# Patient Record
Sex: MALE | Race: BLACK OR AFRICAN AMERICAN | HISPANIC OR LATINO | URBAN - METROPOLITAN AREA
[De-identification: names, ages, dates, MRNs, and addresses within clinical notes are randomized per-mention and may not be internally consistent; named-entity substitution may affect disease eponyms.]

---

## 2021-05-03 ENCOUNTER — INPATIENT (INPATIENT)
Facility: HOSPITAL | Age: 66
LOS: 6 days | Discharge: HOME | DRG: 287 | End: 2021-05-10
Attending: INTERNAL MEDICINE | Admitting: INTERNAL MEDICINE
Payer: COMMERCIAL

## 2021-05-03 VITALS
HEIGHT: 70 IN | DIASTOLIC BLOOD PRESSURE: 81 MMHG | OXYGEN SATURATION: 97 % | HEART RATE: 80 BPM | TEMPERATURE: 99 F | WEIGHT: 220.02 LBS | SYSTOLIC BLOOD PRESSURE: 126 MMHG | RESPIRATION RATE: 18 BRPM

## 2021-05-03 DIAGNOSIS — E89.0 POSTPROCEDURAL HYPOTHYROIDISM: Chronic | ICD-10-CM

## 2021-05-03 DIAGNOSIS — Z98.89 OTHER SPECIFIED POSTPROCEDURAL STATES: Chronic | ICD-10-CM

## 2021-05-03 LAB
ALBUMIN SERPL ELPH-MCNC: 4.4 G/DL — SIGNIFICANT CHANGE UP (ref 3.3–5)
ALP SERPL-CCNC: 52 U/L — SIGNIFICANT CHANGE UP (ref 40–120)
ALT FLD-CCNC: 19 U/L — SIGNIFICANT CHANGE UP (ref 10–45)
ANION GAP SERPL CALC-SCNC: 16 MMOL/L — SIGNIFICANT CHANGE UP (ref 5–17)
APPEARANCE UR: CLEAR — SIGNIFICANT CHANGE UP
APTT BLD: 30.1 SEC — SIGNIFICANT CHANGE UP (ref 27.5–35.5)
AST SERPL-CCNC: 22 U/L — SIGNIFICANT CHANGE UP (ref 10–40)
BACTERIA # UR AUTO: NEGATIVE — SIGNIFICANT CHANGE UP
BASE EXCESS BLDV CALC-SCNC: 3.9 MMOL/L — HIGH (ref -2–2)
BASOPHILS # BLD AUTO: 0.02 K/UL — SIGNIFICANT CHANGE UP (ref 0–0.2)
BASOPHILS NFR BLD AUTO: 0.2 % — SIGNIFICANT CHANGE UP (ref 0–2)
BILIRUB SERPL-MCNC: 0.6 MG/DL — SIGNIFICANT CHANGE UP (ref 0.2–1.2)
BILIRUB UR-MCNC: NEGATIVE — SIGNIFICANT CHANGE UP
BUN SERPL-MCNC: 15 MG/DL — SIGNIFICANT CHANGE UP (ref 7–23)
CA-I SERPL-SCNC: 1.12 MMOL/L — SIGNIFICANT CHANGE UP (ref 1.12–1.3)
CALCIUM SERPL-MCNC: 9.7 MG/DL — SIGNIFICANT CHANGE UP (ref 8.4–10.5)
CHLORIDE BLDV-SCNC: 110 MMOL/L — HIGH (ref 96–108)
CHLORIDE SERPL-SCNC: 105 MMOL/L — SIGNIFICANT CHANGE UP (ref 96–108)
CO2 BLDV-SCNC: 32 MMOL/L — HIGH (ref 22–30)
CO2 SERPL-SCNC: 22 MMOL/L — SIGNIFICANT CHANGE UP (ref 22–31)
COLOR SPEC: SIGNIFICANT CHANGE UP
CREAT SERPL-MCNC: 1.11 MG/DL — SIGNIFICANT CHANGE UP (ref 0.5–1.3)
D DIMER BLD IA.RAPID-MCNC: 336 NG/ML DDU — HIGH
DIFF PNL FLD: NEGATIVE — SIGNIFICANT CHANGE UP
EOSINOPHIL # BLD AUTO: 0.08 K/UL — SIGNIFICANT CHANGE UP (ref 0–0.5)
EOSINOPHIL NFR BLD AUTO: 0.9 % — SIGNIFICANT CHANGE UP (ref 0–6)
EPI CELLS # UR: 0 /HPF — SIGNIFICANT CHANGE UP
GAS PNL BLDV: 138 MMOL/L — SIGNIFICANT CHANGE UP (ref 135–145)
GAS PNL BLDV: SIGNIFICANT CHANGE UP
GAS PNL BLDV: SIGNIFICANT CHANGE UP
GLUCOSE BLDV-MCNC: 96 MG/DL — SIGNIFICANT CHANGE UP (ref 70–99)
GLUCOSE SERPL-MCNC: 90 MG/DL — SIGNIFICANT CHANGE UP (ref 70–99)
GLUCOSE UR QL: NEGATIVE — SIGNIFICANT CHANGE UP
HCO3 BLDV-SCNC: 30 MMOL/L — HIGH (ref 21–29)
HCT VFR BLD CALC: 43.8 % — SIGNIFICANT CHANGE UP (ref 39–50)
HCT VFR BLDA CALC: 46 % — SIGNIFICANT CHANGE UP (ref 39–50)
HGB BLD CALC-MCNC: 15.1 G/DL — SIGNIFICANT CHANGE UP (ref 13–17)
HGB BLD-MCNC: 14.4 G/DL — SIGNIFICANT CHANGE UP (ref 13–17)
IMM GRANULOCYTES NFR BLD AUTO: 0.6 % — SIGNIFICANT CHANGE UP (ref 0–1.5)
INR BLD: 1.12 RATIO — SIGNIFICANT CHANGE UP (ref 0.88–1.16)
KETONES UR-MCNC: NEGATIVE — SIGNIFICANT CHANGE UP
LACTATE BLDV-MCNC: 1.6 MMOL/L — SIGNIFICANT CHANGE UP (ref 0.7–2)
LEUKOCYTE ESTERASE UR-ACNC: ABNORMAL
LYMPHOCYTES # BLD AUTO: 1.59 K/UL — SIGNIFICANT CHANGE UP (ref 1–3.3)
LYMPHOCYTES # BLD AUTO: 17.9 % — SIGNIFICANT CHANGE UP (ref 13–44)
MCHC RBC-ENTMCNC: 29 PG — SIGNIFICANT CHANGE UP (ref 27–34)
MCHC RBC-ENTMCNC: 32.9 GM/DL — SIGNIFICANT CHANGE UP (ref 32–36)
MCV RBC AUTO: 88.3 FL — SIGNIFICANT CHANGE UP (ref 80–100)
MONOCYTES # BLD AUTO: 0.93 K/UL — HIGH (ref 0–0.9)
MONOCYTES NFR BLD AUTO: 10.5 % — SIGNIFICANT CHANGE UP (ref 2–14)
NEUTROPHILS # BLD AUTO: 6.21 K/UL — SIGNIFICANT CHANGE UP (ref 1.8–7.4)
NEUTROPHILS NFR BLD AUTO: 69.9 % — SIGNIFICANT CHANGE UP (ref 43–77)
NITRITE UR-MCNC: NEGATIVE — SIGNIFICANT CHANGE UP
NRBC # BLD: 0 /100 WBCS — SIGNIFICANT CHANGE UP (ref 0–0)
PCO2 BLDV: 54 MMHG — HIGH (ref 35–50)
PH BLDV: 7.37 — SIGNIFICANT CHANGE UP (ref 7.35–7.45)
PH UR: 6.5 — SIGNIFICANT CHANGE UP (ref 5–8)
PLATELET # BLD AUTO: 194 K/UL — SIGNIFICANT CHANGE UP (ref 150–400)
PO2 BLDV: <20 MMHG — LOW (ref 25–45)
POTASSIUM BLDV-SCNC: 3.5 MMOL/L — SIGNIFICANT CHANGE UP (ref 3.5–5.3)
POTASSIUM SERPL-MCNC: 3.8 MMOL/L — SIGNIFICANT CHANGE UP (ref 3.5–5.3)
POTASSIUM SERPL-SCNC: 3.8 MMOL/L — SIGNIFICANT CHANGE UP (ref 3.5–5.3)
PROT SERPL-MCNC: 8.2 G/DL — SIGNIFICANT CHANGE UP (ref 6–8.3)
PROT UR-MCNC: NEGATIVE — SIGNIFICANT CHANGE UP
PROTHROM AB SERPL-ACNC: 13.4 SEC — SIGNIFICANT CHANGE UP (ref 10.6–13.6)
RBC # BLD: 4.96 M/UL — SIGNIFICANT CHANGE UP (ref 4.2–5.8)
RBC # FLD: 15 % — HIGH (ref 10.3–14.5)
RBC CASTS # UR COMP ASSIST: 0 /HPF — SIGNIFICANT CHANGE UP (ref 0–4)
SAO2 % BLDV: 20 % — LOW (ref 67–88)
SARS-COV-2 RNA SPEC QL NAA+PROBE: SIGNIFICANT CHANGE UP
SODIUM SERPL-SCNC: 143 MMOL/L — SIGNIFICANT CHANGE UP (ref 135–145)
SP GR SPEC: 1.01 — SIGNIFICANT CHANGE UP (ref 1.01–1.02)
TROPONIN T, HIGH SENSITIVITY RESULT: 31 NG/L — SIGNIFICANT CHANGE UP (ref 0–51)
TROPONIN T, HIGH SENSITIVITY RESULT: 32 NG/L — SIGNIFICANT CHANGE UP (ref 0–51)
TSH SERPL-MCNC: 1.58 UIU/ML — SIGNIFICANT CHANGE UP (ref 0.27–4.2)
UROBILINOGEN FLD QL: NEGATIVE — SIGNIFICANT CHANGE UP
WBC # BLD: 8.88 K/UL — SIGNIFICANT CHANGE UP (ref 3.8–10.5)
WBC # FLD AUTO: 8.88 K/UL — SIGNIFICANT CHANGE UP (ref 3.8–10.5)
WBC UR QL: 5 /HPF — SIGNIFICANT CHANGE UP (ref 0–5)

## 2021-05-03 RX ORDER — IBUPROFEN 200 MG
600 TABLET ORAL ONCE
Refills: 0 | Status: COMPLETED | OUTPATIENT
Start: 2021-05-03 | End: 2021-05-03

## 2021-05-03 RX ORDER — ACETAMINOPHEN 500 MG
975 TABLET ORAL ONCE
Refills: 0 | Status: COMPLETED | OUTPATIENT
Start: 2021-05-03 | End: 2021-05-03

## 2021-05-03 RX ORDER — ASPIRIN/CALCIUM CARB/MAGNESIUM 324 MG
81 TABLET ORAL DAILY
Refills: 0 | Status: DISCONTINUED | OUTPATIENT
Start: 2021-05-03 | End: 2021-05-10

## 2021-05-03 RX ADMIN — Medication 975 MILLIGRAM(S): at 16:38

## 2021-05-03 RX ADMIN — Medication 600 MILLIGRAM(S): at 20:15

## 2021-05-03 NOTE — ED ADULT NURSE NOTE - OBJECTIVE STATEMENT
65 year old male presented to ED from home with c/o of left sided neck pain radiating down left arm, and mid sternal chest pain radiating down right side of abdomen since last night. hx throat ca in remission, HTN, denies cardiac hx, reports left shoulder sx and back sx. pt denies SOB, nausea/vomiting, numbness/tingling, fever, cough, chills, dizziness, headache, blurred vision, neuro intact. pt a&ox3, lung sounds clear, heart rate regular, on cardiac monitor, EKG completed handed to MD, abdomen soft nontender nondistended to palp. skin intact. IV in right AC 20G and patent. Will continue to monitor and assess while offering support and reassurance.

## 2021-05-03 NOTE — ED ADULT NURSE NOTE - CAS EDN DISCHARGE ASSESSMENT
Alert and oriented to person, place and time/Patient baseline mental status/Awake/Symptoms improved Alert and oriented to person, place and time

## 2021-05-03 NOTE — ED PROVIDER NOTE - PROGRESS NOTE DETAILS
spoke with radiology who saw mixing defect vs thrombus of left atrium would recommend echo. -Vilma Cameron PA-C

## 2021-05-03 NOTE — ED CDU PROVIDER INITIAL DAY NOTE - PROGRESS NOTE DETAILS
CDU PROGRESS NOTE AXEL PALOMINO: Pt resting comfortably, NAD, VSS. No events on telemetry. Will continue to monitor, plan for TTE and Stress in am.

## 2021-05-03 NOTE — ED CDU PROVIDER INITIAL DAY NOTE - DETAILS
66 yo male with pmh htn, thyroidectomy s/p thyroid ca c/o chest pain  Plan: frequent reeval, vitals q 4hrs, tele, TTE, Nuclear stress

## 2021-05-03 NOTE — ED CDU PROVIDER INITIAL DAY NOTE - NS ED ROS FT
Constitutional: No fever or chills  Eyes: No visual changes, eye pain or redness  HEENT: No throat pain, ear pain, nasal pain. No nose bleeding.  CV: +Right sided chest pain  Resp: No SOB no cough  GI: No abd pain. No nausea or vomiting. No diarrhea. No constipation.   : No dysuria, hematuria.   MSK: +Left sided neck pain  Skin: No rash  Neuro: No headache. No numbness or tingling. No weakness.

## 2021-05-03 NOTE — ED CDU PROVIDER DISPOSITION NOTE - CLINICAL COURSE
66 yo male with pmh htn, thyroidectomy s/p thyroid ca presenting with concern of left sided neck and right sided chest pain since yesterday. Pt states he has been doing a lot of work on his house, yesterday awoke with left posterior neck pain, worse with movement and tender to palpation, took a muscle relaxer which did not help the pain, did not take anything else for the pain. Pt then started to have sharp right sided chest discomfort which radiates to the right side of his abdomen, has been waxing and waning but now constant, worse with deep inspiration, has not taken anything for pain, no fevers, chills nausea, vomiting, hx of abd surgery, shortness of breath, difficulty breathing, diarrhea, constipation.  In ED, patient had ekg no signs of acute ischemia, troponin 32--31, Dimer 336, chest x ray no signs of acute pathology. CT angio chest showed No pulmonary embolism. Scattered ground glass opacities of uncertain etiology. Left atrial appendage mixing artifact versus thrombus. Pt sent to CDU for frequent reeval, vitals q 4hrs, telemetry monitoring, TTE, Nuclear stress. 64 yo male with pmh htn, thyroidectomy s/p thyroid ca presenting with concern of left sided neck and right sided chest pain since yesterday. Pt states he has been doing a lot of work on his house, yesterday awoke with left posterior neck pain, worse with movement and tender to palpation, took a muscle relaxer which did not help the pain, did not take anything else for the pain. Pt then started to have sharp right sided chest discomfort which radiates to the right side of his abdomen, has been waxing and waning but now constant, worse with deep inspiration, has not taken anything for pain, no fevers, chills nausea, vomiting, hx of abd surgery, shortness of breath, difficulty breathing, diarrhea, constipation.  In ED, patient had ekg no signs of acute ischemia, troponin 32--31, Dimer 336, chest x ray no signs of acute pathology. CT angio chest showed No pulmonary embolism. Scattered ground glass opacities of uncertain etiology. Left atrial appendage mixing artifact versus thrombus. Pt sent to CDU for frequent reeval, vitals q 4hrs, telemetry monitoring, TTE, Nuclear stress.  No events on telemetry overnight. Pt with no return of CP/SOB in AM.   Called by cardiologist in stress lab, unable to stress pt with questionable LA thrombus noted on CT. Pt to be admitted for JERAD as TTE is suboptimal imaging to evaluate. Admission d/w Dr. Brizuela who will obtain cards consult.

## 2021-05-03 NOTE — ED PROVIDER NOTE - ATTENDING CONTRIBUTION TO CARE
RGUJRAL 66yo male hx listed presents with chest pain since yesterday. Pain is right side, worse with inspiration and self limiting. Pt also reports L side neck pain that is worse with movement and reproducible, states he has been doing a lot of housework. No numbness, tingling, weakness, HA, change in vision, n/v.   On exam, Patient is awake, alert and oriented x 3.  Patient is well appearing and in no acute distress.  NCAT, PERRL, EOMI.  Neck is supple, No LAD. + L TTP paraspinal.   Lungs are CTA B/L,+S1S2 no murmurs,  Abdomen:Soft nd/nt+bs no rebound or guarding.  Extremity no edema or calf tender.  Skin with no rash.  Neuro CN3-12 intact. Strength 5/5 in upper and lower extremities. Nml Sensation.Gait normal.   Check labs, EKG. Neck pain likely MSK, low suspicion for dissection, no manipulation or trauma.   Eval for ACS and CDU for stress test.

## 2021-05-03 NOTE — ED PROVIDER NOTE - PHYSICAL EXAMINATION
A&Ox3, NAD. NCAT. PERRL, EOMI. Neck supple, + left trapezius ttp, no LAD. Lungs CTAB. +S1S2, RRR, No m/r/g. Abd soft, NT/ND, neg murphys sign,  +BS, no rebound or guarding. Extremities: cap refill <2, pulses in distal extremities 4+, no edema. Skin without rash. CN II-XII intact. Strength 5/5 UE/LE. Sensations intact throughout. Gait steady.

## 2021-05-03 NOTE — ED CDU PROVIDER DISPOSITION NOTE - NSFOLLOWUPINSTRUCTIONS_ED_ALL_ED_FT
1. Follow up with your PMD within 48-72 hours.   You may schedule appointment with Cardiology clinic this week by calling [phone]  2. Show copies of your reports given to you. Take all of your other medications as previously prescribed.   3. Worsening or continued chest pain, shortness of breath, weakness, return to ED.

## 2021-05-03 NOTE — ED CDU PROVIDER INITIAL DAY NOTE - OBJECTIVE STATEMENT
64 yo male with pmh htn, thyroidectomy s/p thyroid ca presenting with concern of left sided neck and right sided chest pain since yesterday. Pt states he has been doing a lot of work on his house, yesterday awoke with left posterior neck pain, worse with movement and tender to palpation, took a muscle relaxer which did not help the pain, did not take anything else for the pain. Pt then started to have sharp right sided chest discomfort which radiates to the right side of his abdomen, has been waxing and waning but now constant, worse with deep inspiration, has not taken anything for pain, no fevers, chills nausea, vomiting, hx of abd surgery, shortness of breath, difficulty breathing, diarrhea, constipation.  In ED, patient had ekg no signs of acute ischemia, troponin 32--31, Dimer 336, chest x ray no signs of acute pathology. CT angio chest showed No pulmonary embolism. Scattered ground glass opacities of uncertain etiology. Left atrial appendage mixing artifact versus thrombus. Pt sent to CDU for frequent reeval, vitals q 4hrs, telemetry monitoring, TTE, Nuclear stress.

## 2021-05-03 NOTE — ED PROVIDER NOTE - NS ED ROS FT
Constitutional: No fever or chills  Eyes: No visual changes, eye pain or redness  HEENT: No throat pain, ear pain, nasal pain. No nose bleeding.  CV: see hpi  Resp: No SOB no cough  GI: No abd pain. No nausea or vomiting. No diarrhea. No constipation.   : No dysuria, hematuria.   MSK: see hpi  Skin: No rash  Neuro: No headache. No numbness or tingling. No weakness.

## 2021-05-03 NOTE — ED PROVIDER NOTE - OBJECTIVE STATEMENT
64 yo male with pmh htn, thyroidectomy presenting with concern of left sided neck and right sided chest pain since yesterday. Pt states he has been doing a lot of 64 yo male with pmh htn, thyroidectomy s/p thyroid ca presenting with concern of left sided neck and right sided chest pain since yesterday. Pt states he has been doing a lot of work on his house, yesterday awoke with left posterior neck pain, worse with movement and tender to palpation, took a muscle relaxer which did not help the pain, did not take anything else for the pain. Pt then started to have sharp right sided chest discomfort which radiates to the right side of his abdomen, has been waxing and waning but now constant, worse with deep inspiration, has not taken anything for pain, no fevers, chills nausea, vomiting, hx of abd surgery, shortness of breath, difficulty breathing, diarrhea, constipation.

## 2021-05-04 DIAGNOSIS — R91.8 OTHER NONSPECIFIC ABNORMAL FINDING OF LUNG FIELD: ICD-10-CM

## 2021-05-04 DIAGNOSIS — R07.89 OTHER CHEST PAIN: ICD-10-CM

## 2021-05-04 DIAGNOSIS — M54.2 CERVICALGIA: ICD-10-CM

## 2021-05-04 DIAGNOSIS — J45.909 UNSPECIFIED ASTHMA, UNCOMPLICATED: ICD-10-CM

## 2021-05-04 DIAGNOSIS — R07.9 CHEST PAIN, UNSPECIFIED: ICD-10-CM

## 2021-05-04 LAB
A1C WITH ESTIMATED AVERAGE GLUCOSE RESULT: 6 % — HIGH (ref 4–5.6)
CHOLEST SERPL-MCNC: 177 MG/DL — SIGNIFICANT CHANGE UP
ESTIMATED AVERAGE GLUCOSE: 126 MG/DL — HIGH (ref 68–114)
HDLC SERPL-MCNC: 47 MG/DL — SIGNIFICANT CHANGE UP
LIPID PNL WITH DIRECT LDL SERPL: 112 MG/DL — HIGH
NON HDL CHOLESTEROL: 130 MG/DL — HIGH
TRIGL SERPL-MCNC: 86 MG/DL — SIGNIFICANT CHANGE UP

## 2021-05-04 PROCEDURE — 31575 DIAGNOSTIC LARYNGOSCOPY: CPT

## 2021-05-04 PROCEDURE — 93306 TTE W/DOPPLER COMPLETE: CPT | Mod: 26

## 2021-05-04 PROCEDURE — 99222 1ST HOSP IP/OBS MODERATE 55: CPT | Mod: 25

## 2021-05-04 RX ORDER — ACETAMINOPHEN 500 MG
975 TABLET ORAL ONCE
Refills: 0 | Status: COMPLETED | OUTPATIENT
Start: 2021-05-04 | End: 2021-05-04

## 2021-05-04 RX ORDER — BUDESONIDE AND FORMOTEROL FUMARATE DIHYDRATE 160; 4.5 UG/1; UG/1
2 AEROSOL RESPIRATORY (INHALATION)
Refills: 0 | Status: DISCONTINUED | OUTPATIENT
Start: 2021-05-04 | End: 2021-05-10

## 2021-05-04 RX ORDER — ALBUTEROL 90 UG/1
1 AEROSOL, METERED ORAL
Refills: 0 | Status: DISCONTINUED | OUTPATIENT
Start: 2021-05-04 | End: 2021-05-10

## 2021-05-04 RX ORDER — TAMSULOSIN HYDROCHLORIDE 0.4 MG/1
0.4 CAPSULE ORAL AT BEDTIME
Refills: 0 | Status: DISCONTINUED | OUTPATIENT
Start: 2021-05-04 | End: 2021-05-10

## 2021-05-04 RX ORDER — ACETAMINOPHEN 500 MG
650 TABLET ORAL EVERY 6 HOURS
Refills: 0 | Status: DISCONTINUED | OUTPATIENT
Start: 2021-05-04 | End: 2021-05-10

## 2021-05-04 RX ADMIN — BUDESONIDE AND FORMOTEROL FUMARATE DIHYDRATE 2 PUFF(S): 160; 4.5 AEROSOL RESPIRATORY (INHALATION) at 17:06

## 2021-05-04 RX ADMIN — Medication 81 MILLIGRAM(S): at 07:49

## 2021-05-04 RX ADMIN — TAMSULOSIN HYDROCHLORIDE 0.4 MILLIGRAM(S): 0.4 CAPSULE ORAL at 21:46

## 2021-05-04 RX ADMIN — Medication 650 MILLIGRAM(S): at 23:20

## 2021-05-04 RX ADMIN — Medication 975 MILLIGRAM(S): at 04:50

## 2021-05-04 RX ADMIN — ALBUTEROL 1 PUFF(S): 90 AEROSOL, METERED ORAL at 23:28

## 2021-05-04 NOTE — CONSULT NOTE ADULT - PROBLEM SELECTOR RECOMMENDATION 9
-No acute ENT intervention necessary at this time  -Pt to follow with his out pt ENT as needed
CT chest with scattered GGO - unclear etiology  -Afebrile, normoxic   -COVID PCR neg  -Suggest f/u CT chest in ~1 month

## 2021-05-04 NOTE — H&P ADULT - NSICDXPASTMEDICALHX_GEN_ALL_CORE_FT
PAST MEDICAL HISTORY:  Gout     History of pituitary disease     Hypertension     Throat cancer     Thyroid cancer

## 2021-05-04 NOTE — CONSULT NOTE ADULT - PROBLEM SELECTOR RECOMMENDATION 2
by hx - recently diagnosed by PCP  -Start Symbicort 160mcg/4.5 mcg 2 puffs BID (Home med: Advair)  -Albuterol HFA 2 puffs q6h PRN

## 2021-05-04 NOTE — H&P ADULT - NSICDXPASTSURGICALHX_GEN_ALL_CORE_FT
HC routed chart stating patient having issues with metformin. Prior PharmD message notes improved tolerability.     Attempted outreach. No answer - no voicemail available. Sent my chart message.     Will continue to monitor and reach out.    PAST SURGICAL HISTORY:  H/O thyroidectomy

## 2021-05-04 NOTE — CONSULT NOTE ADULT - SUBJECTIVE AND OBJECTIVE BOX
PULMONARY CONSULT    HPI: 64 y/o M with PMH HTN, thyroidectomy s/p thyroid CA. Presents with left sided neck and right sided chest pain since yesterday. Pt states he has been doing a lot of work on his house, yesterday awoke with left posterior neck pain, worse with movement and tender to palpation, took a muscle relaxer which did not help the pain, did not take anything else for the pain. Pt then started to have sharp right sided chest discomfort which radiates to the right side of his abdomen, has been waxing and waning but now constant, worse with deep inspiration. CTA chest neg PE, found to have L atrial appendage mixing artifact vs. thrombus, and scattered GGO of uncertain etiology.       PAST MEDICAL & SURGICAL HISTORY:  Hypertension  Thyroid cancer  Throat cancer  History of pituitary disease  Gout  H/O thyroidectomy    Allergies  Allergy Status Unknown    FAMILY HISTORY:  No pertinent family history in first degree relatives    Social history:     Review of Systems:  CONSTITUTIONAL: No fever, chills, or fatigue  EYES: No eye pain, visual disturbances, or discharge  ENMT:  No difficulty hearing, tinnitus, vertigo; No sinus or throat pain  NECK: No pain or stiffness  RESPIRATORY: Per above  CARDIOVASCULAR: Per above  GASTROINTESTINAL: No abdominal or epigastric pain. No nausea, vomiting, or hematemesis; No diarrhea or constipation. No melena or hematochezia.  GENITOURINARY: No dysuria, frequency, hematuria, or incontinence  NEUROLOGICAL: No headaches, memory loss, loss of strength, numbness, or tremors  SKIN: No itching, burning, rashes, or lesions   MUSCULOSKELETAL: No joint pain or swelling; No muscle, back, or extremity pain  PSYCHIATRIC: No depression, anxiety, mood swings, or difficulty sleeping      Medications:  MEDICATIONS  (STANDING):  aspirin enteric coated 81 milliGRAM(s) Oral daily  tamsulosin 0.4 milliGRAM(s) Oral at bedtime    MEDICATIONS  (PRN):  acetaminophen   Tablet .. 650 milliGRAM(s) Oral every 6 hours PRN Temp greater or equal to 38.5C (101.3F), Mild Pain (1 - 3)  ALBUTerol    90 MICROgram(s) HFA Inhaler 1 Puff(s) Inhalation four times a day PRN Shortness of Breath and/or Wheezing            Vital Signs Last 24 Hrs  T(C): 36.7 (04 May 2021 11:36), Max: 36.7 (03 May 2021 23:06)  T(F): 98.1 (04 May 2021 11:36), Max: 98.1 (03 May 2021 23:06)  HR: 74 (04 May 2021 11:36) (65 - 79)  BP: 113/75 (04 May 2021 11:36) (106/71 - 144/71)  BP(mean): --  RR: 18 (04 May 2021 07:52) (16 - 20)  SpO2: 96% (04 May 2021 11:36) (96% - 100%)      VBG pH 7.37 05-03 @ 15:00  VBG pCO2 54 05-03 @ 15:00  VBG O2 sat 20 05-03 @ 15:00  VBG lactate 1.6 05-03 @ 15:00            LABS:                        14.4   8.88  )-----------( 194      ( 03 May 2021 15:00 )             43.8     05-03    143  |  105  |  15  ----------------------------<  90  3.8   |  22  |  1.11    Ca    9.7      03 May 2021 15:00    TPro  8.2  /  Alb  4.4  /  TBili  0.6  /  DBili  x   /  AST  22  /  ALT  19  /  AlkPhos  52  05-03          PT/INR - ( 03 May 2021 16:23 )   PT: 13.4 sec;   INR: 1.12 ratio         PTT - ( 03 May 2021 16:23 )  PTT:30.1 sec  Urinalysis Basic - ( 03 May 2021 16:45 )    Color: Light Yellow / Appearance: Clear / S.014 / pH: x  Gluc: x / Ketone: Negative  / Bili: Negative / Urobili: Negative   Blood: x / Protein: Negative / Nitrite: Negative   Leuk Esterase: Small / RBC: 0 /hpf / WBC 5 /HPF   Sq Epi: x / Non Sq Epi: 0 /hpf / Bacteria: Negative          Physical Examination:    General: No acute distress.      HEENT: Pupils equal, reactive to light.  Symmetric.    PULM: Clear to auscultation bilaterally, no significant sputum production    CVS: S1, S2    ABD: Soft, nondistended, nontender, normoactive bowel sounds, no masses    EXT: No edema, nontender    SKIN: Warm and well perfused, no rashes noted.    NEURO: Alert, oriented, interactive, nonfocal      RADIOLOGY REVIEWED    CT chest: < from: CT Angio Chest w/ IV Cont (21 @ 18:05) >  FINDINGS:    LUNGS AND AIRWAYS: Patent central airways.  Scattered groundglass opacities withlower lobe predominance. Mild bilateral lower lobe interlobular septal thickening.  PLEURA: No pleural effusion.  MEDIASTINUM AND COSME: No lymphadenopathy.  VESSELS: No pulmonary embolism  HEART: Heart size is normal. No pericardial effusion. Fillingdefect in the left atrial appendage.  CHEST WALL AND LOWER NECK: Within normal limits.  VISUALIZED UPPER ABDOMEN: Within normal limits.  BONES: Degenerative changes.    IMPRESSION:  No pulmonary embolism    Scattered ground glass opacities of uncertain etiology.    Left atrial appendage mixing artifact versus thrombus.      TTE: < from: Transthoracic Echocardiogram (21 @ 05:47) >  Dimensions:    Normal Values:  LA:     4.8    2.0 - 4.0 cm  Ao:     3.6    2.0 - 3.8 cm  SEPTUM: 1.0    0.6 - 1.2 cm  PWT:    1.0    0.6 - 1.1 cm  LVIDd:  5.2    3.0 - 5.6 cm  LVIDs:  3.2    1.8 - 4.0 cm  Derived variables:  LVMI: 89 g/m2  RWT: 0.38  EF (Visual Estimate): 65 %  Doppler Peak Velocity (m/sec): TV=2.8  ------------------------------------------------------------------------  Observations:  Mitral Valve: Normal mitral valve. Minimal mitral  regurgitation.  Aortic Valve/Aorta: Mildly calcified aortic valve with  normal opening. Mild aortic regurgitation.  Normal aortic root.  Left Atrium: Moderately dilated left atrium.  Left Ventricle: Normal left ventricular internal dimensions  and wall thicknesses.  Normal left ventricular systolic function. No segmental  wall motion abnormalities.  Left ventricular filling pressure is elevated.  Right Heart: Normal right atrium. Normal right ventricular  size and function.  Normal tricuspid valve. Mild tricuspid regurgitation.  Normal pulmonic valve. Mild pulmonic regurgitation.  Pericardium/Pleura: Normal pericardium with no pericardial  effusion.  Hemodynamic: Pulmonary hypertension. Estimated PASP = 32  mmHg + estimated mean right atrial pressure.  ------------------------------------------------------------------------  Conclusions:  Normal left ventricular systolic function. No segmental  wall motion abnormalities.  Left ventricular filling pressure is elevated.  Pulmonary hypertension. Estimated PASP = 32 mmHg +  estimated mean right atrial pressure.    < end of copied text >     PULMONARY CONSULT    HPI: 66 y/o M with PMH HTN, thyroidectomy s/p thyroid CA. Presents with left sided neck and right sided chest pain since yesterday. Pt states he has been doing a lot of work on his house, yesterday awoke with left posterior neck pain, worse with movement and tender to palpation. Pt then started to have sharp right sided chest discomfort which radiates to the right side of his abdomen, has been waxing and waning but now constant, worse with deep inspiration. CTA chest neg PE, found to have L atrial appendage mixing artifact vs. thrombus, and scattered GGO of uncertain etiology. 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 Denies fevers, chills, wheezing.       PAST MEDICAL & SURGICAL HISTORY:  Hypertension  Thyroid cancer  Throat cancer  History of pituitary disease  Gout  H/O thyroidectomy    Allergies  Allergy Status Unknown    FAMILY HISTORY:  No pertinent family history in first degree relatives    Social history: never a smoker     Review of Systems:  CONSTITUTIONAL: No fever, chills, or fatigue  EYES: No eye pain, visual disturbances, or discharge  ENMT:  No difficulty hearing, tinnitus, vertigo; No sinus or throat pain  NECK: No pain or stiffness  RESPIRATORY: Per above  CARDIOVASCULAR: Per above  GASTROINTESTINAL: No abdominal or epigastric pain. No nausea, vomiting, or hematemesis; No diarrhea or constipation. No melena or hematochezia.  GENITOURINARY: No dysuria, frequency, hematuria, or incontinence  NEUROLOGICAL: No headaches, memory loss, loss of strength, numbness, or tremors  SKIN: No itching, burning, rashes, or lesions   MUSCULOSKELETAL: Per above   PSYCHIATRIC: No depression, anxiety, mood swings, or difficulty sleeping      Medications:  MEDICATIONS  (STANDING):  aspirin enteric coated 81 milliGRAM(s) Oral daily  tamsulosin 0.4 milliGRAM(s) Oral at bedtime    MEDICATIONS  (PRN):  acetaminophen   Tablet .. 650 milliGRAM(s) Oral every 6 hours PRN Temp greater or equal to 38.5C (101.3F), Mild Pain (1 - 3)  ALBUTerol    90 MICROgram(s) HFA Inhaler 1 Puff(s) Inhalation four times a day PRN Shortness of Breath and/or Wheezing            Vital Signs Last 24 Hrs  T(C): 36.7 (04 May 2021 11:36), Max: 36.7 (03 May 2021 23:06)  T(F): 98.1 (04 May 2021 11:36), Max: 98.1 (03 May 2021 23:06)  HR: 74 (04 May 2021 11:36) (65 - 79)  BP: 113/75 (04 May 2021 11:36) (106/71 - 144/71)  BP(mean): --  RR: 18 (04 May 2021 07:52) (16 - 20)  SpO2: 96% (04 May 2021 11:36) (96% - 100%)      VBG pH 7.37 05-03 @ 15:00  VBG pCO2 54 05-03 @ 15:00  VBG O2 sat 20 05-03 @ 15:00  VBG lactate 1.6 05-03 @ 15:00            LABS:                        14.4   8.88  )-----------( 194      ( 03 May 2021 15:00 )             43.8     05-03    143  |  105  |  15  ----------------------------<  90  3.8   |  22  |  1.11    Ca    9.7      03 May 2021 15:00    TPro  8.2  /  Alb  4.4  /  TBili  0.6  /  DBili  x   /  AST  22  /  ALT  19  /  AlkPhos  52  05-03          PT/INR - ( 03 May 2021 16:23 )   PT: 13.4 sec;   INR: 1.12 ratio         PTT - ( 03 May 2021 16:23 )  PTT:30.1 sec  Urinalysis Basic - ( 03 May 2021 16:45 )    Color: Light Yellow / Appearance: Clear / S.014 / pH: x  Gluc: x / Ketone: Negative  / Bili: Negative / Urobili: Negative   Blood: x / Protein: Negative / Nitrite: Negative   Leuk Esterase: Small / RBC: 0 /hpf / WBC 5 /HPF   Sq Epi: x / Non Sq Epi: 0 /hpf / Bacteria: Negative          Physical Examination:    General: No acute distress.      HEENT: Pupils equal, reactive to light.  Symmetric.    PULM: Clear to auscultation bilaterally, no significant sputum production    CVS: RRR    ABD: Soft, nondistended, nontender, normoactive bowel sounds, no masses    EXT: No edema, nontender    SKIN: Warm and well perfused, no rashes noted.    NEURO: Alert, oriented, interactive, nonfocal      RADIOLOGY REVIEWED    CT chest: < from: CT Angio Chest w/ IV Cont (21 @ 18:05) >  FINDINGS:    LUNGS AND AIRWAYS: Patent central airways.  Scattered groundglass opacities withlower lobe predominance. Mild bilateral lower lobe interlobular septal thickening.  PLEURA: No pleural effusion.  MEDIASTINUM AND COSME: No lymphadenopathy.  VESSELS: No pulmonary embolism  HEART: Heart size is normal. No pericardial effusion. Fillingdefect in the left atrial appendage.  CHEST WALL AND LOWER NECK: Within normal limits.  VISUALIZED UPPER ABDOMEN: Within normal limits.  BONES: Degenerative changes.    IMPRESSION:  No pulmonary embolism    Scattered ground glass opacities of uncertain etiology.    Left atrial appendage mixing artifact versus thrombus.      TTE: < from: Transthoracic Echocardiogram (21 @ 05:47) >  Dimensions:    Normal Values:  LA:     4.8    2.0 - 4.0 cm  Ao:     3.6    2.0 - 3.8 cm  SEPTUM: 1.0    0.6 - 1.2 cm  PWT:    1.0    0.6 - 1.1 cm  LVIDd:  5.2    3.0 - 5.6 cm  LVIDs:  3.2    1.8 - 4.0 cm  Derived variables:  LVMI: 89 g/m2  RWT: 0.38  EF (Visual Estimate): 65 %  Doppler Peak Velocity (m/sec): TV=2.8  ------------------------------------------------------------------------  Observations:  Mitral Valve: Normal mitral valve. Minimal mitral  regurgitation.  Aortic Valve/Aorta: Mildly calcified aortic valve with  normal opening. Mild aortic regurgitation.  Normal aortic root.  Left Atrium: Moderately dilated left atrium.  Left Ventricle: Normal left ventricular internal dimensions  and wall thicknesses.  Normal left ventricular systolic function. No segmental  wall motion abnormalities.  Left ventricular filling pressure is elevated.  Right Heart: Normal right atrium. Normal right ventricular  size and function.  Normal tricuspid valve. Mild tricuspid regurgitation.  Normal pulmonic valve. Mild pulmonic regurgitation.  Pericardium/Pleura: Normal pericardium with no pericardial  effusion.  Hemodynamic: Pulmonary hypertension. Estimated PASP = 32  mmHg + estimated mean right atrial pressure.  ------------------------------------------------------------------------  Conclusions:  Normal left ventricular systolic function. No segmental  wall motion abnormalities.  Left ventricular filling pressure is elevated.  Pulmonary hypertension. Estimated PASP = 32 mmHg +  estimated mean right atrial pressure.    < end of copied text >

## 2021-05-04 NOTE — CONSULT NOTE ADULT - ATTENDING COMMENTS
right neck pain with movement. appears musculoskeletal however pt is being worked up to r/o cardiac etiologies. no palpable masses and no evidence of mass on CT chest. recommend workup per primary team and pt can f/u with his ENT as outpatient upon discharge to recheck.
agree w above  dw md

## 2021-05-04 NOTE — ED CDU PROVIDER SUBSEQUENT DAY NOTE - MEDICAL DECISION MAKING DETAILS
ALLIE Cummings MD: ck and right sided chest pain since yesterday. Pt states he has been doing a lot of work on his house, yesterday awoke with left posterior neck pain, worse with movement and tender to palpation, took a muscle relaxer which did not help the pain, did not take anything else for the pain. Pt then started to have sharp right sided chest discomfort which radiates to the right side of his abdomen, has been waxing and waning but now constant, worse with deep inspiration, has not taken anything for pain, no fevers, chills nausea, vomiting, hx of abd surgery, shortness of breath, difficulty breathing, diarrhea, constipation.  In ED, patient had ekg no signs of acute ischemia, troponin 32--31, Dimer 336, chest x ray no signs of acute pathology. CT angio chest showed No pulmonary embolism. Scattered ground glass opacities of uncertain etiology. Left atrial appendage mixing artifact versus thrombus. Pt sent to CDU for frequent reeval, vitals q 4hrs, telemetry monitoring, TTE, Nuclear stress.

## 2021-05-04 NOTE — ED CDU PROVIDER SUBSEQUENT DAY NOTE - PROGRESS NOTE DETAILS
Patient seen and evaluated at bedside, resting comfortably in NAD. No complaints. No CP/SOB. Most recent VSS. No events on telemetry monitor. TTE completed, pending results. Pt to also go for stress test today. ALLIE Cummings MD: Pt resting comfortably now, no complaints. VSS, no events O/N. Troponins stable. Results reviewed. CTA chest showed questionable BRENNEN thrombus. Pt is awaiting nuclear stress and echo at this time. If still question of thrombus after add'l testing, will consult cardiology. Called by cardiologist in stress lab, unable to stress pt with questionable LA thrombus noted on CT. Pt to be admitted for JERAD as TTE is suboptimal imaging to evaluate. Left VM for unattached cardiologist, Dr. Velazco. Will admit. D/w Dr. Cummings. Spoke with Dr. Brizuela regarding admission, will obtain his own cards consult.

## 2021-05-04 NOTE — H&P ADULT - NSHPREVIEWOFSYSTEMS_GEN_ALL_CORE
REVIEW OF SYSTEMS:    CONSTITUTIONAL: No weakness, fevers or chills  EYES/ENT: No visual changes;  No vertigo or throat pain   NECK: L side pain   RESPIRATORY: No cough, wheezing, hemoptysis; No shortness of breath  CARDIOVASCULAR: + chest pain no palpitations  GASTROINTESTINAL: No abdominal or epigastric pain. No nausea, vomiting, or hematemesis; No diarrhea or constipation. No melena or hematochezia.  GENITOURINARY: No dysuria, frequency or hematuria  NEUROLOGICAL: No numbness or weakness  SKIN: No itching, burning, rashes, or lesions   All other review of systems is negative unless indicated above.

## 2021-05-04 NOTE — CONSULT NOTE ADULT - SUBJECTIVE AND OBJECTIVE BOX
CC: neck pain    HPI: 64 yo male with pmh htn, VC ca s/p resection and radiation presenting with concern of left sided neck and right sided chest pain since yesterday. Pt states he has been doing a lot of work on his house, yesterday awoke with left posterior neck pain, worse with movement and tender to palpation, took a muscle relaxer which did not help the pain, did not take anything else for the pain. Pt then started to have sharp right sided chest discomfort which radiates to the right side of his abdomen, has been waxing and waning but now constant, worse with deep inspiration, has not taken anything for pain, no fevers, chills nausea, vomiting, hx of abd surgery, shortness of breath, difficulty breathing, diarrhea, constipation.    Pt notes hx of VC ca (unsure of which side) in 2007 which he had surgery and radiation. pt says he knows one of the cords 'stays' open and that is why his voice has changed. Pt notes he is able to eat solids and liquids in small quantities at a time as he is being careful. Pt otherwise denies throat pain, painful swallowing, difficulty tolerating secretions.         PAST MEDICAL & SURGICAL HISTORY:  Low back pain  Herniated disc on lumbar region    Redundant prepuce and phimosis    Arthritis  on knees    Hypothyroid  not taking any meds    Hypertension    Thyroid cancer    Throat cancer    History of pituitary disease    Gout    S/P excision of vocal cord nodule    H/O shoulder surgery  right    H/O thyroidectomy      Allergies    Allergy Status Unknown  No Known Allergies    Intolerances      MEDICATIONS  (STANDING):  aspirin enteric coated 81 milliGRAM(s) Oral daily  budesonide 160 MICROgram(s)/formoterol 4.5 MICROgram(s) Inhaler 2 Puff(s) Inhalation two times a day  tamsulosin 0.4 milliGRAM(s) Oral at bedtime    MEDICATIONS  (PRN):  acetaminophen   Tablet .. 650 milliGRAM(s) Oral every 6 hours PRN Temp greater or equal to 38.5C (101.3F), Mild Pain (1 - 3)  ALBUTerol    90 MICROgram(s) HFA Inhaler 1 Puff(s) Inhalation four times a day PRN Shortness of Breath and/or Wheezing      Social History: Never smoker    Family history:   Family history of stroke (Mother)    Family history of Alzheimer disease (Father)    ROS:   ENT: all negative except as noted in HPI   Skin: No itching, dryness, rash, changes to hair, or skin masses  CV: denies palpitations  Pulm: denies SOB, cough, hemoptysis  GI: denies change in appetite, indigestion, n/v  : denies pertinent urinary symptoms, urgency  Neuro: denies numbness/tingling, loss of sensation  Psych: denies anxiety  MS: denies muscle weakness, instability  Heme: denies easy bruising or bleeding  Endo: denies heat/cold intolerance, excessive sweating  Vascular: denies LE edema    Vital Signs Last 24 Hrs  T(C): 36.8 (04 May 2021 16:10), Max: 36.8 (04 May 2021 16:10)  T(F): 98.2 (04 May 2021 16:10), Max: 98.2 (04 May 2021 16:10)  HR: 85 (04 May 2021 16:10) (65 - 85)  BP: 138/89 (04 May 2021 16:10) (106/71 - 144/71)  BP(mean): 105 (04 May 2021 16:10) (105 - 105)  RR: 20 (04 May 2021 16:10) (16 - 20)  SpO2: 99% (04 May 2021 16:10) (96% - 100%)                          14.4   8.88  )-----------( 194      ( 03 May 2021 15:00 )             43.8    05-03    143  |  105  |  15  ----------------------------<  90  3.8   |  22  |  1.11    Ca    9.7      03 May 2021 15:00    TPro  8.2  /  Alb  4.4  /  TBili  0.6  /  DBili  x   /  AST  22  /  ALT  19  /  AlkPhos  52  05-03   PT/INR - ( 03 May 2021 16:23 )   PT: 13.4 sec;   INR: 1.12 ratio         PTT - ( 03 May 2021 16:23 )  PTT:30.1 sec    PHYSICAL EXAM:  Gen: NAD  Skin: No rashes, bruises, or lesions  Head: Normocephalic, Atraumatic  Face: no edema, erythema, or fluctuance. Parotid glands soft without mass  Eyes: no scleral injection  Nose: Nares bilaterally patent, no discharge  Mouth: No Stridor / Drooling / Trismus.  Mucosa moist, tongue/uvula midline, oropharynx clear  Neck: Flat, supple, no lymphadenopathy, trachea midline, no masses, no scars  Lymphatic: No lymphadenopathy  Resp: breathing easily, no stridor  CV: no peripheral edema/cyanosis  GI: nondistended   Peripheral vascular: no JVD or edema  Neuro: facial nerve intact, no facial droop      Fiberoptic Indirect laryngoscopy:  (Scope #2 used)  Reason for Laryngoscopy:    Patient was unable to cooperate with mirror.  Nasopharynx, oropharynx, and hypopharynx clear, no bleeding. Tongue base, posterior pharyngeal wall, vallecula, and subglottis appear normal. +Omega shaped epiglottis. No erythema, edema, pooling of secretions, masses or lesions. Airway patent, no foreign body visualized. No glottic/supraglottic edema. True vocal cords, arytenoids, vestibular folds, ventricles, pyriform sinuses, and aryepiglottic folds appear normal bilaterally. Glottis post surgery and radiation, both cords appear mobile, however, limited by patient excessive coughing. Appears to be maintaining his airway well             IMAGING/ADDITIONAL STUDIES:  CC: neck pain    HPI: 66 yo male with pmh htn, VC ca s/p resection and radiation presenting with concern of right sided neck and right sided chest pain since yesterday. Pt then started to have sharp right sided chest discomfort which radiates to the right side of his abdomen, has been waxing and waning but now constant, worse with deep inspiration, has not taken anything for pain, no fevers, chills nausea, vomiting, hx of abd surgery, shortness of breath, difficulty breathing, diarrhea, constipation. He ntoes now that when he turns his head he feels pain int he right neck that radiates down the chest.    Pt notes hx of VC ca (unsure of which side) in 2007 which he had surgery and radiation. pt says he knows one of the cords 'stays' open and that is why his voice has changed. Pt notes he is able to eat solids and liquids in small quantities at a time as he is being careful. Pt otherwise denies throat pain, painful swallowing, difficulty tolerating secretions.         PAST MEDICAL & SURGICAL HISTORY:  Low back pain  Herniated disc on lumbar region    Redundant prepuce and phimosis    Arthritis  on knees    Hypothyroid  not taking any meds    Hypertension    Thyroid cancer    Throat cancer    History of pituitary disease    Gout    S/P excision of vocal cord nodule    H/O shoulder surgery  right    H/O thyroidectomy      Allergies    Allergy Status Unknown  No Known Allergies    Intolerances      MEDICATIONS  (STANDING):  aspirin enteric coated 81 milliGRAM(s) Oral daily  budesonide 160 MICROgram(s)/formoterol 4.5 MICROgram(s) Inhaler 2 Puff(s) Inhalation two times a day  tamsulosin 0.4 milliGRAM(s) Oral at bedtime    MEDICATIONS  (PRN):  acetaminophen   Tablet .. 650 milliGRAM(s) Oral every 6 hours PRN Temp greater or equal to 38.5C (101.3F), Mild Pain (1 - 3)  ALBUTerol    90 MICROgram(s) HFA Inhaler 1 Puff(s) Inhalation four times a day PRN Shortness of Breath and/or Wheezing      Social History: Never smoker    Family history:   Family history of stroke (Mother)    Family history of Alzheimer disease (Father)    ROS:   ENT: all negative except as noted in HPI   Skin: No itching, dryness, rash, changes to hair, or skin masses  CV: denies palpitations  Pulm: denies SOB, cough, hemoptysis  GI: denies change in appetite, indigestion, n/v  : denies pertinent urinary symptoms, urgency  Neuro: denies numbness/tingling, loss of sensation  Psych: denies anxiety  MS: denies muscle weakness, instability  Heme: denies easy bruising or bleeding  Endo: denies heat/cold intolerance, excessive sweating  Vascular: denies LE edema    Vital Signs Last 24 Hrs  T(C): 36.8 (04 May 2021 16:10), Max: 36.8 (04 May 2021 16:10)  T(F): 98.2 (04 May 2021 16:10), Max: 98.2 (04 May 2021 16:10)  HR: 85 (04 May 2021 16:10) (65 - 85)  BP: 138/89 (04 May 2021 16:10) (106/71 - 144/71)  BP(mean): 105 (04 May 2021 16:10) (105 - 105)  RR: 20 (04 May 2021 16:10) (16 - 20)  SpO2: 99% (04 May 2021 16:10) (96% - 100%)                          14.4   8.88  )-----------( 194      ( 03 May 2021 15:00 )             43.8    05-03    143  |  105  |  15  ----------------------------<  90  3.8   |  22  |  1.11    Ca    9.7      03 May 2021 15:00    TPro  8.2  /  Alb  4.4  /  TBili  0.6  /  DBili  x   /  AST  22  /  ALT  19  /  AlkPhos  52  05-03   PT/INR - ( 03 May 2021 16:23 )   PT: 13.4 sec;   INR: 1.12 ratio         PTT - ( 03 May 2021 16:23 )  PTT:30.1 sec    PHYSICAL EXAM:  Gen: NAD  Skin: No rashes, bruises, or lesions  Head: Normocephalic, Atraumatic  Face: no edema, erythema, or fluctuance. Parotid glands soft without mass  Eyes: no scleral injection  Nose: Nares bilaterally patent, no discharge  Mouth: No Stridor / Drooling / Trismus.  Mucosa moist, tongue/uvula midline, oropharynx clear  Neck: Flat, supple, no lymphadenopathy, trachea midline, no masses, no scars, + radiation changes  Lymphatic: No lymphadenopathy  Resp: breathing easily, no stridor  CV: no peripheral edema/cyanosis  GI: nondistended   Peripheral vascular: no JVD or edema  Neuro: facial nerve intact, no facial droop      Fiberoptic Indirect laryngoscopy:  (Scope #2 used)  Reason for Laryngoscopy:    Patient was unable to cooperate with mirror.  Nasopharynx, oropharynx, and hypopharynx clear, no bleeding. Tongue base, posterior pharyngeal wall, vallecula, and subglottis appear normal. +Omega shaped epiglottis. No erythema, edema, pooling of secretions, masses or lesions. Airway patent, no foreign body visualized. No glottic/supraglottic edema. True vocal cords, arytenoids, vestibular folds, ventricles, pyriform sinuses, and aryepiglottic folds appear normal bilaterally. Glottis post surgery and radiation, both cords appear mobile, however, limited view by patient excessive coughing. Appears to be maintaining his airway well.      IMAGING/ADDITIONAL STUDIES: CT chest reviewed which covers the majority of the neck and no noted masses/lesion in right neck noted.

## 2021-05-04 NOTE — H&P ADULT - NSHPPHYSICALEXAM_GEN_ALL_CORE
PHYSICAL EXAMINATION:  Vital Signs Last 24 Hrs  T(C): 36.7 (04 May 2021 11:36), Max: 36.8 (03 May 2021 14:07)  T(F): 98.1 (04 May 2021 11:36), Max: 98.3 (03 May 2021 14:07)  HR: 74 (04 May 2021 11:36) (65 - 79)  BP: 113/75 (04 May 2021 11:36) (106/71 - 144/71)  BP(mean): --  RR: 18 (04 May 2021 07:52) (16 - 20)  SpO2: 96% (04 May 2021 11:36) (96% - 100%)  CAPILLARY BLOOD GLUCOSE          GENERAL: NAD, well-groomed, well-developed  HEAD:  atraumatic, normocephalic  EYES: sclera anicteric  ENMT: mucous membranes moist  NECK: supple, No JVD+ scar  CHEST/LUNG: clear to auscultation bilaterally; no rales, rhonchi, or wheezing b/l  HEART: normal S1, S2  ABDOMEN: BS+, soft, ND, NT   EXTREMITIES:  pulses palpable; no clubbing, cyanosis, or edema b/l LEs  NEURO: awake, alert, interactive; moves all extremities  SKIN: no rashes or lesions

## 2021-05-04 NOTE — H&P ADULT - ASSESSMENT
65 m with    Neck pain and hx of throat Ca  - ENT evaluation called    Possible atrial thrombus  - AC with Heparin? ( low Platelets)  - JERAD  - Cardiology evaluation called Dr. Bunch    Hypothyroidism  - continue Rx  - TSH    Hx of Pituitary disease  - check Prolactin  - Endocrine evaluation  - CT head     DVT prophylaxis  - PAS    Further action as per clinical course     Jose Carlos Brizuela MD pager [phone]      65 m with    Neck pain and hx of throat Ca  - ENT evaluation called    Possible atrial thrombus  - AC with Heparin? ( low Platelets)  - JERAD  - Cardiology evaluation called Dr. Bunch    Chest pain  - stress test   - cardiology follow    Chest opacities  - Pulmonary evaluation dr. Jean Baptiste    Hypothyroidism  - continue Rx  - TSH    Hx of Pituitary disease  - check Prolactin  - Endocrine evaluation  - CT head     DVT prophylaxis  - PAS    Further action as per clinical course     Jose Carlos Brizuela MD pager [phone]      65 m with    Neck pain and hx of throat Ca  - ENT evaluation called    Possible atrial thrombus  - AC with Heparin? ( low Platelets)  - JERAD  - Cardiology evaluation called Dr. Bunch    Chest pain  - stress test   - cardiology follow    Chest opacities  - Pulmonary evaluation    Hypothyroidism  - continue Rx  - TSH    Hx of Pituitary disease  - check Prolactin  - Endocrine evaluation  - CT head     DVT prophylaxis  - PAS    Further action as per clinical course     Jose Carlos Brizuela MD pager [phone]

## 2021-05-04 NOTE — H&P ADULT - NSHPLABSRESULTS_GEN_ALL_CORE
14.4   8.88  )-----------( 194      ( 03 May 2021 15:00 )             43.8           143  |  105  |  15  ----------------------------<  90  3.8   |  22  |  1.11    Ca    9.7      03 May 2021 15:00    TPro  8.2  /  Alb  4.4  /  TBili  0.6  /  DBili  x   /  AST  22  /  ALT  19  /  AlkPhos  52  05-03              Urinalysis Basic - ( 03 May 2021 16:45 )    Color: Light Yellow / Appearance: Clear / S.014 / pH: x  Gluc: x / Ketone: Negative  / Bili: Negative / Urobili: Negative   Blood: x / Protein: Negative / Nitrite: Negative   Leuk Esterase: Small / RBC: 0 /hpf / WBC 5 /HPF   Sq Epi: x / Non Sq Epi: 0 /hpf / Bacteria: Negative        PT/INR - ( 03 May 2021 16:23 )   PT: 13.4 sec;   INR: 1.12 ratio         PTT - ( 03 May 2021 16:23 )  PTT:30.1 sec    < from: CT Angio Chest w/ IV Cont (21 @ 18:05) >    IMPRESSION:  No pulmonary embolism    Scattered ground glass opacities of uncertain etiology.    Left atrial appendage mixing artifact versus thrombus.    < end of copied text >    < from: Xray Chest 2 Views PA/Lat (21 @ 15:03) >    IMPRESSION:    The heart is normal in size. The lungs are clear. No pleural effusion. No pneumothorax. No acute bony pathology could be identified.    < end of copied text >    EKG SR NSST/T

## 2021-05-04 NOTE — ED CDU PROVIDER SUBSEQUENT DAY NOTE - HISTORY
CDU PROGRESS NOTE AXEL PALOMINO: No interval change from prior exam. Pt resting comfortably, NAD, VSS. No events on telemetry. Plan for Nuclear stress and TTE in am.

## 2021-05-04 NOTE — CONSULT NOTE ADULT - PROBLEM SELECTOR RECOMMENDATION 4
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

## 2021-05-04 NOTE — H&P ADULT - HISTORY OF PRESENT ILLNESS
66 yo male with pmh htn, thyroidectomy s/p thyroid ca presenting with concern of left sided neck and right sided chest pain since yesterday. Pt states he has been doing a lot of work on his house, yesterday awoke with left posterior neck pain, worse with movement and tender to palpation, took a muscle relaxer which did not help the pain, did not take anything else for the pain. Pt then started to have sharp right sided chest discomfort which radiates to the right side of his abdomen, has been waxing and waning but now constant, worse with deep inspiration, has not taken anything for pain, no fevers, chills nausea, vomiting, hx of abd surgery, shortness of breath, difficulty breathing, diarrhea, constipation.  In ED, patient had ekg no signs of acute ischemia, troponin 32--31, Dimer 336, chest x ray no signs of acute pathology. CT angio chest showed No pulmonary embolism. Scattered ground glass opacities of uncertain etiology. Left atrial appendage mixing artifact versus thrombus.

## 2021-05-04 NOTE — H&P ADULT - NSHPSOCIALHISTORY_GEN_ALL_CORE
Social History:    Marital Status:  ( x  )    (   ) Single    (   )    (  )   Occupation: retired dann  Lives with: (  ) alone  (  ) children   (x  ) spouse   (  ) parents  (  ) other    Substance Use (street drugs): ( x ) never used  (  ) other:  Tobacco Usage:  ( x  ) never smoked   (   ) former smoker   (   ) current smoker  (     ) pack years  (        ) last cigarette date  Alcohol Usage: denies    (     ) Advanced Directives: (     ) None    (      ) DNR    (     ) DNI    (     ) Health Care Proxy:

## 2021-05-05 LAB
COVID-19 SPIKE DOMAIN AB INTERP: POSITIVE
COVID-19 SPIKE DOMAIN ANTIBODY RESULT: >250 U/ML — HIGH
HCV AB S/CO SERPL IA: 0.48 S/CO — SIGNIFICANT CHANGE UP (ref 0–0.99)
HCV AB SERPL-IMP: SIGNIFICANT CHANGE UP
PROLACTIN SERPL-MCNC: 6.1 NG/ML — SIGNIFICANT CHANGE UP (ref 4.1–18.4)
SARS-COV-2 IGG+IGM SERPL QL IA: >250 U/ML — HIGH
SARS-COV-2 IGG+IGM SERPL QL IA: POSITIVE
TSH SERPL-MCNC: 3.12 UIU/ML — SIGNIFICANT CHANGE UP (ref 0.27–4.2)

## 2021-05-05 RX ORDER — LEVOTHYROXINE SODIUM 125 MCG
175 TABLET ORAL DAILY
Refills: 0 | Status: DISCONTINUED | OUTPATIENT
Start: 2021-05-05 | End: 2021-05-10

## 2021-05-05 RX ADMIN — BUDESONIDE AND FORMOTEROL FUMARATE DIHYDRATE 2 PUFF(S): 160; 4.5 AEROSOL RESPIRATORY (INHALATION) at 17:08

## 2021-05-05 RX ADMIN — BUDESONIDE AND FORMOTEROL FUMARATE DIHYDRATE 2 PUFF(S): 160; 4.5 AEROSOL RESPIRATORY (INHALATION) at 05:56

## 2021-05-05 RX ADMIN — TAMSULOSIN HYDROCHLORIDE 0.4 MILLIGRAM(S): 0.4 CAPSULE ORAL at 21:27

## 2021-05-05 RX ADMIN — Medication 81 MILLIGRAM(S): at 11:03

## 2021-05-05 RX ADMIN — Medication 650 MILLIGRAM(S): at 22:00

## 2021-05-05 RX ADMIN — Medication 650 MILLIGRAM(S): at 00:08

## 2021-05-05 RX ADMIN — Medication 650 MILLIGRAM(S): at 21:27

## 2021-05-05 NOTE — PROGRESS NOTE ADULT - SUBJECTIVE AND OBJECTIVE BOX
Follow-up Pulm Progress Note    No new respiratory events overnight.  Denies SOB  CP improving     Medications:  MEDICATIONS  (STANDING):  aspirin enteric coated 81 milliGRAM(s) Oral daily  budesonide 160 MICROgram(s)/formoterol 4.5 MICROgram(s) Inhaler 2 Puff(s) Inhalation two times a day  levothyroxine 175 MICROGram(s) Oral daily  tamsulosin 0.4 milliGRAM(s) Oral at bedtime    MEDICATIONS  (PRN):  acetaminophen   Tablet .. 650 milliGRAM(s) Oral every 6 hours PRN Temp greater or equal to 38.5C (101.3F), Mild Pain (1 - 3)  ALBUTerol    90 MICROgram(s) HFA Inhaler 1 Puff(s) Inhalation four times a day PRN Shortness of Breath and/or Wheezing          Vital Signs Last 24 Hrs  T(C): 36.7 (05 May 2021 12:12), Max: 37.1 (04 May 2021 23:16)  T(F): 98 (05 May 2021 12:12), Max: 98.7 (04 May 2021 23:16)  HR: 76 (05 May 2021 12:12) (76 - 90)  BP: 127/85 (05 May 2021 12:12) (120/76 - 147/93)  BP(mean): 105 (04 May 2021 16:10) (105 - 105)  RR: 16 (05 May 2021 12:12) (16 - 20)  SpO2: 97% (05 May 2021 12:12) (93% - 99%)          -04 @ 07:01  -  - @ 07:00  --------------------------------------------------------  IN: 0 mL / OUT: 300 mL / NET: -300 mL            PT/INR - ( 03 May 2021 16:23 )   PT: 13.4 sec;   INR: 1.12 ratio         PTT - ( 03 May 2021 16:23 )  PTT:30.1 sec  Urinalysis Basic - ( 03 May 2021 16:45 )    Color: Light Yellow / Appearance: Clear / S.014 / pH: x  Gluc: x / Ketone: Negative  / Bili: Negative / Urobili: Negative   Blood: x / Protein: Negative / Nitrite: Negative   Leuk Esterase: Small / RBC: 0 /hpf / WBC 5 /HPF   Sq Epi: x / Non Sq Epi: 0 /hpf / Bacteria: Negative            Physical Examination:  PULM: Clear to auscultation bilaterally, no significant sputum production  CVS: S1, S2 heard    RADIOLOGY REVIEWED  CT chest: < from: CT Angio Chest w/ IV Cont (21 @ 18:05) >  FINDINGS:    LUNGS AND AIRWAYS: Patent central airways.  Scattered groundglass opacities withlower lobe predominance. Mild bilateral lower lobe interlobular septal thickening.  PLEURA: No pleural effusion.  MEDIASTINUM AND COSME: No lymphadenopathy.  VESSELS: No pulmonary embolism  HEART: Heart size is normal. No pericardial effusion. Fillingdefect in the left atrial appendage.  CHEST WALL AND LOWER NECK: Within normal limits.  VISUALIZED UPPER ABDOMEN: Within normal limits.  BONES: Degenerative changes.    IMPRESSION:  No pulmonary embolism    Scattered ground glass opacities of uncertain etiology.    Left atrial appendage mixing artifact versus thrombus.    Findings were discussed with AXEL ECKERT  5/3/2021 6:53 PM with read back confirmation.          < end of copied text >

## 2021-05-05 NOTE — PROGRESS NOTE ADULT - SUBJECTIVE AND OBJECTIVE BOX
Patient is a 65y old  Male who presents with a chief complaint of     SUBJECTIVE / OVERNIGHT EVENTS: no events over night     MEDICATIONS  (STANDING):  aspirin enteric coated 81 milliGRAM(s) Oral daily  budesonide 160 MICROgram(s)/formoterol 4.5 MICROgram(s) Inhaler 2 Puff(s) Inhalation two times a day  levothyroxine 175 MICROGram(s) Oral daily  tamsulosin 0.4 milliGRAM(s) Oral at bedtime    MEDICATIONS  (PRN):  acetaminophen   Tablet .. 650 milliGRAM(s) Oral every 6 hours PRN Temp greater or equal to 38.5C (101.3F), Mild Pain (1 - 3)  ALBUTerol    90 MICROgram(s) HFA Inhaler 1 Puff(s) Inhalation four times a day PRN Shortness of Breath and/or Wheezing      CAPILLARY BLOOD GLUCOSE        I&O's Summary    04 May 2021 07:01  -  05 May 2021 07:00  --------------------------------------------------------  IN: 0 mL / OUT: 300 mL / NET: -300 mL    05 May 2021 07:01  -  05 May 2021 20:58  --------------------------------------------------------  IN: 480 mL / OUT: 150 mL / NET: 330 mL      T(C): 36.7 (05-05-21 @ 20:54), Max: 36.7 (05-05-21 @ 12:12)  HR: 84 (05-05-21 @ 20:54) (76 - 84)  BP: 128/76 (05-05-21 @ 20:54) (127/85 - 128/76)  RR: 17 (05-05-21 @ 20:54) (16 - 17)  SpO2: 96% (05-05-21 @ 20:54) (96% - 97%)    PHYSICAL EXAM:  GENERAL: NAD, well-developed  HEAD:  Atraumatic, Normocephalic  EYES: EOMI, PERRLA, conjunctiva and sclera clear  NECK: Supple, No JVD  CHEST/LUNG: Clear to auscultation bilaterally; No wheeze  HEART: Regular rate and rhythm; No murmurs, rubs, or gallops  ABDOMEN: Soft, Nontender, Nondistended; Bowel sounds present  EXTREMITIES:  2+ Peripheral Pulses, No clubbing, cyanosis, or edema  PSYCH: AAOx3  NEUROLOGY: non-focal  SKIN: No rashes or lesions                    RADIOLOGY & ADDITIONAL TESTS:    Imaging Personally Reviewed:    Consultant(s) Notes Reviewed:      Care Discussed with Consultants/Other Providers:

## 2021-05-05 NOTE — PHARMACOTHERAPY INTERVENTION NOTE - COMMENTS
Confirmed home dose of levothyroxine (175 mcg daily) with patient and wife over the phone (with patient's permission). Patient's wife confirmed that even though the pharmacy has not filled the medication in a while, the patient is still currently taking this dose and has an adequate supply at home. TSH level is normal this admission. Recommended resuming home dose if no contraindications.    Raquel Longo, PharmD, BCPS  [phone]

## 2021-05-06 LAB
ANION GAP SERPL CALC-SCNC: 12 MMOL/L — SIGNIFICANT CHANGE UP (ref 5–17)
BUN SERPL-MCNC: 20 MG/DL — SIGNIFICANT CHANGE UP (ref 7–23)
CALCIUM SERPL-MCNC: 9 MG/DL — SIGNIFICANT CHANGE UP (ref 8.4–10.5)
CHLORIDE SERPL-SCNC: 106 MMOL/L — SIGNIFICANT CHANGE UP (ref 96–108)
CO2 SERPL-SCNC: 22 MMOL/L — SIGNIFICANT CHANGE UP (ref 22–31)
CREAT SERPL-MCNC: 1.2 MG/DL — SIGNIFICANT CHANGE UP (ref 0.5–1.3)
GLUCOSE SERPL-MCNC: 104 MG/DL — HIGH (ref 70–99)
POTASSIUM SERPL-MCNC: 3.8 MMOL/L — SIGNIFICANT CHANGE UP (ref 3.5–5.3)
POTASSIUM SERPL-SCNC: 3.8 MMOL/L — SIGNIFICANT CHANGE UP (ref 3.5–5.3)
SODIUM SERPL-SCNC: 140 MMOL/L — SIGNIFICANT CHANGE UP (ref 135–145)

## 2021-05-06 PROCEDURE — 70553 MRI BRAIN STEM W/O & W/DYE: CPT | Mod: 26

## 2021-05-06 RX ORDER — HEPARIN SODIUM 5000 [USP'U]/ML
INJECTION INTRAVENOUS; SUBCUTANEOUS
Qty: 25000 | Refills: 0 | Status: DISCONTINUED | OUTPATIENT
Start: 2021-05-06 | End: 2021-05-07

## 2021-05-06 RX ORDER — HEPARIN SODIUM 5000 [USP'U]/ML
4000 INJECTION INTRAVENOUS; SUBCUTANEOUS EVERY 6 HOURS
Refills: 0 | Status: DISCONTINUED | OUTPATIENT
Start: 2021-05-06 | End: 2021-05-07

## 2021-05-06 RX ORDER — HEPARIN SODIUM 5000 [USP'U]/ML
8000 INJECTION INTRAVENOUS; SUBCUTANEOUS ONCE
Refills: 0 | Status: COMPLETED | OUTPATIENT
Start: 2021-05-06 | End: 2021-05-06

## 2021-05-06 RX ORDER — HEPARIN SODIUM 5000 [USP'U]/ML
8000 INJECTION INTRAVENOUS; SUBCUTANEOUS EVERY 6 HOURS
Refills: 0 | Status: DISCONTINUED | OUTPATIENT
Start: 2021-05-06 | End: 2021-05-07

## 2021-05-06 RX ADMIN — TAMSULOSIN HYDROCHLORIDE 0.4 MILLIGRAM(S): 0.4 CAPSULE ORAL at 21:34

## 2021-05-06 RX ADMIN — HEPARIN SODIUM 1800 UNIT(S)/HR: 5000 INJECTION INTRAVENOUS; SUBCUTANEOUS at 19:26

## 2021-05-06 RX ADMIN — BUDESONIDE AND FORMOTEROL FUMARATE DIHYDRATE 2 PUFF(S): 160; 4.5 AEROSOL RESPIRATORY (INHALATION) at 18:20

## 2021-05-06 RX ADMIN — Medication 81 MILLIGRAM(S): at 12:37

## 2021-05-06 RX ADMIN — HEPARIN SODIUM 8000 UNIT(S): 5000 INJECTION INTRAVENOUS; SUBCUTANEOUS at 19:27

## 2021-05-06 RX ADMIN — Medication 175 MICROGRAM(S): at 05:22

## 2021-05-06 RX ADMIN — BUDESONIDE AND FORMOTEROL FUMARATE DIHYDRATE 2 PUFF(S): 160; 4.5 AEROSOL RESPIRATORY (INHALATION) at 05:22

## 2021-05-06 NOTE — PROGRESS NOTE ADULT - SUBJECTIVE AND OBJECTIVE BOX
Follow-up Pulm Progress Note    No new respiratory events overnight.  Denies SOB/CP.     Medications:  MEDICATIONS  (STANDING):  aspirin enteric coated 81 milliGRAM(s) Oral daily  budesonide 160 MICROgram(s)/formoterol 4.5 MICROgram(s) Inhaler 2 Puff(s) Inhalation two times a day  levothyroxine 175 MICROGram(s) Oral daily  tamsulosin 0.4 milliGRAM(s) Oral at bedtime    MEDICATIONS  (PRN):  acetaminophen   Tablet .. 650 milliGRAM(s) Oral every 6 hours PRN Temp greater or equal to 38.5C (101.3F), Mild Pain (1 - 3)  ALBUTerol    90 MICROgram(s) HFA Inhaler 1 Puff(s) Inhalation four times a day PRN Shortness of Breath and/or Wheezing          Vital Signs Last 24 Hrs  T(C): 36.6 (06 May 2021 12:12), Max: 36.7 (05 May 2021 20:54)  T(F): 97.9 (06 May 2021 12:12), Max: 98.1 (05 May 2021 20:54)  HR: 81 (06 May 2021 12:12) (74 - 84)  BP: 122/66 (06 May 2021 12:12) (118/50 - 134/76)  BP(mean): --  RR: 18 (06 May 2021 12:12) (17 - 18)  SpO2: 98% (06 May 2021 12:12) (94% - 98%)          05-05 @ 07:01  -  05-06 @ 07:00  --------------------------------------------------------  IN: 480 mL / OUT: 600 mL / NET: -120 mL          LABS:    05-06    140  |  106  |  20  ----------------------------<  104<H>  3.8   |  22  |  1.20    Ca    9.0      06 May 2021 05:59              Physical Examination:  PULM: Clear to auscultation bilaterally, no significant sputum production  CVS: S1, S2 heard    RADIOLOGY REVIEWED  CT chest: < from: CT Angio Chest w/ IV Cont (05.03.21 @ 18:05) >  FINDINGS:    LUNGS AND AIRWAYS: Patent central airways.  Scattered groundglass opacities withlower lobe predominance. Mild bilateral lower lobe interlobular septal thickening.  PLEURA: No pleural effusion.  MEDIASTINUM AND COSME: No lymphadenopathy.  VESSELS: No pulmonary embolism  HEART: Heart size is normal. No pericardial effusion. Fillingdefect in the left atrial appendage.  CHEST WALL AND LOWER NECK: Within normal limits.  VISUALIZED UPPER ABDOMEN: Within normal limits.  BONES: Degenerative changes.    IMPRESSION:  No pulmonary embolism    Scattered ground glass opacities of uncertain etiology.    Left atrial appendage mixing artifact versus thrombus.    Findings were discussed with AXEL ECKERT  5/3/2021 6:53 PM with read back confirmation.          < end of copied text >

## 2021-05-06 NOTE — CONSULT NOTE ADULT - ASSESSMENT
66 yo male w new onset L neck pain and R chest pain. 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 Laryngoscopy WNL, cords mobile w minimal post-radiation and surgical changes. Pt denying hx of thyroidectomy although documented. However, acute presentation unlikely related to patients head/neck hx    Chest CT reviewed, neck up to the mandible included which appears WNL per Dr. Almanzar
64 y/o M with PMH HTN, thyroidectomy s/p thyroid CA. Presents with left sided neck and right sided chest pain since yesterday. Pt states he has been doing a lot of work on his house, yesterday awoke with left posterior neck pain, worse with movement and tender to palpation. Pt then started to have sharp right sided chest discomfort which radiates to the right side of his abdomen, has been waxing and waning but now constant, worse with deep inspiration. CTA chest neg PE, found to have L atrial appendage mixing artifact vs. thrombus, and scattered GGO of uncertain etiology. 
Patient seen and examined, agree with above assessment and plan as transcribed above.    - CTA of chest with possible BRENNEN thrombus  - Start heparin gtt for CVA prophylaxis  - JERAD tomorrow r/o LA mass /clot  - If no sign of thrombus can D.C AC  - No evidence of PAF on tele thus far and he has had no history of CVA  - Stress test pending JERAD result  - D/W team and wife Dea  - Oupt cardiac f/u [phone]    Justice Kerr MD, [facility]  BEEPER [phone]

## 2021-05-06 NOTE — CHART NOTE - NSCHARTNOTEFT_GEN_A_CORE
Consult called as patient on cabergoline and he is a poor historian. Per his PCP's office staff, Dr. Harris just refilled the medication. As a result would reach out to him. Also without a level, I am unable to adjust anything. Check a PRL level and if its elevated (30 or greater), then call a consult.  Discussed with the attg Dr. Jose Carlos Brizuela.  Sandrine Soriano MD  Endocrinology Attending  Mondays and Tuesdays 9am-6pm: [phone] (pager)  Other days, night and weekend: [phone]
PA called for JERAD clearance.   Pt indirect laryngoscopy from 5/4 without any contraindication for procedure   - may procedure with JERAD  - call ent prn
Asked by Cardiology, AXEL Alvarado to order Heparin gtt Full AC for concern of Left Atrial thrombus, Heparin gtt ordered. Patient and RN made aware.    Edouard Dejesus, HELGA  [phone]
Endocrine consulted for hx of "prolactin disorder". Pt's prolactin is normal. Pt. can follow-up as outpatient. Please reconsult if needed.      Tom Ramirez D.O  [phone]

## 2021-05-06 NOTE — PROGRESS NOTE ADULT - SUBJECTIVE AND OBJECTIVE BOX
Patient is a 65y old  Male who presents with a chief complaint of     SUBJECTIVE / OVERNIGHT EVENTS: no events over night     T(C): 36.7 (05-06-21 @ 21:30), Max: 36.7 (05-06-21 @ 21:30)  HR: 76 (05-06-21 @ 21:30) (76 - 81)  BP: 119/77 (05-06-21 @ 21:30) (119/77 - 122/66)  RR: 17 (05-06-21 @ 21:30) (17 - 18)  SpO2: 96% (05-06-21 @ 21:30) (96% - 98%)    MEDICATIONS  (STANDING):  aspirin enteric coated 81 milliGRAM(s) Oral daily  budesonide 160 MICROgram(s)/formoterol 4.5 MICROgram(s) Inhaler 2 Puff(s) Inhalation two times a day  heparin  Infusion.  Unit(s)/Hr (18 mL/Hr) IV Continuous <Continuous>  levothyroxine 175 MICROGram(s) Oral daily  tamsulosin 0.4 milliGRAM(s) Oral at bedtime    MEDICATIONS  (PRN):  acetaminophen   Tablet .. 650 milliGRAM(s) Oral every 6 hours PRN Temp greater or equal to 38.5C (101.3F), Mild Pain (1 - 3)  ALBUTerol    90 MICROgram(s) HFA Inhaler 1 Puff(s) Inhalation four times a day PRN Shortness of Breath and/or Wheezing  heparin   Injectable 8000 Unit(s) IV Push every 6 hours PRN For aPTT less than 40  heparin   Injectable 4000 Unit(s) IV Push every 6 hours PRN For aPTT between 40 - 57      PHYSICAL EXAM:  GENERAL: NAD, well-developed  HEAD:  Atraumatic, Normocephalic  EYES: EOMI, PERRLA, conjunctiva and sclera clear  NECK: Supple, No JVD  CHEST/LUNG: Clear to auscultation bilaterally; No wheeze  HEART: Regular rate and rhythm; No murmurs, rubs, or gallops  ABDOMEN: Soft, Nontender, Nondistended; Bowel sounds present  EXTREMITIES:  2+ Peripheral Pulses, No clubbing, cyanosis, or edema  PSYCH: AAOx3  NEUROLOGY: non-focal  SKIN: No rashes or lesions                  140|106|20<104  3.8|22|1.20  9.0,--,--  05-06 @ 05:59                RADIOLOGY & ADDITIONAL TESTS:    Imaging Personally Reviewed:    Consultant(s) Notes Reviewed:      Care Discussed with Consultants/Other Providers:

## 2021-05-06 NOTE — CONSULT NOTE ADULT - SUBJECTIVE AND OBJECTIVE BOX
HISTORY OF PRESENT ILLNESS: HPI:  Patient is a 64 y/o male with PMH of HTN, thyroidectomy s/p thyroid ca who presented with left sided neck and right sided chest pain. CT angio chest showed "no pulmonary embolism. Scattered ground glass opacities of uncertain etiology. Left atrial appendage mixing artifact versus thrombus." Cardiology consulted for further evaluation. Pt states he has been doing a lot of work on his house, yesterday awoke with left posterior neck pain, worse with movement and tender to palpation, took a muscle relaxer which did not help the pain, did not take anything else for the pain. Pt then started to have sharp right sided chest discomfort which radiates to the right side of his abdomen, has been waxing and waning but now constant, worse with deep inspiration, has not taken anything for pain. Reports chest pain resolved. Denies prior cardiac history. Denies SOB, palpitations, dizziness, or syncope.    PAST MEDICAL & SURGICAL HISTORY:  Low back pain  Herniated disc on lumbar region    Redundant prepuce and phimosis    Arthritis  on knees    Hypothyroid  not taking any meds    Hypertension    Thyroid cancer    Throat cancer    History of pituitary disease    Gout    S/P excision of vocal cord nodule    H/O shoulder surgery  right    H/O thyroidectomy      MEDICATIONS:  MEDICATIONS  (STANDING):  aspirin enteric coated 81 milliGRAM(s) Oral daily  budesonide 160 MICROgram(s)/formoterol 4.5 MICROgram(s) Inhaler 2 Puff(s) Inhalation two times a day  levothyroxine 175 MICROGram(s) Oral daily  tamsulosin 0.4 milliGRAM(s) Oral at bedtime      Allergies    Allergy Status Unknown  No Known Allergies    Intolerances        FAMILY HISTORY:  Family history of stroke (Mother)    Family history of Alzheimer&#x27;s disease (Father)    No pertinent family history in first degree relatives      Non-contributary for premature coronary disease or sudden cardiac death    SOCIAL HISTORY:    [x ] Non-smoker  [ ] Smoker  [ ] Alcohol    FLU VACCINE THIS YEAR STARTS IN AUGUST:  [ ] Yes    [ ] No    IF OVER 65 HAVE YOU EVER HAD A PNA VACCINE:  [ ] Yes    [ ] No       [ ] N/A      REVIEW OF SYSTEMS:  [x ]chest pain  [  ]shortness of breath  [  ]palpitations  [  ]syncope  [ ]near syncope [ ]upper extremity weakness   [ ] lower extremity weakness  [  ]diplopia  [  ]altered mental status   [  ]fevers  [ ]chills [ ]nausea  [ ]vomitting  [  ]dysphagia    [ ]abdominal pain  [ ]melena  [ ]BRBPR    [  ]epistaxis  [  ]rash    [ ]lower extremity edema        [ x] All others negative	  [ ] Unable to obtain      LABS:	 	    CARDIAC MARKERS:          Hb Trend:     05-06    140  |  106  |  20  ----------------------------<  104<H>  3.8   |  22  |  1.20    Ca    9.0      06 May 2021 05:59      Creatinine Trend: 1.20<--, 1.11<--    Coags:      proBNP:   Lipid Profile:   HgA1c:   TSH:         PHYSICAL EXAM:  T(C): 36.6 (05-06-21 @ 12:12), Max: 36.7 (05-05-21 @ 20:54)  HR: 81 (05-06-21 @ 12:12) (74 - 84)  BP: 122/66 (05-06-21 @ 12:12) (118/50 - 134/76)  RR: 18 (05-06-21 @ 12:12) (17 - 18)  SpO2: 98% (05-06-21 @ 12:12) (94% - 98%)  Wt(kg): --   BMI (kg/m2): 31.6 (05-03-21 @ 12:28)  I&O's Summary    05 May 2021 07:01  -  06 May 2021 07:00  --------------------------------------------------------  IN: 480 mL / OUT: 600 mL / NET: -120 mL    06 May 2021 07:01  -  06 May 2021 16:28  --------------------------------------------------------  IN: 240 mL / OUT: 0 mL / NET: 240 mL        Gen: Appears well in NAD  HEENT:  (-)icterus (-)pallor  CV: N S1 S2 1/6 WOJCIECH (+)2 Pulses B/l  Resp:  Clear to auscultation B/L, normal effort  GI: (+) BS Soft, NT, ND  Lymph:  (-)Edema, (-)obvious lymphadenopathy  Skin: Warm to touch, Normal turgor  Psych: Appropriate mood and affect    TELEMETRY: SR 60-80s	      ECG: NSR, nonspecific T wave abnormality 	    < from: Transthoracic Echocardiogram (05.04.21 @ 05:47) >  Observations:  Mitral Valve: Normal mitral valve. Minimal mitral  regurgitation.  Aortic Valve/Aorta: Mildly calcified aortic valve with  normal opening. Mild aortic regurgitation.  Normal aortic root.  Left Atrium: Moderately dilated left atrium.  Left Ventricle: Normal left ventricular internal dimensions  and wall thicknesses.  Normal left ventricular systolic function. No segmental  wall motion abnormalities.  Left ventricular filling pressure is elevated.  Right Heart: Normal right atrium. Normal right ventricular  size and function.  Normal tricuspid valve. Mild tricuspid regurgitation.  Normal pulmonic valve. Mild pulmonic regurgitation.  Pericardium/Pleura: Normal pericardium with no pericardial  effusion.  Hemodynamic: Pulmonary hypertension. Estimated PASP = 32  mmHg + estimated mean right atrial pressure.    < end of copied text >      RADIOLOGY:  < from: CT Angio Chest w/ IV Cont (05.03.21 @ 18:05) >  IMPRESSION:  No pulmonary embolism    Scattered ground glass opacities of uncertain etiology.    Left atrial appendage mixing artifact versus thrombus.    < end of copied text >      ASSESSMENT/PLAN: Patient is a 64 y/o male with PMH of HTN, thyroidectomy s/p thyroid ca who presented with left sided neck and right sided chest pain. CT angio chest showed "no pulmonary embolism. Scattered ground glass opacities of uncertain etiology. Left atrial appendage mixing artifact versus thrombus." Cardiology consulted for further evaluation.    - Stable indeterminate troponin noted, no acute ischemic EKG changes noted  - Pulrosibel diaz appreciated  - Make NPO after midnight for JERAD tomorrow to r/o BRENNEN thrombus  - In meantime, would start hep gtt until JERAD results  - Can check eventual exercise NST once JERAD results known  - Further recs pending above    Pedro Alvarado PA-C  Pager: [phone]

## 2021-05-07 LAB
ANION GAP SERPL CALC-SCNC: 14 MMOL/L — SIGNIFICANT CHANGE UP (ref 5–17)
APTT BLD: 120.1 SEC — CRITICAL HIGH (ref 27.5–35.5)
APTT BLD: 130.4 SEC — CRITICAL HIGH (ref 27.5–35.5)
BUN SERPL-MCNC: 18 MG/DL — SIGNIFICANT CHANGE UP (ref 7–23)
CALCIUM SERPL-MCNC: 8.8 MG/DL — SIGNIFICANT CHANGE UP (ref 8.4–10.5)
CHLORIDE SERPL-SCNC: 106 MMOL/L — SIGNIFICANT CHANGE UP (ref 96–108)
CO2 SERPL-SCNC: 21 MMOL/L — LOW (ref 22–31)
CREAT SERPL-MCNC: 1.16 MG/DL — SIGNIFICANT CHANGE UP (ref 0.5–1.3)
GLUCOSE SERPL-MCNC: 101 MG/DL — HIGH (ref 70–99)
HCT VFR BLD CALC: 35.1 % — LOW (ref 39–50)
HCT VFR BLD CALC: 37.5 % — LOW (ref 39–50)
HGB BLD-MCNC: 11.7 G/DL — LOW (ref 13–17)
HGB BLD-MCNC: 12.4 G/DL — LOW (ref 13–17)
MCHC RBC-ENTMCNC: 28.5 PG — SIGNIFICANT CHANGE UP (ref 27–34)
MCHC RBC-ENTMCNC: 28.6 PG — SIGNIFICANT CHANGE UP (ref 27–34)
MCHC RBC-ENTMCNC: 33.1 GM/DL — SIGNIFICANT CHANGE UP (ref 32–36)
MCHC RBC-ENTMCNC: 33.3 GM/DL — SIGNIFICANT CHANGE UP (ref 32–36)
MCV RBC AUTO: 85.4 FL — SIGNIFICANT CHANGE UP (ref 80–100)
MCV RBC AUTO: 86.6 FL — SIGNIFICANT CHANGE UP (ref 80–100)
NRBC # BLD: 0 /100 WBCS — SIGNIFICANT CHANGE UP (ref 0–0)
NRBC # BLD: 0 /100 WBCS — SIGNIFICANT CHANGE UP (ref 0–0)
PLATELET # BLD AUTO: 176 K/UL — SIGNIFICANT CHANGE UP (ref 150–400)
PLATELET # BLD AUTO: 180 K/UL — SIGNIFICANT CHANGE UP (ref 150–400)
POTASSIUM SERPL-MCNC: 3.9 MMOL/L — SIGNIFICANT CHANGE UP (ref 3.5–5.3)
POTASSIUM SERPL-SCNC: 3.9 MMOL/L — SIGNIFICANT CHANGE UP (ref 3.5–5.3)
RBC # BLD: 4.11 M/UL — LOW (ref 4.2–5.8)
RBC # BLD: 4.33 M/UL — SIGNIFICANT CHANGE UP (ref 4.2–5.8)
RBC # FLD: 14.7 % — HIGH (ref 10.3–14.5)
RBC # FLD: 14.9 % — HIGH (ref 10.3–14.5)
SODIUM SERPL-SCNC: 141 MMOL/L — SIGNIFICANT CHANGE UP (ref 135–145)
WBC # BLD: 5.51 K/UL — SIGNIFICANT CHANGE UP (ref 3.8–10.5)
WBC # BLD: 5.91 K/UL — SIGNIFICANT CHANGE UP (ref 3.8–10.5)
WBC # FLD AUTO: 5.51 K/UL — SIGNIFICANT CHANGE UP (ref 3.8–10.5)
WBC # FLD AUTO: 5.91 K/UL — SIGNIFICANT CHANGE UP (ref 3.8–10.5)

## 2021-05-07 PROCEDURE — 93312 ECHO TRANSESOPHAGEAL: CPT | Mod: 26

## 2021-05-07 PROCEDURE — 93320 DOPPLER ECHO COMPLETE: CPT | Mod: 26

## 2021-05-07 PROCEDURE — 93325 DOPPLER ECHO COLOR FLOW MAPG: CPT | Mod: 26

## 2021-05-07 RX ORDER — ATORVASTATIN CALCIUM 80 MG/1
40 TABLET, FILM COATED ORAL AT BEDTIME
Refills: 0 | Status: DISCONTINUED | OUTPATIENT
Start: 2021-05-07 | End: 2021-05-10

## 2021-05-07 RX ADMIN — TAMSULOSIN HYDROCHLORIDE 0.4 MILLIGRAM(S): 0.4 CAPSULE ORAL at 21:59

## 2021-05-07 RX ADMIN — HEPARIN SODIUM 1300 UNIT(S)/HR: 5000 INJECTION INTRAVENOUS; SUBCUTANEOUS at 12:06

## 2021-05-07 RX ADMIN — BUDESONIDE AND FORMOTEROL FUMARATE DIHYDRATE 2 PUFF(S): 160; 4.5 AEROSOL RESPIRATORY (INHALATION) at 05:08

## 2021-05-07 RX ADMIN — Medication 175 MICROGRAM(S): at 05:09

## 2021-05-07 RX ADMIN — BUDESONIDE AND FORMOTEROL FUMARATE DIHYDRATE 2 PUFF(S): 160; 4.5 AEROSOL RESPIRATORY (INHALATION) at 18:50

## 2021-05-07 RX ADMIN — HEPARIN SODIUM 1500 UNIT(S)/HR: 5000 INJECTION INTRAVENOUS; SUBCUTANEOUS at 02:47

## 2021-05-07 RX ADMIN — ATORVASTATIN CALCIUM 40 MILLIGRAM(S): 80 TABLET, FILM COATED ORAL at 21:59

## 2021-05-07 RX ADMIN — HEPARIN SODIUM 0 UNIT(S)/HR: 5000 INJECTION INTRAVENOUS; SUBCUTANEOUS at 01:46

## 2021-05-07 RX ADMIN — Medication 81 MILLIGRAM(S): at 13:42

## 2021-05-07 NOTE — PROGRESS NOTE ADULT - SUBJECTIVE AND OBJECTIVE BOX
Patient is a 65y old  Male who presents with a chief complaint of     SUBJECTIVE / OVERNIGHT EVENTS: no events over night     T(C): 36.6 (05-07-21 @ 14:44), Max: 36.6 (05-07-21 @ 12:15)  HR: 70 (05-07-21 @ 14:44) (70 - 70)  BP: 118/85 (05-07-21 @ 14:44) (118/85 - 118/85)  RR: 18 (05-07-21 @ 14:44) (18 - 18)  SpO2: 98% (05-07-21 @ 14:44) (98% - 98%)      MEDICATIONS  (STANDING):  aspirin enteric coated 81 milliGRAM(s) Oral daily  budesonide 160 MICROgram(s)/formoterol 4.5 MICROgram(s) Inhaler 2 Puff(s) Inhalation two times a day  heparin  Infusion.  Unit(s)/Hr (18 mL/Hr) IV Continuous <Continuous>  levothyroxine 175 MICROGram(s) Oral daily  tamsulosin 0.4 milliGRAM(s) Oral at bedtime    MEDICATIONS  (PRN):  acetaminophen   Tablet .. 650 milliGRAM(s) Oral every 6 hours PRN Temp greater or equal to 38.5C (101.3F), Mild Pain (1 - 3)  ALBUTerol    90 MICROgram(s) HFA Inhaler 1 Puff(s) Inhalation four times a day PRN Shortness of Breath and/or Wheezing  heparin   Injectable 8000 Unit(s) IV Push every 6 hours PRN For aPTT less than 40  heparin   Injectable 4000 Unit(s) IV Push every 6 hours PRN For aPTT between 40 - 57    PHYSICAL EXAM:  GENERAL: NAD, well-developed  HEAD:  Atraumatic, Normocephalic  EYES: EOMI, PERRLA, conjunctiva and sclera clear  NECK: Supple, No JVD  CHEST/LUNG: Clear to auscultation bilaterally; No wheeze  HEART: Regular rate and rhythm; No murmurs, rubs, or gallops  ABDOMEN: Soft, Nontender, Nondistended; Bowel sounds present  EXTREMITIES:  2+ Peripheral Pulses, No clubbing, cyanosis, or edema  PSYCH: AAOx3  NEUROLOGY: non-focal  SKIN: No rashes or lesions                                12.4   5.51  )-----------( 180      ( 07 May 2021 06:10 )             37.5             PTT - ( 07 May 2021 11:18 )  PTT:120.1 sec  141|106|18<101  3.9|21|1.16  8.8,--,--  05-07 @ 06:09      CAPILLARY BLOOD GLUCOSE              RADIOLOGY & ADDITIONAL TESTS:    Imaging Personally Reviewed:    Consultant(s) Notes Reviewed:      Care Discussed with Consultants/Other Providers:

## 2021-05-07 NOTE — PROGRESS NOTE ADULT - SUBJECTIVE AND OBJECTIVE BOX
Follow-up Pulm Progress Note    No new respiratory events overnight.  Denies SOB/CP.     Medications:  MEDICATIONS  (STANDING):  aspirin enteric coated 81 milliGRAM(s) Oral daily  budesonide 160 MICROgram(s)/formoterol 4.5 MICROgram(s) Inhaler 2 Puff(s) Inhalation two times a day  heparin  Infusion.  Unit(s)/Hr (18 mL/Hr) IV Continuous <Continuous>  levothyroxine 175 MICROGram(s) Oral daily  tamsulosin 0.4 milliGRAM(s) Oral at bedtime    MEDICATIONS  (PRN):  acetaminophen   Tablet .. 650 milliGRAM(s) Oral every 6 hours PRN Temp greater or equal to 38.5C (101.3F), Mild Pain (1 - 3)  ALBUTerol    90 MICROgram(s) HFA Inhaler 1 Puff(s) Inhalation four times a day PRN Shortness of Breath and/or Wheezing  heparin   Injectable 8000 Unit(s) IV Push every 6 hours PRN For aPTT less than 40  heparin   Injectable 4000 Unit(s) IV Push every 6 hours PRN For aPTT between 40 - 57          Vital Signs Last 24 Hrs  T(C): 36.6 (07 May 2021 04:24), Max: 36.7 (06 May 2021 21:30)  T(F): 97.9 (07 May 2021 04:24), Max: 98.1 (06 May 2021 21:30)  HR: 74 (07 May 2021 04:24) (74 - 81)  BP: 115/73 (07 May 2021 04:24) (115/73 - 122/66)  BP(mean): --  RR: 18 (07 May 2021 04:24) (17 - 18)  SpO2: 97% (07 May 2021 04:24) (96% - 98%)          05-06 @ 07:01  -  05-07 @ 07:00  --------------------------------------------------------  IN: 660 mL / OUT: 0 mL / NET: 660 mL          LABS:                        12.4   5.51  )-----------( 180      ( 07 May 2021 06:10 )             37.5     05-07    141  |  106  |  18  ----------------------------<  101<H>  3.9   |  21<L>  |  1.16    Ca    8.8      07 May 2021 06:09          PTT - ( 07 May 2021 01:12 )  PTT:130.4 sec          Physical Examination:  PULM: Clear to auscultation bilaterally, no significant sputum production  CVS: S1, S2 heard    RADIOLOGY REVIEWED  CT chest: < from: CT Angio Chest w/ IV Cont (05.03.21 @ 18:05) >  FINDINGS:    LUNGS AND AIRWAYS: Patent central airways.  Scattered groundglass opacities withlower lobe predominance. Mild bilateral lower lobe interlobular septal thickening.  PLEURA: No pleural effusion.  MEDIASTINUM AND COSME: No lymphadenopathy.  VESSELS: No pulmonary embolism  HEART: Heart size is normal. No pericardial effusion. Fillingdefect in the left atrial appendage.  CHEST WALL AND LOWER NECK: Within normal limits.  VISUALIZED UPPER ABDOMEN: Within normal limits.  BONES: Degenerative changes.    IMPRESSION:  No pulmonary embolism    Scattered ground glass opacities of uncertain etiology.    Left atrial appendage mixing artifact versus thrombus.    Findings were discussed with AXEL ECKERT  5/3/2021 6:53 PM with read back confirmation.          < end of copied text >

## 2021-05-07 NOTE — PROGRESS NOTE ADULT - SUBJECTIVE AND OBJECTIVE BOX
S: Denies chest pain or SOB. Review of systems otherwise (-)    Review of Systems:   Constitutional: [ ] fevers, [ ] chills.   Skin: [ ] dry skin. [ ] rashes.  Psychiatric: [ ] depression, [ ] anxiety.   Gastrointestinal: [ ] BRBPR, [ ] melena.   Neurological: [ ] confusion. [ ] seizures. [ ] shuffling gait.   Ears,Nose,Mouth and Throat: [ ] ear pain [ ] sore throat.   Eyes: [ ] diplopia.   Respiratory: [ ] hemoptysis. [ ] shortness of breath  Cardiovascular: See HPI above  Hematologic/Lymphatic: [ ] anemia. [ ] painful nodes. [ ] prolonged bleeding.   Genitourinary: [ ] hematuria. [ ] flank pain.   Endocrine: [ ] significant change in weight. [ ] intolerance to heat and cold.     Review of systems [x ] otherwise negative, [ ] otherwise unable to obtain    FH: no family history of sudden cardiac death in first degree relatives    SH: [ ] tobacco, [ ] alcohol, [ ] drugs    acetaminophen   Tablet .. 650 milliGRAM(s) Oral every 6 hours PRN  ALBUTerol    90 MICROgram(s) HFA Inhaler 1 Puff(s) Inhalation four times a day PRN  aspirin enteric coated 81 milliGRAM(s) Oral daily  budesonide 160 MICROgram(s)/formoterol 4.5 MICROgram(s) Inhaler 2 Puff(s) Inhalation two times a day  heparin   Injectable 8000 Unit(s) IV Push every 6 hours PRN  heparin   Injectable 4000 Unit(s) IV Push every 6 hours PRN  heparin  Infusion.  Unit(s)/Hr IV Continuous <Continuous>  levothyroxine 175 MICROGram(s) Oral daily  tamsulosin 0.4 milliGRAM(s) Oral at bedtime                            12.4   5.51  )-----------( 180      ( 07 May 2021 06:10 )             37.5       05-07    141  |  106  |  18  ----------------------------<  101<H>  3.9   |  21<L>  |  1.16    Ca    8.8      07 May 2021 06:09        T(C): 36.6 (05-07-21 @ 04:24), Max: 36.7 (05-06-21 @ 21:30)  HR: 74 (05-07-21 @ 04:24) (74 - 81)  BP: 115/73 (05-07-21 @ 04:24) (115/73 - 122/66)  RR: 18 (05-07-21 @ 04:24) (17 - 18)  SpO2: 97% (05-07-21 @ 04:24) (96% - 98%)  Wt(kg): --    I&O's Summary    06 May 2021 07:01  -  07 May 2021 07:00  --------------------------------------------------------  IN: 660 mL / OUT: 0 mL / NET: 660 mL      General: Well nourished in no acute distress. Alert and Oriented * 3.   Head: Normocephalic and atraumatic.   Neck: No JVD. No bruits. Supple. Does not appear to be enlarged.   Cardiovascular: + S1,S2 ; RRR Soft systolic murmur at the left lower sternal border. No rubs noted.    Lungs: CTA b/l. No rhonchi, rales or wheezes.   Abdomen: + BS, soft. Non tender. Non distended. No rebound. No guarding.   Extremities: No clubbing/cyanosis/edema.   Neurologic: Moves all four extremities. Full range of motion.   Skin: Warm and moist. The patient's skin has normal elasticity and good skin turgor.   Psychiatric: Appropriate mood and affect.  Musculoskeletal: Normal range of motion, normal strength    TELEMETRY: SR 60-80s      ECG: NSR, nonspecific T wave abnormality 	    < from: Transthoracic Echocardiogram (05.04.21 @ 05:47) >  Observations:  Mitral Valve: Normal mitral valve. Minimal mitral  regurgitation.  Aortic Valve/Aorta: Mildly calcified aortic valve with  normal opening. Mild aortic regurgitation.  Normal aortic root.  Left Atrium: Moderately dilated left atrium.  Left Ventricle: Normal left ventricular internal dimensions  and wall thicknesses.  Normal left ventricular systolic function. No segmental  wall motion abnormalities.  Left ventricular filling pressure is elevated.  Right Heart: Normal right atrium. Normal right ventricular  size and function.  Normal tricuspid valve. Mild tricuspid regurgitation.  Normal pulmonic valve. Mild pulmonic regurgitation.  Pericardium/Pleura: Normal pericardium with no pericardial  effusion.  Hemodynamic: Pulmonary hypertension. Estimated PASP = 32  mmHg + estimated mean right atrial pressure.    < end of copied text >      RADIOLOGY:  < from: CT Angio Chest w/ IV Cont (05.03.21 @ 18:05) >  IMPRESSION:  No pulmonary embolism    Scattered ground glass opacities of uncertain etiology.    Left atrial appendage mixing artifact versus thrombus.    < end of copied text >      ASSESSMENT/PLAN: Patient is a 66 y/o male with PMH of HTN, thyroidectomy s/p thyroid ca who presented with left sided neck and right sided chest pain. CT angio chest showed "no pulmonary embolism. Scattered ground glass opacities of uncertain etiology. Left atrial appendage mixing artifact versus thrombus." Cardiology consulted for further evaluation.    - Stable indeterminate troponin noted, no acute ischemic EKG changes noted  - Pulm eval appreciated  - No evidence of PAF on tele thus far and he has had no history of CVA  - NPO for JERAD today to r/o BRENNEN thrombus  - In meantime, would start hep gtt until JERAD results (if no sign of thrombus can d/c anticoagulation)  - Can check eventual exercise NST pending JERAD results  - Further recs pending above  - Plan for outpatient cardiac f/u in our office after d/c ([phone])    Pedro Alvarado PA-C  Pager: [phone]

## 2021-05-07 NOTE — PROVIDER CONTACT NOTE (CRITICAL VALUE NOTIFICATION) - ACTION/TREATMENT ORDERED:
Provider notified. Heparin nomogram followed. Rate adjusted from 15ml/hr to 13ml/hr.
Provider notified and full nomogram followed

## 2021-05-07 NOTE — PRE-ANESTHESIA EVALUATION ADULT - NSANTHOSAYNRD_GEN_A_CORE
No. CHILANGO screening performed.  STOP BANG Legend: 0-2 = LOW Risk; 3-4 = INTERMEDIATE Risk; 5-8 = HIGH Risk

## 2021-05-07 NOTE — PRE-ANESTHESIA EVALUATION ADULT - NSANTHLABRESULTSFT_GEN_ALL_CORE
PTT was 130; Heparin gtt held x 1 hour as per cardiology request PTT was 120; Heparin gtt held x 1 hour as per cardiology request

## 2021-05-08 LAB
HCT VFR BLD CALC: 38 % — LOW (ref 39–50)
HGB BLD-MCNC: 12.4 G/DL — LOW (ref 13–17)
MCHC RBC-ENTMCNC: 28.2 PG — SIGNIFICANT CHANGE UP (ref 27–34)
MCHC RBC-ENTMCNC: 32.6 GM/DL — SIGNIFICANT CHANGE UP (ref 32–36)
MCV RBC AUTO: 86.4 FL — SIGNIFICANT CHANGE UP (ref 80–100)
NRBC # BLD: 0 /100 WBCS — SIGNIFICANT CHANGE UP (ref 0–0)
PLATELET # BLD AUTO: 181 K/UL — SIGNIFICANT CHANGE UP (ref 150–400)
RBC # BLD: 4.4 M/UL — SIGNIFICANT CHANGE UP (ref 4.2–5.8)
RBC # FLD: 14.7 % — HIGH (ref 10.3–14.5)
WBC # BLD: 5.62 K/UL — SIGNIFICANT CHANGE UP (ref 3.8–10.5)
WBC # FLD AUTO: 5.62 K/UL — SIGNIFICANT CHANGE UP (ref 3.8–10.5)

## 2021-05-08 RX ADMIN — BUDESONIDE AND FORMOTEROL FUMARATE DIHYDRATE 2 PUFF(S): 160; 4.5 AEROSOL RESPIRATORY (INHALATION) at 17:35

## 2021-05-08 RX ADMIN — ATORVASTATIN CALCIUM 40 MILLIGRAM(S): 80 TABLET, FILM COATED ORAL at 21:47

## 2021-05-08 RX ADMIN — BUDESONIDE AND FORMOTEROL FUMARATE DIHYDRATE 2 PUFF(S): 160; 4.5 AEROSOL RESPIRATORY (INHALATION) at 05:12

## 2021-05-08 RX ADMIN — ALBUTEROL 1 PUFF(S): 90 AEROSOL, METERED ORAL at 22:50

## 2021-05-08 RX ADMIN — Medication 175 MICROGRAM(S): at 05:12

## 2021-05-08 RX ADMIN — Medication 81 MILLIGRAM(S): at 12:37

## 2021-05-08 RX ADMIN — TAMSULOSIN HYDROCHLORIDE 0.4 MILLIGRAM(S): 0.4 CAPSULE ORAL at 21:47

## 2021-05-08 NOTE — PROGRESS NOTE ADULT - SUBJECTIVE AND OBJECTIVE BOX
S: Denies chest pain or SOB. Review of systems otherwise (-)    Review of Systems:   Constitutional: [ ] fevers, [ ] chills.   Skin: [ ] dry skin. [ ] rashes.  Psychiatric: [ ] depression, [ ] anxiety.   Gastrointestinal: [ ] BRBPR, [ ] melena.   Neurological: [ ] confusion. [ ] seizures. [ ] shuffling gait.   Ears,Nose,Mouth and Throat: [ ] ear pain [ ] sore throat.   Eyes: [ ] diplopia.   Respiratory: [ ] hemoptysis. [ ] shortness of breath  Cardiovascular: See HPI above  Hematologic/Lymphatic: [ ] anemia. [ ] painful nodes. [ ] prolonged bleeding.   Genitourinary: [ ] hematuria. [ ] flank pain.   Endocrine: [ ] significant change in weight. [ ] intolerance to heat and cold.     Review of systems [x ] otherwise negative, [ ] otherwise unable to obtain    FH: no family history of sudden cardiac death in first degree relatives    SH: [ ] tobacco, [ ] alcohol, [ ] drugs         acetaminophen   Tablet .. 650 milliGRAM(s) Oral every 6 hours PRN  ALBUTerol    90 MICROgram(s) HFA Inhaler 1 Puff(s) Inhalation four times a day PRN  aspirin enteric coated 81 milliGRAM(s) Oral daily  atorvastatin 40 milliGRAM(s) Oral at bedtime  budesonide 160 MICROgram(s)/formoterol 4.5 MICROgram(s) Inhaler 2 Puff(s) Inhalation two times a day  levothyroxine 175 MICROGram(s) Oral daily  tamsulosin 0.4 milliGRAM(s) Oral at bedtime                            12.4   5.62  )-----------( 181      ( 08 May 2021 07:25 )             38.0       Hemoglobin: 12.4 g/dL (05-08 @ 07:25)  Hemoglobin: 12.4 g/dL (05-07 @ 06:10)  Hemoglobin: 11.7 g/dL (05-07 @ 01:12)  Hemoglobin: 14.4 g/dL (05-03 @ 15:00)      05-07    141  |  106  |  18  ----------------------------<  101<H>  3.9   |  21<L>  |  1.16    Ca    8.8      07 May 2021 06:09      Creatinine Trend: 1.16<--, 1.20<--, 1.11<--    COAGS:           T(C): 36.6 (05-08-21 @ 04:40), Max: 36.6 (05-07-21 @ 12:15)  HR: 67 (05-08-21 @ 04:40) (67 - 83)  BP: 116/73 (05-08-21 @ 04:40) (109/63 - 118/85)  RR: 18 (05-08-21 @ 08:08) (18 - 18)  SpO2: 97% (05-08-21 @ 08:08) (92% - 98%)  Wt(kg): --    I&O's Summary    General: Well nourished in no acute distress. Alert and Oriented * 3.   Head: Normocephalic and atraumatic.   Neck: No JVD. No bruits. Supple. Does not appear to be enlarged.   Cardiovascular: + S1,S2 ; RRR Soft systolic murmur at the left lower sternal border. No rubs noted.    Lungs: CTA b/l. No rhonchi, rales or wheezes.   Abdomen: + BS, soft. Non tender. Non distended. No rebound. No guarding.   Extremities: No clubbing/cyanosis/edema.   Neurologic: Moves all four extremities. Full range of motion.   Skin: Warm and moist. The patient's skin has normal elasticity and good skin turgor.   Psychiatric: Appropriate mood and affect.  Musculoskeletal: Normal range of motion, normal strength    TELEMETRY: SR 60-80s      ECG: NSR, nonspecific T wave abnormality 	    < from: Transthoracic Echocardiogram (05.04.21 @ 05:47) >  Observations:  Mitral Valve: Normal mitral valve. Minimal mitral  regurgitation.  Aortic Valve/Aorta: Mildly calcified aortic valve with  normal opening. Mild aortic regurgitation.  Normal aortic root.  Left Atrium: Moderately dilated left atrium.  Left Ventricle: Normal left ventricular internal dimensions  and wall thicknesses.  Normal left ventricular systolic function. No segmental  wall motion abnormalities.  Left ventricular filling pressure is elevated.  Right Heart: Normal right atrium. Normal right ventricular  size and function.  Normal tricuspid valve. Mild tricuspid regurgitation.  Normal pulmonic valve. Mild pulmonic regurgitation.  Pericardium/Pleura: Normal pericardium with no pericardial  effusion.  Hemodynamic: Pulmonary hypertension. Estimated PASP = 32  mmHg + estimated mean right atrial pressure.    < end of copied text >      RADIOLOGY:  < from: CT Angio Chest w/ IV Cont (05.03.21 @ 18:05) >  IMPRESSION:  No pulmonary embolism    Scattered ground glass opacities of uncertain etiology.    Left atrial appendage mixing artifact versus thrombus.    < end of copied text >    JERAD: t< from: Transesophageal Echocardiogram w/o TTE (05.07.21 @ 15:10) >  ------------------------------------------------------------------------  Conclusions:  1. Complex atheroma noted in aortic arch/descending aorta.  2. No left atrial or left atrial appendage thrombus.  Normal left atrial appendage function (velocity= 0.45  m/s).  3. Concentric left ventricular hypertrophy.  4. Normal left ventricular systolic function. No segmental  wall motion abnormalities.  ------------------------------------------------------------------------  Confirmed on  5/7/2021 - 16:15:21 by CESAR Bertrand  -------------------------    < end of copied text >      ASSESSMENT/PLAN: Patient is a 66 y/o male with PMH of HTN, thyroidectomy s/p thyroid ca who presented with left sided neck and right sided chest pain. CT angio chest showed "no pulmonary embolism. Scattered ground glass opacities of uncertain etiology. Left atrial appendage mixing artifact versus thrombus." Cardiology consulted for further evaluation.  s/p JERAD    - Stable indeterminate troponin noted, no acute ischemic EKG changes noted  - Pulm eval appreciated  - No evidence of PAF on tele thus far and he has had no history of CVA   - In meantime, would start hep gtt until JERAD results (if no sign of thrombus can d/c anticoagulation)  - Can check eventual exercise NST pending JERAD results  - Further recs pending above  - Plan for outpatient cardiac f/u in our office after d/c ([phone])

## 2021-05-08 NOTE — PROGRESS NOTE ADULT - SUBJECTIVE AND OBJECTIVE BOX
Patient is a 65y old  Male who presents with a chief complaint of     SUBJECTIVE / OVERNIGHT EVENTS: no events over night     T(C): 36.6 (05-08-21 @ 21:02), Max: 36.6 (05-08-21 @ 12:31)  HR: 76 (05-08-21 @ 21:02) (71 - 76)  BP: 108/69 (05-08-21 @ 21:02) (108/69 - 117/72)  RR: 17 (05-08-21 @ 21:02) (17 - 18)  SpO2: 92% (05-08-21 @ 21:02) (92% - 98%)    MEDICATIONS  (STANDING):  aspirin enteric coated 81 milliGRAM(s) Oral daily  atorvastatin 40 milliGRAM(s) Oral at bedtime  budesonide 160 MICROgram(s)/formoterol 4.5 MICROgram(s) Inhaler 2 Puff(s) Inhalation two times a day  levothyroxine 175 MICROGram(s) Oral daily  tamsulosin 0.4 milliGRAM(s) Oral at bedtime    MEDICATIONS  (PRN):  acetaminophen   Tablet .. 650 milliGRAM(s) Oral every 6 hours PRN Temp greater or equal to 38.5C (101.3F), Mild Pain (1 - 3)  ALBUTerol    90 MICROgram(s) HFA Inhaler 1 Puff(s) Inhalation four times a day PRN Shortness of Breath and/or Wheezing      PHYSICAL EXAM:  GENERAL: NAD, well-developed  HEAD:  Atraumatic, Normocephalic  EYES: EOMI, PERRLA, conjunctiva and sclera clear  NECK: Supple, No JVD  CHEST/LUNG: Clear to auscultation bilaterally; No wheeze  HEART: Regular rate and rhythm; No murmurs, rubs, or gallops  ABDOMEN: Soft, Nontender, Nondistended; Bowel sounds present  EXTREMITIES:  2+ Peripheral Pulses, No clubbing, cyanosis, or edema  PSYCH: AAOx3  NEUROLOGY: non-focal  SKIN: No rashes or lesions                              12.4   5.62  )-----------( 181      ( 08 May 2021 07:25 )             38.0             PTT - ( 07 May 2021 11:18 )  PTT:120.1 sec      CAPILLARY BLOOD GLUCOSE                  RADIOLOGY & ADDITIONAL TESTS:    Imaging Personally Reviewed:    Consultant(s) Notes Reviewed:      Care Discussed with Consultants/Other Providers:

## 2021-05-09 LAB
HCT VFR BLD CALC: 37.9 % — LOW (ref 39–50)
HGB BLD-MCNC: 12.8 G/DL — LOW (ref 13–17)
MCHC RBC-ENTMCNC: 29.6 PG — SIGNIFICANT CHANGE UP (ref 27–34)
MCHC RBC-ENTMCNC: 33.8 GM/DL — SIGNIFICANT CHANGE UP (ref 32–36)
MCV RBC AUTO: 87.5 FL — SIGNIFICANT CHANGE UP (ref 80–100)
NRBC # BLD: 0 /100 WBCS — SIGNIFICANT CHANGE UP (ref 0–0)
PLATELET # BLD AUTO: 179 K/UL — SIGNIFICANT CHANGE UP (ref 150–400)
RBC # BLD: 4.33 M/UL — SIGNIFICANT CHANGE UP (ref 4.2–5.8)
RBC # FLD: 14.6 % — HIGH (ref 10.3–14.5)
WBC # BLD: 5.16 K/UL — SIGNIFICANT CHANGE UP (ref 3.8–10.5)
WBC # FLD AUTO: 5.16 K/UL — SIGNIFICANT CHANGE UP (ref 3.8–10.5)

## 2021-05-09 PROCEDURE — 93018 CV STRESS TEST I&R ONLY: CPT

## 2021-05-09 PROCEDURE — 93016 CV STRESS TEST SUPVJ ONLY: CPT

## 2021-05-09 PROCEDURE — 78452 HT MUSCLE IMAGE SPECT MULT: CPT | Mod: 26

## 2021-05-09 RX ADMIN — Medication 175 MICROGRAM(S): at 05:25

## 2021-05-09 RX ADMIN — BUDESONIDE AND FORMOTEROL FUMARATE DIHYDRATE 2 PUFF(S): 160; 4.5 AEROSOL RESPIRATORY (INHALATION) at 17:06

## 2021-05-09 RX ADMIN — Medication 200 MILLIGRAM(S): at 15:29

## 2021-05-09 RX ADMIN — Medication 200 MILLIGRAM(S): at 21:49

## 2021-05-09 RX ADMIN — BUDESONIDE AND FORMOTEROL FUMARATE DIHYDRATE 2 PUFF(S): 160; 4.5 AEROSOL RESPIRATORY (INHALATION) at 05:25

## 2021-05-09 RX ADMIN — ATORVASTATIN CALCIUM 40 MILLIGRAM(S): 80 TABLET, FILM COATED ORAL at 21:16

## 2021-05-09 RX ADMIN — TAMSULOSIN HYDROCHLORIDE 0.4 MILLIGRAM(S): 0.4 CAPSULE ORAL at 21:16

## 2021-05-09 RX ADMIN — Medication 81 MILLIGRAM(S): at 14:32

## 2021-05-09 NOTE — PROGRESS NOTE ADULT - SUBJECTIVE AND OBJECTIVE BOX
S: Denies chest pain or SOB. Review of systems otherwise (-)    Review of Systems:   Constitutional: [ ] fevers, [ ] chills.   Skin: [ ] dry skin. [ ] rashes.  Psychiatric: [ ] depression, [ ] anxiety.   Gastrointestinal: [ ] BRBPR, [ ] melena.   Neurological: [ ] confusion. [ ] seizures. [ ] shuffling gait.   Ears,Nose,Mouth and Throat: [ ] ear pain [ ] sore throat.   Eyes: [ ] diplopia.   Respiratory: [ ] hemoptysis. [ ] shortness of breath  Cardiovascular: See HPI above  Hematologic/Lymphatic: [ ] anemia. [ ] painful nodes. [ ] prolonged bleeding.   Genitourinary: [ ] hematuria. [ ] flank pain.   Endocrine: [ ] significant change in weight. [ ] intolerance to heat and cold.     Review of systems [x ] otherwise negative, [ ] otherwise unable to obtain    FH: no family history of sudden cardiac death in first degree relatives    SH: [ ] tobacco, [ ] alcohol, [ ] drugs         acetaminophen   Tablet .. 650 milliGRAM(s) Oral every 6 hours PRN  ALBUTerol    90 MICROgram(s) HFA Inhaler 1 Puff(s) Inhalation four times a day PRN  aspirin enteric coated 81 milliGRAM(s) Oral daily  atorvastatin 40 milliGRAM(s) Oral at bedtime  budesonide 160 MICROgram(s)/formoterol 4.5 MICROgram(s) Inhaler 2 Puff(s) Inhalation two times a day  levothyroxine 175 MICROGram(s) Oral daily  tamsulosin 0.4 milliGRAM(s) Oral at bedtime                            12.4   5.62  )-----------( 181      ( 08 May 2021 07:25 )             38.0       Hemoglobin: 12.4 g/dL (05-08 @ 07:25)  Hemoglobin: 12.4 g/dL (05-07 @ 06:10)  Hemoglobin: 11.7 g/dL (05-07 @ 01:12)            Creatinine Trend: 1.16<--, 1.20<--, 1.11<--    COAGS:           T(C): 36.6 (05-09-21 @ 05:26), Max: 36.6 (05-08-21 @ 12:31)  HR: 83 (05-09-21 @ 05:26) (71 - 83)  BP: 127/77 (05-09-21 @ 05:26) (108/69 - 127/77)  RR: 18 (05-09-21 @ 05:26) (17 - 18)  SpO2: 99% (05-09-21 @ 05:26) (92% - 99%)  Wt(kg): --    I&O's Summary    08 May 2021 07:01  -  09 May 2021 06:55  --------------------------------------------------------  IN: 540 mL / OUT: 0 mL / NET: 540 mL        General: Well nourished in no acute distress. Alert and Oriented * 3.   Head: Normocephalic and atraumatic.   Neck: No JVD. No bruits. Supple. Does not appear to be enlarged.   Cardiovascular: + S1,S2 ; RRR Soft systolic murmur at the left lower sternal border. No rubs noted.    Lungs: CTA b/l. No rhonchi, rales or wheezes.   Abdomen: + BS, soft. Non tender. Non distended. No rebound. No guarding.   Extremities: No clubbing/cyanosis/edema.   Neurologic: Moves all four extremities. Full range of motion.   Skin: Warm and moist. The patient's skin has normal elasticity and good skin turgor.   Psychiatric: Appropriate mood and affect.  Musculoskeletal: Normal range of motion, normal strength    TELEMETRY: SR 60-80s      ECG: NSR, nonspecific T wave abnormality 	    < from: Transthoracic Echocardiogram (05.04.21 @ 05:47) >  Observations:  Mitral Valve: Normal mitral valve. Minimal mitral  regurgitation.  Aortic Valve/Aorta: Mildly calcified aortic valve with  normal opening. Mild aortic regurgitation.  Normal aortic root.  Left Atrium: Moderately dilated left atrium.  Left Ventricle: Normal left ventricular internal dimensions  and wall thicknesses.  Normal left ventricular systolic function. No segmental  wall motion abnormalities.  Left ventricular filling pressure is elevated.  Right Heart: Normal right atrium. Normal right ventricular  size and function.  Normal tricuspid valve. Mild tricuspid regurgitation.  Normal pulmonic valve. Mild pulmonic regurgitation.  Pericardium/Pleura: Normal pericardium with no pericardial  effusion.  Hemodynamic: Pulmonary hypertension. Estimated PASP = 32  mmHg + estimated mean right atrial pressure.    < end of copied text >      RADIOLOGY:  < from: CT Angio Chest w/ IV Cont (05.03.21 @ 18:05) >  IMPRESSION:  No pulmonary embolism    Scattered ground glass opacities of uncertain etiology.    Left atrial appendage mixing artifact versus thrombus.    < end of copied text >    JERAD: t< from: Transesophageal Echocardiogram w/o TTE (05.07.21 @ 15:10) >  ------------------------------------------------------------------------  Conclusions:  1. Complex atheroma noted in aortic arch/descending aorta.  2. No left atrial or left atrial appendage thrombus.  Normal left atrial appendage function (velocity= 0.45  m/s).  3. Concentric left ventricular hypertrophy.  4. Normal left ventricular systolic function. No segmental  wall motion abnormalities.  ------------------------------------------------------------------------  Confirmed on  5/7/2021 - 16:15:21 by CESAR Bertrand  -------------------------    < end of copied text >      ASSESSMENT/PLAN: Patient is a 66 y/o male with PMH of HTN, thyroidectomy s/p thyroid ca who presented with left sided neck and right sided chest pain. CT angio chest showed "no pulmonary embolism. Scattered ground glass opacities of uncertain etiology. Left atrial appendage mixing artifact versus thrombus." Cardiology consulted for further evaluation.  s/p JERAD    - Stable indeterminate troponin noted, no acute ischemic EKG changes noted  - Pulm eval appreciated  - tele stable , no arrythmia   - Can check eventual exercise NST   - Further recs pending above  - Plan for outpatient cardiac f/u in our office after d/c ([phone])

## 2021-05-09 NOTE — PROGRESS NOTE ADULT - SUBJECTIVE AND OBJECTIVE BOX
Patient is a 65y old  Male who presents with a chief complaint of     SUBJECTIVE / OVERNIGHT EVENTS: no events over night     T(C): --  HR: --  BP: --  RR: --  SpO2: --      MEDICATIONS  (STANDING):  aspirin enteric coated 81 milliGRAM(s) Oral daily  atorvastatin 40 milliGRAM(s) Oral at bedtime  budesonide 160 MICROgram(s)/formoterol 4.5 MICROgram(s) Inhaler 2 Puff(s) Inhalation two times a day  levothyroxine 175 MICROGram(s) Oral daily  tamsulosin 0.4 milliGRAM(s) Oral at bedtime    MEDICATIONS  (PRN):  acetaminophen   Tablet .. 650 milliGRAM(s) Oral every 6 hours PRN Temp greater or equal to 38.5C (101.3F), Mild Pain (1 - 3)  ALBUTerol    90 MICROgram(s) HFA Inhaler 1 Puff(s) Inhalation four times a day PRN Shortness of Breath and/or Wheezing  guaiFENesin Oral Liquid (Sugar-Free) 200 milliGRAM(s) Oral every 6 hours PRN Cough      PHYSICAL EXAM:  GENERAL: NAD, well-developed  HEAD:  Atraumatic, Normocephalic  EYES: EOMI, PERRLA, conjunctiva and sclera clear  NECK: Supple, No JVD  CHEST/LUNG: Clear to auscultation bilaterally; No wheeze  HEART: Regular rate and rhythm; No murmurs, rubs, or gallops  ABDOMEN: Soft, Nontender, Nondistended; Bowel sounds present  EXTREMITIES:  2+ Peripheral Pulses, No clubbing, cyanosis, or edema  PSYCH: AAOx3  NEUROLOGY: non-focal  SKIN: No rashes or lesions                                            12.8   5.16  )-----------( 179      ( 09 May 2021 07:03 )             37.9                   CAPILLARY BLOOD GLUCOSE                  RADIOLOGY & ADDITIONAL TESTS:    Imaging Personally Reviewed:    Consultant(s) Notes Reviewed:      Care Discussed with Consultants/Other Providers:

## 2021-05-10 ENCOUNTER — TRANSCRIPTION ENCOUNTER (OUTPATIENT)
Age: 66
End: 2021-05-10

## 2021-05-10 VITALS
OXYGEN SATURATION: 97 % | SYSTOLIC BLOOD PRESSURE: 141 MMHG | RESPIRATION RATE: 18 BRPM | TEMPERATURE: 98 F | DIASTOLIC BLOOD PRESSURE: 83 MMHG | HEART RATE: 80 BPM

## 2021-05-10 LAB
ANION GAP SERPL CALC-SCNC: 12 MMOL/L — SIGNIFICANT CHANGE UP (ref 5–17)
APTT BLD: 32.5 SEC — SIGNIFICANT CHANGE UP (ref 27.5–35.5)
BUN SERPL-MCNC: 26 MG/DL — HIGH (ref 7–23)
CALCIUM SERPL-MCNC: 9 MG/DL — SIGNIFICANT CHANGE UP (ref 8.4–10.5)
CHLORIDE SERPL-SCNC: 106 MMOL/L — SIGNIFICANT CHANGE UP (ref 96–108)
CO2 SERPL-SCNC: 20 MMOL/L — LOW (ref 22–31)
CREAT SERPL-MCNC: 1.25 MG/DL — SIGNIFICANT CHANGE UP (ref 0.5–1.3)
GLUCOSE SERPL-MCNC: 104 MG/DL — HIGH (ref 70–99)
HCT VFR BLD CALC: 37.3 % — LOW (ref 39–50)
HGB BLD-MCNC: 12.4 G/DL — LOW (ref 13–17)
INR BLD: 1.01 RATIO — SIGNIFICANT CHANGE UP (ref 0.88–1.16)
MCHC RBC-ENTMCNC: 28.6 PG — SIGNIFICANT CHANGE UP (ref 27–34)
MCHC RBC-ENTMCNC: 33.2 GM/DL — SIGNIFICANT CHANGE UP (ref 32–36)
MCV RBC AUTO: 85.9 FL — SIGNIFICANT CHANGE UP (ref 80–100)
NRBC # BLD: 0 /100 WBCS — SIGNIFICANT CHANGE UP (ref 0–0)
PLATELET # BLD AUTO: 177 K/UL — SIGNIFICANT CHANGE UP (ref 150–400)
POTASSIUM SERPL-MCNC: 4.1 MMOL/L — SIGNIFICANT CHANGE UP (ref 3.5–5.3)
POTASSIUM SERPL-SCNC: 4.1 MMOL/L — SIGNIFICANT CHANGE UP (ref 3.5–5.3)
PROTHROM AB SERPL-ACNC: 12.1 SEC — SIGNIFICANT CHANGE UP (ref 10.6–13.6)
RBC # BLD: 4.34 M/UL — SIGNIFICANT CHANGE UP (ref 4.2–5.8)
RBC # FLD: 14.6 % — HIGH (ref 10.3–14.5)
SARS-COV-2 RNA SPEC QL NAA+PROBE: SIGNIFICANT CHANGE UP
SODIUM SERPL-SCNC: 138 MMOL/L — SIGNIFICANT CHANGE UP (ref 135–145)
WBC # BLD: 5.96 K/UL — SIGNIFICANT CHANGE UP (ref 3.8–10.5)
WBC # FLD AUTO: 5.96 K/UL — SIGNIFICANT CHANGE UP (ref 3.8–10.5)

## 2021-05-10 PROCEDURE — 70553 MRI BRAIN STEM W/O & W/DYE: CPT

## 2021-05-10 PROCEDURE — 83690 ASSAY OF LIPASE: CPT

## 2021-05-10 PROCEDURE — 80048 BASIC METABOLIC PNL TOTAL CA: CPT

## 2021-05-10 PROCEDURE — 93306 TTE W/DOPPLER COMPLETE: CPT

## 2021-05-10 PROCEDURE — 84132 ASSAY OF SERUM POTASSIUM: CPT

## 2021-05-10 PROCEDURE — 85610 PROTHROMBIN TIME: CPT

## 2021-05-10 PROCEDURE — 84295 ASSAY OF SERUM SODIUM: CPT

## 2021-05-10 PROCEDURE — 86803 HEPATITIS C AB TEST: CPT

## 2021-05-10 PROCEDURE — 84484 ASSAY OF TROPONIN QUANT: CPT

## 2021-05-10 PROCEDURE — A9500: CPT

## 2021-05-10 PROCEDURE — 83036 HEMOGLOBIN GLYCOSYLATED A1C: CPT

## 2021-05-10 PROCEDURE — 85014 HEMATOCRIT: CPT

## 2021-05-10 PROCEDURE — C1894: CPT

## 2021-05-10 PROCEDURE — 99285 EMERGENCY DEPT VISIT HI MDM: CPT | Mod: 25

## 2021-05-10 PROCEDURE — 85379 FIBRIN DEGRADATION QUANT: CPT

## 2021-05-10 PROCEDURE — 83605 ASSAY OF LACTIC ACID: CPT

## 2021-05-10 PROCEDURE — 85018 HEMOGLOBIN: CPT

## 2021-05-10 PROCEDURE — 93005 ELECTROCARDIOGRAM TRACING: CPT

## 2021-05-10 PROCEDURE — 93017 CV STRESS TEST TRACING ONLY: CPT

## 2021-05-10 PROCEDURE — 80061 LIPID PANEL: CPT

## 2021-05-10 PROCEDURE — 85025 COMPLETE CBC W/AUTO DIFF WBC: CPT

## 2021-05-10 PROCEDURE — 82803 BLOOD GASES ANY COMBINATION: CPT

## 2021-05-10 PROCEDURE — U0005: CPT

## 2021-05-10 PROCEDURE — 93454 CORONARY ARTERY ANGIO S&I: CPT | Mod: 26

## 2021-05-10 PROCEDURE — G0378: CPT

## 2021-05-10 PROCEDURE — 86769 SARS-COV-2 COVID-19 ANTIBODY: CPT

## 2021-05-10 PROCEDURE — 93010 ELECTROCARDIOGRAM REPORT: CPT

## 2021-05-10 PROCEDURE — C1887: CPT

## 2021-05-10 PROCEDURE — 84443 ASSAY THYROID STIM HORMONE: CPT

## 2021-05-10 PROCEDURE — 85027 COMPLETE CBC AUTOMATED: CPT

## 2021-05-10 PROCEDURE — 82947 ASSAY GLUCOSE BLOOD QUANT: CPT

## 2021-05-10 PROCEDURE — 84146 ASSAY OF PROLACTIN: CPT

## 2021-05-10 PROCEDURE — 71046 X-RAY EXAM CHEST 2 VIEWS: CPT

## 2021-05-10 PROCEDURE — 94640 AIRWAY INHALATION TREATMENT: CPT

## 2021-05-10 PROCEDURE — 93312 ECHO TRANSESOPHAGEAL: CPT

## 2021-05-10 PROCEDURE — 93356 MYOCRD STRAIN IMG SPCKL TRCK: CPT

## 2021-05-10 PROCEDURE — 80053 COMPREHEN METABOLIC PANEL: CPT

## 2021-05-10 PROCEDURE — 99152 MOD SED SAME PHYS/QHP 5/>YRS: CPT

## 2021-05-10 PROCEDURE — 93454 CORONARY ARTERY ANGIO S&I: CPT

## 2021-05-10 PROCEDURE — 78452 HT MUSCLE IMAGE SPECT MULT: CPT

## 2021-05-10 PROCEDURE — 93320 DOPPLER ECHO COMPLETE: CPT

## 2021-05-10 PROCEDURE — 85730 THROMBOPLASTIN TIME PARTIAL: CPT

## 2021-05-10 PROCEDURE — 81001 URINALYSIS AUTO W/SCOPE: CPT

## 2021-05-10 PROCEDURE — 82435 ASSAY OF BLOOD CHLORIDE: CPT

## 2021-05-10 PROCEDURE — 36000 PLACE NEEDLE IN VEIN: CPT

## 2021-05-10 PROCEDURE — C1769: CPT

## 2021-05-10 PROCEDURE — U0003: CPT

## 2021-05-10 PROCEDURE — 82330 ASSAY OF CALCIUM: CPT

## 2021-05-10 PROCEDURE — 93325 DOPPLER ECHO COLOR FLOW MAPG: CPT

## 2021-05-10 PROCEDURE — 71275 CT ANGIOGRAPHY CHEST: CPT

## 2021-05-10 RX ORDER — ATORVASTATIN CALCIUM 80 MG/1
1 TABLET, FILM COATED ORAL
Qty: 30 | Refills: 0
Start: 2021-05-10 | End: 2021-06-08

## 2021-05-10 RX ORDER — CABERGOLINE 0.5 MG/1
2 TABLET ORAL
Qty: 0 | Refills: 0 | DISCHARGE

## 2021-05-10 RX ORDER — INDAPAMIDE 1.25 MG
1 TABLET ORAL
Qty: 0 | Refills: 0 | DISCHARGE

## 2021-05-10 RX ORDER — ASPIRIN/CALCIUM CARB/MAGNESIUM 324 MG
1 TABLET ORAL
Qty: 30 | Refills: 0
Start: 2021-05-10 | End: 2021-06-08

## 2021-05-10 RX ORDER — SODIUM CHLORIDE 9 MG/ML
250 INJECTION INTRAMUSCULAR; INTRAVENOUS; SUBCUTANEOUS
Refills: 0 | Status: DISCONTINUED | OUTPATIENT
Start: 2021-05-10 | End: 2021-05-10

## 2021-05-10 RX ADMIN — Medication 175 MICROGRAM(S): at 05:25

## 2021-05-10 RX ADMIN — Medication 81 MILLIGRAM(S): at 12:24

## 2021-05-10 RX ADMIN — BUDESONIDE AND FORMOTEROL FUMARATE DIHYDRATE 2 PUFF(S): 160; 4.5 AEROSOL RESPIRATORY (INHALATION) at 05:25

## 2021-05-10 RX ADMIN — BUDESONIDE AND FORMOTEROL FUMARATE DIHYDRATE 2 PUFF(S): 160; 4.5 AEROSOL RESPIRATORY (INHALATION) at 17:30

## 2021-05-10 NOTE — DIETITIAN INITIAL EVALUATION ADULT. - OTHER INFO
Dosing wt: 220 lbs. Daily wt in lbs - standin.4 (). Reports UBW of 260 lbs, endorses recent intentional wt loss ~25 lbs x4 months 2/2 portion control and healthier eating.     Pt is eating well with no changes in appetite. Denies recent N/V, diarrhea, or constipation. Last BM .    Provided verbal education on heart healthy nutrition. Emphasis on limiting sodium intake; foods that contain sodium; limiting saturated fat intake. Pt made aware RD to remain available for any questions.

## 2021-05-10 NOTE — DISCHARGE NOTE PROVIDER - NSDCCPCAREPLAN_GEN_ALL_CORE_FT
PRINCIPAL DISCHARGE DIAGNOSIS  Diagnosis: Other chest pain  Assessment and Plan of Treatment:   HOME CARE INSTRUCTIONS  For the next few days, avoid physical activities that bring on chest pain. Continue physical activities as directed.  Do not smoke.  Avoid drinking alcohol.   Only take over-the-counter or prescription medicine for pain, discomfort, or fever as directed by your caregiver.  Follow your caregiver's suggestions for further testing if your chest pain does not go away.  Keep any follow-up appointments you made. If you do not go to an appointment, you could develop lasting (chronic) problems with pain. If there is any problem keeping an appointment, you must call to reschedule.   SEEK MEDICAL CARE IF:  You think you are having problems from the medicine you are taking. Read your medicine instructions carefully.  Your chest pain does not go away, even after treatment.  You develop a rash with blisters on your chest.  SEEK IMMEDIATE MEDICAL CARE IF:  You have increased chest pain or pain that spreads to your arm, neck, jaw, back, or abdomen.   You develop shortness of breath, an increasing cough, or you are coughing up blood.  You have severe back or abdominal pain, feel nauseous, or vomit.  You develop severe weakness, fainting, or chills.  You have a fever.  THIS IS AN EMERGENCY. Do not wait to see if the pain will go away. Get medical help at once. Call your local emergency services (_____________________). Do not drive yourself to the hospital.        SECONDARY DISCHARGE DIAGNOSES  Diagnosis: Asthma  Assessment and Plan of Treatment: HOME CARE INSTRUCTIONS  Take medicines as directed by your caregiver.  Control your home environment in the following ways to help prevent asthma attacks:  Change your heating and air conditioning filter at least once a month.  Do not smoke. Do not stay in places where others are smoking.  Get rid of pests (such as roaches and mice) and their droppings.  If you see mold on a plant, throw it away.  Clean your floors and dust every week. Use unscented cleaning products. Use a vacuum  with a HEPA filter if possible. If vacuuming or cleaning triggers your asthma, try to find someone else to do these chores..  Use allergy-proof pillows, mattress covers, and box spring covers.  Wash bedsheets and blankets every week in hot water and dry in a dryer.  Use a blanket that is made of polyester or cotton with a tight nap.  Clean bathrooms and karsten with bleach and repaint with mold-resistant paint.   Wash hands frequently.  Talk to your caregiver about an action plan for managing asthma attacks. This includes the use of a peak flow meter which measures the severity of the attack and medicines that can help stop the attack. An action plan can help minimize or stop the attack without having to seek medical care.  Always have a plan prepared for seeking medical attention. This should include contacting your caregiver and in the case of a severe attack, calling your local emergency services (_____________________).  SEEK MEDICAL CARE IF:  You have wheezing, shortness of breath, or a cough even if taking medicine to prevent attacks.   You have thickening of sputum.   Your sputum changes from clear or white to yellow, green, gray, or bloody.   You have any problems that may be related to the medicines you are taking (such as a rash, itching, swelling, or trouble breathing).  You are using a reliever medicine more than 2–3 times per week.  Your peak flow is still at 50–79% of personal best after following your action plan for 1 hour.  SEEK IMMEDIATE MEDICAL CARE IF:  You are short of breath even a

## 2021-05-10 NOTE — PROGRESS NOTE ADULT - PROBLEM SELECTOR PLAN 1
CT chest with scattered GGO - unclear etiology  -Afebrile, normoxic   -COVID PCR neg  -Suggest f/u CT chest in ~1 month.

## 2021-05-10 NOTE — PROGRESS NOTE ADULT - SUBJECTIVE AND OBJECTIVE BOX
Follow-up Pulm Progress Note    No new respiratory events overnight.  Denies SOB/CP.   Sats 98% RA    Medications:  MEDICATIONS  (STANDING):  aspirin enteric coated 81 milliGRAM(s) Oral daily  atorvastatin 40 milliGRAM(s) Oral at bedtime  budesonide 160 MICROgram(s)/formoterol 4.5 MICROgram(s) Inhaler 2 Puff(s) Inhalation two times a day  levothyroxine 175 MICROGram(s) Oral daily  tamsulosin 0.4 milliGRAM(s) Oral at bedtime    MEDICATIONS  (PRN):  acetaminophen   Tablet .. 650 milliGRAM(s) Oral every 6 hours PRN Temp greater or equal to 38.5C (101.3F), Mild Pain (1 - 3)  ALBUTerol    90 MICROgram(s) HFA Inhaler 1 Puff(s) Inhalation four times a day PRN Shortness of Breath and/or Wheezing  guaiFENesin Oral Liquid (Sugar-Free) 200 milliGRAM(s) Oral every 6 hours PRN Cough          Vital Signs Last 24 Hrs  T(C): 36.6 (10 May 2021 04:38), Max: 36.7 (09 May 2021 21:37)  T(F): 97.8 (10 May 2021 04:38), Max: 98 (09 May 2021 21:37)  HR: 78 (10 May 2021 04:38) (78 - 79)  BP: 115/77 (10 May 2021 04:38) (115/77 - 119/75)  BP(mean): --  RR: 18 (10 May 2021 04:38) (16 - 18)  SpO2: 98% (10 May 2021 04:38) (97% - 98%)          05-09 @ 07:01  -  05-10 @ 07:00  --------------------------------------------------------  IN: 720 mL / OUT: 750 mL / NET: -30 mL          LABS:                        12.4   5.96  )-----------( 177      ( 10 May 2021 06:17 )             37.3     05-10    138  |  106  |  26<H>  ----------------------------<  104<H>  4.1   |  20<L>  |  1.25    Ca    9.0      10 May 2021 06:17          PT/INR - ( 10 May 2021 06:17 )   PT: 12.1 sec;   INR: 1.01 ratio         PTT - ( 10 May 2021 06:17 )  PTT:32.5 sec          Physical Examination:  PULM: Clear to auscultation bilaterally, no significant sputum production  CVS: S1, S2 heard    RADIOLOGY REVIEWED  CT chest: < from: CT Angio Chest w/ IV Cont (05.03.21 @ 18:05) >  FINDINGS:    LUNGS AND AIRWAYS: Patent central airways.  Scattered groundglass opacities withlower lobe predominance. Mild bilateral lower lobe interlobular septal thickening.  PLEURA: No pleural effusion.  MEDIASTINUM AND COSME: No lymphadenopathy.  VESSELS: No pulmonary embolism  HEART: Heart size is normal. No pericardial effusion. Fillingdefect in the left atrial appendage.  CHEST WALL AND LOWER NECK: Within normal limits.  VISUALIZED UPPER ABDOMEN: Within normal limits.  BONES: Degenerative changes.    IMPRESSION:  No pulmonary embolism    Scattered ground glass opacities of uncertain etiology.    Left atrial appendage mixing artifact versus thrombus.    Findings were discussed with AXEL ECKERT  5/3/2021 6:53 PM with read back confirmation.          < end of copied text >

## 2021-05-10 NOTE — PROGRESS NOTE ADULT - SUBJECTIVE AND OBJECTIVE BOX
Patient is a 65y old  Male who presents with a chief complaint of     SUBJECTIVE / OVERNIGHT EVENTS: no events over night   s/p cath   MEDICATIONS  (STANDING):  aspirin enteric coated 81 milliGRAM(s) Oral daily  atorvastatin 40 milliGRAM(s) Oral at bedtime  budesonide 160 MICROgram(s)/formoterol 4.5 MICROgram(s) Inhaler 2 Puff(s) Inhalation two times a day  levothyroxine 175 MICROGram(s) Oral daily  tamsulosin 0.4 milliGRAM(s) Oral at bedtime    MEDICATIONS  (PRN):  acetaminophen   Tablet .. 650 milliGRAM(s) Oral every 6 hours PRN Temp greater or equal to 38.5C (101.3F), Mild Pain (1 - 3)  ALBUTerol    90 MICROgram(s) HFA Inhaler 1 Puff(s) Inhalation four times a day PRN Shortness of Breath and/or Wheezing  guaiFENesin Oral Liquid (Sugar-Free) 200 milliGRAM(s) Oral every 6 hours PRN Cough      PHYSICAL EXAM:  GENERAL: NAD, well-developed  HEAD:  Atraumatic, Normocephalic  EYES: EOMI, PERRLA, conjunctiva and sclera clear  NECK: Supple, No JVD  CHEST/LUNG: Clear to auscultation bilaterally; No wheeze  HEART: Regular rate and rhythm; No murmurs, rubs, or gallops  ABDOMEN: Soft, Nontender, Nondistended; Bowel sounds present  EXTREMITIES:  2+ Peripheral Pulses, No clubbing, cyanosis, or edema  PSYCH: AAOx3  NEUROLOGY: non-focal  SKIN: No rashes or lesions                                            12.8   5.16  )-----------( 179      ( 09 May 2021 07:03 )             37.9                   CAPILLARY BLOOD GLUCOSE                  RADIOLOGY & ADDITIONAL TESTS:    Imaging Personally Reviewed:    Consultant(s) Notes Reviewed:      Care Discussed with Consultants/Other Providers:

## 2021-05-10 NOTE — DISCHARGE NOTE PROVIDER - HOSPITAL COURSE
Patient is a 64 y/o male with PMH of HTN, thyroidectomy s/p thyroid ca who presented with left sided neck and right sided chest pain. CT angio chest showed "no pulmonary embolism. Scattered ground glass opacities of uncertain etiology. Left atrial appendage mixing artifact versus thrombus." Cardiology consulted for further evaluation. s/p JERAD with no LA thrombus.s/p cath with minor luminal CAD  - recommend medical management with ASA 81mg/Lipitor 40mg  - no further inpatient cardiac w/u - stable for discharge from CV perspective if radial site remains stable after cath recovery precautions

## 2021-05-10 NOTE — PROGRESS NOTE ADULT - SUBJECTIVE AND OBJECTIVE BOX
S: Denies chest pain or SOB. Review of systems otherwise (-)    Review of Systems:   Constitutional: [ ] fevers, [ ] chills.   Skin: [ ] dry skin. [ ] rashes.  Psychiatric: [ ] depression, [ ] anxiety.   Gastrointestinal: [ ] BRBPR, [ ] melena.   Neurological: [ ] confusion. [ ] seizures. [ ] shuffling gait.   Ears,Nose,Mouth and Throat: [ ] ear pain [ ] sore throat.   Eyes: [ ] diplopia.   Respiratory: [ ] hemoptysis. [ ] shortness of breath  Cardiovascular: See HPI above  Hematologic/Lymphatic: [ ] anemia. [ ] painful nodes. [ ] prolonged bleeding.   Genitourinary: [ ] hematuria. [ ] flank pain.   Endocrine: [ ] significant change in weight. [ ] intolerance to heat and cold.     Review of systems [x ] otherwise negative, [ ] otherwise unable to obtain    FH: no family history of sudden cardiac death in first degree relatives    SH: [ ] tobacco, [ ] alcohol, [ ] drugs      MEDICATIONS  (STANDING):  aspirin enteric coated 81 milliGRAM(s) Oral daily  atorvastatin 40 milliGRAM(s) Oral at bedtime  budesonide 160 MICROgram(s)/formoterol 4.5 MICROgram(s) Inhaler 2 Puff(s) Inhalation two times a day  levothyroxine 175 MICROGram(s) Oral daily  tamsulosin 0.4 milliGRAM(s) Oral at bedtime    MEDICATIONS  (PRN):  acetaminophen   Tablet .. 650 milliGRAM(s) Oral every 6 hours PRN Temp greater or equal to 38.5C (101.3F), Mild Pain (1 - 3)  ALBUTerol    90 MICROgram(s) HFA Inhaler 1 Puff(s) Inhalation four times a day PRN Shortness of Breath and/or Wheezing  guaiFENesin Oral Liquid (Sugar-Free) 200 milliGRAM(s) Oral every 6 hours PRN Cough      LABS:                          12.4   5.96  )-----------( 177      ( 10 May 2021 06:17 )             37.3     Hemoglobin: 12.4 g/dL (05-10 @ 06:17)  Hemoglobin: 12.8 g/dL (05-09 @ 07:03)  Hemoglobin: 12.4 g/dL (05-08 @ 07:25)  Hemoglobin: 12.4 g/dL (05-07 @ 06:10)  Hemoglobin: 11.7 g/dL (05-07 @ 01:12)    05-10    138  |  106  |  26<H>  ----------------------------<  104<H>  4.1   |  20<L>  |  1.25    Ca    9.0      10 May 2021 06:17      Creatinine Trend: 1.25<--, 1.16<--, 1.20<--, 1.11<--   PT/INR - ( 10 May 2021 06:17 )   PT: 12.1 sec;   INR: 1.01 ratio         PTT - ( 10 May 2021 06:17 )  PTT:32.5 sec          05-09-21 @ 07:01  -  05-10-21 @ 07:00  --------------------------------------------------------  IN: 720 mL / OUT: 750 mL / NET: -30 mL        PHYSICAL EXAM  Vital Signs Last 24 Hrs  T(C): 36.6 (10 May 2021 04:38), Max: 36.7 (09 May 2021 21:37)  T(F): 97.8 (10 May 2021 04:38), Max: 98 (09 May 2021 21:37)  HR: 78 (10 May 2021 04:38) (78 - 79)  BP: 115/77 (10 May 2021 04:38) (115/77 - 119/75)  BP(mean): --  RR: 18 (10 May 2021 04:38) (16 - 18)  SpO2: 98% (10 May 2021 04:38) (97% - 98%)      General: Well nourished in no acute distress. Alert and Oriented * 3.   Head: Normocephalic and atraumatic.   Neck: No JVD. No bruits. Supple. Does not appear to be enlarged.   Cardiovascular: + S1,S2 ; RRR Soft systolic murmur at the left lower sternal border. No rubs noted.    Lungs: CTA b/l. No rhonchi, rales or wheezes.   Abdomen: + BS, soft. Non tender. Non distended. No rebound. No guarding.   Extremities: No clubbing/cyanosis/edema.   Neurologic: Moves all four extremities. Full range of motion.   Skin: Warm and moist. The patient's skin has normal elasticity and good skin turgor.   Psychiatric: Appropriate mood and affect.  Musculoskeletal: Normal range of motion, normal strength    TELEMETRY: SR 60-70s    ECG: NSR, nonspecific T wave abnormality 	    < from: Transthoracic Echocardiogram (05.04.21 @ 05:47) >  Observations:  Mitral Valve: Normal mitral valve. Minimal mitral  regurgitation.  Aortic Valve/Aorta: Mildly calcified aortic valve with  normal opening. Mild aortic regurgitation.  Normal aortic root.  Left Atrium: Moderately dilated left atrium.  Left Ventricle: Normal left ventricular internal dimensions  and wall thicknesses.  Normal left ventricular systolic function. No segmental  wall motion abnormalities.  Left ventricular filling pressure is elevated.  Right Heart: Normal right atrium. Normal right ventricular  size and function.  Normal tricuspid valve. Mild tricuspid regurgitation.  Normal pulmonic valve. Mild pulmonic regurgitation.  Pericardium/Pleura: Normal pericardium with no pericardial  effusion.  Hemodynamic: Pulmonary hypertension. Estimated PASP = 32  mmHg + estimated mean right atrial pressure.    < end of copied text >    < from: Nuclear Stress Test-Exercise (05.09.21 @ 12:25) >  IMPRESSIONS:Abnormal Study  * Exercise capacity: 6 METS, Poor for age and gender.  * Chest Pain: No chest pain with exercise.  * Symptom: Fatigue.  * HR Response: Excessive increase in HR with stress due to  poor physical condition and/or reduced cardiovascular  reserve.  * BP Response: Appropriate.  * Heart Rhythm: Sinus Rhythm - 82 BPM.  * Baseline ECG: There were approximately 0.5 mm  downsloping ST segment depressions in leads II, III, and  aVF at baseline.  * ECG Changes: No significant ischemic ST segment changes  beyond baseline abnormalities.  * Arrhythmia: Rare APDs occurred during recovery.  * Review of raw data shows: Minor motion artifact.  * The left ventricle was enlarged.  * There is a small, moderate defect in the basal  anteroseptal wall that is partially reversible suggestive  of infarct with moderate susan-infarct ischemia in this  segment.  * There are large, mild to moderate defects in the  inferior, inferoapical, basal inferoseptal, and basal  inferolateral walls that are mostly fixed suggestive of  infarct with minimal susan-infarct ischemia.  * Post-stress gated wall motion analysis was performed  (LVEF= 48 %;LVEDV = 135 ml.) revealing hypokinesis of the  inferior, inferoapical, basal inferolateral, and basal  septal walls.  *** No previous Nuclear/Stress exam.    < end of copied text >        RADIOLOGY:  < from: CT Angio Chest w/ IV Cont (05.03.21 @ 18:05) >  IMPRESSION:  No pulmonary embolism    Scattered ground glass opacities of uncertain etiology.    Left atrial appendage mixing artifact versus thrombus.    < end of copied text >    JERAD: t< from: Transesophageal Echocardiogram w/o TTE (05.07.21 @ 15:10) >  ------------------------------------------------------------------------  Conclusions:  1. Complex atheroma noted in aortic arch/descending aorta.  2. No left atrial or left atrial appendage thrombus.  Normal left atrial appendage function (velocity= 0.45  m/s).  3. Concentric left ventricular hypertrophy.  4. Normal left ventricular systolic function. No segmental  wall motion abnormalities.  ------------------------------------------------------------------------  Confirmed on  5/7/2021 - 16:15:21 by CESAR Bertrand  -------------------------    < end of copied text >      ASSESSMENT/PLAN: Patient is a 66 y/o male with PMH of HTN, thyroidectomy s/p thyroid ca who presented with left sided neck and right sided chest pain. CT angio chest showed "no pulmonary embolism. Scattered ground glass opacities of uncertain etiology. Left atrial appendage mixing artifact versus thrombus." Cardiology consulted for further evaluation. s/p JERAD with no LA thrombus.    - Stable indeterminate troponin noted, no acute ischemic EKG changes noted  - Pulm eval appreciated  - tele stable , no arrythmia   - JERAD with no LA thrombus  - Abnormal exercise NST above  - Plan for cardiac cath today - patient aware and agreeable  - Plan for outpatient cardiac f/u in our office after d/c ([phone]) - will setup appt    Pedro Alvarado PA-C  Pager: [phone]   S: Denies chest pain or SOB. Review of systems otherwise (-)    Review of Systems:   Constitutional: [ ] fevers, [ ] chills.   Skin: [ ] dry skin. [ ] rashes.  Psychiatric: [ ] depression, [ ] anxiety.   Gastrointestinal: [ ] BRBPR, [ ] melena.   Neurological: [ ] confusion. [ ] seizures. [ ] shuffling gait.   Ears,Nose,Mouth and Throat: [ ] ear pain [ ] sore throat.   Eyes: [ ] diplopia.   Respiratory: [ ] hemoptysis. [ ] shortness of breath  Cardiovascular: See HPI above  Hematologic/Lymphatic: [ ] anemia. [ ] painful nodes. [ ] prolonged bleeding.   Genitourinary: [ ] hematuria. [ ] flank pain.   Endocrine: [ ] significant change in weight. [ ] intolerance to heat and cold.     Review of systems [x ] otherwise negative, [ ] otherwise unable to obtain    FH: no family history of sudden cardiac death in first degree relatives    SH: [ ] tobacco, [ ] alcohol, [ ] drugs      MEDICATIONS  (STANDING):  aspirin enteric coated 81 milliGRAM(s) Oral daily  atorvastatin 40 milliGRAM(s) Oral at bedtime  budesonide 160 MICROgram(s)/formoterol 4.5 MICROgram(s) Inhaler 2 Puff(s) Inhalation two times a day  levothyroxine 175 MICROGram(s) Oral daily  tamsulosin 0.4 milliGRAM(s) Oral at bedtime    MEDICATIONS  (PRN):  acetaminophen   Tablet .. 650 milliGRAM(s) Oral every 6 hours PRN Temp greater or equal to 38.5C (101.3F), Mild Pain (1 - 3)  ALBUTerol    90 MICROgram(s) HFA Inhaler 1 Puff(s) Inhalation four times a day PRN Shortness of Breath and/or Wheezing  guaiFENesin Oral Liquid (Sugar-Free) 200 milliGRAM(s) Oral every 6 hours PRN Cough      LABS:                          12.4   5.96  )-----------( 177      ( 10 May 2021 06:17 )             37.3     Hemoglobin: 12.4 g/dL (05-10 @ 06:17)  Hemoglobin: 12.8 g/dL (05-09 @ 07:03)  Hemoglobin: 12.4 g/dL (05-08 @ 07:25)  Hemoglobin: 12.4 g/dL (05-07 @ 06:10)  Hemoglobin: 11.7 g/dL (05-07 @ 01:12)    05-10    138  |  106  |  26<H>  ----------------------------<  104<H>  4.1   |  20<L>  |  1.25    Ca    9.0      10 May 2021 06:17      Creatinine Trend: 1.25<--, 1.16<--, 1.20<--, 1.11<--   PT/INR - ( 10 May 2021 06:17 )   PT: 12.1 sec;   INR: 1.01 ratio         PTT - ( 10 May 2021 06:17 )  PTT:32.5 sec          05-09-21 @ 07:01  -  05-10-21 @ 07:00  --------------------------------------------------------  IN: 720 mL / OUT: 750 mL / NET: -30 mL        PHYSICAL EXAM  Vital Signs Last 24 Hrs  T(C): 36.6 (10 May 2021 04:38), Max: 36.7 (09 May 2021 21:37)  T(F): 97.8 (10 May 2021 04:38), Max: 98 (09 May 2021 21:37)  HR: 78 (10 May 2021 04:38) (78 - 79)  BP: 115/77 (10 May 2021 04:38) (115/77 - 119/75)  BP(mean): --  RR: 18 (10 May 2021 04:38) (16 - 18)  SpO2: 98% (10 May 2021 04:38) (97% - 98%)      General: Well nourished in no acute distress. Alert and Oriented * 3.   Head: Normocephalic and atraumatic.   Neck: No JVD. No bruits. Supple. Does not appear to be enlarged.   Cardiovascular: + S1,S2 ; RRR Soft systolic murmur at the left lower sternal border. No rubs noted.    Lungs: CTA b/l. No rhonchi, rales or wheezes.   Abdomen: + BS, soft. Non tender. Non distended. No rebound. No guarding.   Extremities: No clubbing/cyanosis/edema.   Neurologic: Moves all four extremities. Full range of motion.   Skin: Warm and moist. The patient's skin has normal elasticity and good skin turgor.   Psychiatric: Appropriate mood and affect.  Musculoskeletal: Normal range of motion, normal strength    TELEMETRY: SR 60-70s    ECG: NSR, nonspecific T wave abnormality 	    < from: Transthoracic Echocardiogram (05.04.21 @ 05:47) >  Observations:  Mitral Valve: Normal mitral valve. Minimal mitral  regurgitation.  Aortic Valve/Aorta: Mildly calcified aortic valve with  normal opening. Mild aortic regurgitation.  Normal aortic root.  Left Atrium: Moderately dilated left atrium.  Left Ventricle: Normal left ventricular internal dimensions  and wall thicknesses.  Normal left ventricular systolic function. No segmental  wall motion abnormalities.  Left ventricular filling pressure is elevated.  Right Heart: Normal right atrium. Normal right ventricular  size and function.  Normal tricuspid valve. Mild tricuspid regurgitation.  Normal pulmonic valve. Mild pulmonic regurgitation.  Pericardium/Pleura: Normal pericardium with no pericardial  effusion.  Hemodynamic: Pulmonary hypertension. Estimated PASP = 32  mmHg + estimated mean right atrial pressure.    < end of copied text >    < from: Nuclear Stress Test-Exercise (05.09.21 @ 12:25) >  IMPRESSIONS:Abnormal Study  * Exercise capacity: 6 METS, Poor for age and gender.  * Chest Pain: No chest pain with exercise.  * Symptom: Fatigue.  * HR Response: Excessive increase in HR with stress due to  poor physical condition and/or reduced cardiovascular  reserve.  * BP Response: Appropriate.  * Heart Rhythm: Sinus Rhythm - 82 BPM.  * Baseline ECG: There were approximately 0.5 mm  downsloping ST segment depressions in leads II, III, and  aVF at baseline.  * ECG Changes: No significant ischemic ST segment changes  beyond baseline abnormalities.  * Arrhythmia: Rare APDs occurred during recovery.  * Review of raw data shows: Minor motion artifact.  * The left ventricle was enlarged.  * There is a small, moderate defect in the basal  anteroseptal wall that is partially reversible suggestive  of infarct with moderate susan-infarct ischemia in this  segment.  * There are large, mild to moderate defects in the  inferior, inferoapical, basal inferoseptal, and basal  inferolateral walls that are mostly fixed suggestive of  infarct with minimal susan-infarct ischemia.  * Post-stress gated wall motion analysis was performed  (LVEF= 48 %;LVEDV = 135 ml.) revealing hypokinesis of the  inferior, inferoapical, basal inferolateral, and basal  septal walls.  *** No previous Nuclear/Stress exam.    < end of copied text >        RADIOLOGY:  < from: CT Angio Chest w/ IV Cont (05.03.21 @ 18:05) >  IMPRESSION:  No pulmonary embolism    Scattered ground glass opacities of uncertain etiology.    Left atrial appendage mixing artifact versus thrombus.    < end of copied text >    JERAD: t< from: Transesophageal Echocardiogram w/o TTE (05.07.21 @ 15:10) >  ------------------------------------------------------------------------  Conclusions:  1. Complex atheroma noted in aortic arch/descending aorta.  2. No left atrial or left atrial appendage thrombus.  Normal left atrial appendage function (velocity= 0.45  m/s).  3. Concentric left ventricular hypertrophy.  4. Normal left ventricular systolic function. No segmental  wall motion abnormalities.  ------------------------------------------------------------------------  Confirmed on  5/7/2021 - 16:15:21 by CESAR Bertrand  -------------------------    < end of copied text >      ASSESSMENT/PLAN: Patient is a 66 y/o male with PMH of HTN, thyroidectomy s/p thyroid ca who presented with left sided neck and right sided chest pain. CT angio chest showed "no pulmonary embolism. Scattered ground glass opacities of uncertain etiology. Left atrial appendage mixing artifact versus thrombus." Cardiology consulted for further evaluation. s/p JERAD with no LA thrombus.    - Stable indeterminate troponin noted, no acute ischemic EKG changes noted  - Pulm eval appreciated  - tele stable , no arrythmia   - JERAD with no LA thrombus  - Abnormal exercise NST above  - Plan for cardiac cath today - patient aware and agreeable  - Plan for outpatient cardiac f/u in our office after d/c ([phone]) - will setup appt    ***ADDENDUM 4:20 PM:  - s/p cath with minor luminal CAD  - recommend medical management with ASA 81mg/Lipitor 40mg  - no further inpatient cardiac w/u - stable for discharge from CV perspective if radial site remains stable after cath recovery precautions  - Patient to follow up in our [place] office with Dr. Ray on 5/17 at 10 AM ([address], [address], [place] [address] - Office #[phone])    Pedro Alvarado PA-C  Pager: [phone]

## 2021-05-10 NOTE — DIETITIAN INITIAL EVALUATION ADULT. - PERTINENT LABORATORY DATA
05-10 Na 138 mmol/L Glu 104 mg/dL<H> K+ 4.1 mmol/L Cr  1.25 mg/dL BUN 26 mg/dL<H>  Hgb 12.4 g/dL<L> Hct 37.3 %<L>  A1C with Estimated Average Glucose Result: 6.0 % (05-04-21 @ 08:55)

## 2021-05-10 NOTE — DIETITIAN INITIAL EVALUATION ADULT. - PHYSCIAL ASSESSMENT
IBW%: 142%   Skin per nursing documentation: No pressure injuries noted.  BMI based on daily wt 5/9 106.8 kG obese

## 2021-05-10 NOTE — PROGRESS NOTE ADULT - PROBLEM SELECTOR PLAN 3
L atrial appendage mixing artifact versus thrombus  -F/u JERAD  -F/u Cards recs.
L atrial appendage mixing artifact versus thrombus  -JERAD with no thrombus
L atrial appendage mixing artifact versus thrombus  -F/u JERAD  -F/u Cards recs.
L atrial appendage mixing artifact versus thrombus  -F/u JERAD  -Heparin gtt per cards

## 2021-05-10 NOTE — DIETITIAN INITIAL EVALUATION ADULT. - ORAL INTAKE PTA/DIET HISTORY
Pt was eating well with no changes in appetite. Pt was following a low Na diet; eats well-balanced meals prepared by self and wife. Confirms no known food allergies. Denies Hx of chewing or swallowing issues. Denies micronutrient use. Pt was taking Oil supplement, unable to name.

## 2021-05-10 NOTE — PROGRESS NOTE ADULT - PROBLEM SELECTOR PLAN 4
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  -No further c/o chest pain at this time
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  -No further c/o chest pain at this time

## 2021-05-10 NOTE — DISCHARGE NOTE PROVIDER - NSDCMRMEDTOKEN_GEN_ALL_CORE_FT
albuterol 90 mcg/inh inhalation aerosol: 1 puff(s) inhaled , As Needed  cabergoline 0.5 mg oral tablet: 2 tab(s) orally once a day  Flexeril 5 mg oral tablet: 1 tab(s) orally , As Needed  Flomax 0.4 mg oral capsule: 1 cap(s) orally once a day  indapamide 1.25 mg oral tablet: 1 tab(s) orally once a day (in the morning)    Note:last filled in sept for 3 month supply pt showed a picture of current medication.  Synthroid 175 mcg (0.175 mg) oral tablet: 1 tab(s) orally once a day    Note:Last filled in sept for 3 month supply   albuterol 90 mcg/inh inhalation aerosol: 1 puff(s) inhaled , As Needed  aspirin 81 mg oral delayed release tablet: 1 tab(s) orally once a day  atorvastatin 40 mg oral tablet: 1 tab(s) orally once a day (at bedtime)  Flexeril 5 mg oral tablet: 1 tab(s) orally , As Needed  Flomax 0.4 mg oral capsule: 1 cap(s) orally once a day  Synthroid 175 mcg (0.175 mg) oral tablet: 1 tab(s) orally once a day    Note:Last filled in sept for 3 month supply   albuterol 90 mcg/inh inhalation aerosol: 1 puff(s) inhaled , As Needed  aspirin 81 mg oral delayed release tablet: 1 tab(s) orally once a day  atorvastatin 40 mg oral tablet: 1 tab(s) orally once a day (at bedtime)  Flexeril 5 mg oral tablet: 1 tab(s) orally , As Needed  Flomax 0.4 mg oral capsule: 1 cap(s) orally once a day  guaiFENesin 100 mg/5 mL oral liquid: 10 milliliter(s) orally every 6 hours, As needed, Cough  Synthroid 175 mcg (0.175 mg) oral tablet: 1 tab(s) orally once a day    Note:Last filled in sept for 3 month supply

## 2021-05-10 NOTE — PROGRESS NOTE ADULT - PROBLEM SELECTOR PLAN 2
by hx - recently diagnosed by PCP  -Symbicort 160mcg/4.5 mcg 2 puffs BID (Home med: Advair)  -Albuterol HFA 2 puffs q6h PRN.

## 2021-05-10 NOTE — DIETITIAN INITIAL EVALUATION ADULT. - CHIEF COMPLAINT
Patient is a 66 y/o male with PMH of HTN, thyroidectomy s/p thyroid ca who presented with left sided neck and right sided chest pain. CT angio chest showed "no pulmonary embolism. Scattered ground glass opacities of uncertain etiology. Left atrial appendage mixing artifact versus thrombus." Cardiology consulted for further evaluation.  s/p JERAD

## 2021-05-10 NOTE — PROGRESS NOTE ADULT - PROVIDER SPECIALTY LIST ADULT
Cardiology
Cardiology
Hospitalist
Cardiology
Cardiology
Hospitalist
Hospitalist
Pulmonology
Hospitalist
Pulmonology

## 2021-05-10 NOTE — DISCHARGE NOTE NURSING/CASE MANAGEMENT/SOCIAL WORK - PATIENT PORTAL LINK FT
You can access the FollowMyHealth Patient Portal offered by [facility] by registering at the following website: [email or web address]. By joining [facility] [employer]’s FollowMyHealth portal, you will also be able to view your health information using other applications (apps) compatible with our system.

## 2021-05-10 NOTE — DISCHARGE NOTE PROVIDER - CARE PROVIDER_API CALL
VITALY SYLVESTER  Internal Medicine  [address] [place] [address], [place] [address]  Phone: [phone]  Fax: [phone]  Follow Up Time:

## 2021-05-10 NOTE — PROGRESS NOTE ADULT - NSICDXPILOT_GEN_ALL_CORE
[place]

## 2021-05-10 NOTE — PROGRESS NOTE ADULT - ASSESSMENT
65 m with    Neck pain and hx of throat Ca  - ENT evaluation appreciated.   Cards consult called, follow up recs     Possible atrial thrombus  - AC with Heparin? ( low Platelets)  - JERAD today follow up results   - Cardiology following, continue with Heparin drip      Chest pain  - stress test   - cardiology follow    Chest opacities  - Pulmonary evaluation    Hypothyroidism  - continue Rx  - TSH    Hx of Pituitary disease  - check Prolactin  - Endocrine evaluation  - CT head     DVT prophylaxis  - PAS    Further action as per clinical course     Jose Carlos Brizuela MD pager [phone]     
65 m with    Neck pain and hx of throat Ca  - ENT evaluation appreciated.   Cards consult called, follow up recs     Possible atrial thrombus  - AC with Heparin? ( low Platelets)  - JERAD today follow up results   - Cardiology following, continue with Heparin drip      Chest pain  - stress test   - cardiology follow    Chest opacities  - Pulmonary evaluation    Hypothyroidism  - continue Rx  - TSH    Hx of Pituitary disease  - check Prolactin  - Endocrine evaluation  - CT head     DVT prophylaxis  - PAS    Further action as per clinical course     Jose Carlos Brizuela MD pager [phone]     
66 y/o M with PMH HTN, VC CA. Presents with left sided neck and right sided chest pain since yesterday. Pt states he has been doing a lot of work on his house, yesterday awoke with left posterior neck pain, worse with movement and tender to palpation. Pt then started to have sharp right sided chest discomfort which radiates to the right side of his abdomen, has been waxing and waning but now constant, worse with deep inspiration. CTA chest neg PE, found to have L atrial appendage mixing artifact vs. thrombus, and scattered GGO of uncertain etiology. Currently breathing comfortably on RA. 
65 m with    Chest pain  - stress test   - cardiology following   Neck pain and hx of throat Ca  - ENT evaluation appreciated.   -JERAD no thrombus       Chest opacities  - Pulmonary evaluation    Hypothyroidism  - continue Rx  - TSH    Hx of Pituitary disease  - check Prolactin 6.1  -  CT head / MRI head no acute findings     DVT prophylaxis  - PAS    Further action as per clinical course   
65 m with    Chest pain  - stress test   - cardiology following   Neck pain and hx of throat Ca  - ENT evaluation appreciated.   -JERAD no thrombus       Chest opacities  - Pulmonary evaluation    Hypothyroidism  - continue Rx  - TSH    Hx of Pituitary disease  - check Prolactin 6.1  -  CT head / MRI head no acute findings     DVT prophylaxis  - PAS    Further action as per clinical course   
65 m with    Neck pain and hx of throat Ca  - ENT evaluation appreciated.   Cards consult called, follow up recs     Possible atrial thrombus  - AC with Heparin? ( low Platelets)  - JERAD  - Cardiology evaluation called Dr. Bunch    Chest pain  - stress test   - cardiology follow    Chest opacities  - Pulmonary evaluation    Hypothyroidism  - continue Rx  - TSH    Hx of Pituitary disease  - check Prolactin  - Endocrine evaluation  - CT head     DVT prophylaxis  - PAS    Further action as per clinical course     Jose Carlos Brizuela MD pager [phone]     
65 m with    Neck pain and hx of throat Ca  - ENT evaluation appreciated.   Cards consult called, follow up recs     Possible atrial thrombus  - AC with Heparin? ( low Platelets)  - JERAD tomorrow   - Cardiology evaluation     Chest pain  - stress test   - cardiology follow    Chest opacities  - Pulmonary evaluation    Hypothyroidism  - continue Rx  - TSH    Hx of Pituitary disease  - check Prolactin  - Endocrine evaluation  - CT head     DVT prophylaxis  - PAS    Further action as per clinical course     Jose Carlos Brziuela MD pager [phone]     
Patient seen and examined, agree with above assessment and plan as transcribed above.    - stress noted, will discuss cath with patient     Justice Kerr MD, FACC  BEEPER [phone]  
66 y/o M with PMH HTN, VC CA. Presents with left sided neck and right sided chest pain since yesterday. Pt states he has been doing a lot of work on his house, yesterday awoke with left posterior neck pain, worse with movement and tender to palpation. Pt then started to have sharp right sided chest discomfort which radiates to the right side of his abdomen, has been waxing and waning but now constant, worse with deep inspiration. CTA chest neg PE, found to have L atrial appendage mixing artifact vs. thrombus, and scattered GGO of uncertain etiology. Currently breathing comfortably on RA. 
66 y/o M with PMH HTN, thyroidectomy s/p thyroid CA. Presents with left sided neck and right sided chest pain since yesterday. Pt states he has been doing a lot of work on his house, yesterday awoke with left posterior neck pain, worse with movement and tender to palpation. Pt then started to have sharp right sided chest discomfort which radiates to the right side of his abdomen, has been waxing and waning but now constant, worse with deep inspiration. CTA chest neg PE, found to have L atrial appendage mixing artifact vs. thrombus, and scattered GGO of uncertain etiology. Currently breathing comfortably on RA. 
66 y/o M with PMH HTN, thyroidectomy s/p thyroid CA. Presents with left sided neck and right sided chest pain since yesterday. Pt states he has been doing a lot of work on his house, yesterday awoke with left posterior neck pain, worse with movement and tender to palpation. Pt then started to have sharp right sided chest discomfort which radiates to the right side of his abdomen, has been waxing and waning but now constant, worse with deep inspiration. CTA chest neg PE, found to have L atrial appendage mixing artifact vs. thrombus, and scattered GGO of uncertain etiology.

## 2021-05-10 NOTE — DIETITIAN INITIAL EVALUATION ADULT. - CONTINUE CURRENT NUTRITION CARE PLAN
1) Continue current low Na diet, no caffeine. RD remains available for diet changes as needed/able./yes

## 2022-04-18 ENCOUNTER — APPOINTMENT (EMERGENCY)
Dept: CT IMAGING | Facility: HOSPITAL | Age: 67
End: 2022-04-18
Payer: COMMERCIAL

## 2022-04-18 ENCOUNTER — HOSPITAL ENCOUNTER (OUTPATIENT)
Facility: HOSPITAL | Age: 67
Setting detail: OBSERVATION
Discharge: HOME | End: 2022-04-19
Attending: EMERGENCY MEDICINE | Admitting: INTERNAL MEDICINE
Payer: COMMERCIAL

## 2022-04-18 ENCOUNTER — APPOINTMENT (EMERGENCY)
Dept: RADIOLOGY | Facility: HOSPITAL | Age: 67
End: 2022-04-18
Payer: COMMERCIAL

## 2022-04-18 DIAGNOSIS — I67.2 ATHEROSCLEROTIC CEREBROVASCULAR DISEASE: ICD-10-CM

## 2022-04-18 DIAGNOSIS — R42 VERTIGO: Primary | ICD-10-CM

## 2022-04-18 PROBLEM — I63.9 CEREBROVASCULAR ACCIDENT (CVA) DUE TO VASCULAR STENOSIS (HCC): Status: RESOLVED | Noted: 2022-04-18 | Resolved: 2022-04-18

## 2022-04-18 PROBLEM — N17.9 AKI (ACUTE KIDNEY INJURY) (HCC): Status: ACTIVE | Noted: 2022-04-18

## 2022-04-18 PROBLEM — J45.909 ASTHMA: Status: ACTIVE | Noted: 2022-04-18

## 2022-04-18 PROBLEM — M10.9 GOUT: Status: ACTIVE | Noted: 2022-04-18

## 2022-04-18 PROBLEM — I63.9 CEREBROVASCULAR ACCIDENT (CVA) DUE TO VASCULAR STENOSIS (HCC): Status: ACTIVE | Noted: 2022-04-18

## 2022-04-18 PROBLEM — E03.9 HYPOTHYROIDISM: Status: ACTIVE | Noted: 2022-04-18

## 2022-04-18 PROBLEM — N40.0 BPH (BENIGN PROSTATIC HYPERPLASIA): Status: ACTIVE | Noted: 2022-04-18

## 2022-04-18 PROBLEM — I10 HTN (HYPERTENSION): Status: ACTIVE | Noted: 2022-04-18

## 2022-04-18 PROBLEM — I67.9 INTRACRANIAL VASCULAR STENOSIS: Status: ACTIVE | Noted: 2022-04-18

## 2022-04-18 LAB
2HR DELTA HS TROPONIN: 1 NG/L
ALBUMIN SERPL BCP-MCNC: 3.9 G/DL (ref 3.5–5)
ALP SERPL-CCNC: 47 U/L (ref 46–116)
ALT SERPL W P-5'-P-CCNC: 24 U/L (ref 12–78)
ANION GAP SERPL CALCULATED.3IONS-SCNC: 10 MMOL/L (ref 4–13)
ANISOCYTOSIS BLD QL SMEAR: PRESENT
AST SERPL W P-5'-P-CCNC: 19 U/L (ref 5–45)
BASOPHILS # BLD MANUAL: 0 THOUSAND/UL (ref 0–0.1)
BASOPHILS NFR MAR MANUAL: 0 % (ref 0–1)
BILIRUB DIRECT SERPL-MCNC: 0.18 MG/DL (ref 0–0.2)
BILIRUB SERPL-MCNC: 0.79 MG/DL (ref 0.2–1)
BUN SERPL-MCNC: 17 MG/DL (ref 5–25)
CALCIUM SERPL-MCNC: 9.1 MG/DL (ref 8.3–10.1)
CARDIAC TROPONIN I PNL SERPL HS: 33 NG/L
CARDIAC TROPONIN I PNL SERPL HS: 34 NG/L
CHLORIDE SERPL-SCNC: 109 MMOL/L (ref 100–108)
CO2 SERPL-SCNC: 26 MMOL/L (ref 21–32)
CREAT SERPL-MCNC: 1.33 MG/DL (ref 0.6–1.3)
EOSINOPHIL # BLD MANUAL: 0 THOUSAND/UL (ref 0–0.4)
EOSINOPHIL NFR BLD MANUAL: 0 % (ref 0–6)
ERYTHROCYTE [DISTWIDTH] IN BLOOD BY AUTOMATED COUNT: 15.8 % (ref 11.6–15.1)
GFR SERPL CREATININE-BSD FRML MDRD: 54 ML/MIN/1.73SQ M
GLUCOSE SERPL-MCNC: 106 MG/DL (ref 65–140)
HCT VFR BLD AUTO: 40.3 % (ref 36.5–49.3)
HGB BLD-MCNC: 13.2 G/DL (ref 12–17)
LG PLATELETS BLD QL SMEAR: PRESENT
LYMPHOCYTES # BLD AUTO: 0.98 THOUSAND/UL (ref 0.6–4.47)
LYMPHOCYTES # BLD AUTO: 17 % (ref 14–44)
MAGNESIUM SERPL-MCNC: 1.6 MG/DL (ref 1.6–2.6)
MCH RBC QN AUTO: 29.7 PG (ref 26.8–34.3)
MCHC RBC AUTO-ENTMCNC: 32.8 G/DL (ref 31.4–37.4)
MCV RBC AUTO: 91 FL (ref 82–98)
MONOCYTES # BLD AUTO: 0.23 THOUSAND/UL (ref 0–1.22)
MONOCYTES NFR BLD: 4 % (ref 4–12)
NEUTROPHILS # BLD MANUAL: 4.55 THOUSAND/UL (ref 1.85–7.62)
NEUTS SEG NFR BLD AUTO: 79 % (ref 43–75)
PLATELET # BLD AUTO: 153 THOUSANDS/UL (ref 149–390)
PLATELET BLD QL SMEAR: ABNORMAL
PMV BLD AUTO: 13.4 FL (ref 8.9–12.7)
POTASSIUM SERPL-SCNC: 3.5 MMOL/L (ref 3.5–5.3)
PROT SERPL-MCNC: 7.4 G/DL (ref 6.4–8.2)
RBC # BLD AUTO: 4.44 MILLION/UL (ref 3.88–5.62)
SODIUM SERPL-SCNC: 145 MMOL/L (ref 136–145)
WBC # BLD AUTO: 5.76 THOUSAND/UL (ref 4.31–10.16)

## 2022-04-18 PROCEDURE — 85007 BL SMEAR W/DIFF WBC COUNT: CPT | Performed by: EMERGENCY MEDICINE

## 2022-04-18 PROCEDURE — 71045 X-RAY EXAM CHEST 1 VIEW: CPT

## 2022-04-18 PROCEDURE — 1124F ACP DISCUSS-NO DSCNMKR DOCD: CPT | Performed by: EMERGENCY MEDICINE

## 2022-04-18 PROCEDURE — 70498 CT ANGIOGRAPHY NECK: CPT

## 2022-04-18 PROCEDURE — 96361 HYDRATE IV INFUSION ADD-ON: CPT

## 2022-04-18 PROCEDURE — 83735 ASSAY OF MAGNESIUM: CPT | Performed by: EMERGENCY MEDICINE

## 2022-04-18 PROCEDURE — 99285 EMERGENCY DEPT VISIT HI MDM: CPT

## 2022-04-18 PROCEDURE — 80076 HEPATIC FUNCTION PANEL: CPT | Performed by: EMERGENCY MEDICINE

## 2022-04-18 PROCEDURE — 99220 PR INITIAL OBSERVATION CARE/DAY 70 MINUTES: CPT | Performed by: INTERNAL MEDICINE

## 2022-04-18 PROCEDURE — 93005 ELECTROCARDIOGRAM TRACING: CPT

## 2022-04-18 PROCEDURE — G1004 CDSM NDSC: HCPCS

## 2022-04-18 PROCEDURE — 85027 COMPLETE CBC AUTOMATED: CPT | Performed by: EMERGENCY MEDICINE

## 2022-04-18 PROCEDURE — 96374 THER/PROPH/DIAG INJ IV PUSH: CPT

## 2022-04-18 PROCEDURE — 70496 CT ANGIOGRAPHY HEAD: CPT

## 2022-04-18 PROCEDURE — 36415 COLL VENOUS BLD VENIPUNCTURE: CPT | Performed by: EMERGENCY MEDICINE

## 2022-04-18 PROCEDURE — 99285 EMERGENCY DEPT VISIT HI MDM: CPT | Performed by: EMERGENCY MEDICINE

## 2022-04-18 PROCEDURE — 84484 ASSAY OF TROPONIN QUANT: CPT | Performed by: EMERGENCY MEDICINE

## 2022-04-18 PROCEDURE — 80048 BASIC METABOLIC PNL TOTAL CA: CPT | Performed by: EMERGENCY MEDICINE

## 2022-04-18 RX ORDER — ONDANSETRON 2 MG/ML
4 INJECTION INTRAMUSCULAR; INTRAVENOUS EVERY 6 HOURS PRN
Status: DISCONTINUED | OUTPATIENT
Start: 2022-04-18 | End: 2022-04-18

## 2022-04-18 RX ORDER — ALLOPURINOL 100 MG/1
300 TABLET ORAL DAILY
COMMUNITY

## 2022-04-18 RX ORDER — TAMSULOSIN HYDROCHLORIDE 0.4 MG/1
0.4 CAPSULE ORAL ONCE
Status: COMPLETED | OUTPATIENT
Start: 2022-04-18 | End: 2022-04-18

## 2022-04-18 RX ORDER — ATORVASTATIN CALCIUM 40 MG/1
40 TABLET, FILM COATED ORAL EVERY EVENING
Status: DISCONTINUED | OUTPATIENT
Start: 2022-04-18 | End: 2022-04-19 | Stop reason: HOSPADM

## 2022-04-18 RX ORDER — ACETAMINOPHEN 325 MG/1
650 TABLET ORAL ONCE
Status: COMPLETED | OUTPATIENT
Start: 2022-04-18 | End: 2022-04-18

## 2022-04-18 RX ORDER — COLCHICINE 0.6 MG/1
0.6 TABLET ORAL DAILY
COMMUNITY

## 2022-04-18 RX ORDER — ALBUTEROL SULFATE 90 UG/1
2 AEROSOL, METERED RESPIRATORY (INHALATION) EVERY 6 HOURS PRN
COMMUNITY

## 2022-04-18 RX ORDER — HEPARIN SODIUM 5000 [USP'U]/ML
5000 INJECTION, SOLUTION INTRAVENOUS; SUBCUTANEOUS EVERY 8 HOURS SCHEDULED
Status: DISCONTINUED | OUTPATIENT
Start: 2022-04-18 | End: 2022-04-19 | Stop reason: HOSPADM

## 2022-04-18 RX ORDER — LEVOTHYROXINE SODIUM 175 UG/1
175 TABLET ORAL DAILY
COMMUNITY

## 2022-04-18 RX ORDER — MECLIZINE HYDROCHLORIDE 25 MG/1
25 TABLET ORAL ONCE
Status: COMPLETED | OUTPATIENT
Start: 2022-04-18 | End: 2022-04-18

## 2022-04-18 RX ORDER — INDAPAMIDE 1.25 MG/1
1.25 TABLET, FILM COATED ORAL EVERY MORNING
COMMUNITY

## 2022-04-18 RX ORDER — CABERGOLINE 0.5 MG/1
0.5 TABLET ORAL 2 TIMES WEEKLY
COMMUNITY

## 2022-04-18 RX ORDER — KETOTIFEN FUMARATE 0.35 MG/ML
1 SOLUTION/ DROPS OPHTHALMIC 2 TIMES DAILY
COMMUNITY

## 2022-04-18 RX ORDER — ONDANSETRON 2 MG/ML
4 INJECTION INTRAMUSCULAR; INTRAVENOUS ONCE
Status: COMPLETED | OUTPATIENT
Start: 2022-04-18 | End: 2022-04-18

## 2022-04-18 RX ORDER — FAMOTIDINE 10 MG
10 TABLET ORAL 2 TIMES DAILY
COMMUNITY

## 2022-04-18 RX ORDER — ASPIRIN 81 MG/1
81 TABLET, CHEWABLE ORAL DAILY
Status: DISCONTINUED | OUTPATIENT
Start: 2022-04-19 | End: 2022-04-19 | Stop reason: HOSPADM

## 2022-04-18 RX ORDER — SODIUM CHLORIDE 9 MG/ML
80 INJECTION, SOLUTION INTRAVENOUS CONTINUOUS
Status: DISCONTINUED | OUTPATIENT
Start: 2022-04-18 | End: 2022-04-19 | Stop reason: HOSPADM

## 2022-04-18 RX ORDER — TAMSULOSIN HYDROCHLORIDE 0.4 MG/1
0.4 CAPSULE ORAL
COMMUNITY

## 2022-04-18 RX ADMIN — SODIUM CHLORIDE 80 ML/HR: 0.9 INJECTION, SOLUTION INTRAVENOUS at 18:46

## 2022-04-18 RX ADMIN — MECLIZINE HYDROCHLORIDE 25 MG: 25 TABLET ORAL at 14:42

## 2022-04-18 RX ADMIN — IOHEXOL 85 ML: 350 INJECTION, SOLUTION INTRAVENOUS at 15:04

## 2022-04-18 RX ADMIN — SODIUM CHLORIDE 1000 ML: 0.9 INJECTION, SOLUTION INTRAVENOUS at 14:21

## 2022-04-18 RX ADMIN — ACETAMINOPHEN 650 MG: 325 TABLET ORAL at 14:42

## 2022-04-18 RX ADMIN — TRIMETHOBENZAMIDE HYDROCHLORIDE 200 MG: 100 INJECTION INTRAMUSCULAR at 23:57

## 2022-04-18 RX ADMIN — HEPARIN SODIUM 5000 UNITS: 5000 INJECTION INTRAVENOUS; SUBCUTANEOUS at 18:46

## 2022-04-18 RX ADMIN — ATORVASTATIN CALCIUM 40 MG: 40 TABLET, FILM COATED ORAL at 18:46

## 2022-04-18 RX ADMIN — TAMSULOSIN HYDROCHLORIDE 0.4 MG: 0.4 CAPSULE ORAL at 16:29

## 2022-04-18 RX ADMIN — ONDANSETRON 4 MG: 2 INJECTION INTRAMUSCULAR; INTRAVENOUS at 14:46

## 2022-04-18 NOTE — ASSESSMENT & PLAN NOTE
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 with distal reconstitution at V3 segment through distal collateral vessels in left upper neck, 0 7 cm penetrating atheromatous ulcer in proximal brachiocephalic artery  Severe short-segment stenosis > 75% narrowing in right common carotid artery with moderate Left ICA (60% stenosis), right ICA proximal cervical (50% stenosis)   -Initiate stroke protocol  -patient not a tPA candidate, out of treatment time frame  -cardiac monitoring  -check lipid profile, A1c, TSH  -start aspirin and atorvastatin  -allow permissive hypertension, labetalol p r n  for BP >220/110  -check MRIB, echo  -OT/PT/ST  -neurology and vascular consult

## 2022-04-18 NOTE — ED PROVIDER NOTES
History  Chief Complaint   Patient presents with    Dizziness     pt arrived via ems having nausea vomiting and dizziness since thursday, pt states it feels like a loss of balance and describes "it only happens when i visit here from [place]"     Patient is a 68-year-old male with past medical history of asthma, hypothyroidism, gout, hypertension, stage 0 vocal cord cancer status post surgical excision and radiation in 2007 in remission, presents to the emergency department for dizziness, nausea and vomiting  Patient reports that since Saturday he has had constant dizziness described as the room spinning  He has also had postural lightheadedness and at times he has felt as though he was going to pass out  He reports the dizziness/vertigo is not positional and is constant, getting progressively worse  He states today he had nausea and 1 episode of nonbilious and nonbloody vomiting  He reports feeling off balance when he walks and at one point he did feel as though he was going to fall but he denies actually falling or any head injury  He does report having a generalized headache  He states he does have a history of migraines but denies that this feels similar to a migraine  Two days ago he felt a whooshing sound in his left ear but that has resolved  Denies any ringing in the ears or loss of hearing, change in vision, photophobia, syncope, neck pain or stiffness, fevers, chills, cough, URI symptoms, chest pain, palpitations, dyspnea, abdominal pain, diarrhea, constipation, blood per rectum or melena, dysuria, change in frequency, hematuria, flank pain, skin rash or color change, leg pain or swelling, lateralizing extremity weakness or paresthesia, slurring of speech or difficulty getting words out, facial drooping or other focal neurologic deficits  He reports having had similar dizziness once before many many years ago but it was very brief and did not last days like this   Denies history of prior stroke  History provided by:  Patient and spouse   used: No        Prior to Admission Medications   Prescriptions Last Dose Informant Patient Reported? Taking? albuterol (PROVENTIL HFA,VENTOLIN HFA) 90 mcg/act inhaler   Yes Yes   Sig: Inhale 2 puffs every 6 (six) hours as needed for wheezing   allopurinol (ZYLOPRIM) 100 mg tablet   Yes Yes   Sig: Take 300 mg by mouth daily   cabergoline (DOSTINEX) 0 5 MG tablet   Yes Yes   Sig: Take 0 5 mg by mouth 2 (two) times a week   colchicine (COLCRYS) 0 6 mg tablet   Yes Yes   Sig: Take 0 6 mg by mouth daily   famotidine (PEPCID) 10 mg tablet   Yes Yes   Sig: Take 10 mg by mouth 2 (two) times a day   fluticasone-salmeterol (Advair) 100-50 mcg/dose inhaler   Yes Yes   Sig: Inhale 1 puff 2 (two) times a day Rinse mouth after use  indapamide (LOZOL) 1 25 mg tablet   Yes Yes   Sig: Take 1 25 mg by mouth every morning   ketotifen (ZADITOR) 0 025 % ophthalmic solution   Yes Yes   Si drop 2 (two) times a day   levothyroxine 175 mcg tablet   Yes Yes   Sig: Take 175 mcg by mouth daily   tamsulosin (FLOMAX) 0 4 mg   Yes Yes   Sig: Take 0 4 mg by mouth daily with dinner      Facility-Administered Medications: None       Past Medical History:   Diagnosis Date    Asthma     Disease of thyroid gland     Gout     Hypertension        Past Surgical History:   Procedure Laterality Date    BACK SURGERY         History reviewed  No pertinent family history  I have reviewed and agree with the history as documented  E-Cigarette/Vaping     E-Cigarette/Vaping Substances     Social History     Tobacco Use    Smoking status: Never Smoker    Smokeless tobacco: Never Used   Substance Use Topics    Alcohol use: Not Currently    Drug use: Never       Review of Systems   Constitutional: Negative for chills and fever  HENT: Negative for congestion, ear pain, hearing loss, rhinorrhea, sore throat, tinnitus and trouble swallowing      Eyes: Negative for photophobia, pain and visual disturbance  Respiratory: Negative for cough, chest tightness, shortness of breath and wheezing  Cardiovascular: Negative for chest pain and palpitations  Gastrointestinal: Positive for nausea and vomiting  Negative for abdominal pain, constipation and diarrhea  Genitourinary: Negative for dysuria, flank pain, frequency and hematuria  Musculoskeletal: Negative for back pain, neck pain and neck stiffness  Skin: Negative for color change, pallor, rash and wound  Allergic/Immunologic: Negative for immunocompromised state  Neurological: Positive for dizziness, light-headedness and headaches  Negative for syncope, facial asymmetry, speech difficulty, weakness and numbness  Hematological: Negative for adenopathy  Does not bruise/bleed easily  Psychiatric/Behavioral: Negative for confusion and decreased concentration  All other systems reviewed and are negative  Physical Exam  Physical Exam  Vitals and nursing note reviewed  Constitutional:       General: He is not in acute distress  Appearance: Normal appearance  He is well-developed  He is not ill-appearing, toxic-appearing or diaphoretic  HENT:      Head: Normocephalic and atraumatic  Right Ear: External ear normal       Left Ear: External ear normal       Nose: Nose normal       Mouth/Throat:      Mouth: Mucous membranes are moist       Pharynx: Oropharynx is clear  No oropharyngeal exudate  Eyes:      Extraocular Movements: Extraocular movements intact  Conjunctiva/sclera: Conjunctivae normal       Pupils: Pupils are equal, round, and reactive to light  Comments: Bidirectional fatigable horizontal nystagmus  No vertical or rotary nystagmus  Neck:      Vascular: No JVD  Cardiovascular:      Rate and Rhythm: Normal rate and regular rhythm  Pulses: Normal pulses  Heart sounds: Normal heart sounds  No murmur heard  No friction rub  No gallop      Pulmonary:      Effort: Pulmonary effort is normal  No respiratory distress  Breath sounds: Normal breath sounds  No wheezing, rhonchi or rales  Abdominal:      General: There is no distension  Palpations: Abdomen is soft  Tenderness: There is no abdominal tenderness  There is no guarding or rebound  Musculoskeletal:         General: No swelling or tenderness  Normal range of motion  Cervical back: Normal range of motion and neck supple  No rigidity  Skin:     General: Skin is warm and dry  Coloration: Skin is not pale  Findings: No erythema or rash  Neurological:      General: No focal deficit present  Mental Status: He is alert and oriented to person, place, and time  Cranial Nerves: No cranial nerve deficit  Sensory: No sensory deficit  Motor: No weakness  Comments: 5/5 strength throughout  Normal finger-to-nose and heel-to-shin exam  Normal speech     Psychiatric:         Mood and Affect: Mood normal          Behavior: Behavior normal          Vital Signs  ED Triage Vitals [04/18/22 1400]   Temperature Pulse Respirations Blood Pressure SpO2   98 3 °F (36 8 °C) 64 20 119/77 98 %      Temp Source Heart Rate Source Patient Position - Orthostatic VS BP Location FiO2 (%)   Oral Monitor Lying Right arm --      Pain Score       No Pain           Vitals:    04/18/22 1400 04/18/22 1402 04/18/22 1430   BP: 119/77 119/77 123/71   Pulse: 64 67 66   Patient Position - Orthostatic VS: Lying Lying Lying     Vitals:    04/18/22 1400 04/18/22 1402 04/18/22 1430   BP: 119/77 119/77 123/71   BP Location: Right arm Right arm Right arm   Pulse: 64 67 66   Resp: 20 18 20   Temp: 98 3 °F (36 8 °C)     TempSrc: Oral     SpO2: 98% 98% 97%   Weight: 105 kg (231 lb 7 7 oz)     Height:   5' 10 5" (1 791 m)       Visual Acuity  Visual Acuity      Most Recent Value   L Pupil Size (mm) 3   R Pupil Size (mm) 3          ED Medications  Medications   sodium chloride 0 9 % bolus 1,000 mL (0 mL Intravenous Stopped 4/18/22 1629)   ondansetron (ZOFRAN) injection 4 mg (4 mg Intravenous Given 4/18/22 1446)   meclizine (ANTIVERT) tablet 25 mg (25 mg Oral Given 4/18/22 1442)   acetaminophen (TYLENOL) tablet 650 mg (650 mg Oral Given 4/18/22 1442)   iohexol (OMNIPAQUE) 350 MG/ML injection (SINGLE-DOSE) 85 mL (85 mL Intravenous Given 4/18/22 1504)   tamsulosin (FLOMAX) capsule 0 4 mg (0 4 mg Oral Given 4/18/22 1629)       Diagnostic Studies  Results Reviewed     Procedure Component Value Units Date/Time    HS Troponin I 2hr [[id number]]  (Normal) Collected: 04/18/22 1634    Lab Status: Final result Specimen: Blood from Arm, Right Updated: 04/18/22 1711     hs TnI 2hr 34 ng/L      Delta 2hr hsTnI 1 ng/L     Manual Differential(PHLEBS Do Not Order) [[id number]]  (Abnormal) Collected: 04/18/22 1417    Lab Status: Final result Specimen: Blood from Arm, Right Updated: 04/18/22 1519     Segmented % 79 %      Lymphocytes % 17 %      Monocytes % 4 %      Eosinophils, % 0 %      Basophils % 0 %      Absolute Neutrophils 4 55 Thousand/uL      Lymphocytes Absolute 0 98 Thousand/uL      Monocytes Absolute 0 23 Thousand/uL      Eosinophils Absolute 0 00 Thousand/uL      Basophils Absolute 0 00 Thousand/uL      Total Counted --     Anisocytosis Present     Platelet Estimate Borderline     Large Platelet Present    CBC and differential [[id number]]  (Abnormal) Collected: 04/18/22 1417    Lab Status: Final result Specimen: Blood from Arm, Right Updated: 04/18/22 1519     WBC 5 76 Thousand/uL      RBC 4 44 Million/uL      Hemoglobin 13 2 g/dL      Hematocrit 40 3 %      MCV 91 fL      MCH 29 7 pg      MCHC 32 8 g/dL      RDW 15 8 %      MPV 13 4 fL      Platelets 608 Thousands/uL     Narrative: This is an appended report  These results have been appended to a previously verified report      HS Troponin 0hr (reflex protocol) [[id number]]  (Normal) Collected: 04/18/22 1417    Lab Status: Final result Specimen: Blood from Arm, Right Updated: 04/18/22 1458     hs TnI 0hr 33 ng/L     Basic metabolic panel [[id number]]  (Abnormal) Collected: 04/18/22 1417    Lab Status: Final result Specimen: Blood from Arm, Right Updated: 04/18/22 1450     Sodium 145 mmol/L      Potassium 3 5 mmol/L      Chloride 109 mmol/L      CO2 26 mmol/L      ANION GAP 10 mmol/L      BUN 17 mg/dL      Creatinine 1 33 mg/dL      Glucose 106 mg/dL      Calcium 9 1 mg/dL      eGFR 54 ml/min/1 73sq m     Narrative:      [place] guidelines for Chronic Kidney Disease (CKD):     Stage 1 with normal or high GFR (GFR > 90 mL/min/1 73 square meters)    Stage 2 Mild CKD (GFR = 60-89 mL/min/1 73 square meters)    Stage 3A Moderate CKD (GFR = 45-59 mL/min/1 73 square meters)    Stage 3B Moderate CKD (GFR = 30-44 mL/min/1 73 square meters)    Stage 4 Severe CKD (GFR = 15-29 mL/min/1 73 square meters)    Stage 5 End Stage CKD (GFR <15 mL/min/1 73 square meters)  Note: GFR calculation is accurate only with a steady state creatinine    Hepatic function panel [[id number]]  (Normal) Collected: 04/18/22 1417    Lab Status: Final result Specimen: Blood from Arm, Right Updated: 04/18/22 1450     Total Bilirubin 0 79 mg/dL      Bilirubin, Direct 0 18 mg/dL      Alkaline Phosphatase 47 U/L      AST 19 U/L      ALT 24 U/L      Total Protein 7 4 g/dL      Albumin 3 9 g/dL     Magnesium [[id number]]  (Normal) Collected: 04/18/22 1417    Lab Status: Final result Specimen: Blood from Arm, Right Updated: 04/18/22 1450     Magnesium 1 6 mg/dL                  CTA head and neck with and without contrast   Final Result by Jak Perez MD (04/18 1559)      No acute intracranial abnormality  Mild chronic microangiopathy  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 with distal reconstitution at V3 segment through distal collateral vessels in left upper neck and probable retrograde flow  Question subclavian steal syndrome        0 7 cm penetrating atheromatous ulcer in proximal brachiocephalic artery  Severe short-segment stenosis (greater than 75% narrowing) in right common carotid artery due to irregular ulcerative atherosclerotic plaque  Moderate stenosis (approximately 60% narrowing) in left ICA proximal cervical segment due to atherosclerotic disease  Moderate stenosis (approximately 50% narrowing) in right ICA proximal cervical segment due to irregular ulcerative atherosclerotic plaque  Additional chronic/incidental findings as detailed above  The study was marked in [facility] for immediate notification  Workstation performed: [id number]         XR chest 1 view portable   ED Interpretation by Jordy Coy DO (04/18 1505)   Borderline cardiomegaly  No acute abnormality in the chest   No prior chest x-ray for comparison  Final Result by Jeffrey Delgado MD (04/18 1501)      No acute cardiopulmonary disease  Workstation performed: [id number]                    Procedures  ECG 12 Lead Documentation Only    Date/Time: 4/18/2022 2:36 PM  Performed by: Jordy Coy DO  Authorized by: Jordy Coy DO     ECG reviewed by me, the ED Provider: yes    Patient location:  ED  Previous ECG:     Previous ECG:  Unavailable  Rate:     ECG rate:  76    ECG rate assessment: normal    Rhythm:     Rhythm: sinus rhythm    Ectopy:     Ectopy: none    QRS:     QRS axis:  Right    QRS intervals:  Normal  Conduction:     Conduction: normal    ST segments:     ST segments:  Normal  T waves:     T waves: non-specific    Other findings:     Other findings: prolonged qTc interval      Other findings comment:  QT/QTc: 432/486 ms  Comments:      Low voltage QRS complex             ED Course  ED Course as of 04/18/22 1715   Mon Apr 18, 2022   1545 Patient reassessed and states he is feeling a lot better  Headache and dizziness essentially resolved    Waiting on head CT results as well as repeat troponin prior to disposition  Will also ambulate patient for ambulatory trial   Patient is requesting his home dose of Flomax  1625 Updated patient about significant CT findings of cerebrovascular atherosclerosis  Recommended admission for neurology and vascular consult and MRI leeann  Patient is agreeable  MDM  Number of Diagnoses or Management Options  Diagnosis management comments: 77-year-old male presents to the ED with 3 days of constant and progressively worsening dizziness/vertigo, nausea and vomiting today, headache as well as postural lightheadedness  DDX includes BPPV, labyrinthitis, ICH, acute stroke  Patient's symptoms present for 3 days, making patient outside of window for stroke alert or TPA if this were to be stroke-related  NIHSS is zero  Will work up with cardiac labs, CXR, EKG, CTA head and neck  Will give IVF, zofran, meclizine and tylenol for symptom relief  Disposition pending CT results and patient's response to treatment         Amount and/or Complexity of Data Reviewed  Clinical lab tests: ordered and reviewed  Tests in the radiology section of CPT®: ordered and reviewed  Tests in the medicine section of CPT®: reviewed and ordered  Obtain history from someone other than the patient: yes (Wife)  Independent visualization of images, tracings, or specimens: yes        Disposition  Final diagnoses:   Vertigo   Atherosclerotic cerebrovascular disease     Time reflects when diagnosis was documented in both MDM as applicable and the Disposition within this note     Time User Action Codes Description Comment    4/18/2022  4:39 PM Daphine [place] E Add [R42] Vertigo     4/18/2022  4:39 PM Daphine [place] E Add [I67 2] Atherosclerotic cerebrovascular disease       ED Disposition     ED Disposition Condition Date/Time Comment    Admit Stable Mon Apr 18, 2022  4:39 PM Case was discussed with BRANDEE and the patient's admission status was agreed to be Admission Status: observation status to the service of Dr Clarisa Ervin   Follow-up Information    None         Patient's Medications   Discharge Prescriptions    No medications on file       No discharge procedures on file      PDMP Review     None          ED Provider  Electronically Signed by           Tamar Bueno DO  04/18/22 4422

## 2022-04-18 NOTE — ASSESSMENT & PLAN NOTE
Cough, occasional shortness of breath no wheezing  Continue Albuterol p r n    Will give Greta Beaver not available on formulary

## 2022-04-18 NOTE — H&P
[facility]  H&P- Ever Yareli 1955, 74 y o  male MRN: [id number]  Unit/Bed#: ED 23 Encounter: [id number]  Primary Care Provider: No primary care provider on file  Date and time admitted to hospital: 4/18/2022  1:53 PM    * Intracranial vascular stenosis  Assessment & Plan  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 with distal reconstitution at V3 segment through distal collateral vessels in left upper neck, 0 7 cm penetrating atheromatous ulcer in proximal brachiocephalic artery  Severe short-segment stenosis > 75% narrowing in right common carotid artery with moderate Left ICA (60% stenosis), right ICA proximal cervical (50% stenosis)   -Initiate stroke protocol  -patient not a tPA candidate, out of treatment time frame  -cardiac monitoring  -check lipid profile, A1c, TSH  -start aspirin and atorvastatin  -check MRIB, echo  -OT/PT  -counseled on risk factor modification with diet, exercise, weight loss  -neurology and vascular consult      REBECCA (acute kidney injury) ([facility] [address] )  Assessment & Plan  Presents with REBECCA, creatinine 1 33, no baseline creatinine after vomiting on Saturday  IVF hydration  Avoid nephrotoxins  Monitor creatinine closely  BMP in the morning    Asthma  Assessment & Plan  Cough, occasional shortness of breath no wheezing  Continue Albuterol p r n  Will give Aaron People not available on formulary    Gout  Assessment & Plan  Continue home regimen colchicine and allopurinol    BPH (benign prostatic hyperplasia)  Assessment & Plan  On tamsulosin    Hypothyroidism  Assessment & Plan  Continue levothyroxine    HTN (hypertension)  Assessment & Plan  Patient normotensive continue home regimen    VTE Pharmacologic Prophylaxis: VTE Score: 10 High Risk (Score >/= 5) - Pharmacological DVT Prophylaxis Ordered: heparin  Sequential Compression Devices Ordered    Code Status: Level 1 - Full Code   Discussion with family: Updated  (wife) at bedside  Anticipated Length of Stay: Patient will be admitted on an observation basis with an anticipated length of stay of less than 2 midnights secondary to Intracranial artery stenosis  Total Time for Visit, including Counseling / Coordination of Care: 70 minutes Greater than 50% of this total time spent on direct patient counseling and coordination of care  Chief Complaint: Dizziness    History of Present Illness:  Aretha Green is a 74 y o  male with a PMH of HTN, asthma, hypothyroidism, 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 who presents with Dizziness since Friday  He complains of persistent dizziness associated with diffuse headache, nausea and vomiting X1 on Saturday  Dizziness is constant, feels like room is spinning and feeling off balance with ambulation  Today, he had worsening dizziness almost passed out prompting wife to bring him to ED  Complains of URI symptoms with cough and shortness of breath using albuterol and Advair inhaler given to him by PCP  Denies facial droop, slurred speech, focal weakness, visual disturbance, fever, chills, tinnitus, palpitation, syncope, chest pain, history of CVA or any other complaint  Patient is from [place], [place] visiting family in the area  Review of Systems:  Review of Systems  Comprehensive review of systems negative except in above HPI  Past Medical and Surgical History:   Past Medical History:   Diagnosis Date    Asthma     BPH (benign prostatic hyperplasia)     Disease of thyroid gland     GERD (gastroesophageal reflux disease)     Gout     Hypertension     Thyroid cancer ([facility] [address] )        Past Surgical History:   Procedure Laterality Date    BACK SURGERY      VOCAL FOLD LESION EXCISION N/A        Meds/Allergies:  Prior to Admission medications    Medication Sig Start Date End Date Taking?  Authorizing Provider   albuterol (PROVENTIL HFA,VENTOLIN HFA) 90 mcg/act inhaler Inhale 2 puffs every 6 (six) hours as needed for wheezing   Yes Historical Provider, MD   allopurinol (ZYLOPRIM) 100 mg tablet Take 300 mg by mouth daily   Yes Historical Provider, MD   cabergoline (DOSTINEX) 0 5 MG tablet Take 0 5 mg by mouth 2 (two) times a week   Yes Historical Provider, MD   colchicine (COLCRYS) 0 6 mg tablet Take 0 6 mg by mouth daily   Yes Historical Provider, MD   famotidine (PEPCID) 10 mg tablet Take 10 mg by mouth 2 (two) times a day   Yes Historical Provider, MD   fluticasone-salmeterol (Advair) 100-50 mcg/dose inhaler Inhale 1 puff 2 (two) times a day Rinse mouth after use  Yes Historical Provider, MD   indapamide (LOZOL) 1 25 mg tablet Take 1 25 mg by mouth every morning   Yes Historical Provider, MD   ketotifen (ZADITOR) 0 025 % ophthalmic solution 1 drop 2 (two) times a day   Yes Historical Provider, MD   levothyroxine 175 mcg tablet Take 175 mcg by mouth daily   Yes Historical Provider, MD   tamsulosin (FLOMAX) 0 4 mg Take 0 4 mg by mouth daily with dinner   Yes Historical Provider, MD     I have reviewed home medications with patient personally      Allergies: No Known Allergies    Social History:  Marital Status: /Civil Union   Occupation: retired  Patient Pre-hospital Living Situation: With spouse  Patient Pre-hospital Level of Mobility: walks  Patient Pre-hospital Diet Restrictions: none reported  Substance Use History:   Social History     Substance and Sexual Activity   Alcohol Use Not Currently     Social History     Tobacco Use   Smoking Status Never Smoker   Smokeless Tobacco Never Used     Social History     Substance and Sexual Activity   Drug Use Never       Family History:  Family History   Problem Relation Age of Onset    Stroke Mother     Stroke Father        Physical Exam:     Vitals:   Blood Pressure: 120/70 (04/18/22 1900)  Pulse: 80 (04/18/22 1900)  Temperature: 98 3 °F (36 8 °C) (04/18/22 1400)  Temp Source: Oral (04/18/22 1400)  Respirations: 16 (04/18/22 1900)  Height: 5' 10 5" (179 1 cm) (04/18/22 1430)  Weight - Scale: 105 kg (231 lb 7 7 oz) (04/18/22 1400)  SpO2: 98 % (04/18/22 1900)    Physical Exam  Vitals and nursing note reviewed  Constitutional:       General: He is not in acute distress  Appearance: Normal appearance  He is well-developed  He is not toxic-appearing  Comments: Wife at the bedside   HENT:      Head: Normocephalic and atraumatic  Eyes:      Extraocular Movements: Extraocular movements intact  Conjunctiva/sclera: Conjunctivae normal       Pupils: Pupils are equal, round, and reactive to light  Cardiovascular:      Rate and Rhythm: Normal rate and regular rhythm  Pulses: Normal pulses  Heart sounds: Normal heart sounds  No murmur heard  Pulmonary:      Effort: Pulmonary effort is normal  No respiratory distress  Breath sounds: Normal breath sounds  Abdominal:      General: Abdomen is flat  Bowel sounds are normal  There is no distension  Palpations: Abdomen is soft  Tenderness: There is no abdominal tenderness  Musculoskeletal:      Cervical back: Neck supple  Right lower leg: No edema  Left lower leg: No edema  Skin:     General: Skin is warm and dry  Neurological:      Mental Status: He is alert and oriented to person, place, and time  Cranial Nerves: No cranial nerve deficit  Sensory: No sensory deficit  Motor: No weakness        Coordination: Coordination normal       Deep Tendon Reflexes: Reflexes normal    Psychiatric:         Mood and Affect: Mood normal          Behavior: Behavior normal           Additional Data:     Lab Results:  Results from last 7 days   Lab Units 04/18/22  1417   WBC Thousand/uL 5 76   HEMOGLOBIN g/dL 13 2   HEMATOCRIT % 40 3   PLATELETS Thousands/uL 153   LYMPHO PCT % 17   MONO PCT % 4   EOS PCT % 0     Results from last 7 days   Lab Units 04/18/22  1417   SODIUM mmol/L 145   POTASSIUM mmol/L 3 5   CHLORIDE mmol/L 109*   CO2 mmol/L 26   BUN mg/dL 17   CREATININE mg/dL 1 33*   ANION GAP mmol/L 10   CALCIUM mg/dL 9 1   ALBUMIN g/dL 3 9   TOTAL BILIRUBIN mg/dL 0 79   ALK PHOS U/L 47   ALT U/L 24   AST U/L 19   GLUCOSE RANDOM mg/dL 106                       Imaging: Reviewed radiology reports from this admission including: CTA head/neck  CTA head and neck with and without contrast   Final Result by Fay Rainey MD (04/18 3279)      No acute intracranial abnormality  Mild chronic microangiopathy  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 with distal reconstitution at V3 segment through distal collateral vessels in left upper neck and probable retrograde flow  Question subclavian steal syndrome  0 7 cm penetrating atheromatous ulcer in proximal brachiocephalic artery  Severe short-segment stenosis (greater than 75% narrowing) in right common carotid artery due to irregular ulcerative atherosclerotic plaque  Moderate stenosis (approximately 60% narrowing) in left ICA proximal cervical segment due to atherosclerotic disease  Moderate stenosis (approximately 50% narrowing) in right ICA proximal cervical segment due to irregular ulcerative atherosclerotic plaque  Additional chronic/incidental findings as detailed above  The study was marked in [facility] for immediate notification  Workstation performed: [id number]         XR chest 1 view portable   ED Interpretation by Merline Tobar DO (04/18 5840)   Borderline cardiomegaly  No acute abnormality in the chest   No prior chest x-ray for comparison  Final Result by Rhett Johnson MD (04/18 1611)      No acute cardiopulmonary disease  Workstation performed: [id number]         MRI Inpatient Order    (Results Pending)       EKG and Other Studies Reviewed on Admission:   · EKG: NSR   HR 76bpm   RAD, nonspecific T wave abnormality, prolonged QT  ** Please Note: This note has been constructed using a voice recognition system   **

## 2022-04-18 NOTE — ASSESSMENT & PLAN NOTE
Presents with REBECCA, creatinine 1 33, no baseline creatinine after vomiting on Saturday  IVF hydration  Avoid nephrotoxins  Monitor creatinine closely  BMP in the morning

## 2022-04-19 ENCOUNTER — APPOINTMENT (OUTPATIENT)
Dept: VASCULAR ULTRASOUND | Facility: HOSPITAL | Age: 67
End: 2022-04-19
Payer: COMMERCIAL

## 2022-04-19 ENCOUNTER — APPOINTMENT (OUTPATIENT)
Dept: NON INVASIVE DIAGNOSTICS | Facility: HOSPITAL | Age: 67
End: 2022-04-19
Payer: COMMERCIAL

## 2022-04-19 ENCOUNTER — APPOINTMENT (OUTPATIENT)
Dept: MRI IMAGING | Facility: HOSPITAL | Age: 67
End: 2022-04-19
Payer: COMMERCIAL

## 2022-04-19 VITALS
SYSTOLIC BLOOD PRESSURE: 111 MMHG | OXYGEN SATURATION: 89 % | HEART RATE: 72 BPM | WEIGHT: 231 LBS | HEIGHT: 70 IN | RESPIRATION RATE: 18 BRPM | TEMPERATURE: 98.1 F | DIASTOLIC BLOOD PRESSURE: 77 MMHG | BODY MASS INDEX: 33.07 KG/M2

## 2022-04-19 PROBLEM — R79.89 ELEVATED SERUM CREATININE: Status: ACTIVE | Noted: 2022-04-18

## 2022-04-19 PROBLEM — R42 DIZZINESS: Status: ACTIVE | Noted: 2022-04-19

## 2022-04-19 LAB
ANION GAP SERPL CALCULATED.3IONS-SCNC: 7 MMOL/L (ref 4–13)
AORTIC ROOT: 3.3 CM
APICAL FOUR CHAMBER EJECTION FRACTION: 47 %
ASCENDING AORTA: 3.8 CM (ref 2.18–3.26)
AV LVOT MEAN GRADIENT: 1 MMHG
AV LVOT PEAK GRADIENT: 2 MMHG
BUN SERPL-MCNC: 17 MG/DL (ref 5–25)
CALCIUM SERPL-MCNC: 8.3 MG/DL (ref 8.3–10.1)
CHLORIDE SERPL-SCNC: 112 MMOL/L (ref 100–108)
CHOLEST SERPL-MCNC: 176 MG/DL
CO2 SERPL-SCNC: 25 MMOL/L (ref 21–32)
CREAT SERPL-MCNC: 1.14 MG/DL (ref 0.6–1.3)
DOP CALC LVOT PEAK VEL VTI: 12.94 CM
DOP CALC LVOT PEAK VEL: 0.67 M/S
E WAVE DECELERATION TIME: 118 MS
EST. AVERAGE GLUCOSE BLD GHB EST-MCNC: 123 MG/DL
FRACTIONAL SHORTENING: 24 % (ref 28–44)
GFR SERPL CREATININE-BSD FRML MDRD: 65 ML/MIN/1.73SQ M
GLUCOSE SERPL-MCNC: 94 MG/DL (ref 65–140)
HBA1C MFR BLD: 5.9 %
HDLC SERPL-MCNC: 49 MG/DL
INTERVENTRICULAR SEPTUM IN DIASTOLE (PARASTERNAL SHORT AXIS VIEW): 1.6 CM
INTERVENTRICULAR SEPTUM: 1.6 CM (ref 0.56–1.06)
LAAS-AP2: 23.3 CM2
LAAS-AP4: 30.2 CM2
LDLC SERPL CALC-MCNC: 117 MG/DL (ref 0–100)
LEFT ATRIUM AREA SYSTOLE SINGLE PLANE A4C: 30.1 CM2
LEFT ATRIUM SIZE: 4.9 CM
LEFT INTERNAL DIMENSION IN SYSTOLE: 3.7 CM (ref 4.45–6.74)
LEFT VENTRICULAR INTERNAL DIMENSION IN DIASTOLE: 4.9 CM (ref 7.44–11.09)
LEFT VENTRICULAR POSTERIOR WALL IN END DIASTOLE: 1.6 CM (ref 0.55–1.04)
LEFT VENTRICULAR STROKE VOLUME: 55 ML
LVSV (TEICH): 55 ML
MITRAL REGURGITATION PEAK VELOCITY: 4.36 M/S
MITRAL VALVE MEAN INFLOW VELOCITY: 3.44 M/S
MITRAL VALVE REGURGITANT PEAK GRADIENT: 76 MMHG
MV E'TISSUE VEL-SEP: 6 CM/S
MV EROA: 0.2 CM2
MV PEAK A VEL: 0.22 M/S
MV PEAK E VEL: 70 CM/S
MV STENOSIS PRESSURE HALF TIME: 34 MS
MV VALVE AREA P 1/2 METHOD: 6.47 CM2
PISA MRMAX VEL: 0.3 M/S
PISA RADIUS: 0.6 CM
POTASSIUM SERPL-SCNC: 3.5 MMOL/L (ref 3.5–5.3)
RIGHT ATRIUM AREA SYSTOLE A4C: 22 CM2
RIGHT VENTRICLE ID DIMENSION: 4.7 CM
SL CV DOP CALC MV VTI RETROGRADE: 131.5 CM
SL CV LEFT ATRIUM LENGTH A2C: 5.5 CM
SL CV MV MEAN GRADIENT RETROGRADE: 53 MMHG
SL CV PED ECHO LEFT VENTRICLE DIASTOLIC VOLUME (MOD BIPLANE) 2D: 113 ML
SL CV PED ECHO LEFT VENTRICLE SYSTOLIC VOLUME (MOD BIPLANE) 2D: 59 ML
SODIUM SERPL-SCNC: 144 MMOL/L (ref 136–145)
T4 FREE SERPL-MCNC: 1.07 NG/DL (ref 0.76–1.46)
TR MAX PG: 37 MMHG
TR PEAK VELOCITY: 3 M/S
TRICUSPID VALVE PEAK REGURGITATION VELOCITY: 3.04 M/S
TRIGL SERPL-MCNC: 50 MG/DL
TSH SERPL DL<=0.05 MIU/L-ACNC: 6.1 UIU/ML (ref 0.45–4.5)
Z-SCORE OF ASCENDING AORTA: 3.95
Z-SCORE OF INTERVENTRICULAR SEPTUM IN END DIASTOLE: 6.27
Z-SCORE OF LEFT VENTRICULAR DIMENSION IN END DIASTOLE: -6.17
Z-SCORE OF LEFT VENTRICULAR DIMENSION IN END SYSTOLE: -3.1
Z-SCORE OF LEFT VENTRICULAR POSTERIOR WALL IN END DIASTOLE: 6.39

## 2022-04-19 PROCEDURE — 99217 PR OBSERVATION CARE DISCHARGE MANAGEMENT: CPT | Performed by: PHYSICIAN ASSISTANT

## 2022-04-19 PROCEDURE — 36415 COLL VENOUS BLD VENIPUNCTURE: CPT | Performed by: INTERNAL MEDICINE

## 2022-04-19 PROCEDURE — 83036 HEMOGLOBIN GLYCOSYLATED A1C: CPT | Performed by: INTERNAL MEDICINE

## 2022-04-19 PROCEDURE — 84443 ASSAY THYROID STIM HORMONE: CPT | Performed by: INTERNAL MEDICINE

## 2022-04-19 PROCEDURE — G1004 CDSM NDSC: HCPCS

## 2022-04-19 PROCEDURE — 99204 OFFICE O/P NEW MOD 45 MIN: CPT | Performed by: PSYCHIATRY & NEUROLOGY

## 2022-04-19 PROCEDURE — 70551 MRI BRAIN STEM W/O DYE: CPT

## 2022-04-19 PROCEDURE — 80048 BASIC METABOLIC PNL TOTAL CA: CPT | Performed by: INTERNAL MEDICINE

## 2022-04-19 PROCEDURE — 93306 TTE W/DOPPLER COMPLETE: CPT | Performed by: INTERNAL MEDICINE

## 2022-04-19 PROCEDURE — 80061 LIPID PANEL: CPT | Performed by: INTERNAL MEDICINE

## 2022-04-19 PROCEDURE — 97162 PT EVAL MOD COMPLEX 30 MIN: CPT

## 2022-04-19 PROCEDURE — 99204 OFFICE O/P NEW MOD 45 MIN: CPT | Performed by: NURSE PRACTITIONER

## 2022-04-19 PROCEDURE — 97166 OT EVAL MOD COMPLEX 45 MIN: CPT

## 2022-04-19 PROCEDURE — 84439 ASSAY OF FREE THYROXINE: CPT | Performed by: INTERNAL MEDICINE

## 2022-04-19 PROCEDURE — 93306 TTE W/DOPPLER COMPLETE: CPT

## 2022-04-19 RX ORDER — ALLOPURINOL 300 MG/1
300 TABLET ORAL DAILY
Status: DISCONTINUED | OUTPATIENT
Start: 2022-04-19 | End: 2022-04-19 | Stop reason: HOSPADM

## 2022-04-19 RX ORDER — ALBUTEROL SULFATE 90 UG/1
2 AEROSOL, METERED RESPIRATORY (INHALATION) EVERY 6 HOURS PRN
Status: DISCONTINUED | OUTPATIENT
Start: 2022-04-19 | End: 2022-04-19 | Stop reason: HOSPADM

## 2022-04-19 RX ORDER — FAMOTIDINE 20 MG/1
10 TABLET, FILM COATED ORAL 2 TIMES DAILY
Status: DISCONTINUED | OUTPATIENT
Start: 2022-04-19 | End: 2022-04-19 | Stop reason: HOSPADM

## 2022-04-19 RX ORDER — ASPIRIN 81 MG/1
81 TABLET, CHEWABLE ORAL DAILY
Refills: 0
Start: 2022-04-20 | End: 2022-04-19

## 2022-04-19 RX ORDER — MECLIZINE HYDROCHLORIDE 25 MG/1
25 TABLET ORAL EVERY 8 HOURS PRN
Qty: 30 TABLET | Refills: 0 | Status: SHIPPED | OUTPATIENT
Start: 2022-04-19 | End: 2022-04-19 | Stop reason: SDUPTHER

## 2022-04-19 RX ORDER — ATORVASTATIN CALCIUM 40 MG/1
40 TABLET, FILM COATED ORAL EVERY EVENING
Qty: 30 TABLET | Refills: 0 | Status: SHIPPED | OUTPATIENT
Start: 2022-04-19

## 2022-04-19 RX ORDER — LORAZEPAM 2 MG/ML
1 INJECTION INTRAMUSCULAR ONCE AS NEEDED
Status: COMPLETED | OUTPATIENT
Start: 2022-04-19 | End: 2022-04-19

## 2022-04-19 RX ORDER — ASPIRIN 81 MG/1
81 TABLET, CHEWABLE ORAL DAILY
Qty: 30 TABLET | Refills: 0 | Status: SHIPPED | OUTPATIENT
Start: 2022-04-20

## 2022-04-19 RX ORDER — COLCHICINE 0.6 MG/1
0.6 TABLET ORAL DAILY
Status: DISCONTINUED | OUTPATIENT
Start: 2022-04-19 | End: 2022-04-19 | Stop reason: HOSPADM

## 2022-04-19 RX ORDER — FLUTICASONE FUROATE AND VILANTEROL 100; 25 UG/1; UG/1
1 POWDER RESPIRATORY (INHALATION) DAILY
Status: DISCONTINUED | OUTPATIENT
Start: 2022-04-19 | End: 2022-04-19 | Stop reason: HOSPADM

## 2022-04-19 RX ORDER — LEVOTHYROXINE SODIUM 175 UG/1
175 TABLET ORAL DAILY
Status: DISCONTINUED | OUTPATIENT
Start: 2022-04-19 | End: 2022-04-19 | Stop reason: HOSPADM

## 2022-04-19 RX ORDER — MECLIZINE HYDROCHLORIDE 25 MG/1
25 TABLET ORAL EVERY 8 HOURS PRN
Qty: 15 TABLET | Refills: 0 | Status: SHIPPED | OUTPATIENT
Start: 2022-04-19

## 2022-04-19 RX ORDER — ATORVASTATIN CALCIUM 40 MG/1
40 TABLET, FILM COATED ORAL EVERY EVENING
Qty: 30 TABLET | Refills: 0 | Status: SHIPPED | OUTPATIENT
Start: 2022-04-19 | End: 2022-04-19

## 2022-04-19 RX ADMIN — LORAZEPAM 1 MG: 2 INJECTION INTRAMUSCULAR; INTRAVENOUS at 14:21

## 2022-04-19 RX ADMIN — HEPARIN SODIUM 5000 UNITS: 5000 INJECTION INTRAVENOUS; SUBCUTANEOUS at 15:33

## 2022-04-19 RX ADMIN — FAMOTIDINE 10 MG: 20 TABLET ORAL at 18:29

## 2022-04-19 RX ADMIN — ASPIRIN 81 MG: 81 TABLET, CHEWABLE ORAL at 09:35

## 2022-04-19 RX ADMIN — ALBUTEROL SULFATE 2 PUFF: 90 AEROSOL, METERED RESPIRATORY (INHALATION) at 15:36

## 2022-04-19 RX ADMIN — FAMOTIDINE 10 MG: 20 TABLET ORAL at 09:35

## 2022-04-19 RX ADMIN — ALLOPURINOL 300 MG: 300 TABLET ORAL at 09:35

## 2022-04-19 RX ADMIN — LEVOTHYROXINE SODIUM 175 MCG: 175 TABLET ORAL at 09:35

## 2022-04-19 RX ADMIN — HEPARIN SODIUM 5000 UNITS: 5000 INJECTION INTRAVENOUS; SUBCUTANEOUS at 06:30

## 2022-04-19 RX ADMIN — COLCHICINE 0.6 MG: 0.6 TABLET ORAL at 09:35

## 2022-04-19 RX ADMIN — ATORVASTATIN CALCIUM 40 MG: 40 TABLET, FILM COATED ORAL at 18:30

## 2022-04-19 NOTE — CONSULTS
[facility] 1955, 74 y o  male MRN: [id number]  Unit/Bed#: -01 Encounter: [id number]  Primary Care Provider: No primary care provider on file  Date and time admitted to hospital: 4/18/2022  1:53 PM    Inpatient consult to Vascular Surgery  Consult performed by: Rik Moritz, CRNP  Consult ordered by: Jacob Mark MD        Dizziness  Assessment & Plan  58-year-old male with HTN, hypothyroidism, and asthma presented with complaints of dizziness, nausea and vomiting  CTA head and neck demonstrated bilateral ICA stenosis  Vascular was consulted for evaluation recommendations  Diagnostics:  -CTA head and neck 4/18/22: No acute intracranial abnormality  Mild chronic microangiopathy  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 with distal reconstitution at V3 segment through distal collateral vessels in left upper neck and probable retrograde flow  Question subclavian steal syndrome  0 7 cm penetrating atheromatous ulcer in proximal brachiocephalic artery    Severe short-segment stenosis (greater than 75% narrowing) in right common carotid artery due to irregular ulcerative atherosclerotic plaque    Moderate stenosis (approximately 60% narrowing) in left ICA proximal cervical segment    Moderate stenosis (approximately 50% narrowing) in right ICA proximal cervical segment    Recommendations:  - asymptomatic bilateral carotid artery stenosis  No vascular intervention indicated at this time  - Carotid duplex- as outpatient  Repeat in 6 months  - continue ASA  - continue statin  - patient can follow-up with PCP/vascular at home, [facility]  - rest of care per Medicine team  - d/w SLIM   - will discuss with Dr Liu Smith Service: SLIM    Chief Complaint: dizziness    HPI: Zena Schmidt is a 74 y o  male with HTN, hypothyroidism, asthma who presented with complaints of dizziness, n/v worsening since Thursday  Patient [place] state and is here visiting his niece helping with renovations on her home  Patient reports dizziness worsened yesterday 04/18/2022 stating he was unable to even stand up  Patient denies fall  Patient denies TIA or stroke like symptoms  Neurovascular exam intact  CTA head and neck demonstrated bilateral carotid artery stenosis and occluded left vertebral artery with distal reconstitution  Patient is asymptomatic  Recommend carotid duplex as outpatient  No vascular intervention indicated at this time  Patient is on optimal medical management with ASA and statin    Will discuss case with Dr Alan Matson of Systems:  General: negative  Cardiovascular: no chest pain or dyspnea on exertion  Respiratory: no cough, shortness of breath, or wheezing  Gastrointestinal: no abdominal pain, change in bowel habits, or black or bloody stools  Genitourinary ROS: no dysuria, trouble voiding, or hematuria  Musculoskeletal ROS: negative  Neurological ROS: no TIA or stroke symptoms  Hematological and Lymphatic ROS: negative  Dermatological ROS: negative  Psychological ROS: negative  Ophthalmic ROS: negative  ENT ROS: negative    Past Medical History:  Past Medical History:   Diagnosis Date    Asthma     BPH (benign prostatic hyperplasia)     Disease of thyroid gland     GERD (gastroesophageal reflux disease)     Gout     Hypertension     [facility] ([facility] [address] )        Past Surgical History:  Past Surgical History:   Procedure Laterality Date    BACK SURGERY      VOCAL FOLD LESION EXCISION N/A        Social History:  Social History     Substance and Sexual Activity   Alcohol Use Not Currently     Social History     Substance and Sexual Activity   Drug Use Never     Social History     Tobacco Use   Smoking Status Never Smoker   Smokeless Tobacco Never Used       Family History:  Family History   Problem Relation Age of Onset    Stroke Mother     Stroke Father        Allergies:  No Known Allergies    Medications:  Current Facility-Administered Medications   Medication Dose Route Frequency    albuterol (PROVENTIL HFA,VENTOLIN HFA) inhaler 2 puff  2 puff Inhalation Q6H PRN    allopurinol (ZYLOPRIM) tablet 300 mg  300 mg Oral Daily    aspirin chewable tablet 81 mg  81 mg Oral Daily    atorvastatin (LIPITOR) tablet 40 mg  40 mg Oral QPM    colchicine (COLCRYS) tablet 0 6 mg  0 6 mg Oral Daily    famotidine (PEPCID) tablet 10 mg  10 mg Oral BID    fluticasone-vilanterol (BREO ELLIPTA) 100-25 mcg/inh inhaler 1 puff  1 puff Inhalation Daily    heparin (porcine) subcutaneous injection 5,000 Units  5,000 Units Subcutaneous Q8H [address]    levothyroxine tablet 175 mcg  175 mcg Oral Daily    sodium chloride 0 9 % infusion  80 mL/hr Intravenous Continuous    trimethobenzamide (TIGAN) IM injection 200 mg  200 mg Intramuscular Q6H PRN       Vitals:  /70   Pulse 67   Temp 98 3 °F (36 8 °C) (Oral)   Resp 18   Ht 5' 10" (1 778 m)   Wt 105 kg (231 lb)   SpO2 93%   BMI 33 15 kg/m²     I/Os:  I/O last 24 hours:   In: 1000 [IV Piggyback:1000]  Out: -     Lab Results and Cultures:   Lab Results   Component Value Date    WBC 5 76 04/18/2022    HGB 13 2 04/18/2022    HCT 40 3 04/18/2022    MCV 91 04/18/2022     04/18/2022     Lab Results   Component Value Date    CALCIUM 8 3 04/19/2022    K 3 5 04/19/2022    CO2 25 04/19/2022     (H) 04/19/2022    BUN 17 04/19/2022    CREATININE 1 14 04/19/2022     No results found for: INR, PROTIME    Lipid Panel: No results found for: CHOL,     Blood Culture: No results found for: BLOODCX,   Urinalysis: No results found for: COLORU, CLARITYU, SPECGRAV, PHUR, LEUKOCYTESUR, NITRITE, PROTEINUA, GLUCOSEU, KETONESU, BILIRUBINUR, BLOODU,   Urine Culture: No results found for: URINECX,   Wound Culure: No results found for: WOUNDCULT    Imaging:  As above    Physical Exam:    General appearance: alert and oriented, in no acute distress  Skin: Skin color, texture, turgor normal  No rashes or lesions  Neurologic: Grossly normal  Head: Normocephalic, without obvious abnormality, atraumatic  Eyes: conjunctivae/corneas clear  PERRL, EOM's intact  Fundi benign    Neck: no adenopathy, no carotid bruit, no JVD, supple, symmetrical, trachea midline and thyroid not enlarged, symmetric, no tenderness/mass/nodules  Lungs: clear to auscultation bilaterally  Chest wall: no tenderness  Heart: regular rate and rhythm, S1, S2 normal, no murmur, click, rub or gallop  Abdomen: soft, non-tender; bowel sounds normal; no masses,  no organomegaly  Extremities: extremities normal, warm and well-perfused; no cyanosis, clubbing, or edema    Wound/Incision:  n/a    Pulse exam:  Radial: Right: 2+ Left[de-identified] 2+    KISHA Green  4/19/2022  [facility], [phone]

## 2022-04-19 NOTE — ASSESSMENT & PLAN NOTE
66M with migraines, htn, hypothyroid and pituitary macroadenoma who presented to [facility] ED 4/18/2022 via EMS with dizziness, nausea and vomiting for several days  He reported progressive worsening of his dizziness and it felt as though the room was spinning causing him to feel "off balance" to where he almost fell  He also reported associated postural lightheadedness and a generalized headache  He was treated with ivf, antiemetics, and meclizine with some improvement of his symptoms    Imaging:  CTA head and neck showed occluded left vertebral artery with possible subclavian steal syndrome  Severe stenosis (>75%) right common carotid artery, moderate stenosis (approximately 60%) left ICA, and moderate stenosis (approximately 50%) right ICA secondary to atherosclerotic disease  Pertinent Labs:  Lipid panel - chol 176, trig 50, HDL 49   Na 145    A1c 5 9  TSH 6 0 with T4 wnl    Recommendations:  -continue asa 81mg daily  -continue atorvastatin 40mg daily  -check MRI brain->IMPRESSION:  White matter changes suggestive of chronic microangiopathy    No acute intracranial pathology   -check echo  -vascular surgery consult  -goal normotension; avoid hypotension  -prn meclizine/antiemetics  -Check orthostatic Bps  -consider outpatient vestibular therapy or ENT eval if symptoms persist

## 2022-04-19 NOTE — PLAN OF CARE
Problem: MOBILITY - ADULT  Goal: Maintain or return to baseline ADL function  Description: INTERVENTIONS:  -  Assess patient's ability to carry out ADLs; assess patient's baseline for ADL function and identify physical deficits which impact ability to perform ADLs (bathing, care of mouth/teeth, toileting, grooming, dressing, etc )  - Assess/evaluate cause of self-care deficits   - Assess range of motion  - Assess patient's mobility; develop plan if impaired  - Assess patient's need for assistive devices and provide as appropriate  - Encourage maximum independence but intervene and supervise when necessary  - Involve family in performance of ADLs  - Assess for home care needs following discharge   - Consider OT consult to assist with ADL evaluation and planning for discharge  - Provide patient education as appropriate  4/19/2022 1429 by Ozzie Bernstein RN  Outcome: Progressing  4/19/2022 0957 by Ozzie Bernstein RN  Outcome: Progressing  Goal: Maintains/Returns to pre admission functional level  Description: INTERVENTIONS:  - Perform BMAT or MOVE assessment daily    - Set and communicate daily mobility goal to care team and patient/family/caregiver  - Collaborate with rehabilitation services on mobility goals if consulted  - Perform Range of Motion  times a day  - Reposition patient every  hours    - Dangle patient  times a day  - Stand patient  times a day  - Ambulate patient  times a day  - Out of bed to chair  times a day   - Out of bed for meals  times a day  - Out of bed for toileting  - Record patient progress and toleration of activity level   4/19/2022 1429 by Ozzie Bernstein RN  Outcome: Progressing  4/19/2022 0957 by Ozzie Bernstein RN  Outcome: Progressing     Problem: Potential for Falls  Goal: Patient will remain free of falls  Description: INTERVENTIONS:  - Educate patient/family on patient safety including physical limitations  - Instruct patient to call for assistance with activity   - Consult OT/PT to assist with strengthening/mobility   - Keep Call bell within reach  - Keep bed low and locked with side rails adjusted as appropriate  - Keep care items and personal belongings within reach  - Initiate and maintain comfort rounds  - Make Fall Risk Sign visible to staff  - Offer Toileting every  Hours, in advance of need  - Initiate/Maintainalarm  - Obtain necessary fall risk management equipment  - Apply yellow socks and bracelet for high fall risk patients  - Consider moving patient to room near nurses station  Outcome: Progressing

## 2022-04-19 NOTE — ASSESSMENT & PLAN NOTE
-CTA with bilateral ICA stenosis  -continue asa and statin  -no vascular surgical intervention at this time  -outpatient carotid duplex  -outpatient follow-up

## 2022-04-19 NOTE — DISCHARGE INSTR - AVS FIRST PAGE
Dear Leatha Khalil,     It was our pleasure to care for you here at [facility]  It is our hope that we were always able to exceed the expected standards for your care during your stay  You were hospitalized due to vertigo  You were cared for on the 2nd floor by the [address] [place] [address] Internal Medicine Hospitalist Group who covers for your primary care physician (PCP), while you were hospitalized  If you have any questions or concerns related to this hospitalization, you may contact us at [phone]  For follow up as well as any medication refills, we recommend that you follow up with your primary care physician  A registered nurse will reach out to you by phone within a few days after your discharge to answer any additional questions that you may have after going home  However, at this time we provide for you here, the most important instructions / recommendations at discharge:     Notable Medication Adjustments -   Start aspirin and atorvastatin for management of vascular disease found on imaging  Testing Required after Discharge -   Holter (cardiac) monitor to look for atrial fibrillation  Important follow up information -   No driving until cleared by your physician  Schedule an appointment for within 7 days of discharge  If any significant worsening or NEW concerning symptoms, seek emergent evaluation in the closest ER   Other Information for your doctor -   CTA head/neck:  No acute intracranial abnormality  Mild chronic microangiopathy  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 with distal reconstitution at V3 segment through distal collateral vessels in left upper neck and probable retrograde flow  Question subclavian steal syndrome  0 7 cm penetrating atheromatous ulcer in proximal brachiocephalic artery  Severe short-segment stenosis (greater than 75% narrowing) in right common carotid artery due to irregular ulcerative atherosclerotic plaque    Moderate stenosis (approximately 60% narrowing) in left ICA proximal cervical segment due to atherosclerotic disease  Moderate stenosis (approximately 50% narrowing) in right ICA proximal cervical segment due to irregular ulcerative atherosclerotic plaque  MRI brain:  White matter changes suggestive of chronic microangiopathy  No acute intracranial abnormality  Echo:  LV dilated, LVEF 50%, mild global hypokinesis  RV dilated  Biatrial dilation  RVSP 40 mmHg       Please review this entire after visit summary as additional general instructions including medication list, appointments, activity, diet, any pertinent wound care, and other additional recommendations from your care team that may be provided for you        Sincerely,     Re Garcia PA-C

## 2022-04-19 NOTE — PLAN OF CARE
Patient is calling with the following symptoms: Sore throat, swollen and red tonsils, 102.1 fever this afternoon  The symptoms have been present for 3 days  The patient has tried any over the counter medications. Sore throat medications, elderberry syrup immune system support      Same day appointment or appointment offered? No.      Problem: PHYSICAL THERAPY ADULT  Goal: Performs mobility at highest level of function for planned discharge setting  See evaluation for individualized goals  Description: Treatment/Interventions: Functional transfer training,Elevations,Therapeutic exercise,Endurance training,Patient/family training,Bed mobility,Gait training,OT          See flowsheet documentation for full assessment, interventions and recommendations  Note: Prognosis: Good  Problem List: Decreased endurance,Impaired balance,Decreased mobility  Assessment: Pt is 74year old male seen for PT evaluation s/p admit to [facility] on 4/18/2022 with Intracranial vascular stenosis  PT consulted to assess pt's functional mobility and d/c needs  Order placed for PT eval and tx, with up with assist order  Comorbidities affecting pt's physical performance at time of assessment include hypertension, hypothyroidism, benign prostatic hyperplasia, and gout  PTA, pt was independent with all functional mobility without an AD  Pt ambulates unrestricted distances on all terrain and elevations  Pt resides with his spouse in a two level house with two steps to enter and a flight of stairs to the second floor  Personal factors affecting pt at time of IE include lives in a two story house, ambulating with an assistive device, stairs to enter home, inability to ambulate household distances, inability to navigate community distances, inability to navigate level surfaces without external assistance, and unable to perform dynamic tasks in community  Please find objective findings from PT assessment regarding body systems outlined above with impairments and limitations including impaired balance, decreased endurance, gait deviations, decreased activity tolerance, decreased functional mobility tolerance, and fall risk   The following objective measures performed on IE also reveal limitations: Barthel Index: 55/100, Modified Adam: 4 (moderate/severe disability) and -PAC 6-Clicks: 24/80  Pt's clinical presentation is currently evolving seen in pt's presentation of need for ongoing medical management/monitoring, pt is a fall risk, pt requires use of RW for safe ambulation, pt has persistent dizziness limiting functional mobility, and pt requires cues and assist for safety with functional mobility  Pt to benefit from continued PT tx to address deficits as defined above and maximize level of functional independent mobility and consistency  From PT/mobility standpoint, recommendation at time of d/c would be home with family support and outpatient PT pending progress in order to facilitate return to PLOF  Barriers to Discharge: Inaccessible home environment     PT Discharge Recommendation: Home with outpatient rehabilitation    See flowsheet documentation for full assessment

## 2022-04-19 NOTE — PLAN OF CARE
Problem: MOBILITY - ADULT  Goal: Maintain or return to baseline ADL function  Description: INTERVENTIONS:  -  Assess patient's ability to carry out ADLs; assess patient's baseline for ADL function and identify physical deficits which impact ability to perform ADLs (bathing, care of mouth/teeth, toileting, grooming, dressing, etc )  - Assess/evaluate cause of self-care deficits   - Assess range of motion  - Assess patient's mobility; develop plan if impaired  - Assess patient's need for assistive devices and provide as appropriate  - Encourage maximum independence but intervene and supervise when necessary  - Involve family in performance of ADLs  - Assess for home care needs following discharge   - Consider OT consult to assist with ADL evaluation and planning for discharge  - Provide patient education as appropriate  Outcome: Progressing  Goal: Maintains/Returns to pre admission functional level  Description: INTERVENTIONS:  - Perform BMAT or MOVE assessment daily    - Set and communicate daily mobility goal to care team and patient/family/caregiver  - Collaborate with rehabilitation services on mobility goals if consulted  - Perform Range of Motion  times a day  - Reposition patient every  hours    - Dangle patient  times a day  - Stand patient  times a day  - Ambulate patient  times a day  - Out of bed to chair  times a day   - Out of bed for meal times a day  - Out of bed for toileting  - Record patient progress and toleration of activity level   Outcome: Progressing

## 2022-04-19 NOTE — ASSESSMENT & PLAN NOTE
· Patient is from out of the area, baseline is unknown    Presenting creatinine 1 33 with BUN 17  · IV fluids provided overnight with improvement to 1 14  · Will continue to monitor while inpatient

## 2022-04-19 NOTE — ASSESSMENT & PLAN NOTE
· Patient presented the hospital secondary to new onset dizziness, nausea and vomiting for 3 days  · See plan below

## 2022-04-19 NOTE — ASSESSMENT & PLAN NOTE
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 with distal reconstitution at V3 segment through distal collateral vessels in left upper neck and probable retrograde flow  Question subclavian steal syndrome  0 7 cm penetrating atheromatous ulcer in proximal brachiocephalic artery  Severe short-segment stenosis (greater than 75% narrowing) in right common carotid artery due to irregular ulcerative atherosclerotic plaque  Moderate stenosis (approximately 60% narrowing) in left ICA proximal cervical segment due to atherosclerotic disease    Moderate stenosis (approximately 50% narrowing) in right ICA proximal cervical segment due to irregular ulcerative atherosclerotic plaque      · Patient under stroke pathway, not a tPA candidate secondary to onset of symptoms > 4 hours an NIHSS of 0  · PT, OT consult pending  · Neuro and vascular consult to be appreciated  · Continue aspirin, statin, blood pressure management  · Telemetry monitor shows normal sinus rhythm  · Echo, MRI brain pending  · Fall precautions in the setting of dizziness  Lab Results   Component Value Date    HGBA1C 5 9 (H) 04/19/2022    CHOLESTEROL 176 04/19/2022    LDLCALC 117 (H) 04/19/2022    HDL 49 04/19/2022    TRIG 50 04/19/2022

## 2022-04-19 NOTE — ASSESSMENT & PLAN NOTE
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 with distal reconstitution at V3 segment through distal collateral vessels in left upper neck and probable retrograde flow  Question subclavian steal syndrome  0 7 cm penetrating atheromatous ulcer in proximal brachiocephalic artery    Severe short-segment stenosis (greater than 75% narrowing) in right common carotid artery due to irregular ulcerative atherosclerotic plaque    Moderate stenosis (approximately 60% narrowing) in left ICA proximal cervical segment    Moderate stenosis (approximately 50% narrowing) in right ICA proximal cervical segment    Recommendations:  - asymptomatic bilateral carotid artery stenosis  No vascular intervention indicated at this time  - Carotid duplex- as outpatient  Repeat in 6 months    - continue ASA  - continue statin  - patient can follow-up with PCP/vascular at home, [facility]  - rest of care per Medicine team  - d/w SLIM   - will discuss with Dr Shannon Marcos

## 2022-04-19 NOTE — CONSULTS
Consultation - Neurology   Wheatley Mick Dakin 74 y o  male MRN: [id number]  Unit/Bed#: -01 Encounter: [id number]      Assessment/Plan     Dizziness  Assessment & Plan  2Y with migraines, htn, hypothyroid and pituitary macroadenoma who presented to [facility] ED 4/18/2022 via EMS with dizziness, nausea and vomiting for several days  He reported progressive worsening of his dizziness and it felt as though the room was spinning causing him to feel "off balance" to where he almost fell  He also reported associated postural lightheadedness and a generalized headache  He was treated with ivf, antiemetics, and meclizine with some improvement of his symptoms    Imaging:  CTA head and neck showed occluded left vertebral artery with possible subclavian steal syndrome  Severe stenosis (>75%) right common carotid artery, moderate stenosis (approximately 60%) left ICA, and moderate stenosis (approximately 50%) right ICA secondary to atherosclerotic disease  Pertinent Labs:  Lipid panel - chol 176, trig 50, HDL 49   Na 145    A1c 5 9  TSH 6 0 with T4 wnl    Recommendations:  -continue asa 81mg daily  -continue atorvastatin 40mg daily  -check MRI brain->IMPRESSION:  White matter changes suggestive of chronic microangiopathy  No acute intracranial pathology   -check echo  -vascular surgery consult  -goal normotension; avoid hypotension  -prn meclizine/antiemetics  -Check orthostatic Bps  -consider outpatient vestibular therapy or ENT eval if symptoms persist          * Intracranial vascular stenosis  Assessment & Plan  -CTA with bilateral ICA stenosis  -continue asa and statin  -no vascular surgical intervention at this time  -outpatient carotid duplex  -outpatient follow-up      Recommendations for outpatient neurological follow up have yet to be determined      Reason for Consult / Principal Problem: "vertigo/atherosclerotic cerebrovascular disease"  HPI: Elias Zuluaga is a 74 y o  right handed male with asthma, migraines, htn, gerd, bph, hypothyroid, pituitary macroadenoma, and 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 who presented to [facility] ED 4/18/2022 via EMS with dizziness, nausea and vomiting for several days  He reported progressive worsening of his dizziness and it felt as though the room was spinning causing him to feel "off balance" to where he almost fell  He also reported associated postural lightheadedness and a generalized headache  CTA head and neck showed occluded left vertebral artery with possible subclavian steal syndrome  Severe stenosis (>75%) right common carotid artery, moderate stenosis (approximately 60%) left ICA, and moderate stenosis (approximately 50%) right ICA secondary to atherosclerotic disease  Initial /77  He was given meclizine 25mg x1, zofran 4mg x1, tigan 200mg x1 and 1L NSS with documented improvement of his symptoms with essentially resolved headache and dizziness  He still however feels a little dizzy when stands up  Inpatient consult to Neurology  Consult performed by: KISHA Montoya  Consult ordered by: Eliz Be MD        Review of Systems   Constitutional: Negative for chills and fever  Eyes: Negative for visual disturbance  Respiratory: Negative for shortness of breath  Cardiovascular: Negative for chest pain  Gastrointestinal: Negative for abdominal pain  Neurological: Positive for dizziness, light-headedness and headaches  Negative for speech difficulty, weakness and numbness  Psychiatric/Behavioral: Negative for confusion         Historical Information   Past Medical History:   Diagnosis Date    Asthma     BPH (benign prostatic hyperplasia)     Disease of thyroid gland     GERD (gastroesophageal reflux disease)     Gout     Hypertension     Thyroid cancer ([facility] [address] )      Past Surgical History:   Procedure Laterality Date    BACK SURGERY      VOCAL FOLD LESION EXCISION N/A      Social History   Social History     Substance and Sexual Activity   Alcohol Use Not Currently     Social History     Substance and Sexual Activity   Drug Use Never     E-Cigarette/Vaping     E-Cigarette/Vaping Substances     Social History     Tobacco Use   Smoking Status Never Smoker   Smokeless Tobacco Never Used     Family History:   Family History   Problem Relation Age of Onset    Stroke Mother     Stroke Father        Review of previous medical records was completed  Meds/Allergies   current meds:   Current Facility-Administered Medications   Medication Dose Route Frequency    albuterol (PROVENTIL HFA,VENTOLIN HFA) inhaler 2 puff  2 puff Inhalation Q6H PRN    allopurinol (ZYLOPRIM) tablet 300 mg  300 mg Oral Daily    aspirin chewable tablet 81 mg  81 mg Oral Daily    atorvastatin (LIPITOR) tablet 40 mg  40 mg Oral QPM    colchicine (COLCRYS) tablet 0 6 mg  0 6 mg Oral Daily    famotidine (PEPCID) tablet 10 mg  10 mg Oral BID    fluticasone-vilanterol (BREO ELLIPTA) 100-25 mcg/inh inhaler 1 puff  1 puff Inhalation Daily    heparin (porcine) subcutaneous injection 5,000 Units  5,000 Units Subcutaneous Q8H [address]    levothyroxine tablet 175 mcg  175 mcg Oral Daily    sodium chloride 0 9 % infusion  80 mL/hr Intravenous Continuous    trimethobenzamide (TIGAN) IM injection 200 mg  200 mg Intramuscular Q6H PRN    and PTA meds:   Prior to Admission Medications   Prescriptions Last Dose Informant Patient Reported? Taking?    albuterol (PROVENTIL HFA,VENTOLIN HFA) 90 mcg/act inhaler   Yes Yes   Sig: Inhale 2 puffs every 6 (six) hours as needed for wheezing   allopurinol (ZYLOPRIM) 100 mg tablet   Yes Yes   Sig: Take 300 mg by mouth daily   cabergoline (DOSTINEX) 0 5 MG tablet   Yes Yes   Sig: Take 0 5 mg by mouth 2 (two) times a week   colchicine (COLCRYS) 0 6 mg tablet   Yes Yes   Sig: Take 0 6 mg by mouth daily   famotidine (PEPCID) 10 mg tablet   Yes Yes   Sig: Take 10 mg by mouth 2 (two) times a day   fluticasone-salmeterol (Advair) 100-50 mcg/dose inhaler   Yes Yes   Sig: Inhale 1 puff 2 (two) times a day Rinse mouth after use  indapamide (LOZOL) 1 25 mg tablet   Yes Yes   Sig: Take 1 25 mg by mouth every morning   ketotifen (ZADITOR) 0 025 % ophthalmic solution   Yes Yes   Si drop 2 (two) times a day   levothyroxine 175 mcg tablet   Yes Yes   Sig: Take 175 mcg by mouth daily   tamsulosin (FLOMAX) 0 4 mg   Yes Yes   Sig: Take 0 4 mg by mouth daily with dinner      Facility-Administered Medications: None       No Known Allergies    Objective   Vitals:Blood pressure 111/77, pulse 72, temperature 98 1 °F (36 7 °C), temperature source Oral, resp  rate 18, height 5' 10" (1 778 m), weight 105 kg (231 lb), SpO2 (!) 89 %  ,Body mass index is 33 15 kg/m²  Intake/Output Summary (Last 24 hours) at 2022 1701  Last data filed at 2022 1418  Gross per 24 hour   Intake 480 ml   Output --   Net 480 ml       Invasive Devices: Invasive Devices  Report    Peripheral Intravenous Line            Peripheral IV 22 Right Antecubital 1 day                Physical Exam  Vitals reviewed  Constitutional:       General: He is not in acute distress  HENT:      Head: Normocephalic and atraumatic  Eyes:      Pupils: Pupils are equal, round, and reactive to light  Pulmonary:      Effort: Pulmonary effort is normal    Abdominal:      Palpations: Abdomen is soft  Musculoskeletal:      Cervical back: Neck supple  Skin:     General: Skin is warm and dry  Neurological:      General: No focal deficit present  Mental Status: He is alert and oriented to person, place, and time  Coordination: Finger-Nose-Finger Test normal       Deep Tendon Reflexes: Strength normal       Reflex Scores:       Brachioradialis reflexes are 2+ on the right side and 2+ on the left side  Patellar reflexes are 2+ on the right side and 2+ on the left side    Psychiatric:         Mood and Affect: Mood normal          Speech: Speech normal        Neurologic Exam     Mental Status   Oriented to person, place, and time  Follows 2 step commands  Attention: normal  Concentration: normal    Speech: speech is normal   Level of consciousness: alert  Knowledge: good  Able to perform simple calculations  Able to name object  Able to repeat  Normal comprehension  Cranial Nerves     CN II   Visual acuity: normal  Right visual field deficit: none  Left visual field deficit: none     CN III, IV, VI   Pupils are equal, round, and reactive to light  Nystagmus: bilateral   Nystagmus type: horizontal  Diplopia: none  Conjugate gaze: present    CN V   Facial sensation intact  CN VII   Facial expression full, symmetric  CN VIII   Hearing: intact    CN XI   Right sternocleidomastoid strength: normal  Right trapezius strength: normal    CN XII   CN XII normal    Horizontal nystagmus noted when looking right and up     Motor Exam   Muscle bulk: normal  Overall muscle tone: normal    Strength   Strength 5/5 throughout       Sensory Exam   Light touch normal    Vibration normal      Gait, Coordination, and Reflexes     Coordination   Finger to nose coordination: normal    Reflexes   Right brachioradialis: 2+  Left brachioradialis: 2+  Right patellar: 2+  Left patellar: 2+      Lab Results:   CBC:   Results from last 7 days   Lab Units 04/18/22  1417   WBC Thousand/uL 5 76   RBC Million/uL 4 44   HEMOGLOBIN g/dL 13 2   HEMATOCRIT % 40 3   MCV fL 91   PLATELETS Thousands/uL 153   BMP/CMP:   Results from last 7 days   Lab Units 04/19/22  0431 04/18/22  1417   SODIUM mmol/L 144 145   POTASSIUM mmol/L 3 5 3 5   CHLORIDE mmol/L 112* 109*   CO2 mmol/L 25 26   BUN mg/dL 17 17   CREATININE mg/dL 1 14 1 33*   CALCIUM mg/dL 8 3 9 1   AST U/L  --  19   ALT U/L  --  24   ALK PHOS U/L  --  47   EGFR ml/min/1 73sq m 65 54   HgBA1C:   Results from last 7 days   Lab Units 04/19/22  0431   HEMOGLOBIN A1C % 5 9*   TSH:   Results from last 7 days   Lab Units 04/19/22  0431   TSH 3RD GENERATON uIU/mL 6 097* Lipid Profile:   Results from last 7 days   Lab Units 04/19/22  0431   HDL mg/dL 49   LDL CALC mg/dL 117*   TRIGLYCERIDES mg/dL 50     Imaging Studies: I have personally reviewed pertinent reports  and I have personally reviewed pertinent films in PACS     4/18/2022 CTA head and neck:  IMPRESSION:  No acute intracranial abnormality  Mild chronic microangiopathy  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 with distal reconstitution at V3 segment through distal collateral vessels in left upper neck and probable retrograde flow  Question subclavian steal syndrome  0 7 cm penetrating atheromatous ulcer in proximal brachiocephalic artery  Severe short-segment stenosis (greater than 75% narrowing) in right common carotid artery due to irregular ulcerative atherosclerotic plaque  Moderate stenosis (approximately 60% narrowing) in left ICA proximal cervical segment due to atherosclerotic disease  Moderate stenosis (approximately 50% narrowing) in right ICA proximal cervical segment due to irregular ulcerative atherosclerotic plaque      EKG, Pathology, and Other Studies: I have personally reviewed pertinent reports       ECHO: pending    VTE Prophylaxis: SQ heparin    Code Status: Level 1 - Full Code    Counseling / Coordination of Care  Assessment, plan and imaging reviewed with Dr Kala Byrd

## 2022-04-19 NOTE — PROGRESS NOTES
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 with distal reconstitution at V3 segment through distal collateral vessels in left upper neck and probable retrograde flow  Question subclavian steal syndrome  0 7 cm penetrating atheromatous ulcer in proximal brachiocephalic artery  Severe short-segment stenosis (greater than 75% narrowing) in right common carotid artery due to irregular ulcerative atherosclerotic plaque  Moderate stenosis (approximately 60% narrowing) in left ICA proximal cervical segment due to atherosclerotic disease  Moderate stenosis (approximately 50% narrowing) in right ICA proximal cervical segment due to irregular ulcerative atherosclerotic plaque      · Patient under stroke pathway, not a tPA candidate secondary to onset of symptoms > 4 hours an NIHSS of 0  · PT, OT consult pending  · Neuro and vascular consult to be appreciated  · Continue aspirin, statin, blood pressure management  · Telemetry monitor shows normal sinus rhythm  · Echo, MRI brain pending  · Fall precautions in the setting of dizziness  Lab Results   Component Value Date    HGBA1C 5 9 (H) 04/19/2022    CHOLESTEROL 176 04/19/2022    LDLCALC 117 (H) 04/19/2022    HDL 49 04/19/2022    TRIG 50 04/19/2022       Elevated serum creatinine  Assessment & Plan  · Patient is from out of the area, baseline is unknown    Presenting creatinine 1 33 with BUN 17  · IV fluids provided overnight with improvement to 1 14  · Will continue to monitor while inpatient    HTN (hypertension)  Assessment & Plan  · Patient normotensive continue home regimen    Hypothyroidism  Assessment & Plan  · Continue levothyroxine 175 mcg daily    Gout  Assessment & Plan  · Continue home regimen colchicine and allopurinol    BPH (benign prostatic hyperplasia)  Assessment & Plan  · On tamsulosin, monitor urine output    Asthma  Assessment & Plan  · Cough, occasional shortness of breath, no wheezing  · Continue Albuterol p r n  · Will give [place] not available on formulary        VTE Pharmacologic Prophylaxis: VTE Score: 10 High Risk (Score >/= 5) - Pharmacological DVT Prophylaxis Ordered: heparin  Sequential Compression Devices Ordered  Patient Centered Rounds: I performed bedside rounds with nursing staff today  Discussions with Specialists or Other Care Team Provider: pending consults     Education and Discussions with Family / Patient: Patient declined call to   Time Spent for Care: 30 minutes  More than 50% of total time spent on counseling and coordination of care as described above  Current Length of Stay: 0 day(s)  Current Patient Status: Observation   Certification Statement: The patient, admitted on an observation basis, will now require > 2 midnight hospital stay due to Ongoing workup for vascular disease, rule out CVA  Discharge Plan: Anticipate discharge later today or tomorrow to home  outpatient PT recommended    Code Status: Level 1 - Full Code    Subjective:   Patient seen this morning, he is doing well  No further issues overnight  No headache, no dizziness, no visual changes  No overt weakness  No chest pain or palpitations      Objective:     Vitals:   Temp (24hrs), Av 3 °F (36 8 °C), Min:98 3 °F (36 8 °C), Max:98 3 °F (36 8 °C)    Temp:  [98 3 °F (36 8 °C)] 98 3 °F (36 8 °C)  HR:  [64-87] 85  Resp:  [15-22] 18  BP: (102-137)/(58-83) 102/70  SpO2:  [92 %-98 %] 93 %  Body mass index is 32 74 kg/m²  Input and Output Summary (last 24 hours): Intake/Output Summary (Last 24 hours) at 4/19/2022 0949  Last data filed at 4/18/2022 1629  Gross per 24 hour   Intake 1000 ml   Output --   Net 1000 ml       Physical Exam:   Physical Exam  Vitals and nursing note reviewed  Constitutional:       General: He is not in acute distress  Appearance: He is not ill-appearing or toxic-appearing  HENT:      Head: Normocephalic and atraumatic  Eyes:      Extraocular Movements: Extraocular movements intact  Conjunctiva/sclera: Conjunctivae normal       Pupils: Pupils are equal, round, and reactive to light  Cardiovascular:      Rate and Rhythm: Normal rate and regular rhythm  Heart sounds: Normal heart sounds  Pulmonary:      Effort: Pulmonary effort is normal  No respiratory distress  Breath sounds: Normal breath sounds  Abdominal:      General: Bowel sounds are normal  There is no distension  Palpations: Abdomen is soft  Musculoskeletal:      Right lower leg: No edema  Left lower leg: No edema  Neurological:      Mental Status: He is alert and oriented to person, place, and time  Cranial Nerves: No cranial nerve deficit        Coordination: Coordination normal    Psychiatric:         Behavior: Behavior normal           Additional Data:     Labs:  Results from last 7 days   Lab Units 04/18/22  1417   WBC Thousand/uL 5 76   HEMOGLOBIN g/dL 13 2   HEMATOCRIT % 40 3   PLATELETS Thousands/uL 153   LYMPHO PCT % 17   MONO PCT % 4   EOS PCT % 0     Results from last 7 days   Lab Units 04/19/22  0431 04/18/22  1417 04/18/22  1417   SODIUM mmol/L 144   < > 145   POTASSIUM mmol/L 3 5   < > 3 5   CHLORIDE mmol/L 112*   < > 109*   CO2 mmol/L 25   < > 26   BUN mg/dL 17   < > 17   CREATININE mg/dL 1 14   < > 1 33*   ANION GAP mmol/L 7   < > 10   CALCIUM mg/dL 8 3   < > 9 1   ALBUMIN g/dL  --   --  3 9   TOTAL BILIRUBIN mg/dL --   --  0 79   ALK PHOS U/L  --   --  47   ALT U/L  --   --  24   AST U/L  --   --  19   GLUCOSE RANDOM mg/dL 94   < > 106    < > = values in this interval not displayed  Results from last 7 days   Lab Units 04/19/22  0431   HEMOGLOBIN A1C % 5 9*           Lines/Drains:  Invasive Devices  Report    Peripheral Intravenous Line            Peripheral IV 04/18/22 Right Antecubital <1 day                  Telemetry:  Telemetry Orders (From admission, onward)             48 Hour Telemetry Monitoring  Continuous x 48 hours        References:    Telemetry Guidelines   Question:  Reason for 48 Hour Telemetry  Answer:  Acute CVA (<24 hrs old, hemispheric strokes, selected brainstem strokes, cardiac arrhythmias)                 Telemetry Reviewed: Normal Sinus Rhythm  Indication for Continued Telemetry Use: Acute CVA             Imaging: Reviewed radiology reports from this admission including: CTA head neck    Recent Cultures (last 7 days):         Last 24 Hours Medication List:   Current Facility-Administered Medications   Medication Dose Route Frequency Provider Last Rate    albuterol  2 puff Inhalation Q6H PRN Jeffry Snyder MD      allopurinol  300 mg Oral Daily Jeffry Snyder MD      aspirin  81 mg Oral Daily Jeffry Snyder MD      atorvastatin  40 mg Oral QPM Jeffry Snyder MD      colchicine  0 6 mg Oral Daily Jeffry Snyder MD      famotidine  10 mg Oral BID Jeffry Snyder MD      fluticasone-vilanterol  1 puff Inhalation Daily Jeffry Snyder MD      heparin (porcine)  5,000 Units Subcutaneous [facility] Jeffry Snyder MD      levothyroxine  175 mcg Oral Daily Jeffry Snyder MD      sodium chloride  80 mL/hr Intravenous Continuous Jeffry Snyder MD 80 mL/hr (04/18/22 8446)    trimethobenzamide  200 mg Intramuscular Q6H PRN Jeffry Snyder MD          Today, Patient Was Seen By: Sally Woodall PA-C    **Please Note: This note may have been constructed using a voice recognition system  **

## 2022-04-19 NOTE — QUICK NOTE
Floor call: Pt  Is discharged and has unsigned paper prescriptions for Statin and Meclizine  Pt  Prefers medications to be sent to [facility] aid pharmacy in [place]  Will sent rx for ASA 81mg daily, Atorvastatin 40mg daily and meclizine 25mg q8hrs PRN  As per chart review

## 2022-04-19 NOTE — DISCHARGE INSTRUCTIONS
Vertigo   WHAT YOU NEED TO KNOW:   Vertigo is a condition that causes you to feel dizzy  You may feel that you or everything around you is moving or spinning  You may also feel like you are being pulled down or toward your side  DISCHARGE INSTRUCTIONS:   Seek care immediately if:   · You have a headache and a stiff neck  · You have shaking chills and a fever  · You vomit over and over with no relief  · You have blood, pus, or fluid coming out of your ears  · You are confused  Contact your healthcare provider if:   · Your symptoms do not get better with treatment  · You have questions about your condition or care  Medicines:   · Medicine  may be given to help relieve your symptoms  · Take your medicine as directed  Contact your healthcare provider if you think your medicine is not helping or if you have side effects  Tell him or her if you are allergic to any medicine  Keep a list of the medicines, vitamins, and herbs you take  Include the amounts, and when and why you take them  Bring the list or the pill bottles to follow-up visits  Carry your medicine list with you in case of an emergency  Manage your symptoms:   · Do not drive , walk without help, or operate heavy machinery when you are dizzy  · Move slowly  when you move from one position to another position  Get up slowly from sitting or lying down  Sit or lie down right away if you feel dizzy  · Drink plenty of liquids  Liquids help prevent dehydration  Ask how much liquid to drink each day and which liquids are best for you  · Vestibular and balance rehabilitation therapy (VBRT)  is used to teach you exercises to improve your balance and strength  These exercises may help decrease your vertigo and improve your balance  Ask for more information about this therapy  Follow up with your doctor as directed:  Write down your questions so you remember to ask them during your visits     © Copyright [employer] 2022 Information is for End User's use only and may not be sold, redistributed or otherwise used for commercial purposes  All illustrations and images included in CareNotes® are the copyrighted property of A D A M , Inc  or Oliverio Ellison  The above information is an  only  It is not intended as medical advice for individual conditions or treatments  Talk to your doctor, nurse or pharmacist before following any medical regimen to see if it is safe and effective for you  Meclizine (Antivert, Antivert/25, Antivert/50, Motion Sickness Relief) - (By mouth)     Why this medicine is used:   Treats symptoms of motion sickness  Also treats vertigo  Common side effects:  · Drowsiness  · Dry mouth  · Headache  · Blurred vision    © Copyright [employer] 2022 Information is for End User's use only and may not be sold, redistributed or otherwise used for commercial purposes  Aspirin (Maria E Extra Strength, Maria E Aspirin Children's, Bufferin, Bufferin Low Dose) - (By mouth)     Why this medicine is used:   Treats pain, fever, and inflammation  May also reduce the risk of heart attack  Contact a nurse or doctor right away if you have:  · Bloody vomit or vomit that looks like coffee grounds  · Blood in urine or bloody or black, tarry stools  · Wheezing or trouble breathing     Common side effects:  · Upset stomach    © Copyright [employer] 2022 Information is for End User's use only and may not be sold, redistributed or otherwise used for commercial purposes  Atorvastatin (Lipitor) - (By mouth)     Why this medicine is used:   Lowers high cholesterol and triglyceride levels  Reduces the risk of angina, stroke, heart attack, or certain heart and blood vessel problems     Contact a nurse or doctor right away if you have:  · Blistering, peeling, red skin rash  · Change in how much or how often you urinate  · Dark urine or pale stools, nausea, vomiting  · Loss of appetite, stomach pain, yellow skin or eyes  · Fever, unusual tiredness  · Unexplained muscle pain, tenderness, or weakness     Common side effects:  · Diarrhea  · Arm, leg, or joint pain    © Copyright [employer] 2022 Information is for End User's use only and may not be sold, redistributed or otherwise used for commercial purposes

## 2022-04-19 NOTE — ASSESSMENT & PLAN NOTE
· Cough, occasional shortness of breath, no wheezing  · Continue Albuterol p r n    · Will give [place] not available on formulary

## 2022-04-19 NOTE — ASSESSMENT & PLAN NOTE
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 with distal reconstitution at V3 segment through distal collateral vessels in left upper neck and probable retrograde flow  Question subclavian steal syndrome   0 7 cm penetrating atheromatous ulcer in proximal brachiocephalic artery    Severe short-segment stenosis (greater than 75% narrowing) in right common carotid artery due to irregular ulcerative atherosclerotic plaque    Moderate stenosis (approximately 60% narrowing) in left ICA proximal cervical segment    Moderate stenosis (approximately 50% narrowing) in right ICA proximal cervical segment    Recommendations:  - No vascular intervention indicated at this time  - Carotid duplex  - continue ASA  - continue statin  - rest of care per Medicine team  - will discuss with Dr Philippe Moreno

## 2022-04-19 NOTE — PHYSICAL THERAPY NOTE
Physical Therapy Evaluation     Patient's Name: Jose Toledo    Admitting Diagnosis  Atherosclerotic cerebrovascular disease [I67 2]  Vertigo [R42]  Vomiting [R11 10]    Problem List  Patient Active Problem List   Diagnosis    HTN (hypertension)    Hypothyroidism    BPH (benign prostatic hyperplasia)    Gout    Asthma    [facility]    Intracranial vascular stenosis     Past Medical History  Past Medical History:   Diagnosis Date    Asthma     BPH (benign prostatic hyperplasia)     Disease of thyroid gland     GERD (gastroesophageal reflux disease)     Gout     Hypertension     [facility] cancer ([facility] )      Past Surgical History  Past Surgical History:   Procedure Laterality Date    BACK SURGERY      VOCAL FOLD LESION EXCISION N/A       04/19/22 0756   PT Last Visit   PT Visit Date 04/19/22   Note Type   Note type Evaluation   Pain Assessment   Pain Assessment Tool 0-10   Pain Score No Pain   Restrictions/Precautions   Weight Bearing Precautions Per Order No   Braces or Orthoses Other (Comment)  (none per patient)   Other Precautions Multiple lines;Telemetry; Fall Risk   Home Living   Type of [address] [place] [address] Two level;Stairs to enter with rails;Bed/bath upstairs  (2 NANCY; flight of stairs to 2nd floor)   Bathroom Shower/Tub Walk-in shower   Bathroom Toilet Standard   Bathroom Equipment Grab bars in shower; Shower chair   Bathroom Accessibility Accessible   Home Equipment Walker;Cane;Crutches   Additional Comments Pt ambulates without an AD     Prior Function   Level of [place] Independent with ADLs and functional mobility   Lives With Spouse   Receives Help From Family   ADL Assistance Independent   IADLs Independent   Falls in the last 6 months 0   Vocational Retired   General   Family/Caregiver Present No   Cognition   Overall Cognitive Status WFL   Arousal/Participation Alert   Orientation Level Oriented X4   Memory Within functional limits   Following Commands Follows all commands and directions without difficulty   Comments Pt agreeable to PT  Subjective   Subjective "I get dizzy "   RUE Assessment   RUE Assessment   (defer to OT assessment)   LUE Assessment   LUE Assessment   (defer to OT assessment)   RLE Assessment   RLE Assessment WFL   LLE Assessment   LLE Assessment WFL   Light Touch   RLE Light Touch Grossly intact   LLE Light Touch Grossly intact   Bed Mobility   Rolling R 5  Supervision   Additional items Assist x 1;HOB elevated; Increased time required;Verbal cues   Supine to Sit 5  Supervision   Additional items Assist x 1;HOB elevated; Increased time required;Verbal cues   Sit to Supine 5  Supervision   Additional items Assist x 1;HOB elevated; Increased time required;Verbal cues   Transfers   Sit to Stand 4  Minimal assistance  (CG assist)   Additional items Assist x 1; Increased time required;Verbal cues   Stand to Sit 4  Minimal assistance  (CG assist)   Additional items Assist x 1; Increased time required;Verbal cues   Ambulation/Elevation   Gait pattern Decreased toe off;Decreased heel strike;Decreased hip extension; Excessively slow; Step to;Short stride; Shuffling   Gait Assistance 4  Minimal assist  (CG assist)   Additional items Assist x 1;Verbal cues   Assistive Device Rolling walker   Distance 3 side steps at EOB   Stair Management Assistance Not tested   Ambulation/Elevation Additional Comments Ambulation distance limited and stair negotation deferred secondary to persistent dizziness   Balance   Static Sitting Good   Dynamic Sitting Fair +   Static Standing Fair   Dynamic Standing Fair -   Ambulatory Fair -   Endurance Deficit   Endurance Deficit Yes   Endurance Deficit Description decreased activity tolerance   Activity Tolerance   Activity Tolerance Treatment limited secondary to medical complications (Comment)  (persistent dizziness)   Medical Staff Made Aware OT [employer]   Assessment   Prognosis Good   Problem List Decreased endurance; Impaired balance;Decreased mobility   Assessment Pt is 74year old male seen for PT evaluation s/p admit to [facility] on 4/18/2022 with Intracranial vascular stenosis  PT consulted to assess pt's functional mobility and d/c needs  Order placed for PT eval and tx, with up with assist order  Comorbidities affecting pt's physical performance at time of assessment include hypertension, hypothyroidism, benign prostatic hyperplasia, and gout  PTA, pt was independent with all functional mobility without an AD  Pt ambulates unrestricted distances on all terrain and elevations  Pt resides with his spouse in a two level house with two steps to enter and a flight of stairs to the second floor  Personal factors affecting pt at time of IE include lives in a two story house, ambulating with an assistive device, stairs to enter home, inability to ambulate household distances, inability to navigate community distances, inability to navigate level surfaces without external assistance, and unable to perform dynamic tasks in community  Please find objective findings from PT assessment regarding body systems outlined above with impairments and limitations including impaired balance, decreased endurance, gait deviations, decreased activity tolerance, decreased functional mobility tolerance, and fall risk  The following objective measures performed on IE also reveal limitations: Barthel Index: 55/100, Modified Adam: 4 (moderate/severe disability) and AM-PAC 6-Clicks: 45/38  Pt's clinical presentation is currently evolving seen in pt's presentation of need for ongoing medical management/monitoring, pt is a fall risk, pt requires use of RW for safe ambulation, pt has persistent dizziness limiting functional mobility, and pt requires cues and assist for safety with functional mobility  Pt to benefit from continued PT tx to address deficits as defined above and maximize level of functional independent mobility and consistency   From PT/mobility standpoint, recommendation at time of d/c would be home with family support and outpatient PT pending progress in order to facilitate return to PLOF  Barriers to Discharge Inaccessible home environment   Goals   STG Expiration Date 04/29/22   Short Term Goal #1 In 7-10 days: Perform all bed mobility tasks modified independent to decrease caregiver burden, Perform all transfers modified independent to improve independence, Ambulate > 150 ft  with least restrictive assistive device modified independent w/o LOB and w/ normalized gait pattern 100% of the time, Navigate 12 stairs modified independent with unilateral handrail to facilitate return to previous living environment and Increase all balance 1/2 grade to decrease risk for falls   Plan   Treatment/Interventions Functional transfer training;Elevations; Therapeutic exercise; Endurance training;Patient/family training;Bed mobility;Gait training;OT   PT Frequency 2-3x/wk   Recommendation   PT Discharge Recommendation Home with outpatient rehabilitation   AM-PAC Basic Mobility Inpatient   Turning in Bed Without Bedrails 3   Lying on Back to Sitting on Edge of Flat Bed 3   Moving Bed to Chair 3   Standing Up From Chair 3   Walk in Room 3   Climb 3-5 Stairs 3   Basic Mobility Inpatient Raw Score 18   Basic Mobility Standardized Score 41 05   Highest Level Of Mobility   -HL Goal 6: Walk 10 steps or more   -[facility] Highest Level of Mobility 5: Stand (1 or more minutes)   -HL Goal Achieved No   Modified Howell Scale   Modified Howell Scale 4   Barthel Index   Feeding 10   Bathing 0   Grooming Score 5   Dressing Score 5   Bladder Score 10   Bowels Score 10   Toilet Use Score 5   Transfers (Bed/Chair) Score 10   Mobility (Level Surface) Score 0   Stairs Score 0   Barthel Index Score 55     PT Evaluation Time: 8139-1980    Heena Virk, PT, DPT

## 2022-04-20 NOTE — ASSESSMENT & PLAN NOTE
· Patient is from out of the area, baseline is unknown    Presenting creatinine 1 33 with BUN 17  · IV fluids provided overnight with improvement to 1 14

## 2022-04-20 NOTE — ASSESSMENT & PLAN NOTE
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 with distal reconstitution at V3 segment through distal collateral vessels in left upper neck and probable retrograde flow  Question subclavian steal syndrome  0 7 cm penetrating atheromatous ulcer in proximal brachiocephalic artery  Severe short-segment stenosis (greater than 75% narrowing) in right common carotid artery due to irregular ulcerative atherosclerotic plaque  Moderate stenosis (approximately 60% narrowing) in left ICA proximal cervical segment due to atherosclerotic disease    Moderate stenosis (approximately 50% narrowing) in right ICA proximal cervical segment due to irregular ulcerative atherosclerotic plaque      · Patient under stroke pathway, not a tPA candidate secondary to onset of symptoms > 4 hours an NIHSS of 0  · PT, OT consult pending  · Neuro and vascular consults appreciated  · Continue aspirin, statin, blood pressure management  · Telemetry monitor shows normal sinus rhythm  · Echo shows no acute findings requiring intervention, but he will need cardiac follow up on discharge  · MRI brain shows chronic microangiopathic changes without acute CVA  · Meclizine prn, ENT if needed, PT for vestibular therapy outpatient  · Will need vascular surgery follow up and serial monitoring for ICA stenosis  · Hemodynamically stable for discharge   Lab Results   Component Value Date    HGBA1C 5 9 (H) 04/19/2022    CHOLESTEROL 176 04/19/2022    LDLCALC 117 (H) 04/19/2022    HDL 49 04/19/2022    TRIG 50 04/19/2022

## 2022-04-20 NOTE — DISCHARGE SUMMARY
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 with distal reconstitution at V3 segment through distal collateral vessels in left upper neck and probable retrograde flow  Question subclavian steal syndrome  0 7 cm penetrating atheromatous ulcer in proximal brachiocephalic artery  Severe short-segment stenosis (greater than 75% narrowing) in right common carotid artery due to irregular ulcerative atherosclerotic plaque  Moderate stenosis (approximately 60% narrowing) in left ICA proximal cervical segment due to atherosclerotic disease    Moderate stenosis (approximately 50% narrowing) in right ICA proximal cervical segment due to irregular ulcerative atherosclerotic plaque      · Patient under stroke pathway, not a tPA candidate secondary to onset of symptoms > 4 hours an NIHSS of 0  · PT, OT consult pending  · Neuro and vascular consults appreciated  · Continue aspirin, statin, blood pressure management  · Telemetry monitor shows normal sinus rhythm  · Echo shows no acute findings requiring intervention, but he will need cardiac follow up on discharge  · MRI brain shows chronic microangiopathic changes without acute CVA  · Meclizine prn, ENT if needed, PT for vestibular therapy outpatient  · Will need vascular surgery follow up and serial monitoring for ICA stenosis  · Hemodynamically stable for discharge   Lab Results   Component Value Date HGBA1C 5 9 (H) 04/19/2022    CHOLESTEROL 176 04/19/2022    LDLCALC 117 (H) 04/19/2022    HDL 49 04/19/2022    TRIG 50 04/19/2022       Elevated serum creatinine  Assessment & Plan  · Patient is from out of the area, baseline is unknown  Presenting creatinine 1 33 with BUN 17  · IV fluids provided overnight with improvement to 1 14    HTN (hypertension)  Assessment & Plan  · Patient normotensive continue home regimen    Hypothyroidism  Assessment & Plan  · Continue levothyroxine 175 mcg daily    Gout  Assessment & Plan  · Continue home regimen colchicine and allopurinol    BPH (benign prostatic hyperplasia)  Assessment & Plan  · On tamsulosin, monitor urine output    Asthma  Assessment & Plan  · Cough, occasional shortness of breath, no wheezing  · Continue Albuterol p r n  Medical Problems             Resolved Problems  Date Reviewed: 4/20/2022    None              Discharging Physician / Practitioner: Ileana Purcell PA-C  PCP: No primary care provider on file  Admission Date:   Admission Orders (From admission, onward)     Ordered        04/18/22 1640  Place in Observation  Once                      Discharge Date: 04/19/22    Consultations During Hospital Stay:  [facility]  · IP CONSULT TO VASCULAR SURGERY     Procedures Performed:   · None     Significant Findings / Test Results:   MRI brain wo contrast   Final Result by Rahul Pisano DO (04/19 9795)      White matter changes suggestive of chronic microangiopathy  No acute intracranial pathology  Workstation performed: [id number]         CTA head and neck with and without contrast   Final Result by Iman Lenz MD (04/18 4495)      No acute intracranial abnormality  Mild chronic microangiopathy  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 with distal reconstitution at V3 segment through distal collateral vessels in left upper neck and probable retrograde flow    Question subclavian steal syndrome  0 7 cm penetrating atheromatous ulcer in proximal brachiocephalic artery  Severe short-segment stenosis (greater than 75% narrowing) in right common carotid artery due to irregular ulcerative atherosclerotic plaque  Moderate stenosis (approximately 60% narrowing) in left ICA proximal cervical segment due to atherosclerotic disease  Moderate stenosis (approximately 50% narrowing) in right ICA proximal cervical segment due to irregular ulcerative atherosclerotic plaque  Additional chronic/incidental findings as detailed above  The study was marked in [facility] for immediate notification  Workstation performed: [id number]         XR chest 1 view portable   ED Interpretation by Marisol Erazo DO (04/18 9846)   Borderline cardiomegaly  No acute abnormality in the chest   No prior chest x-ray for comparison  Final Result by Donna Danielson MD (04/18 1611)      No acute cardiopulmonary disease  Workstation performed: [id number]            Incidental Findings:   · As above      Test Results Pending at Discharge (will require follow up): · None      Outpatient Tests Requested:  · PCP  · Cardiology  · Neurology prn  · Vascular surgery   · ENT prn  · PT for vestibular therapy  · Holter monitor  · Stress test  · Carotid surveillance     Complications:  none    Reason for Admission: vertigo     Hospital Course:   Claudell Plane is a 74 y o  male patient who originally presented to the hospital on 4/18/2022 due to several days of dizziness and nausea  Patient's past medical history of hypertension, asthma, hypothyroidism, 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  Patient presented with dizziness x3 days with associated vomiting  Patient felt like the room was spinning and he was off balance  Patient complaining of some URI symptoms as well    Patient is currently visiting family in the area and plans return to [place] once discharge  Patient was observed for stroke rule out  Incidental findings as above  No acute stroke  He will need close outpatient follow-up with his PCP to coordinate continued workup and surveillance  He will need PT evaluation outpatient for vestibular therapy  Continue with meclizine as needed  Continue aspirin and statin, alcohol avoidance and no tobacco   Patient was hemodynamically stable at time of discharge and all of his questions were answered to the best my ability at the bedside along with his wife  Please see above list of diagnoses and related plan for additional information  Condition at Discharge: good    Discharge Day Visit / Exam:   * Please refer to separate progress note for these details *    Discussion with Family: Updated  (wife) at bedside  Discharge instructions/Information to patient and family:   See after visit summary for information provided to patient and family  Provisions for Follow-Up Care:  See after visit summary for information related to follow-up care and any pertinent home health orders  Disposition:   Home    Planned Readmission: none     Discharge Statement:  I spent 45 minutes discharging the patient  This time was spent on the day of discharge  I had direct contact with the patient on the day of discharge  Greater than 50% of the total time was spent examining patient, answering all patient questions, arranging and discussing plan of care with patient as well as directly providing post-discharge instructions  Additional time then spent on discharge activities  Discharge Medications:  See after visit summary for reconciled discharge medications provided to patient and/or family        **Please Note: This note may have been constructed using a voice recognition system**

## 2022-04-20 NOTE — OCCUPATIONAL THERAPY NOTE
Occupational Therapy Evaluation        Patient Name: Leatha Khalil  RTRXG'A Date: 4/19/2022 04/19/22 6102   OT Last Visit   OT Visit Date 04/19/22   Note Type   Note type Evaluation   Restrictions/Precautions   Weight Bearing Precautions Per Order No   Braces or Orthoses Other (Comment)  (none per patient)   Other Precautions Multiple lines;Telemetry; Fall Risk   Pain Assessment   Pain Assessment Tool 0-10   Pain Score No Pain   Home Living   Type of Home House   Home Layout Two level;Bed/bath upstairs;Stairs to enter with rails  (2 NANCY, flight of stairs to second floor)   Bathroom Shower/Tub Walk-in shower   Bathroom Toilet Standard   Bathroom Equipment Grab bars in shower; Shower chair   Bathroom Accessibility Accessible   Home Equipment Walker;Cane;Crutches   Additional Comments Pt  ambulates without use of AD   Prior Function   Level of [place] Independent with ADLs and functional mobility   Lives With Spouse   Receives Help From Family   ADL Assistance Independent   IADLs Independent   Falls in the last 6 months 0   Vocational Retired   Comments  (+)   Lifestyle   Autonomy Patient reported independent with all ADL and IADL tasks  Patient lives in a 2 stroy home with 2 NANCY and bed/bath located on the second floor  Patient ambulates without the use of AD and reports no falls within the last 6 months  Patient is retired and enjoys making jewlery with his special printer     Reciprocal Relationships supportive family   Intrinsic Gratification enjoys making jewlery with his special printer   Psychosocial   Psychosocial (WDL) WDL   Subjective   Subjective "It feels like the room is spinning"   ADL   Eating Assistance 6  Modified independent   Grooming Assistance 5  Supervision/Setup   [address] [place] [address] Minimal Assistance   Functional Assistance 4  Minimal Assistance   Bed Mobility   Rolling R 5  Supervision   Additional items Assist x 1;HOB elevated; Increased time required;Verbal cues   Supine to Sit 5  Supervision   Additional items Assist x 1;HOB elevated;Leg ;Verbal cues   Sit to Supine 5  Supervision   Additional items Assist x 1;HOB elevated; Increased time required;Verbal cues   Transfers   Sit to Stand 4  Minimal assistance  (contact guard)   Additional items Assist x 1; Increased time required;Verbal cues  (RW)   Stand to Sit 4  Minimal assistance  (contact guard)   Additional items Assist x 1; Increased time required;Verbal cues   Functional Mobility   Functional Mobility 4  Minimal assistance  (contact guard)   Additional Comments small side steps taken limited by pt  dizziness   Additional items Rolling walker   Balance   Static Sitting Fair -   Dynamic Sitting Fair -   Static Standing Poor +   Dynamic Standing Poor  (RW)   Ambulatory Poor  (RW, small side steps taken only)   Activity Tolerance   Activity Tolerance Patient limited by fatigue;Treatment limited secondary to medical complications (Comment)  (limited by dizziness)   Medical Staff Made Aware PT Donald Salcedo   RUE Assessment   RUE Assessment WFL   LUE Assessment   LUE Assessment WFL   Hand Function   Gross Motor Coordination Functional   Fine Motor Coordination Functional   Sensation   Light Touch No apparent deficits  (BUEs)   Vision-Basic Assessment   Current Vision Wears glasses all the time   Vision - Complex Assessment   Ocular Range of Motion WFL   Head Position WDL   Tracking Able to track stimulus in all quads without difficulty   Additional Comments dizziness not worsened by eye mov  only head changes   Perception   Inattention/Neglect Appears intact   Motor Planning Appears intact   Cognition   Overall Cognitive Status [facility]   Arousal/Participation Alert; Cooperative;Responsive   Attention Within functional limits   Orientation Level Oriented X4   Memory Within functional limits   Following Commands Follows all commands and directions without difficulty   Assessment   Limitation Decreased ADL status; Decreased endurance;Decreased high-level ADLs   Prognosis Good   Assessment Patient is a 74 y o  male seen for OT evaluation s/p admit to [facility] on 4/18/2022 w/Intracranial vascular stenosis  Commorbidities affecting patient's functional performance at time of assessment include: hypertension, hypothyroidism, benign prostatic hyperplasia, and gout  Orders placed for OT evaluation and treatment  Performed at least two patient identifiers during session including name and wristband  Prior to admission, Patient reported independent with all ADL and IADL tasks  Patient lives in a 2 stroy home with 2 NANCY and bed/bath located on the second floor  Patient ambulates without the use of AD and reports no falls within the last 6 months  Patient is retired and 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  Personal factors affecting patient at time of initial evaluation include: steps to enter, decreased initiation and engagement, difficulty performing ADLs and difficulty performing IADLs  Upon evaluation, patient requires modified independent assist for UB ADLs, minimal  assist for LB ADLs  Occupational performance is affected by the following deficits: dynamic sit/ stand balance deficit with poor standing tolerance time for self care and functional mobility, decreased activity tolerance and postural control and postural alignment deficit, requiring external assistance to complete transitional movements  Patient to benefit from continued Occupational Therapy treatment while in the hospital to address deficits as defined above and maximize level of functional independence with ADLs and functional mobility   Occupational Performance areas to address include: transfer to all surfaces, functional mobility, emergency response, health maintenance and IADLs: safety procedures  From OT standpoint, recommendation at time of d/c would be Home with family supportOP Vestibular therapy as indicated  Plan   Treatment Interventions Endurance training;Patient/family training;Equipment evaluation/education; Compensatory technique education;Cardiac education;Continued evaluation; Energy conservation; Activityengagement   Goal Expiration Date 04/26/22   OT Frequency 1-2x/wk   Recommendation   OT Discharge Recommendation Home with outpatient rehabilitation   AM-PAC Daily Activity Inpatient   Lower Body Dressing 2   Bathing 2   Toileting 3   Upper Body Dressing 3   Grooming 4   Eating 4   Daily Activity Raw Score 18   Daily Activity Standardized Score (Calc for Raw Score >=11) 38 66   AM-PAC Applied Cognition Inpatient   Following a Speech/Presentation 4   Understanding Ordinary Conversation 4   Taking Medications 4   Remembering Where Things Are Placed or Put Away 4   Remembering List of 4-5 Errands 4   Taking Care of Complicated Tasks 4   Applied Cognition Raw Score 24   Applied Cognition Standardized Score 62 21   Barthel Index   Feeding 10   Bathing 0   Grooming Score 5   Dressing Score 5   Bladder Score 10   Bowels Score 10   Toilet Use Score 5   Transfers (Bed/Chair) Score 10   Mobility (Level Surface) Score 0   Stairs Score 0   Barthel Index Score 55   Modified [place] Scale   Modified [place] Scale 4       Occupational therapy Goals: In 2-4 days    1- Patient will verbalize and demonstrate use of energy conservation/ deep breathing technique and work simplification skills during functional activity with no verbal cues  2- Patient will verbalize and demonstrate good body mechanics and joint protection techniques during ADLs/ IADLs with no verbal cues   3- Patient will increase OOB/ sitting tolerance to 4-6 hours per day for increased participation in self care and leisure tasks with no s/s of exertion    4- Patient will identify s/s of exertion during ADL and functional mobility with no verbal cues    5-Patient will verbalize/ demonstrate compensatory strategies to recover from exertion with no verbal cues  6-Patient will increase standing tolerance time to 10 minutes with No UE support to complete sink level ADLs @ Mod I level   7- Patient will increase sitting tolerance at edge of bed to 30 minutes to complete UB ADLs @ Indep  level     8-- Patient/ Family will demonstrate competency with UE Home Exercise Program

## 2022-04-20 NOTE — PLAN OF CARE
Problem: OCCUPATIONAL THERAPY ADULT  Goal: Performs self-care activities at highest level of function for planned discharge setting  See evaluation for individualized goals  Description: Treatment Interventions: Endurance training,Patient/family training,Equipment evaluation/education,Compensatory technique education,Cardiac education,Continued evaluation,Energy conservation,Activityengagement          See flowsheet documentation for full assessment, interventions and recommendations  Note: Limitation: Decreased ADL status,Decreased endurance,Decreased high-level ADLs  Prognosis: Good  Assessment: Patient is a 74 y o  male seen for OT evaluation s/p admit to [id number] [place] [address] on 4/18/2022 w/Intracranial vascular stenosis  Commorbidities affecting patient's functional performance at time of assessment include: hypertension, hypothyroidism, benign prostatic hyperplasia, and gout  Orders placed for OT evaluation and treatment  Performed at least two patient identifiers during session including name and wristband  Prior to admission, Patient reported independent with all ADL and IADL tasks  Patient lives in a 2 stroy home with 2 NANCY and bed/bath located on the second floor  Patient ambulates without the use of AD and reports no falls within the last 6 months  Patient is retired and enjoys making [place] [employer] with his special Tarenaer  Personal factors affecting patient at time of initial evaluation include: steps to enter, decreased initiation and engagement, difficulty performing ADLs and difficulty performing IADLs  Upon evaluation, patient requires modified independent assist for UB ADLs, minimal  assist for LB ADLs   Occupational performance is affected by the following deficits: dynamic sit/ stand balance deficit with poor standing tolerance time for self care and functional mobility, decreased activity tolerance and postural control and postural alignment deficit, requiring external assistance to complete transitional movements  Patient to benefit from continued Occupational Therapy treatment while in the hospital to address deficits as defined above and maximize level of functional independence with ADLs and functional mobility  Occupational Performance areas to address include: transfer to all surfaces, functional mobility, emergency response, health maintenance and IADLs: safety procedures  From OT standpoint, recommendation at time of d/c would be Home with family supportOP Vestibular therapy as indicated         OT Discharge Recommendation: Home with outpatient rehabilitation

## 2022-04-21 LAB
ATRIAL RATE: 76 BPM
P AXIS: 67 DEGREES
PR INTERVAL: 152 MS
QRS AXIS: 93 DEGREES
QRSD INTERVAL: 96 MS
QT INTERVAL: 432 MS
QTC INTERVAL: 486 MS
T WAVE AXIS: -3 DEGREES
VENTRICULAR RATE: 76 BPM

## 2022-04-21 PROCEDURE — 93010 ELECTROCARDIOGRAM REPORT: CPT | Performed by: INTERNAL MEDICINE

## 2022-04-25 PROBLEM — M10.9 GOUT, UNSPECIFIED: Chronic | Status: ACTIVE | Noted: 2021-05-04

## 2022-04-25 PROBLEM — I10 ESSENTIAL (PRIMARY) HYPERTENSION: Chronic | Status: ACTIVE | Noted: 2021-05-03

## 2022-04-29 ENCOUNTER — OUTPATIENT (OUTPATIENT)
Dept: OUTPATIENT SERVICES | Facility: HOSPITAL | Age: 67
LOS: 1 days | End: 2022-04-29
Payer: COMMERCIAL

## 2022-04-29 VITALS
TEMPERATURE: 98 F | SYSTOLIC BLOOD PRESSURE: 113 MMHG | OXYGEN SATURATION: 99 % | WEIGHT: 225.97 LBS | HEART RATE: 75 BPM | DIASTOLIC BLOOD PRESSURE: 76 MMHG | RESPIRATION RATE: 18 BRPM | HEIGHT: 69 IN

## 2022-04-29 DIAGNOSIS — Z98.89 OTHER SPECIFIED POSTPROCEDURAL STATES: Chronic | ICD-10-CM

## 2022-04-29 DIAGNOSIS — Z01.818 ENCOUNTER FOR OTHER PREPROCEDURAL EXAMINATION: ICD-10-CM

## 2022-04-29 DIAGNOSIS — I65.23 OCCLUSION AND STENOSIS OF BILATERAL CAROTID ARTERIES: ICD-10-CM

## 2022-04-29 DIAGNOSIS — G47.33 OBSTRUCTIVE SLEEP APNEA (ADULT) (PEDIATRIC): ICD-10-CM

## 2022-04-29 DIAGNOSIS — Z98.890 OTHER SPECIFIED POSTPROCEDURAL STATES: Chronic | ICD-10-CM

## 2022-04-29 DIAGNOSIS — E89.0 POSTPROCEDURAL HYPOTHYROIDISM: Chronic | ICD-10-CM

## 2022-04-29 DIAGNOSIS — Z29.9 ENCOUNTER FOR PROPHYLACTIC MEASURES, UNSPECIFIED: ICD-10-CM

## 2022-04-29 LAB
ANION GAP SERPL CALC-SCNC: 13 MMOL/L — SIGNIFICANT CHANGE UP (ref 5–17)
BLD GP AB SCN SERPL QL: NEGATIVE — SIGNIFICANT CHANGE UP
BUN SERPL-MCNC: 19 MG/DL — SIGNIFICANT CHANGE UP (ref 7–23)
CALCIUM SERPL-MCNC: 9.6 MG/DL — SIGNIFICANT CHANGE UP (ref 8.4–10.5)
CHLORIDE SERPL-SCNC: 105 MMOL/L — SIGNIFICANT CHANGE UP (ref 96–108)
CO2 SERPL-SCNC: 23 MMOL/L — SIGNIFICANT CHANGE UP (ref 22–31)
CREAT SERPL-MCNC: 1.18 MG/DL — SIGNIFICANT CHANGE UP (ref 0.5–1.3)
EGFR: 67 ML/MIN/1.73M2 — SIGNIFICANT CHANGE UP
GLUCOSE SERPL-MCNC: 75 MG/DL — SIGNIFICANT CHANGE UP (ref 70–99)
HCT VFR BLD CALC: 39.6 % — SIGNIFICANT CHANGE UP (ref 39–50)
HGB BLD-MCNC: 12.7 G/DL — LOW (ref 13–17)
MCHC RBC-ENTMCNC: 29.3 PG — SIGNIFICANT CHANGE UP (ref 27–34)
MCHC RBC-ENTMCNC: 32.1 GM/DL — SIGNIFICANT CHANGE UP (ref 32–36)
MCV RBC AUTO: 91.2 FL — SIGNIFICANT CHANGE UP (ref 80–100)
NRBC # BLD: 0 /100 WBCS — SIGNIFICANT CHANGE UP (ref 0–0)
PLATELET # BLD AUTO: 181 K/UL — SIGNIFICANT CHANGE UP (ref 150–400)
POTASSIUM SERPL-MCNC: 3.9 MMOL/L — SIGNIFICANT CHANGE UP (ref 3.5–5.3)
POTASSIUM SERPL-SCNC: 3.9 MMOL/L — SIGNIFICANT CHANGE UP (ref 3.5–5.3)
RBC # BLD: 4.34 M/UL — SIGNIFICANT CHANGE UP (ref 4.2–5.8)
RBC # FLD: 15.1 % — HIGH (ref 10.3–14.5)
RH IG SCN BLD-IMP: POSITIVE — SIGNIFICANT CHANGE UP
SODIUM SERPL-SCNC: 141 MMOL/L — SIGNIFICANT CHANGE UP (ref 135–145)
WBC # BLD: 5.68 K/UL — SIGNIFICANT CHANGE UP (ref 3.8–10.5)
WBC # FLD AUTO: 5.68 K/UL — SIGNIFICANT CHANGE UP (ref 3.8–10.5)

## 2022-04-29 PROCEDURE — 86850 RBC ANTIBODY SCREEN: CPT

## 2022-04-29 PROCEDURE — G0463: CPT

## 2022-04-29 PROCEDURE — 86900 BLOOD TYPING SEROLOGIC ABO: CPT

## 2022-04-29 PROCEDURE — 36415 COLL VENOUS BLD VENIPUNCTURE: CPT

## 2022-04-29 PROCEDURE — 85027 COMPLETE CBC AUTOMATED: CPT

## 2022-04-29 PROCEDURE — 86901 BLOOD TYPING SEROLOGIC RH(D): CPT

## 2022-04-29 PROCEDURE — 80048 BASIC METABOLIC PNL TOTAL CA: CPT

## 2022-04-29 RX ORDER — LIDOCAINE HCL 20 MG/ML
0.2 VIAL (ML) INJECTION ONCE
Refills: 0 | Status: DISCONTINUED | OUTPATIENT
Start: 2022-05-13 | End: 2022-05-13

## 2022-04-29 RX ORDER — CEFAZOLIN SODIUM 1 G
2000 VIAL (EA) INJECTION ONCE
Refills: 0 | Status: DISCONTINUED | OUTPATIENT
Start: 2022-05-13 | End: 2022-05-13

## 2022-04-29 RX ORDER — SODIUM CHLORIDE 9 MG/ML
3 INJECTION INTRAMUSCULAR; INTRAVENOUS; SUBCUTANEOUS EVERY 8 HOURS
Refills: 0 | Status: DISCONTINUED | OUTPATIENT
Start: 2022-05-13 | End: 2022-05-13

## 2022-04-29 RX ORDER — CHLORHEXIDINE GLUCONATE 213 G/1000ML
1 SOLUTION TOPICAL DAILY
Refills: 0 | Status: DISCONTINUED | OUTPATIENT
Start: 2022-05-13 | End: 2022-05-13

## 2022-04-29 RX ORDER — COLCHICINE 0.6 MG
1 TABLET ORAL
Qty: 0 | Refills: 0 | DISCHARGE

## 2022-04-29 RX ORDER — CYCLOBENZAPRINE HYDROCHLORIDE 10 MG/1
1 TABLET, FILM COATED ORAL
Qty: 0 | Refills: 0 | DISCHARGE

## 2022-04-29 NOTE — H&P PST ADULT - OTHER CARE PROVIDERS
neurology Erica Gould - [phone]  last seen year ago - PMD give carbergoline Endocrinology Erica Gould - [phone]  last seen year ago - PMD give carbergoline

## 2022-04-29 NOTE — H&P PST ADULT - NSICDXFAMILYHX_GEN_ALL_CORE_FT
FAMILY HISTORY:  No pertinent family history in first degree relatives    Father  Still living? No  Family history of Alzheimer's disease, Age at diagnosis: Age Unknown    Mother  Still living? No  Family history of stroke, Age at diagnosis: Age Unknown

## 2022-04-29 NOTE — H&P PST ADULT - HISTORY OF PRESENT ILLNESS
66-year-old male with past medical history of asthma on meds ( denies any exacerbation), hypothyroidism, gout, hypertension, stage 0 vocal cord cancer status post surgical excision and radiation in 2007 in remission, Pituitary disease on cabergoline ( PMD provide med). Patient reports that  he has been experiencing  constant dizziness /vertigo admit to [facility] on 4/18/2022 work up revealed Intracranial vascular stenosis. Pt followed by Dr Sims planned for Stage 1 Right Carotid Endarterectomy with EEG Monitoring on 5/13/22,    covid 5/10/22

## 2022-04-29 NOTE — H&P PST ADULT - NSICDXPASTSURGICALHX_GEN_ALL_CORE_FT
PAST SURGICAL HISTORY:  H/O shoulder surgery right- long time ago    History of back surgery long time ago    S/P excision of vocal cord nodule radiation - 2007

## 2022-04-29 NOTE — H&P PST ADULT - PROBLEM SELECTOR PLAN 1
Stage 1 Right carotid Endarterectomy with EEg monitoring on 5/13/.22  Pre- op Instructions discussed   labs sent   Covid test 5/10/22  medical eval   pt will continue ASA

## 2022-04-29 NOTE — H&P PST ADULT - NSICDXPASTMEDICALHX_GEN_ALL_CORE_FT
PAST MEDICAL HISTORY:  Arthritis on knees    Gout     History of pituitary disease on caber    Hypertension     Hypothyroid not taking any meds    Low back pain Herniated disc on lumbar region    Redundant prepuce and phimosis     S/P excision of vocal cord nodule 2007 and radiation -    Thyroid cancer pt denies any thyroid cancer

## 2022-04-29 NOTE — H&P PST ADULT - ASSESSMENT
CAPRINI SCORE [CLOT updated 18]    AGE RELATED RISK FACTORS                                                       MOBILITY RELATED FACTORS  [ ] Age 41-60 years                                            (1 Point)                    [ ] Bed rest                                                        (1 Point)  x[ ] Age: 61-74 years                                           (2 Points)                  [ ] Plaster cast                                                   (2 Points)  [ ] Age= 75 years                                              (3 Points)                    [ ] Bed bound for more than 72 hours                 (2 Points)    DISEASE RELATED RISK FACTORS                                               GENDER SPECIFIC FACTORS  [ ] Edema in the lower extremities                       (1 Point)              [ ] Pregnancy                                                     (1 Point)  [ ] Varicose veins                                               (1 Point)                     [ ] Post-partum < 6 weeks                                   (1 Point)             [x ] BMI > 25 Kg/m2                                            (1 Point)                     [ ] Hormonal therapy  or oral contraception          (1 Point)                 [ ] Sepsis (in the previous month)                        (1 Point)               [ ] History of pregnancy complications                 (1 point)  [ ] Pneumonia or serious lung disease                                               [ ] Unexplained or recurrent                     (1 Point)           (in the previous month)                               (1 Point)  [ ] Abnormal pulmonary function test                     (1 Point)                 SURGERY RELATED RISK FACTORS  [ ] Acute myocardial infarction                              (1 Point)               [ ]  Section                                             (1 Point)  [ ] Congestive heart failure (in the previous month)  (1 Point)      [ ] Minor surgery                                                  (1 Point)   [ ] Inflammatory bowel disease                             (1 Point)               [ ] Arthroscopic surgery                                        (2 Points)  [ ] Central venous access                                      (2 Points)                [ x] General surgery lasting more than 45 minutes (2 points)  [ x] Present or previous malignancy                     (2 Points)                [ ] Elective arthroplasty                                         (5 points)    [ ] Stroke (in the previous month)                          (5 Points)                                                                                                                                                           HEMATOLOGY RELATED FACTORS                                                 TRAUMA RELATED RISK FACTORS  [ ] Prior episodes of VTE                                     (3 Points)                [ ] Fracture of the hip, pelvis, or leg                       (5 Points)  [ ] Positive family history for VTE                         (3 Points)             [ ] Acute spinal cord injury (in the previous month)  (5 Points)  [ ] Prothrombin 49026 A                                     (3 Points)               [ ] Paralysis  (less than 1 month)                             (5 Points)  [ ] Factor V Leiden                                             (3 Points)                  [ ] Multiple Trauma within 1 month                        (5 Points)  [ ] Lupus anticoagulants                                     (3 Points)                                                           [ ] Anticardiolipin antibodies                               (3 Points)                                                       [ ] High homocysteine in the blood                      (3 Points)                                             [ ] Other congenital or acquired thrombophilia      (3 Points)                                                [ ] Heparin induced thrombocytopenia                  (3 Points)                                     Total Score [      7    ]

## 2022-05-10 ENCOUNTER — OUTPATIENT (OUTPATIENT)
Dept: OUTPATIENT SERVICES | Facility: HOSPITAL | Age: 67
LOS: 1 days | End: 2022-05-10
Payer: COMMERCIAL

## 2022-05-10 DIAGNOSIS — Z98.890 OTHER SPECIFIED POSTPROCEDURAL STATES: Chronic | ICD-10-CM

## 2022-05-10 DIAGNOSIS — Z98.89 OTHER SPECIFIED POSTPROCEDURAL STATES: Chronic | ICD-10-CM

## 2022-05-10 DIAGNOSIS — Z11.52 ENCOUNTER FOR SCREENING FOR COVID-19: ICD-10-CM

## 2022-05-10 LAB — SARS-COV-2 RNA SPEC QL NAA+PROBE: SIGNIFICANT CHANGE UP

## 2022-05-10 PROCEDURE — U0003: CPT

## 2022-05-10 PROCEDURE — C9803: CPT

## 2022-05-10 PROCEDURE — U0005: CPT

## 2022-05-12 ENCOUNTER — TRANSCRIPTION ENCOUNTER (OUTPATIENT)
Age: 67
End: 2022-05-12

## 2022-05-13 ENCOUNTER — OUTPATIENT (OUTPATIENT)
Dept: INPATIENT UNIT | Facility: HOSPITAL | Age: 67
LOS: 1 days | End: 2022-05-13
Payer: COMMERCIAL

## 2022-05-13 ENCOUNTER — TRANSCRIPTION ENCOUNTER (OUTPATIENT)
Age: 67
End: 2022-05-13

## 2022-05-13 VITALS
RESPIRATION RATE: 16 BRPM | SYSTOLIC BLOOD PRESSURE: 123 MMHG | HEART RATE: 59 BPM | DIASTOLIC BLOOD PRESSURE: 82 MMHG | TEMPERATURE: 98 F | OXYGEN SATURATION: 96 %

## 2022-05-13 VITALS
HEIGHT: 69 IN | WEIGHT: 225.97 LBS | TEMPERATURE: 98 F | HEART RATE: 73 BPM | OXYGEN SATURATION: 98 % | RESPIRATION RATE: 16 BRPM | DIASTOLIC BLOOD PRESSURE: 73 MMHG | SYSTOLIC BLOOD PRESSURE: 106 MMHG

## 2022-05-13 DIAGNOSIS — Z98.890 OTHER SPECIFIED POSTPROCEDURAL STATES: Chronic | ICD-10-CM

## 2022-05-13 DIAGNOSIS — I65.23 OCCLUSION AND STENOSIS OF BILATERAL CAROTID ARTERIES: ICD-10-CM

## 2022-05-13 DIAGNOSIS — Z98.89 OTHER SPECIFIED POSTPROCEDURAL STATES: Chronic | ICD-10-CM

## 2022-05-13 LAB — RH IG SCN BLD-IMP: POSITIVE — SIGNIFICANT CHANGE UP

## 2022-05-13 PROCEDURE — 35301 RECHANNELING OF ARTERY: CPT | Mod: RT,74

## 2022-05-13 PROCEDURE — C1769: CPT

## 2022-05-13 PROCEDURE — C1889: CPT

## 2022-05-13 PROCEDURE — C9399: CPT

## 2022-05-13 DEVICE — CLIP APPLIER ETHICON LIGACLIP 9 3/8" MEDIUM: Type: IMPLANTABLE DEVICE | Site: RIGHT | Status: FUNCTIONAL

## 2022-05-13 DEVICE — KIT A-LINE 1LUM 20G X 12CM SAFE KIT: Type: IMPLANTABLE DEVICE | Site: RIGHT | Status: FUNCTIONAL

## 2022-05-13 DEVICE — CLIP APPLIER COVIDIEN SURGICLIP III 9" SM: Type: IMPLANTABLE DEVICE | Site: RIGHT | Status: FUNCTIONAL

## 2022-05-13 DEVICE — SURGIFOAM PAD 8CM X 12.5CM X 10MM (100): Type: IMPLANTABLE DEVICE | Site: RIGHT | Status: FUNCTIONAL

## 2022-05-13 RX ORDER — ONDANSETRON 8 MG/1
4 TABLET, FILM COATED ORAL ONCE
Refills: 0 | Status: COMPLETED | OUTPATIENT
Start: 2022-05-13 | End: 2022-05-13

## 2022-05-13 RX ORDER — HYDROMORPHONE HYDROCHLORIDE 2 MG/ML
0.5 INJECTION INTRAMUSCULAR; INTRAVENOUS; SUBCUTANEOUS ONCE
Refills: 0 | Status: DISCONTINUED | OUTPATIENT
Start: 2022-05-13 | End: 2022-05-13

## 2022-05-13 RX ORDER — FENTANYL CITRATE 50 UG/ML
50 INJECTION INTRAVENOUS
Refills: 0 | Status: DISCONTINUED | OUTPATIENT
Start: 2022-05-13 | End: 2022-05-13

## 2022-05-13 RX ORDER — LEVOTHYROXINE SODIUM 125 MCG
175 TABLET ORAL DAILY
Refills: 0 | Status: DISCONTINUED | OUTPATIENT
Start: 2022-05-13 | End: 2022-05-13

## 2022-05-13 RX ADMIN — ONDANSETRON 4 MILLIGRAM(S): 8 TABLET, FILM COATED ORAL at 17:15

## 2022-05-13 RX ADMIN — HYDROMORPHONE HYDROCHLORIDE 0.5 MILLIGRAM(S): 2 INJECTION INTRAMUSCULAR; INTRAVENOUS; SUBCUTANEOUS at 15:51

## 2022-05-13 RX ADMIN — HYDROMORPHONE HYDROCHLORIDE 0.5 MILLIGRAM(S): 2 INJECTION INTRAMUSCULAR; INTRAVENOUS; SUBCUTANEOUS at 15:41

## 2022-05-13 NOTE — PRE-ANESTHESIA EVALUATION ADULT - NSANTHPMHFT_GEN_ALL_CORE
66-year-old male with past medical history of asthma on meds ( denies any exacerbation), hypothyroidism, gout, hypertension, stage 0 vocal cord cancer status post surgical excision and radiation in 2007 in remission, Pituitary disease on cabergoline ( PMD provide med). Patient reports that  he has been experiencing  constant dizziness /vertigo admit to [facility] on 4/18/2022 work up revealed Intracranial vascular stenosis.

## 2022-05-13 NOTE — ASU DISCHARGE PLAN (ADULT/PEDIATRIC) - NS MD DC FALL RISK RISK
For information on Fall & Injury Prevention, visit: [email or web address] OR  [email or web address] OR  https://www.cdc.gov/steadi/patient.html

## 2022-05-13 NOTE — ASU DISCHARGE PLAN (ADULT/PEDIATRIC) - ASU DC SPECIAL INSTRUCTIONSFT
Please follow up with Dr. Sims in 2 weeks. Keep dressing c/d/i for at least 1-2 days. Can shower as long as dressing is covered.

## 2022-05-13 NOTE — ASU DISCHARGE PLAN (ADULT/PEDIATRIC) - CARE PROVIDER_API CALL
Melvin Sims)  Vascular Surgery  [address], [address]  [place] [address]  Phone: [phone]  Fax: [phone]  Follow Up Time:

## 2022-05-13 NOTE — CONSULT NOTE ADULT - SUBJECTIVE AND OBJECTIVE BOX
HPI:  66-year-old male with past medical history of asthma on meds ( denies any exacerbation), hypothyroidism, gout, hypertension, stage 0 vocal cord cancer status post surgical excision and radiation in 2007 in remission, Pituitary disease on cabergoline ( PMD provide med). Patient reports that  he has been experiencing  constant dizziness /vertigo admit to [facility] on 4/18/2022 work up revealed Intracranial vascular stenosis. Pt followed by Dr Sims planned for Stage 1 Right Carotid Endarterectomy with EEG Monitoring on 5/13/22,    covid 5/10/22  (29 Apr 2022 16:13)    No events     PAST MEDICAL & SURGICAL HISTORY:  Low back pain  Herniated disc on lumbar region      Redundant prepuce and phimosis      Arthritis  on knees      Hypothyroid  not taking any meds      Hypertension      Thyroid cancer  pt denies any thyroid cancer      History of pituitary disease  on caber      Gout      S/P excision of vocal cord nodule  2007 and radiation -      S/P excision of vocal cord nodule  radiation - 2007      H/O shoulder surgery  right- long time ago      History of back surgery  long time ago          Review of Systems:   CONSTITUTIONAL: No fever, weight loss, or fatigue  EYES: No eye pain, visual disturbances, or discharge  ENMT:  No difficulty hearing, tinnitus, vertigo; No sinus or throat pain  NECK: No pain or stiffness  BREASTS: No pain, masses, or nipple discharge  RESPIRATORY: No cough, wheezing, chills or hemoptysis; No shortness of breath  CARDIOVASCULAR: No chest pain, palpitations, dizziness, or leg swelling  GASTROINTESTINAL: No abdominal or epigastric pain. No nausea, vomiting, or hematemesis; No diarrhea or constipation. No melena or hematochezia.  GENITOURINARY: No dysuria, frequency, hematuria, or incontinence  NEUROLOGICAL: No headaches, memory loss, loss of strength, numbness, or tremors  SKIN: No itching, burning, rashes, or lesions   LYMPH NODES: No enlarged glands  ENDOCRINE: No heat or cold intolerance; No hair loss  MUSCULOSKELETAL: No joint pain or swelling; No muscle, back, or extremity pain  PSYCHIATRIC: No depression, anxiety, mood swings, or difficulty sleeping  HEME/LYMPH: No easy bruising, or bleeding gums  ALLERY AND IMMUNOLOGIC: No hives or eczema    Allergies    No Known Allergies    Intolerances        Social History:     FAMILY HISTORY:  Family history of stroke (Mother)    Family history of Alzheimer&#x27;s disease (Father)    No pertinent family history in first degree relatives        MEDICATIONS  (STANDING):  ceFAZolin   IVPB 2000 milliGRAM(s) IV Intermittent once    MEDICATIONS  (PRN):  fentaNYL    Injectable 50 MICROGram(s) IV Push every 15 minutes PRN Moderate Pain (4 - 6)        CAPILLARY BLOOD GLUCOSE        I&O's Summary    13 May 2022 07:01  -  13 May 2022 20:02  --------------------------------------------------------  IN: 0 mL / OUT: 300 mL / NET: -300 mL        PHYSICAL EXAM:  GENERAL: NAD, well-developed  HEAD:  Atraumatic, Normocephalic  EYES: EOMI, PERRLA, conjunctiva and sclera clear  NECK: Supple, No JVD  CHEST/LUNG: Clear to auscultation bilaterally; No wheeze  HEART: Regular rate and rhythm; No murmurs, rubs, or gallops  ABDOMEN: Soft, Nontender, Nondistended; Bowel sounds present  EXTREMITIES:  2+ Peripheral Pulses, No clubbing, cyanosis, or edema  PSYCH: AAOx3  NEUROLOGY: non-focal  SKIN: No rashes or lesions        no new labs                     RADIOLOGY & ADDITIONAL TESTS:    Imaging Personally Reviewed:    Consultant(s) Notes Reviewed:      Care Discussed with Consultants/Other Providers:

## 2022-05-13 NOTE — PATIENT PROFILE ADULT - FALL HARM RISK - HARM RISK INTERVENTIONS

## 2022-05-13 NOTE — CONSULT NOTE ADULT - ASSESSMENT
66-year-old male with past medical history of asthma on meds ( denies any exacerbation), hypothyroidism, gout, hypertension, stage 0 vocal cord cancer status post surgical excision and radiation in 2007 in remission, Pituitary disease on cabergoline ( PMD provide med). Patient reports that  he has been experiencing  constant dizziness /vertigo admit to [facility] on 4/18/2022 work up revealed Intracranial vascular stenosis. Pt followed by Dr Sims planned for Stage 1 Right Carotid Endarterectomy with EEG Monitoring on 5/13/22,    # ICA stenosis stage 1 Right Carotid Endarterectomy with EEG Monitoring today     # Pituitary Disease on Cabergoline Continue with Cabergoline   Endo consult     # HTN Hold Indapamide   # Hypothyroidism Continue with Synthroid   # Asthma stable not on meds     Patient with no cardiac risk factors   Left message with pmd

## 2022-05-13 NOTE — BRIEF OPERATIVE NOTE - OPERATION/FINDINGS
Significant adhesive radiation changed with internal carotid artery fused to adjacent scar tissue and muscle.  Patient requiring multiple pressors so elevate SBP.  Procedure aborted.

## 2022-05-18 PROBLEM — C73 MALIGNANT NEOPLASM OF THYROID GLAND: Chronic | Status: ACTIVE | Noted: 2021-05-03

## 2022-05-18 PROBLEM — Z86.39 PERSONAL HISTORY OF OTHER ENDOCRINE, NUTRITIONAL AND METABOLIC DISEASE: Chronic | Status: ACTIVE | Noted: 2021-05-04

## 2022-05-18 PROBLEM — Z98.890 OTHER SPECIFIED POSTPROCEDURAL STATES: Chronic | Status: ACTIVE | Noted: 2022-04-29

## 2022-06-01 PROBLEM — Z00.00 ENCOUNTER FOR PREVENTIVE HEALTH EXAMINATION: Status: ACTIVE | Noted: 2022-06-01

## 2022-06-03 ENCOUNTER — APPOINTMENT (OUTPATIENT)
Dept: VASCULAR SURGERY | Facility: CLINIC | Age: 67
End: 2022-06-03
Payer: COMMERCIAL

## 2022-06-03 VITALS — DIASTOLIC BLOOD PRESSURE: 71 MMHG | HEART RATE: 69 BPM | SYSTOLIC BLOOD PRESSURE: 109 MMHG

## 2022-06-03 VITALS — TEMPERATURE: 97.8 F | BODY MASS INDEX: 31.35 KG/M2 | WEIGHT: 219 LBS | HEIGHT: 70 IN

## 2022-06-03 PROCEDURE — 99204 OFFICE O/P NEW MOD 45 MIN: CPT

## 2022-06-03 NOTE — PHYSICAL EXAM
[JVD] : no jugular venous distention  [Normal Breath Sounds] : Normal breath sounds [Normal Heart Sounds] : normal heart sounds [Ankle Swelling (On Exam)] : not present [Abdomen Tenderness] : ~T ~M No abdominal tenderness [Alert] : alert [de-identified] : awake, alert [de-identified] : cn 2-12 intact.  slight R mabel mandibular palsy [de-identified] : healing R carotid incision [FreeTextEntry1] : ext warm well perfused [de-identified] : no gross motor/sensory deficits

## 2022-06-03 NOTE — HISTORY OF PRESENT ILLNESS
[FreeTextEntry1] : 66M, bilat carotid stenosis.\par pt has hx of vocal cord CA, sp radiation \par No hx stroke ot TIA sx.  Last month, was planned for R CEA, however intraop encountered sig scarring, therefore procedure was aborted.  \par He presents here for eval for poss carotid stent.\par He does have hx of dizziness, pt denies irregular heart rhythm, low blood pressure.  Dizziness is episodic, but occurs frequently throughout the day.  no known exacerbating factors.\par \par no hx smoking\par Good overall exercise tolerance, able to walk up stairs and many blocks without chest pain or sob\par \par

## 2022-06-03 NOTE — ASSESSMENT
[FreeTextEntry1] : given bilat carotid stenosis, occluded left vert, dizziness may be a symptom, however no evidence cva.\par likely radiation induced\par \par after reviewing images, I think L carotid stent warranted.\par The R has a concommitant CC stenosis, and therefore not typical tcar candidate.\par \par Would check laryngoscopy to assess VC, pt reports he has ENT and recent eval.\par Pt unsure of meds, will obtain records from his PCP to confirm.\par Will need to be on DAPT, and will have to check plavix response assay prior to scheduling procedure

## 2022-06-03 NOTE — DATA REVIEWED
[FreeTextEntry1] : CTA 5/26: R common and ICA steonosis.  L ICA stenosis.  patent R vert.  occluded L vert

## 2022-06-09 ENCOUNTER — OUTPATIENT (OUTPATIENT)
Dept: OUTPATIENT SERVICES | Facility: HOSPITAL | Age: 67
LOS: 1 days | End: 2022-06-09
Payer: COMMERCIAL

## 2022-06-09 VITALS
SYSTOLIC BLOOD PRESSURE: 112 MMHG | DIASTOLIC BLOOD PRESSURE: 81 MMHG | WEIGHT: 214.07 LBS | OXYGEN SATURATION: 98 % | TEMPERATURE: 98 F | RESPIRATION RATE: 16 BRPM | HEIGHT: 70.5 IN | HEART RATE: 86 BPM

## 2022-06-09 DIAGNOSIS — Z98.890 OTHER SPECIFIED POSTPROCEDURAL STATES: Chronic | ICD-10-CM

## 2022-06-09 DIAGNOSIS — Z98.89 OTHER SPECIFIED POSTPROCEDURAL STATES: Chronic | ICD-10-CM

## 2022-06-09 DIAGNOSIS — Z98.1 ARTHRODESIS STATUS: Chronic | ICD-10-CM

## 2022-06-09 DIAGNOSIS — I65.22 OCCLUSION AND STENOSIS OF LEFT CAROTID ARTERY: ICD-10-CM

## 2022-06-09 DIAGNOSIS — I65.23 OCCLUSION AND STENOSIS OF BILATERAL CAROTID ARTERIES: ICD-10-CM

## 2022-06-09 LAB
ALBUMIN SERPL ELPH-MCNC: 4.6 G/DL — SIGNIFICANT CHANGE UP (ref 3.3–5)
ALP SERPL-CCNC: 54 U/L — SIGNIFICANT CHANGE UP (ref 40–120)
ALT FLD-CCNC: 18 U/L — SIGNIFICANT CHANGE UP (ref 4–41)
ANION GAP SERPL CALC-SCNC: 14 MMOL/L — SIGNIFICANT CHANGE UP (ref 7–14)
AST SERPL-CCNC: 16 U/L — SIGNIFICANT CHANGE UP (ref 4–40)
BILIRUB SERPL-MCNC: 0.5 MG/DL — SIGNIFICANT CHANGE UP (ref 0.2–1.2)
BLD GP AB SCN SERPL QL: NEGATIVE — SIGNIFICANT CHANGE UP
BUN SERPL-MCNC: 34 MG/DL — HIGH (ref 7–23)
CALCIUM SERPL-MCNC: 9.6 MG/DL — SIGNIFICANT CHANGE UP (ref 8.4–10.5)
CHLORIDE SERPL-SCNC: 104 MMOL/L — SIGNIFICANT CHANGE UP (ref 98–107)
CO2 SERPL-SCNC: 23 MMOL/L — SIGNIFICANT CHANGE UP (ref 22–31)
CREAT SERPL-MCNC: 1.45 MG/DL — HIGH (ref 0.5–1.3)
EGFR: 52 ML/MIN/1.73M2 — LOW
GLUCOSE SERPL-MCNC: 88 MG/DL — SIGNIFICANT CHANGE UP (ref 70–99)
HCT VFR BLD CALC: 39.1 % — SIGNIFICANT CHANGE UP (ref 39–50)
HGB BLD-MCNC: 13.2 G/DL — SIGNIFICANT CHANGE UP (ref 13–17)
MCHC RBC-ENTMCNC: 29.1 PG — SIGNIFICANT CHANGE UP (ref 27–34)
MCHC RBC-ENTMCNC: 33.8 GM/DL — SIGNIFICANT CHANGE UP (ref 32–36)
MCV RBC AUTO: 86.1 FL — SIGNIFICANT CHANGE UP (ref 80–100)
NRBC # BLD: 0 /100 WBCS — SIGNIFICANT CHANGE UP
NRBC # FLD: 0 K/UL — SIGNIFICANT CHANGE UP
PLATELET # BLD AUTO: 201 K/UL — SIGNIFICANT CHANGE UP (ref 150–400)
POTASSIUM SERPL-MCNC: 3.6 MMOL/L — SIGNIFICANT CHANGE UP (ref 3.5–5.3)
POTASSIUM SERPL-SCNC: 3.6 MMOL/L — SIGNIFICANT CHANGE UP (ref 3.5–5.3)
PROT SERPL-MCNC: 8 G/DL — SIGNIFICANT CHANGE UP (ref 6–8.3)
RBC # BLD: 4.54 M/UL — SIGNIFICANT CHANGE UP (ref 4.2–5.8)
RBC # FLD: 14.4 % — SIGNIFICANT CHANGE UP (ref 10.3–14.5)
RH IG SCN BLD-IMP: POSITIVE — SIGNIFICANT CHANGE UP
SODIUM SERPL-SCNC: 141 MMOL/L — SIGNIFICANT CHANGE UP (ref 135–145)
WBC # BLD: 5.96 K/UL — SIGNIFICANT CHANGE UP (ref 3.8–10.5)
WBC # FLD AUTO: 5.96 K/UL — SIGNIFICANT CHANGE UP (ref 3.8–10.5)

## 2022-06-09 PROCEDURE — 93010 ELECTROCARDIOGRAM REPORT: CPT

## 2022-06-09 RX ORDER — MECLIZINE HCL 12.5 MG
1 TABLET ORAL
Qty: 0 | Refills: 0 | DISCHARGE

## 2022-06-09 RX ORDER — INDAPAMIDE 1.25 MG
1 TABLET ORAL
Qty: 0 | Refills: 0 | DISCHARGE

## 2022-06-09 RX ORDER — FLUTICASONE PROPIONATE 220 MCG
1 AEROSOL WITH ADAPTER (GRAM) INHALATION
Qty: 0 | Refills: 0 | DISCHARGE

## 2022-06-09 RX ORDER — TAMSULOSIN HYDROCHLORIDE 0.4 MG/1
1 CAPSULE ORAL
Qty: 0 | Refills: 0 | DISCHARGE

## 2022-06-09 RX ORDER — CABERGOLINE 0.5 MG/1
1 TABLET ORAL
Qty: 0 | Refills: 0 | DISCHARGE

## 2022-06-09 NOTE — H&P PST ADULT - NSICDXFAMILYHX_GEN_ALL_CORE_FT
FAMILY HISTORY:  Father  Still living? No  Family history of Alzheimer's disease, Age at diagnosis: Age Unknown  FH: stroke, Age at diagnosis: Age Unknown    Mother  Still living? No  Family history of stroke, Age at diagnosis: Age Unknown  FH: stroke, Age at diagnosis: Age Unknown

## 2022-06-09 NOTE — H&P PST ADULT - NSICDXPASTMEDICALHX_GEN_ALL_CORE_FT
PAST MEDICAL HISTORY:  Arthritis on knees    Carotid occlusion, bilateral     Gout     History of pituitary disease on caber    Hypertension     Hypothyroid not taking any meds    Laryngeal cancer "stage 0"    Low back pain Herniated disc on lumbar region    Pituitary mass     Redundant prepuce and phimosis     S/P excision of vocal cord nodule 2007 and radiation -    Thyroid cancer pt denies any thyroid cancer     PAST MEDICAL HISTORY:  Arthritis on knees    Carotid occlusion, bilateral     Gout     History of pituitary disease on caber    Hypertension     Hypothyroid not taking any meds    Laryngeal cancer "stage 0"    Low back pain Herniated disc on lumbar region    Obese     Pituitary mass     Redundant prepuce and phimosis     S/P excision of vocal cord nodule 2007 and radiation -    Thyroid cancer pt denies any thyroid cancer

## 2022-06-09 NOTE — H&P PST ADULT - PROBLEM SELECTOR PLAN 1
Pt. is scheduled for a left transcarotid artery revascularization...6/28/22.  Pt. verbalized understanding of instructions and that Chlorhexidine is for external use.  Pt. instructed to continue ASA.  Attempted to discuss with the Surgeon.  The office is closed.  Therefore, will notify the Surgeon via email. Informed the pt. that an email will be sent to the Surgeon and if there are any changes that I will request that the Surgeon's office contact him.

## 2022-06-09 NOTE — H&P PST ADULT - NSICDXPASTSURGICALHX_GEN_ALL_CORE_FT
PAST SURGICAL HISTORY:  H/O arthroscopy of shoulder right-2017    History of lumbar spinal fusion 2018    S/P excision of vocal cord nodule radiation - 2007    Status post carotid surgery right-5/13/2022, pt. states unsuccesful

## 2022-06-20 LAB — PA ADP PRP-ACNC: 158 PRU

## 2022-06-21 RX ORDER — SODIUM CHLORIDE 9 MG/ML
1000 INJECTION, SOLUTION INTRAVENOUS
Refills: 0 | Status: DISCONTINUED | OUTPATIENT
Start: 2022-06-28 | End: 2022-06-29

## 2022-06-24 LAB — SARS-COV-2 N GENE NPH QL NAA+PROBE: NOT DETECTED

## 2022-06-27 ENCOUNTER — TRANSCRIPTION ENCOUNTER (OUTPATIENT)
Age: 67
End: 2022-06-27

## 2022-06-27 NOTE — ASU PATIENT PROFILE, ADULT - NSICDXPASTMEDICALHX_GEN_ALL_CORE_FT
PAST MEDICAL HISTORY:  Arthritis on knees    Carotid occlusion, bilateral     Gout     History of pituitary disease on caber    Hypertension     Hypothyroid not taking any meds    Laryngeal cancer "stage 0"    Low back pain Herniated disc on lumbar region    Obese     Pituitary mass     Redundant prepuce and phimosis     S/P excision of vocal cord nodule 2007 and radiation -    Thyroid cancer pt denies any thyroid cancer

## 2022-06-27 NOTE — ASU PATIENT PROFILE, ADULT - FALL HARM RISK - UNIVERSAL INTERVENTIONS
Bed in lowest position, wheels locked, appropriate side rails in place/Call bell, personal items and telephone in reach/Instruct patient to call for assistance before getting out of bed or chair/Non-slip footwear when patient is out of bed/[place] to call system/Physically safe environment - no spills, clutter or unnecessary equipment/Purposeful Proactive Rounding/Room/bathroom lighting operational, light cord in reach

## 2022-06-28 ENCOUNTER — RESULT REVIEW (OUTPATIENT)
Age: 67
End: 2022-06-28

## 2022-06-28 ENCOUNTER — APPOINTMENT (OUTPATIENT)
Dept: VASCULAR SURGERY | Facility: HOSPITAL | Age: 67
End: 2022-06-28

## 2022-06-28 ENCOUNTER — INPATIENT (INPATIENT)
Facility: HOSPITAL | Age: 67
LOS: 0 days | Discharge: HOME | End: 2022-06-29
Attending: STUDENT IN AN ORGANIZED HEALTH CARE EDUCATION/TRAINING PROGRAM | Admitting: STUDENT IN AN ORGANIZED HEALTH CARE EDUCATION/TRAINING PROGRAM
Payer: COMMERCIAL

## 2022-06-28 VITALS
WEIGHT: 214.07 LBS | SYSTOLIC BLOOD PRESSURE: 119 MMHG | DIASTOLIC BLOOD PRESSURE: 73 MMHG | HEART RATE: 84 BPM | RESPIRATION RATE: 19 BRPM | OXYGEN SATURATION: 97 % | HEIGHT: 70.5 IN | TEMPERATURE: 98 F

## 2022-06-28 DIAGNOSIS — Z98.890 OTHER SPECIFIED POSTPROCEDURAL STATES: Chronic | ICD-10-CM

## 2022-06-28 DIAGNOSIS — Z98.89 OTHER SPECIFIED POSTPROCEDURAL STATES: Chronic | ICD-10-CM

## 2022-06-28 DIAGNOSIS — Z98.1 ARTHRODESIS STATUS: Chronic | ICD-10-CM

## 2022-06-28 DIAGNOSIS — I65.23 OCCLUSION AND STENOSIS OF BILATERAL CAROTID ARTERIES: ICD-10-CM

## 2022-06-28 LAB
GAS PNL BLDA: SIGNIFICANT CHANGE UP
GAS PNL BLDA: SIGNIFICANT CHANGE UP

## 2022-06-28 PROCEDURE — 88304 TISSUE EXAM BY PATHOLOGIST: CPT | Mod: 26

## 2022-06-28 PROCEDURE — 37215 TRANSCATH STENT CCA W/EPS: CPT | Mod: LT

## 2022-06-28 PROCEDURE — 76937 US GUIDE VASCULAR ACCESS: CPT | Mod: 26

## 2022-06-28 PROCEDURE — 37215 TRANSCATH STENT CCA W/EPS: CPT | Mod: 80,LT

## 2022-06-28 PROCEDURE — 88311 DECALCIFY TISSUE: CPT | Mod: 26

## 2022-06-28 DEVICE — STENT TRANSCAROTID ENROUTE 10X40MM: Type: IMPLANTABLE DEVICE | Site: LEFT | Status: FUNCTIONAL

## 2022-06-28 DEVICE — CLIP APPLIER ETHICON LIGACLIP 11.5" MEDIUM: Type: IMPLANTABLE DEVICE | Site: LEFT | Status: FUNCTIONAL

## 2022-06-28 DEVICE — GWIRE ENROUTE 0.014X95CM: Type: IMPLANTABLE DEVICE | Site: LEFT | Status: FUNCTIONAL

## 2022-06-28 DEVICE — IMPLANTABLE DEVICE: Type: IMPLANTABLE DEVICE | Site: LEFT | Status: FUNCTIONAL

## 2022-06-28 DEVICE — BLLN STERLING MONORAIL 5X30MMX135CM: Type: IMPLANTABLE DEVICE | Site: LEFT | Status: FUNCTIONAL

## 2022-06-28 DEVICE — CLIP APPLIER COVIDIEN SURGICLIP III 9" SM: Type: IMPLANTABLE DEVICE | Site: LEFT | Status: FUNCTIONAL

## 2022-06-28 DEVICE — SYS TRANSCAROTID NEUROPROTECTION: Type: IMPLANTABLE DEVICE | Site: LEFT | Status: FUNCTIONAL

## 2022-06-28 DEVICE — KIT INTRODUCER MICRO 21GAX7CM 7FR: Type: IMPLANTABLE DEVICE | Site: LEFT | Status: FUNCTIONAL

## 2022-06-28 DEVICE — SURGIFOAM PAD 8CM X 12.5CM X 2MM (100C): Type: IMPLANTABLE DEVICE | Site: LEFT | Status: FUNCTIONAL

## 2022-06-28 DEVICE — SHEATH INTRODUCER TERUMO PINNACLE CORONARY 6FR X 10CM X 0.038" MINI WIRE: Type: IMPLANTABLE DEVICE | Site: LEFT | Status: FUNCTIONAL

## 2022-06-28 DEVICE — GWIRE VASC ENTRY MINISTICK MAX 4FRX10CM: Type: IMPLANTABLE DEVICE | Site: LEFT | Status: FUNCTIONAL

## 2022-06-28 RX ORDER — TAMSULOSIN HYDROCHLORIDE 0.4 MG/1
0.8 CAPSULE ORAL AT BEDTIME
Refills: 0 | Status: DISCONTINUED | OUTPATIENT
Start: 2022-06-28 | End: 2022-06-29

## 2022-06-28 RX ORDER — LEVOTHYROXINE SODIUM 125 MCG
175 TABLET ORAL DAILY
Refills: 0 | Status: DISCONTINUED | OUTPATIENT
Start: 2022-06-28 | End: 2022-06-29

## 2022-06-28 RX ORDER — SODIUM CHLORIDE 9 MG/ML
1000 INJECTION, SOLUTION INTRAVENOUS
Refills: 0 | Status: DISCONTINUED | OUTPATIENT
Start: 2022-06-28 | End: 2022-06-29

## 2022-06-28 RX ORDER — ONDANSETRON 8 MG/1
4 TABLET, FILM COATED ORAL ONCE
Refills: 0 | Status: DISCONTINUED | OUTPATIENT
Start: 2022-06-28 | End: 2022-06-28

## 2022-06-28 RX ORDER — CLOPIDOGREL BISULFATE 75 MG/1
75 TABLET, FILM COATED ORAL DAILY
Refills: 0 | Status: DISCONTINUED | OUTPATIENT
Start: 2022-06-28 | End: 2022-06-29

## 2022-06-28 RX ORDER — ATORVASTATIN CALCIUM 80 MG/1
40 TABLET, FILM COATED ORAL AT BEDTIME
Refills: 0 | Status: DISCONTINUED | OUTPATIENT
Start: 2022-06-28 | End: 2022-06-29

## 2022-06-28 RX ORDER — HEPARIN SODIUM 5000 [USP'U]/ML
5000 INJECTION INTRAVENOUS; SUBCUTANEOUS EVERY 8 HOURS
Refills: 0 | Status: DISCONTINUED | OUTPATIENT
Start: 2022-06-28 | End: 2022-06-29

## 2022-06-28 RX ORDER — HYDROMORPHONE HYDROCHLORIDE 2 MG/ML
1 INJECTION INTRAMUSCULAR; INTRAVENOUS; SUBCUTANEOUS
Refills: 0 | Status: DISCONTINUED | OUTPATIENT
Start: 2022-06-28 | End: 2022-06-28

## 2022-06-28 RX ORDER — ALBUTEROL 90 UG/1
1 AEROSOL, METERED ORAL DAILY
Refills: 0 | Status: DISCONTINUED | OUTPATIENT
Start: 2022-06-28 | End: 2022-06-29

## 2022-06-28 RX ORDER — HYDROMORPHONE HYDROCHLORIDE 2 MG/ML
0.5 INJECTION INTRAMUSCULAR; INTRAVENOUS; SUBCUTANEOUS
Refills: 0 | Status: DISCONTINUED | OUTPATIENT
Start: 2022-06-28 | End: 2022-06-28

## 2022-06-28 RX ORDER — ASPIRIN/CALCIUM CARB/MAGNESIUM 324 MG
81 TABLET ORAL DAILY
Refills: 0 | Status: DISCONTINUED | OUTPATIENT
Start: 2022-06-28 | End: 2022-06-29

## 2022-06-28 RX ADMIN — CLOPIDOGREL BISULFATE 75 MILLIGRAM(S): 75 TABLET, FILM COATED ORAL at 21:07

## 2022-06-28 RX ADMIN — ATORVASTATIN CALCIUM 40 MILLIGRAM(S): 80 TABLET, FILM COATED ORAL at 21:06

## 2022-06-28 RX ADMIN — HEPARIN SODIUM 5000 UNIT(S): 5000 INJECTION INTRAVENOUS; SUBCUTANEOUS at 17:14

## 2022-06-28 RX ADMIN — TAMSULOSIN HYDROCHLORIDE 0.8 MILLIGRAM(S): 0.4 CAPSULE ORAL at 21:06

## 2022-06-28 RX ADMIN — Medication 81 MILLIGRAM(S): at 21:13

## 2022-06-28 RX ADMIN — SODIUM CHLORIDE 100 MILLILITER(S): 9 INJECTION, SOLUTION INTRAVENOUS at 11:10

## 2022-06-28 NOTE — ASU PREOP CHECKLIST - HAIR REMOVAL
Pt asked for copy of lipid/liver lab orders due end of month to be mailed to her. Printed & mailed. She also asked for refill on amlodipine 10mg daily to be sent to [facility]. Sent refill.  
Pt is still awaiting mailing of lab order. Let her know it will take a few more days to get to her d/t [facility] central mail service.  
hair removal not indicated

## 2022-06-28 NOTE — PATIENT PROFILE ADULT - DATE OF LAST VACCINATION
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 Stressed the importance of regular followup and good control of BS BP and Lipids to avoid future complications.  f/u Dr Maddie Vázquez in a month to reevaluate the retina 09-Apr-2021

## 2022-06-28 NOTE — CHART NOTE - NSCHARTNOTEFT_GEN_A_CORE
Post Operative Note  Patient: MUSTAPHA CANTU 66y (1955) Male   MRN: [id number]  Location: [facility] 02  Visit: 06-28-22 Inpatient  Date: 06-28-22 @ 15:48    Procedure: S/P Left TCAR with left groin access, stent placed.      Subjective: Patient seen and examined post operatively. Reports pain as controlled. Denies nausea, vomiting, fever, chills, chest pain, SOB, cough.      Objective:  Vitals: T(F): 97.5 (06-28-22 @ 14:05), Max: 97.9 (06-28-22 @ 07:51)  HR: 71 (06-28-22 @ 14:30)  BP: 125/80 (06-28-22 @ 14:30) (104/65 - 137/88)  RR: 14 (06-28-22 @ 14:30)  SpO2: 99% (06-28-22 @ 14:30)  Vent Settings:     In:   IV Fluids: lactated ringers. 1000 milliLiter(s) (100 mL/Hr) IV Continuous <Continuous>  lactated ringers. 1000 milliLiter(s) (30 mL/Hr) IV Continuous <Continuous>        Physical Examination:  General: NAD, resting comfortably in bed  Respiratory: Nonlabored respirations, normal CW expansion.  Cardio: S1S2, regular rate and rhythm.  Abdomen: non distended, non tender  Vascular: extremities are warm and well perfused. Left groin site c/d/i.   Neuro: intact.     Imaging:  No post-op imaging studies    Assessment:  66yMale patient S/P Left TCAR with left groin access, stent placed.     Plan:  - IV Abx:   - Pain control PRN  - Diet:  - IVF  - Peterson, DTV  - Activity:  - DVT ppx: SCD's &     Date/Time: 06-28-22 @ 15:48 Post Operative Note  Patient: MUSTAPHA CANTU 66y (1955) Male   MRN: [id number]  Location: [facility] 02  Visit: 06-28-22 Inpatient  Date: 06-28-22 @ 15:48    Procedure: S/P Left TCAR with left groin access, stent placed.      Subjective: Patient seen and examined post operatively. Reports pain as controlled. Denies nausea, vomiting, fever, chills, chest pain, SOB, cough.      Objective:  Vitals: T(F): 97.5 (06-28-22 @ 14:05), Max: 97.9 (06-28-22 @ 07:51)  HR: 71 (06-28-22 @ 14:30)  BP: 125/80 (06-28-22 @ 14:30) (104/65 - 137/88)  RR: 14 (06-28-22 @ 14:30)  SpO2: 99% (06-28-22 @ 14:30)      In:   IV Fluids: lactated ringers. 1000 milliLiter(s) (100 mL/Hr) IV Continuous <Continuous>  lactated ringers. 1000 milliLiter(s) (30 mL/Hr) IV Continuous <Continuous>        Physical Examination:  General: NAD, resting comfortably in bed  Respiratory: Nonlabored respirations, normal CW expansion.  Cardio: S1S2, regular rate and rhythm.  Abdomen: non distended, non tender  Vascular: extremities are warm and well perfused. Left groin site c/d/i.   Neuro: intact.     Imaging:  No post-op imaging studies    Assessment:  66yMale patient S/P Left TCAR with left groin access, stent placed.     Plan:  - Pain control PRN  - Diet: Regular   - IVF  - Activity: as tolerated   - Cont. ASA/Plavix/Heparin    Date/Time: 06-28-22 @ 15:48

## 2022-06-28 NOTE — PATIENT PROFILE ADULT - FALL HARM RISK - HARM RISK INTERVENTIONS

## 2022-06-29 ENCOUNTER — TRANSCRIPTION ENCOUNTER (OUTPATIENT)
Age: 67
End: 2022-06-29

## 2022-06-29 VITALS
RESPIRATION RATE: 18 BRPM | TEMPERATURE: 98 F | SYSTOLIC BLOOD PRESSURE: 112 MMHG | OXYGEN SATURATION: 99 % | DIASTOLIC BLOOD PRESSURE: 78 MMHG | HEART RATE: 68 BPM

## 2022-06-29 LAB
ANION GAP SERPL CALC-SCNC: 14 MMOL/L — SIGNIFICANT CHANGE UP (ref 7–14)
BUN SERPL-MCNC: 22 MG/DL — SIGNIFICANT CHANGE UP (ref 7–23)
CALCIUM SERPL-MCNC: 9.1 MG/DL — SIGNIFICANT CHANGE UP (ref 8.4–10.5)
CHLORIDE SERPL-SCNC: 102 MMOL/L — SIGNIFICANT CHANGE UP (ref 98–107)
CO2 SERPL-SCNC: 22 MMOL/L — SIGNIFICANT CHANGE UP (ref 22–31)
CREAT SERPL-MCNC: 1.26 MG/DL — SIGNIFICANT CHANGE UP (ref 0.5–1.3)
EGFR: 62 ML/MIN/1.73M2 — SIGNIFICANT CHANGE UP
GLUCOSE SERPL-MCNC: 118 MG/DL — HIGH (ref 70–99)
HCT VFR BLD CALC: 36 % — LOW (ref 39–50)
HGB BLD-MCNC: 11.9 G/DL — LOW (ref 13–17)
MCHC RBC-ENTMCNC: 28 PG — SIGNIFICANT CHANGE UP (ref 27–34)
MCHC RBC-ENTMCNC: 33.1 GM/DL — SIGNIFICANT CHANGE UP (ref 32–36)
MCV RBC AUTO: 84.7 FL — SIGNIFICANT CHANGE UP (ref 80–100)
NRBC # BLD: 0 /100 WBCS — SIGNIFICANT CHANGE UP
NRBC # FLD: 0 K/UL — SIGNIFICANT CHANGE UP
PLATELET # BLD AUTO: 154 K/UL — SIGNIFICANT CHANGE UP (ref 150–400)
POTASSIUM SERPL-MCNC: 4 MMOL/L — SIGNIFICANT CHANGE UP (ref 3.5–5.3)
POTASSIUM SERPL-SCNC: 4 MMOL/L — SIGNIFICANT CHANGE UP (ref 3.5–5.3)
RBC # BLD: 4.25 M/UL — SIGNIFICANT CHANGE UP (ref 4.2–5.8)
RBC # FLD: 14.5 % — SIGNIFICANT CHANGE UP (ref 10.3–14.5)
SODIUM SERPL-SCNC: 138 MMOL/L — SIGNIFICANT CHANGE UP (ref 135–145)
WBC # BLD: 7.36 K/UL — SIGNIFICANT CHANGE UP (ref 3.8–10.5)
WBC # FLD AUTO: 7.36 K/UL — SIGNIFICANT CHANGE UP (ref 3.8–10.5)

## 2022-06-29 RX ORDER — ALBUTEROL 90 UG/1
1 AEROSOL, METERED ORAL
Qty: 0 | Refills: 0 | DISCHARGE

## 2022-06-29 RX ORDER — CLOPIDOGREL BISULFATE 75 MG/1
1 TABLET, FILM COATED ORAL
Qty: 30 | Refills: 0
Start: 2022-06-29 | End: 2022-07-28

## 2022-06-29 RX ADMIN — HEPARIN SODIUM 5000 UNIT(S): 5000 INJECTION INTRAVENOUS; SUBCUTANEOUS at 10:05

## 2022-06-29 RX ADMIN — Medication 175 MICROGRAM(S): at 05:26

## 2022-06-29 RX ADMIN — HEPARIN SODIUM 5000 UNIT(S): 5000 INJECTION INTRAVENOUS; SUBCUTANEOUS at 00:16

## 2022-06-29 NOTE — DISCHARGE NOTE PROVIDER - NSDCCPTREATMENT_GEN_ALL_CORE_FT
PRINCIPAL PROCEDURE  Procedure: Transcarotid artery revascularization (TCAR)  Findings and Treatment:

## 2022-06-29 NOTE — PROGRESS NOTE ADULT - ASSESSMENT
66M S/P Left TCAR with left groin access, stent placed.     PLAN  - Pain control PRN  - Diet: Regular   - IVF  - Activity: as tolerated   - Cont. ASA/Plavix/Heparin  - Discharge: home today    C team - Vascular Surgery  [id number]

## 2022-06-29 NOTE — DISCHARGE NOTE PROVIDER - NSDCMRMEDTOKEN_GEN_ALL_CORE_FT
albuterol 90 mcg/inh inhalation aerosol: 1 puff(s) inhaled every 4 to 6 hours, As Needed  aspirin 81 mg oral delayed release tablet: 1 tab(s) orally once a day  atorvastatin 40 mg oral tablet: 1 tab(s) orally once a day (at bedtime)  cabergoline 0.5 mg oral tablet: 2 tab(s) orally once a week on friday  clopidogrel 75 mg oral tablet: 1 tab(s) orally once a day MDD:75mg  Flomax 0.4 mg oral capsule: 2 cap(s) orally once a day (at bedtime)  indapamide 1.25 mg oral tablet: 1 tab(s) orally once a day (at bedtime)  Synthroid 175 mcg (0.175 mg) oral tablet: 1 tab(s) orally once a day    Note:Last filled in sept for 3 month supply

## 2022-06-29 NOTE — PROGRESS NOTE ADULT - SUBJECTIVE AND OBJECTIVE BOX
VASCULAR SURGERY DAILY PROGRESS NOTE:     PROCEDURE: Left TCAR with left groin access with stent 6/28    SUBJECTIVE/ROS: No acute events overnight. Patient seen and examined at bedside by surgical team.     OBJECTIVE:  Vital Signs Last 24 Hrs  T(C): 36.4 (28 Jun 2022 20:29), Max: 36.6 (28 Jun 2022 07:51)  T(F): 97.5 (28 Jun 2022 20:29), Max: 97.9 (28 Jun 2022 07:51)  HR: 83 (28 Jun 2022 20:29) (57 - 100)  BP: 116/80 (28 Jun 2022 22:40) (104/65 - 137/88)  BP(mean): 91 (28 Jun 2022 18:30) (72 - 101)  RR: 18 (28 Jun 2022 20:29) (10 - 20)  SpO2: 100% (28 Jun 2022 20:29) (96% - 100%)    PHYSICAL EXAM:  General: NAD, resting comfortably in bed  Respiratory: Nonlabored respirations, normal CW expansion.  Abdomen: non distended, non tender  Vascular: extremities are warm and well perfused. Left groin site c/d/i.   Neuro: intact.            I&O's Detail    28 Jun 2022 07:01  -  29 Jun 2022 00:14  --------------------------------------------------------  IN:    Lactated Ringers: 730 mL    Lactated Ringers: 120 mL    Oral Fluid: 60 mL  Total IN: 910 mL    OUT:    Blood Loss (mL): 0 mL    IV PiggyBack: 0 mL    Voided (mL): 1025 mL  Total OUT: 1025 mL    Total NET: -115 mL

## 2022-06-29 NOTE — PROGRESS NOTE ADULT - SUBJECTIVE AND OBJECTIVE BOX
ANESTHESIA POSTOP CHECK    66y Male POSTOP DAY 1 S/P     [ X] NO APPARENT ANESTHESIA COMPLICATIONS      Comments:

## 2022-06-29 NOTE — DISCHARGE NOTE PROVIDER - NSDCCPCAREPLAN_GEN_ALL_CORE_FT
PRINCIPAL DISCHARGE DIAGNOSIS  Diagnosis: Left carotid artery occlusion  Assessment and Plan of Treatment: WOUND CARE:  Please keep incisions clean and dry. Please do not Scrub or rub incisions. Do not use lotion or powder on incisions.   BATHING: You may shower and/or sponge bathe. You may use warm soapy water in the shower and rinse, pat dry.  ACTIVITY: You may return to your usual level of physical activity. If you are taking narcotic pain medication DO NOT drive a car, operate machinery or make important decisions.  DIET: Return to your usual diet.  NOTIFY YOUR SURGEON IF YOU HAVE: sudden facial droop, difficulty speaking ,weakness or paralysis of any extremity, severe headache, loss of consciousness, any bleeding that does not stop, any pus draining from your wound(s), any fever (over 100.4 F), if your pain is not controlled on your discharge pain medications, unable to urinate.  FOLLOW UP:  1. Please follow up with your primary care physician in one week regarding your hospitalization, bring copies of your discharge paperwork.  2. Please follow up with your surgeon, Dr. Castellanos. Call [phone] to make an appointment.

## 2022-06-29 NOTE — DISCHARGE NOTE NURSING/CASE MANAGEMENT/SOCIAL WORK - PATIENT PORTAL LINK FT
You can access the FollowMyHealth Patient Portal offered by [facility] by registering at the following website: [email or web address]. By joining [employer]’s FollowMyHealth portal, you will also be able to view your health information using other applications (apps) compatible with our system.

## 2022-06-29 NOTE — DISCHARGE NOTE NURSING/CASE MANAGEMENT/SOCIAL WORK - NSDCPEFALRISK_GEN_ALL_CORE
For information on Fall & Injury Prevention, visit: [email or web address] OR  [email or web address] OR  https://www.cdc.gov/steadi/patient.html

## 2022-06-29 NOTE — DISCHARGE NOTE PROVIDER - HOSPITAL COURSE
This patient is a 65 yo male with left carotid occlusion.  Patient taken to OR by Dr. Castellanos and Dr. Teixeira and underwent left transcarotid artery revascularization. Patient tolerated operation well and there were no post-operative complications identified. Patient remained hemodynamically stable in the PACU and transferred to the surgical floor. Diet was restarted and advanced as tolerated. Pain control was transitioned from IV to PO pain meds. At this time, patient is currently ambulating, voiding, tolerating a regular diet. Pain well controlled on PO pain meds. Patient has been deemed stable for discharge home with follow up as an outpatient.

## 2022-06-29 NOTE — DISCHARGE NOTE PROVIDER - CARE PROVIDER_API CALL
Hemanth Castellanos)  Vascular Surgery  [address] [facility], [address] [place] [address]  Phone: [phone]  Fax: [phone]  Follow Up Time: 2 weeks

## 2022-06-30 PROBLEM — E23.6 OTHER DISORDERS OF PITUITARY GLAND: Chronic | Status: ACTIVE | Noted: 2022-06-09

## 2022-06-30 PROBLEM — C32.9 MALIGNANT NEOPLASM OF LARYNX, UNSPECIFIED: Chronic | Status: ACTIVE | Noted: 2022-06-09

## 2022-06-30 PROBLEM — E66.9 OBESITY, UNSPECIFIED: Chronic | Status: ACTIVE | Noted: 2022-06-09

## 2022-06-30 PROBLEM — I65.23 OCCLUSION AND STENOSIS OF BILATERAL CAROTID ARTERIES: Chronic | Status: ACTIVE | Noted: 2022-06-09

## 2022-06-30 NOTE — ED CDU PROVIDER DISPOSITION NOTE - NS ED MD EM SUBS DAY SELECT 1
Patient aware that the provider is out of the office and is okay waiting until they return for the message to be addressed. Thank you    Patient requesting refills for clopidogrel (PLAVIX) 75 MG tablet and atorvastatin (LIPITOR) 40 MG tablet  Patient would like them sent to [facility]. [facility], but she cannot get in contact with the provider from [facility] who prescribed them.      Please call patient with questions [id number] - Disposition Day Code

## 2022-07-01 LAB — SURGICAL PATHOLOGY STUDY: SIGNIFICANT CHANGE UP

## 2022-07-01 RX ORDER — CLOPIDOGREL BISULFATE 75 MG/1
1 TABLET, FILM COATED ORAL
Qty: 60 | Refills: 3
Start: 2022-07-01 | End: 2023-02-25

## 2022-07-10 ENCOUNTER — NON-APPOINTMENT (OUTPATIENT)
Age: 67
End: 2022-07-10

## 2022-07-15 ENCOUNTER — APPOINTMENT (OUTPATIENT)
Dept: VASCULAR SURGERY | Facility: CLINIC | Age: 67
End: 2022-07-15

## 2022-07-15 VITALS
WEIGHT: 219 LBS | DIASTOLIC BLOOD PRESSURE: 76 MMHG | TEMPERATURE: 98 F | HEART RATE: 67 BPM | SYSTOLIC BLOOD PRESSURE: 111 MMHG | BODY MASS INDEX: 31.35 KG/M2 | HEIGHT: 70 IN

## 2022-07-15 VITALS — SYSTOLIC BLOOD PRESSURE: 109 MMHG | DIASTOLIC BLOOD PRESSURE: 71 MMHG | HEART RATE: 69 BPM

## 2022-07-15 PROCEDURE — 99024 POSTOP FOLLOW-UP VISIT: CPT

## 2022-07-15 NOTE — PHYSICAL EXAM
[JVD] : no jugular venous distention  [Normal Breath Sounds] : Normal breath sounds [Normal Heart Sounds] : normal heart sounds [Ankle Swelling (On Exam)] : not present [Abdomen Tenderness] : ~T ~M No abdominal tenderness [Alert] : alert [de-identified] : awake, alert [de-identified] : cn 2-12 intact.  slight R mabel mandibular palsy [de-identified] : healed R carotid incision.  clean L cervical incision, no surrounding erythema.  small underlying hematoma [FreeTextEntry1] : ext warm well perfused [de-identified] : no gross motor/sensory deficits

## 2022-07-15 NOTE — HISTORY OF PRESENT ILLNESS
[FreeTextEntry1] : 66M, bilat carotid stenosis.\par pt has hx of vocal cord CA, sp radiation \par No hx stroke ot TIA sx.  Last month, was planned for R CEA, however intraop encountered sig scarring, therefore procedure was aborted.  \par He presents here for eval for poss carotid stent.\par He does have hx of dizziness, pt denies irregular heart rhythm, low blood pressure.  Dizziness is episodic, but occurs frequently throughout the day.  no known exacerbating factors.\par \par no hx smoking\par Good overall exercise tolerance, able to walk up stairs and many blocks without chest pain or sob\par \par  [de-identified] : s/p left TCAR 6/28/22\par \par 7/15/22: post op.  doing well, no neurologic deficits.  no incisional pain.

## 2022-10-17 ENCOUNTER — APPOINTMENT (OUTPATIENT)
Dept: VASCULAR SURGERY | Facility: CLINIC | Age: 67
End: 2022-10-17

## 2022-10-18 ENCOUNTER — APPOINTMENT (OUTPATIENT)
Dept: VASCULAR SURGERY | Facility: CLINIC | Age: 67
End: 2022-10-18

## 2022-10-18 PROCEDURE — 93880 EXTRACRANIAL BILAT STUDY: CPT

## 2022-10-26 ENCOUNTER — APPOINTMENT (OUTPATIENT)
Dept: VASCULAR SURGERY | Facility: CLINIC | Age: 67
End: 2022-10-26

## 2022-10-26 DIAGNOSIS — I65.23 OCCLUSION AND STENOSIS OF BILATERAL CAROTID ARTERIES: ICD-10-CM

## 2022-10-26 PROCEDURE — 99442: CPT

## 2022-10-26 RX ORDER — CABERGOLINE 0.5 MG/1
0.5 TABLET ORAL
Qty: 25 | Refills: 0 | Status: ACTIVE | COMMUNITY
Start: 2022-10-11

## 2022-10-26 RX ORDER — TAMSULOSIN HYDROCHLORIDE 0.4 MG/1
0.4 CAPSULE ORAL
Qty: 180 | Refills: 0 | Status: ACTIVE | COMMUNITY
Start: 2022-01-20

## 2022-10-26 RX ORDER — LEVOTHYROXINE SODIUM 0.17 MG/1
175 TABLET ORAL
Qty: 30 | Refills: 0 | Status: ACTIVE | COMMUNITY
Start: 2021-10-07

## 2022-10-26 RX ORDER — FLUTICASONE PROPIONATE AND SALMETEROL 50; 100 UG/1; UG/1
100-50 POWDER RESPIRATORY (INHALATION)
Qty: 180 | Refills: 0 | Status: ACTIVE | COMMUNITY
Start: 2022-10-13

## 2022-10-26 RX ORDER — FINASTERIDE 5 MG/1
5 TABLET, FILM COATED ORAL
Qty: 30 | Refills: 0 | Status: ACTIVE | COMMUNITY
Start: 2022-08-04

## 2022-10-26 NOTE — HISTORY OF PRESENT ILLNESS
[FreeTextEntry1] : 66M, bilat carotid stenosis.\par pt has hx of vocal cord CA, sp radiation \par No hx stroke ot TIA sx.  Last month, was planned for R CEA, however intraop encountered sig scarring, therefore procedure was aborted.  \par He presents here for eval for poss carotid stent.\par He does have hx of dizziness, pt denies irregular heart rhythm, low blood pressure.  Dizziness is episodic, but occurs frequently throughout the day.  no known exacerbating factors.\par \par no hx smoking\par Good overall exercise tolerance, able to walk up stairs and many blocks without chest pain or sob\par \par  [de-identified] : s/p left TCAR 6/28/22\par \par 7/15/22: post op.  doing well, no neurologic deficits.  no incisional pain.  \par \par 10/26/22: telephone visit.  surveillance duplex.  No complaints, no stroke/tia sx.

## 2022-10-26 NOTE — ASSESSMENT
[FreeTextEntry1] : s/p TCAR, plan 6 mo surveillance\par can dc plavix, continue on ASA monotherapy\par \par The R had significant radiation changes, and lesion is in the CCA.  plan medical management (asa, statin) for primary stroke prevention

## 2022-10-26 NOTE — PHYSICAL EXAM
[JVD] : no jugular venous distention  [Normal Breath Sounds] : Normal breath sounds [Normal Heart Sounds] : normal heart sounds [Ankle Swelling (On Exam)] : not present [Abdomen Tenderness] : ~T ~M No abdominal tenderness [Alert] : alert [de-identified] : awake, alert [de-identified] : cn 2-12 intact.  slight R mabel mandibular palsy [FreeTextEntry1] : ext warm well perfused [de-identified] : healed R carotid incision.  clean L cervical incision, no surrounding erythema.  small underlying hematoma [de-identified] : no gross motor/sensory deficits

## 2022-11-04 ENCOUNTER — EMERGENCY (EMERGENCY)
Facility: HOSPITAL | Age: 67
LOS: 1 days | Discharge: HOME | End: 2022-11-04
Attending: EMERGENCY MEDICINE
Payer: COMMERCIAL

## 2022-11-04 VITALS
RESPIRATION RATE: 20 BRPM | SYSTOLIC BLOOD PRESSURE: 119 MMHG | HEART RATE: 75 BPM | HEIGHT: 71 IN | DIASTOLIC BLOOD PRESSURE: 83 MMHG | OXYGEN SATURATION: 99 % | TEMPERATURE: 98 F | WEIGHT: 220.02 LBS

## 2022-11-04 VITALS
HEART RATE: 68 BPM | RESPIRATION RATE: 18 BRPM | SYSTOLIC BLOOD PRESSURE: 131 MMHG | OXYGEN SATURATION: 99 % | TEMPERATURE: 98 F | DIASTOLIC BLOOD PRESSURE: 90 MMHG

## 2022-11-04 DIAGNOSIS — Z98.890 OTHER SPECIFIED POSTPROCEDURAL STATES: Chronic | ICD-10-CM

## 2022-11-04 DIAGNOSIS — Z98.89 OTHER SPECIFIED POSTPROCEDURAL STATES: Chronic | ICD-10-CM

## 2022-11-04 DIAGNOSIS — Z98.1 ARTHRODESIS STATUS: Chronic | ICD-10-CM

## 2022-11-04 LAB
ALBUMIN SERPL ELPH-MCNC: 4.3 G/DL — SIGNIFICANT CHANGE UP (ref 3.3–5)
ALP SERPL-CCNC: 57 U/L — SIGNIFICANT CHANGE UP (ref 40–120)
ALT FLD-CCNC: 40 U/L — SIGNIFICANT CHANGE UP (ref 10–45)
ANION GAP SERPL CALC-SCNC: 12 MMOL/L — SIGNIFICANT CHANGE UP (ref 5–17)
AST SERPL-CCNC: 35 U/L — SIGNIFICANT CHANGE UP (ref 10–40)
BASOPHILS # BLD AUTO: 0.01 K/UL — SIGNIFICANT CHANGE UP (ref 0–0.2)
BASOPHILS NFR BLD AUTO: 0.2 % — SIGNIFICANT CHANGE UP (ref 0–2)
BILIRUB SERPL-MCNC: 1 MG/DL — SIGNIFICANT CHANGE UP (ref 0.2–1.2)
BUN SERPL-MCNC: 32 MG/DL — HIGH (ref 7–23)
CALCIUM SERPL-MCNC: 9.5 MG/DL — SIGNIFICANT CHANGE UP (ref 8.4–10.5)
CHLORIDE SERPL-SCNC: 106 MMOL/L — SIGNIFICANT CHANGE UP (ref 96–108)
CO2 SERPL-SCNC: 23 MMOL/L — SIGNIFICANT CHANGE UP (ref 22–31)
CREAT SERPL-MCNC: 1.29 MG/DL — SIGNIFICANT CHANGE UP (ref 0.5–1.3)
EGFR: 60 ML/MIN/1.73M2 — SIGNIFICANT CHANGE UP
EOSINOPHIL # BLD AUTO: 0.15 K/UL — SIGNIFICANT CHANGE UP (ref 0–0.5)
EOSINOPHIL NFR BLD AUTO: 2.4 % — SIGNIFICANT CHANGE UP (ref 0–6)
GLUCOSE SERPL-MCNC: 96 MG/DL — SIGNIFICANT CHANGE UP (ref 70–99)
HCT VFR BLD CALC: 40.2 % — SIGNIFICANT CHANGE UP (ref 39–50)
HGB BLD-MCNC: 12.6 G/DL — LOW (ref 13–17)
IMM GRANULOCYTES NFR BLD AUTO: 0.5 % — SIGNIFICANT CHANGE UP (ref 0–0.9)
LYMPHOCYTES # BLD AUTO: 1.7 K/UL — SIGNIFICANT CHANGE UP (ref 1–3.3)
LYMPHOCYTES # BLD AUTO: 27.7 % — SIGNIFICANT CHANGE UP (ref 13–44)
MCHC RBC-ENTMCNC: 28.8 PG — SIGNIFICANT CHANGE UP (ref 27–34)
MCHC RBC-ENTMCNC: 31.3 GM/DL — LOW (ref 32–36)
MCV RBC AUTO: 92 FL — SIGNIFICANT CHANGE UP (ref 80–100)
MONOCYTES # BLD AUTO: 0.45 K/UL — SIGNIFICANT CHANGE UP (ref 0–0.9)
MONOCYTES NFR BLD AUTO: 7.3 % — SIGNIFICANT CHANGE UP (ref 2–14)
NEUTROPHILS # BLD AUTO: 3.79 K/UL — SIGNIFICANT CHANGE UP (ref 1.8–7.4)
NEUTROPHILS NFR BLD AUTO: 61.9 % — SIGNIFICANT CHANGE UP (ref 43–77)
NRBC # BLD: 0 /100 WBCS — SIGNIFICANT CHANGE UP (ref 0–0)
PLATELET # BLD AUTO: 153 K/UL — SIGNIFICANT CHANGE UP (ref 150–400)
POTASSIUM SERPL-MCNC: 3.9 MMOL/L — SIGNIFICANT CHANGE UP (ref 3.5–5.3)
POTASSIUM SERPL-SCNC: 3.9 MMOL/L — SIGNIFICANT CHANGE UP (ref 3.5–5.3)
PROT SERPL-MCNC: 7.4 G/DL — SIGNIFICANT CHANGE UP (ref 6–8.3)
RAPID RVP RESULT: DETECTED
RBC # BLD: 4.37 M/UL — SIGNIFICANT CHANGE UP (ref 4.2–5.8)
RBC # FLD: 15.2 % — HIGH (ref 10.3–14.5)
RV+EV RNA SPEC QL NAA+PROBE: DETECTED
SARS-COV-2 RNA SPEC QL NAA+PROBE: SIGNIFICANT CHANGE UP
SODIUM SERPL-SCNC: 141 MMOL/L — SIGNIFICANT CHANGE UP (ref 135–145)
TROPONIN T, HIGH SENSITIVITY RESULT: 38 NG/L — SIGNIFICANT CHANGE UP (ref 0–51)
TROPONIN T, HIGH SENSITIVITY RESULT: 41 NG/L — SIGNIFICANT CHANGE UP (ref 0–51)
WBC # BLD: 6.13 K/UL — SIGNIFICANT CHANGE UP (ref 3.8–10.5)
WBC # FLD AUTO: 6.13 K/UL — SIGNIFICANT CHANGE UP (ref 3.8–10.5)

## 2022-11-04 PROCEDURE — 71046 X-RAY EXAM CHEST 2 VIEWS: CPT | Mod: 26

## 2022-11-04 PROCEDURE — 36415 COLL VENOUS BLD VENIPUNCTURE: CPT

## 2022-11-04 PROCEDURE — 0225U NFCT DS DNA&RNA 21 SARSCOV2: CPT

## 2022-11-04 PROCEDURE — 93005 ELECTROCARDIOGRAM TRACING: CPT

## 2022-11-04 PROCEDURE — 85025 COMPLETE CBC W/AUTO DIFF WBC: CPT

## 2022-11-04 PROCEDURE — 84484 ASSAY OF TROPONIN QUANT: CPT

## 2022-11-04 PROCEDURE — 71046 X-RAY EXAM CHEST 2 VIEWS: CPT

## 2022-11-04 PROCEDURE — 80053 COMPREHEN METABOLIC PANEL: CPT

## 2022-11-04 PROCEDURE — 83880 ASSAY OF NATRIURETIC PEPTIDE: CPT

## 2022-11-04 PROCEDURE — 99285 EMERGENCY DEPT VISIT HI MDM: CPT | Mod: 25

## 2022-11-04 PROCEDURE — 96374 THER/PROPH/DIAG INJ IV PUSH: CPT

## 2022-11-04 PROCEDURE — 99285 EMERGENCY DEPT VISIT HI MDM: CPT

## 2022-11-04 RX ORDER — FUROSEMIDE 40 MG
20 TABLET ORAL ONCE
Refills: 0 | Status: COMPLETED | OUTPATIENT
Start: 2022-11-04 | End: 2022-11-04

## 2022-11-04 RX ADMIN — Medication 20 MILLIGRAM(S): at 10:17

## 2022-11-04 NOTE — ED PROVIDER NOTE - NS ED ROS FT
GENERAL: No fever, chills  HEENT: +cough, +congestion, + sore throat, + Rhinnorhea  CARDIAC: +chest tightness No palpitations, lightheadedness, syncope  PULM: + dyspnea, +cough, No wheezing   GI: No abdominal pain, nausea, vomiting, diarrhea, constipation, hematochezia  : No urinary dysuria, +frequency, hematuria  NEURO: No headache, motor weakness, sensory changes  MSK: No joint pain, back pain, pain in extremities  SKIN: no rashes, hives, urticaria  HEME: no active bleeding, bruising

## 2022-11-04 NOTE — ED PROVIDER NOTE - CLINICAL SUMMARY MEDICAL DECISION MAKING FREE TEXT BOX
Your symptoms are likely due to a Viral Illness such as the Flu or RSV. You will receive a text with the results of the Viral Panel that was done in the ED.       Shortness of breath    Shortness of breath (dyspnea) means you have trouble breathing and could indicate a medical problem. Causes include lung disease, heart disease, low amount of red blood cells (anemia), poor physical fitness, being overweight, smoking, etc. Your health care provider today may not be able to find a cause for your shortness of breath after your exam. In this case, it is important to have a follow-up exam with your primary care physician as instructed. If medicines were prescribed, take them as directed for the full length of time directed. Refrain from tobacco products.    SEEK IMMEDIATE MEDICAL CARE IF YOU HAVE ANY OF THE FOLLOWING SYMPTOMS: worsening shortness of breath, chest pain, back pain, abdominal pain, fever, coughing up blood, lightheadedness/dizziness.    Rest, drink plenty of fluids.  Advance activity as tolerated.  Continue all previously prescribed medications as directed.  Follow up with your primary care physician as needed, bring copies of your results.  Return to the ER for worsening or persistent symptoms, and/or ANY NEW OR CONCERNING SYMPTOMS. If you have issues obtaining follow up, please call: [phone] to obtain a doctor or specialist who takes your insurance in your area. Dr. Villarreal's Note: Patient with 1 week of upper respiratory symptoms including congestion, cough, shortness of breath, here with worsening shortness of breath particularly at night keeping him awake.  On exam, patient is well-appearing, no acute distress, breathing comfortably, although with crackles bilaterally.  We will obtain labs and x-ray to rule out pneumonia versus fluid overload versus new onset CHF, and if negative, will be treated symptomatically for suspected URI.

## 2022-11-04 NOTE — ED ADULT NURSE NOTE - OBJECTIVE STATEMENT
66 yo male presents to the ED AAOx4 with complaints of SOB that woke him from sleep this morning. PMH HTN, HLD, Thyroid Ca, Laryngeal ca, Carotid occlusion BL, with stents placed. Pt states he woke up from this morning and felt like he couldn't breath. Pt has seen his pcp regarding intermitted episodes of SOB and was told he was developing asthma and was given an inhaler. Pt states he had no relief after use of inhaler this morning. Pt breathing unlabored, spontaneous, and symmetrical. Lung sounds clear to ausculation BL. Pt states he has been feeling anxious regarding upcoming trip that he thinks could be triggering this exacerbation. Pt abd is soft, nontender, nondistended. Denies headache, dizziness, vision changes, chest pain, shortness of breath, abdominal pain, nausea, vomiting, diarrhea, fevers, chills, dysuria, hematuria, recent illness travel or fall. No new swelling noted in lower extremities. States has chronic cough with sputum, nasal drip s/p vocal cord nodule removal 66 yo male presents to the ED AAOx4 with complaints of SOB that woke him from sleep this morning. PMH HTN, HLD, Thyroid Ca, Laryngeal ca, Carotid occlusion BL, with stents placed. Pt states he woke up from this morning and felt like he couldn't breath.  Pt has seen his pcp regarding intermitted episodes of SOB and was told he was developing asthma and was given an inhaler. Pt states he had no relief after use of inhaler this morning. Pt breathing unlabored, spontaneous, and symmetrical. Lung sounds clear to ausculation BL. Pt states he has been feeling anxious regarding upcoming trip that he thinks could be triggering this exacerbation. Pt abd is soft, nontender, nondistended. Denies headache, dizziness, vision changes, chest pain, shortness of breath, abdominal pain, nausea, vomiting, diarrhea, fevers, chills, dysuria, hematuria, recent illness travel or fall. No new swelling noted in lower extremities. States has chronic cough with sputum, nasal drip s/p vocal cord nodule removal

## 2022-11-04 NOTE — ED PROVIDER NOTE - PHYSICAL EXAMINATION
GENERAL: Not in acute distress, non-toxic appearing  HEAD: normocephalic, atraumatic  HEENT: EOMI, normal conjunctiva, oral mucosa moist, neck supple  CARDIAC: regular rate and rhythm, normal S1 and S2,  no appreciable murmurs  PULM: + coarse breath sound in the bases bilat, O2 sat 100% while walking  GI: abdomen nondistended, soft, nontender, no guarding or rebound tenderness  : No CVA tenderness, no suprapubic tenderness  NEURO: alert and oriented x 3, normal speech, no focal motor or sensory deficits, gait normal, no gross neurologic deficit  MSK: No visible deformities, no peripheral edema, calf tenderness/redness/swelling  SKIN: No visible rashes, dry, well-perfused  PSYCH: appropriate mood and affect

## 2022-11-04 NOTE — ED ADULT NURSE REASSESSMENT NOTE - NS ED NURSE REASSESS COMMENT FT1
[id number] Pt returned from CXR. A&Ox4. IVL intact without sx of infilt. No c/o pain. c/o difficulty breathing. No change from earlier. Voice hoarse. Skin W&D. Lips and nailbeds pink.

## 2022-11-04 NOTE — ED PROVIDER NOTE - PATIENT PORTAL LINK FT
You can access the FollowMyHealth Patient Portal offered by [facility] by registering at the following website: [email or web address]. By joining [employer]’s FollowMyHealth portal, you will also be able to view your health information using other applications (apps) compatible with our system.

## 2022-11-04 NOTE — ED PROVIDER NOTE - PROGRESS NOTE DETAILS
Erica Marc PGY2: I received sign out on this patient. I have reassessed patient and agree with the current plan. Will follow-up labs/imaging. Erica Marc MD PGY2: Patient states no history of CHF, BNP here is 2200. Patient breathing comfortably sitting up in room. Will give Lasix in ED and d/c with cardiology f/u. Trop x2 stable.

## 2022-11-04 NOTE — ED PROVIDER NOTE - OBJECTIVE STATEMENT
67 year old male comes into the ED for eval of SOB and flu like symptoms for the past 1 week. He comes into the ED today because he has had trouble sleeping or laying flat and has had some intermittent chest tightness. He denies any fevers, chills, abd pain, n/v/d. He has had a good appetite and has been able to take fluids. No exertional CP/SOB or pleuritic CP.

## 2022-12-04 ENCOUNTER — EMERGENCY (EMERGENCY)
Facility: HOSPITAL | Age: 67
LOS: 1 days | Discharge: HOME | End: 2022-12-04
Attending: STUDENT IN AN ORGANIZED HEALTH CARE EDUCATION/TRAINING PROGRAM
Payer: COMMERCIAL

## 2022-12-04 VITALS
DIASTOLIC BLOOD PRESSURE: 75 MMHG | HEART RATE: 71 BPM | RESPIRATION RATE: 16 BRPM | WEIGHT: 220.02 LBS | SYSTOLIC BLOOD PRESSURE: 115 MMHG | OXYGEN SATURATION: 99 % | HEIGHT: 71 IN | TEMPERATURE: 98 F

## 2022-12-04 DIAGNOSIS — Z98.89 OTHER SPECIFIED POSTPROCEDURAL STATES: Chronic | ICD-10-CM

## 2022-12-04 DIAGNOSIS — Z98.1 ARTHRODESIS STATUS: Chronic | ICD-10-CM

## 2022-12-04 DIAGNOSIS — Z98.890 OTHER SPECIFIED POSTPROCEDURAL STATES: Chronic | ICD-10-CM

## 2022-12-04 LAB
ALBUMIN SERPL ELPH-MCNC: 3.9 G/DL — SIGNIFICANT CHANGE UP (ref 3.3–5)
ALP SERPL-CCNC: 57 U/L — SIGNIFICANT CHANGE UP (ref 40–120)
ALT FLD-CCNC: 17 U/L — SIGNIFICANT CHANGE UP (ref 10–45)
ANION GAP SERPL CALC-SCNC: 12 MMOL/L — SIGNIFICANT CHANGE UP (ref 5–17)
AST SERPL-CCNC: 15 U/L — SIGNIFICANT CHANGE UP (ref 10–40)
BASE EXCESS BLDV CALC-SCNC: 2.4 MMOL/L — SIGNIFICANT CHANGE UP (ref -2–3)
BILIRUB SERPL-MCNC: 0.7 MG/DL — SIGNIFICANT CHANGE UP (ref 0.2–1.2)
BUN SERPL-MCNC: 25 MG/DL — HIGH (ref 7–23)
CA-I SERPL-SCNC: 1.16 MMOL/L — SIGNIFICANT CHANGE UP (ref 1.15–1.33)
CALCIUM SERPL-MCNC: 9.2 MG/DL — SIGNIFICANT CHANGE UP (ref 8.4–10.5)
CHLORIDE BLDV-SCNC: 101 MMOL/L — SIGNIFICANT CHANGE UP (ref 96–108)
CHLORIDE SERPL-SCNC: 100 MMOL/L — SIGNIFICANT CHANGE UP (ref 96–108)
CO2 BLDV-SCNC: 30 MMOL/L — HIGH (ref 22–26)
CO2 SERPL-SCNC: 26 MMOL/L — SIGNIFICANT CHANGE UP (ref 22–31)
CREAT SERPL-MCNC: 1.51 MG/DL — HIGH (ref 0.5–1.3)
EGFR: 50 ML/MIN/1.73M2 — LOW
GAS PNL BLDV: 135 MMOL/L — LOW (ref 136–145)
GAS PNL BLDV: SIGNIFICANT CHANGE UP
GAS PNL BLDV: SIGNIFICANT CHANGE UP
GLUCOSE BLDV-MCNC: 143 MG/DL — HIGH (ref 70–99)
GLUCOSE SERPL-MCNC: 151 MG/DL — HIGH (ref 70–99)
HCO3 BLDV-SCNC: 29 MMOL/L — SIGNIFICANT CHANGE UP (ref 22–29)
HCT VFR BLD CALC: 36.7 % — LOW (ref 39–50)
HCT VFR BLDA CALC: 39 % — SIGNIFICANT CHANGE UP (ref 39–51)
HGB BLD CALC-MCNC: 13.1 G/DL — SIGNIFICANT CHANGE UP (ref 12.6–17.4)
HGB BLD-MCNC: 12.3 G/DL — LOW (ref 13–17)
HOROWITZ INDEX BLDV+IHG-RTO: SIGNIFICANT CHANGE UP
LACTATE BLDV-MCNC: 0.9 MMOL/L — SIGNIFICANT CHANGE UP (ref 0.5–2)
LIDOCAIN IGE QN: 31 U/L — SIGNIFICANT CHANGE UP (ref 7–60)
MCHC RBC-ENTMCNC: 29.8 PG — SIGNIFICANT CHANGE UP (ref 27–34)
MCHC RBC-ENTMCNC: 33.5 GM/DL — SIGNIFICANT CHANGE UP (ref 32–36)
MCV RBC AUTO: 88.9 FL — SIGNIFICANT CHANGE UP (ref 80–100)
PCO2 BLDV: 51 MMHG — SIGNIFICANT CHANGE UP (ref 42–55)
PH BLDV: 7.36 — SIGNIFICANT CHANGE UP (ref 7.32–7.43)
PLATELET # BLD AUTO: 193 K/UL — SIGNIFICANT CHANGE UP (ref 150–400)
PO2 BLDV: 20 MMHG — LOW (ref 25–45)
POTASSIUM BLDV-SCNC: 3.5 MMOL/L — SIGNIFICANT CHANGE UP (ref 3.5–5.1)
POTASSIUM SERPL-MCNC: 3.7 MMOL/L — SIGNIFICANT CHANGE UP (ref 3.5–5.3)
POTASSIUM SERPL-SCNC: 3.7 MMOL/L — SIGNIFICANT CHANGE UP (ref 3.5–5.3)
PROT SERPL-MCNC: 7.3 G/DL — SIGNIFICANT CHANGE UP (ref 6–8.3)
RBC # BLD: 4.13 M/UL — LOW (ref 4.2–5.8)
RBC # FLD: 14.7 % — HIGH (ref 10.3–14.5)
SAO2 % BLDV: 26 % — LOW (ref 67–88)
SODIUM SERPL-SCNC: 138 MMOL/L — SIGNIFICANT CHANGE UP (ref 135–145)

## 2022-12-04 PROCEDURE — 99285 EMERGENCY DEPT VISIT HI MDM: CPT

## 2022-12-04 PROCEDURE — 71045 X-RAY EXAM CHEST 1 VIEW: CPT | Mod: 26

## 2022-12-04 PROCEDURE — 74174 CTA ABD&PLVS W/CONTRAST: CPT | Mod: 26,MA

## 2022-12-04 RX ORDER — ACETAMINOPHEN 500 MG
1000 TABLET ORAL ONCE
Refills: 0 | Status: COMPLETED | OUTPATIENT
Start: 2022-12-04 | End: 2022-12-04

## 2022-12-04 RX ORDER — SODIUM CHLORIDE 9 MG/ML
1000 INJECTION INTRAMUSCULAR; INTRAVENOUS; SUBCUTANEOUS ONCE
Refills: 0 | Status: COMPLETED | OUTPATIENT
Start: 2022-12-04 | End: 2022-12-04

## 2022-12-04 RX ADMIN — SODIUM CHLORIDE 1000 MILLILITER(S): 9 INJECTION INTRAMUSCULAR; INTRAVENOUS; SUBCUTANEOUS at 23:22

## 2022-12-04 RX ADMIN — Medication 1000 MILLIGRAM(S): at 23:52

## 2022-12-04 RX ADMIN — Medication 400 MILLIGRAM(S): at 23:22

## 2022-12-04 NOTE — CHART NOTE - NSCHARTNOTEFT_GEN_A_CORE
[facility]  Leo Rodríguez  Office: [phone]  Cell: [phone]  Can also be reached on [employer] Teams      Patient is a 68yo male PMHx of prolactinoma on cabergoline, CEA now on asa/statin, BPH on tamsulosin, hypothyroidism, gout on allopurinol recently diagnosed mildy reduced EF 45% presented to my office 11/29 complaining of nausea after fatty meal of fried chicken mac and cheese night before. Nausea subsided after a day followed by right sided abdominal pain for 1 day. At that office he was also instructed to start lisinopril 2.5mg (after findings of echo) when he started feeling better. On Thursday he started having some back pain, took oxycodone at home. He also notes not having a bowel movement for some time, tried enema at home and did not work.     Patient seen and examined in the Emergency Department. Still with severe back pain, difficulty getting onto stretcher. No sensation deficits lower extremities, no focal deficits. Has severe right flank pain. [facility]  Leo Rodríguez  Office: [phone]  Cell: [phone]  Can also be reached on [employer] Teams      Patient is a 66yo male PMHx of prolactinoma on cabergoline, CEA now on asa/statin, BPH on tamsulosin, hypothyroidism, gout on allopurinol recently diagnosed mildy reduced EF 45% presented to my office 11/29 complaining of nausea after fatty meal of fried chicken mac and cheese night before. Nausea subsided after a day followed by right sided abdominal pain for 1 day. At that office he was also instructed to start lisinopril 2.5mg (after findings of echo) when he started feeling better. On Thursday he started having some back pain, took oxycodone at home. He also notes not having a bowel movement for some time, tried enema at home and did not work.   Labs drawn 11/29: CBC- mild anemia 12.8 stable, CMP unremarkable, creatinine 1.3 at baseline. lipase unremarkable.    Patient seen and examined in the Emergency Department. Still with severe back pain, difficulty getting onto stretcher. No sensation deficits lower extremities, no focal deficits. Has severe right flank pain. [facility]  Leo Rodríguez  Office: [phone]  Cell: [phone]  Can also be reached on [employer] Teams      Patient is a 66yo male PMHx of prolactinoma on cabergoline, CEA now on asa/statin, BPH on tamsulosin, hypothyroidism, gout on allopurinol recently diagnosed mildy reduced EF 45% presented to my office 11/29 complaining of nausea after fatty meal of fried chicken mac and cheese night before. Nausea subsided after a day followed by right sided abdominal pain for 1 day. At that office he was also instructed to start lisinopril 2.5mg (after findings of echo) when he started feeling better. On Thursday he started having some back pain, took oxycodone at home. He also notes not having a bowel movement for some time, tried enema at home and did not work.   Labs drawn 11/29: CBC- mild anemia 12.8 stable, CMP unremarkable, creatinine 1.3 at baseline. lipase unremarkable.    Patient seen and examined in the Emergency Department. Still with severe back pain, difficulty getting onto stretcher. No sensation deficits lower extremities, no focal deficits. Has severe right flank tenderness [facility]  Leo Rodríguez  Office: [phone]  Cell: [phone]  Can also be reached on [employer] Teams      Patient is a 66yo male PMHx of prolactinoma on cabergoline, CEA now on asa/statin, BPH on tamsulosin, hypothyroidism, gout on allopurinol recently diagnosed mildy reduced EF 45% presented to my office 11/29 complaining of nausea after fatty meal of fried chicken mac and cheese night before. Nausea subsided after a day followed by right sided abdominal pain for 1 day. At that office he was also instructed to start lisinopril 2.5mg (after findings of echo) when he started feeling better. On Thursday he started having some back pain, took oxycodone at home. He also notes not having a bowel movement for some time, tried enema at home and did not work.   Labs drawn 11/29: CBC- mild anemia 12.8 stable, CMP unremarkable, creatinine 1.3 at baseline. lipase unremarkable.    Patient seen and examined in the Emergency Department. Still with severe back pain, difficulty getting onto stretcher. No sensation deficits lower extremities, no focal deficits. Has severe right flank tenderness. Abd soft nontender to palpation. [facility]  Leo Rodríguez  Office: [phone]  Cell: [phone]  Can also be reached on [employer] Teams      Patient is a 68yo male PMHx of prolactinoma on cabergoline, CEA now on asa/statin, BPH on tamsulosin, hypothyroidism, gout on allopurinol recently diagnosed mildy reduced EF 45% presented to my office 11/29 complaining of nausea after fatty meal of fried chicken mac and cheese night before. Nausea subsided after a day followed by right sided abdominal pain for 1 day. At that office he was also instructed to start lisinopril 2.5mg (after findings of echo) when he started feeling better. On Thursday he started having some back pain, took oxycodone at home. He also notes not having a bowel movement for some time, tried enema at home and did not work.   Labs drawn 11/29: CBC- hgb 12.8 baseline, CMP unremarkable, creatinine 1.3 at baseline. lipase unremarkable.    Patient seen and examined in the Emergency Department. Still with severe back pain, difficulty getting onto stretcher. No sensation deficits lower extremities, no focal deficits. Has severe right flank tenderness. Abd soft nontender to palpation. [facility]  Leo Rodríguez  Office: [phone]  Cell: [phone]  Can also be reached on [employer] Teams      Patient is a 66yo male PMHx of prolactinoma on cabergoline, CEA now on asa/statin, BPH on tamsulosin, hypothyroidism, gout on allopurinol recently diagnosed mildy reduced EF 45% presented to my office 11/29 complaining of nausea after fatty meal of fried chicken mac and cheese night before. Nausea subsided after a day followed by right sided abdominal pain for 1 day. At that office visit, he was also instructed to start lisinopril 2.5mg (after findings of echo) when he started feeling better. On Thursday he started having some back pain, took oxycodone at home. Has a hx of lower back pain. He also notes not having a bowel movement for some time, tried enema at home and did not work.   Labs drawn 11/29: CBC- hgb 12.8 baseline, CMP unremarkable, creatinine 1.3 at baseline. lipase unremarkable.    Patient seen and examined in the Emergency Department. Still with severe back pain, difficulty getting onto stretcher. No sensation deficits lower extremities, no focal deficits. Has severe right flank tenderness. Abd soft nontender to palpation.

## 2022-12-04 NOTE — ED ADULT TRIAGE NOTE - CHIEF COMPLAINT QUOTE
constipation x 2 weeks, back pain. denies abd pain. vomiting 2 days ago. took enema without any relief.

## 2022-12-05 VITALS
HEART RATE: 72 BPM | DIASTOLIC BLOOD PRESSURE: 82 MMHG | TEMPERATURE: 98 F | OXYGEN SATURATION: 98 % | RESPIRATION RATE: 18 BRPM | SYSTOLIC BLOOD PRESSURE: 112 MMHG

## 2022-12-05 LAB
ANISOCYTOSIS BLD QL: SLIGHT — SIGNIFICANT CHANGE UP
APPEARANCE UR: CLEAR — SIGNIFICANT CHANGE UP
APTT BLD: 29.4 SEC — SIGNIFICANT CHANGE UP (ref 27.5–35.5)
BACTERIA # UR AUTO: NEGATIVE — SIGNIFICANT CHANGE UP
BASOPHILS # BLD AUTO: 0 K/UL — SIGNIFICANT CHANGE UP (ref 0–0.2)
BASOPHILS NFR BLD AUTO: 0 % — SIGNIFICANT CHANGE UP (ref 0–2)
BILIRUB UR-MCNC: NEGATIVE — SIGNIFICANT CHANGE UP
BLD GP AB SCN SERPL QL: NEGATIVE — SIGNIFICANT CHANGE UP
COLOR SPEC: SIGNIFICANT CHANGE UP
DIFF PNL FLD: NEGATIVE — SIGNIFICANT CHANGE UP
EOSINOPHIL # BLD AUTO: 0.08 K/UL — SIGNIFICANT CHANGE UP (ref 0–0.5)
EOSINOPHIL NFR BLD AUTO: 0.9 % — SIGNIFICANT CHANGE UP (ref 0–6)
EPI CELLS # UR: 0 /HPF — SIGNIFICANT CHANGE UP
FLUAV AG NPH QL: SIGNIFICANT CHANGE UP
FLUBV AG NPH QL: SIGNIFICANT CHANGE UP
GIANT PLATELETS BLD QL SMEAR: PRESENT — SIGNIFICANT CHANGE UP
GLUCOSE UR QL: NEGATIVE — SIGNIFICANT CHANGE UP
HYALINE CASTS # UR AUTO: 1 /LPF — SIGNIFICANT CHANGE UP (ref 0–2)
INR BLD: 1.4 RATIO — HIGH (ref 0.88–1.16)
KETONES UR-MCNC: NEGATIVE — SIGNIFICANT CHANGE UP
LEUKOCYTE ESTERASE UR-ACNC: NEGATIVE — SIGNIFICANT CHANGE UP
LYMPHOCYTES # BLD AUTO: 1.23 K/UL — SIGNIFICANT CHANGE UP (ref 1–3.3)
LYMPHOCYTES # BLD AUTO: 13.3 % — SIGNIFICANT CHANGE UP (ref 13–44)
MACROCYTES BLD QL: SLIGHT — SIGNIFICANT CHANGE UP
MANUAL SMEAR VERIFICATION: SIGNIFICANT CHANGE UP
MONOCYTES # BLD AUTO: 1.15 K/UL — HIGH (ref 0–0.9)
MONOCYTES NFR BLD AUTO: 12.4 % — SIGNIFICANT CHANGE UP (ref 2–14)
NEUTROPHILS # BLD AUTO: 6.73 K/UL — SIGNIFICANT CHANGE UP (ref 1.8–7.4)
NEUTROPHILS NFR BLD AUTO: 72.5 % — SIGNIFICANT CHANGE UP (ref 43–77)
NITRITE UR-MCNC: NEGATIVE — SIGNIFICANT CHANGE UP
OVALOCYTES BLD QL SMEAR: SLIGHT — SIGNIFICANT CHANGE UP
PH UR: 5.5 — SIGNIFICANT CHANGE UP (ref 5–8)
PLAT MORPH BLD: NORMAL — SIGNIFICANT CHANGE UP
PLATELET COUNT - ESTIMATE: ABNORMAL
PROT UR-MCNC: SIGNIFICANT CHANGE UP
PROTHROM AB SERPL-ACNC: 16.1 SEC — HIGH (ref 10.5–13.4)
RBC BLD AUTO: SIGNIFICANT CHANGE UP
RBC CASTS # UR COMP ASSIST: 1 /HPF — SIGNIFICANT CHANGE UP (ref 0–4)
RH IG SCN BLD-IMP: POSITIVE — SIGNIFICANT CHANGE UP
RSV RNA NPH QL NAA+NON-PROBE: SIGNIFICANT CHANGE UP
SARS-COV-2 RNA SPEC QL NAA+PROBE: SIGNIFICANT CHANGE UP
SP GR SPEC: 1.04 — HIGH (ref 1.01–1.02)
SPHEROCYTES BLD QL SMEAR: SLIGHT — SIGNIFICANT CHANGE UP
TROPONIN T, HIGH SENSITIVITY RESULT: 57 NG/L — HIGH (ref 0–51)
UROBILINOGEN FLD QL: NEGATIVE — SIGNIFICANT CHANGE UP
VARIANT LYMPHS # BLD: 0.9 % — SIGNIFICANT CHANGE UP (ref 0–6)
WBC # BLD: 9.28 K/UL — SIGNIFICANT CHANGE UP (ref 3.8–10.5)
WBC # FLD AUTO: 9.28 K/UL — SIGNIFICANT CHANGE UP (ref 3.8–10.5)
WBC UR QL: 1 /HPF — SIGNIFICANT CHANGE UP (ref 0–5)

## 2022-12-05 PROCEDURE — 83690 ASSAY OF LIPASE: CPT

## 2022-12-05 PROCEDURE — 74174 CTA ABD&PLVS W/CONTRAST: CPT | Mod: MA

## 2022-12-05 PROCEDURE — 83605 ASSAY OF LACTIC ACID: CPT

## 2022-12-05 PROCEDURE — 85610 PROTHROMBIN TIME: CPT

## 2022-12-05 PROCEDURE — 85730 THROMBOPLASTIN TIME PARTIAL: CPT

## 2022-12-05 PROCEDURE — 82435 ASSAY OF BLOOD CHLORIDE: CPT

## 2022-12-05 PROCEDURE — 93005 ELECTROCARDIOGRAM TRACING: CPT

## 2022-12-05 PROCEDURE — 36415 COLL VENOUS BLD VENIPUNCTURE: CPT

## 2022-12-05 PROCEDURE — 85018 HEMOGLOBIN: CPT

## 2022-12-05 PROCEDURE — 82947 ASSAY GLUCOSE BLOOD QUANT: CPT

## 2022-12-05 PROCEDURE — 82330 ASSAY OF CALCIUM: CPT

## 2022-12-05 PROCEDURE — 96374 THER/PROPH/DIAG INJ IV PUSH: CPT | Mod: XU

## 2022-12-05 PROCEDURE — 87637 SARSCOV2&INF A&B&RSV AMP PRB: CPT

## 2022-12-05 PROCEDURE — 86850 RBC ANTIBODY SCREEN: CPT

## 2022-12-05 PROCEDURE — 85014 HEMATOCRIT: CPT

## 2022-12-05 PROCEDURE — 86900 BLOOD TYPING SEROLOGIC ABO: CPT

## 2022-12-05 PROCEDURE — 87086 URINE CULTURE/COLONY COUNT: CPT

## 2022-12-05 PROCEDURE — 80053 COMPREHEN METABOLIC PANEL: CPT

## 2022-12-05 PROCEDURE — 81001 URINALYSIS AUTO W/SCOPE: CPT

## 2022-12-05 PROCEDURE — 82803 BLOOD GASES ANY COMBINATION: CPT

## 2022-12-05 PROCEDURE — 86901 BLOOD TYPING SEROLOGIC RH(D): CPT

## 2022-12-05 PROCEDURE — 84295 ASSAY OF SERUM SODIUM: CPT

## 2022-12-05 PROCEDURE — 84132 ASSAY OF SERUM POTASSIUM: CPT

## 2022-12-05 PROCEDURE — 71045 X-RAY EXAM CHEST 1 VIEW: CPT

## 2022-12-05 PROCEDURE — 84484 ASSAY OF TROPONIN QUANT: CPT

## 2022-12-05 PROCEDURE — 99285 EMERGENCY DEPT VISIT HI MDM: CPT | Mod: 25

## 2022-12-05 PROCEDURE — 85025 COMPLETE CBC W/AUTO DIFF WBC: CPT

## 2022-12-05 RX ORDER — POLYETHYLENE GLYCOL 3350 17 G/17G
17 POWDER, FOR SOLUTION ORAL ONCE
Refills: 0 | Status: COMPLETED | OUTPATIENT
Start: 2022-12-05 | End: 2022-12-05

## 2022-12-05 RX ADMIN — POLYETHYLENE GLYCOL 3350 17 GRAM(S): 17 POWDER, FOR SOLUTION ORAL at 01:40

## 2022-12-05 RX ADMIN — Medication 1 ENEMA: at 01:12

## 2022-12-05 NOTE — ED ADULT NURSE NOTE - OBJECTIVE STATEMENT
66 y/o male presents to the ED endorsing abdominal pain and constipation x2 weeks. A/Ox4. Ambulatory. PMH: Pituitary Tumor, HTN, HLD, vertigo, hypothyroid and BPH. Patient endorses BL intermittent lower quadrant pain. Patient's last bowel movement was 12/2/22 and describes it as "wagner." Patient is passing flatus. Patient attempted an enema today at home. Patient recently took oxycodone at home today. Patient endorses 1 episode of vomiting on 11/29/22. Upon assessment, abdomen is soft, distended and non-tender. Safety measures maintained, call-bell within reach and side rails intact.

## 2022-12-05 NOTE — ED PROVIDER NOTE - PROGRESS NOTE DETAILS
Simi Dawson- pt feels better. had small bm after enema. instructed to f/u with pcp for Cr and repeat labs.  instructed to use miralax and enemas prn. pt feels comfortable going home and following up with pcp.

## 2022-12-05 NOTE — ED PROVIDER NOTE - OBJECTIVE STATEMENT
67-year-old male with history of hypertension, hyperlipidemia, pituitary tumor on cabergoline, hypothyroidism presenting with constipation.  Patient has not had normal bowel movement for 2 weeks, has had small bowel movements with last one being on Friday after self administered enema.  Patient started with lower abdominal pain which has now resolved but moved to lower back.  Denies urinary symptoms.  Nausea vomiting which has since resolved.  No fever, chest pain, shortness of breath.

## 2022-12-05 NOTE — ED PROVIDER NOTE - CLINICAL SUMMARY MEDICAL DECISION MAKING FREE TEXT BOX
67-year-old male with history of hypertension, hyperlipidemia, pituitary tumor on cabergoline, hypothyroidism presenting with constipation.  Patient has not had normal bowel movement for 2 weeks, has had small bowel movements with last one being on Friday after self administered enema.  Patient started with lower abdominal pain which has now resolved but moved to lower back.  Denies urinary symptoms.  Nausea vomiting which has since resolved.  No fever, chest pain, shortness of breath. Exam shows uncomfortable pt, no abd ttp, back not ttp. Likely has constipation. Possibly mesenteric ischemia given abd pain, pain out of proportion to exam. will get labs, ct, urine. if ct unremarkable, will enema.

## 2022-12-05 NOTE — ED PROVIDER NOTE - ATTENDING CONTRIBUTION TO CARE
I, Michael Abarca, performed a history and physical exam of the patient and discussed their management with the resident and/or advanced care provider. I reviewed the resident and/or advanced care provider's note and agree with the documented findings and plan of care. I was present and available for all procedures.    67-year-old male with history of hypertension, hyperlipidemia, pituitary tumor on cabergoline, hypothyroidism presenting with constipation.  Patient has not had normal bowel movement for 2 weeks, has had small bowel movements with last one being on Friday after self administered enema.  Patient started with lower abdominal pain which has now resolved but moved to lower back.  Denies urinary symptoms.  Nausea vomiting which has since resolved.  No fever, chest pain, shortness of breath.    Well appearing and in NAD, head normal appearing atraumatic, trachea midline, no respiratory distress, lungs cta bilaterally, rrr no murmurs, soft NT ND abdomen, no visible extremity deformities, Alert and oriented, non focal neuro exam, skin warm and dry, normal affect and mood, no c/t/l spine ttp midline no leg swelling jvd    Concerning for constipation otherwise not tender on examination will CTA rule out mesenteric ischemia otherwise screening blood work unlikely ACS PE pneumothorax dissection AAA but will obtain troponin EKG chest x-ray otherwise discussed with patient avoidance and education of constipation will give IV hydration pain medication as needed and enema as well as MiraLAX p.o. DC hopefully with follow-up with PMD return precautions discussed with patient and wife at bedside agreeable with plan

## 2022-12-05 NOTE — ED PROVIDER NOTE - PATIENT PORTAL LINK FT
You can access the FollowMyHealth Patient Portal offered by [facility] by registering at the following website: [email or web address]. By joining [employer]’s FollowMyHealth portal, you will also be able to view your health information using other applications (apps) compatible with our system.

## 2022-12-05 NOTE — ED ADULT NURSE REASSESSMENT NOTE - NS ED NURSE REASSESS COMMENT FT1
Patient endorses having a bowel movement following the enema. Patient denies diarrhea. Patient describes the stool as "wagner".

## 2022-12-05 NOTE — ED PROVIDER NOTE - PHYSICAL EXAMINATION
General appearance: uncomfortable, conversant, afebrile    Eyes: anicteric sclerae, MARLEN, EOMI   HENT: Atraumatic; oropharynx clear, MMM and no ulcerations, no pharyngeal erythema or exudate   Neck: Trachea midline; Full range of motion, supple   Pulm: CTA bl, normal respiratory effort and no intercostal retractions, normal work of breathing   CV: RRR, No murmurs, rubs, or gallops. 2+ peripheral pulses.   Abdomen: Soft, non-tender, non-distended; no guarding or rebound   Extremities: No peripheral edema or extremity lymphadenopathy. 5/5 strength in all four extremities.   Skin: Dry, normal temperature, turgor and texture; no rash, ulcers or subcutaneous nodules   Psych: Appropriate affect, cooperative; alert and oriented to person, place and time

## 2022-12-05 NOTE — ED ADULT NURSE NOTE - NSFALLRSKUNASSIST_ED_ALL_ED
[place]  Progress Note - Joaquin Dove 1948, 74 y o  male MRN: [id number]  Unit/Bed#: -01 Encounter: [id number]  Primary Care Provider: Latha Taylor DO   Date and time admitted to hospital: 5/2/2021  2:28 PM    * Pneumonia due to COVID-19 virus  Assessment & Plan  · Patient was diagnosed with COVID on 04/06  · Patient was admitted twice after diagnosis from 4/ 6-4/10 and also from 4/15-4/20   · Patient finished steroids/supportive care  · Came to the hospital with worsening of shortness of breath and chest x-ray was suggestive of worsening of infiltrate  · Given fever possibility of secondary bacterial pneumonia however initial procalcitonin was normal, patient will be started on levofloxacin and vancomycin for healthcare associated pneumonia  · ID consulted on admission  · Currently on 15L midflow, pulmonology following - appreciate ongoing recommendations  · Possible cardiac etiology for patient's shortness of breath/hypoxia, doubt active COVID infection    Chronic diastolic congestive heart failure ([facility] )  Assessment & Plan  Wt Readings from Last 3 Encounters:   05/06/21 70 5 kg (155 lb 6 8 oz)   04/20/21 73 2 kg (161 lb 6 oz)   04/10/21 76 8 kg (169 lb 5 oz)     · Diuretics initially held on admission - home regimen includes Lasix 20 mg twice daily patient reports compliance - overall on exam appears euvolemic  · Elevated BNP on admission around 7 K, repeat showed 11 K  · Concern for possible cardiac etiology for shortness of breath/worsening hypoxia  · Obtain echocardiogram - EF 40% appears similar to prior  · Good urine output with lasix 40 mg BID  · Diurese as tolerated, monitor creatinine, if creatinine peaks, will resume home dose diuretics    · CTA does suggest pulmonary effusion and congestion    Lymphoma ([facility] )  Assessment & Plan  · Follows with Dr Glo Hawkins with [facility] [phone]  · Considering lung biopsy, patient currently not stable enough to undergo biopsy  · PET scan was done through [facility]- concerning for illuminating areas, question possible reactive inflammatory response versus tumors  Patient will need outpatient follow-up with his oncologist   · Recommending IVIG- ordered 5/6    Severe sepsis [facility])  Assessment & Plan  · Secondary to HCAP?  · Patient met the criteria for sepsis with fever and leukocytosis  · Patient also noted to have lactic acidosis and tachypnea  · Repeat lactic acid normal  · Continue antibiotics pending procalcitonin/blood cultures    Paroxysmal atrial fibrillation ([facility] [address] )  Assessment & Plan  · Patient is on Eliquis as outpatient  · Rate controlled on metoprolol  · Continue with Eliquis    Acute respiratory failure with hypoxia ([facility] [address] )  Assessment & Plan  · Patient with recent diagnosis of COVID and was on 2 L nasal cannula as outpatient, patient reports that he otherwise was doing well  · Concern for possible cardiac etiology - echocardiogram completed, EF 40% appears similar to prior  · Patient got acutely short of breath prior to admission  · Currently on 15L mid flow  · Will trial IV diuresis given elevated BNP, good urine output- ongoing 40 milligram IV Lasix b i d  · Also recently had PET scan completed as output, working with [facility] onc- nodules and lymph nodes illuminated, question reactive inflammatory response versus tumor    Patient will need outpatient follow-up with his oncologist   · Continue with the supplemental oxygen and wean off as tolerated    Dyslipidemia  Assessment & Plan  · Continue statin    Type 2 diabetes mellitus without complication, without long-term current use of insulin [facility])  Assessment & Plan  Lab Results   Component Value Date    HGBA1C 5 8 (H) 12/30/2020       Recent Labs     05/05/21  1618 05/05/21  2150 05/06/21  0806 05/06/21  1102   POCGLU 174* 228* 163* 246*       Blood Sugar Average: Last 72 hrs:  (P) 222 [id number]   · Hold oral agents while inpatient  · SSI + accuchek  · Diabetic diet    Coronary disease  Assessment & Plan  · Status post CABG  · Continue with the aspirin statin and beta-blocker    VTE Pharmacologic Prophylaxis:   Pharmacologic: Apixaban (Eliquis)  Mechanical VTE Prophylaxis in Place: Yes    Patient Centered Rounds: I have performed bedside rounds with nursing staff today  Discussions with Specialists or Other Care Team Provider:  Discussed with attending who has communicated with patient's oncologist who is from [facility]  Oncologist recommending IVIG and desiring possible biopsy of areas of elimination on PET scan  Pulmonology does not feel this is safe at this time  Appreciate a tightening coordinating and communicating with all providers  Education and Discussions with Family / Patient:  Discussed with patient at bedside  Attending has communicated with patient's family today and updated them in regards to results of PET scan  Time Spent for Care: 30 minutes  More than 50% of total time spent on counseling and coordination of care as described above  Current Length of Stay: 4 day(s)    Current Patient Status: Inpatient   Certification Statement: The patient will continue to require additional inpatient hospital stay due to Ongoing increased oxygen requirements  Ongoing pulmonary management and Infectious Disease monitoring  Discharge Plan:  Patient comes from home  Await clinical improvement prior to discharge planning  Code Status: Level 1 - Full Code      Subjective:   Patient reports shortness of breath has improved now he is on 15 liters  Denies any difficulty eating, drinking or eliminating  Objective:     Vitals:   Temp (24hrs), Av 6 °F (37 °C), Min:98 2 °F (36 8 °C), Max:98 9 °F (37 2 °C)    Temp:  [98 2 °F (36 8 °C)-98 9 °F (37 2 °C)] 98 2 °F (36 8 °C)  HR:  [88-95] 95  BP: (113-131)/(54-57) 113/54  SpO2:  [85 %-94 %] 92 %  Body mass index is 21 68 kg/m²       Input and Output Summary (last 24 hours): Intake/Output Summary (Last 24 hours) at 5/6/2021 1644  Last data filed at 5/6/2021 1501  Gross per 24 hour   Intake 1920 ml   Output 3740 ml   Net -1820 ml       Physical Exam:     Physical Exam  Vitals signs and nursing note reviewed  Constitutional:       General: He is not in acute distress  Appearance: Normal appearance  He is well-developed  Interventions: Nasal cannula and face mask in place  HENT:      Head: Normocephalic and atraumatic  Eyes:      General: No scleral icterus  Conjunctiva/sclera: Conjunctivae normal    Cardiovascular:      Rate and Rhythm: Normal rate and regular rhythm  Heart sounds: No murmur  Pulmonary:      Effort: Pulmonary effort is normal       Breath sounds: Decreased air movement present  Decreased breath sounds present  No wheezing, rhonchi or rales  Abdominal:      General: There is no distension  Palpations: Abdomen is soft  Skin:     General: Skin is warm and dry  Neurological:      General: No focal deficit present  Mental Status: He is alert     Psychiatric:         Mood and Affect: Mood normal          Additional Data:     Labs:    Results from last 7 days   Lab Units 05/06/21  0543 05/06/21  0404   WBC Thousand/uL 9 66  --    HEMOGLOBIN g/dL 7 4*  7 6*  --    HEMATOCRIT % 23 9*  24 0*  --    PLATELETS Thousands/uL 213  --    NEUTROS PCT %  --  76*   LYMPHS PCT %  --  8*   MONOS PCT %  --  8   EOS PCT %  --  7*     Results from last 7 days   Lab Units 05/06/21  1347  05/05/21  0515   SODIUM mmol/L 139   < > 138   POTASSIUM mmol/L 3 1*   < > 3 2*   CHLORIDE mmol/L 97*   < > 99*   CO2 mmol/L 35*   < > 28   BUN mg/dL 15   < > 16   CREATININE mg/dL 1 08   < > 0 95   ANION GAP mmol/L 7   < > 11   CALCIUM mg/dL 8 2*   < > 8 0*   ALBUMIN g/dL  --   --  1 8*   TOTAL BILIRUBIN mg/dL  --   --  0 80   ALK PHOS U/L  --   --  94   ALT U/L  --   --  23   AST U/L  --   --  11   GLUCOSE RANDOM mg/dL 123   < > 126    < > = values in this interval not displayed  Results from last 7 days   Lab Units 05/02/21  1518   INR  1 66*     Results from last 7 days   Lab Units 05/06/21  1102 05/06/21  0806 05/05/21  2150 05/05/21  1618 05/05/21  1118 05/05/21  0806 05/04/21  2040 05/04/21  1548 05/04/21  1214 05/04/21  0925 05/03/21  2211 05/03/21  1703   POC GLUCOSE mg/dl 246* 163* 228* 174* 198* 137 185* 211* 204* 208* 259* 326*         Results from last 7 days   Lab Units 05/03/21  0536 05/02/21  2048 05/02/21  1825 05/02/21  1518   LACTIC ACID mmol/L  --  2 0 2 7* 3 6*   PROCALCITONIN ng/ml 0 17  --  0 21 0 18           * I Have Reviewed All Lab Data Listed Above  * Additional Pertinent Lab Tests Reviewed: All Labs Within Last 24 Hours Reviewed    Imaging:    Imaging Reports Reviewed Today Include:   · CTA PE study:   No definite pulmonary emboli although interpretation is moderately compromised by respiratory motion and small emboli cannot be excluded      Marked worsening of consolidation and groundglass opacity since 4/15/2021, severe and diffuse throughout both lungs which could be due to superimposed edema and/or pneumonia on changes of Covid-19      Small bilateral pleural effusions, slightly larger on the left; the left effusion has increased since 4/15/2021      Persistent mild mediastinal lymphadenopathy, unchanged since March 2021, new since September 2020  Imaging Personally Reviewed by Myself Includes:  None    Recent Cultures (last 7 days):     Results from last 7 days   Lab Units 05/03/21  0539 05/02/21  1518   BLOOD CULTURE   --  No Growth at 72 hrs  No Growth at 72 hrs     LEGIONELLA URINARY ANTIGEN  Negative  --        Last 24 Hours Medication List:   Current Facility-Administered Medications   Medication Dose Route Frequency Provider Last Rate    acetaminophen  650 mg Oral Q6H PRN Janett Teran MD      albuterol  2 puff Inhalation TID Meghan Manuel DO      apixaban  2 5 mg Oral BID Janett Teran MD      aspirin  81 mg Oral Daily Rico Otero MD      atorvastatin  40 mg Oral Daily With Abisai Chavis MD      cholecalciferol  2,000 Units Oral Daily Rico Otero MD      ferrous sulfate  325 mg Oral Daily With Breakfast Rico Otero MD      furosemide  40 mg Intravenous BID (diuretic) Margot Maldonado PA-C      immune globulin, human  400 mg/kg (Adjusted) Intravenous Once Denis Eastlake, DO      insulin lispro  2-12 Units Subcutaneous HS Jose Lopez PA-C      insulin lispro  2-12 Units Subcutaneous TID AC Digna Lopez PA-C      levofloxacin  750 mg Intravenous Q24H Rico Otero  mg (05/05/21 1624)    lisinopril  20 mg Oral Daily Rico Otero MD      metoprolol succinate  100 mg Oral Daily Rico Otero MD      multivitamin-minerals  1 tablet Oral Daily Rico Otero MD      nitroglycerin  0 4 mg Sublingual Q5 Min PRN Rico Otero MD      ondansetron  4 mg Intravenous Q6H PRN Rico Otero MD      pantoprazole  40 mg Oral Early Morning Rico Otero MD      saccharomyces boulardii  250 mg Oral BID Rico Otero MD          Today, Patient Was Seen By: John Mcclain PA-C    ** Please Note: Dictation voice to text software may have been used in the creation of this document   ** no

## 2022-12-05 NOTE — ED ADULT NURSE REASSESSMENT NOTE - NS ED NURSE REASSESS COMMENT FT1
Patient educated regarding discharge instructions. IV removed. Patient to follow-up with PCP. Patient instructed to return to ED for any worsening s/s including CP, dyspnea and lightheadedness.

## 2022-12-06 LAB
CULTURE RESULTS: SIGNIFICANT CHANGE UP
SPECIMEN SOURCE: SIGNIFICANT CHANGE UP

## 2023-02-24 ENCOUNTER — NON-APPOINTMENT (OUTPATIENT)
Age: 68
End: 2023-02-24

## 2023-02-24 ENCOUNTER — APPOINTMENT (OUTPATIENT)
Dept: CARDIOLOGY | Facility: CLINIC | Age: 68
End: 2023-02-24
Payer: MEDICARE

## 2023-02-24 VITALS
WEIGHT: 230 LBS | BODY MASS INDEX: 33 KG/M2 | DIASTOLIC BLOOD PRESSURE: 70 MMHG | SYSTOLIC BLOOD PRESSURE: 110 MMHG | HEART RATE: 84 BPM | OXYGEN SATURATION: 98 %

## 2023-02-24 DIAGNOSIS — Z95.828 OTHER SPECIFIED POSTPROCEDURAL STATES: ICD-10-CM

## 2023-02-24 DIAGNOSIS — Z98.890 OTHER SPECIFIED POSTPROCEDURAL STATES: ICD-10-CM

## 2023-02-24 PROCEDURE — 93000 ELECTROCARDIOGRAM COMPLETE: CPT

## 2023-02-24 PROCEDURE — 99204 OFFICE O/P NEW MOD 45 MIN: CPT

## 2023-02-24 NOTE — PHYSICAL EXAM
[Well Developed] : well developed [Well Nourished] : well nourished [Normal Conjunctiva] : normal conjunctiva [Normal S1, S2] : normal S1, S2 [No Murmur] : no murmur [Clear Lung Fields] : clear lung fields [No Respiratory Distress] : no respiratory distress  [No Edema] : no edema [de-identified] : Hoarse voice some abdominal obesity [de-identified] : No rales rhonchi or wheezes [de-identified] : Protuberant abdomen

## 2023-02-24 NOTE — ASSESSMENT
[FreeTextEntry1] : Patient has a history of normal coronaries and initially a normal echo with normal LV function.  He is status post TICAR stenting of the left carotid.  He has some shortness of breath particularly in the lying position.  His most recent echo shows some decrease in LV function.  I am not certain what the cause of his shortness of breath is but he does have some degree of LV dysfunction.  I doubt ischemia is playing a role.  There is probably an underlying pulmonary etiology either obstructive sleep apnea or related to his vocal cord radiation or underlying pulmonary etiology that is causing some of the shortness of breath.\par \par 1.  Status post TICAR stent of the left carotid\par 2.  Mixed hyperlipidemia\par 3.  Mild LV dysfunction\par 4.  Normal coronaries 2021\par 5.  Shortness of breath mainly orthopnea etiology unclear though I am not certain it is cardiac related

## 2023-02-24 NOTE — DISCUSSION/SUMMARY
[FreeTextEntry1] : 1 start low-dose ARB losartan 25 mg sent to his pharmacy\par 2.  On next blood draw to check BNP\par 3.  Pulmonary consultation/ENT consultation read shortness of breath possible sleep apnea etc.\par \par Patient to follow-up with me again in 2 to 3 months

## 2023-02-24 NOTE — HISTORY OF PRESENT ILLNESS
[FreeTextEntry1] : Jamie is 61 years old no history of diabetes or hypertension and a non-smoker.\par \par His cardiac status was apparently evaluated at [facility] in 2021.  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  He is chronically hoarse.\par \par 2D echo dated November 23, 2022 mildly depressed LV systolic function EF 45% mild decrease in global LV function left atrial cavity is moderately dilated structurally normal mitral valve with moderate mitral regurgitation moderate to severe tricuspid insufficiency.  There is no mention of pulmonary hypertension\par \par EKG February 24, 2023: Normal sinus rhythm low voltage otherwise unremarkable

## 2023-02-24 NOTE — REASON FOR VISIT
[FreeTextEntry1] : Jamie Joaquin 67 years old here for evaluation of recent onset of shortness of breath.\par \par Referring physician Dr. Mitchell Harris

## 2023-03-19 ENCOUNTER — INPATIENT (INPATIENT)
Facility: HOSPITAL | Age: 68
LOS: 2 days | Discharge: HOME | DRG: 545 | End: 2023-03-22
Attending: STUDENT IN AN ORGANIZED HEALTH CARE EDUCATION/TRAINING PROGRAM | Admitting: INTERNAL MEDICINE
Payer: MEDICARE

## 2023-03-19 VITALS
HEART RATE: 94 BPM | OXYGEN SATURATION: 96 % | RESPIRATION RATE: 30 BRPM | DIASTOLIC BLOOD PRESSURE: 89 MMHG | HEIGHT: 70 IN | SYSTOLIC BLOOD PRESSURE: 121 MMHG | WEIGHT: 229.94 LBS

## 2023-03-19 DIAGNOSIS — I50.9 HEART FAILURE, UNSPECIFIED: ICD-10-CM

## 2023-03-19 DIAGNOSIS — Z98.1 ARTHRODESIS STATUS: Chronic | ICD-10-CM

## 2023-03-19 DIAGNOSIS — Z98.89 OTHER SPECIFIED POSTPROCEDURAL STATES: Chronic | ICD-10-CM

## 2023-03-19 DIAGNOSIS — J38.4 EDEMA OF LARYNX: ICD-10-CM

## 2023-03-19 DIAGNOSIS — Z98.890 OTHER SPECIFIED POSTPROCEDURAL STATES: Chronic | ICD-10-CM

## 2023-03-19 LAB
A1C WITH ESTIMATED AVERAGE GLUCOSE RESULT: 6.1 % — HIGH (ref 4–5.6)
ALBUMIN SERPL ELPH-MCNC: 3.8 G/DL — SIGNIFICANT CHANGE UP (ref 3.3–5)
ALBUMIN SERPL ELPH-MCNC: 4.3 G/DL — SIGNIFICANT CHANGE UP (ref 3.3–5)
ALP SERPL-CCNC: 56 U/L — SIGNIFICANT CHANGE UP (ref 40–120)
ALP SERPL-CCNC: 67 U/L — SIGNIFICANT CHANGE UP (ref 40–120)
ALT FLD-CCNC: 29 U/L — SIGNIFICANT CHANGE UP (ref 10–45)
ALT FLD-CCNC: 35 U/L — SIGNIFICANT CHANGE UP (ref 10–45)
ANION GAP SERPL CALC-SCNC: 12 MMOL/L — SIGNIFICANT CHANGE UP (ref 5–17)
ANION GAP SERPL CALC-SCNC: 12 MMOL/L — SIGNIFICANT CHANGE UP (ref 5–17)
APTT BLD: 30.1 SEC — SIGNIFICANT CHANGE UP (ref 27.5–35.5)
AST SERPL-CCNC: 21 U/L — SIGNIFICANT CHANGE UP (ref 10–40)
AST SERPL-CCNC: 28 U/L — SIGNIFICANT CHANGE UP (ref 10–40)
BASE EXCESS BLDV CALC-SCNC: 3.1 MMOL/L — HIGH (ref -2–3)
BASOPHILS # BLD AUTO: 0.02 K/UL — SIGNIFICANT CHANGE UP (ref 0–0.2)
BASOPHILS NFR BLD AUTO: 0.4 % — SIGNIFICANT CHANGE UP (ref 0–2)
BILIRUB SERPL-MCNC: 1 MG/DL — SIGNIFICANT CHANGE UP (ref 0.2–1.2)
BILIRUB SERPL-MCNC: 1 MG/DL — SIGNIFICANT CHANGE UP (ref 0.2–1.2)
BLD GP AB SCN SERPL QL: NEGATIVE — SIGNIFICANT CHANGE UP
BUN SERPL-MCNC: 25 MG/DL — HIGH (ref 7–23)
BUN SERPL-MCNC: 26 MG/DL — HIGH (ref 7–23)
CA-I SERPL-SCNC: 1.19 MMOL/L — SIGNIFICANT CHANGE UP (ref 1.15–1.33)
CALCIUM SERPL-MCNC: 9 MG/DL — SIGNIFICANT CHANGE UP (ref 8.4–10.5)
CALCIUM SERPL-MCNC: 9.4 MG/DL — SIGNIFICANT CHANGE UP (ref 8.4–10.5)
CHLORIDE BLDV-SCNC: 107 MMOL/L — SIGNIFICANT CHANGE UP (ref 96–108)
CHLORIDE SERPL-SCNC: 107 MMOL/L — SIGNIFICANT CHANGE UP (ref 96–108)
CHLORIDE SERPL-SCNC: 108 MMOL/L — SIGNIFICANT CHANGE UP (ref 96–108)
CHOLEST SERPL-MCNC: 111 MG/DL — SIGNIFICANT CHANGE UP
CK MB BLD-MCNC: 1.6 % — SIGNIFICANT CHANGE UP (ref 0–3.5)
CK MB CFR SERPL CALC: 2.3 NG/ML — SIGNIFICANT CHANGE UP (ref 0–6.7)
CK SERPL-CCNC: 141 U/L — SIGNIFICANT CHANGE UP (ref 30–200)
CO2 BLDV-SCNC: 32 MMOL/L — HIGH (ref 22–26)
CO2 SERPL-SCNC: 23 MMOL/L — SIGNIFICANT CHANGE UP (ref 22–31)
CO2 SERPL-SCNC: 24 MMOL/L — SIGNIFICANT CHANGE UP (ref 22–31)
CREAT SERPL-MCNC: 1.37 MG/DL — HIGH (ref 0.5–1.3)
CREAT SERPL-MCNC: 1.58 MG/DL — HIGH (ref 0.5–1.3)
EGFR: 47 ML/MIN/1.73M2 — LOW
EGFR: 56 ML/MIN/1.73M2 — LOW
EOSINOPHIL # BLD AUTO: 0.19 K/UL — SIGNIFICANT CHANGE UP (ref 0–0.5)
EOSINOPHIL NFR BLD AUTO: 4 % — SIGNIFICANT CHANGE UP (ref 0–6)
ESTIMATED AVERAGE GLUCOSE: 128 MG/DL — HIGH (ref 68–114)
GAS PNL BLDV: 139 MMOL/L — SIGNIFICANT CHANGE UP (ref 136–145)
GAS PNL BLDV: SIGNIFICANT CHANGE UP
GAS PNL BLDV: SIGNIFICANT CHANGE UP
GLUCOSE BLDV-MCNC: 104 MG/DL — HIGH (ref 70–99)
GLUCOSE SERPL-MCNC: 108 MG/DL — HIGH (ref 70–99)
GLUCOSE SERPL-MCNC: 115 MG/DL — HIGH (ref 70–99)
HCO3 BLDV-SCNC: 30 MMOL/L — HIGH (ref 22–29)
HCT VFR BLD CALC: 45.7 % — SIGNIFICANT CHANGE UP (ref 39–50)
HCT VFR BLDA CALC: 44 % — SIGNIFICANT CHANGE UP (ref 39–51)
HDLC SERPL-MCNC: 45 MG/DL — SIGNIFICANT CHANGE UP
HGB BLD CALC-MCNC: 14.7 G/DL — SIGNIFICANT CHANGE UP (ref 12.6–17.4)
HGB BLD-MCNC: 14 G/DL — SIGNIFICANT CHANGE UP (ref 13–17)
IMM GRANULOCYTES NFR BLD AUTO: 0.4 % — SIGNIFICANT CHANGE UP (ref 0–0.9)
INR BLD: 1.18 RATIO — HIGH (ref 0.88–1.16)
LACTATE BLDV-MCNC: 1.7 MMOL/L — SIGNIFICANT CHANGE UP (ref 0.5–2)
LACTATE SERPL-SCNC: 1.5 MMOL/L — SIGNIFICANT CHANGE UP (ref 0.5–2)
LIPID PNL WITH DIRECT LDL SERPL: 56 MG/DL — SIGNIFICANT CHANGE UP
LYMPHOCYTES # BLD AUTO: 1.24 K/UL — SIGNIFICANT CHANGE UP (ref 1–3.3)
LYMPHOCYTES # BLD AUTO: 25.9 % — SIGNIFICANT CHANGE UP (ref 13–44)
MAGNESIUM SERPL-MCNC: 1.6 MG/DL — SIGNIFICANT CHANGE UP (ref 1.6–2.6)
MCHC RBC-ENTMCNC: 28.7 PG — SIGNIFICANT CHANGE UP (ref 27–34)
MCHC RBC-ENTMCNC: 30.6 GM/DL — LOW (ref 32–36)
MCV RBC AUTO: 93.8 FL — SIGNIFICANT CHANGE UP (ref 80–100)
MONOCYTES # BLD AUTO: 0.43 K/UL — SIGNIFICANT CHANGE UP (ref 0–0.9)
MONOCYTES NFR BLD AUTO: 9 % — SIGNIFICANT CHANGE UP (ref 2–14)
NEUTROPHILS # BLD AUTO: 2.88 K/UL — SIGNIFICANT CHANGE UP (ref 1.8–7.4)
NEUTROPHILS NFR BLD AUTO: 60.3 % — SIGNIFICANT CHANGE UP (ref 43–77)
NON HDL CHOLESTEROL: 66 MG/DL — SIGNIFICANT CHANGE UP
NRBC # BLD: 0 /100 WBCS — SIGNIFICANT CHANGE UP (ref 0–0)
NT-PROBNP SERPL-SCNC: 1962 PG/ML — HIGH (ref 0–300)
NT-PROBNP SERPL-SCNC: 2338 PG/ML — HIGH (ref 0–300)
PCO2 BLDV: 53 MMHG — SIGNIFICANT CHANGE UP (ref 42–55)
PH BLDV: 7.36 — SIGNIFICANT CHANGE UP (ref 7.32–7.43)
PHOSPHATE SERPL-MCNC: 3.6 MG/DL — SIGNIFICANT CHANGE UP (ref 2.5–4.5)
PLATELET # BLD AUTO: 167 K/UL — SIGNIFICANT CHANGE UP (ref 150–400)
PO2 BLDV: 17 MMHG — LOW (ref 25–45)
POTASSIUM BLDV-SCNC: 3.8 MMOL/L — SIGNIFICANT CHANGE UP (ref 3.5–5.1)
POTASSIUM SERPL-MCNC: 4.1 MMOL/L — SIGNIFICANT CHANGE UP (ref 3.5–5.3)
POTASSIUM SERPL-MCNC: 4.1 MMOL/L — SIGNIFICANT CHANGE UP (ref 3.5–5.3)
POTASSIUM SERPL-SCNC: 4.1 MMOL/L — SIGNIFICANT CHANGE UP (ref 3.5–5.3)
POTASSIUM SERPL-SCNC: 4.1 MMOL/L — SIGNIFICANT CHANGE UP (ref 3.5–5.3)
PROT SERPL-MCNC: 6.1 G/DL — SIGNIFICANT CHANGE UP (ref 6–8.3)
PROT SERPL-MCNC: 6.8 G/DL — SIGNIFICANT CHANGE UP (ref 6–8.3)
PROTHROM AB SERPL-ACNC: 13.6 SEC — HIGH (ref 10.5–13.4)
RAPID RVP RESULT: SIGNIFICANT CHANGE UP
RBC # BLD: 4.87 M/UL — SIGNIFICANT CHANGE UP (ref 4.2–5.8)
RBC # FLD: 16.2 % — HIGH (ref 10.3–14.5)
RH IG SCN BLD-IMP: POSITIVE — SIGNIFICANT CHANGE UP
SAO2 % BLDV: 18.1 % — LOW (ref 67–88)
SARS-COV-2 RNA SPEC QL NAA+PROBE: SIGNIFICANT CHANGE UP
SODIUM SERPL-SCNC: 143 MMOL/L — SIGNIFICANT CHANGE UP (ref 135–145)
SODIUM SERPL-SCNC: 143 MMOL/L — SIGNIFICANT CHANGE UP (ref 135–145)
TRIGL SERPL-MCNC: 49 MG/DL — SIGNIFICANT CHANGE UP
TROPONIN T, HIGH SENSITIVITY RESULT: 44 NG/L — SIGNIFICANT CHANGE UP (ref 0–51)
TROPONIN T, HIGH SENSITIVITY RESULT: 54 NG/L — HIGH (ref 0–51)
TSH SERPL-MCNC: 4.94 UIU/ML — HIGH (ref 0.27–4.2)
WBC # BLD: 4.78 K/UL — SIGNIFICANT CHANGE UP (ref 3.8–10.5)
WBC # FLD AUTO: 4.78 K/UL — SIGNIFICANT CHANGE UP (ref 3.8–10.5)

## 2023-03-19 PROCEDURE — 93308 TTE F-UP OR LMTD: CPT | Mod: 26

## 2023-03-19 PROCEDURE — 93971 EXTREMITY STUDY: CPT | Mod: 26,RT

## 2023-03-19 PROCEDURE — 99291 CRITICAL CARE FIRST HOUR: CPT

## 2023-03-19 PROCEDURE — 71045 X-RAY EXAM CHEST 1 VIEW: CPT | Mod: 26

## 2023-03-19 PROCEDURE — 99292 CRITICAL CARE ADDL 30 MIN: CPT | Mod: FS

## 2023-03-19 PROCEDURE — 93010 ELECTROCARDIOGRAM REPORT: CPT

## 2023-03-19 RX ORDER — CLOPIDOGREL BISULFATE 75 MG/1
75 TABLET, FILM COATED ORAL DAILY
Refills: 0 | Status: DISCONTINUED | OUTPATIENT
Start: 2023-03-19 | End: 2023-03-22

## 2023-03-19 RX ORDER — FUROSEMIDE 40 MG
40 TABLET ORAL
Refills: 0 | Status: DISCONTINUED | OUTPATIENT
Start: 2023-03-19 | End: 2023-03-20

## 2023-03-19 RX ORDER — TAMSULOSIN HYDROCHLORIDE 0.4 MG/1
0.4 CAPSULE ORAL AT BEDTIME
Refills: 0 | Status: DISCONTINUED | OUTPATIENT
Start: 2023-03-19 | End: 2023-03-22

## 2023-03-19 RX ORDER — FUROSEMIDE 40 MG
40 TABLET ORAL ONCE
Refills: 0 | Status: COMPLETED | OUTPATIENT
Start: 2023-03-19 | End: 2023-03-19

## 2023-03-19 RX ORDER — CABERGOLINE 0.5 MG/1
1 TABLET ORAL
Refills: 0 | Status: DISCONTINUED | OUTPATIENT
Start: 2023-03-19 | End: 2023-03-22

## 2023-03-19 RX ORDER — FINASTERIDE 5 MG/1
5 TABLET, FILM COATED ORAL DAILY
Refills: 0 | Status: DISCONTINUED | OUTPATIENT
Start: 2023-03-19 | End: 2023-03-22

## 2023-03-19 RX ORDER — MAGNESIUM SULFATE 500 MG/ML
2 VIAL (ML) INJECTION ONCE
Refills: 0 | Status: COMPLETED | OUTPATIENT
Start: 2023-03-19 | End: 2023-03-19

## 2023-03-19 RX ORDER — ALBUTEROL 90 UG/1
2.5 AEROSOL, METERED ORAL EVERY 6 HOURS
Refills: 0 | Status: DISCONTINUED | OUTPATIENT
Start: 2023-03-19 | End: 2023-03-22

## 2023-03-19 RX ORDER — ASPIRIN/CALCIUM CARB/MAGNESIUM 324 MG
81 TABLET ORAL DAILY
Refills: 0 | Status: DISCONTINUED | OUTPATIENT
Start: 2023-03-19 | End: 2023-03-22

## 2023-03-19 RX ORDER — CHLORHEXIDINE GLUCONATE 213 G/1000ML
1 SOLUTION TOPICAL
Refills: 0 | Status: DISCONTINUED | OUTPATIENT
Start: 2023-03-19 | End: 2023-03-22

## 2023-03-19 RX ORDER — LEVOTHYROXINE SODIUM 125 MCG
175 TABLET ORAL DAILY
Refills: 0 | Status: DISCONTINUED | OUTPATIENT
Start: 2023-03-19 | End: 2023-03-22

## 2023-03-19 RX ORDER — ATORVASTATIN CALCIUM 80 MG/1
40 TABLET, FILM COATED ORAL AT BEDTIME
Refills: 0 | Status: DISCONTINUED | OUTPATIENT
Start: 2023-03-19 | End: 2023-03-22

## 2023-03-19 RX ORDER — BUDESONIDE AND FORMOTEROL FUMARATE DIHYDRATE 160; 4.5 UG/1; UG/1
2 AEROSOL RESPIRATORY (INHALATION)
Refills: 0 | Status: DISCONTINUED | OUTPATIENT
Start: 2023-03-19 | End: 2023-03-22

## 2023-03-19 RX ORDER — FUROSEMIDE 40 MG
1 TABLET ORAL
Qty: 0 | Refills: 0 | DISCHARGE

## 2023-03-19 RX ORDER — HEPARIN SODIUM 5000 [USP'U]/ML
5000 INJECTION INTRAVENOUS; SUBCUTANEOUS EVERY 8 HOURS
Refills: 0 | Status: DISCONTINUED | OUTPATIENT
Start: 2023-03-19 | End: 2023-03-22

## 2023-03-19 RX ORDER — INDAPAMIDE 1.25 MG
1 TABLET ORAL
Qty: 0 | Refills: 0 | DISCHARGE

## 2023-03-19 RX ADMIN — ATORVASTATIN CALCIUM 40 MILLIGRAM(S): 80 TABLET, FILM COATED ORAL at 22:20

## 2023-03-19 RX ADMIN — Medication 40 MILLIGRAM(S): at 16:07

## 2023-03-19 RX ADMIN — BUDESONIDE AND FORMOTEROL FUMARATE DIHYDRATE 2 PUFF(S): 160; 4.5 AEROSOL RESPIRATORY (INHALATION) at 21:09

## 2023-03-19 RX ADMIN — HEPARIN SODIUM 5000 UNIT(S): 5000 INJECTION INTRAVENOUS; SUBCUTANEOUS at 13:50

## 2023-03-19 RX ADMIN — Medication 25 GRAM(S): at 13:09

## 2023-03-19 RX ADMIN — CLOPIDOGREL BISULFATE 75 MILLIGRAM(S): 75 TABLET, FILM COATED ORAL at 11:47

## 2023-03-19 RX ADMIN — HEPARIN SODIUM 5000 UNIT(S): 5000 INJECTION INTRAVENOUS; SUBCUTANEOUS at 22:20

## 2023-03-19 RX ADMIN — FINASTERIDE 5 MILLIGRAM(S): 5 TABLET, FILM COATED ORAL at 11:47

## 2023-03-19 RX ADMIN — Medication 40 MILLIGRAM(S): at 05:27

## 2023-03-19 RX ADMIN — Medication 81 MILLIGRAM(S): at 11:47

## 2023-03-19 RX ADMIN — TAMSULOSIN HYDROCHLORIDE 0.4 MILLIGRAM(S): 0.4 CAPSULE ORAL at 22:20

## 2023-03-19 NOTE — H&P ADULT - ASSESSMENT
====================ASSESSMENT ==============  67M PMHx of CEA, BPH, hypothyroidism, gout, HFrEF (EF 45%), HTN, HLD, pituitary tumor w/ prolactinoma, and Laryngeal cancer s/p radiation and resection p/w pleuritic CP, SOB, LE edema found with ADHF req BiPAP    Plan:  ====================== NEUROLOGY=====================  A&Ox3-4  - continue to monitor mental status as per protocol     ==================== RESPIRATORY======================  Hypoxic resp failure  - likely 2/2 fluid overload  - improved s/p diuresis with lasix 40 IVP. Weaned BiPAP to NC, now comfortable on NC  - wean NC as juan, continue to monitor SpO2 with goal >94%  - c/w home inhalers: budesonide     Mechanical Vent:   albuterol    0.083% 2.5 milliGRAM(s) Nebulizer every 6 hours PRN Shortness of Breath and/or Wheezing  budesonide  80 MICROgram(s)/formoterol 4.5 MICROgram(s) Inhaler 2 Puff(s) Inhalation two times a day    ====================CARDIOVASCULAR==================  furosemide   Injectable 40 milliGRAM(s) IV Push two times a day    ===================HEMATOLOGIC/ONC ===================  - Monitor H/H and plts  - DVT PPX:   aspirin enteric coated 81 milliGRAM(s) Oral daily  clopidogrel Tablet 75 milliGRAM(s) Oral daily    ===================== RENAL =========================  - Continue monitoring urine output, lytes, SCr/ BUN  - replete lytes prn with goal K >4 and Mg >2  tamsulosin 0.4 milliGRAM(s) Oral at bedtime    ==================== GASTROINTESTINAL===================    =======================    ENDOCRINE  =====================  Prolactinoma  - c/w home cabergoline     Hypothyroid  - c/w home synthroid  - f/u TSH      ========================INFECTIOUS DISEASE================    - monitor and trend WBC and temperature curve         Patient requires continuous monitoring with bedside rhythm monitoring, arterial line, pulse oximetry, ventilator monitoring and intermittent blood gas analysis.  Care plan discussed with ICU care team.  Patient remained critical; required more than usual post op care; I have spent 35 minutes providing non-routine post op care, in addition to initial critical time provided by CICU attending Dr. Nunez, re-evaluated multiple times during the day.         ====================ASSESSMENT ==============  67M PMHx of CEA, BPH, hypothyroidism, gout, HFrEF (EF 45%), HTN, HLD, pituitary tumor w/ prolactinoma, and Laryngeal cancer s/p radiation and resection p/w pleuritic CP, SOB, LE edema found with ADHF req BiPAP    Plan:  ====================== NEUROLOGY=====================  A&Ox3-4  - continue to monitor mental status as per protocol     ==================== RESPIRATORY======================  Hypoxic resp failure  - likely 2/2 fluid overload  - improved s/p diuresis with lasix 40 IVP. Weaned BiPAP to NC, now comfortable on NC  - wean NC as juan, continue to monitor SpO2 with goal >94%  - c/w home inhalers: budesonide (therapuetic exhange for home advair) and albuterol     Mechanical Vent:   albuterol    0.083% 2.5 milliGRAM(s) Nebulizer every 6 hours PRN Shortness of Breath and/or Wheezing  budesonide  80 MICROgram(s)/formoterol 4.5 MICROgram(s) Inhaler 2 Puff(s) Inhalation two times a day    ====================CARDIOVASCULAR==================  ADHF  - f.u TTE  - CP now resolved. Initial trop 53. Will send one more set of CE. CP pleuritic in nature, likely 2.2 ADHF. Will send one more set of CE. No ECG changes.  - continue diuresis. Good urinary response to lasix 40 IVP. Will start lasix 40 IV BID for 1-2L neg daily. Reassess fluid status  - home lasix 20 PO BID, will likely need to be started on higher doses for outpatient reg  - lactate negative   - pBNP pending  - Monitor perfusion indices       RLE swelling  - VA duplex ordered  - No tenderness, per patient swelling present for "awhile", doesn't bother him.       ===================HEMATOLOGIC/ONC ===================  h/h and plts wnl   - Monitor H/H and plts  - DVT PPX: start SQH    aspirin enteric coated 81 milliGRAM(s) Oral daily  clopidogrel Tablet 75 milliGRAM(s) Oral daily    ===================== RENAL =========================  SCR 1.57  - 12/4/22 SCr 1.51. Prior admission 05/2021 SCr 1.2   - Continue monitoring urine output, lytes, SCr/ BUN  - replete lytes prn with goal K >4 and Mg >2  - continue with diuresis as above    BPH  - c.w home flomax and finasteride   tamsulosin 0.4 milliGRAM(s) Oral at bedtime    ==================== GASTROINTESTINAL===================  DASH diet as tolerated  - monitor for regular BM while inpatient   =======================    ENDOCRINE  =====================  Prolactinoma  - c/w home cabergoline     Hypothyroid  - c/w home synthroid  - f/u TSH    - f/u hgb a1c and lipid panel   ========================INFECTIOUS DISEASE================  Afebrile, no leukocytosis   - monitor and trend WBC and temperature curve         Patient requires continuous monitoring with bedside rhythm monitoring, arterial line, pulse oximetry, ventilator monitoring and intermittent blood gas analysis.  Care plan discussed with ICU care team.  Patient remained critical; required more than usual post op care; I have spent 35 minutes providing non-routine post op care, in addition to initial critical time provided by CICU attending Dr. Nunez, re-evaluated multiple times during the day.         ====================ASSESSMENT ==============  67M PMHx of CEA, BPH, hypothyroidism, gout, HFrEF (EF 45%), HTN, HLD, pituitary tumor w/ prolactinoma, and Laryngeal cancer s/p radiation and resection p/w pleuritic CP, SOB, LE edema found with ADHF req BiPAP    Plan:  ====================== NEUROLOGY=====================  A&Ox3-4  - continue to monitor mental status as per protocol     CEA s/p stent  - c/w ASA and plavix    ==================== RESPIRATORY======================  Hypoxic resp failure  - likely 2/2 fluid overload  - improved s/p diuresis with lasix 40 IVP. Weaned BiPAP to NC, now comfortable on NC  - wean NC as juan, continue to monitor SpO2 with goal >94%  - c/w home inhalers: budesonide (therapuetic exhange for home advair) and albuterol   - c/w lipitor    Mechanical Vent:   albuterol    0.083% 2.5 milliGRAM(s) Nebulizer every 6 hours PRN Shortness of Breath and/or Wheezing  budesonide  80 MICROgram(s)/formoterol 4.5 MICROgram(s) Inhaler 2 Puff(s) Inhalation two times a day    ====================CARDIOVASCULAR==================  ADHF  - f.u TTE  - CP now resolved. Initial trop 53. Will send one more set of CE. CP pleuritic in nature, likely 2.2 ADHF. Will send one more set of CE. No ECG changes.  - continue diuresis. Good urinary response to lasix 40 IVP. Will start lasix 40 IV BID for 1-2L neg daily. Reassess fluid status  - home lasix 20 PO BID, will likely need to be started on higher doses for outpatient reg  - lactate negative   - pBNP pending  - Monitor perfusion indices       RLE swelling  - VA duplex ordered  - No tenderness, per patient swelling present for "awhile", doesn't bother him.       ===================HEMATOLOGIC/ONC ===================  h/h and plts wnl   - Monitor H/H and plts  - DVT PPX: start SQH    aspirin enteric coated 81 milliGRAM(s) Oral daily  clopidogrel Tablet 75 milliGRAM(s) Oral daily    ===================== RENAL =========================  SCR 1.57  - 12/4/22 SCr 1.51. Prior admission 05/2021 SCr 1.2   - Continue monitoring urine output, lytes, SCr/ BUN  - replete lytes prn with goal K >4 and Mg >2  - continue with diuresis as above    BPH  - c.w home flomax and finasteride   tamsulosin 0.4 milliGRAM(s) Oral at bedtime    ==================== GASTROINTESTINAL===================  DASH diet as tolerated  - monitor for regular BM while inpatient   =======================    ENDOCRINE  =====================  Prolactinoma  - c/w home cabergoline     Hypothyroid  - c/w home synthroid  - f/u TSH    - f/u hgb a1c and lipid panel   ========================INFECTIOUS DISEASE================  Afebrile, no leukocytosis   - monitor and trend WBC and temperature curve         Patient requires continuous monitoring with bedside rhythm monitoring, arterial line, pulse oximetry, ventilator monitoring and intermittent blood gas analysis.  Care plan discussed with ICU care team.  Patient remained critical; required more than usual post op care; I have spent 35 minutes providing non-routine post op care, in addition to initial critical time provided by CICU attending Dr. Nunez, re-evaluated multiple times during the day.

## 2023-03-19 NOTE — PATIENT PROFILE ADULT - WILL THE PATIENT ACCEPT THE PFIZER COVID-19 VACCINE IF ELIGIBLE AND IT IS AVAILABLE?
Letter by Jericho Patel MD at      Author: Jericho Patel [employer].    Sincerely,        Electronically signed by eJricho Patel MD        No

## 2023-03-19 NOTE — H&P ADULT - NSHPPHYSICALEXAM_GEN_ALL_CORE
PHYSICAL EXAM:  Constitutional: Patient laying in bed, NAD  Head: Atraumatic, normocephalic  Eyes: No scleral icterus. PERRLA. EOMI  ENMT: Moist mucous membrane. Uvula midline  Neck: Supple, + JVD  Respiratory: CTA B/L. No wheezes, rales or rhonchi   Cardiovascular: S1/S2. No murmurs, rubs, or gallops.  Gastrointestinal: Nondistended, BSx4, soft, nontender.  Extremities: Moves all extremities. Warm, +RLE edema, nontender  Vascular: 2+ DP/PT pulses B/L   Neurological: A&Ox3. Follows commands. No focal deficits.   Skin: No rashes  on exposed skin

## 2023-03-19 NOTE — ED PROVIDER NOTE - NS ED ROS FT
Gen: Denies fever, weight loss  HEENT: Denies vision changes, ear pain, epistaxis, sore throat  CV: Denies palpitations; + chest pain  Skin: Denies rash, erythema, color changes  Resp: Denies cough; +SOB  Endo: Denies sensitivity to heat, cold, increased urination  GI: Denies abdominal pain, constipation, nausea, vomiting, diarrhea  Msk: Denies back pain, extremity pain; +LE swelling  : Denies dysuria, increased frequency  Neuro: Denies LOC, weakness, numbness, tingling  Psych: Denies hx of psych, hallucinations  ROS statement: all other ROS negative except as per HPI

## 2023-03-19 NOTE — PROGRESS NOTE ADULT - SUBJECTIVE AND OBJECTIVE BOX
CICU MID-NIGHT  Admission date: 3/19/23  Chief complaint/ Diagnosis: ADHF  HPI: 67M PMHx of CEA s/p stenting, BPH, hypothyroidism, gout, HFrEF (EF 45%), HTN, HLD, pituitary tumor, prolactinoma, and Laryngeal cancer s/p radiation and resection p/w pleuritic CP (mild in nature, present only with deep inspiration), SOB, LE edema. He states that his CP and SOB were more profound starting this morning at 3am which prompted him to go to the ED. States he has been getting progressively worse with SOB over the past 1.5 months. His outpatient PCP started him on Lasix 20 PO BID recently and he has been compliant with that medication. In ED found with ADHF req BiPAP and given Lasix 40 IVP for diuresis. He states he now feels much better with his breathing after  the Lasix and his CP is now completely resolved  (19 Mar 2023 10:42)    Interval history: Pt remains hemodynamically stable    REVIEW OF SYSTEMS  Denies CP, Palpitation, SOB, Dyspnea [ x ] All other systems negative    MEDICATIONS  (STANDING)  aspirin enteric coated 81 milliGRAM(s) Oral daily  atorvastatin 40 milliGRAM(s) Oral at bedtime  budesonide  80 MICROgram(s)/formoterol 4.5 MICROgram(s) Inhaler 2 Puff(s) Inhalation two times a day  cabergoline 1 milliGRAM(s) Oral <User Schedule>  chlorhexidine 4% Liquid 1 Application(s) Topical <User Schedule>  clopidogrel Tablet 75 milliGRAM(s) Oral daily  finasteride 5 milliGRAM(s) Oral daily  furosemide   Injectable 40 milliGRAM(s) IV Push two times a day  heparin   Injectable 5000 Unit(s) SubCutaneous every 8 hours  levothyroxine 175 MICROGram(s) Oral daily  tamsulosin 0.4 milliGRAM(s) Oral at bedtime    MEDICATIONS  (PRN):  albuterol    0.083% 2.5 milliGRAM(s) Nebulizer every 6 hours PRN Shortness of Breath and/or Wheezing    PAST MEDICAL & SURGICAL HISTORY:  Low back pain  Herniated disc on lumbar region  Redundant prepuce and phimosis  Arthritis on knees  Hypothyroid not taking any meds  Hypertension  pt denies any thyroid cancer  Gout  S/P excision of vocal cord [id number] and radiation -  Laryngeal cancer&quot;stage 0&quot;  Pituitary mass  Carotid occlusion, bilateral  Obese    FAMILY HISTORY:  Family history of stroke (Mother)  Family history of Alzheimer&#x27;s disease (Father)  FH: stroke (Mother, Father)    Allergy   No Known Allergies    ICU Vital Signs Last 24 Hrs  T(C): 36.7 (Max: 37.1)  HR: 83  (63 - 107)  BP: 145/99  (88/61 - 169/94)  BP(mean): 118  (70 - 124)  RR: 16  (14 - 30)  SpO2: 97% (95% - 100%) on room air     I&O's Summary  IN: 290 mL / OUT: 2200 mL(VOIDS) / NET: -1910 mL    PHYSICAL EXAM  Appearance: Normal, NAD  HEAD:  Normocephalic  EYES:  PERRLA, conjunctiva and sclera clear  NECK: Supple, No JVD  CHEST/LUNG: Clear to auscultation bilaterally; No wheezing  HEART: Normal S1, S2. No murmurs, rubs, or gallops  ABDOMEN: + Bowel sounds, Soft, NT, ND   EXTREMITIES:  2+ Peripheral Pulses, No clubbing, cyanosis, or edema  NEUROLOGY: non-focal, aaox3  SKIN: No rashes or lesions    Interpretation of Telemetry:                        14.0   4.78  )-----------( 167                 45.7     143  |  108  |  25<H>  --------------------------<  115<H>  4.1   |  23  |  1.37<1.58    Ca    9.0     Phos  3.6     Mg     1.6   (repleted)   TPro  6.1  /  Alb  3.8  /  TBili  1.0  /  DBili  x   /  AST  21  /  ALT  29  /  AlkPhos  56  03-19  pro-BNP 1963

## 2023-03-19 NOTE — PROVIDER CONTACT NOTE (CHANGE IN STATUS NOTIFICATION) - BACKGROUND
admitted with heart failure, prior today drank water and coffee okay, pt states he has "choking" instances at home

## 2023-03-19 NOTE — ED PROVIDER NOTE - CLINICAL SUMMARY MEDICAL DECISION MAKING FREE TEXT BOX
67-year-old male, past medical history of hypertension, hyperlipidemia, pituitary tumor, hypothyroidism, and left carotid artery stent, presents to ED complaining of worsening chest pain and shortness of breath. Pt also endorses BILLY and BLE. Pt has been told he has "fluid in his lungs," and was recently started on lasix 20mg BID. Denies f/c, abd pain, n/v, urinary sx, weakness/numbness.    VSS. Physical exam significant for lung exam w/ bibasilar crackles and rales, mild tachypnea, and speaking in short sentences; Pt also w/ 2+ pitting BLE edema.    Bedside POCUS significant for hypodynamic squeeze, poor EF, large Rt sided pleural effusion, trace Lt sided pleural effusion.    Concern for CHF exacerbation. Differential also includes PNA vs. ACS. Plan to check basic labs/electrolytes, trop, bnp, and CXR. Will start on BiPAP and lasix to improve resp and fluid status. Likely will require tele admission.

## 2023-03-19 NOTE — CONSULT NOTE ADULT - ASSESSMENT
68yo male admitted for acute on chronic heart failure C/O hoarseness and dysphagia for the past few weeks. Bedside indirect laryngoscopy revealed laryngeal edema consistent with radiation changes, airway patent.. Pt has a H/O VC lesion excision followed by RT in 2007. 68yo male admitted for acute on chronic heart failure C/O hoarseness and dysphagia for the past few weeks. Bedside indirect laryngoscopy revealed laryngeal edema consistent with radiation changes, airway patent.. Pt has a H/O VC cancer excision followed by RT in 2007.

## 2023-03-19 NOTE — ED ADULT NURSE REASSESSMENT NOTE - NS ED NURSE REASSESS COMMENT FT1
Report received from break coverage ANALILIA Angelo. Introduced self to pt and wife. Pt on BIPAP for respiratory effort and fluid overload. Pt using urinal to urinate.
Tolerating BiPAP well, NAD

## 2023-03-19 NOTE — H&P ADULT - NSHPREVIEWOFSYSTEMS_GEN_ALL_CORE
REVIEW OF SYSTEMS:    CONSTITUTIONAL: No weakness, fevers or chills  EYES/ENT: No visual changes;  No vertigo or throat pain   NECK: No pain or stiffness  RESPIRATORY: SEE HPI. No cough, wheezing, hemoptysis; currently No shortness of breath  CARDIOVASCULAR: SEE HPI. currently No chest pain or palpitations  GASTROINTESTINAL: No abdominal or epigastric pain. No nausea, vomiting, or hematemesis; No diarrhea or constipation. No melena or hematochezia.  GENITOURINARY: No dysuria, frequency or hematuria  NEUROLOGICAL: No numbness or weakness  SKIN: No itching, burning, rashes, or lesions   All other review of systems is negative unless indicated above.

## 2023-03-19 NOTE — PATIENT PROFILE ADULT - FALL HARM RISK - UNIVERSAL INTERVENTIONS
Bed in lowest position, wheels locked, appropriate side rails in place/Call bell, personal items and telephone in reach/Instruct patient to call for assistance before getting out of bed or chair/Non-slip footwear when patient is out of bed/[place] to call system/Physically safe environment - no spills, clutter or unnecessary equipment/Purposeful Proactive Rounding/Room/bathroom lighting operational, light cord in reach

## 2023-03-19 NOTE — PROGRESS NOTE ADULT - ASSESSMENT
====================ASSESSMENT ==============  67M PMHx of CEA, BPH, hypothyroidism, gout, HFrEF (EF 45%), HTN, HLD, pituitary tumor w/ prolactinoma, and Laryngeal cancer s/p radiation and resection p/w pleuritic CP, SOB, LE edema found with ADHF req BiPAP    Plan:  ====================== NEUROLOGY=====================  A&Ox3-4  - continue to monitor mental status as per ICU protocol     CEA s/p stent  - c/w ASA and Plavix  - Cont. Lipitor     ==================== RESPIRATORY======================  Hypoxic resp failure  likely 2/2 fluid overload  - improved s/p diuresis with lasix 40 IVP. Weaned BiPAP to NC, now comfortable on NC  - wean NC as juan, continue to monitor SpO2 with goal >94%  - c/w home inhalers: budesonide (therapuetic exhange for home advair) and albuterol     ====================CARDIOVASCULAR==================  ADHF  - f.u TTE  - CP now resolved. Initial trop 53. Will send one more set of CE. CP pleuritic in nature, likely 2.2 ADHF. Will send one more set of CE. No ECG changes.  - Cont. Lasix 40 IV BID Net negative 1-2L   - Monitor perfusion indices     RLE swelling  - VA duplex ordered  - No tenderness, per patient swelling present for "awhile", doesn't bother him.     ===================HEMATOLOGIC/ONC ===================  h/h and plts wnl   - Monitor H/H and plts  - DVT PPX: start SQH    aspirin enteric coated 81 milliGRAM(s) Oral daily  clopidogrel Tablet 75 milliGRAM(s) Oral daily    ===================== RENAL =========================  SCR 1.57  - 12/4/22 SCr 1.51. Prior admission 05/2021 SCr 1.2   - Continue monitoring urine output, lytes, SCr/ BUN  - replete lytes prn with goal K >4 and Mg >2  - continue with diuresis as above    BPH  - c.w home flomax and finasteride   tamsulosin 0.4 milliGRAM(s) Oral at bedtime    ==================== GASTROINTESTINAL===================  DASH diet as tolerated  - monitor for regular BM while inpatient   =======================    ENDOCRINE  =====================  Prolactinoma  - c/w home cabergoline     Hypothyroid  - c/w home synthroid  - f/u TSH    ========================INFECTIOUS DISEASE================  Afebrile, no leukocytosis   - monitor and trend WBC and temperature curve

## 2023-03-19 NOTE — H&P ADULT - HISTORY OF PRESENT ILLNESS
67M PMHx of CEA s/p stenting, BPH, hypothyroidism, gout, HFrEF (EF 45%), HTN, HLD, pituitary tumor, prolactinoma, and Laryngeal cancer s/p radiation and resection p/w pleuritic CP (mild in nature, present only with deep inspiration), SOB, LE edema. He states that his CP and SOB were more profound starting this morning at 3am which prompted him to go to the ED. States he has been getting progressively worse with SOB over the past 1.5 months. His outpatient PCP started him on Lasix 20 PO BID recently and he has been compliant with that medication. In ED found with ADHF req BiPAP and given Lasix 40 IVP for diuresis. He states he now feels much better with his breathing after  the Lasix and his CP is now completely resolved

## 2023-03-19 NOTE — ED PROVIDER NOTE - OBJECTIVE STATEMENT
67-year-old male, past medical history of hypertension, hyperlipidemia, pituitary tumor, hypothyroidism, and left carotid artery stent, presents to ED complaining of worsening chest pain and shortness of breath since 3 AM this morning.  Patient has been dealing with SOB and bilateral lower extremity edema for "some time now."  Patient was told by his PCP that he has "fluid in my lungs."  Patient states that he was recently started on Lasix 20 mg twice daily.  Patient denies any recent travel/recent sick contacts/recent illnesses.  Denies fever/chills, abdominal pain, nausea vomiting, urinary symptoms, back pain or any other symptoms at this time.  Patient recently saw cardiologist outpatient for first time a month ago.  At that time patient received echocardiogram, however does not remember results.

## 2023-03-19 NOTE — H&P ADULT - NSHPSOCIALHISTORY_GEN_ALL_CORE
Patient denies any ETOH use, durg use, or current/past smoking history. 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 Currently lives at home with wife and has no issues with ADLs

## 2023-03-19 NOTE — ED ADULT NURSE NOTE - NSIMPLEMENTINTERV_GEN_ALL_ED
Implemented All Universal Safety Interventions:  [place] to call system. Call bell, personal items and telephone within reach. Instruct patient to call for assistance. Room bathroom lighting operational. Non-slip footwear when patient is off stretcher. Physically safe environment: no spills, clutter or unnecessary equipment. Stretcher in lowest position, wheels locked, appropriate side rails in place.

## 2023-03-19 NOTE — ED PROVIDER NOTE - ATTENDING CONTRIBUTION TO CARE
Attending Statement (RANULFO Mabry MD):    HPI: 67-year-old male with history of HTN, HLD, hypothyroidism, carotid stenosis status post left carotid stent, who presents with worsening shortness of breath since approximately 3 AM and associated chest tightness.  Reports has been having worsening shortness of breath and leg swelling for the past several months.  Was told by his primary care that he had "fluid in his lungs "and was started on Lasix 20 mg twice daily by PCP.  Patient reportedly saw her cardiologist outpatient a few weeks ago but states he does not know any specific diagnoses.    Review of Systems:  -General: no fever   -ENT: no congestion  -Pulmonary: no cough, + shortness of breath  -Cardiac: + chest pain  -Gastrointestinal: no abdominal pain, no nausea, no vomiting, and no diarrhea.  -Genitourinary: no blood or pain with urination  -Musculoskeletal: no back or neck pain  -Skin: no rashes  -Endocrine: No h/o diabetes +hypothyroid  -Neurologic: No new weakness or numbness in extremities    All else negative unless otherwise specified elsewhere in this note.    On Physical Exam:  General: well appearing, in NAD, speaking clearly in full sentences and without difficulty; cooperative with exam  HEENT: anicteric sclera, airway patent  Neck: no JVD  Cardiac: regular, s1s2  Lungs: decreased BS at bases b/l; tachypneic/increased WOB  Abdomen: soft nontender/nondistended  : no bladder tenderness or distension  Skin: intact, no rash  Extremities: ++ b/l lower extremity peripheral edema, no gross deformities      MDM: Patient presenting with worsening shortness of breath and chest tightness.  Decreased breath sounds and bilateral peripheral edema noted on exam.  Point-of-care ultrasound performed revealing severely depressed left ventricular systolic function and bilateral pleural effusions right much greater than left.  Patient started on BiPAP with it for increased work of breathing with significant improvement.  Patient given Lasix 40 mg for present heart failure induced pulmonary edema.  Initial screening labs obtained, EKG obtained (shows low voltage but no evidence of ST elevations; point-of-care ultrasound shows no evidence of pericardial effusion).  Cardiology consulted and patient to be admitted to CCU for further evaluation and management. Patient started on BIPAP and given lasix with improvement in symptoms and good urine output.

## 2023-03-19 NOTE — ED PROVIDER NOTE - PROGRESS NOTE DETAILS
O'Damon DO PGY-3: received sign out on this patient. Stephon DO PGY-3: spoke to cards fellow for CCU consult as pt has dec EF along w/ low MAPs (70-75). O'Hopkins DO PGY-3: spoke to cards fellow for CCU consult as pt has dec EF along w/ low MAPs (70-75).  Dr. Bradford Note: s/o from night attending, following patient and CCU consult as well.  Agree with plan. Spoke with pt (on bipap) and wife at bedside about plan, concerns about HF, and need for admission.  Demonstated fair understanding.  Stable for admission.

## 2023-03-19 NOTE — CHART NOTE - NSCHARTNOTEFT_GEN_A_CORE
Patient brother called for help bedside. Went to patient bedside where he was seen coughing and was not responding. He was eating his lunch prior to these events. With sitting the patient in a 90 degree position he was able to cough up some sweet potatoes with subsequent improvement. After event patient able to talk in full sentences and SpO2 98%. I asked patient and wife if this has ever happened to him at home before. They said that is did intermittently happen to him since his vocal cord nodule resection/radiation in 2007. He followed up with his doctors about this, got a "scope down his throat" and was given no further recommendations about it. He follows a normal diet at home with solid food. He states sometimes it can happen with food or sometimes with water. He states he was told it is because his "throat flap" sometimes stays open (likely epiglottis). Ordered for a s/s exam and made patient NPO. Called ENT and presented the case, they state they would just have a s/s exam at this time and had no further recommendations.

## 2023-03-19 NOTE — CONSULT NOTE ADULT - SUBJECTIVE AND OBJECTIVE BOX
CC: Hoarseness, dysphagia    HPI: 67M PMHx of CEA s/p stenting, BPH, hypothyroidism, gout, HFrEF (EF 45%), HTN, HLD, pituitary tumor, prolactinoma, and Laryngeal cancer s/p radiation and resection p/w pleuritic CP (mild in nature, present only with deep inspiration), SOB, LE edema. He states that his CP and SOB were more profound starting the morning the pt got admitted at 3am which prompted him to go to the ED. States he has been getting progressively worse with SOB over the past 1.5 months. His outpatient PCP started him on Lasix 20 PO BID recently and he has been compliant with that medication. In ED found with ADHF req BiPAP and given Lasix 40 IVP for diuresis. He states he now feels much better with his breathing after  the Lasix and his CP is completely resolved. ENT was called to see pt for  hoarseness, dysphagia. Pt reports chocking on foods while eating. Pt admits to cough occasionally when he drinks. Pt with H/O vocal cord nodules S/P excision followed by RT in 2007. Pt denies sore throat, SOB, hemoptysis.          PAST MEDICAL & SURGICAL HISTORY:  Low back pain  Herniated disc on lumbar region      Redundant prepuce and phimosis      Arthritis  on knees      Hypothyroid  not taking any meds      Hypertension      Thyroid cancer  pt denies any thyroid cancer      History of pituitary disease  on caber      Gout      S/P excision of vocal cord nodule  2007 and radiation -      Laryngeal cancer  &quot;stage 0&quot;      Pituitary mass      Carotid occlusion, bilateral      Obese      S/P excision of vocal cord nodule  radiation - 2007      History of lumbar spinal fusion  2018      H/O arthroscopy of shoulder  right-2017      Status post carotid surgery  right-5/13/2022, pt. states unsuccesful        Allergies    No Known Allergies    Intolerances      MEDICATIONS  (STANDING):  aspirin enteric coated 81 milliGRAM(s) Oral daily  atorvastatin 40 milliGRAM(s) Oral at bedtime  budesonide  80 MICROgram(s)/formoterol 4.5 MICROgram(s) Inhaler 2 Puff(s) Inhalation two times a day  cabergoline 1 milliGRAM(s) Oral <User Schedule>  chlorhexidine 4% Liquid 1 Application(s) Topical <User Schedule>  clopidogrel Tablet 75 milliGRAM(s) Oral daily  finasteride 5 milliGRAM(s) Oral daily  furosemide   Injectable 40 milliGRAM(s) IV Push two times a day  heparin   Injectable 5000 Unit(s) SubCutaneous every 8 hours  levothyroxine 175 MICROGram(s) Oral daily  tamsulosin 0.4 milliGRAM(s) Oral at bedtime    MEDICATIONS  (PRN):  albuterol    0.083% 2.5 milliGRAM(s) Nebulizer every 6 hours PRN Shortness of Breath and/or Wheezing      Social History: No tobacco use    Family history: no pertinent FHx    ROS:   ENT: all negative except as noted in HPI   CV: denies palpitations  Pulm: denies SOB, cough, hemoptysis  GI: denies change in apetite, indigestion, n/v  : denies pertinent urinary symptoms, urgency  Neuro: denies numbness/tingling, loss of sensation  Psych: denies anxiety  MS: denies muscle weakness, instability  Heme: denies easy bruising or bleeding  Endo: denies heat/cold intolerance, excessive sweating  Vascular: denies LE edema    Vital Signs Last 24 Hrs  T(C): 36.7 (19 Mar 2023 14:00), Max: 37.1 (19 Mar 2023 05:15)  T(F): 98 (19 Mar 2023 14:00), Max: 98.7 (19 Mar 2023 05:15)  HR: 79 (19 Mar 2023 22:00) (63 - 107)  BP: 123/85 (19 Mar 2023 22:00) (88/61 - 169/94)  BP(mean): 100 (19 Mar 2023 22:00) (70 - 124)  RR: 15 (19 Mar 2023 22:00) (14 - 30)  SpO2: 96% (19 Mar 2023 22:00) (94% - 100%)    Parameters below as of 19 Mar 2023 18:00  Patient On (Oxygen Delivery Method): room air                              14.0   4.78  )-----------( 167      ( 19 Mar 2023 05:18 )             45.7    03-19    143  |  108  |  25<H>  ----------------------------<  115<H>  4.1   |  23  |  1.37<H>    Ca    9.0      19 Mar 2023 11:46  Phos  3.6     03-19  Mg     1.6     03-19    TPro  6.1  /  Alb  3.8  /  TBili  1.0  /  DBili  x   /  AST  21  /  ALT  29  /  AlkPhos  56  03-19   PT/INR - ( 19 Mar 2023 05:18 )   PT: 13.6 sec;   INR: 1.18 ratio         PTT - ( 19 Mar 2023 05:18 )  PTT:30.1 sec    PHYSICAL EXAM:  Gen: NAD  Skin: No rashes, bruises, or lesions  Head: Normocephalic, Atraumatic  Face: no edema, erythema, or fluctuance. Parotid glands soft without mass  Eyes: no scleral injection  Nose: Nares bilaterally patent, no discharge  Mouth: No Stridor / Drooling / Trismus.  Mucosa moist, tongue/uvula midline, oropharynx clear  Neck: Flat, supple, no lymphadenopathy, trachea midline, no masses  Lymphatic: No lymphadenopathy  Resp: breathing easily, no stridor  CV: no peripheral edema/cyanosis  GI: nondistended   Peripheral vascular: no JVD or edema  Neuro: facial nerve intact, no facial droop      Fiberoptic Indirect laryngoscopy:  (Scope #2 used)    Reason for Laryngoscopy:    Patient was unable to cooperate with mirror.  Omega shaped epiglottis, shortened A/E folds, B/L vocal cord, arytenoid, and post-cricoid edema consistent with radiation changes. Unsure of left cord mobility. Nasopharynx, oropharynx, and hypopharynx clear, no bleeding. Tongue base, posterior pharyngeal wall, vallecula. Subglottis not well visualized. No erythema, pooling of secretions, masses or lesions. Airway patent, no foreign body visualized.    CC: Hoarseness, dysphagia    HPI: 67M PMHx of CEA s/p stenting, BPH, hypothyroidism, gout, HFrEF (EF 45%), HTN, HLD, pituitary tumor, prolactinoma, and Laryngeal cancer s/p radiation and resection p/w pleuritic CP (mild in nature, present only with deep inspiration), SOB, LE edema. He states that his CP and SOB were more profound starting the morning the pt got admitted at 3am which prompted him to go to the ED. States he has been getting progressively worse with SOB over the past 1.5 months. His outpatient PCP started him on Lasix 20 PO BID recently and he has been compliant with that medication. In ED found with ADHF req BiPAP and given Lasix 40 IVP for diuresis. He states he now feels much better with his breathing after  the Lasix and his CP is completely resolved. ENT was called to see pt for  hoarseness, dysphagia. Pt reports chocking on foods while eating. Pt admits to cough occasionally when he drinks. Pt with H/O vocal cord cancer S/P excision followed by RT in 2007. Pt denies sore throat, SOB, hemoptysis.          PAST MEDICAL & SURGICAL HISTORY:  Low back pain  Herniated disc on lumbar region      Redundant prepuce and phimosis      Arthritis  on knees      Hypothyroid  not taking any meds      Hypertension      Thyroid cancer  pt denies any thyroid cancer      History of pituitary disease  on caber      Gout      S/P excision of vocal cord nodule  2007 and radiation -      Laryngeal cancer  &quot;stage 0&quot;      Pituitary mass      Carotid occlusion, bilateral      Obese      S/P excision of vocal cord nodule  radiation - 2007      History of lumbar spinal fusion  2018      H/O arthroscopy of shoulder  right-2017      Status post carotid surgery  right-5/13/2022, pt. states unsuccesful        Allergies    No Known Allergies    Intolerances      MEDICATIONS  (STANDING):  aspirin enteric coated 81 milliGRAM(s) Oral daily  atorvastatin 40 milliGRAM(s) Oral at bedtime  budesonide  80 MICROgram(s)/formoterol 4.5 MICROgram(s) Inhaler 2 Puff(s) Inhalation two times a day  cabergoline 1 milliGRAM(s) Oral <User Schedule>  chlorhexidine 4% Liquid 1 Application(s) Topical <User Schedule>  clopidogrel Tablet 75 milliGRAM(s) Oral daily  finasteride 5 milliGRAM(s) Oral daily  furosemide   Injectable 40 milliGRAM(s) IV Push two times a day  heparin   Injectable 5000 Unit(s) SubCutaneous every 8 hours  levothyroxine 175 MICROGram(s) Oral daily  tamsulosin 0.4 milliGRAM(s) Oral at bedtime    MEDICATIONS  (PRN):  albuterol    0.083% 2.5 milliGRAM(s) Nebulizer every 6 hours PRN Shortness of Breath and/or Wheezing      Social History: No tobacco use    Family history: no pertinent FHx    ROS:   ENT: all negative except as noted in HPI   CV: denies palpitations  Pulm: denies SOB, cough, hemoptysis  GI: denies change in apetite, indigestion, n/v  : denies pertinent urinary symptoms, urgency  Neuro: denies numbness/tingling, loss of sensation  Psych: denies anxiety  MS: denies muscle weakness, instability  Heme: denies easy bruising or bleeding  Endo: denies heat/cold intolerance, excessive sweating  Vascular: denies LE edema    Vital Signs Last 24 Hrs  T(C): 36.7 (19 Mar 2023 14:00), Max: 37.1 (19 Mar 2023 05:15)  T(F): 98 (19 Mar 2023 14:00), Max: 98.7 (19 Mar 2023 05:15)  HR: 79 (19 Mar 2023 22:00) (63 - 107)  BP: 123/85 (19 Mar 2023 22:00) (88/61 - 169/94)  BP(mean): 100 (19 Mar 2023 22:00) (70 - 124)  RR: 15 (19 Mar 2023 22:00) (14 - 30)  SpO2: 96% (19 Mar 2023 22:00) (94% - 100%)    Parameters below as of 19 Mar 2023 18:00  Patient On (Oxygen Delivery Method): room air                              14.0   4.78  )-----------( 167      ( 19 Mar 2023 05:18 )             45.7    03-19    143  |  108  |  25<H>  ----------------------------<  115<H>  4.1   |  23  |  1.37<H>    Ca    9.0      19 Mar 2023 11:46  Phos  3.6     03-19  Mg     1.6     03-19    TPro  6.1  /  Alb  3.8  /  TBili  1.0  /  DBili  x   /  AST  21  /  ALT  29  /  AlkPhos  56  03-19   PT/INR - ( 19 Mar 2023 05:18 )   PT: 13.6 sec;   INR: 1.18 ratio         PTT - ( 19 Mar 2023 05:18 )  PTT:30.1 sec    PHYSICAL EXAM:  Gen: NAD  Skin: No rashes, bruises, or lesions  Head: Normocephalic, Atraumatic  Face: no edema, erythema, or fluctuance. Parotid glands soft without mass  Eyes: no scleral injection  Nose: Nares bilaterally patent, no discharge  Mouth: No Stridor / Drooling / Trismus.  Mucosa moist, tongue/uvula midline, oropharynx clear  Neck: Flat, supple, no lymphadenopathy, trachea midline, no masses  Lymphatic: No lymphadenopathy  Resp: breathing easily, no stridor  CV: no peripheral edema/cyanosis  GI: nondistended   Peripheral vascular: no JVD or edema  Neuro: facial nerve intact, no facial droop      Fiberoptic Indirect laryngoscopy:  (Scope #2 used)    Reason for Laryngoscopy:    Patient was unable to cooperate with mirror.  Omega shaped epiglottis, shortened A/E folds, B/L vocal cord, arytenoid, and post-cricoid edema consistent with radiation changes. Unsure of left cord mobility. Nasopharynx, oropharynx, and hypopharynx clear, no bleeding. Tongue base, posterior pharyngeal wall, vallecula. Subglottis not well visualized. No erythema, pooling of secretions, masses or lesions. Airway patent, no foreign body visualized.

## 2023-03-19 NOTE — ED ADULT NURSE NOTE - OBJECTIVE STATEMENT
Pt is 68 y/o male, presenting to the ED c/o chest pain. As per pt, woke up from sleep @ 3am w/ non-radiating substernal chest pain that felt like pressure. Pt reports that he has also had increased work of breathing and SOB. Pt states "My doctor told me I have fluid on my lungs". PMH HTN, Hypothyroid, s/p carotid stent, gout, laryngeal CA, and pituitary disease, compliant w/ daily medications. Upon assessment, pt AxOx3, sitting up in stretcher, and speaking in full sentences w/ SOB. Breathing spontaneously and tachypneic, placed on continuous pulse ox, >95% RA. Pt noted to have JVD and +2 b/l LE edema. Respiratory called to place pt on bipap as per MD. Abdomen is distended and non-tender to palpation. Pt able to ambulate from wheel chair to stretcher, noted to have associated SOB. Skin is warm, dry, and intact w/ + peripheral pulses. Pt denies n/v/d, dizziness, numbness/tingling, chills, and fever. Safety and comfort measures provided-bed in lowest position, locked, and blanket given.

## 2023-03-19 NOTE — ED PROVIDER NOTE - PHYSICAL EXAMINATION
PHYSICAL EXAM:  GENERAL: non-toxic appearing; in mild respiratory distress  HEENT: Atraumatic, Normocephalic, PERRL, EOMs intact b/l w/out deficits, no conjunctival pallor, MMM  NECK: +JVD; FROM  CHEST/LUNG: bibasilar crackles, coarse breath sounds, speaking in short sentences, mildly tachypneic  HEART: RRR, systolic murmur  ABDOMEN: +BS, soft, mildly distended, NT  EXTREMITIES: 2+ pitting BLE edema, +2 radial pulses b/l, +2 DP/PT pulses b/l  MUSCULOSKELETAL: FROM of all 4 extremities  NERVOUS SYSTEM:  A&Ox3, No motor deficits or sensory deficits; no focal neurologic deficits  SKIN:  No new rashes

## 2023-03-19 NOTE — PATIENT PROFILE ADULT - LEGAL HELP
Ambulates to triage  Chief Complaint   Patient presents with   • Abdominal Pain     constipated, has been taking colon cleanse for 4 days not helping, last healthy BP was 1 month ago     Takes percocet q4 hours, takes a stool softener sometimes, also c/o of back pain, getting worse with her constipation.   no

## 2023-03-20 DIAGNOSIS — Z29.9 ENCOUNTER FOR PROPHYLACTIC MEASURES, UNSPECIFIED: ICD-10-CM

## 2023-03-20 DIAGNOSIS — N18.30 CHRONIC KIDNEY DISEASE, STAGE 3 UNSPECIFIED: ICD-10-CM

## 2023-03-20 DIAGNOSIS — D35.2 BENIGN NEOPLASM OF PITUITARY GLAND: ICD-10-CM

## 2023-03-20 DIAGNOSIS — R73.03 PREDIABETES: ICD-10-CM

## 2023-03-20 DIAGNOSIS — J96.91 RESPIRATORY FAILURE, UNSPECIFIED WITH HYPOXIA: ICD-10-CM

## 2023-03-20 DIAGNOSIS — E03.9 HYPOTHYROIDISM, UNSPECIFIED: ICD-10-CM

## 2023-03-20 DIAGNOSIS — I50.23 ACUTE ON CHRONIC SYSTOLIC (CONGESTIVE) HEART FAILURE: ICD-10-CM

## 2023-03-20 DIAGNOSIS — Z98.890 OTHER SPECIFIED POSTPROCEDURAL STATES: ICD-10-CM

## 2023-03-20 LAB
ALBUMIN SERPL ELPH-MCNC: 3.4 G/DL — SIGNIFICANT CHANGE UP (ref 3.3–5)
ALP SERPL-CCNC: 50 U/L — SIGNIFICANT CHANGE UP (ref 40–120)
ALT FLD-CCNC: 24 U/L — SIGNIFICANT CHANGE UP (ref 10–45)
ANION GAP SERPL CALC-SCNC: 11 MMOL/L — SIGNIFICANT CHANGE UP (ref 5–17)
APTT BLD: 31.5 SEC — SIGNIFICANT CHANGE UP (ref 27.5–35.5)
AST SERPL-CCNC: 16 U/L — SIGNIFICANT CHANGE UP (ref 10–40)
BASE EXCESS BLDV CALC-SCNC: 3 MMOL/L — SIGNIFICANT CHANGE UP (ref -2–3)
BASOPHILS # BLD AUTO: 0.01 K/UL — SIGNIFICANT CHANGE UP (ref 0–0.2)
BASOPHILS NFR BLD AUTO: 0.2 % — SIGNIFICANT CHANGE UP (ref 0–2)
BILIRUB SERPL-MCNC: 1.1 MG/DL — SIGNIFICANT CHANGE UP (ref 0.2–1.2)
BUN SERPL-MCNC: 24 MG/DL — HIGH (ref 7–23)
CA-I SERPL-SCNC: 1.18 MMOL/L — SIGNIFICANT CHANGE UP (ref 1.15–1.33)
CALCIUM SERPL-MCNC: 8.6 MG/DL — SIGNIFICANT CHANGE UP (ref 8.4–10.5)
CHLORIDE BLDV-SCNC: 108 MMOL/L — SIGNIFICANT CHANGE UP (ref 96–108)
CHLORIDE SERPL-SCNC: 108 MMOL/L — SIGNIFICANT CHANGE UP (ref 96–108)
CO2 BLDV-SCNC: 30 MMOL/L — HIGH (ref 22–26)
CO2 SERPL-SCNC: 25 MMOL/L — SIGNIFICANT CHANGE UP (ref 22–31)
CREAT SERPL-MCNC: 1.5 MG/DL — HIGH (ref 0.5–1.3)
EGFR: 50 ML/MIN/1.73M2 — LOW
EOSINOPHIL # BLD AUTO: 0.17 K/UL — SIGNIFICANT CHANGE UP (ref 0–0.5)
EOSINOPHIL NFR BLD AUTO: 3.9 % — SIGNIFICANT CHANGE UP (ref 0–6)
GAS PNL BLDV: 138 MMOL/L — SIGNIFICANT CHANGE UP (ref 136–145)
GAS PNL BLDV: SIGNIFICANT CHANGE UP
GAS PNL BLDV: SIGNIFICANT CHANGE UP
GLUCOSE BLDV-MCNC: 91 MG/DL — SIGNIFICANT CHANGE UP (ref 70–99)
GLUCOSE SERPL-MCNC: 92 MG/DL — SIGNIFICANT CHANGE UP (ref 70–99)
HCO3 BLDV-SCNC: 28 MMOL/L — SIGNIFICANT CHANGE UP (ref 22–29)
HCT VFR BLD CALC: 37.3 % — LOW (ref 39–50)
HCT VFR BLDA CALC: 39 % — SIGNIFICANT CHANGE UP (ref 39–51)
HGB BLD CALC-MCNC: 13 G/DL — SIGNIFICANT CHANGE UP (ref 12.6–17.4)
HGB BLD-MCNC: 12.3 G/DL — LOW (ref 13–17)
HOROWITZ INDEX BLDV+IHG-RTO: 28 — SIGNIFICANT CHANGE UP
IMM GRANULOCYTES NFR BLD AUTO: 0.2 % — SIGNIFICANT CHANGE UP (ref 0–0.9)
INR BLD: 1.37 RATIO — HIGH (ref 0.88–1.16)
LACTATE BLDV-MCNC: 0.8 MMOL/L — SIGNIFICANT CHANGE UP (ref 0.5–2)
LACTATE SERPL-SCNC: 0.8 MMOL/L — SIGNIFICANT CHANGE UP (ref 0.5–2)
LYMPHOCYTES # BLD AUTO: 0.9 K/UL — LOW (ref 1–3.3)
LYMPHOCYTES # BLD AUTO: 20.8 % — SIGNIFICANT CHANGE UP (ref 13–44)
MAGNESIUM SERPL-MCNC: 1.6 MG/DL — SIGNIFICANT CHANGE UP (ref 1.6–2.6)
MCHC RBC-ENTMCNC: 29.4 PG — SIGNIFICANT CHANGE UP (ref 27–34)
MCHC RBC-ENTMCNC: 33 GM/DL — SIGNIFICANT CHANGE UP (ref 32–36)
MCV RBC AUTO: 89.2 FL — SIGNIFICANT CHANGE UP (ref 80–100)
MONOCYTES # BLD AUTO: 0.35 K/UL — SIGNIFICANT CHANGE UP (ref 0–0.9)
MONOCYTES NFR BLD AUTO: 8.1 % — SIGNIFICANT CHANGE UP (ref 2–14)
NEUTROPHILS # BLD AUTO: 2.88 K/UL — SIGNIFICANT CHANGE UP (ref 1.8–7.4)
NEUTROPHILS NFR BLD AUTO: 66.8 % — SIGNIFICANT CHANGE UP (ref 43–77)
NRBC # BLD: 0 /100 WBCS — SIGNIFICANT CHANGE UP (ref 0–0)
PCO2 BLDV: 46 MMHG — SIGNIFICANT CHANGE UP (ref 42–55)
PH BLDV: 7.4 — SIGNIFICANT CHANGE UP (ref 7.32–7.43)
PHOSPHATE SERPL-MCNC: 4.1 MG/DL — SIGNIFICANT CHANGE UP (ref 2.5–4.5)
PLATELET # BLD AUTO: 134 K/UL — LOW (ref 150–400)
PO2 BLDV: 112 MMHG — HIGH (ref 25–45)
POTASSIUM BLDV-SCNC: 3.5 MMOL/L — SIGNIFICANT CHANGE UP (ref 3.5–5.1)
POTASSIUM SERPL-MCNC: 3.7 MMOL/L — SIGNIFICANT CHANGE UP (ref 3.5–5.3)
POTASSIUM SERPL-SCNC: 3.7 MMOL/L — SIGNIFICANT CHANGE UP (ref 3.5–5.3)
PROT SERPL-MCNC: 5.8 G/DL — LOW (ref 6–8.3)
PROTHROM AB SERPL-ACNC: 16 SEC — HIGH (ref 10.5–13.4)
RBC # BLD: 4.18 M/UL — LOW (ref 4.2–5.8)
RBC # FLD: 15.7 % — HIGH (ref 10.3–14.5)
SAO2 % BLDV: 98.8 % — HIGH (ref 67–88)
SODIUM SERPL-SCNC: 144 MMOL/L — SIGNIFICANT CHANGE UP (ref 135–145)
T3FREE SERPL-MCNC: 1.89 PG/ML — LOW (ref 2–4.4)
T4 FREE SERPL-MCNC: 1.2 NG/DL — SIGNIFICANT CHANGE UP (ref 0.9–1.8)
WBC # BLD: 4.32 K/UL — SIGNIFICANT CHANGE UP (ref 3.8–10.5)
WBC # FLD AUTO: 4.32 K/UL — SIGNIFICANT CHANGE UP (ref 3.8–10.5)

## 2023-03-20 PROCEDURE — 99292 CRITICAL CARE ADDL 30 MIN: CPT

## 2023-03-20 PROCEDURE — 93306 TTE W/DOPPLER COMPLETE: CPT | Mod: 26

## 2023-03-20 PROCEDURE — 74230 X-RAY XM SWLNG FUNCJ C+: CPT | Mod: 26

## 2023-03-20 PROCEDURE — 99291 CRITICAL CARE FIRST HOUR: CPT

## 2023-03-20 PROCEDURE — 93010 ELECTROCARDIOGRAM REPORT: CPT

## 2023-03-20 RX ORDER — FUROSEMIDE 40 MG
40 TABLET ORAL DAILY
Refills: 0 | Status: DISCONTINUED | OUTPATIENT
Start: 2023-03-20 | End: 2023-03-21

## 2023-03-20 RX ORDER — MAGNESIUM SULFATE 500 MG/ML
2 VIAL (ML) INJECTION ONCE
Refills: 0 | Status: COMPLETED | OUTPATIENT
Start: 2023-03-20 | End: 2023-03-20

## 2023-03-20 RX ORDER — POTASSIUM CHLORIDE 20 MEQ
20 PACKET (EA) ORAL ONCE
Refills: 0 | Status: COMPLETED | OUTPATIENT
Start: 2023-03-20 | End: 2023-03-20

## 2023-03-20 RX ADMIN — BUDESONIDE AND FORMOTEROL FUMARATE DIHYDRATE 2 PUFF(S): 160; 4.5 AEROSOL RESPIRATORY (INHALATION) at 20:45

## 2023-03-20 RX ADMIN — Medication 25 GRAM(S): at 06:49

## 2023-03-20 RX ADMIN — HEPARIN SODIUM 5000 UNIT(S): 5000 INJECTION INTRAVENOUS; SUBCUTANEOUS at 17:20

## 2023-03-20 RX ADMIN — CHLORHEXIDINE GLUCONATE 1 APPLICATION(S): 213 SOLUTION TOPICAL at 06:25

## 2023-03-20 RX ADMIN — TAMSULOSIN HYDROCHLORIDE 0.4 MILLIGRAM(S): 0.4 CAPSULE ORAL at 21:03

## 2023-03-20 RX ADMIN — HEPARIN SODIUM 5000 UNIT(S): 5000 INJECTION INTRAVENOUS; SUBCUTANEOUS at 06:25

## 2023-03-20 RX ADMIN — Medication 175 MICROGRAM(S): at 06:24

## 2023-03-20 RX ADMIN — Medication 81 MILLIGRAM(S): at 17:20

## 2023-03-20 RX ADMIN — Medication 20 MILLIEQUIVALENT(S): at 06:49

## 2023-03-20 RX ADMIN — ATORVASTATIN CALCIUM 40 MILLIGRAM(S): 80 TABLET, FILM COATED ORAL at 21:03

## 2023-03-20 RX ADMIN — FINASTERIDE 5 MILLIGRAM(S): 5 TABLET, FILM COATED ORAL at 17:20

## 2023-03-20 RX ADMIN — HEPARIN SODIUM 5000 UNIT(S): 5000 INJECTION INTRAVENOUS; SUBCUTANEOUS at 21:03

## 2023-03-20 RX ADMIN — CLOPIDOGREL BISULFATE 75 MILLIGRAM(S): 75 TABLET, FILM COATED ORAL at 17:19

## 2023-03-20 NOTE — SWALLOW VFSS/MBS ASSESSMENT ADULT - SLP PERTINENT HISTORY OF CURRENT PROBLEM
66 y/o M with PMH of CEA s/p stenting, BPH, hypothyroidism, gout, HFrEF (EF 45%), HTN, HLD, pituitary tumor, prolactinoma, and Laryngeal cancer s/p radiation and resection p/w pleuritic CP (mild in nature, present only with deep inspiration), SOB, LE edema. He states that his CP and SOB were more profound starting this morning at 3am which prompted him to go to the ED. States he has been getting progressively worse with SOB over the past 1.5 months. His outpatient PCP started him on Lasix 20 PO BID recently and he has been compliant with that medication. In ED found with ADHF req BiPAP and given Lasix 40 IVP for diuresis. He states he now feels much better with his breathing after the Lasix and his CP is now completely resolved.

## 2023-03-20 NOTE — PROGRESS NOTE ADULT - ASSESSMENT
67M PMHx of CEA, BPH, hypothyroidism, gout, HFrEF (EF 45%), HTN, HLD, pituitary tumor w/ prolactinoma, and Laryngeal cancer s/p radiation and resection p/w pleuritic CP, SOB, LE edema found with ADHF req BiPAP    Plan:  ====================== NEUROLOGY=====================  A&Ox3-4  - continue to monitor mental status as per ICU protocol     CEA s/p stent  - c/w ASA and Plavix  - Cont. Lipitor     ==================== RESPIRATORY======================  Hypoxic resp failure  likely 2/2 fluid overload  - improved s/p diuresis with lasix 40 IVP. Weaned BiPAP to NC, now comfortable on NC  - wean NC as juan, continue to monitor SpO2 with goal >94%  - c/w home inhalers: budesonide (therapuetic exhange for home advair) and albuterol     ====================CARDIOVASCULAR==================  ADHF  - f.u TTE  - CP now resolved. Initial trop 53. Will send one more set of CE. CP pleuritic in nature, likely 2.2 ADHF. Will send one more set of CE. No ECG changes.  - Cont. Lasix 40 IV BID Net negative 1-2L   - Monitor perfusion indices     RLE swelling  - VA duplex ordered  - No tenderness, per patient swelling present for "awhile", doesn't bother him.     ===================HEMATOLOGIC/ONC ===================  h/h and plts wnl   - Monitor H/H and plts  - DVT PPX: start SQH    aspirin enteric coated 81 milliGRAM(s) Oral daily  clopidogrel Tablet 75 milliGRAM(s) Oral daily    ===================== RENAL =========================  SCR 1.57  - 12/4/22 SCr 1.51. Prior admission 05/2021 SCr 1.2   - Continue monitoring urine output, lytes, SCr/ BUN  - replete lytes prn with goal K >4 and Mg >2  - continue with diuresis as above    BPH  - c.w home flomax and finasteride   tamsulosin 0.4 milliGRAM(s) Oral at bedtime    ==================== GASTROINTESTINAL===================  NPO until S/S eval, hx of dysphagia.  - monitor for regular BM while inpatient   =======================    ENDOCRINE  =====================  Prolactinoma  - c/w home cabergoline     Hypothyroid  - c/w home synthroid  - f/u TSH    ========================INFECTIOUS DISEASE================  Afebrile, no leukocytosis   - monitor and trend WBC and temperature curve  67M PMHx of CEA, BPH, hypothyroidism, gout, HFrEF (EF 45%, now 28% this admission), HTN, HLD, pituitary tumor w/ prolactinoma, and Laryngeal cancer s/p radiation and resection p/w pleuritic CP, SOB, LE edema found with ADHF req BiPAP    Plan:  ====================== NEUROLOGY=====================  A&Ox3-4  - continue to monitor mental status as per ICU protocol     Carotid Endarterectomy s/p stent in 5/2022  - c/w ASA and Plavix  - Cont. Lipitor     ==================== RESPIRATORY======================  Hypoxic resp failure  likely 2/2 fluid overload  Resolved Breathing well on RA    ====================CARDIOVASCULAR==================  HFrEF  No longer in acute exacerbation  TTE 3/20/2023 with worsening EF to 28% from 45% prior.  Only saw Dr. Yi (cardiology) outpatient once in 2/2023, was unclear cause of SOB at that time.   - Cont. Lasix 40 IV BID Net negative 1-2L   - Monitor perfusion indices   - cardiology f/u inpatient as well as outpatient for GDMT    ===================HEMATOLOGIC/ONC ===================  h/h and plts wnl   - Monitor H/H and plts  - DVT PPX: start SQH  - on DAPT: asa plavix    ===================== RENAL =========================  SCR 1.57, probably new baseline (CKD)  - 12/4/22 SCr 1.51. Prior admission 05/2021 SCr 1.2   - Continue monitoring urine output, lytes, SCr/ BUN  - replete lytes prn with goal K >4 and Mg >2  - continue with diuresis as above    BPH  - c.w home flomax and finasteride     ==================== GASTROINTESTINAL===================  NPO until S/S eval, hx of dysphagia.  - per speech eval, patient for barium swallow 3/20 at 1:30pm before further recs  - f/u S/S recs  =======================    ENDOCRINE  =====================  Prolactinoma  - c/w home cabergoline     Hypothyroid  TSH and T4 wnl inpatient  - c/w home synthroid 175mcg qd    ========================INFECTIOUS DISEASE================  Afebrile, no leukocytosis   - monitor and trend WBC and temperature curve     Lines: peripherals 67M PMHx of CEA, BPH, hypothyroidism, gout, HFrEF (EF 45%, now 28% this admission), HTN, HLD, pituitary tumor w/ prolactinoma, and Laryngeal cancer s/p radiation and resection p/w pleuritic CP, SOB, LE edema found with ADHF req BiPAP    Plan:  ====================== NEUROLOGY=====================  A&Ox3-4  - continue to monitor mental status as per ICU protocol     Carotid Endarterectomy s/p stent in 5/2022  - c/w ASA and Plavix  - Cont. Lipitor     ==================== RESPIRATORY======================  Hypoxic resp failure  likely 2/2 fluid overload  Resolved Breathing well on RA    ====================CARDIOVASCULAR==================  HFrEF  No longer in acute exacerbation  TTE 3/20/2023 with worsening EF to 28% from 45% prior.  Only saw Dr. Yi (cardiology) outpatient once in 2/2023  - Cont. Lasix 40 IV BID Net negative 1-2L   - Monitor perfusion indices   - cardiology f/u inpatient as well as outpatient for GDMT    ===================HEMATOLOGIC/ONC ===================  h/h and plts wnl   - Monitor H/H and plts  - DVT PPX: start SQH  - on DAPT: asa plavix    ===================== RENAL =========================  SCR 1.57, probably new baseline (CKD)  - 12/4/22 SCr 1.51. Prior admission 05/2021 SCr 1.2   - Continue monitoring urine output, lytes, SCr/ BUN  - replete lytes prn with goal K >4 and Mg >2  - continue with diuresis as above    BPH  - c.w home flomax and finasteride     ==================== GASTROINTESTINAL===================  NPO until S/S eval, hx of dysphagia.  - per speech eval, patient for barium swallow 3/20 at 1:30pm before further recs  - f/u S/S recs  =======================    ENDOCRINE  =====================  Prolactinoma  - c/w home cabergoline     Hypothyroid  TSH and T4 wnl inpatient  - c/w home synthroid 175mcg qd    ========================INFECTIOUS DISEASE================  Afebrile, no leukocytosis   - monitor and trend WBC and temperature curve     Lines: peripherals

## 2023-03-20 NOTE — SWALLOW BEDSIDE ASSESSMENT ADULT - SLP PERTINENT HISTORY OF CURRENT PROBLEM
68 y/o M with PMH of CEA s/p stenting, BPH, hypothyroidism, gout, HFrEF (EF 45%), HTN, HLD, pituitary tumor, prolactinoma, and Laryngeal cancer s/p radiation and resection p/w pleuritic CP (mild in nature, present only with deep inspiration), SOB, LE edema. He states that his CP and SOB were more profound starting this morning at 3am which prompted him to go to the ED. States he has been getting progressively worse with SOB over the past 1.5 months. His outpatient PCP started him on Lasix 20 PO BID recently and he has been compliant with that medication. In ED found with ADHF req BiPAP and given Lasix 40 IVP for diuresis. He states he now feels much better with his breathing after the Lasix and his CP is now completely resolved.

## 2023-03-20 NOTE — PROGRESS NOTE ADULT - SUBJECTIVE AND OBJECTIVE BOX
Patient is a 67y old  Male who presents with a chief complaint of ADHF (19 Mar 2023 18:59)    HPI:  67M PMHx of CEA s/p stenting, BPH, hypothyroidism, gout, HFrEF (EF 45%), HTN, HLD, pituitary tumor, prolactinoma, and Laryngeal cancer s/p radiation and resection p/w pleuritic CP (mild in nature, present only with deep inspiration), SOB, LE edema. He states that his CP and SOB were more profound starting this morning at 3am which prompted him to go to the ED. States he has been getting progressively worse with SOB over the past 1.5 months. His outpatient PCP started him on Lasix 20 PO BID recently and he has been compliant with that medication. In ED found with ADHF req BiPAP and given Lasix 40 IVP for diuresis. He states he now feels much better with his breathing after  the Lasix and his CP is now completely resolved  (19 Mar 2023 10:42)       INTERVAL HPI/OVERNIGHT EVENTS:   No overnight events   Afebrile, hemodynamically stable   - potential bed board today     Subjective:    ICU Vital Signs Last 24 Hrs  T(C): 36.7 (20 Mar 2023 04:00), Max: 36.8 (19 Mar 2023 20:00)  T(F): 98 (20 Mar 2023 04:00), Max: 98.3 (19 Mar 2023 20:00)  HR: 71 (20 Mar 2023 07:00) (63 - 107)  BP: 116/75 (20 Mar 2023 06:00) (101/64 - 169/94)  BP(mean): 91 (20 Mar 2023 06:00) (77 - 124)  ABP: --  ABP(mean): --  RR: 18 (20 Mar 2023 07:00) (13 - 25)  SpO2: 97% (20 Mar 2023 07:00) (89% - 100%)    O2 Parameters below as of 20 Mar 2023 07:00  Patient On (Oxygen Delivery Method): room air          I&O's Summary    19 Mar 2023 07:01  -  20 Mar 2023 07:00  --------------------------------------------------------  IN: 340 mL / OUT: 4000 mL / NET: -3660 mL          Daily Height in cm: 177.8 (19 Mar 2023 08:09)    Daily Weight in k.3 (20 Mar 2023 04:00)    Adult Advanced Hemodynamics Last 24 Hrs  CVP(mm Hg): --  CVP(cm H2O): --  CO: --  CI: --  PA: --  PA(mean): --  PCWP: --  SVR: --  SVRI: --  PVR: --  PVRI: --    EKG/Telemetry Events:    MEDICATIONS  (STANDING):  aspirin enteric coated 81 milliGRAM(s) Oral daily  atorvastatin 40 milliGRAM(s) Oral at bedtime  budesonide  80 MICROgram(s)/formoterol 4.5 MICROgram(s) Inhaler 2 Puff(s) Inhalation two times a day  cabergoline 1 milliGRAM(s) Oral <User Schedule>  chlorhexidine 4% Liquid 1 Application(s) Topical <User Schedule>  clopidogrel Tablet 75 milliGRAM(s) Oral daily  finasteride 5 milliGRAM(s) Oral daily  furosemide   Injectable 40 milliGRAM(s) IV Push daily  heparin   Injectable 5000 Unit(s) SubCutaneous every 8 hours  levothyroxine 175 MICROGram(s) Oral daily  tamsulosin 0.4 milliGRAM(s) Oral at bedtime    MEDICATIONS  (PRN):  albuterol    0.083% 2.5 milliGRAM(s) Nebulizer every 6 hours PRN Shortness of Breath and/or Wheezing      PHYSICAL EXAM:  Appearance: Normal, NAD  HEAD:  Normocephalic  EYES:  PERRLA, conjunctiva and sclera clear  NECK: Supple, No JVD  CHEST/LUNG: Clear to auscultation bilaterally; No wheezing  HEART: Normal S1, S2. No murmurs, rubs, or gallops  ABDOMEN: + Bowel sounds, Soft, NT, ND   EXTREMITIES:  2+ Peripheral Pulses, No clubbing, cyanosis, or edema  NEUROLOGY: non-focal, aaox3  SKIN: No rashes or lesions    LABS:                        12.3   4.32  )-----------( 134      ( 20 Mar 2023 05:50 )             37.3     03-20    144  |  108  |  24<H>  ----------------------------<  92  3.7   |  25  |  1.50<H>    Ca    8.6      20 Mar 2023 05:49  Phos  4.1     -  Mg     1.6         TPro  5.8<L>  /  Alb  3.4  /  TBili  1.1  /  DBili  x   /  AST  16  /  ALT  24  /  AlkPhos  50  -    LIVER FUNCTIONS - ( 20 Mar 2023 05:49 )  Alb: 3.4 g/dL / Pro: 5.8 g/dL / ALK PHOS: 50 U/L / ALT: 24 U/L / AST: 16 U/L / GGT: x           PT/INR - ( 20 Mar 2023 05:51 )   PT: 16.0 sec;   INR: 1.37 ratio         PTT - ( 20 Mar 2023 05:51 )  PTT:31.5 sec  CAPILLARY BLOOD GLUCOSE      Creatine Kinase, Serum: 141 U/L ( @ 11:46)  CKMB Units: 2.3 ng/mL ( @ 11:46)    CARDIAC MARKERS ( 19 Mar 2023 11:46 )  x     / x     / 141 U/L / x     / 2.3 ng/mL    RADIOLOGY & ADDITIONAL TESTS:  CXR:        Care Discussed with Consultants/Other Providers [ x] YES  [ ] NO           Patient is a 67y old  Male who presents with a chief complaint of ADHF (19 Mar 2023 18:59)    HPI:  67M PMHx of CEA s/p stenting, BPH, hypothyroidism, gout, HFrEF (EF 45%), HTN, HLD, pituitary tumor, prolactinoma, and Laryngeal cancer s/p radiation and resection p/w pleuritic CP (mild in nature, present only with deep inspiration), SOB, LE edema. He states that his CP and SOB were more profound starting this morning at 3am which prompted him to go to the ED. States he has been getting progressively worse with SOB over the past 1.5 months. His outpatient PCP started him on Lasix 20 PO BID recently and he has been compliant with that medication. In ED found with ADHF req BiPAP and given Lasix 40 IVP for diuresis. He states he now feels much better with his breathing after  the Lasix and his CP is now completely resolved  (19 Mar 2023 10:42)    INTERVAL HPI/OVERNIGHT EVENTS:   No overnight events   Afebrile, hemodynamically stable   - potential bed board today   - patient now on RA, no respiratory distress reported    Subjective:  - patient says he feels well, normal BM and urine output. Denies CP, SOB.   - no n/v/d    ICU Vital Signs Last 24 Hrs  T(C): 36.7 (20 Mar 2023 04:00), Max: 36.8 (19 Mar 2023 20:00)  T(F): 98 (20 Mar 2023 04:00), Max: 98.3 (19 Mar 2023 20:00)  HR: 71 (20 Mar 2023 07:00) (63 - 107)  BP: 116/75 (20 Mar 2023 06:00) (101/64 - 169/94)  BP(mean): 91 (20 Mar 2023 06:00) (77 - 124)  ABP: --  ABP(mean): --  RR: 18 (20 Mar 2023 07:00) (13 - 25)  SpO2: 97% (20 Mar 2023 07:00) (89% - 100%)    O2 Parameters below as of 20 Mar 2023 07:00  Patient On (Oxygen Delivery Method): room air    I&O's Summary    19 Mar 2023 07:01  -  20 Mar 2023 07:00  --------------------------------------------------------  IN: 340 mL / OUT: 4000 mL / NET: -3660 mL    Daily Height in cm: 177.8 (19 Mar 2023 08:09)    Daily Weight in k.3 (20 Mar 2023 04:00)    Adult Advanced Hemodynamics Last 24 Hrs  CVP(mm Hg): --  CVP(cm H2O): --  CO: --  CI: --  PA: --  PA(mean): --  PCWP: --  SVR: --  SVRI: --  PVR: --  PVRI: --    EKG/Telemetry Events:  No events.    MEDICATIONS  (STANDING):  aspirin enteric coated 81 milliGRAM(s) Oral daily  atorvastatin 40 milliGRAM(s) Oral at bedtime  budesonide  80 MICROgram(s)/formoterol 4.5 MICROgram(s) Inhaler 2 Puff(s) Inhalation two times a day  cabergoline 1 milliGRAM(s) Oral <User Schedule>  chlorhexidine 4% Liquid 1 Application(s) Topical <User Schedule>  clopidogrel Tablet 75 milliGRAM(s) Oral daily  finasteride 5 milliGRAM(s) Oral daily  furosemide   Injectable 40 milliGRAM(s) IV Push daily  heparin   Injectable 5000 Unit(s) SubCutaneous every 8 hours  levothyroxine 175 MICROGram(s) Oral daily  tamsulosin 0.4 milliGRAM(s) Oral at bedtime    MEDICATIONS  (PRN):  albuterol    0.083% 2.5 milliGRAM(s) Nebulizer every 6 hours PRN Shortness of Breath and/or Wheezing    PHYSICAL EXAM:  Appearance: Normal, NAD  HEAD:  Normocephalic  EYES:  PERRLA, conjunctiva and sclera clear  NECK: Supple, No JVD  CHEST/LUNG: Mild crackles lower lobes bilaterally   HEART: Normal S1, S2. No murmurs, rubs, or gallops  ABDOMEN: + Bowel sounds, Soft, NT, ND   EXTREMITIES: Trace edema LE bilaterally. 2+ Peripheral Pulses, No clubbing, cyanosis  NEUROLOGY: non-focal, aaox3  SKIN: No rashes or lesions    LABS:                        12.3   4.32  )-----------( 134      ( 20 Mar 2023 05:50 )             37.3     03-    144  |  108  |  24<H>  ----------------------------<  92  3.7   |  25  |  1.50<H>    Ca    8.6      20 Mar 2023 05:49  Phos  4.1     03-  Mg     1.6         TPro  5.8<L>  /  Alb  3.4  /  TBili  1.1  /  DBili  x   /  AST  16  /  ALT  24  /  AlkPhos  50  03-20    LIVER FUNCTIONS - ( 20 Mar 2023 05:49 )  Alb: 3.4 g/dL / Pro: 5.8 g/dL / ALK PHOS: 50 U/L / ALT: 24 U/L / AST: 16 U/L / GGT: x           PT/INR - ( 20 Mar 2023 05:51 )   PT: 16.0 sec;   INR: 1.37 ratio         PTT - ( 20 Mar 2023 05:51 )  PTT:31.5 sec  CAPILLARY BLOOD GLUCOSE      Creatine Kinase, Serum: 141 U/L ( @ 11:46)  CKMB Units: 2.3 ng/mL ( @ 11:46)    CARDIAC MARKERS ( 19 Mar 2023 11:46 )  x     / x     / 141 U/L / x     / 2.3 ng/mL    RADIOLOGY & ADDITIONAL TESTS:  < from: TTE with Doppler (w/Cont) (23 @ 08:54) >  Conclusions:  1. Normal mitral valve.  2. Normal trileaflet aortic valve.Mild aortic  regurgitation.  3. Severely dilated left atrium.  LA volume index = 69  cc/m2. Severe concentric leftventricular  hypertrophy.Severe global left ventricular systolic  dysfunction. There is severe global hypokinesis. LVEF  calculated using biplane Alberto's method is 28%. No left  ventricular thrombus.Severe reversible diastolic  dysfunction (Stage III).Increased E/e'  is consistent with  elevated left ventricular filling pressure.  4. Severe right atrial enlargement.The right ventricle is  not well visualized. Using UEA, the RV appears dilated and  decreased in function.  5. Normal tricuspid valve. Mild-moderate tricuspid  regurgitation.Estimated pulmonary artery systolic pressure  equals 29 mm Hg, assuming right atrial pressure equals 8  mm Hg, consistent with normal pulmonary pressures.  6. No pericardial effusion seen.  *** Compared with echocardiogram of 2021, there is  decreased LVEF and has severe diastolic dysfunction.    < end of copied text >          Care Discussed with Consultants/Other Providers [ x] YES  [ ] NO           Patient is a 67y old  Male who presents with a chief complaint of ADHF (19 Mar 2023 18:59)    HPI:  67M PMHx of CEA s/p stenting, BPH, hypothyroidism, gout, HFrEF (EF 45%), HTN, HLD, pituitary tumor, prolactinoma, and Laryngeal cancer s/p radiation and resection p/w pleuritic CP (mild in nature, present only with deep inspiration), SOB, LE edema. He states that his CP and SOB were more profound starting this morning at 3am which prompted him to go to the ED. States he has been getting progressively worse with SOB over the past 1.5 months. His outpatient PCP started him on Lasix 20 PO BID recently and he has been compliant with that medication. In ED found with ADHF req BiPAP and given Lasix 40 IVP for diuresis. He states he now feels much better with his breathing after  the Lasix and his CP is now completely resolved  (19 Mar 2023 10:42)    INTERVAL HPI/OVERNIGHT EVENTS:   No overnight events   Afebrile, hemodynamically stable   - potential bed board today   - patient now on RA, no respiratory distress reported    Subjective:  - patient says he feels well, normal BM and urine output. Denies CP, SOB.   - no n/v/d    ICU Vital Signs Last 24 Hrs  T(C): 36.7 (20 Mar 2023 04:00), Max: 36.8 (19 Mar 2023 20:00)  T(F): 98 (20 Mar 2023 04:00), Max: 98.3 (19 Mar 2023 20:00)  HR: 71 (20 Mar 2023 07:00) (63 - 107)  BP: 116/75 (20 Mar 2023 06:00) (101/64 - 169/94)  BP(mean): 91 (20 Mar 2023 06:00) (77 - 124)  ABP: --  ABP(mean): --  RR: 18 (20 Mar 2023 07:00) (13 - 25)  SpO2: 97% (20 Mar 2023 07:00) (89% - 100%)    O2 Parameters below as of 20 Mar 2023 07:00  Patient On (Oxygen Delivery Method): room air    I&O's Summary    19 Mar 2023 07:01  -  20 Mar 2023 07:00  --------------------------------------------------------  IN: 340 mL / OUT: 4000 mL / NET: -3660 mL    Daily Height in cm: 177.8 (19 Mar 2023 08:09)    Daily Weight in k.3 (20 Mar 2023 04:00)    Adult Advanced Hemodynamics Last 24 Hrs  CVP(mm Hg): --  CVP(cm H2O): --  CO: --  CI: --  PA: --  PA(mean): --  PCWP: --  SVR: --  SVRI: --  PVR: --  PVRI: --    EKG/Telemetry Events:  No events.    MEDICATIONS  (STANDING):  aspirin enteric coated 81 milliGRAM(s) Oral daily  atorvastatin 40 milliGRAM(s) Oral at bedtime  budesonide  80 MICROgram(s)/formoterol 4.5 MICROgram(s) Inhaler 2 Puff(s) Inhalation two times a day  cabergoline 1 milliGRAM(s) Oral <User Schedule>  chlorhexidine 4% Liquid 1 Application(s) Topical <User Schedule>  clopidogrel Tablet 75 milliGRAM(s) Oral daily  finasteride 5 milliGRAM(s) Oral daily  furosemide   Injectable 40 milliGRAM(s) IV Push daily  heparin   Injectable 5000 Unit(s) SubCutaneous every 8 hours  levothyroxine 175 MICROGram(s) Oral daily  tamsulosin 0.4 milliGRAM(s) Oral at bedtime    MEDICATIONS  (PRN):  albuterol    0.083% 2.5 milliGRAM(s) Nebulizer every 6 hours PRN Shortness of Breath and/or Wheezing    PHYSICAL EXAM:  Appearance: Normal, NAD  HEAD:  Normocephalic  EYES:  PERRL, conjunctiva and sclera clear  NECK: Supple, No JVD  CHEST/LUNG: Mild crackles lower lobes bilaterally   HEART: Normal S1, S2. No murmurs, rubs, or gallops  ABDOMEN: + Bowel sounds, Soft, NT, ND   EXTREMITIES: Trace edema LE bilaterally. 2+ Peripheral Pulses, No clubbing, cyanosis  NEUROLOGY: non-focal, aaox3  SKIN: No rashes or lesions    LABS:                        12.3   4.32  )-----------( 134      ( 20 Mar 2023 05:50 )             37.3     03-    144  |  108  |  24<H>  ----------------------------<  92  3.7   |  25  |  1.50<H>    Ca    8.6      20 Mar 2023 05:49  Phos  4.1     03-  Mg     1.6         TPro  5.8<L>  /  Alb  3.4  /  TBili  1.1  /  DBili  x   /  AST  16  /  ALT  24  /  AlkPhos  50  03-20    LIVER FUNCTIONS - ( 20 Mar 2023 05:49 )  Alb: 3.4 g/dL / Pro: 5.8 g/dL / ALK PHOS: 50 U/L / ALT: 24 U/L / AST: 16 U/L / GGT: x           PT/INR - ( 20 Mar 2023 05:51 )   PT: 16.0 sec;   INR: 1.37 ratio         PTT - ( 20 Mar 2023 05:51 )  PTT:31.5 sec  CAPILLARY BLOOD GLUCOSE      Creatine Kinase, Serum: 141 U/L ( @ 11:46)  CKMB Units: 2.3 ng/mL ( @ 11:46)    CARDIAC MARKERS ( 19 Mar 2023 11:46 )  x     / x     / 141 U/L / x     / 2.3 ng/mL    RADIOLOGY & ADDITIONAL TESTS:  < from: TTE with Doppler (w/Cont) (23 @ 08:54) >  Conclusions:  1. Normal mitral valve.  2. Normal trileaflet aortic valve.Mild aortic  regurgitation.  3. Severely dilated left atrium.  LA volume index = 69  cc/m2. Severe concentric leftventricular  hypertrophy.Severe global left ventricular systolic  dysfunction. There is severe global hypokinesis. LVEF  calculated using biplane Alberto's method is 28%. No left  ventricular thrombus.Severe reversible diastolic  dysfunction (Stage III).Increased E/e'  is consistent with  elevated left ventricular filling pressure.  4. Severe right atrial enlargement.The right ventricle is  not well visualized. Using UEA, the RV appears dilated and  decreased in function.  5. Normal tricuspid valve. Mild-moderate tricuspid  regurgitation.Estimated pulmonary artery systolic pressure  equals 29 mm Hg, assuming right atrial pressure equals 8  mm Hg, consistent with normal pulmonary pressures.  6. No pericardial effusion seen.  *** Compared with echocardiogram of 2021, there is  decreased LVEF and has severe diastolic dysfunction.    < end of copied text >          Care Discussed with Consultants/Other Providers [ x] YES  [ ] NO

## 2023-03-20 NOTE — SWALLOW BEDSIDE ASSESSMENT ADULT - SLP GENERAL OBSERVATIONS
Pt was received at bedside awake and alert with spouse present. +room air, +tele (VSS). He was AAOx4, pleasant, and cooperative. Good historian- able to follow all directions and answer all questions for purposes of evaluation. Vocal quality deemed hoarse, rough, and with reduced vocal volume (h/o vocal cord resection/XRT 2007). Pt reported vocal quality at baseline since surgery.

## 2023-03-20 NOTE — SWALLOW BEDSIDE ASSESSMENT ADULT - SPECIFY REASON(S)
to clinically evaluate pt's tom-pharyngeal swallow mechanism. Per consult, "vocal cord lesions, known h/o intermittent dysphagia."

## 2023-03-20 NOTE — CONSULT NOTE ADULT - PROBLEM SELECTOR RECOMMENDATION 2
Now off BiPAP, on IV diuresis  Echocardiogram rsults pending- last echo with moderate atrial dilation, mod to severe tricuspid regurg, mild mitral regurg  - Monitor on telemetry for any arrythmias  Need cardiology to determine etiology of CHF- repeat cardiac cath? last performed may 2021  Continue with diuretics  - Needs GDMT- was started on lisinoril by me, and then losartan by cardio  - Betablocker. SGLT2i reasonable in the setting of prediabetes

## 2023-03-20 NOTE — SWALLOW BEDSIDE ASSESSMENT ADULT - COMMENTS
Hx cont: Chart Note: 3/19: "Patient brother called for help bedside. Went to patient bedside where he was seen coughing and was not responding. He was eating his lunch prior to these events. With sitting the patient in a 90 degree position he was able to cough up some sweet potatoes with subsequent improvement. After event patient able to talk in full sentences and SpO2 98%. I asked patient and wife if this has ever happened to him at home before. They said that is did intermittently happen to him since his vocal cord nodule resection/radiation in 2007. He followed up with his doctors about this, got a "scope down his throat" and was given no further recommendations about it. He follows a normal diet at home with solid food. He states sometimes it can happen with food or sometimes with water. He states he was told it is because his "throat flap" sometimes stays open (likely epiglottis). Ordered for a s/s exam and made patient NPO. Called ENT and presented the case, they state they would just have a s/s exam at this time and had no further recommendations." Seen by ENT: "Bedside indirect laryngoscopy revealed laryngeal edema consistent with radiation changes, airway patent. Pt has a H/O VC cancer excision followed by RT in 2007." Rx: PPI, swallow eval, diet per S/S, f/u with private ENT as outpt. 3/20: potential bed board today.     IMAGING:  CXR: 3/19/23  IMPRESSION: Heart size is enlarged, and appears larger in size compared to prior x-ray. Questionable developing infiltrate at the right base.    Pt is unknown to this service.

## 2023-03-20 NOTE — CONSULT NOTE ADULT - ASSESSMENT
Patient is a 67 year old male with PMHx of CEA, BPH, hypothyroid, new HFrEF, pituitary mass on cabergoline, laryngeal ca s/p resection with hoarse voice who is admitted to the hospital due to acute hypoxic respiratory failure due to heart failure exacerbation.

## 2023-03-20 NOTE — SWALLOW VFSS/MBS ASSESSMENT ADULT - SUCCESSFUL STRATEGIES TRIALED DURING PROCEDURE
Chin Tuck reduced the depth/amount of penetration Head Turn LEFT with additional dry swallow with head still turned improved airway protection. Mild penetration on secondary dry swallow with eventual retrieval with additional dry swallows.

## 2023-03-20 NOTE — SWALLOW BEDSIDE ASSESSMENT ADULT - PHARYNGEAL PHASE
Multiple swallows (x3)/Decreased laryngeal elevation/Cough post oral intake/Throat clear post oral intake

## 2023-03-20 NOTE — SWALLOW VFSS/MBS ASSESSMENT ADULT - ASPIRATION DURING THE SWALLOW - SILENT
Trace aspiration during the swallow from the laryngeal surface of the epiglottis. Throat clear/cough response.

## 2023-03-20 NOTE — SWALLOW VFSS/MBS ASSESSMENT ADULT - ADDITIONAL RECOMMENDATIONS
GOALS:  1. Pt/family/caregiver will demonstrate understanding and carryover of dysphagia management (safe swallow guidelines, compensatory strategies, dysphagia diet).  2. Pt will complete dysphagia exercises to improve swallow function.  3. Pt will tolerate recommended diet with no overt, clinical s/s of aspiration.

## 2023-03-20 NOTE — CONSULT NOTE ADULT - PROBLEM SELECTOR RECOMMENDATION 9
PPI   Swallow eval  Diet per S/S  F/U with private ENT as outpt
Improved now, off BIPAP saturating 100% on RA  Continue diuresis  Likely etiology due to CHF

## 2023-03-20 NOTE — SWALLOW VFSS/MBS ASSESSMENT ADULT - RECOMMENDED FEEDING/EATING TECHNIQUES
Encourage 2-3 dry swallows between bites/sips; SMALL SINGLE bites/sips; SLOW RATE; chew completely/alternate food with liquid/crush medication (when feasible)/maintain upright posture during/after eating for 30 mins/oral hygiene Encourage 2-3 dry swallows between bites/sips; SMALL SINGLE bites/sips; SLOW RATE; chew completely; Intermittent cough/throat clear throughout meal!/alternate food with liquid/crush medication (when feasible)/maintain upright posture during/after eating for 30 mins/oral hygiene

## 2023-03-20 NOTE — CONSULT NOTE ADULT - SUBJECTIVE AND OBJECTIVE BOX
[facility]  Leo Rdor[place]guez  Office: [phone]  Cell: [phone]  Can also be reached on Microsoft Teams     INTERVAL HPI/OVERNIGHT EVENTS: Pateint well known to me, being followed outpatient. came to the office last year after ER visit and BNP elevation. This was evaluated with Echocardiogram that showed a new mild decrease of EF @ 45%. He was started on lisinopril and furosemide but due to him feeling fine, never started. He was referred to cardiology for evaluation. Last had cardiac cath a couple of years ago that was normal. He also had a carotid artery stent placed on asa plavix and statin. At the present denies any chest pain or sob. no fever chills      REVIEW OF SYSTEMS:  Negative except HPI    Vital Signs Last 24 Hrs  T(C): 36.7 (20 Mar 2023 04:00), Max: 36.8 (19 Mar 2023 20:00)  T(F): 98 (20 Mar 2023 04:00), Max: 98.3 (19 Mar 2023 20:00)  HR: 71 (20 Mar 2023 07:00) (63 - 107)  BP: 116/75 (20 Mar 2023 06:00) (101/64 - 169/94)  BP(mean): 91 (20 Mar 2023 06:00) (77 - 124)  RR: 18 (20 Mar 2023 07:00) (13 - 25)  SpO2: 97% (20 Mar 2023 07:00) (89% - 100%)    Parameters below as of 20 Mar 2023 07:00  Patient On (Oxygen Delivery Method): room air        PHYSICAL EXAMINATION:  GENERAL: NAD, well built  HEAD:  Atraumatic, Normocephalic  EYES:  conjunctiva and sclera clear  NECK: Supple, No JVD, Normal thyroid  CHEST/LUNG: Clear to auscultation. Clear to percussion bilaterally; No rales, rhonchi, wheezing, or rubs  HEART: Regular rate and rhythm; No murmurs, rubs, or gallops  ABDOMEN: Soft, Nontender, Nondistended; Bowel sounds present  NERVOUS SYSTEM:  Alert & Oriented X3,    EXTREMITIES:  No pitting edema  SKIN: warm dry                          12.3   4.32  )-----------( 134      ( 20 Mar 2023 05:50 )             37.3     03-20    144  |  108  |  24<H>  ----------------------------<  92  3.7   |  25  |  1.50<H>    Ca    8.6      20 Mar 2023 05:49  Phos  4.1     03-20  Mg     1.6     03-20    TPro  5.8<L>  /  Alb  3.4  /  TBili  1.1  /  DBili  x   /  AST  16  /  ALT  24  /  AlkPhos  50  03-20    LIVER FUNCTIONS - ( 20 Mar 2023 05:49 )  Alb: 3.4 g/dL / Pro: 5.8 g/dL / ALK PHOS: 50 U/L / ALT: 24 U/L / AST: 16 U/L / GGT: x           CARDIAC MARKERS ( 19 Mar 2023 11:46 )  x     / x     / 141 U/L / x     / 2.3 ng/mL      PT/INR - ( 20 Mar 2023 05:51 )   PT: 16.0 sec;   INR: 1.37 ratio         PTT - ( 20 Mar 2023 05:51 )  PTT:31.5 sec    CAPILLARY BLOOD GLUCOSE      RADIOLOGY & ADDITIONAL TESTS:                   [facility]  Leo Rodríguez  Office: [phone]  Cell: [phone]  Can also be reached on Microsoft Teams     INTERVAL HPI/OVERNIGHT EVENTS: Pateint well known to me, being followed outpatient. came to the office last year after ER visit and BNP elevation. This was evaluated with Echocardiogram that showed a new mild decrease of EF @ 45%. He was started on lisinopril and furosemide but due to him feeling fine, never started. On followup for worsening sob, this was compliance was stressed and increased furosemide 2 0mg bid. He was referred to cardiology for evaluation. Last had cardiac cath a couple of years ago that was normal. He also had a carotid artery stent placed on asa plavix and statin. At the present denies any chest pain or sob. no fever chills      REVIEW OF SYSTEMS:  Negative except HPI    Vital Signs Last 24 Hrs  T(C): 36.7 (20 Mar 2023 04:00), Max: 36.8 (19 Mar 2023 20:00)  T(F): 98 (20 Mar 2023 04:00), Max: 98.3 (19 Mar 2023 20:00)  HR: 71 (20 Mar 2023 07:00) (63 - 107)  BP: 116/75 (20 Mar 2023 06:00) (101/64 - 169/94)  BP(mean): 91 (20 Mar 2023 06:00) (77 - 124)  RR: 18 (20 Mar 2023 07:00) (13 - 25)  SpO2: 97% (20 Mar 2023 07:00) (89% - 100%)    Parameters below as of 20 Mar 2023 07:00  Patient On (Oxygen Delivery Method): room air        PHYSICAL EXAMINATION:  GENERAL: NAD, well built  HEAD:  Atraumatic, Normocephalic  EYES:  conjunctiva and sclera clear  NECK: Supple, No JVD, Normal thyroid  CHEST/LUNG: Clear to auscultation. Clear to percussion bilaterally; No rales, rhonchi, wheezing, or rubs  HEART: Regular rate and rhythm; No murmurs, rubs, or gallops  ABDOMEN: Soft, Nontender, Nondistended; Bowel sounds present  NERVOUS SYSTEM:  Alert & Oriented X3,    EXTREMITIES:  No pitting edema  SKIN: warm dry                          12.3   4.32  )-----------( 134      ( 20 Mar 2023 05:50 )             37.3     03-20    144  |  108  |  24<H>  ----------------------------<  92  3.7   |  25  |  1.50<H>    Ca    8.6      20 Mar 2023 05:49  Phos  4.1     03-20  Mg     1.6     03-20    TPro  5.8<L>  /  Alb  3.4  /  TBili  1.1  /  DBili  x   /  AST  16  /  ALT  24  /  AlkPhos  50  03-20    LIVER FUNCTIONS - ( 20 Mar 2023 05:49 )  Alb: 3.4 g/dL / Pro: 5.8 g/dL / ALK PHOS: 50 U/L / ALT: 24 U/L / AST: 16 U/L / GGT: x           CARDIAC MARKERS ( 19 Mar 2023 11:46 )  x     / x     / 141 U/L / x     / 2.3 ng/mL      PT/INR - ( 20 Mar 2023 05:51 )   PT: 16.0 sec;   INR: 1.37 ratio         PTT - ( 20 Mar 2023 05:51 )  PTT:31.5 sec    CAPILLARY BLOOD GLUCOSE      RADIOLOGY & ADDITIONAL TESTS:

## 2023-03-20 NOTE — CONSULT NOTE ADULT - PROBLEM SELECTOR PROBLEM 8
Took ibuprofen this am, hot steam shower, and drinking hot beverages    Has plans for travel and is wanting to be tested to ensure she is not contagious with COVID/flu/strep   Need for prophylactic measure

## 2023-03-20 NOTE — SWALLOW VFSS/MBS ASSESSMENT ADULT - ADDITIONAL INFORMATION
?esophageal web  +Air distention   +Small osteophyte on the anterior cervical spine at the level of C6-C7 with trace retention surrounding with trace retrograde flow.

## 2023-03-20 NOTE — SWALLOW VFSS/MBS ASSESSMENT ADULT - RESIDUE IN LARYNGEAL VESTIBULE / VENTRICLE
Trace residue remaining on vocal folds and along the laryngeal surface of the epiglottis. trace residue on the shallow portion of the laryngeal surface of the epiglottis (does not descend further or increase in amount with extensive trials) Trace-mild residue on/below vocal folds and along the laryngeal surface of the epiglottis. Does not fully clear with additional cued coughs. Trace residue along the laryngeal surface of the epiglottis

## 2023-03-20 NOTE — SWALLOW VFSS/MBS ASSESSMENT ADULT - DIAGNOSTIC IMPRESSIONS
66 y/o M with PMH of new HFrEF and vocal cord nodule resection/radiation in 2007 with hoarse voice/dysphagia, who is admitted for acute hypoxic respiratory failure due to HF exacerbation. Hospital course c/b choking episode on sweet potatoes, made NPO pending S&S. Pt reports h/o dysphagia since vocal cord resection in 2007 with frequent coughing/throat with thin liquids and food and occasional choking episodes. Pt was seen today for Modified Barium Swallow Study (MBS) which revealed a mild oral dysphagia and moderate pharyngeal dysphagia. Aspiration of thin liquids during the swallow. Improvement in airway protection with head turn to the LEFT with additional 1-2 dry swallows with head turned to left. Pt demonstrated mild penetration with puree and soft and bite sized trials. Although pt with inconsistent retrieval of penetrated material, no increase in amount or depth of penetration with extensive trials. Increase in depth of penetration with regular solids. Pt is at risk for aspiration. Disorders: reduced BOT to posterior pharyngeal wall contact, mild delay in trigger of the swallow reflex, reduced hyo-laryngeal excursion, reduced laryngeal closure, and reduced pharyngeal contractility. OF NOTE, pt with throat clearing/coughing prior to PO trials. 68 y/o M with PMH of vocal cord nodule resection/radiation in 2007 with hoarse voice/dysphagia, who is admitted for acute hypoxic respiratory failure due to HF exacerbation. Hospital course c/b choking episode. Pt was seen today for Modified Barium Swallow Study (MBS) which revealed a mild oral dysphagia and moderate pharyngeal dysphagia. Aspiration of thin liquids. Improvement in airway protection with head turn to the LEFT with additional 1-2 dry swallows. Mild penetration with puree and soft and bite sized trials with inconsistent retrieval; however, no increase in amount or depth of penetration with extensive trials. Increase in depth of penetration with regular solids. Disorders: reduced BOT to posterior pharyngeal wall contact, mild delay in trigger of the swallow reflex, reduced hyo-laryngeal excursion, reduced laryngeal closure, and reduced pharyngeal contractility. OF NOTE, pt with throat clearing/coughing prior to PO trials.

## 2023-03-20 NOTE — SWALLOW VFSS/MBS ASSESSMENT ADULT - ORAL PHASE
Uncontrolled spillover (over ~ half the bolus with cup trials) to the level of the pyriform sinus Uncontrolled bolus / spillover in hypopharynx within functional limits Within functional limits

## 2023-03-20 NOTE — PROGRESS NOTE ADULT - THIS PATIENT HAS THE FOLLOWING CONDITION(S)/DIAGNOSES ON THIS ADMISSION:
04/19/2019    HGB 9.5 (A) 04/19/2019    HCT 30.7 (A) 04/19/2019    MCV 99.0 04/19/2019    .0 04/19/2019       Chemistry        Component Value Date/Time     04/19/2019    K 4.7 04/19/2019     04/19/2019    CO2 28 04/19/2019    BUN 24 (H) 02/27/2019 1030    CREATININE 0.9 04/19/2019        Component Value Date/Time    CALCIUM 9.6 04/19/2019    ALKPHOS 98 04/19/2019    AST 16 04/19/2019    ALT 11 04/19/2019    BILITOT 0.6 04/19/2019          Lab Results   Component Value Date    SEDRATE 32 (H) 05/09/2016     No results found for: RAMILA, ALLIE, SSA, SSB, C3, C4  No results found for: HBSAGI, HEPBIGM, HCVABI  No results found for: RAMILA, ANATITER, ANAINT, PATH  No results found for: DSDNAG, DSDNAIGGIFA  No results found for: SSAROAB, SSALAAB  No results found for: SMAB, RNPAB  No results found for: CENTABIGG  Lab Results   Component Value Date    SEDRATE 32 05/09/2016     Lab Results   Component Value Date    ACE 33 04/27/2018     Lab Results   Component Value Date    VITD25 38.2 04/27/2018     Lab Results   Component Value Date    COLORU Yellow 12/30/2016    COLORU DK YELLOW 06/01/2016    CLARITYU CLOUDY 06/01/2016    GLUCOSEU Neg 12/30/2016    BILIRUBINUR Neg 12/30/2016    KETUA Neg 12/30/2016    SPECGRAV 1.013 12/30/2016    BLOODU Neg 12/30/2016    PHUR 6.0 06/01/2016    PROTEINU Neg 12/30/2016    UROBILINOGEN Neg 12/30/2016    NITRU Neg 12/30/2016    LEUKOCYTESUR Small 12/30/2016    LABMICR YES 06/01/2016    HYALCAST 2 06/01/2016    WBCUA 5-9 12/30/2016    RBCUA 1-2 12/30/2016    EPIU 2 06/01/2016     Nm Bone Scan Whole Body    Result Date: 2/23/2018  EXAMINATION: WHOLE BODY BONE SCAN  2/23/2018 TECHNIQUE: The patient was injected intravenously with 23.4 mCi of 99 mTc MDP and scintigraphy of the entire skeleton was performed approximately three hours later.  COMPARISON: CT dated 10/13/2017 HISTORY: ORDERING PHYSICIAN PROVIDED HISTORY: Malignant neoplasm of female breast, unspecified estrogen receptor
ADHF
Heart Failure/Acute Respiratory Failure

## 2023-03-20 NOTE — CHART NOTE - NSCHARTNOTEFT_GEN_A_CORE
CCU Transfer Note    Transfer from: CCU    Transfer to: ( x ) Telemetry --    Accepting Physician: Dr. Rodríguez  Team covering on floor: ACP/Resident team   Signout given to: Hospitalist and     CCU COURSE:     HPI:  67M PMHx of CEA s/p stenting, BPH, hypothyroidism, gout, HFrEF (EF 45%), HTN, HLD, pituitary tumor, prolactinoma, and Laryngeal cancer s/p radiation and resection p/w pleuritic CP (mild in nature, present only with deep inspiration), SOB, LE edema. He states that his CP and SOB were more profound starting this morning at 3am which prompted him to go to the ED. States he has been getting progressively worse with SOB over the past 1.5 months. His outpatient PCP started him on Lasix 20 PO BID recently and he has been compliant with that medication. In ED found with ADHF req BiPAP and given Lasix 40 IVP for diuresis. He states he now feels much better with his breathing after  the Lasix and his CP is now completely resolved  (19 Mar 2023 10:42)    Patient was admitted to CICU on BiPAP for ADHF, patient was diuresed with lasix and was eventually weaned to RA. Patient with much improved LE edema and lung sounds. TTE on 3/20/23 showing a newly worsened EF at 28% (previous TTE was 45% in 2021). Patient is being followed by Dr. Yi from cardiology. Patient is no longer in acute exacerbation of his CHF, and is medically optimized for transfer to the general medical floors.       PAST MEDICAL & SURGICAL HISTORY:  Low back pain  Herniated disc on lumbar region      Redundant prepuce and phimosis      Arthritis  on knees      Hypothyroid  not taking any meds      Hypertension      Thyroid cancer  pt denies any thyroid cancer      History of pituitary disease  on caber      Gout      S/P excision of vocal cord nodule  2007 and radiation -      Laryngeal cancer  &quot;stage 0&quot;      Pituitary mass      Carotid occlusion, bilateral      Obese      S/P excision of vocal cord nodule  radiation - 2007      History of lumbar spinal fusion  2018      H/O arthroscopy of shoulder  right-2017      Status post carotid surgery  right-5/13/2022, pt. states unsuccesful          Vital Signs Last 24 Hrs  T(C): 36.5 (20 Mar 2023 07:00), Max: 36.8 (19 Mar 2023 20:00)  T(F): 97.7 (20 Mar 2023 07:00), Max: 98.3 (19 Mar 2023 20:00)  HR: 86 (20 Mar 2023 17:00) (63 - 88)  BP: 108/64 (20 Mar 2023 17:00) (101/59 - 147/97)  BP(mean): 82 (20 Mar 2023 17:00) (75 - 118)  RR: 24 (20 Mar 2023 17:00) (11 - 31)  SpO2: 100% (20 Mar 2023 17:00) (89% - 100%)    Parameters below as of 20 Mar 2023 17:00  Patient On (Oxygen Delivery Method): room air      MEDICATIONS  (STANDING):  aspirin enteric coated 81 milliGRAM(s) Oral daily  atorvastatin 40 milliGRAM(s) Oral at bedtime  budesonide  80 MICROgram(s)/formoterol 4.5 MICROgram(s) Inhaler 2 Puff(s) Inhalation two times a day  cabergoline 1 milliGRAM(s) Oral <User Schedule>  chlorhexidine 4% Liquid 1 Application(s) Topical <User Schedule>  clopidogrel Tablet 75 milliGRAM(s) Oral daily  finasteride 5 milliGRAM(s) Oral daily  furosemide   Injectable 40 milliGRAM(s) IV Push daily  heparin   Injectable 5000 Unit(s) SubCutaneous every 8 hours  levothyroxine 175 MICROGram(s) Oral daily  tamsulosin 0.4 milliGRAM(s) Oral at bedtime    MEDICATIONS  (PRN):  albuterol    0.083% 2.5 milliGRAM(s) Nebulizer every 6 hours PRN Shortness of Breath and/or Wheezing    PHYSICAL EXAM:  Appearance: Normal, NAD  HEAD:  Normocephalic  EYES:  PERRL, conjunctiva and sclera clear  NECK: Supple, No JVD  CHEST/LUNG: Mild crackles lower lobes bilaterally   HEART: Normal S1, S2. No murmurs, rubs, or gallops  ABDOMEN: + Bowel sounds, Soft, NT, ND   EXTREMITIES: Trace edema LE bilaterally. 2+ Peripheral Pulses, No clubbing, cyanosis  NEUROLOGY: non-focal, aaox3  SKIN: No rashes or lesions    CARDIAC MARKERS ( 19 Mar 2023 11:46 )  x     / x     / 141 U/L / x     / 2.3 ng/mL                            12.3   4.32  )-----------( 134      ( 20 Mar 2023 05:50 )             37.3     03-20    144  |  108  |  24<H>  ----------------------------<  92  3.7   |  25  |  1.50<H>    Ca    8.6      20 Mar 2023 05:49  Phos  4.1     03-20  Mg     1.6     03-20    TPro  5.8<L>  /  Alb  3.4  /  TBili  1.1  /  DBili  x   /  AST  16  /  ALT  24  /  AlkPhos  50  03-20    PT/INR - ( 20 Mar 2023 05:51 )   PT: 16.0 sec;   INR: 1.37 ratio         PTT - ( 20 Mar 2023 05:51 )  PTT:31.5 sec  PHYSICAL EXAM:      ASSESSMENT & PLAN:   67M PMHx of CEA, BPH, hypothyroidism, gout, HFrEF (EF 45%, now 28% this admission), HTN, HLD, pituitary tumor w/ prolactinoma, and Laryngeal cancer s/p radiation and resection p/w pleuritic CP, SOB, LE edema found with ADHF req BiPAP    Plan:  ====================== NEUROLOGY=====================  A&Ox3-4  - continue to monitor mental status as per ICU protocol     Carotid Endarterectomy s/p stent in 5/2022  - c/w ASA and Plavix  - Cont. Lipitor     ==================== RESPIRATORY======================  Hypoxic resp failure  likely 2/2 fluid overload  Resolved Breathing well on RA    ====================CARDIOVASCULAR==================  HFrEF  No longer in acute exacerbation  TTE 3/20/2023 with worsening EF to 28% from 45% prior.  Only saw Dr. Yi (cardiology) outpatient once in 2/2023  - Cont. Lasix 40 IV BID Net negative 1-2L   - Monitor perfusion indices   - cardiology f/u inpatient as well as outpatient for GDMT    ===================HEMATOLOGIC/ONC ===================  h/h and plts wnl   - Monitor H/H and plts  - DVT PPX: start SQH  - on DAPT: asa plavix    ===================== RENAL =========================  SCR 1.57, probably new baseline (CKD)  - 12/4/22 SCr 1.51. Prior admission 05/2021 SCr 1.2   - Continue monitoring urine output, lytes, SCr/ BUN  - replete lytes prn with goal K >4 and Mg >2  - continue with diuresis as above    BPH  - c.w home flomax and finasteride     ==================== GASTROINTESTINAL===================  NPO until S/S eval, hx of dysphagia.  - per speech eval, patient for barium swallow 3/20 at 1:30pm before further recs  - f/u S/S recs  --- patient s/p barium swallow--> soft and bite sized diet, thin liquids but must lean to left when drinking.   =======================    ENDOCRINE  =====================  Prolactinoma  - c/w home cabergoline     Hypothyroid  TSH and T4 wnl inpatient  - c/w home synthroid 175mcg qd    ========================INFECTIOUS DISEASE================  Afebrile, no leukocytosis   - monitor and trend WBC and temperature curve     Lines: peripherals    FOR FOLLOW UP:  [ ] Cardiac MRI ordered due to new worsening CHF  [ ] f/u cardiology recs, Dr. Yi will follow patient  [ ] Eventual GOC discussion prior to DC  [ ] Monitor SCr CCU Transfer Note    Transfer from: CCU    Transfer to: ( x ) Telemetry --    Accepting Physician: Dr. Rodríguez  Team covering on floor: ACP/Resident team   Signout given to: Hospitalist and     HPI:  67M PMHx of CEA s/p stenting, BPH, hypothyroidism, gout, HFrEF (EF 45%), HTN, HLD, pituitary tumor, prolactinoma, and Laryngeal cancer s/p radiation and resection p/w pleuritic CP (mild in nature, present only with deep inspiration), SOB, LE edema. He states that his CP and SOB were more profound starting this morning at 3am which prompted him to go to the ED. States he has been getting progressively worse with SOB over the past 1.5 months. His outpatient PCP started him on Lasix 20 PO BID recently and he has been compliant with that medication. In ED found with ADHF req BiPAP and given Lasix 40 IVP for diuresis. He states he now feels much better with his breathing after  the Lasix and his CP is now completely resolved  (19 Mar 2023 10:42)    Patient was admitted to CICU on BiPAP for ADHF, patient was diuresed with lasix and was eventually weaned to RA. Patient with much improved LE edema and lung sounds. TTE on 3/20/23 showing a newly worsened EF at 28% (previous TTE was 45% in 2021). Patient is being followed by Dr. Yi from cardiology. Patient is no longer in acute exacerbation of his CHF, and is medically optimized for transfer to the general medical floors.       PAST MEDICAL & SURGICAL HISTORY:  Low back pain  Herniated disc on lumbar region      Redundant prepuce and phimosis      Arthritis  on knees      Hypothyroid  not taking any meds      Hypertension      Thyroid cancer  pt denies any thyroid cancer      History of pituitary disease  on caber      Gout      S/P excision of vocal cord nodule  2007 and radiation -      Laryngeal cancer  &quot;stage 0&quot;      Pituitary mass      Carotid occlusion, bilateral      Obese      S/P excision of vocal cord nodule  radiation - 2007      History of lumbar spinal fusion  2018      H/O arthroscopy of shoulder  right-2017      Status post carotid surgery  right-5/13/2022, pt. states unsuccesful          Vital Signs Last 24 Hrs  T(C): 36.5 (20 Mar 2023 07:00), Max: 36.8 (19 Mar 2023 20:00)  T(F): 97.7 (20 Mar 2023 07:00), Max: 98.3 (19 Mar 2023 20:00)  HR: 86 (20 Mar 2023 17:00) (63 - 88)  BP: 108/64 (20 Mar 2023 17:00) (101/59 - 147/97)  BP(mean): 82 (20 Mar 2023 17:00) (75 - 118)  RR: 24 (20 Mar 2023 17:00) (11 - 31)  SpO2: 100% (20 Mar 2023 17:00) (89% - 100%)    Parameters below as of 20 Mar 2023 17:00  Patient On (Oxygen Delivery Method): room air      MEDICATIONS  (STANDING):  aspirin enteric coated 81 milliGRAM(s) Oral daily  atorvastatin 40 milliGRAM(s) Oral at bedtime  budesonide  80 MICROgram(s)/formoterol 4.5 MICROgram(s) Inhaler 2 Puff(s) Inhalation two times a day  cabergoline 1 milliGRAM(s) Oral <User Schedule>  chlorhexidine 4% Liquid 1 Application(s) Topical <User Schedule>  clopidogrel Tablet 75 milliGRAM(s) Oral daily  finasteride 5 milliGRAM(s) Oral daily  furosemide   Injectable 40 milliGRAM(s) IV Push daily  heparin   Injectable 5000 Unit(s) SubCutaneous every 8 hours  levothyroxine 175 MICROGram(s) Oral daily  tamsulosin 0.4 milliGRAM(s) Oral at bedtime    MEDICATIONS  (PRN):  albuterol    0.083% 2.5 milliGRAM(s) Nebulizer every 6 hours PRN Shortness of Breath and/or Wheezing    PHYSICAL EXAM:  Appearance: Normal, NAD  HEAD:  Normocephalic  EYES:  PERRL, conjunctiva and sclera clear  NECK: Supple, No JVD  CHEST/LUNG: Mild crackles lower lobes bilaterally   HEART: Normal S1, S2. No murmurs, rubs, or gallops  ABDOMEN: + Bowel sounds, Soft, NT, ND   EXTREMITIES: Trace edema LE bilaterally. 2+ Peripheral Pulses, No clubbing, cyanosis  NEUROLOGY: non-focal, aaox3  SKIN: No rashes or lesions    CARDIAC MARKERS ( 19 Mar 2023 11:46 )  x     / x     / 141 U/L / x     / 2.3 ng/mL                            12.3   4.32  )-----------( 134      ( 20 Mar 2023 05:50 )             37.3     03-20    144  |  108  |  24<H>  ----------------------------<  92  3.7   |  25  |  1.50<H>    Ca    8.6      20 Mar 2023 05:49  Phos  4.1     03-20  Mg     1.6     03-20    TPro  5.8<L>  /  Alb  3.4  /  TBili  1.1  /  DBili  x   /  AST  16  /  ALT  24  /  AlkPhos  50  03-20    PT/INR - ( 20 Mar 2023 05:51 )   PT: 16.0 sec;   INR: 1.37 ratio         PTT - ( 20 Mar 2023 05:51 )  PTT:31.5 sec  PHYSICAL EXAM:      ASSESSMENT & PLAN:   67M PMHx of CEA, BPH, hypothyroidism, gout, HFrEF (EF 45%, now 28% this admission), HTN, HLD, pituitary tumor w/ prolactinoma, and Laryngeal cancer s/p radiation and resection p/w pleuritic CP, SOB, LE edema found with ADHF req BiPAP    Plan:  ====================== NEUROLOGY=====================  A&Ox3-4  - continue to monitor mental status as per ICU protocol     Carotid Endarterectomy s/p stent in 5/2022  - c/w ASA and Plavix  - Cont. Lipitor     ==================== RESPIRATORY======================  Hypoxic resp failure  likely 2/2 fluid overload  Resolved Breathing well on RA    ====================CARDIOVASCULAR==================  HFrEF  No longer in acute exacerbation  TTE 3/20/2023 with worsening EF to 28% from 45% prior.  Only saw Dr. Yi (cardiology) outpatient once in 2/2023  - Cont. Lasix 40 IV BID Net negative 1-2L   - Monitor perfusion indices   - cardiology f/u inpatient as well as outpatient for GDMT    ===================HEMATOLOGIC/ONC ===================  h/h and plts wnl   - Monitor H/H and plts  - DVT PPX: start SQH  - on DAPT: asa plavix    ===================== RENAL =========================  SCR 1.57, probably new baseline (CKD)  - 12/4/22 SCr 1.51. Prior admission 05/2021 SCr 1.2   - Continue monitoring urine output, lytes, SCr/ BUN  - replete lytes prn with goal K >4 and Mg >2  - continue with diuresis as above    BPH  - c.w home flomax and finasteride     ==================== GASTROINTESTINAL===================  NPO until S/S eval, hx of dysphagia.  - per speech eval, patient for barium swallow 3/20 at 1:30pm before further recs  - f/u S/S recs  --- patient s/p barium swallow--> soft and bite sized diet, thin liquids but must lean to left when drinking.   =======================    ENDOCRINE  =====================  Prolactinoma  - c/w home cabergoline     Hypothyroid  TSH and T4 wnl inpatient  - c/w home synthroid 175mcg qd    ========================INFECTIOUS DISEASE================  Afebrile, no leukocytosis   - monitor and trend WBC and temperature curve     Lines: peripherals    FOR FOLLOW UP:  [ ] Cardiac MRI ordered due to new worsening CHF - c/f infiltrative disease  [ ] f/u cardiology recs, Dr. Yi will follow patient  [ ] Eventual GOC discussion prior to DC  [ ] Monitor SCr CCU Transfer Note    Transfer from: CCU    Transfer to: ( x ) Telemetry --    Accepting Physician: Dr. Rodríguez  Team covering on floor: ACP/Resident team   Signout given to: Hospitalist and AXEL Watt ([id number])    HPI:  67M PMHx of CEA s/p stenting, BPH, hypothyroidism, gout, HFrEF (EF 45%), HTN, HLD, pituitary tumor, prolactinoma, and Laryngeal cancer s/p radiation and resection p/w pleuritic CP (mild in nature, present only with deep inspiration), SOB, LE edema. He states that his CP and SOB were more profound starting this morning at 3am which prompted him to go to the ED. States he has been getting progressively worse with SOB over the past 1.5 months. His outpatient PCP started him on Lasix 20 PO BID recently and he has been compliant with that medication. In ED found with ADHF req BiPAP and given Lasix 40 IVP for diuresis. He states he now feels much better with his breathing after  the Lasix and his CP is now completely resolved  (19 Mar 2023 10:42)    Patient was admitted to CICU on BiPAP for ADHF, patient was diuresed with lasix and was eventually weaned to RA. Patient with much improved LE edema and lung sounds. TTE on 3/20/23 showing a newly worsened EF at 28% (previous TTE was 45% in 2021). Patient is being followed by Dr. Yi from cardiology. Patient is no longer in acute exacerbation of his CHF, and is medically optimized for transfer to the general medical floors.       PAST MEDICAL & SURGICAL HISTORY:  Low back pain  Herniated disc on lumbar region      Redundant prepuce and phimosis      Arthritis  on knees      Hypothyroid  not taking any meds      Hypertension      Thyroid cancer  pt denies any thyroid cancer      History of pituitary disease  on caber      Gout      S/P excision of vocal cord nodule  2007 and radiation -      Laryngeal cancer  &quot;stage 0&quot;      Pituitary mass      Carotid occlusion, bilateral      Obese      S/P excision of vocal cord nodule  radiation - 2007      History of lumbar spinal fusion  2018      H/O arthroscopy of shoulder  right-2017      Status post carotid surgery  right-5/13/2022, pt. states unsuccesful          Vital Signs Last 24 Hrs  T(C): 36.5 (20 Mar 2023 07:00), Max: 36.8 (19 Mar 2023 20:00)  T(F): 97.7 (20 Mar 2023 07:00), Max: 98.3 (19 Mar 2023 20:00)  HR: 86 (20 Mar 2023 17:00) (63 - 88)  BP: 108/64 (20 Mar 2023 17:00) (101/59 - 147/97)  BP(mean): 82 (20 Mar 2023 17:00) (75 - 118)  RR: 24 (20 Mar 2023 17:00) (11 - 31)  SpO2: 100% (20 Mar 2023 17:00) (89% - 100%)    Parameters below as of 20 Mar 2023 17:00  Patient On (Oxygen Delivery Method): room air      MEDICATIONS  (STANDING):  aspirin enteric coated 81 milliGRAM(s) Oral daily  atorvastatin 40 milliGRAM(s) Oral at bedtime  budesonide  80 MICROgram(s)/formoterol 4.5 MICROgram(s) Inhaler 2 Puff(s) Inhalation two times a day  cabergoline 1 milliGRAM(s) Oral <User Schedule>  chlorhexidine 4% Liquid 1 Application(s) Topical <User Schedule>  clopidogrel Tablet 75 milliGRAM(s) Oral daily  finasteride 5 milliGRAM(s) Oral daily  furosemide   Injectable 40 milliGRAM(s) IV Push daily  heparin   Injectable 5000 Unit(s) SubCutaneous every 8 hours  levothyroxine 175 MICROGram(s) Oral daily  tamsulosin 0.4 milliGRAM(s) Oral at bedtime    MEDICATIONS  (PRN):  albuterol    0.083% 2.5 milliGRAM(s) Nebulizer every 6 hours PRN Shortness of Breath and/or Wheezing    PHYSICAL EXAM:  Appearance: Normal, NAD  HEAD:  Normocephalic  EYES:  PERRL, conjunctiva and sclera clear  NECK: Supple, No JVD  CHEST/LUNG: Mild crackles lower lobes bilaterally   HEART: Normal S1, S2. No murmurs, rubs, or gallops  ABDOMEN: + Bowel sounds, Soft, NT, ND   EXTREMITIES: Trace edema LE bilaterally. 2+ Peripheral Pulses, No clubbing, cyanosis  NEUROLOGY: non-focal, aaox3  SKIN: No rashes or lesions    CARDIAC MARKERS ( 19 Mar 2023 11:46 )  x     / x     / 141 U/L / x     / 2.3 ng/mL                            12.3   4.32  )-----------( 134      ( 20 Mar 2023 05:50 )             37.3     03-20    144  |  108  |  24<H>  ----------------------------<  92  3.7   |  25  |  1.50<H>    Ca    8.6      20 Mar 2023 05:49  Phos  4.1     03-20  Mg     1.6     03-20    TPro  5.8<L>  /  Alb  3.4  /  TBili  1.1  /  DBili  x   /  AST  16  /  ALT  24  /  AlkPhos  50  03-20    PT/INR - ( 20 Mar 2023 05:51 )   PT: 16.0 sec;   INR: 1.37 ratio         PTT - ( 20 Mar 2023 05:51 )  PTT:31.5 sec  PHYSICAL EXAM:      ASSESSMENT & PLAN:   67M PMHx of CEA, BPH, hypothyroidism, gout, HFrEF (EF 45%, now 28% this admission), HTN, HLD, pituitary tumor w/ prolactinoma, and Laryngeal cancer s/p radiation and resection p/w pleuritic CP, SOB, LE edema found with ADHF req BiPAP    Plan:  ====================== NEUROLOGY=====================  A&Ox3-4  - continue to monitor mental status as per ICU protocol     Carotid Endarterectomy s/p stent in 5/2022  - c/w ASA and Plavix  - Cont. Lipitor     ==================== RESPIRATORY======================  Hypoxic resp failure  likely 2/2 fluid overload  Resolved Breathing well on RA    ====================CARDIOVASCULAR==================  HFrEF  No longer in acute exacerbation  TTE 3/20/2023 with worsening EF to 28% from 45% prior.  Only saw Dr. Yi (cardiology) outpatient once in 2/2023  - Cont. Lasix 40 IV BID Net negative 1-2L   - Monitor perfusion indices   - cardiology f/u inpatient as well as outpatient for GDMT    ===================HEMATOLOGIC/ONC ===================  h/h and plts wnl   - Monitor H/H and plts  - DVT PPX: start SQH  - on DAPT: asa plavix    ===================== RENAL =========================  SCR 1.57, probably new baseline (CKD)  - 12/4/22 SCr 1.51. Prior admission 05/2021 SCr 1.2   - Continue monitoring urine output, lytes, SCr/ BUN  - replete lytes prn with goal K >4 and Mg >2  - continue with diuresis as above    BPH  - c.w home flomax and finasteride     ==================== GASTROINTESTINAL===================  NPO until S/S eval, hx of dysphagia.  - per speech eval, patient for barium swallow 3/20 at 1:30pm before further recs  - f/u S/S recs  --- patient s/p barium swallow--> soft and bite sized diet, thin liquids but must lean to left when drinking.   =======================    ENDOCRINE  =====================  Prolactinoma  - c/w home cabergoline     Hypothyroid  TSH and T4 wnl inpatient  - c/w home synthroid 175mcg qd    ========================INFECTIOUS DISEASE================  Afebrile, no leukocytosis   - monitor and trend WBC and temperature curve     Lines: peripherals    FOR FOLLOW UP:  [ ] Cardiac MRI ordered due to new worsening CHF - c/f infiltrative disease  [ ] f/u cardiology recs, Dr. Yi will follow patient  [ ] Eventual GOC discussion prior to DC  [ ] Monitor SCr

## 2023-03-20 NOTE — SWALLOW VFSS/MBS ASSESSMENT ADULT - LARYNGEAL PENETRATION DURING THE SWALLOW - COUGH
Deep penetration of mild amount of material to the level of the vocal folds during the swallow from the laryngeal surface of the epiglottis with CUP trials. Cough response. Mild penetration from the laryngeal surface of the epiglottis with incomplete retrieval (does not descend further or increase in amount with extensive trials) Trace penetration of moderate depth from the laryngeal surface of the epiglottis with inconsistent retrieval.

## 2023-03-20 NOTE — PROGRESS NOTE ADULT - ATTENDING COMMENTS
CONSTITUTIONAL: Well-developed; well-nourished; in no acute distress.   SKIN: warm, dry  HEAD: Normocephalic; atraumatic.  EYES: PERRL, EOMI, no conjunctival erythema  ENT: No nasal discharge; airway clear.  NECK: Supple; non tender.  CARD: S1, S2 normal; no murmurs, gallops, or rubs. Regular rate and rhythm.   RESP: No wheezes, rales or rhonchi.  ABD: soft ntnd  EXT: Normal ROM.  No clubbing, cyanosis or edema.   LYMPH: No acute cervical adenopathy.  NEURO: CN II-XII grossly intact, no facial asymmetry, no slurred speech. Strength 5/5 in upper and lower extremities. Sensation intact in all extremities. FNF testing normal. No pronator drift. Negative Rhombergs test. Normal gait.  PSYCH: Cooperative, appropriate. 68 yo man with vocal cord CA, prolactinoma p/w ADHF and resp failure     A+Ox3  Hemodynamics acceptable, no pressors or inotropes  cont diuresis   worsened EF on recent TTE, prior cath in 2021 non obstructive per report   O2 sats mid to high 90s on room air  Acute hypoxic respiratory failure requiring mechanical Bipap, resolved and now RA   diet currently on hold given issues with chocking, pending barium study   Non-oliguric ZEESHAN, cont diuresis  H/H low normal HSQ for DVT prophylaxis  Afebrile, no antibiotics  Sugars controlled  No central access CONSTITUTIONAL: Well-developed; well-nourished; in no acute distress.   SKIN: warm, dry  HEAD: Normocephalic; atraumatic.  EYES: PERRL, EOMI, no conjunctival erythema  ENT: No nasal discharge; airway clear.  NECK: Supple; non tender.  CARD: S1, S2 normal; no murmurs, gallops, or rubs. Regular rate and rhythm.   RESP: No wheezes, rales or rhonchi.  ABD: soft ntnd  EXT: Normal ROM.  No clubbing, cyanosis or edema.   LYMPH: No acute cervical adenopathy.  NEURO: CN II-XII grossly intact, no facial asymmetry, no slurred speech. Strength 5/5 in upper and lower extremities. Sensation intact in all extremities. FNF testing normal. No pronator drift. Negative Rhombergs test. Normal gait.  Vision is 20/10 in the left eye and 20/4 in the right eye.   PSYCH: Cooperative, appropriate.

## 2023-03-20 NOTE — PROGRESS NOTE ADULT - SUBJECTIVE AND OBJECTIVE BOX
ALONDRA [place]  MRN-[id number]  Patient is a 67y old  Male who presents with a chief complaint of ADHF (20 Mar 2023 08:07)    HPI:  67M PMHx of CEA s/p stenting, BPH, hypothyroidism, gout, HFrEF (EF 45%), HTN, HLD, pituitary tumor, prolactinoma, and Laryngeal cancer s/p radiation and resection p/w pleuritic CP (mild in nature, present only with deep inspiration), SOB, LE edema. He states that his CP and SOB were more profound starting this morning at 3am which prompted him to go to the ED. States he has been getting progressively worse with SOB over the past 1.5 months. His outpatient PCP started him on Lasix 20 PO BID recently and he has been compliant with that medication. In ED found with ADHF req BiPAP and given Lasix 40 IVP for diuresis. He states he now feels much better with his breathing after  the Lasix and his CP is now completely resolved  (19 Mar 2023 10:42)      24 hr events: no acute events     REVIEW OF SYSTEMS:    CONSTITUTIONAL: No weakness, fevers or chills  EYES/ENT: No visual changes;  No vertigo or throat pain   NECK: No pain or stiffness  RESPIRATORY: No cough, wheezing, hemoptysis; No shortness of breath  CARDIOVASCULAR: No chest pain or palpitations  GASTROINTESTINAL: No abdominal or epigastric pain. No nausea, vomiting, or hematemesis; No diarrhea or constipation. No melena or hematochezia.  GENITOURINARY: No dysuria, frequency or hematuria  NEUROLOGICAL: No numbness or weakness  SKIN: No itching, rashes      Physical Exam:  Vital Signs Last 24 Hrs  T(C): 36.6 (20 Mar 2023 15:00), Max: 36.8 (19 Mar 2023 20:00)  T(F): 97.8 (20 Mar 2023 15:00), Max: 98.3 (19 Mar 2023 20:00)  HR: 80 (20 Mar 2023 18:00) (63 - 86)  BP: 116/78 (20 Mar 2023 18:00) (101/59 - 136/86)  BP(mean): 92 (20 Mar 2023 18:00) (75 - 106)  RR: 27 (20 Mar 2023 18:00) (11 - 31)  SpO2: 98% (20 Mar 2023 18:00) (89% - 100%)    Parameters below as of 20 Mar 2023 17:00  Patient On (Oxygen Delivery Method): room air    ============================I/O===========================   I&O's Detail    19 Mar 2023 07:01  -  20 Mar 2023 07:00  --------------------------------------------------------  IN:    IV PiggyBack: 100 mL    Oral Fluid: 240 mL  Total IN: 340 mL    OUT:    Voided (mL): 4000 mL  Total OUT: 4000 mL    Total NET: -3660 mL      20 Mar 2023 07:01  -  20 Mar 2023 19:15  --------------------------------------------------------  IN:  Total IN: 0 mL    OUT:    Voided (mL): 500 mL  Total OUT: 500 mL    Total NET: -500 mL        ============================ LABS =========================                        12.3   4.32  )-----------( 134      ( 20 Mar 2023 05:50 )             37.3     03-20    144  |  108  |  24<H>  ----------------------------<  92  3.7   |  25  |  1.50<H>    Ca    8.6      20 Mar 2023 05:49  Phos  4.1     03-20  Mg     1.6     03-20    TPro  5.8<L>  /  Alb  3.4  /  TBili  1.1  /  DBili  x   /  AST  16  /  ALT  24  /  AlkPhos  50  03-20    LIVER FUNCTIONS - ( 20 Mar 2023 05:49 )  Alb: 3.4 g/dL / Pro: 5.8 g/dL / ALK PHOS: 50 U/L / ALT: 24 U/L / AST: 16 U/L / GGT: x           PT/INR - ( 20 Mar 2023 05:51 )   PT: 16.0 sec;   INR: 1.37 ratio         PTT - ( 20 Mar 2023 05:51 )  PTT:31.5 sec      ======================Micro/Rad/Cardio=================  Culture: Reviewed   CXR: Reviewed  Echo:Reviewed  ======================================================  PAST MEDICAL & SURGICAL HISTORY:  Low back pain  Herniated disc on lumbar region      Redundant prepuce and phimosis      Arthritis  on knees      Hypothyroid  not taking any meds      Hypertension      Thyroid cancer  pt denies any thyroid cancer      History of pituitary disease  on caber      Gout      S/P excision of vocal cord nodule  2007 and radiation -      Laryngeal cancer  &quot;stage 0&quot;      Pituitary mass      Carotid occlusion, bilateral      Obese      S/P excision of vocal cord nodule  radiation - 2007      History of lumbar spinal fusion  2018      H/O arthroscopy of shoulder  right-2017      Status post carotid surgery  right-5/13/2022, pt. states unsuccesful        ====================ASSESSMENT ==============  67M PMHx of CEA, BPH, hypothyroidism, gout, HFrEF (EF 45%, now 28% this admission), HTN, HLD, pituitary tumor w/ prolactinoma, and Laryngeal cancer s/p radiation and resection p/w pleuritic CP, SOB, LE edema found with ADHF req BiPAP        Plan:  ====================== NEUROLOGY=====================  A&Ox3-4  - continue to monitor mental status as per ICU protocol     Carotid Endarterectomy s/p stent in 5/2022  - c/w ASA and Plavix  - Cont. Lipitor     ==================== RESPIRATORY======================  Hypoxic resp failure  likely 2/2 fluid overload  Resolved Breathing well on RA        albuterol    0.083% 2.5 milliGRAM(s) Nebulizer every 6 hours PRN Shortness of Breath and/or Wheezing  budesonide  80 MICROgram(s)/formoterol 4.5 MICROgram(s) Inhaler 2 Puff(s) Inhalation two times a day    ====================CARDIOVASCULAR==================  HFrEF  No longer in acute exacerbation  TTE 3/20/2023 with worsening EF to 28% from 45% prior.  Only saw Dr. Yi (cardiology) outpatient once in 2/2023  - Cont. Lasix 40 IV BID Net negative 1-2L   - Monitor perfusion indices   - cardiology f/u inpatient as well as outpatient for GDMT    furosemide   Injectable 40 milliGRAM(s) IV Push daily    ===================HEMATOLOGIC/ONC ===================  h/h and plts wnl   - Monitor H/H and plts  - DVT PPX: start SQH  - on DAPT: asa plavix    aspirin enteric coated 81 milliGRAM(s) Oral daily  clopidogrel Tablet 75 milliGRAM(s) Oral daily  heparin   Injectable 5000 Unit(s) SubCutaneous every 8 hours    ===================== RENAL =========================  SCR 1.57, probably new baseline (CKD)  - 12/4/22 SCr 1.51. Prior admission 05/2021 SCr 1.2   - Continue monitoring urine output, lytes, SCr/ BUN  - replete lytes prn with goal K >4 and Mg >2  - continue with diuresis as above    BPH  - c.w home flomax and finasteride       tamsulosin 0.4 milliGRAM(s) Oral at bedtime    ==================== GASTROINTESTINAL===================  s/p barium study  - soft, bite sized diet    =======================    ENDOCRINE  =====================  Prolactinoma  - c/w home cabergoline     Hypothyroid  TSH and T4 wnl inpatient  - c/w home synthroid 175mcg qd    atorvastatin 40 milliGRAM(s) Oral at bedtime  cabergoline 1 milliGRAM(s) Oral <User Schedule>  finasteride 5 milliGRAM(s) Oral daily  levothyroxine 175 MICROGram(s) Oral daily    ========================INFECTIOUS DISEASE================  Afebrile, no leukocytosis   - monitor and trend WBC and temperature curve       Patient requires continuous monitoring with bedside rhythm monitoring, pulse ox monitoring, and intermittent blood gas analysis. Care plan discussed with ICU care team. Patient remained critical and at risk for life threatening decompensation.  Patient seen, examined and plan discussed with CCU team during rounds.     I have personally provided 35 minutes of critical care time excluding time spent on separate procedures, in addition to initial critical care time provided by the CICU Attending, Dr. Saldaña   ALONDRA [place]  MRN-[id number]  Patient is a 67y old  Male who presents with a chief complaint of ADHF (20 Mar 2023 08:07)    HPI:  67M PMHx of CEA s/p stenting, BPH, hypothyroidism, gout, HFrEF (EF 45%), HTN, HLD, pituitary tumor, prolactinoma, and Laryngeal cancer s/p radiation and resection p/w pleuritic CP (mild in nature, present only with deep inspiration), SOB, LE edema. He states that his CP and SOB were more profound starting this morning at 3am which prompted him to go to the ED. States he has been getting progressively worse with SOB over the past 1.5 months. His outpatient PCP started him on Lasix 20 PO BID recently and he has been compliant with that medication. In ED found with ADHF req BiPAP and given Lasix 40 IVP for diuresis. He states he now feels much better with his breathing after  the Lasix and his CP is now completely resolved  (19 Mar 2023 10:42)      24 hr events: no acute events     REVIEW OF SYSTEMS:    CONSTITUTIONAL: No weakness, fevers or chills  EYES/ENT: No visual changes;  No vertigo or throat pain   NECK: No pain or stiffness  RESPIRATORY: No cough, wheezing, hemoptysis; No shortness of breath  CARDIOVASCULAR: No chest pain or palpitations  GASTROINTESTINAL: No abdominal or epigastric pain. No nausea, vomiting, or hematemesis; No diarrhea or constipation. No melena or hematochezia.  GENITOURINARY: No dysuria, frequency or hematuria  NEUROLOGICAL: No numbness or weakness  SKIN: No itching, rashes      Physical Exam:  Vital Signs Last 24 Hrs  T(C): 36.6 (20 Mar 2023 15:00), Max: 36.8 (19 Mar 2023 20:00)  T(F): 97.8 (20 Mar 2023 15:00), Max: 98.3 (19 Mar 2023 20:00)  HR: 80 (20 Mar 2023 18:00) (63 - 86)  BP: 116/78 (20 Mar 2023 18:00) (101/59 - 136/86)  BP(mean): 92 (20 Mar 2023 18:00) (75 - 106)  RR: 27 (20 Mar 2023 18:00) (11 - 31)  SpO2: 98% (20 Mar 2023 18:00) (89% - 100%)    Parameters below as of 20 Mar 2023 17:00  Patient On (Oxygen Delivery Method): room air    ============================I/O===========================   I&O's Detail    19 Mar 2023 07:01  -  20 Mar 2023 07:00  --------------------------------------------------------  IN:    IV PiggyBack: 100 mL    Oral Fluid: 240 mL  Total IN: 340 mL    OUT:    Voided (mL): 4000 mL  Total OUT: 4000 mL    Total NET: -3660 mL      20 Mar 2023 07:01  -  20 Mar 2023 19:15  --------------------------------------------------------  IN:  Total IN: 0 mL    OUT:    Voided (mL): 500 mL  Total OUT: 500 mL    Total NET: -500 mL        ============================ LABS =========================                        12.3   4.32  )-----------( 134      ( 20 Mar 2023 05:50 )             37.3     03-20    144  |  108  |  24<H>  ----------------------------<  92  3.7   |  25  |  1.50<H>    Ca    8.6      20 Mar 2023 05:49  Phos  4.1     03-20  Mg     1.6     03-20    TPro  5.8<L>  /  Alb  3.4  /  TBili  1.1  /  DBili  x   /  AST  16  /  ALT  24  /  AlkPhos  50  03-20    LIVER FUNCTIONS - ( 20 Mar 2023 05:49 )  Alb: 3.4 g/dL / Pro: 5.8 g/dL / ALK PHOS: 50 U/L / ALT: 24 U/L / AST: 16 U/L / GGT: x           PT/INR - ( 20 Mar 2023 05:51 )   PT: 16.0 sec;   INR: 1.37 ratio         PTT - ( 20 Mar 2023 05:51 )  PTT:31.5 sec      ======================Micro/Rad/Cardio=================  Culture: Reviewed   CXR: Reviewed  Echo:Reviewed  ======================================================  PAST MEDICAL & SURGICAL HISTORY:  Low back pain  Herniated disc on lumbar region      Redundant prepuce and phimosis      Arthritis  on knees      Hypothyroid  not taking any meds      Hypertension      Thyroid cancer  pt denies any thyroid cancer      History of pituitary disease  on caber      Gout      S/P excision of vocal cord nodule  2007 and radiation -      Laryngeal cancer  &quot;stage 0&quot;      Pituitary mass      Carotid occlusion, bilateral      Obese      S/P excision of vocal cord nodule  radiation - 2007      History of lumbar spinal fusion  2018      H/O arthroscopy of shoulder  right-2017      Status post carotid surgery  right-5/13/2022, pt. states unsuccesful        ====================ASSESSMENT ==============  67M PMHx of CEA, BPH, hypothyroidism, gout, HFrEF (EF 45%, now 28% this admission), HTN, HLD, pituitary tumor w/ prolactinoma, and Laryngeal cancer s/p radiation and resection p/w pleuritic CP, SOB, LE edema found with ADHF req BiPAP        Plan:  ====================== NEUROLOGY=====================  A&Ox3-4  - continue to monitor mental status as per ICU protocol     Carotid Endarterectomy s/p stent in 5/2022  - c/w ASA and Plavix  - Cont. Lipitor     ==================== RESPIRATORY======================  Hypoxic resp failure  likely 2/2 fluid overload  - Resolved Breathing well on RA  - required BiPAP on admission, improved with diuresis       albuterol    0.083% 2.5 milliGRAM(s) Nebulizer every 6 hours PRN Shortness of Breath and/or Wheezing  budesonide  80 MICROgram(s)/formoterol 4.5 MICROgram(s) Inhaler 2 Puff(s) Inhalation two times a day    ====================CARDIOVASCULAR==================  HFrEF  - TTE 3/20/2023 with worsening EF to 28% from 45% prior.  - Only saw Dr. Yi (cardiology) outpatient once in 2/2023  - Cont. Lasix 40 IV BID Net negative 1-2L   - Monitor perfusion indices   - cardiology f/u inpatient as well as outpatient for GDMT    furosemide   Injectable 40 milliGRAM(s) IV Push daily    ===================HEMATOLOGIC/ONC ===================  h/h and plts wnl   - Monitor H/H and plts  - DVT PPX: start SQH  - on DAPT: asa plavix    aspirin enteric coated 81 milliGRAM(s) Oral daily  clopidogrel Tablet 75 milliGRAM(s) Oral daily  heparin   Injectable 5000 Unit(s) SubCutaneous every 8 hours    ===================== RENAL =========================  SCR 1.57, probably new baseline (CKD)  - 12/4/22 SCr 1.51. Prior admission 05/2021 SCr 1.2   - Continue monitoring urine output, lytes, SCr/ BUN  - replete lytes prn with goal K >4 and Mg >2  - continue with diuresis as above    BPH  - c.w home flomax and finasteride       tamsulosin 0.4 milliGRAM(s) Oral at bedtime    ==================== GASTROINTESTINAL===================  s/p barium study  - soft, bite sized diet    =======================    ENDOCRINE  =====================  Prolactinoma  - c/w home cabergoline     Hypothyroid  TSH and T4 wnl inpatient  - c/w home synthroid 175mcg qd    atorvastatin 40 milliGRAM(s) Oral at bedtime  cabergoline 1 milliGRAM(s) Oral <User Schedule>  finasteride 5 milliGRAM(s) Oral daily  levothyroxine 175 MICROGram(s) Oral daily    ========================INFECTIOUS DISEASE================  Afebrile, no leukocytosis   - monitor and trend WBC and temperature curve       Patient requires continuous monitoring with bedside rhythm monitoring, pulse ox monitoring, and intermittent blood gas analysis. Care plan discussed with ICU care team. Patient remained critical and at risk for life threatening decompensation.  Patient seen, examined and plan discussed with CCU team during rounds.     I have personally provided 35 minutes of critical care time excluding time spent on separate procedures, in addition to initial critical care time provided by the CICU Attending, Dr. Saldaña

## 2023-03-20 NOTE — SWALLOW VFSS/MBS ASSESSMENT ADULT - ROSENBEK'S PENETRATION ASPIRATION SCALE
(5) contrast contacts vocal cords, visible residue remains (penetration) (3) contrast remains above the vocal cords, visible residue remains (penetration) (7) contrast passes glottis, visible subglottic residue remains despite patient’s response (aspiration)

## 2023-03-20 NOTE — CONSULT NOTE ADULT - PROBLEM SELECTOR RECOMMENDATION 7
They just finalized today and I just sent them through.   Renal function at baseline  Avoid further nephrotoxic medications  Monitor I+Os

## 2023-03-20 NOTE — SWALLOW VFSS/MBS ASSESSMENT ADULT - RECOMMENDED CONSISTENCY
Soft and Bite Sized Diet and Thin Liquids with HEAD TURN LEFT for every sip of liquid (with an additional 1-2 dry swallows in head turned L position).

## 2023-03-20 NOTE — SWALLOW BEDSIDE ASSESSMENT ADULT - DIET PRIOR TO ADMI
Regular Diet and Thin Liquids with frequent coughing/throat clearing and occasional choking during meals.

## 2023-03-20 NOTE — SWALLOW VFSS/MBS ASSESSMENT ADULT - SLP GENERAL OBSERVATIONS
Pt was received in radiology suite in CHRISTIAN chair with spouse present. +room air. He was AAOx4, pleasant, and cooperative. Able to follow all complex directions. Vocal quality deemed hoarse, rough, and with reduced vocal volume (h/o vocal cord resection/XRT 2007). Pt reported vocal quality at baseline since surgery. OF NOTE, pt with baseline throat clearing and cough prior to PO trials.

## 2023-03-21 ENCOUNTER — TRANSCRIPTION ENCOUNTER (OUTPATIENT)
Age: 68
End: 2023-03-21

## 2023-03-21 DIAGNOSIS — E85.4 ORGAN-LIMITED AMYLOIDOSIS: ICD-10-CM

## 2023-03-21 DIAGNOSIS — J98.11 ATELECTASIS: ICD-10-CM

## 2023-03-21 DIAGNOSIS — Z98.890 OTHER SPECIFIED POSTPROCEDURAL STATES: ICD-10-CM

## 2023-03-21 LAB
ALBUMIN SERPL ELPH-MCNC: 3.5 G/DL — SIGNIFICANT CHANGE UP (ref 3.3–5)
ALP SERPL-CCNC: 53 U/L — SIGNIFICANT CHANGE UP (ref 40–120)
ALT FLD-CCNC: 22 U/L — SIGNIFICANT CHANGE UP (ref 10–45)
ANION GAP SERPL CALC-SCNC: 11 MMOL/L — SIGNIFICANT CHANGE UP (ref 5–17)
APTT BLD: 31.6 SEC — SIGNIFICANT CHANGE UP (ref 27.5–35.5)
AST SERPL-CCNC: 15 U/L — SIGNIFICANT CHANGE UP (ref 10–40)
BASOPHILS # BLD AUTO: 0.02 K/UL — SIGNIFICANT CHANGE UP (ref 0–0.2)
BASOPHILS NFR BLD AUTO: 0.4 % — SIGNIFICANT CHANGE UP (ref 0–2)
BILIRUB SERPL-MCNC: 0.8 MG/DL — SIGNIFICANT CHANGE UP (ref 0.2–1.2)
BUN SERPL-MCNC: 22 MG/DL — SIGNIFICANT CHANGE UP (ref 7–23)
CALCIUM SERPL-MCNC: 8.7 MG/DL — SIGNIFICANT CHANGE UP (ref 8.4–10.5)
CHLORIDE SERPL-SCNC: 106 MMOL/L — SIGNIFICANT CHANGE UP (ref 96–108)
CO2 SERPL-SCNC: 25 MMOL/L — SIGNIFICANT CHANGE UP (ref 22–31)
CREAT SERPL-MCNC: 1.42 MG/DL — HIGH (ref 0.5–1.3)
EGFR: 53 ML/MIN/1.73M2 — LOW
EOSINOPHIL # BLD AUTO: 0.17 K/UL — SIGNIFICANT CHANGE UP (ref 0–0.5)
EOSINOPHIL NFR BLD AUTO: 3.6 % — SIGNIFICANT CHANGE UP (ref 0–6)
GLUCOSE SERPL-MCNC: 100 MG/DL — HIGH (ref 70–99)
HCT VFR BLD CALC: 39.1 % — SIGNIFICANT CHANGE UP (ref 39–50)
HGB BLD-MCNC: 12.8 G/DL — LOW (ref 13–17)
IMM GRANULOCYTES NFR BLD AUTO: 0.4 % — SIGNIFICANT CHANGE UP (ref 0–0.9)
INR BLD: 1.27 RATIO — HIGH (ref 0.88–1.16)
LYMPHOCYTES # BLD AUTO: 0.87 K/UL — LOW (ref 1–3.3)
LYMPHOCYTES # BLD AUTO: 18.3 % — SIGNIFICANT CHANGE UP (ref 13–44)
MAGNESIUM SERPL-MCNC: 2 MG/DL — SIGNIFICANT CHANGE UP (ref 1.6–2.6)
MCHC RBC-ENTMCNC: 29 PG — SIGNIFICANT CHANGE UP (ref 27–34)
MCHC RBC-ENTMCNC: 32.7 GM/DL — SIGNIFICANT CHANGE UP (ref 32–36)
MCV RBC AUTO: 88.5 FL — SIGNIFICANT CHANGE UP (ref 80–100)
MONOCYTES # BLD AUTO: 0.52 K/UL — SIGNIFICANT CHANGE UP (ref 0–0.9)
MONOCYTES NFR BLD AUTO: 10.9 % — SIGNIFICANT CHANGE UP (ref 2–14)
NEUTROPHILS # BLD AUTO: 3.15 K/UL — SIGNIFICANT CHANGE UP (ref 1.8–7.4)
NEUTROPHILS NFR BLD AUTO: 66.4 % — SIGNIFICANT CHANGE UP (ref 43–77)
NRBC # BLD: 0 /100 WBCS — SIGNIFICANT CHANGE UP (ref 0–0)
PHOSPHATE SERPL-MCNC: 3.2 MG/DL — SIGNIFICANT CHANGE UP (ref 2.5–4.5)
PLATELET # BLD AUTO: 139 K/UL — LOW (ref 150–400)
POTASSIUM SERPL-MCNC: 3.7 MMOL/L — SIGNIFICANT CHANGE UP (ref 3.5–5.3)
POTASSIUM SERPL-SCNC: 3.7 MMOL/L — SIGNIFICANT CHANGE UP (ref 3.5–5.3)
PROT SERPL-MCNC: 5.9 G/DL — LOW (ref 6–8.3)
PROTHROM AB SERPL-ACNC: 14.6 SEC — HIGH (ref 10.5–13.4)
RBC # BLD: 4.42 M/UL — SIGNIFICANT CHANGE UP (ref 4.2–5.8)
RBC # FLD: 15.9 % — HIGH (ref 10.3–14.5)
SODIUM SERPL-SCNC: 142 MMOL/L — SIGNIFICANT CHANGE UP (ref 135–145)
WBC # BLD: 4.75 K/UL — SIGNIFICANT CHANGE UP (ref 3.8–10.5)
WBC # FLD AUTO: 4.75 K/UL — SIGNIFICANT CHANGE UP (ref 3.8–10.5)

## 2023-03-21 PROCEDURE — 99222 1ST HOSP IP/OBS MODERATE 55: CPT

## 2023-03-21 PROCEDURE — 75561 CARDIAC MRI FOR MORPH W/DYE: CPT | Mod: 26

## 2023-03-21 RX ORDER — FUROSEMIDE 40 MG
40 TABLET ORAL DAILY
Refills: 0 | Status: DISCONTINUED | OUTPATIENT
Start: 2023-03-21 | End: 2023-03-22

## 2023-03-21 RX ORDER — ACETAMINOPHEN 500 MG
650 TABLET ORAL EVERY 6 HOURS
Refills: 0 | Status: DISCONTINUED | OUTPATIENT
Start: 2023-03-21 | End: 2023-03-22

## 2023-03-21 RX ORDER — SACUBITRIL AND VALSARTAN 24; 26 MG/1; MG/1
1 TABLET, FILM COATED ORAL
Refills: 0 | Status: DISCONTINUED | OUTPATIENT
Start: 2023-03-21 | End: 2023-03-22

## 2023-03-21 RX ADMIN — HEPARIN SODIUM 5000 UNIT(S): 5000 INJECTION INTRAVENOUS; SUBCUTANEOUS at 05:19

## 2023-03-21 RX ADMIN — Medication 81 MILLIGRAM(S): at 11:49

## 2023-03-21 RX ADMIN — BUDESONIDE AND FORMOTEROL FUMARATE DIHYDRATE 2 PUFF(S): 160; 4.5 AEROSOL RESPIRATORY (INHALATION) at 20:25

## 2023-03-21 RX ADMIN — SACUBITRIL AND VALSARTAN 1 TABLET(S): 24; 26 TABLET, FILM COATED ORAL at 17:55

## 2023-03-21 RX ADMIN — CLOPIDOGREL BISULFATE 75 MILLIGRAM(S): 75 TABLET, FILM COATED ORAL at 11:49

## 2023-03-21 RX ADMIN — Medication 650 MILLIGRAM(S): at 15:05

## 2023-03-21 RX ADMIN — CHLORHEXIDINE GLUCONATE 1 APPLICATION(S): 213 SOLUTION TOPICAL at 09:24

## 2023-03-21 RX ADMIN — Medication 40 MILLIGRAM(S): at 05:19

## 2023-03-21 RX ADMIN — HEPARIN SODIUM 5000 UNIT(S): 5000 INJECTION INTRAVENOUS; SUBCUTANEOUS at 13:08

## 2023-03-21 RX ADMIN — Medication 175 MICROGRAM(S): at 05:19

## 2023-03-21 RX ADMIN — HEPARIN SODIUM 5000 UNIT(S): 5000 INJECTION INTRAVENOUS; SUBCUTANEOUS at 21:22

## 2023-03-21 RX ADMIN — ATORVASTATIN CALCIUM 40 MILLIGRAM(S): 80 TABLET, FILM COATED ORAL at 21:22

## 2023-03-21 RX ADMIN — FINASTERIDE 5 MILLIGRAM(S): 5 TABLET, FILM COATED ORAL at 11:49

## 2023-03-21 RX ADMIN — Medication 650 MILLIGRAM(S): at 15:25

## 2023-03-21 RX ADMIN — BUDESONIDE AND FORMOTEROL FUMARATE DIHYDRATE 2 PUFF(S): 160; 4.5 AEROSOL RESPIRATORY (INHALATION) at 09:25

## 2023-03-21 RX ADMIN — TAMSULOSIN HYDROCHLORIDE 0.4 MILLIGRAM(S): 0.4 CAPSULE ORAL at 21:22

## 2023-03-21 RX ADMIN — Medication 650 MILLIGRAM(S): at 23:46

## 2023-03-21 NOTE — DISCHARGE NOTE PROVIDER - PROVIDER TOKENS
PROVIDER:[TOKEN:[[id number]]] PROVIDER:[TOKEN:[[id number]]],PROVIDER:[TOKEN:[[id number]]]

## 2023-03-21 NOTE — DISCHARGE NOTE PROVIDER - NSDCCPCAREPLAN_GEN_ALL_CORE_FT
PRINCIPAL DISCHARGE DIAGNOSIS  Diagnosis: Acute on chronic systolic congestive heart failure  Assessment and Plan of Treatment: Found to be due to amyloidosis. Started on new medications for heart failure: Entresto, furosemide(water pill), Farxiga(dapagaflozin) will be sent to outpatient pharmacy. Please follow up with cardiologist, Dr. Yi within 1 week of discharge to further discuss management of amyloid cardiomyopathy.      SECONDARY DISCHARGE DIAGNOSES  Diagnosis: Hypothyroidism  Assessment and Plan of Treatment: Continue with usual dose of levothyroxine. Follow up with PCP routinely for TSH levels as needed.    Diagnosis: H/O carotid endarterectomy  Assessment and Plan of Treatment: Continue with aspirin, clopidogrel, and statin medication.    Diagnosis: Cardiac amyloidosis  Assessment and Plan of Treatment: Cardiac MRI consistent with amyloidosis. Please follow up with cardiology Dr. Yi within 1 week after discharge for further evaluation and management.    Diagnosis: S/P excision of vocal cord nodule  Assessment and Plan of Treatment: Found with aspiration during imaging. You were shown exercises by speech and swallow. Please continue with these.    Diagnosis: Prediabetes  Assessment and Plan of Treatment: A1C increased from prior, now 6.1.  Please avoid excess or added sugars as much as possible.  Return to PCP to further monitor and discuss.

## 2023-03-21 NOTE — DISCHARGE NOTE PROVIDER - NSDCMRMEDTOKEN_GEN_ALL_CORE_FT
Advair Diskus 100 mcg-50 mcg inhalation powder: 1 puff(s) inhaled 2 times a day  albuterol 90 mcg/inh inhalation aerosol: 1 puff(s) inhaled every 4 to 6 hours, As Needed  aspirin 81 mg oral delayed release tablet: 1 tab(s) orally once a day  atorvastatin 40 mg oral tablet: 1 tab(s) orally once a day (at bedtime)  cabergoline 0.5 mg oral tablet: 2 tab(s) orally once a week on friday  clopidogrel 75 mg oral tablet: 1 tab(s) orally once a day   finasteride 5 mg oral tablet: 1 tab(s) orally once a day  Flomax 0.4 mg oral capsule: 2 cap(s) orally once a day (at bedtime)  Lasix 20 mg oral tablet: 1 tab(s) orally 2 times a day  Synthroid 175 mcg (0.175 mg) oral tablet: 1 tab(s) orally once a day    Note:Last filled in sept for 3 month supply   Advair Diskus 100 mcg-50 mcg inhalation powder: 1 puff(s) inhaled 2 times a day  albuterol 90 mcg/inh inhalation aerosol: 1 puff(s) inhaled every 4 to 6 hours, As Needed  aspirin 81 mg oral delayed release tablet: 1 tab(s) orally once a day  atorvastatin 40 mg oral tablet: 1 tab(s) orally once a day (at bedtime)  cabergoline 0.5 mg oral tablet: 2 tab(s) orally once a week on friday  clopidogrel 75 mg oral tablet: 1 tab(s) orally once a day   dapagliflozin 10 mg oral tablet: 1 tab(s) orally once a day   finasteride 5 mg oral tablet: 1 tab(s) orally once a day  Flomax 0.4 mg oral capsule: 2 cap(s) orally once a day (at bedtime)  furosemide 40 mg oral tablet: 1 tab(s) orally once a day  sacubitril-valsartan 24 mg-26 mg oral tablet: 1 tab(s) orally 2 times a day  Synthroid 175 mcg (0.175 mg) oral tablet: 1 tab(s) orally once a day    Note:Last filled in sept for 3 month supply

## 2023-03-21 NOTE — CONSULT NOTE ADULT - SUBJECTIVE AND OBJECTIVE BOX
CHIEF COMPLAINT:  SOB cp abnormal echo  HISTORY OF PRESENT ILLNESS:  HPI:  67M PMHx of CEA s/p stenting, BPH, hypothyroidism, gout, HFrEF (EF 45%), HTN, HLD, pituitary tumor, prolactinoma, and Laryngeal cancer s/p radiation and resection p/w pleuritic CP (mild in nature, present only with deep inspiration), SOB, LE edema. He states that his CP and SOB were more profound starting this morning at 3am which prompted him to go to the ED. States he has been getting progressively worse with SOB over the past 1.5 months. His outpatient PCP started him on Lasix 20 PO BID recently and he has been compliant with that medication. In ED found with ADHF req BiPAP and given Lasix 40 IVP for diuresis. He states he now feels much better with his breathing after  the Lasix and his CP is now completely resolved      2D echo severe global LV dysfunctyion which is worse then several months ago in my offic  severe diastolic dysfunction and severe LVH without significant hypertension  today feels well  returned from MRI to r/o amyloidosis      PAST MEDICAL & SURGICAL HISTORY:  Low back pain  Herniated disc on lumbar region      Redundant prepuce and phimosis      Arthritis  on knees      Hypothyroid  not taking any meds      Hypertension      Thyroid cancer  pt denies any thyroid cancer      History of pituitary disease  on caber      Gout      S/P excision of vocal cord nodule  2007 and radiation -      Laryngeal cancer  &quot;stage 0&quot;      Pituitary mass      Carotid occlusion, bilateral      Obese      S/P excision of vocal cord nodule  radiation - 2007      History of lumbar spinal fusion  2018      H/O arthroscopy of shoulder  right-2017      Status post carotid surgery  right-5/13/2022, pt. states unsuccesful              MEDICATIONS:  aspirin enteric coated 81 milliGRAM(s) Oral daily  clopidogrel Tablet 75 milliGRAM(s) Oral daily  furosemide   Injectable 40 milliGRAM(s) IV Push daily  heparin   Injectable 5000 Unit(s) SubCutaneous every 8 hours  sacubitril 24 mG/valsartan 26 mG 1 Tablet(s) Oral two times a day      albuterol    0.083% 2.5 milliGRAM(s) Nebulizer every 6 hours PRN  budesonide  80 MICROgram(s)/formoterol 4.5 MICROgram(s) Inhaler 2 Puff(s) Inhalation two times a day        atorvastatin 40 milliGRAM(s) Oral at bedtime  cabergoline 1 milliGRAM(s) Oral <User Schedule>  finasteride 5 milliGRAM(s) Oral daily  levothyroxine 175 MICROGram(s) Oral daily    chlorhexidine 4% Liquid 1 Application(s) Topical <User Schedule>  tamsulosin 0.4 milliGRAM(s) Oral at bedtime      FAMILY HISTORY:  Family history of stroke (Mother)    Family history of Alzheimer&#x27;s disease (Father)    FH: stroke (Mother, Father)        SOCIAL HISTORY:    [ ] Non-smoker  [ ] Smoker  [ ] Alcohol    Allergies    No Known Allergies    Intolerances    	    PHYSICAL EXAM:  T(C): 37.6 (03-21-23 @ 04:00), Max: 37.6 (03-21-23 @ 04:00)  HR: 71 (03-21-23 @ 04:00) (69 - 88)  BP: 101/69 (03-21-23 @ 04:00) (101/59 - 127/88)  RR: 18 (03-21-23 @ 04:00) (11 - 27)  SpO2: 95% (03-21-23 @ 04:00) (95% - 100%)  Wt(kg): --  I&O's Summary    20 Mar 2023 07:01  -  21 Mar 2023 07:00  --------------------------------------------------------  IN: 200 mL / OUT: 1000 mL / NET: -800 mL        Appearance: Normal	NAD no sob or cp  HEENT:   Normal oral mucosa, PERRL, EOMI	  Cardiovascular: Normal S1 S2, No JVD, No murmurs n s3  Respiratory: Lungs clear to auscultation	  Psychiatry: A & O x 3, Mood & affect appropriate  Gastrointestinal:  Soft, Non-tender, + BS	  Skin: No rashes, No ecchymoses, No cyanosis	  Neurologic: Non-focal  Extremities: No clubbing, cyanosis or edema  Vascular: Peripheral pulses palpable 2+ bilaterally    TELEMETRY: 	    ECG:  < from: 12 Lead ECG (03.19.23 @ 08:38) >  Diagnosis Line SINUS RHYTHM WITH PREMATURE ATRIAL COMPLEXES IN A PATTERN OF BIGEMINY  RIGHTWARD AXIS  SEPTAL INFARCT , AGE UNDETERMINED  ABNORMAL ECG  WHEN COMPARED WITH 19-MAR-2023  NO SIGNIFICANT CHANGE WAS FOUND  Confirmed by CESAR FONG, PERCY ([id number]) on 3/19/2023 8:24:41 PM      	  RADIOLOGY:  < from: Xray Chest 1 View- PORTABLE-Urgent (03.19.23 @ 07:18) >    FINDINGS:  Heart size is enlarged, and appears larger in size compared to prior   x-ray.  Questionable developing infiltrate at the right base.  Mild degenerative changes are seen.    IMPRESSION:  Heart size is enlarged, and appears larger in size compared to prior   x-ray.    Questionable developing infiltrate at the right base.    -< from: TTE with Doppler (w/Cont) (03.20.23 @ 08:54) >  Conclusions:  1. Normal mitral valve.  2. Normal trileaflet aortic valve.Mild aortic  regurgitation.  3. Severely dilated left atrium.  LA volume index = 69  cc/m2. Severe concentric leftventricular  hypertrophy.Severe global left ventricular systolic  dysfunction. There is severe global hypokinesis. LVEF  calculated using biplane Alberto's method is 28%. No left  ventricular thrombus.Severe reversible diastolic  dysfunction (Stage III).Increased E/e'  is consistent with  elevated left ventricular filling pressure.  4. Severe right atrial enlargement.The right ventricle is  not well visualized. Using UEA, the RV appears dilated and  decreased in function.  5. Normal tricuspid valve. Mild-moderate tricuspid  regurgitation.Estimated pulmonary artery systolic pressure  equals 29 mm Hg, assuming right atrial pressure equals 8  mm Hg, consistent with normal pulmonary pressures.  6. No pericardial effusion seen.  *** Compared with echocardiogram of 5/7/2021, there is  decreased LVEF and has severe diastolic dysfunction.  ------------------------------------------------------------------------  Confirmed on  3/20/2023 - 11:18:10 by Samantha Fofana M.D.  ------------------------------------------------------------------------    < from: Cardiac Cath Lab - Adult (05.10.21 @ 14:12) >  CORONARY VESSELS: The coronary circulation is right dominant.  LM:   --  LM: Normal.  LAD:   --  LAD: Angiography showed minor luminal irregularities with no  flow limiting lesions.  CX:   --  Circumflex: Angiography showed minor luminal irregularities with  no flow limiting lesions.  RCA:   --  RCA: Angiography showed minor luminal irregularities with no  flow limiting lesions.  COMPLICATIONS: There were no complications.  DIAGNOSTIC RECOMMENDATIONS: The patient should continue with the present  medications.  Prepared and signed by  Ezio Nunez M.D.  Signed 05/10/2021 15:23:06  HEMODYNAMIC TABLES  Pressures:  Baseline  Pressures:  - HR: 87  Pressures:  - Rhythm:  Pressures:  -- Aortic Pressure (S/D/M): 123/95/109  Outputs:  Baseline  Outputs:  -- CALCULATIONS[age]  Outputs:  -- CALCULATIONS: Body Surface Area: 2.18  Outputs:  -- CALCULATIONS: Height in cm: 178.00           	  	  LABS:	 	    CARDIAC MARKERS:                                  12.8   4.75  )-----------( 139      ( 21 Mar 2023 06:53 )             39.1     03-21    142  |  106  |  22  ----------------------------<  100<H>  3.7   |  25  |  1.42<H>    Ca    8.7      21 Mar 2023 06:54  Phos  3.2     03-21  Mg     2.0     03-21    TPro  5.9<L>  /  Alb  3.5  /  TBili  0.8  /  DBili  x   /  AST  15  /  ALT  22  /  AlkPhos  53  03-21    proBNP:   Lipid Profile:   HgA1c:   TSH:

## 2023-03-21 NOTE — PROGRESS NOTE ADULT - SUBJECTIVE AND OBJECTIVE BOX
[facility]  Leo Rodríguez  Office: [phone]  Cell: [phone]  Can also be reached on [employer] Teams     INTERVAL HPI/OVERNIGHT EVENTS: Underwent MRI in the AM, findings consistent with amyloidosis. Started on po furosemide and Entresto. Ambulated around the unit 3x without any sob or chest discomfort. No fever no chills. Increase in back pain from laying on hospital bed all day.      REVIEW OF SYSTEMS:  Negative except per HPI    Vital Signs Last 24 Hrs  T(C): 36.4 (21 Mar 2023 12:00), Max: 37.6 (21 Mar 2023 04:00)  T(F): 97.6 (21 Mar 2023 12:00), Max: 99.7 (21 Mar 2023 04:00)  HR: 72 (21 Mar 2023 12:00) (71 - 88)  BP: 105/70 (21 Mar 2023 12:00) (101/69 - 126/90)  BP(mean): 82 (20 Mar 2023 22:00) (80 - 92)  RR: 18 (21 Mar 2023 12:00) (14 - 27)  SpO2: 97% (21 Mar 2023 12:00) (95% - 100%)    Parameters below as of 21 Mar 2023 10:48  Patient On (Oxygen Delivery Method): room air        PHYSICAL EXAMINATION:  GENERAL: NAD, well developed, hoarse voice  HEAD:  Atraumatic, Normocephalic  EYES:  conjunctiva and sclera clear  NECK: Supple, No JVD, Normal thyroid  CHEST/LUNG: Clear to auscultation. Clear to percussion bilaterally; No rales, rhonchi, wheezing, or rubs  HEART: Regular rate and rhythm; No murmurs, rubs, or gallops  ABDOMEN: Soft, Nontender, Nondistended; Bowel sounds present  NERVOUS SYSTEM:  Alert & Oriented X3,    EXTREMITIES:  No pitting edema  SKIN: warm dry                        12.8   4.75  )-----------( 139      ( 21 Mar 2023 06:53 )             39.1     03-21    142  |  106  |  22  ----------------------------<  100<H>  3.7   |  25  |  1.42<H>    Ca    8.7      21 Mar 2023 06:54  Phos  3.2     03-21  Mg     2.0     03-21    TPro  5.9<L>  /  Alb  3.5  /  TBili  0.8  /  DBili  x   /  AST  15  /  ALT  22  /  AlkPhos  53  03-21    LIVER FUNCTIONS - ( 21 Mar 2023 06:54 )  Alb: 3.5 g/dL / Pro: 5.9 g/dL / ALK PHOS: 53 U/L / ALT: 22 U/L / AST: 15 U/L / GGT: x               PT/INR - ( 21 Mar 2023 06:55 )   PT: 14.6 sec;   INR: 1.27 ratio         PTT - ( 21 Mar 2023 06:55 )  PTT:31.6 sec    CAPILLARY BLOOD GLUCOSE      RADIOLOGY & ADDITIONAL TESTS:

## 2023-03-21 NOTE — CONSULT NOTE ADULT - ASSESSMENT
1. cardiomyopathy with infiltrative pattern systolic and diastolic dysfunction suggestive of amyloid  2. chf improved with lasix\3. pituitary tumor on cabegoline  3. s/p laryngeal cardinoma    Recommend  check MRI to r/o amyloid  start entresto 24-26 BID  daily weights  change lasix to po 40  OOB and ambulate

## 2023-03-21 NOTE — DISCHARGE NOTE PROVIDER - CARE PROVIDER_API CALL
Mane Yi (MD)  Cardiovascular Disease; Internal Medicine; Interventional Cardiology  [address] [place] [address]  Phone: [phone]  Fax: [phone]  Follow Up Time:    Mane Yi (MD)  Cardiovascular Disease; Internal Medicine; Interventional Cardiology  [address] [place] [id number]  Phone: [phone]  Fax: [phone]  Follow Up Time:     Leo Rodríguez (DO)  Medicine  [address] [place] [address], [address] [place] [address]  Phone: [phone]  Fax: [phone]  Follow Up Time:

## 2023-03-22 ENCOUNTER — TRANSCRIPTION ENCOUNTER (OUTPATIENT)
Age: 68
End: 2023-03-22

## 2023-03-22 VITALS
SYSTOLIC BLOOD PRESSURE: 130 MMHG | TEMPERATURE: 98 F | OXYGEN SATURATION: 96 % | RESPIRATION RATE: 18 BRPM | HEART RATE: 76 BPM | DIASTOLIC BLOOD PRESSURE: 85 MMHG

## 2023-03-22 LAB
ANION GAP SERPL CALC-SCNC: 13 MMOL/L — SIGNIFICANT CHANGE UP (ref 5–17)
BUN SERPL-MCNC: 19 MG/DL — SIGNIFICANT CHANGE UP (ref 7–23)
CALCIUM SERPL-MCNC: 9.5 MG/DL — SIGNIFICANT CHANGE UP (ref 8.4–10.5)
CHLORIDE SERPL-SCNC: 103 MMOL/L — SIGNIFICANT CHANGE UP (ref 96–108)
CO2 SERPL-SCNC: 25 MMOL/L — SIGNIFICANT CHANGE UP (ref 22–31)
CREAT SERPL-MCNC: 1.27 MG/DL — SIGNIFICANT CHANGE UP (ref 0.5–1.3)
EGFR: 61 ML/MIN/1.73M2 — SIGNIFICANT CHANGE UP
GLUCOSE SERPL-MCNC: 80 MG/DL — SIGNIFICANT CHANGE UP (ref 70–99)
HCT VFR BLD CALC: 47.3 % — SIGNIFICANT CHANGE UP (ref 39–50)
HGB BLD-MCNC: 15.2 G/DL — SIGNIFICANT CHANGE UP (ref 13–17)
KAPPA LC SER QL IFE: 3.2 MG/DL — HIGH (ref 0.33–1.94)
KAPPA/LAMBDA FREE LIGHT CHAIN RATIO, SERUM: 1.47 RATIO — SIGNIFICANT CHANGE UP (ref 0.26–1.65)
LAMBDA LC SER QL IFE: 2.18 MG/DL — SIGNIFICANT CHANGE UP (ref 0.57–2.63)
MCHC RBC-ENTMCNC: 28.4 PG — SIGNIFICANT CHANGE UP (ref 27–34)
MCHC RBC-ENTMCNC: 32.1 GM/DL — SIGNIFICANT CHANGE UP (ref 32–36)
MCV RBC AUTO: 88.2 FL — SIGNIFICANT CHANGE UP (ref 80–100)
NRBC # BLD: 0 /100 WBCS — SIGNIFICANT CHANGE UP (ref 0–0)
PLATELET # BLD AUTO: 159 K/UL — SIGNIFICANT CHANGE UP (ref 150–400)
POTASSIUM SERPL-MCNC: 4 MMOL/L — SIGNIFICANT CHANGE UP (ref 3.5–5.3)
POTASSIUM SERPL-SCNC: 4 MMOL/L — SIGNIFICANT CHANGE UP (ref 3.5–5.3)
PROT ?TM UR-MCNC: 12 MG/DL — SIGNIFICANT CHANGE UP (ref 0–12)
RBC # BLD: 5.36 M/UL — SIGNIFICANT CHANGE UP (ref 4.2–5.8)
RBC # FLD: 15.9 % — HIGH (ref 10.3–14.5)
SODIUM SERPL-SCNC: 141 MMOL/L — SIGNIFICANT CHANGE UP (ref 135–145)
WBC # BLD: 5.98 K/UL — SIGNIFICANT CHANGE UP (ref 3.8–10.5)
WBC # FLD AUTO: 5.98 K/UL — SIGNIFICANT CHANGE UP (ref 3.8–10.5)

## 2023-03-22 PROCEDURE — 83735 ASSAY OF MAGNESIUM: CPT

## 2023-03-22 PROCEDURE — 94660 CPAP INITIATION&MGMT: CPT

## 2023-03-22 PROCEDURE — 74230 X-RAY XM SWLNG FUNCJ C+: CPT

## 2023-03-22 PROCEDURE — A9585: CPT

## 2023-03-22 PROCEDURE — 84100 ASSAY OF PHOSPHORUS: CPT

## 2023-03-22 PROCEDURE — 84481 FREE ASSAY (FT-3): CPT

## 2023-03-22 PROCEDURE — 92526 ORAL FUNCTION THERAPY: CPT

## 2023-03-22 PROCEDURE — 85018 HEMOGLOBIN: CPT

## 2023-03-22 PROCEDURE — 80048 BASIC METABOLIC PNL TOTAL CA: CPT

## 2023-03-22 PROCEDURE — 84295 ASSAY OF SERUM SODIUM: CPT

## 2023-03-22 PROCEDURE — 84439 ASSAY OF FREE THYROXINE: CPT

## 2023-03-22 PROCEDURE — 84484 ASSAY OF TROPONIN QUANT: CPT

## 2023-03-22 PROCEDURE — 86901 BLOOD TYPING SEROLOGIC RH(D): CPT

## 2023-03-22 PROCEDURE — 94640 AIRWAY INHALATION TREATMENT: CPT

## 2023-03-22 PROCEDURE — 83521 IG LIGHT CHAINS FREE EACH: CPT

## 2023-03-22 PROCEDURE — 86335 IMMUNFIX E-PHORSIS/URINE/CSF: CPT

## 2023-03-22 PROCEDURE — 82803 BLOOD GASES ANY COMBINATION: CPT

## 2023-03-22 PROCEDURE — 82330 ASSAY OF CALCIUM: CPT

## 2023-03-22 PROCEDURE — 84132 ASSAY OF SERUM POTASSIUM: CPT

## 2023-03-22 PROCEDURE — 83036 HEMOGLOBIN GLYCOSYLATED A1C: CPT

## 2023-03-22 PROCEDURE — 85027 COMPLETE CBC AUTOMATED: CPT

## 2023-03-22 PROCEDURE — 83880 ASSAY OF NATRIURETIC PEPTIDE: CPT

## 2023-03-22 PROCEDURE — 84443 ASSAY THYROID STIM HORMONE: CPT

## 2023-03-22 PROCEDURE — 93005 ELECTROCARDIOGRAM TRACING: CPT

## 2023-03-22 PROCEDURE — 82947 ASSAY GLUCOSE BLOOD QUANT: CPT

## 2023-03-22 PROCEDURE — 80061 LIPID PANEL: CPT

## 2023-03-22 PROCEDURE — 82553 CREATINE MB FRACTION: CPT

## 2023-03-22 PROCEDURE — 92611 MOTION FLUOROSCOPY/SWALLOW: CPT

## 2023-03-22 PROCEDURE — 85025 COMPLETE CBC W/AUTO DIFF WBC: CPT

## 2023-03-22 PROCEDURE — 86900 BLOOD TYPING SEROLOGIC ABO: CPT

## 2023-03-22 PROCEDURE — 71045 X-RAY EXAM CHEST 1 VIEW: CPT

## 2023-03-22 PROCEDURE — 99291 CRITICAL CARE FIRST HOUR: CPT

## 2023-03-22 PROCEDURE — 85610 PROTHROMBIN TIME: CPT

## 2023-03-22 PROCEDURE — 75561 CARDIAC MRI FOR MORPH W/DYE: CPT

## 2023-03-22 PROCEDURE — 85730 THROMBOPLASTIN TIME PARTIAL: CPT

## 2023-03-22 PROCEDURE — 84156 ASSAY OF PROTEIN URINE: CPT

## 2023-03-22 PROCEDURE — 83605 ASSAY OF LACTIC ACID: CPT

## 2023-03-22 PROCEDURE — 86334 IMMUNOFIX E-PHORESIS SERUM: CPT

## 2023-03-22 PROCEDURE — 92610 EVALUATE SWALLOWING FUNCTION: CPT

## 2023-03-22 PROCEDURE — C8929: CPT

## 2023-03-22 PROCEDURE — 80053 COMPREHEN METABOLIC PANEL: CPT

## 2023-03-22 PROCEDURE — 36415 COLL VENOUS BLD VENIPUNCTURE: CPT

## 2023-03-22 PROCEDURE — 93010 ELECTROCARDIOGRAM REPORT: CPT

## 2023-03-22 PROCEDURE — 85014 HEMATOCRIT: CPT

## 2023-03-22 PROCEDURE — 0225U NFCT DS DNA&RNA 21 SARSCOV2: CPT

## 2023-03-22 PROCEDURE — 93308 TTE F-UP OR LMTD: CPT

## 2023-03-22 PROCEDURE — 93971 EXTREMITY STUDY: CPT

## 2023-03-22 PROCEDURE — 99232 SBSQ HOSP IP/OBS MODERATE 35: CPT

## 2023-03-22 PROCEDURE — 86850 RBC ANTIBODY SCREEN: CPT

## 2023-03-22 PROCEDURE — 85520 HEPARIN ASSAY: CPT

## 2023-03-22 PROCEDURE — 96374 THER/PROPH/DIAG INJ IV PUSH: CPT

## 2023-03-22 PROCEDURE — 82435 ASSAY OF BLOOD CHLORIDE: CPT

## 2023-03-22 PROCEDURE — 82550 ASSAY OF CK (CPK): CPT

## 2023-03-22 RX ORDER — FUROSEMIDE 40 MG
1 TABLET ORAL
Qty: 0 | Refills: 0 | DISCHARGE

## 2023-03-22 RX ORDER — FUROSEMIDE 40 MG
1 TABLET ORAL
Qty: 30 | Refills: 0
Start: 2023-03-22 | End: 2023-04-20

## 2023-03-22 RX ORDER — DAPAGLIFLOZIN 10 MG/1
1 TABLET, FILM COATED ORAL
Qty: 30 | Refills: 0
Start: 2023-03-22 | End: 2023-04-20

## 2023-03-22 RX ORDER — SACUBITRIL AND VALSARTAN 24; 26 MG/1; MG/1
1 TABLET, FILM COATED ORAL
Qty: 60 | Refills: 0
Start: 2023-03-22 | End: 2023-04-20

## 2023-03-22 RX ADMIN — Medication 40 MILLIGRAM(S): at 05:17

## 2023-03-22 RX ADMIN — Medication 650 MILLIGRAM(S): at 00:30

## 2023-03-22 RX ADMIN — HEPARIN SODIUM 5000 UNIT(S): 5000 INJECTION INTRAVENOUS; SUBCUTANEOUS at 05:17

## 2023-03-22 RX ADMIN — Medication 81 MILLIGRAM(S): at 12:11

## 2023-03-22 RX ADMIN — SACUBITRIL AND VALSARTAN 1 TABLET(S): 24; 26 TABLET, FILM COATED ORAL at 05:17

## 2023-03-22 RX ADMIN — Medication 175 MICROGRAM(S): at 05:17

## 2023-03-22 RX ADMIN — FINASTERIDE 5 MILLIGRAM(S): 5 TABLET, FILM COATED ORAL at 12:11

## 2023-03-22 RX ADMIN — CLOPIDOGREL BISULFATE 75 MILLIGRAM(S): 75 TABLET, FILM COATED ORAL at 12:11

## 2023-03-22 RX ADMIN — BUDESONIDE AND FORMOTEROL FUMARATE DIHYDRATE 2 PUFF(S): 160; 4.5 AEROSOL RESPIRATORY (INHALATION) at 12:11

## 2023-03-22 NOTE — DISCHARGE NOTE NURSING/CASE MANAGEMENT/SOCIAL WORK - PATIENT PORTAL LINK FT
You can access the FollowMyHealth Patient Portal offered by [facility] by registering at the following website: [email or web address]. By joining [facility]’s FollowMyHealth portal, you will also be able to view your health information using other applications (apps) compatible with our system.

## 2023-03-22 NOTE — CHART NOTE - NSCHARTNOTEFT_GEN_A_CORE
Hpi Title: Evaluation of Skin Lesions MEDICINE NP    MUSTAPHA CANTU  67y Male    Patient is a 67y old  Male who presents with a chief complaint of ADHF (21 Mar 2023 16:43)       67M PMHx of LT CEA s/p stenting, BPH, hypothyroidism, gout, HFrEF (EF 45%), HTN, HLD, pituitary tumor, prolactinoma, and Laryngeal cancer s/p RT and resection p/w mild pleuritic CP,  LE edema, SOB  worsening over the past 1.5 months. In ED found with ADHF req BiPAP and given Lasix 40 IVP for diuresis, with improvement in symptoms, and CP resolved.   Hospital course with cMRI for Amyloid.         > Event Summary:   Notified by RN, Patient with x3 consecutive 2sec pauses on tele, asleep and asymptomatic.  patient seen at bedside, denies chest pain, sob, dizziness, palpitations.  -ECG SR w/ Sinus arrythmia 69bpm.  no acute st/t wave changes  -c/w Telemetry  -On Entresto f/u cardiology in am  -Will endorse to Day Provider in AM and Attending to follow          -Vital Signs Last 24 Hrs  T(C): 36.7 (22 Mar 2023 04:52), Max: 36.7 (22 Mar 2023 04:52)  T(F): 98.1 (22 Mar 2023 04:52), Max: 98.1 (22 Mar 2023 04:52)  HR: 83 (22 Mar 2023 04:52) (72 - 83)  BP: 132/90 (22 Mar 2023 04:52) (105/70 - 132/90)  BP(mean): --  RR: 18 (22 Mar 2023 04:52) (18 - 18)  SpO2: 98% (22 Mar 2023 04:52) (96% - 98%)    Parameters below as of 22 Mar 2023 04:52  Patient On (Oxygen Delivery Method): room air        RUTH Rosario-BC  Medicine Department  #[id number]

## 2023-03-22 NOTE — PROGRESS NOTE ADULT - PROBLEM SELECTOR PLAN 1
Cardiac MRI this AM consistent with amyloidosis.  Started on Entresto and furosemide changed to po.  Plans for further gdmt sglt2i and betablocker after monitoring starting on Entresto given borderline blood pressures.
MRI consistent with amyloidosis  Started on Entresto and furosemide changed to po. blood pressure stable  DC on Jardiance/Farxiga

## 2023-03-22 NOTE — PROGRESS NOTE ADULT - PROBLEM SELECTOR PLAN 4
stable  Continue levothyroxine, will adjust dose per TSH outpatient when acute illness stabilizes
stable  Continue levothyroxine, will adjust dose per TSH outpatient when acute illness stabilizes

## 2023-03-22 NOTE — DISCHARGE NOTE NURSING/CASE MANAGEMENT/SOCIAL WORK - NSDCPEFALRISK_GEN_ALL_CORE
For information on Fall & Injury Prevention, visit: [email or web address] OR  [email or web address] OR  https://www.cdc.gov/steadi/patient.html

## 2023-03-22 NOTE — PROGRESS NOTE ADULT - PROBLEM SELECTOR PLAN 6
xray esophagram with some aspiration  appreciate speech and swallow recs and guidance  Soft and bite sized diet, swallow exercises
xray esophagram with some aspiration  appreciate speech and swallow recs and guidance

## 2023-03-22 NOTE — PROGRESS NOTE ADULT - ASSESSMENT
1. cardiomyopathy with infiltrative pattern systolic and diastolic dysfunction suggestive of amyloid- confirmed by MRI  2. chf improved with lasix now on entresto  3. pituitary tumor on cabegoline  3. s/p laryngeal cardinoma    Recommendd   entresto 24-26 BID  daily weights  lasix to po 40  send blood to r/o AL amyloid  discharge home  weill arrange for visit with Dr. Marcio Faulkner re: amyloid medications

## 2023-03-22 NOTE — DISCHARGE NOTE NURSING/CASE MANAGEMENT/SOCIAL WORK - NSSCNAMETXT_GEN_ALL_CORE
Patient Returning Call    Reason for call:  Returning Call to Clinic    Information relayed to patient:  Relayed results message from Pamela Marion    Patient has additional questions:  No     Accepted offer to schedule Establish Care appt 3/22/2023 with Dr. Licea       NWACOSTA

## 2023-03-22 NOTE — PROGRESS NOTE ADULT - SUBJECTIVE AND OBJECTIVE BOX
Subjective: Patient seen and examined. No new events except as noted.   MRI compatible with cardiac amyloidosis  Patient feels well now on entresto low dose  MEDICATIONS:  MEDICATIONS  (STANDING):  aspirin enteric coated 81 milliGRAM(s) Oral daily  atorvastatin 40 milliGRAM(s) Oral at bedtime  budesonide  80 MICROgram(s)/formoterol 4.5 MICROgram(s) Inhaler 2 Puff(s) Inhalation two times a day  cabergoline 1 milliGRAM(s) Oral <User Schedule>  chlorhexidine 4% Liquid 1 Application(s) Topical <User Schedule>  clopidogrel Tablet 75 milliGRAM(s) Oral daily  finasteride 5 milliGRAM(s) Oral daily  furosemide    Tablet 40 milliGRAM(s) Oral daily  heparin   Injectable 5000 Unit(s) SubCutaneous every 8 hours  levothyroxine 175 MICROGram(s) Oral daily  sacubitril 24 mG/valsartan 26 mG 1 Tablet(s) Oral two times a day  tamsulosin 0.4 milliGRAM(s) Oral at bedtime      PHYSICAL EXAM:  T(C): 36.4 (03-22-23 @ 11:29), Max: 36.7 (03-22-23 @ 04:52)  HR: 76 (03-22-23 @ 11:29) (73 - 83)  BP: 130/85 (03-22-23 @ 11:29) (108/73 - 132/90)  RR: 18 (03-22-23 @ 11:29) (18 - 18)  SpO2: 96% (03-22-23 @ 11:29) (96% - 98%)  Wt(kg): --  I&O's Summary    21 Mar 2023 07:01  -  22 Mar 2023 07:00  --------------------------------------------------------  IN: 160 mL / OUT: 500 mL / NET: -340 mL        LABS:    CARDIAC MARKERS:                                15.2   5.98  )-----------( 159      ( 22 Mar 2023 13:35 )             47.3     03-22    141  |  103  |  19  ----------------------------<  80  4.0   |  25  |  1.27    Ca    9.5      22 Mar 2023 13:35  Phos  3.2     03-21  Mg     2.0     03-21    TPro  5.9<L>  /  Alb  3.5  /  TBili  0.8  /  DBili  x   /  AST  15  /  ALT  22  /  AlkPhos  53  03-21    proBNP:   Lipid Profile:   HgA1c:   TSH:     < from: MR Cardiac w/wo IV Cont (03.21.23 @ 08:58) >  FINDINGS:  Cardiac morphology:  The cardiac chambers demonstrate normal atrioventricular and   ventriculoarterial concordance. The visualized thoracic aorta is normal   in course, caliber, and contour. The main pulmonary artery is normal in   size.  The systemic and pulmonary venous return appears to be normal.    Left Ventricle:  There is normal LV size and reduced globalsystolic function. No regional   wall motion abnormalities are present. No thrombus is seen in the left   ventricle.  T2-weighted imaging demonstrates no high signal intensity to   suggest the presence of myocardial edema.  On the TI  series, there   is poor nulling of the myocardium. There is diffuse enhancement   throughout the LV myocardium and RV myocardium including the   subendocardial and mid-myocardial regions.    Absolute measure    LVEDV: 193 ml  LVESV: 138 ml  LVSV: 55 ml  LVEF: 28 % (normal > 54%)    Cardiac output: 3.8 L/min  Cardiac index: 1.7 L/min/m2        LV measurements: (I=Indexed to BSA)  LVEDV/BSA: 85 ml/m2 (normal < 95)  LVESV/BSA: 61 ml/m2  LVSV/BSA: 24 ml/m2    LV KAHLIL: 63 mm (normal < 62)  Anterior septal wall: 16 mm  Posterior lateral wall: 10 mm  LV ESD: 57 mm    Left atrium:  The left atrium is normal in size.      Right Ventricle:  There is normal RV size and reduced global systolic function. There is no   fatty infiltration of the RV wall. There is diffuse enhancement   throughout the LV myocardium and RV myocardium including the   subendocardial and mid-myocardial regions. There are no regional wall   motion abnormalities or focal aneurysmal motion.    Absolute measure      "-I" (Indexed to BSA)  RVEDV: 225 ml  RVESV: 170 ml  RVSV: 56 ml  RVEF: 25 % (normal > 46%)    RVEDV/BSA: 99 ml/m2 (normal= )  RVESV/BSA: 75 ml/m2  RVSV/BSA: 25 ml/m2      Right atrium:  The right atrium is normal in size.        Aortic valve:  Quantification was not performed; however, qualitatively there are no   abnormal flow jets to suggest aortic stenosis or regurgitation.    Pulmonic valve: Quantification was not performed; however, qualitatively   there are no abnormal flow jets to suggest aortic stenosis or   regurgitation    Mitral valve:  No significant mitral stenosis or regurgitation.    Tricuspid valve:  No significant tricuspid stenosis or regurgitation.    Pericardium:  Small pericardial effusion. No pericardial thickening is identified.    Extracardiac findings:  The chest wall and mediastinum are unremarkable. Small right pleural   effusion with partial right lower lobe atelectasis.  Limited evaluation   of the lungs demonstrate no other abnormal signal characteristics.      IMPRESSION:    Findings consistent with cardiac amyloidosis.      Small right pleural effusion with partial right lower lobe atelectasis.    --- End of Report ---          AMIE RAMIREZ MD; Resident Radiologist  This document has been electronically signed.  KAMLESH HI; Attending Radiologist  This document has been electronically signed. Mar 21 2023 11:22AM  < from: TTE with Doppler (w/Cont) (03.20.23 @ 08:54) >  Dimensions:    Normal Values:  LA:     5.1    2.0 - 4.0 cm  Ao:     3.9    2.0 - 3.8 cm  SEPTUM: 1.5    0.6 - 1.2 cm  PWT:    1.6    0.6 -1.1 cm  LVIDd:  5.2    3.0 - 5.6 cm  LVIDs:  4.6    1.8 - 4.0 cm  Derived variables:  LVMI: 163 g/m2  RWT: 0.61  Fractional short: 12 %  EF (Alberto Rule): 28 %Doppler Peak Velocity (m/sec):  AoV=0.9  ------------------------------------------------------------------------  Observations:  Mitral Valve: Normal mitral valve. Mild mitral  regurgitation.  Aortic Valve/Aorta: Normal trileaflet aortic valve. Mild  aortic regurgitation.  Aortic Root: 3.9 cm.  Ascending Aorta: 3.8 cm.  LVOT diameter: 2.3cm.  Left Atrium: Severely dilated left atrium.  LA volume index  = 69 cc/m2.  Left Ventricle: Severe global left ventricular systolic  dysfunction. There is severe global hypokinesis. LVEF  calculated using biplane Alberto's method is 28%. No left  ventricular thrombus. Severe concentric left ventricular  hypertrophy. Severe reversible diastolic dysfunction (Stage  III). Increased E/e'  is consistent with elevated left  ventricular filling pressure.  Right Heart: Severe right atrial enlargement.The right  ventricle is not well visualized. Using UEA, the RV appears  dilated and decreased in function. Normal tricuspid valve.  Mild-moderate tricuspid regurgitation. Normal pulmonic  valve. Mild pulmonic regurgitation.  Pericardium/Pleura: No pericardial effusion seen.  Hemodynamic: Estimated right atrial pressure is 8 mm Hg.  Estimated right ventricular systolic pressure equals 29 mm  Hg, assuming right atrial pressure equals 8 mm Hg,  consistent with normal pulmonary pressures.  ------------------------------------------------------------------------  Conclusions:  1. Normal mitral valve.  2. Normal trileaflet aortic valve.Mild aortic  regurgitation.  3. Severely dilated left atrium.  LA volume index = 69  cc/m2. Severe concentric leftventricular  hypertrophy.Severe global left ventricular systolic  dysfunction. There is severe global hypokinesis. LVEF  calculated using biplane Alberto's method is 28%. No left  ventricular thrombus.Severe reversible diastolic  dysfunction (Stage III).Increased E/e'  is consistent with  elevated left ventricular filling pressure.  4. Severe right atrial enlargement.The right ventricle is  not well visualized. Using UEA, the RV appears dilated and  decreased in function.  5. Normal tricuspid valve. Mild-moderate tricuspid  regurgitation.Estimated pulmonary artery systolic pressure  equals 29 mm Hg, assuming right atrial pressure equals 8  mm Hg, consistent with normal pulmonary pressures.  6. No pericardial effusion seen.  *** Compared with echocardiogram of 5/7/2021, there is  decreased LVEF and has severe diastolic dysfunction.  ------------------------------------------------------------------------  Confirmed on  3/20/2023 - 11:18:10 by Samantha Fofana M.D.  ------------------------------------------------------------------------          TELEMETRY: 	    ECG:  	  RADIOLOGY:   DIAGNOSTIC TESTING:  [ ] Echocardiogram:  [ ]  Catheterization:  [ ] Stress Test:    OTHER:

## 2023-03-22 NOTE — PROGRESS NOTE ADULT - PROBLEM SELECTOR PLAN 2
A1C 6.1  Will add sglt2i either inpatient vs outpatient for gdmt, will also treat prediabetes  continue sliding scale
A1C 6.1  DC on SGLT2i  continue sliding scale

## 2023-03-22 NOTE — PROGRESS NOTE ADULT - SUBJECTIVE AND OBJECTIVE BOX
[facility]  Leo Rodríguez  Office: [phone]  Cell: [phone]  Can also be reached on [employer] Teams         INTERVAL HPI/OVERNIGHT EVENTS: pauses on tele overnight while sleeping. Seen and examined sitting up in chair doing very well. Has no complaints denies any chest pain or SOB. no fever chills or cough. Having regular bowel movements. Urinating a lot.      REVIEW OF SYSTEMS:  Negative except per hpi    Vital Signs Last 24 Hrs  T(C): 36.7 (22 Mar 2023 04:52), Max: 36.7 (22 Mar 2023 04:52)  T(F): 98.1 (22 Mar 2023 04:52), Max: 98.1 (22 Mar 2023 04:52)  HR: 83 (22 Mar 2023 04:52) (72 - 83)  BP: 132/90 (22 Mar 2023 04:52) (105/70 - 132/90)  BP(mean): --  RR: 18 (22 Mar 2023 04:52) (18 - 18)  SpO2: 98% (22 Mar 2023 04:52) (96% - 98%)    Parameters below as of 22 Mar 2023 04:52  Patient On (Oxygen Delivery Method): room air        PHYSICAL EXAMINATION:  GENERAL: NAD, well developed, hoarse voice  HEAD:  Atraumatic, Normocephalic  EYES:  conjunctiva and sclera clear  NECK: Supple, No JVD, Normal thyroid  CHEST/LUNG: Clear to auscultation. Clear to percussion bilaterally; No rales, rhonchi, wheezing, or rubs  HEART: Regular rate and rhythm; No murmurs, rubs, or gallops  ABDOMEN: Soft, Nontender, Nondistended; Bowel sounds present  NERVOUS SYSTEM:  Alert & Oriented X3,    EXTREMITIES:  No pitting edema  SKIN: warm dry                          12.8   4.75  )-----------( 139      ( 21 Mar 2023 06:53 )             39.1     03-21    142  |  106  |  22  ----------------------------<  100<H>  3.7   |  25  |  1.42<H>    Ca    8.7      21 Mar 2023 06:54  Phos  3.2     03-21  Mg     2.0     03-21    TPro  5.9<L>  /  Alb  3.5  /  TBili  0.8  /  DBili  x   /  AST  15  /  ALT  22  /  AlkPhos  53  03-21    LIVER FUNCTIONS - ( 21 Mar 2023 06:54 )  Alb: 3.5 g/dL / Pro: 5.9 g/dL / ALK PHOS: 53 U/L / ALT: 22 U/L / AST: 15 U/L / GGT: x               PT/INR - ( 21 Mar 2023 06:55 )   PT: 14.6 sec;   INR: 1.27 ratio         PTT - ( 21 Mar 2023 06:55 )  PTT:31.6 sec    CAPILLARY BLOOD GLUCOSE      RADIOLOGY & ADDITIONAL TESTS:

## 2023-03-22 NOTE — PROGRESS NOTE ADULT - PROBLEM SELECTOR PLAN 9
Will start gdmt for now. Further evaluation drawn to rule out AL so can start on cardiac amyloid medications  - On entresto low dose and furosemide  Continue to monitor BP- start metoprolol  - Discharge on sglt2i- will help with: prediabetes, renal function, and also cardiomyopathy
Will start gdmt for now. Further evaluation drawn to rule out AL so can start on cardiac amyloid medications  - On entresto low dose and furosemide  Continue to monitor BP- start metoprolol  - Discharge on sglt2i- will help with: prediabetes, renal function, and also cardiomyopathy

## 2023-03-23 LAB — INTERPRETATION SERPL IFE-IMP: SIGNIFICANT CHANGE UP

## 2023-03-24 ENCOUNTER — TRANSCRIPTION ENCOUNTER (OUTPATIENT)
Age: 68
End: 2023-03-24

## 2023-03-24 LAB
INTERPRETATION 24H UR IFE-IMP: SIGNIFICANT CHANGE UP
INTERPRETATION 24H UR IFE-IMP: SIGNIFICANT CHANGE UP

## 2023-03-27 ENCOUNTER — APPOINTMENT (OUTPATIENT)
Dept: CARE COORDINATION | Facility: HOME HEALTH | Age: 68
End: 2023-03-27
Payer: MEDICARE

## 2023-03-27 VITALS
OXYGEN SATURATION: 95 % | BODY MASS INDEX: 31.5 KG/M2 | HEART RATE: 80 BPM | TEMPERATURE: 97.1 F | RESPIRATION RATE: 16 BRPM | SYSTOLIC BLOOD PRESSURE: 85 MMHG | WEIGHT: 220 LBS | DIASTOLIC BLOOD PRESSURE: 60 MMHG | HEIGHT: 70 IN

## 2023-03-27 DIAGNOSIS — N40.0 BENIGN PROSTATIC HYPERPLASIA WITHOUT LOWER URINARY TRACT SYMPMS: ICD-10-CM

## 2023-03-27 DIAGNOSIS — I95.9 HYPOTENSION, UNSPECIFIED: ICD-10-CM

## 2023-03-27 DIAGNOSIS — E03.9 HYPOTHYROIDISM, UNSPECIFIED: ICD-10-CM

## 2023-03-27 DIAGNOSIS — M10.9 GOUT, UNSPECIFIED: ICD-10-CM

## 2023-03-27 PROCEDURE — 99495 TRANSJ CARE MGMT MOD F2F 14D: CPT

## 2023-03-28 ENCOUNTER — APPOINTMENT (OUTPATIENT)
Dept: CARDIOLOGY | Facility: CLINIC | Age: 68
End: 2023-03-28
Payer: MEDICARE

## 2023-03-28 VITALS
BODY MASS INDEX: 33 KG/M2 | WEIGHT: 230 LBS | OXYGEN SATURATION: 99 % | HEART RATE: 86 BPM | DIASTOLIC BLOOD PRESSURE: 70 MMHG | SYSTOLIC BLOOD PRESSURE: 106 MMHG

## 2023-03-28 PROBLEM — N40.0 BPH (BENIGN PROSTATIC HYPERPLASIA): Status: ACTIVE | Noted: 2023-03-28

## 2023-03-28 PROBLEM — E03.9 HYPOTHYROIDISM, UNSPECIFIED TYPE: Status: ACTIVE | Noted: 2023-03-28

## 2023-03-28 PROBLEM — M10.9 GOUT: Status: ACTIVE | Noted: 2023-03-28

## 2023-03-28 PROBLEM — I95.9 HYPOTENSION: Status: ACTIVE | Noted: 2023-03-28

## 2023-03-28 PROCEDURE — 99214 OFFICE O/P EST MOD 30 MIN: CPT

## 2023-03-28 RX ORDER — CABERGOLINE 0.5 MG/1
0.5 TABLET ORAL WEEKLY
Refills: 0 | Status: ACTIVE | COMMUNITY
Start: 2023-03-28

## 2023-03-28 RX ORDER — INDAPAMIDE 1.25 MG/1
1.25 TABLET, FILM COATED ORAL
Qty: 30 | Refills: 0 | Status: DISCONTINUED | COMMUNITY
Start: 2021-10-21 | End: 2023-03-28

## 2023-03-28 RX ORDER — LOSARTAN POTASSIUM 25 MG/1
25 TABLET, FILM COATED ORAL
Qty: 30 | Refills: 3 | Status: DISCONTINUED | COMMUNITY
Start: 2023-02-24 | End: 2023-03-28

## 2023-03-28 RX ORDER — ALBUTEROL SULFATE 90 UG/1
108 (90 BASE) INHALANT RESPIRATORY (INHALATION)
Refills: 0 | Status: ACTIVE | COMMUNITY
Start: 2023-03-28

## 2023-03-28 RX ORDER — FUROSEMIDE 40 MG/1
40 TABLET ORAL DAILY
Refills: 0 | Status: ACTIVE | COMMUNITY
Start: 2023-03-28

## 2023-03-28 RX ORDER — LEVOFLOXACIN 500 MG/1
500 TABLET, FILM COATED ORAL
Qty: 5 | Refills: 0 | Status: DISCONTINUED | COMMUNITY
Start: 2022-10-11 | End: 2023-03-28

## 2023-03-28 RX ORDER — DAPAGLIFLOZIN 10 MG/1
10 TABLET, FILM COATED ORAL DAILY
Refills: 0 | Status: ACTIVE | COMMUNITY
Start: 2023-03-28

## 2023-03-28 NOTE — HISTORY OF PRESENT ILLNESS
[Post-hospitalization from ___ Hospital] : Post-hospitalization from [unfilled] Hospital [Admitted on: ___] : The patient was admitted on [unfilled] [Discharged on ___] : discharged on [unfilled] [Discharge Summary] : discharge summary [Discharge Med List] : discharge medication list [Other: ____] : [unfilled] [Med Reconciliation] : medication reconciliation has been completed [Patient Contacted By: ____] : and contacted by [unfilled] [FreeTextEntry2] : 67M PMHx of CEA s/p stenting, BPH, hypothyroidism, gout, HFrEF (EF 45%), HTN, HLD, pituitary tumor, prolactinoma, and Laryngeal cancer s/p radiation and resection p/w pleuritic CP (mild in nature, present only with deep inspiration), SOB, LE edema. He states that his CP and SOB were more profound starting this morning at 3am which prompted him to go to the ED. States he has been getting progressively worse with SOB over the past 1.5 months. His outpatient PCP started him on Lasix 20 PO BID recently and he has been compliant with that medication. In ED found with ADHF req BiPAP and given Lasix 40 IVP for diuresis. He states he now feels much better with his breathing after  the Lasix and his CP is now completely resolved.\par Admitted to CICU, started on iv lasix and bipap. After diuresis, improved respiratory status. New echo showed mfurther decreased ef. Downgraded to tele. MR cardiac recommended by cardio, consistent with amyloidosis, Started low dose entresto, po furosemide. Stable blood pressure, renal function stable. Light chain anaylsis drawn to rule out al amyloid. Needs to follow up cardio, Dr. Yi within 1 week of discharge.\par \par 68 y/o male seen today in his home for post hospital initial visit.  CC "my blood pressure is low". Pt reports hypotension since being discharged from the hospital. States he spoke to PMD  yesterday who advised him to eat salt, drink coffee and hold furosemide.  Pt is awake, alert and oriented x 3, in no acute distress, breathing is even and unlabored. Ambulatory w/o assistance steady gait, continues to drive, independent AIDL's. Denies any chest pain, palpitations, dizziness, SOB/BILLY. States he becomes dizzy and has blurry vision when BP is low.  Self monitors at home.  BP sitting 85/60 P-80 standing 78/51 P-87.  Call to PMD Dr Rodríguez, advised to hold lasix and entresto, will see cardiology Dr. Yi on 3/28/23. Pt educated on safety, to avoid resuming swimming and going to the gym until he is seen by cardiology. He agrees to not go swimming today. Pt has no other complaints at this time. He takes his medication as prescribed, agrees to keep his MD appts. Yellow card left in the home with instructions to call for any concerns or changes to his conditon.

## 2023-03-28 NOTE — REASON FOR VISIT
[FreeTextEntry1] : Jamie Joaquin 67 years recently hospitalized at [facility] with shortness of breath and found to have a fall and ejection fraction compatible with a dilated cardiomyopathy which subsequently was diagnosed as amyloid

## 2023-03-28 NOTE — PLAN
[FreeTextEntry1] : hold entresto and lasix until seen by cardiology 3/28/23\par take all other medications as prescribed\par follow up with PCP\par fall precautions\par refrain from intense physical activity until seen by cardiology and cleared \par call TCM or your MD's for any questions, concerns or changes to condition. \par

## 2023-03-28 NOTE — REVIEW OF SYSTEMS
[Vision Problems] : vision problems [Dizziness] : dizziness [Negative] : Heme/Lymph [Discharge] : no discharge [Pain] : no pain [Redness] : no redness [Dryness] : no dryness [Itching] : no itching [Chest Pain] : no chest pain [Palpitations] : no palpitations [Lower Ext Edema] : no lower extremity edema [Orthopena] : no orthopnea [Paroxysmal Nocturnal Dyspnea] : no paroxysmal nocturnal dyspnea [Shortness Of Breath] : no shortness of breath [Wheezing] : no wheezing [Cough] : no cough [Dyspnea on Exertion] : not dyspnea on exertion [Abdominal Pain] : no abdominal pain [Nausea] : no nausea [Constipation] : no constipation [Diarrhea] : no diarrhea [Vomiting] : no vomiting [Heartburn] : no heartburn [Melena] : no melena [Dysuria] : no dysuria [Incontinence] : no incontinence [Hesitancy] : no hesitancy [Nocturia] : no nocturia [Hematuria] : no hematuria [Frequency] : no frequency [Headache] : no headache [Fainting] : no fainting [Confusion] : no confusion [Unsteady Walk] : no ataxia [Memory Loss] : no memory loss [FreeTextEntry3] : blurry vision when BP drops [FreeTextEntry5] : hypotensive episodes [de-identified] : becomes dizzy when BP drops

## 2023-03-28 NOTE — HISTORY OF PRESENT ILLNESS
[FreeTextEntry1] : Jamie is 67 years old no history of diabetes or hypertension and a non-smoker.\par \par His cardiac status was apparently evaluated at [facility] in 2021.  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  He is chronically hoarse.\par \par 2D echo dated November 23, 2022 mildly depressed LV systolic function EF 45% mild decrease in global LV function left atrial cavity is moderately dilated structurally normal mitral valve with moderate mitral regurgitation moderate to severe tricuspid insufficiency.  There is no mention of pulmonary hypertension\par \par EKG February 24, 2023: Normal sinus rhythm low voltage otherwise unremarkable\par \par  visit March 28, 2023: The patient was recently admitted to the hospital at [facility] with shortness of breath and edema and hypoxia.  He was placed in the CCU.  He had a repeat echocardiogram which revealed moderate to severe global LV dysfunction, significant left ventricular hypertrophy and evidence of restrictive cardiomyopathy as well as dilated with restrictive physiologic pattern.\par \par   His EKG had relatively low voltage\par \par  based upon his cardiomyopathy, known normal coronaries, significant LVH in the absence of hypertension, a diagnosis of amyloid  heart was entertained\par \par .  He underwent an MRI which was compatible with amyloid heart disease and a moderately reduced global ejection fraction.\par \par Since being home, he has felt well but somewhat dizzy on the Entresto and cut the dose in half.  He denies edema.  He denies significant shortness of breath and overall is feeling well\par \par   He was placed on Entresto and diuretic and comes for follow-up today on March 28, 2023

## 2023-03-28 NOTE — HEALTH RISK ASSESSMENT
[No] : No [No falls in past year] : Patient reported no falls in the past year [0] : 2) Feeling down, depressed, or hopeless: Not at all (0) [PHQ-2 Negative - No further assessment needed] : PHQ-2 Negative - No further assessment needed [Low Carb Diet] : low carbohydrate [General Adherence] : general adherence [None] : None [With Significant Other] : lives with significant other [Retired] : retired [] :  [Feels Safe at Home] : Feels safe at home [Fully functional (bathing, dressing, toileting, transferring, walking, feeding)] : Fully functional (bathing, dressing, toileting, transferring, walking, feeding) [Fully functional (using the telephone, shopping, preparing meals, housekeeping, doing laundry, using] : Fully functional and needs no help or supervision to perform IADLs (using the telephone, shopping, preparing meals, housekeeping, doing laundry, using transportation, managing medications and managing finances) [Reports normal functional visual acuity (ie: able to read med bottle)] : Reports normal functional visual acuity [[id number]] : 0 [FreeTextEntry1] : soft, bite sized [Change in mental status noted] : No change in mental status noted [Language] : denies difficulty with language [Behavior] : denies difficulty with behavior [Learning/Retaining New Information] : denies difficulty learning/retaining new information [Handling Complex Tasks] : denies difficulty handling complex tasks [Reasoning] : denies difficulty with reasoning [Spatial Ability and Orientation] : denies difficulty with spatial ability and orientation [Reports changes in hearing] : Reports no changes in hearing [Reports changes in vision] : Reports no changes in vision

## 2023-03-28 NOTE — PHYSICAL EXAM
[No Acute Distress] : no acute distress [Well Nourished] : well nourished [Well Developed] : well developed [Well-Appearing] : well-appearing [Normal Sclera/Conjunctiva] : normal sclera/conjunctiva [EOMI] : extraocular movements intact [Normal Outer Ear/Nose] : the outer ears and nose were normal in appearance [Normal Oropharynx] : the oropharynx was normal [No JVD] : no jugular venous distention [Supple] : supple [No Lymphadenopathy] : no lymphadenopathy [No Respiratory Distress] : no respiratory distress  [Normal Rate] : normal rate  [No Edema] : there was no peripheral edema [No Extremity Clubbing/Cyanosis] : no extremity clubbing/cyanosis [Soft] : abdomen soft [Non Tender] : non-tender [Non-distended] : non-distended [Normal Bowel Sounds] : normal bowel sounds [No CVA Tenderness] : no CVA  tenderness [No Spinal Tenderness] : no spinal tenderness [No Joint Swelling] : no joint swelling [Grossly Normal Strength/Tone] : grossly normal strength/tone [No Rash] : no rash [Normal Gait] : normal gait [Coordination Grossly Intact] : coordination grossly intact [No Focal Deficits] : no focal deficits [Normal Affect] : the affect was normal [Alert and Oriented x3] : oriented to person, place, and time [Normal Mood] : the mood was normal [Normal Insight/Judgement] : insight and judgment were intact [de-identified] : full ambulation, independent

## 2023-03-28 NOTE — ASSESSMENT
[FreeTextEntry1] : Patient has a history of normal coronaries and initially a normal echo with normal LV function.  He is status post TICAR stenting of the left carotid.  He has some shortness of breath particularly in the lying position.  His most recent echo shows some decrease in LV function.  I am not certain what the cause of his shortness of breath is but he does have some degree of LV dysfunction.  I doubt ischemia is playing a role. the patient has now been diagnosed with amyloid heart disease based on left ventricular hypertrophy no hypertension in fact hypotension low voltage and a positive MRI\par \par 1.  Status post TICAR stent of the left carotid\par 2.  Mixed hyperlipidemia\par 3.   amyloid heart disease now with systolic as well as diastolic dysfunction.\par 4.  Normal coronaries 2021\par \par 5.   periods of hypotension and dizziness related to the amyloid and to being on Entresto

## 2023-03-28 NOTE — DISCUSSION/SUMMARY
[FreeTextEntry1] :  1.  We will refer the patient to amyloid heart sent to Dr. Marcio Faulkner\par  2.  The patient if he continues to feel dizzy can stop the Entresto just maintain on the Lasix\par  3.  For completeness we will check the results of his screening for AL amyloidosis done in the hospital and send him for a pyrophosphate scan to confirm the diagnosis of amyloid transthyretin\par \par  patient will follow with me in 2 months

## 2023-03-28 NOTE — PHYSICAL EXAM
[Well Developed] : well developed [Well Nourished] : well nourished [Normal Conjunctiva] : normal conjunctiva [Normal S1, S2] : normal S1, S2 [No Murmur] : no murmur [Clear Lung Fields] : clear lung fields [No Respiratory Distress] : no respiratory distress  [No Edema] : no edema [Alert and Oriented] : alert and oriented [de-identified] : Hoarse voice some abdominal obesity [de-identified] : No rales rhonchi or wheezes [de-identified] : Protuberant abdomen

## 2023-04-17 ENCOUNTER — NON-APPOINTMENT (OUTPATIENT)
Age: 68
End: 2023-04-17

## 2023-04-17 ENCOUNTER — LABORATORY RESULT (OUTPATIENT)
Age: 68
End: 2023-04-17

## 2023-04-17 ENCOUNTER — APPOINTMENT (OUTPATIENT)
Dept: CARDIOLOGY | Facility: CLINIC | Age: 68
End: 2023-04-17
Payer: MEDICARE

## 2023-04-17 VITALS — BODY MASS INDEX: 33.29 KG/M2 | RESPIRATION RATE: 97 BRPM | HEART RATE: 94 BPM | WEIGHT: 232 LBS

## 2023-04-17 VITALS — DIASTOLIC BLOOD PRESSURE: 80 MMHG | SYSTOLIC BLOOD PRESSURE: 118 MMHG

## 2023-04-17 DIAGNOSIS — Z82.49 FAMILY HISTORY OF ISCHEMIC HEART DISEASE AND OTHER DISEASES OF THE CIRCULATORY SYSTEM: ICD-10-CM

## 2023-04-17 DIAGNOSIS — Z78.9 OTHER SPECIFIED HEALTH STATUS: ICD-10-CM

## 2023-04-17 PROCEDURE — 36415 COLL VENOUS BLD VENIPUNCTURE: CPT

## 2023-04-17 PROCEDURE — 93000 ELECTROCARDIOGRAM COMPLETE: CPT

## 2023-04-17 PROCEDURE — 99205 OFFICE O/P NEW HI 60 MIN: CPT

## 2023-04-17 NOTE — HISTORY OF PRESENT ILLNESS
[FreeTextEntry1] : Patient is a 67 year-old Black gentleman with known past medical history of hypertension, hypothyroidism, pituitary adenoma, and recently diagnosed with a dilated cardiomyopathy, cardiac MRI suggestive of infiltrative disease (cardiac amyloidosis).\par Labs during his recent hospitalization showed normal light chains. \par He has been taking Entresto and Farxiga, and he has been tolerating them well at current doses. \par \par He gets numbness in his toes.\par He has a brother in [place] who also has cardiomyopathy.\par He has three adult children from his first marriage. \par \par He weighs himself each morning. AM weight has recently risen by 7 lbs from 220 to 227 lbs.

## 2023-04-17 NOTE — CARDIOLOGY SUMMARY
[de-identified] : 4/17/2023, sinus 89 bpm, nonspecific T abnormality, poor R-wave progression, low voltage ECG [de-identified] : 3/20/2023, septum 1.5 cm, PWT 1.6 cm, dilated LA, severely reduced LV systolic function, LVEF 28% [de-identified] : 3/21/2023, cardiac MRI with diffuse LGE suggestive of amyloidosis, LVEF 28%

## 2023-04-17 NOTE — DISCUSSION/SUMMARY
[EKG obtained to assist in diagnosis and management of assessed problem(s)] : EKG obtained to assist in diagnosis and management of assessed problem(s) [FreeTextEntry1] : Patient is a 67 year-old Black gentleman with cardiac MRI suggestive of cardiac amyloidosis.\par Inpatient light chain studies were normal.\par \par Follow-up technetium pyrophosphate scan to confirm ATTR-CM.\par Follow-up genetic testing sent today to determine hereditary vs. wildtype disease.\par \par Plan to start TTR stabilizer therapy with Vyndamax.\par If patient is noted to have hereditary disease, will send for neurologic evaluation and consider silencer therapy.

## 2023-04-18 LAB
ALBUMIN SERPL ELPH-MCNC: 4.3 G/DL
ALP BLD-CCNC: 60 U/L
ALT SERPL-CCNC: 17 U/L
ANION GAP SERPL CALC-SCNC: 12 MMOL/L
AST SERPL-CCNC: 12 U/L
BASOPHILS # BLD AUTO: 0.02 K/UL
BASOPHILS NFR BLD AUTO: 0.3 %
BILIRUB SERPL-MCNC: 0.4 MG/DL
BUN SERPL-MCNC: 23 MG/DL
CALCIUM SERPL-MCNC: 9.6 MG/DL
CHLORIDE SERPL-SCNC: 109 MMOL/L
CHOLEST SERPL-MCNC: 144 MG/DL
CO2 SERPL-SCNC: 25 MMOL/L
CREAT SERPL-MCNC: 1.43 MG/DL
EGFR: 53 ML/MIN/1.73M2
EOSINOPHIL # BLD AUTO: 0.13 K/UL
EOSINOPHIL NFR BLD AUTO: 2.1 %
ESTIMATED AVERAGE GLUCOSE: 134 MG/DL
GLUCOSE SERPL-MCNC: 94 MG/DL
HBA1C MFR BLD HPLC: 6.3 %
HCT VFR BLD CALC: 41.5 %
HDLC SERPL-MCNC: 59 MG/DL
HGB BLD-MCNC: 13 G/DL
IMM GRANULOCYTES NFR BLD AUTO: 0.2 %
LDLC SERPL CALC-MCNC: 71 MG/DL
LYMPHOCYTES # BLD AUTO: 0.88 K/UL
LYMPHOCYTES NFR BLD AUTO: 14.3 %
MAN DIFF?: NORMAL
MCHC RBC-ENTMCNC: 29.1 PG
MCHC RBC-ENTMCNC: 31.3 GM/DL
MCV RBC AUTO: 93 FL
MONOCYTES # BLD AUTO: 0.33 K/UL
MONOCYTES NFR BLD AUTO: 5.4 %
NEUTROPHILS # BLD AUTO: 4.77 K/UL
NEUTROPHILS NFR BLD AUTO: 77.7 %
NONHDLC SERPL-MCNC: 84 MG/DL
NT-PROBNP SERPL-MCNC: 2725 PG/ML
PLATELET # BLD AUTO: 172 K/UL
POTASSIUM SERPL-SCNC: 5.2 MMOL/L
PREALB SERPL NEPH-MCNC: 15 MG/DL
PROT SERPL-MCNC: 6.4 G/DL
RBC # BLD: 4.46 M/UL
RBC # FLD: 16.4 %
SODIUM SERPL-SCNC: 146 MMOL/L
TRIGL SERPL-MCNC: 65 MG/DL
TSH SERPL-ACNC: 4.96 UIU/ML
WBC # FLD AUTO: 6.14 K/UL

## 2023-04-27 ENCOUNTER — APPOINTMENT (OUTPATIENT)
Dept: NUCLEAR MEDICINE | Facility: IMAGING CENTER | Age: 68
End: 2023-04-27
Payer: MEDICARE

## 2023-04-27 ENCOUNTER — OUTPATIENT (OUTPATIENT)
Dept: OUTPATIENT SERVICES | Facility: HOSPITAL | Age: 68
LOS: 1 days | End: 2023-04-27
Payer: MEDICARE

## 2023-04-27 DIAGNOSIS — Z98.890 OTHER SPECIFIED POSTPROCEDURAL STATES: Chronic | ICD-10-CM

## 2023-04-27 DIAGNOSIS — E85.4 ORGAN-LIMITED AMYLOIDOSIS: ICD-10-CM

## 2023-04-27 DIAGNOSIS — Z98.1 ARTHRODESIS STATUS: Chronic | ICD-10-CM

## 2023-04-27 DIAGNOSIS — Z98.89 OTHER SPECIFIED POSTPROCEDURAL STATES: Chronic | ICD-10-CM

## 2023-04-27 PROCEDURE — A9538: CPT

## 2023-04-27 PROCEDURE — 78830 RP LOCLZJ TUM SPECT W/CT 1: CPT | Mod: 26

## 2023-04-27 PROCEDURE — 78830 RP LOCLZJ TUM SPECT W/CT 1: CPT

## 2023-05-11 ENCOUNTER — APPOINTMENT (OUTPATIENT)
Dept: CARDIOLOGY | Facility: CLINIC | Age: 68
End: 2023-05-11
Payer: MEDICARE

## 2023-05-11 ENCOUNTER — NON-APPOINTMENT (OUTPATIENT)
Age: 68
End: 2023-05-11

## 2023-05-11 VITALS
DIASTOLIC BLOOD PRESSURE: 80 MMHG | BODY MASS INDEX: 33.29 KG/M2 | OXYGEN SATURATION: 97 % | SYSTOLIC BLOOD PRESSURE: 122 MMHG | HEART RATE: 87 BPM | WEIGHT: 232 LBS

## 2023-05-11 PROCEDURE — 93000 ELECTROCARDIOGRAM COMPLETE: CPT

## 2023-05-11 PROCEDURE — 99215 OFFICE O/P EST HI 40 MIN: CPT

## 2023-05-12 NOTE — REASON FOR VISIT
[Other: ____] : [unfilled] [FreeTextEntry3] : Sen Yi MD [FreeTextEntry1] : May 2023 - Patient returns today for follow-up of his amyloid cardiomyopathy. Positive pyrophosphate scan confirms ATTR-CM, and positive genetic testing (V142I) confirms hereditary disease.\par He has not yet started stabilizer or silencer therapy.\par He gets occasional numbness in the feet. He has no new cardiovascular complaints, but he did recently increase his loop diuretic to address some lower extremity edema.

## 2023-05-12 NOTE — CARDIOLOGY SUMMARY
[de-identified] : 4/17/2023, sinus 89 bpm, nonspecific T abnormality, poor R-wave progression, low voltage ECG [de-identified] : 3/20/2023, septum 1.5 cm, PWT 1.6 cm, dilated LA, severely reduced LV systolic function, LVEF 28% [de-identified] : 3/21/2023, cardiac MRI with diffuse LGE suggestive of amyloidosis, LVEF 28% [de-identified] : 4/27/2023, technetium pyrophosphate scan, grade 3

## 2023-05-12 NOTE — DISCUSSION/SUMMARY
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 \par \par Plan to start TTR stabilizer therapy with Vyndamax.\par Will refer to neurology for evaluation for familial amyloid polyneuropathy that would be best treated by the addition of silencer therapy.

## 2023-06-06 ENCOUNTER — NON-APPOINTMENT (OUTPATIENT)
Age: 68
End: 2023-06-06

## 2023-06-06 ENCOUNTER — APPOINTMENT (OUTPATIENT)
Dept: CARDIOLOGY | Facility: CLINIC | Age: 68
End: 2023-06-06
Payer: MEDICARE

## 2023-06-06 VITALS
WEIGHT: 224 LBS | OXYGEN SATURATION: 97 % | SYSTOLIC BLOOD PRESSURE: 120 MMHG | HEART RATE: 92 BPM | DIASTOLIC BLOOD PRESSURE: 82 MMHG | HEIGHT: 70 IN | BODY MASS INDEX: 32.07 KG/M2

## 2023-06-06 PROCEDURE — 99215 OFFICE O/P EST HI 40 MIN: CPT

## 2023-06-06 PROCEDURE — 93000 ELECTROCARDIOGRAM COMPLETE: CPT

## 2023-06-13 ENCOUNTER — APPOINTMENT (OUTPATIENT)
Dept: CARDIOLOGY | Facility: CLINIC | Age: 68
End: 2023-06-13
Payer: MEDICARE

## 2023-06-13 DIAGNOSIS — D35.2 BENIGN NEOPLASM OF PITUITARY GLAND: ICD-10-CM

## 2023-06-13 DIAGNOSIS — R06.02 SHORTNESS OF BREATH: ICD-10-CM

## 2023-06-13 DIAGNOSIS — E66.9 OBESITY, UNSPECIFIED: ICD-10-CM

## 2023-06-13 DIAGNOSIS — I51.89 OTHER ILL-DEFINED HEART DISEASES: ICD-10-CM

## 2023-06-13 DIAGNOSIS — I50.31 ACUTE DIASTOLIC (CONGESTIVE) HEART FAILURE: ICD-10-CM

## 2023-06-13 PROCEDURE — 99442: CPT | Mod: 95

## 2023-06-13 RX ORDER — COLCHICINE 0.6 MG/1
0.6 TABLET ORAL
Qty: 90 | Refills: 0 | Status: DISCONTINUED | COMMUNITY
Start: 2022-10-11 | End: 2023-06-13

## 2023-06-13 NOTE — REASON FOR VISIT
[FreeTextEntry1] : Jamie Joaquin 67 years old had a telemedicine visit by telephone today on June 13, 2023.  The patient was in his home and I, Dr. Mane Yi was in my office at [address] [place] [address].  The patient agreed to the telemedicine visit

## 2023-06-13 NOTE — HISTORY OF PRESENT ILLNESS
[FreeTextEntry1] : Jamie  is 67 years old with a known history of amyloid cardiomyopathy documented by MRI pyrophosphate scan and genetic testing.  I had referred him to Dr. Marcio Faulkner for evaluation of the cardiomyopathy and for evaluation for newer medications for amyloid.\par \par   The patient actually is feeling well and is able to bicycle distances without difficulty.  He denies edema orthopnea PND.  He overall feels well and is maintained on stable medication including Entresto Farxiga and Lasix.\par \par   He related that he is supposed to have an appointment with neurology for an evaluation prior to getting approval for the new medications for amyloid.\par \par   He feels well and offers no complaints.\par   He maintains on stable medication\par \par  patient has hereditary ATTRCM with the 1421 mutation.  He also has a polyneuropathy and needs to see neurology\par \par  from a cardiac point of view he is stable and tolerating his present regimen of medication and has good exercise tolerance\par \par  he should proceed with further evaluation and therapy as per Dr. Faulkner and the amyloid center

## 2023-06-13 NOTE — DISCUSSION/SUMMARY
[FreeTextEntry1] : Proceed with further evaluation as per Dr. Faulkner and neurology\par  continue present regimen of medication for cardiomyopathy\par  clinically patient is quite stable

## 2023-06-14 ENCOUNTER — APPOINTMENT (OUTPATIENT)
Dept: NEUROLOGY | Facility: CLINIC | Age: 68
End: 2023-06-14
Payer: MEDICARE

## 2023-06-14 VITALS
SYSTOLIC BLOOD PRESSURE: 115 MMHG | WEIGHT: 221 LBS | DIASTOLIC BLOOD PRESSURE: 81 MMHG | BODY MASS INDEX: 31.64 KG/M2 | HEIGHT: 70 IN | HEART RATE: 87 BPM

## 2023-06-14 DIAGNOSIS — G56.02 CARPAL TUNNEL SYNDROME, LEFT UPPER LIMB: ICD-10-CM

## 2023-06-14 DIAGNOSIS — G62.9 POLYNEUROPATHY, UNSPECIFIED: ICD-10-CM

## 2023-06-14 PROCEDURE — 99204 OFFICE O/P NEW MOD 45 MIN: CPT

## 2023-06-14 NOTE — PHYSICAL EXAM
[FreeTextEntry1] : Constitutional:  Patient was well-developed, well-nourished and in no acute distress. \par \par Head:  Normocephalic, atraumatic. Tympanic membranes were clear. \par \par Neck:  Supple with full range of motion. \par \par Cardiovascular:  Cardiac rhythm was regular without murmur. There were no carotid bruits. Peripheral pulses were full and symmetric.  Blood pressure was 122/85 seated and erect.\par \par Respiratory:  Lungs were clear. \par \par Abdomen:  Soft and nontender. \par \par Spine:  Nontender.  There was no pain on straight leg raising.\par \par Skin:  There were no rashes. \par \par NEUROLOGICAL EXAMINATION:\par \par Mental Status: Patient was alert and oriented. Speech was fluent. There was no dysarthria. \par \par Cranial Nerves: \par \par II: Visual acuity was 20/20 bilaterally with glasses and near card. Pupils were equal and reactive. Visual fields were full. Funduscopic examination was normal. \par \par III, IV, VI:  Eye movements were full without nystagmus. \par \par V: Facial sensation was intact. \par \par VII: Facial strength was normal. \par \par VIII: Hearing was equal. \par \par IX, X: Palatal movement was normal. Phonation was normal. \par \par XI: Sternocleidomastoids and trapezii were normal. \par \par XII: Tongue was midline and movements normal. There was no lingual atrophy or fasciculations. \par \par Motor Examination: Muscle bulk, tone and strength were normal. \par \par Sensory Examination: Vibration was mildly diminished in the toes compared to the fingers.  Joint position sense was intact.  There was shading of pinprick in a stocking glove distribution.  Tinel signs were absent at the wrists and elbows.  Phalen sign was present on the left.\par \par Reflexes: DTRs were 2 throughout. \par \par Plantar Responses: Plantar responses were flexor. \par \par Coordination/Cerebellar Function: There was no dysmetria on finger to nose or heel to shin testing. \par \par Gait/Stance: Gait and tandem were normal. Romberg was negative.\par

## 2023-06-14 NOTE — ASSESSMENT
[FreeTextEntry1] : Mr. Joaquin is a 67-year-old with hereditary TTR cardiomyopathy.  He appears to be suffering from a sensory polyneuropathy and left carpal tunnel syndrome.  He also appears to have autonomic dysfunction possibly on a peripheral basis.  He has a history of lumbar spine disease status postlaminectomy.\par \par I suggested that he return to the office for EMG and nerve conduction studies to confirm and quantitate his peripheral neurologic disorder.  Further management will depend upon the results and his clinical course

## 2023-06-14 NOTE — HISTORY OF PRESENT ILLNESS
[FreeTextEntry1] : Mr. Jamie Joaquin is a 67-year-old right-handed patient who was referred for neurologic evaluation at your kind suggestion.\par \par Mr. Joaquin suffers from cardiomyopathy.  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  Treatment with Vyndamax is being considered.\par \par He is status post lumbar laminectomy with fusion in 2013 for sciatica.  He experiences occasional low back pain without radiation.  He describes pedal numbness for 2 or 3 years.  He complains of nocturnal left hand numbness which resolves with movement.  He denies lower or upper extremity weakness.  He complains of erectile dysfunction and constipation for 2 months.  He experiences urinary frequency and nocturia up to 6 times a night.  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  He suffers of hypertension, hyperlipidemia, amyloid cardiomyopathy hypothyroidism and pituitary adenoma.  There is no history of diabetes, coronary, pulmonary, renal, hepatic, gastrointestinal, hematologic or cerebrovascular disease.  He has no allergies.\par \par Medications include Advair, albuterol, atorvastatin, cabergoline, clopidogrel, Entresto, Farxiga, finasteride, furosemide, levothyroxine and tamsulosin.\par \par He is a non-smoker and social drinker.  He is .  Family history is notable for both parents with stroke.  He has a half brother on his mother's side with heart issues.\par \par

## 2023-06-14 NOTE — CONSULT LETTER
[Dear  ___] : Dear  [unfilled], [Consult Letter:] : I had the pleasure of evaluating your patient, [unfilled]. [Please see my note below.] : Please see my note below. [Consult Closing:] : Thank you very much for allowing me to participate in the care of this patient.  If you have any questions, please do not hesitate to contact me. [Sincerely,] : Sincerely, [FreeTextEntry3] : Rogelio Thompson MD\par  [DrWes  ___] : Dr. LOGAN [DrWes ___] : Dr. LOGAN

## 2023-06-19 NOTE — HISTORY OF PRESENT ILLNESS
[FreeTextEntry1] : Patient is a 67 year-old Black gentleman with known past medical history of hypertension, hypothyroidism, pituitary adenoma, and recently diagnosed with a dilated cardiomyopathy, cardiac MRI suggestive of infiltrative disease (cardiac amyloidosis).\par Labs during his recent hospitalization showed normal light chains. \par He has been taking Entresto and Farxiga, and he has been tolerating them well at current doses. \par \par He gets numbness in his toes.\par He has a brother in [place] who also has cardiomyopathy.\par He has three adult children from his first marriage. \par \par He weighs himself each morning. AM weight has recently risen by 7 lbs from 220 to 227 lbs.\par \par May 2023 - Patient returns today for follow-up of his amyloid cardiomyopathy. Positive pyrophosphate scan confirms ATTR-CM, and positive genetic testing (V142I) confirms hereditary disease.\par He has not yet started stabilizer or silencer therapy.\par He gets occasional numbness in the feet. He has no new cardiovascular complaints, but he did recently increase his loop diuretic to address some lower extremity edema.

## 2023-06-19 NOTE — CARDIOLOGY SUMMARY
[de-identified] : 4/17/2023, sinus 89 bpm, nonspecific T abnormality, poor R-wave progression, low voltage ECG [de-identified] : 3/20/2023, septum 1.5 cm, PWT 1.6 cm, dilated LA, severely reduced LV systolic function, LVEF 28% [de-identified] : 3/21/2023, cardiac MRI with diffuse LGE suggestive of amyloidosis, LVEF 28% [de-identified] : 4/27/2023, technetium pyrophosphate scan, grade 3

## 2023-06-19 NOTE — END OF VISIT
[Time Spent: ___ minutes] : I have spent [unfilled] minutes of time on the encounter. [FreeTextEntry3] : I saw the patient with Concha Clay NP and I agree with the findings and plan as above.

## 2023-06-19 NOTE — REASON FOR VISIT
[Other: ____] : [unfilled] [FreeTextEntry3] : Sen Yi MD [FreeTextEntry1] : 6/6/2023\par Jamie Joaquin returns today for scheduled follow up.\par He continues to feel well in general. For his lower extremity edema, he has been monitoring his daily weights and increasing his furosemide as necessary. On the average, he doubles his furosemide dose 1-2 times a week. He does report issues with swallowing and frequent coughing fits with associated lightheadedness. His exertional dyspnea is unchanged. He is yet to begin stabilizer therapy with Vyndamax due to ongoing insurance issues. He has not yet seen neurology but has an appointment for evaluation on October 24, 2023.

## 2023-06-21 NOTE — PATIENT PROFILE ADULT - HISTORY OF COVID-19 VACCINATION
Telemetry Bed?: Yes   Admitting Physician: LENKA POLANCO [[id number]]   Is this a telephone or verbal order?: This is a telephone order from the admitting physician   Yes

## 2023-07-05 ENCOUNTER — APPOINTMENT (OUTPATIENT)
Dept: CARDIOLOGY | Facility: CLINIC | Age: 68
End: 2023-07-05
Payer: MEDICARE

## 2023-07-05 PROCEDURE — 99214 OFFICE O/P EST MOD 30 MIN: CPT | Mod: 95

## 2023-07-05 NOTE — REASON FOR VISIT
[Other: ____] : [unfilled] [FreeTextEntry3] : Sen Yi MD [FreeTextEntry1] : TeleHealth Video Encounter\par Initiated by: Patient election for TeleHealth visit\par Patient was consented for TeleHealth visit\par Patient Location: Home\par Physician Location: Office ([address] [place] [address], [address], [place], [place], [address])\par Duration of Encounter: 30 minutes, at least 50% of which was spent in direct counseling and coordination of care. \par \par 7/5/2023\par He was seen and evaluated by neurology for sensory polyneuropathy and left carpal tunnel syndrome along with autonomic dysfunction. He is now pending EMG and nerve conduction studies to confirm and quantitate his peripheral neurologic disorder. His breathing has been stable as has his lower extremity edema. He has not had to take any additional doses of furosemide. Otherwise today he has no specific complaints aside from awaiting the recommended medical therapies.

## 2023-07-05 NOTE — DISCUSSION/SUMMARY
[FreeTextEntry1] : Patient is a 67 year-old Black gentleman with cardiac MRI suggestive of cardiac amyloidosis.\par Inpatient light chain studies were normal.\par Technetium pyrophosphate scan consistent with ATTR-CM.\par Genetic testing confirms hereditary ATTR-CM with V142I mutation. \par \par Plan to start TTR stabilizer therapy with Vyndamax.\par Plan to begin silencer therapy for patient with familial amyloid polyneuropathy.\par \par Follow up in one month.

## 2023-07-05 NOTE — HISTORY OF PRESENT ILLNESS
[FreeTextEntry1] : Patient is a 67 year-old Black gentleman with known past medical history of hypertension, hypothyroidism, pituitary adenoma, and recently diagnosed with a dilated cardiomyopathy, cardiac MRI suggestive of infiltrative disease (cardiac amyloidosis).\par Labs during his recent hospitalization showed normal light chains. \par He has been taking Entresto and Farxiga, and he has been tolerating them well at current doses. \par \par He gets numbness in his toes.\par He has a brother in [place] who also has cardiomyopathy.\par He has three adult children from his first marriage. \par \par He weighs himself each morning. AM weight has recently risen by 7 lbs from 220 to 227 lbs.\par \par May 2023 - Patient returns today for follow-up of his amyloid cardiomyopathy. Positive pyrophosphate scan confirms ATTR-CM, and positive genetic testing (V142I) confirms hereditary disease.\par He has not yet started stabilizer or silencer therapy.\par He gets occasional numbness in the feet. He has no new cardiovascular complaints, but he did recently increase his loop diuretic to address some lower extremity edema.\par \par 6/6/2023\par Jamie Joaquin returns today for scheduled follow up.\par He continues to feel well in general. For his lower extremity edema, he has been monitoring his daily weights and increasing his furosemide as necessary. On the average, he doubles his furosemide dose 1-2 times a week. He does report issues with swallowing and frequent coughing fits with associated lightheadedness. His exertional dyspnea is unchanged. He is yet to begin stabilizer therapy with Vyndamax due to ongoing insurance issues. He has not yet seen neurology but has an appointment for evaluation on October 24, 2023.

## 2023-07-05 NOTE — CARDIOLOGY SUMMARY
[de-identified] : 4/17/2023, sinus 89 bpm, nonspecific T abnormality, poor R-wave progression, low voltage ECG [de-identified] : 3/20/2023, septum 1.5 cm, PWT 1.6 cm, dilated LA, severely reduced LV systolic function, LVEF 28% [de-identified] : 3/21/2023, cardiac MRI with diffuse LGE suggestive of amyloidosis, LVEF 28% [de-identified] : 4/27/2023, technetium pyrophosphate scan, grade 3

## 2023-08-03 RX ORDER — CABERGOLINE 0.5 MG/1
0.5 TABLET ORAL WEEKLY
Qty: 26 | Refills: 3 | Status: ACTIVE | COMMUNITY
Start: 2023-08-03 | End: 1900-01-01

## 2023-08-04 RX ORDER — SACUBITRIL AND VALSARTAN 24; 26 MG/1; MG/1
24-26 TABLET, FILM COATED ORAL
Qty: 180 | Refills: 0 | Status: ACTIVE | COMMUNITY
Start: 2023-03-28

## 2023-08-04 RX ORDER — ATORVASTATIN CALCIUM 40 MG/1
40 TABLET, FILM COATED ORAL
Qty: 90 | Refills: 2 | Status: ACTIVE | COMMUNITY
Start: 2022-05-21 | End: 1900-01-01

## 2023-08-04 RX ORDER — CLOPIDOGREL BISULFATE 75 MG/1
75 TABLET, FILM COATED ORAL DAILY
Qty: 90 | Refills: 0 | Status: ACTIVE | COMMUNITY
Start: 2022-06-13 | End: 1900-01-01

## 2023-08-16 RX ORDER — TAFAMIDIS 61 MG/1
61 CAPSULE, LIQUID FILLED ORAL
Qty: 90 | Refills: 1 | Status: ACTIVE | COMMUNITY
Start: 2023-08-15 | End: 1900-01-01

## 2023-10-08 NOTE — ED ADULT NURSE NOTE - NS ED NOTE  TALK SOMEONE YN
HCP/ Alvin Capacchione HCP/ Alvin Isidro ph#[phone], son is secondary hcp Jono Isidro ph# [phone] No

## 2023-12-03 ENCOUNTER — INPATIENT (INPATIENT)
Facility: HOSPITAL | Age: 68
LOS: 3 days | Discharge: HOME HEALTH | DRG: 291 | End: 2023-12-07
Attending: STUDENT IN AN ORGANIZED HEALTH CARE EDUCATION/TRAINING PROGRAM | Admitting: STUDENT IN AN ORGANIZED HEALTH CARE EDUCATION/TRAINING PROGRAM
Payer: MEDICARE

## 2023-12-03 VITALS
HEART RATE: 82 BPM | WEIGHT: 220.02 LBS | HEIGHT: 70 IN | DIASTOLIC BLOOD PRESSURE: 74 MMHG | OXYGEN SATURATION: 98 % | RESPIRATION RATE: 20 BRPM | TEMPERATURE: 97 F | SYSTOLIC BLOOD PRESSURE: 109 MMHG

## 2023-12-03 DIAGNOSIS — Z98.1 ARTHRODESIS STATUS: Chronic | ICD-10-CM

## 2023-12-03 DIAGNOSIS — E85.9 AMYLOIDOSIS, UNSPECIFIED: ICD-10-CM

## 2023-12-03 DIAGNOSIS — I50.9 HEART FAILURE, UNSPECIFIED: ICD-10-CM

## 2023-12-03 DIAGNOSIS — Z98.890 OTHER SPECIFIED POSTPROCEDURAL STATES: Chronic | ICD-10-CM

## 2023-12-03 DIAGNOSIS — N17.9 ACUTE KIDNEY FAILURE, UNSPECIFIED: ICD-10-CM

## 2023-12-03 DIAGNOSIS — N50.82 SCROTAL PAIN: ICD-10-CM

## 2023-12-03 DIAGNOSIS — D35.2 BENIGN NEOPLASM OF PITUITARY GLAND: ICD-10-CM

## 2023-12-03 DIAGNOSIS — Z98.89 OTHER SPECIFIED POSTPROCEDURAL STATES: Chronic | ICD-10-CM

## 2023-12-03 LAB
ALBUMIN SERPL ELPH-MCNC: 3.6 G/DL — SIGNIFICANT CHANGE UP (ref 3.3–5)
ALBUMIN SERPL ELPH-MCNC: 3.6 G/DL — SIGNIFICANT CHANGE UP (ref 3.3–5)
ALP SERPL-CCNC: 81 U/L — SIGNIFICANT CHANGE UP (ref 40–120)
ALP SERPL-CCNC: 81 U/L — SIGNIFICANT CHANGE UP (ref 40–120)
ALT FLD-CCNC: 20 U/L — SIGNIFICANT CHANGE UP (ref 10–45)
ALT FLD-CCNC: 20 U/L — SIGNIFICANT CHANGE UP (ref 10–45)
ANION GAP SERPL CALC-SCNC: 10 MMOL/L — SIGNIFICANT CHANGE UP (ref 5–17)
ANION GAP SERPL CALC-SCNC: 10 MMOL/L — SIGNIFICANT CHANGE UP (ref 5–17)
APPEARANCE UR: CLEAR — SIGNIFICANT CHANGE UP
APPEARANCE UR: CLEAR — SIGNIFICANT CHANGE UP
APTT BLD: 32.4 SEC — SIGNIFICANT CHANGE UP (ref 24.5–35.6)
APTT BLD: 32.4 SEC — SIGNIFICANT CHANGE UP (ref 24.5–35.6)
AST SERPL-CCNC: 20 U/L — SIGNIFICANT CHANGE UP (ref 10–40)
AST SERPL-CCNC: 20 U/L — SIGNIFICANT CHANGE UP (ref 10–40)
BACTERIA # UR AUTO: NEGATIVE /HPF — SIGNIFICANT CHANGE UP
BACTERIA # UR AUTO: NEGATIVE /HPF — SIGNIFICANT CHANGE UP
BASE EXCESS BLDV CALC-SCNC: -1.7 MMOL/L — SIGNIFICANT CHANGE UP (ref -2–3)
BASE EXCESS BLDV CALC-SCNC: -1.7 MMOL/L — SIGNIFICANT CHANGE UP (ref -2–3)
BASOPHILS # BLD AUTO: 0.01 K/UL — SIGNIFICANT CHANGE UP (ref 0–0.2)
BASOPHILS # BLD AUTO: 0.01 K/UL — SIGNIFICANT CHANGE UP (ref 0–0.2)
BASOPHILS NFR BLD AUTO: 0.2 % — SIGNIFICANT CHANGE UP (ref 0–2)
BASOPHILS NFR BLD AUTO: 0.2 % — SIGNIFICANT CHANGE UP (ref 0–2)
BILIRUB SERPL-MCNC: 0.6 MG/DL — SIGNIFICANT CHANGE UP (ref 0.2–1.2)
BILIRUB SERPL-MCNC: 0.6 MG/DL — SIGNIFICANT CHANGE UP (ref 0.2–1.2)
BILIRUB UR-MCNC: NEGATIVE — SIGNIFICANT CHANGE UP
BILIRUB UR-MCNC: NEGATIVE — SIGNIFICANT CHANGE UP
BUN SERPL-MCNC: 28 MG/DL — HIGH (ref 7–23)
BUN SERPL-MCNC: 28 MG/DL — HIGH (ref 7–23)
CA-I SERPL-SCNC: 1.17 MMOL/L — SIGNIFICANT CHANGE UP (ref 1.15–1.33)
CA-I SERPL-SCNC: 1.17 MMOL/L — SIGNIFICANT CHANGE UP (ref 1.15–1.33)
CALCIUM SERPL-MCNC: 8.8 MG/DL — SIGNIFICANT CHANGE UP (ref 8.4–10.5)
CALCIUM SERPL-MCNC: 8.8 MG/DL — SIGNIFICANT CHANGE UP (ref 8.4–10.5)
CAST: 1 /LPF — SIGNIFICANT CHANGE UP (ref 0–4)
CAST: 1 /LPF — SIGNIFICANT CHANGE UP (ref 0–4)
CHLORIDE BLDV-SCNC: 106 MMOL/L — SIGNIFICANT CHANGE UP (ref 96–108)
CHLORIDE BLDV-SCNC: 106 MMOL/L — SIGNIFICANT CHANGE UP (ref 96–108)
CHLORIDE SERPL-SCNC: 109 MMOL/L — HIGH (ref 96–108)
CHLORIDE SERPL-SCNC: 109 MMOL/L — HIGH (ref 96–108)
CO2 BLDV-SCNC: 26 MMOL/L — SIGNIFICANT CHANGE UP (ref 22–26)
CO2 BLDV-SCNC: 26 MMOL/L — SIGNIFICANT CHANGE UP (ref 22–26)
CO2 SERPL-SCNC: 23 MMOL/L — SIGNIFICANT CHANGE UP (ref 22–31)
CO2 SERPL-SCNC: 23 MMOL/L — SIGNIFICANT CHANGE UP (ref 22–31)
COLOR SPEC: YELLOW — SIGNIFICANT CHANGE UP
COLOR SPEC: YELLOW — SIGNIFICANT CHANGE UP
CREAT SERPL-MCNC: 2.05 MG/DL — HIGH (ref 0.5–1.3)
CREAT SERPL-MCNC: 2.05 MG/DL — HIGH (ref 0.5–1.3)
DIFF PNL FLD: NEGATIVE — SIGNIFICANT CHANGE UP
DIFF PNL FLD: NEGATIVE — SIGNIFICANT CHANGE UP
EGFR: 34 ML/MIN/1.73M2 — LOW
EGFR: 34 ML/MIN/1.73M2 — LOW
EOSINOPHIL # BLD AUTO: 0.11 K/UL — SIGNIFICANT CHANGE UP (ref 0–0.5)
EOSINOPHIL # BLD AUTO: 0.11 K/UL — SIGNIFICANT CHANGE UP (ref 0–0.5)
EOSINOPHIL NFR BLD AUTO: 2.5 % — SIGNIFICANT CHANGE UP (ref 0–6)
EOSINOPHIL NFR BLD AUTO: 2.5 % — SIGNIFICANT CHANGE UP (ref 0–6)
GAS PNL BLDV: 139 MMOL/L — SIGNIFICANT CHANGE UP (ref 136–145)
GAS PNL BLDV: 139 MMOL/L — SIGNIFICANT CHANGE UP (ref 136–145)
GAS PNL BLDV: SIGNIFICANT CHANGE UP
GLUCOSE BLDV-MCNC: 77 MG/DL — SIGNIFICANT CHANGE UP (ref 70–99)
GLUCOSE BLDV-MCNC: 77 MG/DL — SIGNIFICANT CHANGE UP (ref 70–99)
GLUCOSE SERPL-MCNC: 85 MG/DL — SIGNIFICANT CHANGE UP (ref 70–99)
GLUCOSE SERPL-MCNC: 85 MG/DL — SIGNIFICANT CHANGE UP (ref 70–99)
GLUCOSE UR QL: NEGATIVE MG/DL — SIGNIFICANT CHANGE UP
GLUCOSE UR QL: NEGATIVE MG/DL — SIGNIFICANT CHANGE UP
HCO3 BLDV-SCNC: 25 MMOL/L — SIGNIFICANT CHANGE UP (ref 22–29)
HCO3 BLDV-SCNC: 25 MMOL/L — SIGNIFICANT CHANGE UP (ref 22–29)
HCT VFR BLD CALC: 35.9 % — LOW (ref 39–50)
HCT VFR BLD CALC: 35.9 % — LOW (ref 39–50)
HCT VFR BLDA CALC: 36 % — LOW (ref 39–51)
HCT VFR BLDA CALC: 36 % — LOW (ref 39–51)
HGB BLD CALC-MCNC: 12 G/DL — LOW (ref 12.6–17.4)
HGB BLD CALC-MCNC: 12 G/DL — LOW (ref 12.6–17.4)
HGB BLD-MCNC: 11.6 G/DL — LOW (ref 13–17)
HGB BLD-MCNC: 11.6 G/DL — LOW (ref 13–17)
IMM GRANULOCYTES NFR BLD AUTO: 0.2 % — SIGNIFICANT CHANGE UP (ref 0–0.9)
IMM GRANULOCYTES NFR BLD AUTO: 0.2 % — SIGNIFICANT CHANGE UP (ref 0–0.9)
INR BLD: 1.3 RATIO — HIGH (ref 0.85–1.18)
INR BLD: 1.3 RATIO — HIGH (ref 0.85–1.18)
KETONES UR-MCNC: NEGATIVE MG/DL — SIGNIFICANT CHANGE UP
KETONES UR-MCNC: NEGATIVE MG/DL — SIGNIFICANT CHANGE UP
LACTATE BLDV-MCNC: 1.1 MMOL/L — SIGNIFICANT CHANGE UP (ref 0.5–2)
LACTATE BLDV-MCNC: 1.1 MMOL/L — SIGNIFICANT CHANGE UP (ref 0.5–2)
LEUKOCYTE ESTERASE UR-ACNC: NEGATIVE — SIGNIFICANT CHANGE UP
LEUKOCYTE ESTERASE UR-ACNC: NEGATIVE — SIGNIFICANT CHANGE UP
LYMPHOCYTES # BLD AUTO: 0.59 K/UL — LOW (ref 1–3.3)
LYMPHOCYTES # BLD AUTO: 0.59 K/UL — LOW (ref 1–3.3)
LYMPHOCYTES # BLD AUTO: 13.3 % — SIGNIFICANT CHANGE UP (ref 13–44)
LYMPHOCYTES # BLD AUTO: 13.3 % — SIGNIFICANT CHANGE UP (ref 13–44)
MCHC RBC-ENTMCNC: 30.4 PG — SIGNIFICANT CHANGE UP (ref 27–34)
MCHC RBC-ENTMCNC: 30.4 PG — SIGNIFICANT CHANGE UP (ref 27–34)
MCHC RBC-ENTMCNC: 32.3 GM/DL — SIGNIFICANT CHANGE UP (ref 32–36)
MCHC RBC-ENTMCNC: 32.3 GM/DL — SIGNIFICANT CHANGE UP (ref 32–36)
MCV RBC AUTO: 94 FL — SIGNIFICANT CHANGE UP (ref 80–100)
MCV RBC AUTO: 94 FL — SIGNIFICANT CHANGE UP (ref 80–100)
MONOCYTES # BLD AUTO: 0.43 K/UL — SIGNIFICANT CHANGE UP (ref 0–0.9)
MONOCYTES # BLD AUTO: 0.43 K/UL — SIGNIFICANT CHANGE UP (ref 0–0.9)
MONOCYTES NFR BLD AUTO: 9.7 % — SIGNIFICANT CHANGE UP (ref 2–14)
MONOCYTES NFR BLD AUTO: 9.7 % — SIGNIFICANT CHANGE UP (ref 2–14)
NEUTROPHILS # BLD AUTO: 3.28 K/UL — SIGNIFICANT CHANGE UP (ref 1.8–7.4)
NEUTROPHILS # BLD AUTO: 3.28 K/UL — SIGNIFICANT CHANGE UP (ref 1.8–7.4)
NEUTROPHILS NFR BLD AUTO: 74.1 % — SIGNIFICANT CHANGE UP (ref 43–77)
NEUTROPHILS NFR BLD AUTO: 74.1 % — SIGNIFICANT CHANGE UP (ref 43–77)
NITRITE UR-MCNC: NEGATIVE — SIGNIFICANT CHANGE UP
NITRITE UR-MCNC: NEGATIVE — SIGNIFICANT CHANGE UP
NRBC # BLD: 0 /100 WBCS — SIGNIFICANT CHANGE UP (ref 0–0)
NRBC # BLD: 0 /100 WBCS — SIGNIFICANT CHANGE UP (ref 0–0)
NT-PROBNP SERPL-SCNC: 3341 PG/ML — HIGH (ref 0–300)
NT-PROBNP SERPL-SCNC: 3341 PG/ML — HIGH (ref 0–300)
OSMOLALITY UR: 361 MOS/KG — SIGNIFICANT CHANGE UP (ref 300–900)
OSMOLALITY UR: 361 MOS/KG — SIGNIFICANT CHANGE UP (ref 300–900)
PCO2 BLDV: 48 MMHG — SIGNIFICANT CHANGE UP (ref 42–55)
PCO2 BLDV: 48 MMHG — SIGNIFICANT CHANGE UP (ref 42–55)
PH BLDV: 7.32 — SIGNIFICANT CHANGE UP (ref 7.32–7.43)
PH BLDV: 7.32 — SIGNIFICANT CHANGE UP (ref 7.32–7.43)
PH UR: 5.5 — SIGNIFICANT CHANGE UP (ref 5–8)
PH UR: 5.5 — SIGNIFICANT CHANGE UP (ref 5–8)
PLATELET # BLD AUTO: 164 K/UL — SIGNIFICANT CHANGE UP (ref 150–400)
PLATELET # BLD AUTO: 164 K/UL — SIGNIFICANT CHANGE UP (ref 150–400)
PO2 BLDV: 35 MMHG — SIGNIFICANT CHANGE UP (ref 25–45)
PO2 BLDV: 35 MMHG — SIGNIFICANT CHANGE UP (ref 25–45)
POTASSIUM BLDV-SCNC: 4.3 MMOL/L — SIGNIFICANT CHANGE UP (ref 3.5–5.1)
POTASSIUM BLDV-SCNC: 4.3 MMOL/L — SIGNIFICANT CHANGE UP (ref 3.5–5.1)
POTASSIUM SERPL-MCNC: 4.3 MMOL/L — SIGNIFICANT CHANGE UP (ref 3.5–5.3)
POTASSIUM SERPL-MCNC: 4.3 MMOL/L — SIGNIFICANT CHANGE UP (ref 3.5–5.3)
POTASSIUM SERPL-SCNC: 4.3 MMOL/L — SIGNIFICANT CHANGE UP (ref 3.5–5.3)
POTASSIUM SERPL-SCNC: 4.3 MMOL/L — SIGNIFICANT CHANGE UP (ref 3.5–5.3)
PROT SERPL-MCNC: 6 G/DL — SIGNIFICANT CHANGE UP (ref 6–8.3)
PROT SERPL-MCNC: 6 G/DL — SIGNIFICANT CHANGE UP (ref 6–8.3)
PROT UR-MCNC: NEGATIVE MG/DL — SIGNIFICANT CHANGE UP
PROT UR-MCNC: NEGATIVE MG/DL — SIGNIFICANT CHANGE UP
PROTHROM AB SERPL-ACNC: 13.5 SEC — HIGH (ref 9.5–13)
PROTHROM AB SERPL-ACNC: 13.5 SEC — HIGH (ref 9.5–13)
RBC # BLD: 3.82 M/UL — LOW (ref 4.2–5.8)
RBC # BLD: 3.82 M/UL — LOW (ref 4.2–5.8)
RBC # FLD: 16.2 % — HIGH (ref 10.3–14.5)
RBC # FLD: 16.2 % — HIGH (ref 10.3–14.5)
RBC CASTS # UR COMP ASSIST: 0 /HPF — SIGNIFICANT CHANGE UP (ref 0–4)
RBC CASTS # UR COMP ASSIST: 0 /HPF — SIGNIFICANT CHANGE UP (ref 0–4)
SAO2 % BLDV: 36.3 % — LOW (ref 67–88)
SAO2 % BLDV: 36.3 % — LOW (ref 67–88)
SODIUM SERPL-SCNC: 142 MMOL/L — SIGNIFICANT CHANGE UP (ref 135–145)
SODIUM SERPL-SCNC: 142 MMOL/L — SIGNIFICANT CHANGE UP (ref 135–145)
SODIUM UR-SCNC: 99 MMOL/L — SIGNIFICANT CHANGE UP
SODIUM UR-SCNC: 99 MMOL/L — SIGNIFICANT CHANGE UP
SP GR SPEC: 1.01 — SIGNIFICANT CHANGE UP (ref 1–1.03)
SP GR SPEC: 1.01 — SIGNIFICANT CHANGE UP (ref 1–1.03)
SQUAMOUS # UR AUTO: 0 /HPF — SIGNIFICANT CHANGE UP (ref 0–5)
SQUAMOUS # UR AUTO: 0 /HPF — SIGNIFICANT CHANGE UP (ref 0–5)
TROPONIN T, HIGH SENSITIVITY RESULT: 58 NG/L — HIGH (ref 0–51)
TROPONIN T, HIGH SENSITIVITY RESULT: 58 NG/L — HIGH (ref 0–51)
TROPONIN T, HIGH SENSITIVITY RESULT: 68 NG/L — HIGH (ref 0–51)
TROPONIN T, HIGH SENSITIVITY RESULT: 68 NG/L — HIGH (ref 0–51)
UROBILINOGEN FLD QL: 0.2 MG/DL — SIGNIFICANT CHANGE UP (ref 0.2–1)
UROBILINOGEN FLD QL: 0.2 MG/DL — SIGNIFICANT CHANGE UP (ref 0.2–1)
WBC # BLD: 4.43 K/UL — SIGNIFICANT CHANGE UP (ref 3.8–10.5)
WBC # BLD: 4.43 K/UL — SIGNIFICANT CHANGE UP (ref 3.8–10.5)
WBC # FLD AUTO: 4.43 K/UL — SIGNIFICANT CHANGE UP (ref 3.8–10.5)
WBC # FLD AUTO: 4.43 K/UL — SIGNIFICANT CHANGE UP (ref 3.8–10.5)
WBC UR QL: 0 /HPF — SIGNIFICANT CHANGE UP (ref 0–5)
WBC UR QL: 0 /HPF — SIGNIFICANT CHANGE UP (ref 0–5)

## 2023-12-03 PROCEDURE — 76870 US EXAM SCROTUM: CPT | Mod: 26

## 2023-12-03 PROCEDURE — 99223 1ST HOSP IP/OBS HIGH 75: CPT

## 2023-12-03 PROCEDURE — 99053 MED SERV 10PM-8AM 24 HR FAC: CPT

## 2023-12-03 PROCEDURE — 71045 X-RAY EXAM CHEST 1 VIEW: CPT | Mod: 26

## 2023-12-03 PROCEDURE — 99221 1ST HOSP IP/OBS SF/LOW 40: CPT

## 2023-12-03 PROCEDURE — 93975 VASCULAR STUDY: CPT | Mod: 26

## 2023-12-03 PROCEDURE — 99285 EMERGENCY DEPT VISIT HI MDM: CPT

## 2023-12-03 RX ORDER — IPRATROPIUM/ALBUTEROL SULFATE 18-103MCG
3 AEROSOL WITH ADAPTER (GRAM) INHALATION EVERY 6 HOURS
Refills: 0 | Status: DISCONTINUED | OUTPATIENT
Start: 2023-12-03 | End: 2023-12-07

## 2023-12-03 RX ORDER — FINASTERIDE 5 MG/1
5 TABLET, FILM COATED ORAL DAILY
Refills: 0 | Status: DISCONTINUED | OUTPATIENT
Start: 2023-12-03 | End: 2023-12-07

## 2023-12-03 RX ORDER — CEFTRIAXONE 500 MG/1
INJECTION, POWDER, FOR SOLUTION INTRAMUSCULAR; INTRAVENOUS
Refills: 0 | Status: DISCONTINUED | OUTPATIENT
Start: 2023-12-03 | End: 2023-12-07

## 2023-12-03 RX ORDER — FINASTERIDE 5 MG/1
1 TABLET, FILM COATED ORAL
Qty: 0 | Refills: 0 | DISCHARGE

## 2023-12-03 RX ORDER — ALPRAZOLAM 0.25 MG
0.25 TABLET ORAL DAILY
Refills: 0 | Status: DISCONTINUED | OUTPATIENT
Start: 2023-12-03 | End: 2023-12-07

## 2023-12-03 RX ORDER — CEFTRIAXONE 500 MG/1
1000 INJECTION, POWDER, FOR SOLUTION INTRAMUSCULAR; INTRAVENOUS ONCE
Refills: 0 | Status: COMPLETED | OUTPATIENT
Start: 2023-12-03 | End: 2023-12-03

## 2023-12-03 RX ORDER — TAMSULOSIN HYDROCHLORIDE 0.4 MG/1
0.4 CAPSULE ORAL AT BEDTIME
Refills: 0 | Status: DISCONTINUED | OUTPATIENT
Start: 2023-12-03 | End: 2023-12-07

## 2023-12-03 RX ORDER — LANOLIN ALCOHOL/MO/W.PET/CERES
3 CREAM (GRAM) TOPICAL AT BEDTIME
Refills: 0 | Status: DISCONTINUED | OUTPATIENT
Start: 2023-12-03 | End: 2023-12-07

## 2023-12-03 RX ORDER — FUROSEMIDE 40 MG
40 TABLET ORAL
Refills: 0 | Status: DISCONTINUED | OUTPATIENT
Start: 2023-12-03 | End: 2023-12-05

## 2023-12-03 RX ORDER — LEVOTHYROXINE SODIUM 125 MCG
175 TABLET ORAL DAILY
Refills: 0 | Status: DISCONTINUED | OUTPATIENT
Start: 2023-12-03 | End: 2023-12-07

## 2023-12-03 RX ORDER — FLUTICASONE PROPIONATE AND SALMETEROL 50; 250 UG/1; UG/1
1 POWDER ORAL; RESPIRATORY (INHALATION)
Qty: 0 | Refills: 0 | DISCHARGE

## 2023-12-03 RX ORDER — ATORVASTATIN CALCIUM 80 MG/1
40 TABLET, FILM COATED ORAL AT BEDTIME
Refills: 0 | Status: DISCONTINUED | OUTPATIENT
Start: 2023-12-03 | End: 2023-12-07

## 2023-12-03 RX ORDER — ACETAMINOPHEN 500 MG
650 TABLET ORAL EVERY 6 HOURS
Refills: 0 | Status: DISCONTINUED | OUTPATIENT
Start: 2023-12-03 | End: 2023-12-07

## 2023-12-03 RX ORDER — HEPARIN SODIUM 5000 [USP'U]/ML
5000 INJECTION INTRAVENOUS; SUBCUTANEOUS EVERY 12 HOURS
Refills: 0 | Status: DISCONTINUED | OUTPATIENT
Start: 2023-12-03 | End: 2023-12-07

## 2023-12-03 RX ORDER — CABERGOLINE 0.5 MG/1
2 TABLET ORAL
Qty: 0 | Refills: 0 | DISCHARGE

## 2023-12-03 RX ORDER — CEFTRIAXONE 500 MG/1
1000 INJECTION, POWDER, FOR SOLUTION INTRAMUSCULAR; INTRAVENOUS EVERY 24 HOURS
Refills: 0 | Status: DISCONTINUED | OUTPATIENT
Start: 2023-12-04 | End: 2023-12-07

## 2023-12-03 RX ORDER — ACETAMINOPHEN 500 MG
1000 TABLET ORAL ONCE
Refills: 0 | Status: COMPLETED | OUTPATIENT
Start: 2023-12-03 | End: 2023-12-03

## 2023-12-03 RX ORDER — ONDANSETRON 8 MG/1
4 TABLET, FILM COATED ORAL EVERY 8 HOURS
Refills: 0 | Status: DISCONTINUED | OUTPATIENT
Start: 2023-12-03 | End: 2023-12-07

## 2023-12-03 RX ORDER — TAMSULOSIN HYDROCHLORIDE 0.4 MG/1
2 CAPSULE ORAL
Qty: 0 | Refills: 0 | DISCHARGE

## 2023-12-03 RX ORDER — LEVOTHYROXINE SODIUM 125 MCG
1 TABLET ORAL
Qty: 0 | Refills: 0 | DISCHARGE

## 2023-12-03 RX ORDER — FUROSEMIDE 40 MG
40 TABLET ORAL ONCE
Refills: 0 | Status: COMPLETED | OUTPATIENT
Start: 2023-12-03 | End: 2023-12-03

## 2023-12-03 RX ORDER — CABERGOLINE 0.5 MG/1
1 TABLET ORAL
Refills: 0 | Status: DISCONTINUED | OUTPATIENT
Start: 2023-12-03 | End: 2023-12-07

## 2023-12-03 RX ORDER — ALBUTEROL 90 UG/1
1 AEROSOL, METERED ORAL
Qty: 0 | Refills: 0 | DISCHARGE

## 2023-12-03 RX ADMIN — TAMSULOSIN HYDROCHLORIDE 0.4 MILLIGRAM(S): 0.4 CAPSULE ORAL at 21:59

## 2023-12-03 RX ADMIN — Medication 400 MILLIGRAM(S): at 06:20

## 2023-12-03 RX ADMIN — Medication 40 MILLIGRAM(S): at 18:42

## 2023-12-03 RX ADMIN — Medication 40 MILLIGRAM(S): at 04:01

## 2023-12-03 RX ADMIN — CEFTRIAXONE 100 MILLIGRAM(S): 500 INJECTION, POWDER, FOR SOLUTION INTRAMUSCULAR; INTRAVENOUS at 18:42

## 2023-12-03 RX ADMIN — HEPARIN SODIUM 5000 UNIT(S): 5000 INJECTION INTRAVENOUS; SUBCUTANEOUS at 19:45

## 2023-12-03 RX ADMIN — Medication 650 MILLIGRAM(S): at 21:59

## 2023-12-03 RX ADMIN — ATORVASTATIN CALCIUM 40 MILLIGRAM(S): 80 TABLET, FILM COATED ORAL at 21:59

## 2023-12-03 NOTE — H&P ADULT - PROBLEM SELECTOR PLAN 3
DOppler and US of Scrotum with Rt Hydrocele and Prominence of Rt Epidymidis , possible Epididymitis and 1 cm Rt Epididymal lesion  --> URO consult is called   Pt follows with BALJIT Carrizales  WIll get urine NAAT for GC and CHlamydia and will discuss with the Uro team

## 2023-12-03 NOTE — H&P ADULT - ASSESSMENT
68-year-old male past medical history of HFrEF,  Cardiac Amyloidosis, Cardiomyopathy, Neuropathy, Carotid stenosis -> s/p stents, Hypothyroidism, Pituitary Adenoma, Hyperprolactinemia,  BPH, HTN, laryngeal cancer s/p radiation presents  for BILLY with increased leg and scrotal swelling for 4 days and no orthopnea.  Patient states symptoms similar to prior CHF exacerbation.   NO orthospnea, no PND and no chest pain , no cough.  Patient noted swelling with increased pain on the scrotum about 5 days ago and his Urologist ( started him on ABX as he states ) .  NO fever , no chest pain, no palpitation, no change in diet, no change in medication and he states to be compliant with his medications Endorses symptoms of scrotal swelling, dysuria, extremity tenderness.  Denies fever, chills, nausea, vomiting, chest pain, SOB, calf tenderness, abdominal pain, urinary symptoms, no penile DC , no trauma.   No prior STIs and has been  for 17 years.    68-year-old male past medical history of HFrEF,  Cardiac Amyloidosis, Cardiomyopathy, Neuropathy, Carotid stenosis -> s/p stents, Hypothyroidism, Pituitary Adenoma, Hyperprolactinemia,  BPH, HTN, laryngeal cancer s/p radiation presents  for BILLY with increased leg and scrotal swelling for 4 days and no orthopnea. Patient is found to have evidence of Acute on chronic heart failure and admitted to the hospital for further evaluation and treatment .

## 2023-12-03 NOTE — H&P ADULT - NSHPADDITIONALINFOADULT_GEN_ALL_CORE
Poly Singh   Hospitalist   available on TEAMS Discussed with medicine ACP and Urology ACP     Poly Singh   Hospitalist   available on TEAMS

## 2023-12-03 NOTE — ED PROVIDER NOTE - CHILD ABUSE FACILITY
[facility]

## 2023-12-03 NOTE — ED ADULT NURSE REASSESSMENT NOTE - NS ED NURSE REASSESS COMMENT FT1
report received from rn karla. pt reports to ED for dyspnea, admitted to tele for CHF exacerbation. pt resting comfortably in stretcher, pending room assignment. pt a&Ox3 and vss

## 2023-12-03 NOTE — H&P ADULT - TIME BILLING
Extensive review of HIE records , Long discussion with patient , personal evaluation of patient , personally review ED records and labs and implementing care and discussion with Urology team

## 2023-12-03 NOTE — H&P ADULT - HISTORY OF PRESENT ILLNESS
68-year-old male past medical history of CHF, CVA s/p stents, BPH, HTN, laryngeal cancer s/p radiation presents today for BILLY with increased leg and scrotal swelling for 4 days.  Patient states symptoms similar to prior CHF exacerbation.  Compliant with Lasix dosage.  No change in diet.  Endorses symptoms of scrotal swelling, dysuria, extremity tenderness.  Denies fever, chills, nausea, vomiting, chest pain, SOB, calf tenderness, abdominal pain, urinary symptoms.  No prior STIs.    In the ED : patient received Lasix 40mg IV X1 and IV Tylenol CX 1        68-year-old male past medical history of HFrEF,  Cardiac Amyloidosis, Cardiomyopathy, Neuropathy, Carotid stenosis -> s/p stents, Hypothyroidism, Pituitary Adenoma, Hyperprolactinemia,  BPH, HTN, laryngeal cancer s/p radiation presents  for BILLY with increased leg and scrotal swelling for 4 days and no orthopnea.  Patient states symptoms similar to prior CHF exacerbation.   NO orthospnea, no PND and no chest pain , no cough.  Patient noted swelling with increased pain on the scrotum about 5 days ago and his Urologist ( started him on ABX as he states ) .  NO fever , no chest pain, no palpitation, no change in diet, no change in medication and he states to be compliant with his medications Endorses symptoms of scrotal swelling, dysuria, extremity tenderness.  Denies fever, chills, nausea, vomiting, chest pain, SOB, calf tenderness, abdominal pain, urinary symptoms, no penile DC , no trauma.   No prior STIs and has been  for 17 years.    In the ED : patient received Lasix 40mg IV X1 and IV Tylenol CX 1

## 2023-12-03 NOTE — ED PROVIDER NOTE - ATTENDING CONTRIBUTION TO CARE
I, Dr. Magalie Marroquin, have personally performed a face to face medical and diagnostic evaluation of the patient. I have discussed with and reviewed the Resident's and/or ACP's and/or Medical/PA/NP student's note and agree with the History, ROS, Physical Exam and MDM unless otherwise indicated. A brief summary of my personal evaluation and impression can be found below.     HPi: 68-year-old male past medical history of CHF, CVA s/p stents, BPH, HTN, laryngeal cancer s/p radiation presents today for BILLY with increased leg and scrotal swelling for 4 days.  Patient states symptoms similar to prior CHF exacerbation.  Compliant with Lasix dosage.  No change in diet.  Endorses symptoms of scrotal swelling, dysuria, extremity tenderness.  Denies fever, chills, nausea, vomiting, chest pain, SOB, calf tenderness, abdominal pain, urinary symptoms.  No prior STIs.    PE: Nontoxic appearing, crackles at bilateral lungs bases, pitting edema of bilateral lower extremities.  exam performed by resident c/w fluid retention. Concern for acute on chronic heart failure. Will give additional diuresis here, check labs, tele monitory and anticipate cdu v admission for medication optimization.

## 2023-12-03 NOTE — ED PROVIDER NOTE - OBJECTIVE STATEMENT
68-year-old male past medical history of CHF, CVA s/p stents, BPH, HTN, laryngeal cancer s/p radiation presents today for BILLY with increased leg and scrotal swelling for 4 days.  Patient states symptoms similar to prior CHF exacerbation.  Compliant with Lasix dosage.  No change in diet.  Endorses symptoms of scrotal swelling, dysuria, extremity tenderness.  Denies fever, chills, nausea, vomiting, chest pain, SOB, calf tenderness, abdominal pain, urinary symptoms.  No prior STIs.

## 2023-12-03 NOTE — H&P ADULT - NSHPLABSRESULTS_GEN_ALL_CORE
Lab and Xrays results are personally reviewed by me Lab and Xrays results are personally reviewed by me with no noted Increased Cr , high BNP , stable Delta troponin .  Please see full results

## 2023-12-03 NOTE — ED ADULT NURSE NOTE - NSFALLRISKINTERV_ED_ALL_ED
Assistance OOB with selected safe patient handling equipment if applicable/Assistance with ambulation/Communicate fall risk and risk factors to all staff, patient, and family/Monitor gait and stability/Provide visual cue: yellow wristband, yellow gown, etc/Reinforce activity limits and safety measures with patient and family/Call bell, personal items and telephone in reach/Instruct patient to call for assistance before getting out of bed/chair/stretcher/Non-slip footwear applied when patient is off stretcher/[place] to call system/Physically safe environment - no spills, clutter or unnecessary equipment/Purposeful Proactive Rounding/Room/bathroom lighting operational, light cord in reach Assistance OOB with selected safe patient handling equipment if applicable/Assistance with ambulation/Communicate fall risk and risk factors to all staff, patient, and family/Monitor gait and stability/Provide visual cue: yellow wristband, yellow gown, etc/Reinforce activity limits and safety measures with patient and family/Call bell, personal items and telephone in reach/Instruct patient to call for assistance before getting out of bed/chair/stretcher/Non-slip footwear applied when patient is off stretcher/[place] to call system/Physically safe environment - no spills, clutter or unnecessary equipment/Purposeful Proactive Rounding/Room/bathroom lighting operational, light cord in reach Assistance OOB with selected safe patient handling equipment if applicable/Assistance with ambulation/Communicate fall risk and risk factors to all staff, patient, and family/Monitor gait and stability/Provide visual cue: yellow wristband, yellow gown, etc/Reinforce activity limits and safety measures with patient and family/Call bell, personal items and telephone in reach/Instruct patient to call for assistance before getting out of bed/chair/stretcher/Non-slip footwear applied when patient is off stretcher/[place] to call system/Physically safe environment - no spills, clutter or unnecessary equipment/Purposeful Proactive Rounding/Room/bathroom lighting operational, light cord in reach

## 2023-12-03 NOTE — H&P ADULT - PROBLEM SELECTOR PLAN 2
F/up with ARUNA Berrios   Pt is awaiting on Amyloidosis meds F/up with ARUNA Berrios   Pt is awaiting on Amyloidosis meds approval from the company

## 2023-12-03 NOTE — ED PROVIDER NOTE - PHYSICAL EXAMINATION
Gen: NAD, non-toxic appearing  Head: normal appearing  HEENT: normal conjunctiva, oral mucosa moist  Lung: no respiratory distress, speaking in full sentences, CTA b/l     CV: regular rate and rhythm, no murmurs, +2 pitting edema   Abd: soft, non distended, non tender   MSK: no visible deformities  Neuro: No focal deficits, AAOx3  Skin: Warm  Psych: normal affect     exam: (Chaperone: Marcio Valadez) scrotal swelling with tender right testicular, no erythema or abscess, no penile drainage

## 2023-12-03 NOTE — ED ADULT TRIAGE NOTE - CHIEF COMPLAINT QUOTE
pt endorsing worsening dyspnea on exertion over the last few months related to his pmhx CHF; also endorses chest pain and increased swelling to his ankles and testicles

## 2023-12-03 NOTE — H&P ADULT - PROBLEM SELECTOR PLAN 1
Last EF was 25 % in 3/2025   Pt follows up with ARUNA Berrios and Dr Yi   Received Lasix in the ED   Cont with Lasix 40mg BID, monitor response and electrolytes   HOLD ENtresto in setting of Hypotension and ZEESHAN  No other GDMT   cardio consult   TTE is  ordered   cont Tele monitor   MOnitor weight , intake, Output and electrolytes Last EF was 25 % in 3/2025   Pt follows up with ARUNA Berrios and Dr Yi   Received Lasix in the ED   Cont with Lasix 40mg BID, monitor response and electrolytes   HOLD ENtresto in setting of Hypotension and ZEESHAN  No other GDMT   cardio consult   TTE is  ordered   cont Tele monitor   MOnitor weight , intake, Output and electrolytes  Delta troponin is stable,, but will get one more troponin

## 2023-12-03 NOTE — ED PROVIDER NOTE - CLINICAL SUMMARY MEDICAL DECISION MAKING FREE TEXT BOX
68-year-old male past medical history of CHF, CVA s/p stents, BPH, HTN, laryngeal cancer s/p radiation presents today for BILLY with increased leg and scrotal swelling for 4 days.  Patient states symptoms similar to prior CHF exacerbation.  Compliant with Lasix dosage.  No change in diet.  Endorses symptoms of scrotal swelling, dysuria, extremity tenderness.  Denies fever, chills, nausea, vomiting, chest pain, SOB, calf tenderness, abdominal pain, urinary symptoms.  No prior STIs.    Given history of CHF symptoms of BILLY with increased lower extremity and scrotal swelling.  Will order CBC, CMP, BNP, Trope, EKG, CXR to rule out likely acute on chronic CHF exacerbation versus ACS.  Less likely ACS given unchanged EKG.  Ordered ultrasound testicles to rule out epididymitis for testicular tenderness on PE.  Given dose of Lasix, will reassess.

## 2023-12-03 NOTE — H&P ADULT - PROBLEM SELECTOR PLAN 5
most likely in setting of HF exacerbation   Will get bladder scan, Urine studies   HOLD nephrotoxic agents

## 2023-12-03 NOTE — ED ADULT NURSE NOTE - OBJECTIVE STATEMENT
68y male A&OX4 coming in through triage complaining of dyspnea. PMHX CHF. Pt reports having b/l leg and scrotum swelling for four days. Pt states his scrotum is really bothering him. Scrotum is swollen and tender to touch. Pt is speaking in complete sentences. Pt denies chest pain, abd pain, N/V/D, fever. Labs was drawn and sent to lab. Pt pending dispo.

## 2023-12-04 LAB
A1C WITH ESTIMATED AVERAGE GLUCOSE RESULT: 5.8 % — HIGH (ref 4–5.6)
A1C WITH ESTIMATED AVERAGE GLUCOSE RESULT: 5.8 % — HIGH (ref 4–5.6)
ALBUMIN SERPL ELPH-MCNC: 3.3 G/DL — SIGNIFICANT CHANGE UP (ref 3.3–5)
ALBUMIN SERPL ELPH-MCNC: 3.3 G/DL — SIGNIFICANT CHANGE UP (ref 3.3–5)
ALP SERPL-CCNC: 63 U/L — SIGNIFICANT CHANGE UP (ref 40–120)
ALP SERPL-CCNC: 63 U/L — SIGNIFICANT CHANGE UP (ref 40–120)
ALT FLD-CCNC: 17 U/L — SIGNIFICANT CHANGE UP (ref 10–45)
ALT FLD-CCNC: 17 U/L — SIGNIFICANT CHANGE UP (ref 10–45)
ANION GAP SERPL CALC-SCNC: 12 MMOL/L — SIGNIFICANT CHANGE UP (ref 5–17)
ANION GAP SERPL CALC-SCNC: 12 MMOL/L — SIGNIFICANT CHANGE UP (ref 5–17)
ANION GAP SERPL CALC-SCNC: 13 MMOL/L — SIGNIFICANT CHANGE UP (ref 5–17)
ANION GAP SERPL CALC-SCNC: 13 MMOL/L — SIGNIFICANT CHANGE UP (ref 5–17)
ANISOCYTOSIS BLD QL: SIGNIFICANT CHANGE UP
ANISOCYTOSIS BLD QL: SIGNIFICANT CHANGE UP
AST SERPL-CCNC: 14 U/L — SIGNIFICANT CHANGE UP (ref 10–40)
AST SERPL-CCNC: 14 U/L — SIGNIFICANT CHANGE UP (ref 10–40)
BASOPHILS # BLD AUTO: 0.05 K/UL — SIGNIFICANT CHANGE UP (ref 0–0.2)
BASOPHILS # BLD AUTO: 0.05 K/UL — SIGNIFICANT CHANGE UP (ref 0–0.2)
BASOPHILS NFR BLD AUTO: 1.7 % — SIGNIFICANT CHANGE UP (ref 0–2)
BASOPHILS NFR BLD AUTO: 1.7 % — SIGNIFICANT CHANGE UP (ref 0–2)
BILIRUB SERPL-MCNC: 0.6 MG/DL — SIGNIFICANT CHANGE UP (ref 0.2–1.2)
BILIRUB SERPL-MCNC: 0.6 MG/DL — SIGNIFICANT CHANGE UP (ref 0.2–1.2)
BUN SERPL-MCNC: 30 MG/DL — HIGH (ref 7–23)
C TRACH RRNA SPEC QL NAA+PROBE: SIGNIFICANT CHANGE UP
C TRACH RRNA SPEC QL NAA+PROBE: SIGNIFICANT CHANGE UP
CALCIUM SERPL-MCNC: 8.5 MG/DL — SIGNIFICANT CHANGE UP (ref 8.4–10.5)
CALCIUM SERPL-MCNC: 8.5 MG/DL — SIGNIFICANT CHANGE UP (ref 8.4–10.5)
CALCIUM SERPL-MCNC: 8.9 MG/DL — SIGNIFICANT CHANGE UP (ref 8.4–10.5)
CALCIUM SERPL-MCNC: 8.9 MG/DL — SIGNIFICANT CHANGE UP (ref 8.4–10.5)
CHLORIDE SERPL-SCNC: 109 MMOL/L — HIGH (ref 96–108)
CHLORIDE SERPL-SCNC: 109 MMOL/L — HIGH (ref 96–108)
CHLORIDE SERPL-SCNC: 110 MMOL/L — HIGH (ref 96–108)
CHLORIDE SERPL-SCNC: 110 MMOL/L — HIGH (ref 96–108)
CHOLEST SERPL-MCNC: 109 MG/DL — SIGNIFICANT CHANGE UP
CHOLEST SERPL-MCNC: 109 MG/DL — SIGNIFICANT CHANGE UP
CO2 SERPL-SCNC: 21 MMOL/L — LOW (ref 22–31)
CREAT SERPL-MCNC: 2.13 MG/DL — HIGH (ref 0.5–1.3)
CREAT SERPL-MCNC: 2.13 MG/DL — HIGH (ref 0.5–1.3)
CREAT SERPL-MCNC: 2.24 MG/DL — HIGH (ref 0.5–1.3)
CREAT SERPL-MCNC: 2.24 MG/DL — HIGH (ref 0.5–1.3)
EGFR: 31 ML/MIN/1.73M2 — LOW
EGFR: 31 ML/MIN/1.73M2 — LOW
EGFR: 33 ML/MIN/1.73M2 — LOW
EGFR: 33 ML/MIN/1.73M2 — LOW
ELLIPTOCYTES BLD QL SMEAR: SLIGHT — SIGNIFICANT CHANGE UP
ELLIPTOCYTES BLD QL SMEAR: SLIGHT — SIGNIFICANT CHANGE UP
EOSINOPHIL # BLD AUTO: 0.08 K/UL — SIGNIFICANT CHANGE UP (ref 0–0.5)
EOSINOPHIL # BLD AUTO: 0.08 K/UL — SIGNIFICANT CHANGE UP (ref 0–0.5)
EOSINOPHIL NFR BLD AUTO: 2.6 % — SIGNIFICANT CHANGE UP (ref 0–6)
EOSINOPHIL NFR BLD AUTO: 2.6 % — SIGNIFICANT CHANGE UP (ref 0–6)
ESTIMATED AVERAGE GLUCOSE: 120 MG/DL — HIGH (ref 68–114)
ESTIMATED AVERAGE GLUCOSE: 120 MG/DL — HIGH (ref 68–114)
GLUCOSE SERPL-MCNC: 103 MG/DL — HIGH (ref 70–99)
GLUCOSE SERPL-MCNC: 103 MG/DL — HIGH (ref 70–99)
GLUCOSE SERPL-MCNC: 74 MG/DL — SIGNIFICANT CHANGE UP (ref 70–99)
GLUCOSE SERPL-MCNC: 74 MG/DL — SIGNIFICANT CHANGE UP (ref 70–99)
HCT VFR BLD CALC: 34.3 % — LOW (ref 39–50)
HCT VFR BLD CALC: 34.3 % — LOW (ref 39–50)
HDLC SERPL-MCNC: 40 MG/DL — LOW
HDLC SERPL-MCNC: 40 MG/DL — LOW
HGB BLD-MCNC: 11 G/DL — LOW (ref 13–17)
HGB BLD-MCNC: 11 G/DL — LOW (ref 13–17)
INR BLD: 1.29 RATIO — HIGH (ref 0.85–1.18)
INR BLD: 1.29 RATIO — HIGH (ref 0.85–1.18)
LIPID PNL WITH DIRECT LDL SERPL: 57 MG/DL — SIGNIFICANT CHANGE UP
LIPID PNL WITH DIRECT LDL SERPL: 57 MG/DL — SIGNIFICANT CHANGE UP
LYMPHOCYTES # BLD AUTO: 0.57 K/UL — LOW (ref 1–3.3)
LYMPHOCYTES # BLD AUTO: 0.57 K/UL — LOW (ref 1–3.3)
LYMPHOCYTES # BLD AUTO: 18.3 % — SIGNIFICANT CHANGE UP (ref 13–44)
LYMPHOCYTES # BLD AUTO: 18.3 % — SIGNIFICANT CHANGE UP (ref 13–44)
MACROCYTES BLD QL: SIGNIFICANT CHANGE UP
MACROCYTES BLD QL: SIGNIFICANT CHANGE UP
MAGNESIUM SERPL-MCNC: 1.8 MG/DL — SIGNIFICANT CHANGE UP (ref 1.6–2.6)
MANUAL SMEAR VERIFICATION: SIGNIFICANT CHANGE UP
MANUAL SMEAR VERIFICATION: SIGNIFICANT CHANGE UP
MCHC RBC-ENTMCNC: 30.4 PG — SIGNIFICANT CHANGE UP (ref 27–34)
MCHC RBC-ENTMCNC: 30.4 PG — SIGNIFICANT CHANGE UP (ref 27–34)
MCHC RBC-ENTMCNC: 32.1 GM/DL — SIGNIFICANT CHANGE UP (ref 32–36)
MCHC RBC-ENTMCNC: 32.1 GM/DL — SIGNIFICANT CHANGE UP (ref 32–36)
MCV RBC AUTO: 94.8 FL — SIGNIFICANT CHANGE UP (ref 80–100)
MCV RBC AUTO: 94.8 FL — SIGNIFICANT CHANGE UP (ref 80–100)
MONOCYTES # BLD AUTO: 0.16 K/UL — SIGNIFICANT CHANGE UP (ref 0–0.9)
MONOCYTES # BLD AUTO: 0.16 K/UL — SIGNIFICANT CHANGE UP (ref 0–0.9)
MONOCYTES NFR BLD AUTO: 5.2 % — SIGNIFICANT CHANGE UP (ref 2–14)
MONOCYTES NFR BLD AUTO: 5.2 % — SIGNIFICANT CHANGE UP (ref 2–14)
N GONORRHOEA RRNA SPEC QL NAA+PROBE: SIGNIFICANT CHANGE UP
N GONORRHOEA RRNA SPEC QL NAA+PROBE: SIGNIFICANT CHANGE UP
NEUTROPHILS # BLD AUTO: 2.23 K/UL — SIGNIFICANT CHANGE UP (ref 1.8–7.4)
NEUTROPHILS # BLD AUTO: 2.23 K/UL — SIGNIFICANT CHANGE UP (ref 1.8–7.4)
NEUTROPHILS NFR BLD AUTO: 72.2 % — SIGNIFICANT CHANGE UP (ref 43–77)
NEUTROPHILS NFR BLD AUTO: 72.2 % — SIGNIFICANT CHANGE UP (ref 43–77)
NON HDL CHOLESTEROL: 69 MG/DL — SIGNIFICANT CHANGE UP
NON HDL CHOLESTEROL: 69 MG/DL — SIGNIFICANT CHANGE UP
PLAT MORPH BLD: NORMAL — SIGNIFICANT CHANGE UP
PLAT MORPH BLD: NORMAL — SIGNIFICANT CHANGE UP
PLATELET # BLD AUTO: 153 K/UL — SIGNIFICANT CHANGE UP (ref 150–400)
PLATELET # BLD AUTO: 153 K/UL — SIGNIFICANT CHANGE UP (ref 150–400)
POIKILOCYTOSIS BLD QL AUTO: SLIGHT — SIGNIFICANT CHANGE UP
POIKILOCYTOSIS BLD QL AUTO: SLIGHT — SIGNIFICANT CHANGE UP
POTASSIUM SERPL-MCNC: 4.2 MMOL/L — SIGNIFICANT CHANGE UP (ref 3.5–5.3)
POTASSIUM SERPL-MCNC: 4.2 MMOL/L — SIGNIFICANT CHANGE UP (ref 3.5–5.3)
POTASSIUM SERPL-MCNC: 4.4 MMOL/L — SIGNIFICANT CHANGE UP (ref 3.5–5.3)
POTASSIUM SERPL-MCNC: 4.4 MMOL/L — SIGNIFICANT CHANGE UP (ref 3.5–5.3)
POTASSIUM SERPL-SCNC: 4.2 MMOL/L — SIGNIFICANT CHANGE UP (ref 3.5–5.3)
POTASSIUM SERPL-SCNC: 4.2 MMOL/L — SIGNIFICANT CHANGE UP (ref 3.5–5.3)
POTASSIUM SERPL-SCNC: 4.4 MMOL/L — SIGNIFICANT CHANGE UP (ref 3.5–5.3)
POTASSIUM SERPL-SCNC: 4.4 MMOL/L — SIGNIFICANT CHANGE UP (ref 3.5–5.3)
PROT SERPL-MCNC: 5.8 G/DL — LOW (ref 6–8.3)
PROT SERPL-MCNC: 5.8 G/DL — LOW (ref 6–8.3)
PROTHROM AB SERPL-ACNC: 13.4 SEC — HIGH (ref 9.5–13)
PROTHROM AB SERPL-ACNC: 13.4 SEC — HIGH (ref 9.5–13)
RBC # BLD: 3.62 M/UL — LOW (ref 4.2–5.8)
RBC # BLD: 3.62 M/UL — LOW (ref 4.2–5.8)
RBC # FLD: 16.2 % — HIGH (ref 10.3–14.5)
RBC # FLD: 16.2 % — HIGH (ref 10.3–14.5)
RBC BLD AUTO: ABNORMAL
RBC BLD AUTO: ABNORMAL
SCHISTOCYTES BLD QL AUTO: SLIGHT — SIGNIFICANT CHANGE UP
SCHISTOCYTES BLD QL AUTO: SLIGHT — SIGNIFICANT CHANGE UP
SODIUM SERPL-SCNC: 142 MMOL/L — SIGNIFICANT CHANGE UP (ref 135–145)
SODIUM SERPL-SCNC: 142 MMOL/L — SIGNIFICANT CHANGE UP (ref 135–145)
SODIUM SERPL-SCNC: 144 MMOL/L — SIGNIFICANT CHANGE UP (ref 135–145)
SODIUM SERPL-SCNC: 144 MMOL/L — SIGNIFICANT CHANGE UP (ref 135–145)
SPECIMEN SOURCE: SIGNIFICANT CHANGE UP
TARGETS BLD QL SMEAR: SLIGHT — SIGNIFICANT CHANGE UP
TARGETS BLD QL SMEAR: SLIGHT — SIGNIFICANT CHANGE UP
TRIGL SERPL-MCNC: 49 MG/DL — SIGNIFICANT CHANGE UP
TRIGL SERPL-MCNC: 49 MG/DL — SIGNIFICANT CHANGE UP
TROPONIN T, HIGH SENSITIVITY RESULT: 64 NG/L — HIGH (ref 0–51)
TROPONIN T, HIGH SENSITIVITY RESULT: 64 NG/L — HIGH (ref 0–51)
TSH SERPL-MCNC: 5.53 UIU/ML — HIGH (ref 0.27–4.2)
TSH SERPL-MCNC: 5.53 UIU/ML — HIGH (ref 0.27–4.2)
URATE UR-MCNC: 12.6 MG/DL — SIGNIFICANT CHANGE UP
URATE UR-MCNC: 12.6 MG/DL — SIGNIFICANT CHANGE UP
WBC # BLD: 3.09 K/UL — LOW (ref 3.8–10.5)
WBC # BLD: 3.09 K/UL — LOW (ref 3.8–10.5)
WBC # FLD AUTO: 3.09 K/UL — LOW (ref 3.8–10.5)
WBC # FLD AUTO: 3.09 K/UL — LOW (ref 3.8–10.5)

## 2023-12-04 PROCEDURE — 99233 SBSQ HOSP IP/OBS HIGH 50: CPT

## 2023-12-04 PROCEDURE — 93970 EXTREMITY STUDY: CPT | Mod: 26

## 2023-12-04 RX ORDER — TRAMADOL HYDROCHLORIDE 50 MG/1
25 TABLET ORAL ONCE
Refills: 0 | Status: DISCONTINUED | OUTPATIENT
Start: 2023-12-04 | End: 2023-12-04

## 2023-12-04 RX ORDER — TRAMADOL HYDROCHLORIDE 50 MG/1
50 TABLET ORAL ONCE
Refills: 0 | Status: DISCONTINUED | OUTPATIENT
Start: 2023-12-04 | End: 2023-12-04

## 2023-12-04 RX ORDER — HYDROMORPHONE HYDROCHLORIDE 2 MG/ML
1 INJECTION INTRAMUSCULAR; INTRAVENOUS; SUBCUTANEOUS ONCE
Refills: 0 | Status: DISCONTINUED | OUTPATIENT
Start: 2023-12-04 | End: 2023-12-04

## 2023-12-04 RX ORDER — KETOROLAC TROMETHAMINE 30 MG/ML
30 SYRINGE (ML) INJECTION EVERY 6 HOURS
Refills: 0 | Status: DISCONTINUED | OUTPATIENT
Start: 2023-12-04 | End: 2023-12-04

## 2023-12-04 RX ORDER — MAGNESIUM SULFATE 500 MG/ML
1 VIAL (ML) INJECTION ONCE
Refills: 0 | Status: COMPLETED | OUTPATIENT
Start: 2023-12-04 | End: 2023-12-04

## 2023-12-04 RX ADMIN — FINASTERIDE 5 MILLIGRAM(S): 5 TABLET, FILM COATED ORAL at 12:14

## 2023-12-04 RX ADMIN — HYDROMORPHONE HYDROCHLORIDE 1 MILLIGRAM(S): 2 INJECTION INTRAMUSCULAR; INTRAVENOUS; SUBCUTANEOUS at 23:47

## 2023-12-04 RX ADMIN — TRAMADOL HYDROCHLORIDE 25 MILLIGRAM(S): 50 TABLET ORAL at 22:18

## 2023-12-04 RX ADMIN — Medication 650 MILLIGRAM(S): at 22:30

## 2023-12-04 RX ADMIN — Medication 650 MILLIGRAM(S): at 21:27

## 2023-12-04 RX ADMIN — ATORVASTATIN CALCIUM 40 MILLIGRAM(S): 80 TABLET, FILM COATED ORAL at 21:30

## 2023-12-04 RX ADMIN — TAMSULOSIN HYDROCHLORIDE 0.4 MILLIGRAM(S): 0.4 CAPSULE ORAL at 21:30

## 2023-12-04 RX ADMIN — Medication 650 MILLIGRAM(S): at 09:57

## 2023-12-04 RX ADMIN — HEPARIN SODIUM 5000 UNIT(S): 5000 INJECTION INTRAVENOUS; SUBCUTANEOUS at 17:25

## 2023-12-04 RX ADMIN — CEFTRIAXONE 100 MILLIGRAM(S): 500 INJECTION, POWDER, FOR SOLUTION INTRAMUSCULAR; INTRAVENOUS at 17:22

## 2023-12-04 RX ADMIN — Medication 1000 MILLIGRAM(S): at 09:56

## 2023-12-04 RX ADMIN — Medication 100 GRAM(S): at 14:40

## 2023-12-04 RX ADMIN — Medication 175 MICROGRAM(S): at 05:26

## 2023-12-04 RX ADMIN — Medication 40 MILLIGRAM(S): at 05:26

## 2023-12-04 RX ADMIN — Medication 40 MILLIGRAM(S): at 14:42

## 2023-12-04 NOTE — PROGRESS NOTE ADULT - SUBJECTIVE AND OBJECTIVE BOX
[facility] Division of Hospital Medicine  Haile Rainey MD  Available via MS Teams  Pager: [phone]    SUBJECTIVE / OVERNIGHT EVENTS:  -no events overnight, states his LE swelling is largely improving, has no abdominal pain, n/v/sob. States his breathing has largely improved as well. no chest pain. scrotal swelling is improved.     MEDICATIONS  (STANDING):  atorvastatin 40 milliGRAM(s) Oral at bedtime  cabergoline 1 milliGRAM(s) Oral <User Schedule>  cefTRIAXone   IVPB 1000 milliGRAM(s) IV Intermittent every 24 hours  cefTRIAXone   IVPB      finasteride 5 milliGRAM(s) Oral daily  furosemide   Injectable 40 milliGRAM(s) IV Push two times a day  heparin   Injectable 5000 Unit(s) SubCutaneous every 12 hours  levothyroxine 175 MICROGram(s) Oral daily  magnesium sulfate  IVPB 1 Gram(s) IV Intermittent once  tamsulosin 0.4 milliGRAM(s) Oral at bedtime    MEDICATIONS  (PRN):  acetaminophen     Tablet .. 650 milliGRAM(s) Oral every 6 hours PRN Temp greater or equal to 38C (100.4F), Mild Pain (1 - 3)  albuterol/ipratropium for Nebulization 3 milliLiter(s) Nebulizer every 6 hours PRN Shortness of Breath and/or Wheezing  ALPRAZolam 0.25 milliGRAM(s) Oral daily PRN Anxiety  aluminum hydroxide/magnesium hydroxide/simethicone Suspension 30 milliLiter(s) Oral every 4 hours PRN Dyspepsia  melatonin 3 milliGRAM(s) Oral at bedtime PRN Insomnia  ondansetron Injectable 4 milliGRAM(s) IV Push every 8 hours PRN Nausea and/or Vomiting      I&O's Summary    03 Dec 2023 07:01  -  04 Dec 2023 07:00  --------------------------------------------------------  IN: 0 mL / OUT: 1100 mL / NET: -1100 mL        PHYSICAL EXAM:  Vital Signs Last 24 Hrs  T(C): 36.7 (04 Dec 2023 11:57), Max: 36.7 (04 Dec 2023 08:30)  T(F): 98.1 (04 Dec 2023 11:57), Max: 98.1 (04 Dec 2023 11:57)  HR: 78 (04 Dec 2023 11:57) (74 - 97)  BP: 120/78 (04 Dec 2023 11:57) (92/59 - 120/78)  BP(mean): --  RR: 18 (04 Dec 2023 11:57) (14 - 18)  SpO2: 96% (04 Dec 2023 11:57) (95% - 100%)    Parameters below as of 04 Dec 2023 11:57  Patient On (Oxygen Delivery Method): room air      CONSTITUTIONAL: NAD, well-developed, well-groomed  EYES: PERRLA; conjunctiva and sclera clear  ENMT: Moist oral mucosa, no pharyngeal injection or exudates;   NECK: Supple, no palpable masses;   RESPIRATORY: Normal respiratory effort; b/l lower lobe mild crackles  CARDIOVASCULAR: Regular rate and rhythm, normal S1 and S2, no murmur/rub/gallop; R>L lower extremity edema, 2+ pitting.  Peripheral pulses are 2+ bilaterally  ABDOMEN: Nontender to palpation, normoactive bowel sounds, no rebound/guarding;   MUSCULOSKELETAL; no clubbing or cyanosis of digits; no joint swelling or tenderness to palpation  PSYCH: A+O to person, place, and time; affect appropriate  NEUROLOGY: CN 2-12 are intact and symmetric; no gross sensory deficits   SKIN: No rashes; no palpable lesions    LABS:                        11.0   3.09  )-----------( 153      ( 04 Dec 2023 07:58 )             34.3     12-04    144  |  110<H>  |  30<H>  ----------------------------<  74  4.4   |  21<L>  |  2.13<H>    Ca    8.9      04 Dec 2023 07:58  Mg     1.8     12-04    TPro  5.8<L>  /  Alb  3.3  /  TBili  0.6  /  DBili  x   /  AST  14  /  ALT  17  /  AlkPhos  63  12-04    PT/INR - ( 04 Dec 2023 07:58 )   PT: 13.4 sec;   INR: 1.29 ratio         PTT - ( 03 Dec 2023 03:48 )  PTT:32.4 sec      Urinalysis Basic - ( 04 Dec 2023 07:58 )    Color: x / Appearance: x / SG: x / pH: x  Gluc: 74 mg/dL / Ketone: x  / Bili: x / Urobili: x   Blood: x / Protein: x / Nitrite: x   Leuk Esterase: x / RBC: x / WBC x   Sq Epi: x / Non Sq Epi: x / Bacteria: x     [facility] Division of Hospital Medicine  Haile Rainey MD  Available via MS Teams  Pager: [phone]    SUBJECTIVE / OVERNIGHT EVENTS:  -no events overnight, states his LE swelling is largely improving, has no abdominal pain, n/v/sob. States his breathing has largely improved as well. no chest pain. scrotal swelling is improved.     MEDICATIONS  (STANDING):  atorvastatin 40 milliGRAM(s) Oral at bedtime  cabergoline 1 milliGRAM(s) Oral <User Schedule>  cefTRIAXone   IVPB 1000 milliGRAM(s) IV Intermittent every 24 hours  cefTRIAXone   IVPB      finasteride 5 milliGRAM(s) Oral daily  furosemide   Injectable 40 milliGRAM(s) IV Push two times a day  heparin   Injectable 5000 Unit(s) SubCutaneous every 12 hours  levothyroxine 175 MICROGram(s) Oral daily  magnesium sulfate  IVPB 1 Gram(s) IV Intermittent once  tamsulosin 0.4 milliGRAM(s) Oral at bedtime    MEDICATIONS  (PRN):  acetaminophen     Tablet .. 650 milliGRAM(s) Oral every 6 hours PRN Temp greater or equal to 38C (100.4F), Mild Pain (1 - 3)  albuterol/ipratropium for Nebulization 3 milliLiter(s) Nebulizer every 6 hours PRN Shortness of Breath and/or Wheezing  ALPRAZolam 0.25 milliGRAM(s) Oral daily PRN Anxiety  aluminum hydroxide/magnesium hydroxide/simethicone Suspension 30 milliLiter(s) Oral every 4 hours PRN Dyspepsia  melatonin 3 milliGRAM(s) Oral at bedtime PRN Insomnia  ondansetron Injectable 4 milliGRAM(s) IV Push every 8 hours PRN Nausea and/or Vomiting      I&O's Summary    03 Dec 2023 07:01  -  04 Dec 2023 07:00  --------------------------------------------------------  IN: 0 mL / OUT: 1100 mL / NET: -1100 mL        PHYSICAL EXAM:  Vital Signs Last 24 Hrs  T(C): 36.7 (04 Dec 2023 11:57), Max: 36.7 (04 Dec 2023 08:30)  T(F): 98.1 (04 Dec 2023 11:57), Max: 98.1 (04 Dec 2023 11:57)  HR: 78 (04 Dec 2023 11:57) (74 - 97)  BP: 120/78 (04 Dec 2023 11:57) (92/59 - 120/78)  BP(mean): --  RR: 18 (04 Dec 2023 11:57) (14 - 18)  SpO2: 96% (04 Dec 2023 11:57) (95% - 100%)    Parameters below as of 04 Dec 2023 11:57  Patient On (Oxygen Delivery Method): room air      CONSTITUTIONAL: NAD, well-developed, well-groomed  EYES: PERRLA; conjunctiva and sclera clear  ENMT: Moist oral mucosa, no pharyngeal injection or exudates;   NECK: Supple, no palpable masses;   RESPIRATORY: Normal respiratory effort; b/l lower lobe mild crackles  CARDIOVASCULAR: Regular rate and rhythm, normal S1 and S2, no murmur/rub/gallop; R>L lower extremity edema, 2+ pitting.  Peripheral pulses are 2+ bilaterally  ABDOMEN: Nontender to palpation, normoactive bowel sounds, no rebound/guarding;   MUSCULOSKELETAL; no clubbing or cyanosis of digits; no joint swelling or tenderness to palpation  PSYCH: A+O to person, place, and time; affect appropriate  NEUROLOGY: CN 2-12 are intact and symmetric; no gross sensory deficits   SKIN: No rashes; no palpable lesions    LABS:                        11.0   3.09  )-----------( 153      ( 04 Dec 2023 07:58 )             34.3     12-04    144  |  110<H>  |  30<H>  ----------------------------<  74  4.4   |  21<L>  |  2.13<H>    Ca    8.9      04 Dec 2023 07:58  Mg     1.8     12-04    TPro  5.8<L>  /  Alb  3.3  /  TBili  0.6  /  DBili  x   /  AST  14  /  ALT  17  /  AlkPhos  63  12-04    PT/INR - ( 04 Dec 2023 07:58 )   PT: 13.4 sec;   INR: 1.29 ratio         PTT - ( 03 Dec 2023 03:48 )  PTT:32.4 sec      Urinalysis Basic - ( 04 Dec 2023 07:58 )    Color: x / Appearance: x / SG: x / pH: x  Gluc: 74 mg/dL / Ketone: x  / Bili: x / Urobili: x   Blood: x / Protein: x / Nitrite: x   Leuk Esterase: x / RBC: x / WBC x   Sq Epi: x / Non Sq Epi: x / Bacteria: x

## 2023-12-04 NOTE — PATIENT PROFILE ADULT - FALL HARM RISK - HARM RISK INTERVENTIONS
Communicate Risk of Fall with Harm to all staff/Reinforce activity limits and safety measures with patient and family/Tailored Fall Risk Interventions/Visual Cue: Yellow wristband and red socks/Bed in lowest position, wheels locked, appropriate side rails in place/Call bell, personal items and telephone in reach/Instruct patient to call for assistance before getting out of bed or chair/Non-slip footwear when patient is out of bed/[place] to call system/Physically safe environment - no spills, clutter or unnecessary equipment/Purposeful Proactive Rounding/Room/bathroom lighting operational, light cord in reach Communicate Risk of Fall with Harm to all staff/Reinforce activity limits and safety measures with patient and family/Tailored Fall Risk Interventions/Visual Cue: Yellow wristband and red socks/Bed in lowest position, wheels locked, appropriate side rails in place/Call bell, personal items and telephone in reach/Instruct patient to call for assistance before getting out of bed or chair/Non-slip footwear when patient is out of bed/[place] to call system/Physically safe environment - no spills, clutter or unnecessary equipment/Purposeful Proactive Rounding/Room/bathroom lighting operational, light cord in reach

## 2023-12-04 NOTE — CHART NOTE - NSCHARTNOTEFT_GEN_A_CORE
MUSTAPHA CANTU    Notified by RN patient with severe pain in Rt groin area and Tylenols and tramadol with minimal help. Seen at bedside in NAD, still c/o Rt testes area pain. US shows Epididymis. No fever, v/s stable at present.       Interventions taken     C/W current iv ATB  Dilaudid iv for pain  may need  consult if pain persist  cont assess and monitor  f/u with am team.                    Megan Salcedo ANP-BC  Spectralink #[id number] MUSTAPHA CANTU    Notified by RN patient with severe pain in Rt groin area and Tylenols and tramadol with minimal help. Seen at bedside in NAD, still c/o Rt testes area pain. US shows Epididymis. No fever, v/s stable at present.       Interventions taken     C/W current iv ATB  Dilaudid iv for pain  may need  consult if pain persist  cont assess and monitor  f/u with am team.                    Megan Salcedo ANP-BC  Spectralink #[id number]

## 2023-12-04 NOTE — ED ADULT NURSE REASSESSMENT NOTE - NS ED NURSE REASSESS COMMENT FT1
Received awake alert and orientedx4 ON CM Voiding 700cc this am . Pt states " I feel much better." Admitted.

## 2023-12-05 LAB
ALBUMIN SERPL ELPH-MCNC: 3.7 G/DL — SIGNIFICANT CHANGE UP (ref 3.3–5)
ALBUMIN SERPL ELPH-MCNC: 3.7 G/DL — SIGNIFICANT CHANGE UP (ref 3.3–5)
ALP SERPL-CCNC: 68 U/L — SIGNIFICANT CHANGE UP (ref 40–120)
ALP SERPL-CCNC: 68 U/L — SIGNIFICANT CHANGE UP (ref 40–120)
ALT FLD-CCNC: 19 U/L — SIGNIFICANT CHANGE UP (ref 10–45)
ALT FLD-CCNC: 19 U/L — SIGNIFICANT CHANGE UP (ref 10–45)
ANION GAP SERPL CALC-SCNC: 13 MMOL/L — SIGNIFICANT CHANGE UP (ref 5–17)
ANION GAP SERPL CALC-SCNC: 13 MMOL/L — SIGNIFICANT CHANGE UP (ref 5–17)
APPEARANCE UR: CLEAR — SIGNIFICANT CHANGE UP
APPEARANCE UR: CLEAR — SIGNIFICANT CHANGE UP
AST SERPL-CCNC: 19 U/L — SIGNIFICANT CHANGE UP (ref 10–40)
AST SERPL-CCNC: 19 U/L — SIGNIFICANT CHANGE UP (ref 10–40)
BASOPHILS # BLD AUTO: 0.01 K/UL — SIGNIFICANT CHANGE UP (ref 0–0.2)
BASOPHILS # BLD AUTO: 0.01 K/UL — SIGNIFICANT CHANGE UP (ref 0–0.2)
BASOPHILS NFR BLD AUTO: 0.2 % — SIGNIFICANT CHANGE UP (ref 0–2)
BASOPHILS NFR BLD AUTO: 0.2 % — SIGNIFICANT CHANGE UP (ref 0–2)
BILIRUB SERPL-MCNC: 0.5 MG/DL — SIGNIFICANT CHANGE UP (ref 0.2–1.2)
BILIRUB SERPL-MCNC: 0.5 MG/DL — SIGNIFICANT CHANGE UP (ref 0.2–1.2)
BILIRUB UR-MCNC: NEGATIVE — SIGNIFICANT CHANGE UP
BILIRUB UR-MCNC: NEGATIVE — SIGNIFICANT CHANGE UP
BUN SERPL-MCNC: 36 MG/DL — HIGH (ref 7–23)
BUN SERPL-MCNC: 36 MG/DL — HIGH (ref 7–23)
CALCIUM SERPL-MCNC: 8.9 MG/DL — SIGNIFICANT CHANGE UP (ref 8.4–10.5)
CALCIUM SERPL-MCNC: 8.9 MG/DL — SIGNIFICANT CHANGE UP (ref 8.4–10.5)
CHLORIDE SERPL-SCNC: 105 MMOL/L — SIGNIFICANT CHANGE UP (ref 96–108)
CHLORIDE SERPL-SCNC: 105 MMOL/L — SIGNIFICANT CHANGE UP (ref 96–108)
CO2 SERPL-SCNC: 22 MMOL/L — SIGNIFICANT CHANGE UP (ref 22–31)
CO2 SERPL-SCNC: 22 MMOL/L — SIGNIFICANT CHANGE UP (ref 22–31)
COLOR SPEC: YELLOW — SIGNIFICANT CHANGE UP
COLOR SPEC: YELLOW — SIGNIFICANT CHANGE UP
CREAT ?TM UR-MCNC: 125 MG/DL — SIGNIFICANT CHANGE UP
CREAT ?TM UR-MCNC: 125 MG/DL — SIGNIFICANT CHANGE UP
CREAT SERPL-MCNC: 2.46 MG/DL — HIGH (ref 0.5–1.3)
CREAT SERPL-MCNC: 2.46 MG/DL — HIGH (ref 0.5–1.3)
DIFF PNL FLD: NEGATIVE — SIGNIFICANT CHANGE UP
DIFF PNL FLD: NEGATIVE — SIGNIFICANT CHANGE UP
EGFR: 27 ML/MIN/1.73M2 — LOW
EGFR: 27 ML/MIN/1.73M2 — LOW
EOSINOPHIL # BLD AUTO: 0.02 K/UL — SIGNIFICANT CHANGE UP (ref 0–0.5)
EOSINOPHIL # BLD AUTO: 0.02 K/UL — SIGNIFICANT CHANGE UP (ref 0–0.5)
EOSINOPHIL NFR BLD AUTO: 0.4 % — SIGNIFICANT CHANGE UP (ref 0–6)
EOSINOPHIL NFR BLD AUTO: 0.4 % — SIGNIFICANT CHANGE UP (ref 0–6)
GLUCOSE SERPL-MCNC: 111 MG/DL — HIGH (ref 70–99)
GLUCOSE SERPL-MCNC: 111 MG/DL — HIGH (ref 70–99)
GLUCOSE UR QL: NEGATIVE MG/DL — SIGNIFICANT CHANGE UP
GLUCOSE UR QL: NEGATIVE MG/DL — SIGNIFICANT CHANGE UP
HCT VFR BLD CALC: 35 % — LOW (ref 39–50)
HCT VFR BLD CALC: 35 % — LOW (ref 39–50)
HGB BLD-MCNC: 11.2 G/DL — LOW (ref 13–17)
HGB BLD-MCNC: 11.2 G/DL — LOW (ref 13–17)
IMM GRANULOCYTES NFR BLD AUTO: 0.4 % — SIGNIFICANT CHANGE UP (ref 0–0.9)
IMM GRANULOCYTES NFR BLD AUTO: 0.4 % — SIGNIFICANT CHANGE UP (ref 0–0.9)
KETONES UR-MCNC: NEGATIVE MG/DL — SIGNIFICANT CHANGE UP
KETONES UR-MCNC: NEGATIVE MG/DL — SIGNIFICANT CHANGE UP
LEUKOCYTE ESTERASE UR-ACNC: NEGATIVE — SIGNIFICANT CHANGE UP
LEUKOCYTE ESTERASE UR-ACNC: NEGATIVE — SIGNIFICANT CHANGE UP
LYMPHOCYTES # BLD AUTO: 0.52 K/UL — LOW (ref 1–3.3)
LYMPHOCYTES # BLD AUTO: 0.52 K/UL — LOW (ref 1–3.3)
LYMPHOCYTES # BLD AUTO: 11 % — LOW (ref 13–44)
LYMPHOCYTES # BLD AUTO: 11 % — LOW (ref 13–44)
MAGNESIUM SERPL-MCNC: 2 MG/DL — SIGNIFICANT CHANGE UP (ref 1.6–2.6)
MAGNESIUM SERPL-MCNC: 2 MG/DL — SIGNIFICANT CHANGE UP (ref 1.6–2.6)
MCHC RBC-ENTMCNC: 29.7 PG — SIGNIFICANT CHANGE UP (ref 27–34)
MCHC RBC-ENTMCNC: 29.7 PG — SIGNIFICANT CHANGE UP (ref 27–34)
MCHC RBC-ENTMCNC: 32 GM/DL — SIGNIFICANT CHANGE UP (ref 32–36)
MCHC RBC-ENTMCNC: 32 GM/DL — SIGNIFICANT CHANGE UP (ref 32–36)
MCV RBC AUTO: 92.8 FL — SIGNIFICANT CHANGE UP (ref 80–100)
MCV RBC AUTO: 92.8 FL — SIGNIFICANT CHANGE UP (ref 80–100)
MONOCYTES # BLD AUTO: 0.44 K/UL — SIGNIFICANT CHANGE UP (ref 0–0.9)
MONOCYTES # BLD AUTO: 0.44 K/UL — SIGNIFICANT CHANGE UP (ref 0–0.9)
MONOCYTES NFR BLD AUTO: 9.3 % — SIGNIFICANT CHANGE UP (ref 2–14)
MONOCYTES NFR BLD AUTO: 9.3 % — SIGNIFICANT CHANGE UP (ref 2–14)
NEUTROPHILS # BLD AUTO: 3.7 K/UL — SIGNIFICANT CHANGE UP (ref 1.8–7.4)
NEUTROPHILS # BLD AUTO: 3.7 K/UL — SIGNIFICANT CHANGE UP (ref 1.8–7.4)
NEUTROPHILS NFR BLD AUTO: 78.7 % — HIGH (ref 43–77)
NEUTROPHILS NFR BLD AUTO: 78.7 % — HIGH (ref 43–77)
NITRITE UR-MCNC: NEGATIVE — SIGNIFICANT CHANGE UP
NITRITE UR-MCNC: NEGATIVE — SIGNIFICANT CHANGE UP
NRBC # BLD: 0 /100 WBCS — SIGNIFICANT CHANGE UP (ref 0–0)
NRBC # BLD: 0 /100 WBCS — SIGNIFICANT CHANGE UP (ref 0–0)
OSMOLALITY UR: 413 MOS/KG — SIGNIFICANT CHANGE UP (ref 300–900)
OSMOLALITY UR: 413 MOS/KG — SIGNIFICANT CHANGE UP (ref 300–900)
PH UR: 5.5 — SIGNIFICANT CHANGE UP (ref 5–8)
PH UR: 5.5 — SIGNIFICANT CHANGE UP (ref 5–8)
PHOSPHATE SERPL-MCNC: 4 MG/DL — SIGNIFICANT CHANGE UP (ref 2.5–4.5)
PHOSPHATE SERPL-MCNC: 4 MG/DL — SIGNIFICANT CHANGE UP (ref 2.5–4.5)
PLATELET # BLD AUTO: 161 K/UL — SIGNIFICANT CHANGE UP (ref 150–400)
PLATELET # BLD AUTO: 161 K/UL — SIGNIFICANT CHANGE UP (ref 150–400)
POTASSIUM SERPL-MCNC: 4.7 MMOL/L — SIGNIFICANT CHANGE UP (ref 3.5–5.3)
POTASSIUM SERPL-MCNC: 4.7 MMOL/L — SIGNIFICANT CHANGE UP (ref 3.5–5.3)
POTASSIUM SERPL-SCNC: 4.7 MMOL/L — SIGNIFICANT CHANGE UP (ref 3.5–5.3)
POTASSIUM SERPL-SCNC: 4.7 MMOL/L — SIGNIFICANT CHANGE UP (ref 3.5–5.3)
POTASSIUM UR-SCNC: 44 MMOL/L — SIGNIFICANT CHANGE UP
POTASSIUM UR-SCNC: 44 MMOL/L — SIGNIFICANT CHANGE UP
PROT ?TM UR-MCNC: 15 MG/DL — HIGH (ref 0–12)
PROT ?TM UR-MCNC: 15 MG/DL — HIGH (ref 0–12)
PROT SERPL-MCNC: 6 G/DL — SIGNIFICANT CHANGE UP (ref 6–8.3)
PROT SERPL-MCNC: 6 G/DL — SIGNIFICANT CHANGE UP (ref 6–8.3)
PROT UR-MCNC: NEGATIVE MG/DL — SIGNIFICANT CHANGE UP
PROT UR-MCNC: NEGATIVE MG/DL — SIGNIFICANT CHANGE UP
PROT/CREAT UR-RTO: 0.1 RATIO — SIGNIFICANT CHANGE UP (ref 0–0.2)
PROT/CREAT UR-RTO: 0.1 RATIO — SIGNIFICANT CHANGE UP (ref 0–0.2)
RBC # BLD: 3.77 M/UL — LOW (ref 4.2–5.8)
RBC # BLD: 3.77 M/UL — LOW (ref 4.2–5.8)
RBC # FLD: 15.9 % — HIGH (ref 10.3–14.5)
RBC # FLD: 15.9 % — HIGH (ref 10.3–14.5)
SODIUM SERPL-SCNC: 140 MMOL/L — SIGNIFICANT CHANGE UP (ref 135–145)
SODIUM SERPL-SCNC: 140 MMOL/L — SIGNIFICANT CHANGE UP (ref 135–145)
SODIUM UR-SCNC: 52 MMOL/L — SIGNIFICANT CHANGE UP
SODIUM UR-SCNC: 52 MMOL/L — SIGNIFICANT CHANGE UP
SP GR SPEC: 1.01 — SIGNIFICANT CHANGE UP (ref 1–1.03)
SP GR SPEC: 1.01 — SIGNIFICANT CHANGE UP (ref 1–1.03)
UROBILINOGEN FLD QL: 0.2 MG/DL — SIGNIFICANT CHANGE UP (ref 0.2–1)
UROBILINOGEN FLD QL: 0.2 MG/DL — SIGNIFICANT CHANGE UP (ref 0.2–1)
WBC # BLD: 4.71 K/UL — SIGNIFICANT CHANGE UP (ref 3.8–10.5)
WBC # BLD: 4.71 K/UL — SIGNIFICANT CHANGE UP (ref 3.8–10.5)
WBC # FLD AUTO: 4.71 K/UL — SIGNIFICANT CHANGE UP (ref 3.8–10.5)
WBC # FLD AUTO: 4.71 K/UL — SIGNIFICANT CHANGE UP (ref 3.8–10.5)

## 2023-12-05 PROCEDURE — 99233 SBSQ HOSP IP/OBS HIGH 50: CPT

## 2023-12-05 PROCEDURE — 99223 1ST HOSP IP/OBS HIGH 75: CPT

## 2023-12-05 RX ORDER — CHLORHEXIDINE GLUCONATE 213 G/1000ML
1 SOLUTION TOPICAL
Refills: 0 | Status: DISCONTINUED | OUTPATIENT
Start: 2023-12-05 | End: 2023-12-07

## 2023-12-05 RX ORDER — ACETAMINOPHEN 500 MG
1000 TABLET ORAL ONCE
Refills: 0 | Status: COMPLETED | OUTPATIENT
Start: 2023-12-05 | End: 2023-12-05

## 2023-12-05 RX ADMIN — TAMSULOSIN HYDROCHLORIDE 0.4 MILLIGRAM(S): 0.4 CAPSULE ORAL at 21:23

## 2023-12-05 RX ADMIN — CEFTRIAXONE 100 MILLIGRAM(S): 500 INJECTION, POWDER, FOR SOLUTION INTRAMUSCULAR; INTRAVENOUS at 17:46

## 2023-12-05 RX ADMIN — Medication 175 MICROGRAM(S): at 05:58

## 2023-12-05 RX ADMIN — Medication 400 MILLIGRAM(S): at 21:23

## 2023-12-05 RX ADMIN — HYDROMORPHONE HYDROCHLORIDE 1 MILLIGRAM(S): 2 INJECTION INTRAMUSCULAR; INTRAVENOUS; SUBCUTANEOUS at 00:15

## 2023-12-05 RX ADMIN — Medication 1000 MILLIGRAM(S): at 21:38

## 2023-12-05 RX ADMIN — ATORVASTATIN CALCIUM 40 MILLIGRAM(S): 80 TABLET, FILM COATED ORAL at 21:23

## 2023-12-05 RX ADMIN — ONDANSETRON 4 MILLIGRAM(S): 8 TABLET, FILM COATED ORAL at 05:58

## 2023-12-05 RX ADMIN — FINASTERIDE 5 MILLIGRAM(S): 5 TABLET, FILM COATED ORAL at 11:32

## 2023-12-05 RX ADMIN — HEPARIN SODIUM 5000 UNIT(S): 5000 INJECTION INTRAVENOUS; SUBCUTANEOUS at 05:58

## 2023-12-05 RX ADMIN — CHLORHEXIDINE GLUCONATE 1 APPLICATION(S): 213 SOLUTION TOPICAL at 17:58

## 2023-12-05 RX ADMIN — Medication 40 MILLIGRAM(S): at 05:58

## 2023-12-05 RX ADMIN — TRAMADOL HYDROCHLORIDE 25 MILLIGRAM(S): 50 TABLET ORAL at 00:15

## 2023-12-05 NOTE — OCCUPATIONAL THERAPY INITIAL EVALUATION ADULT - PERTINENT HX OF CURRENT PROBLEM, REHAB EVAL
68-year-old male past medical history of HFrEF,  Cardiac Amyloidosis, Cardiomyopathy, Neuropathy, Carotid stenosis -> s/p stents, Hypothyroidism, Pituitary Adenoma, Hyperprolactinemia,  BPH, HTN, laryngeal cancer s/p radiation presents  for BILLY with increased leg and scrotal swelling for 4 days and no orthopnea.  Patient states symptoms similar to prior CHF exacerbation.   NO orthospnea, no PND and no chest pain , no cough.  Patient noted swelling with increased pain on the scrotum about 5 days ago and his Urologist ( started him on ABX as he states ) .  NO fever , no chest pain, no palpitation, no change in diet, no change in medication and he states to be compliant with his medications Endorses symptoms of scrotal swelling, dysuria, extremity tenderness. US Testicles: No evidence of testicular torsion. Moderate right hydrocele with associated prominence of the right epididymis which may be related to epididymitis. Questionable 1 cm hypoechoic right epididymal lesion. Consider follow-up ultrasound in 4-6 weeks.VA Duplex LE (-) X Ray Chest (-)

## 2023-12-05 NOTE — OCCUPATIONAL THERAPY INITIAL EVALUATION ADULT - LIVES WITH, PROFILE
Pt lives in a house with his spouse +4STE +17 steps to 2nd floor. Pt owns chair lift but it is currently out of order. Pt owns a walker, cane and scooter. Pt was independent PTA./spouse

## 2023-12-05 NOTE — PHYSICAL THERAPY INITIAL EVALUATION ADULT - ADDITIONAL COMMENTS
Pt lives in a house with his spouse +4STE +17 steps to 2nd floor. Pt owns chair lift but it is currently out of order. Pt  amb indep w/o AD. Pt was independent PTA.

## 2023-12-05 NOTE — DIETITIAN INITIAL EVALUATION ADULT - ORAL INTAKE PTA/DIET HISTORY
Pt reports good appetite/PO intake PTA. States his wife prepares his food without the use of salt, uses Mrs. PATEL for seasoning. Eats out at restaurants from time to time.  Confirms NKFA.

## 2023-12-05 NOTE — DIETITIAN INITIAL EVALUATION ADULT - PERTINENT LABORATORY DATA
12-05    140  |  105  |  36<H>  ----------------------------<  111<H>  4.7   |  22  |  2.46<H>    Ca    8.9      05 Dec 2023 07:01  Phos  4.0     12-05  Mg     2.0     12-05    TPro  6.0  /  Alb  3.7  /  TBili  0.5  /  DBili  x   /  AST  19  /  ALT  19  /  AlkPhos  68  12-05    A1C with Estimated Average Glucose Result: 5.8 % (12-04-23 @ 07:58)  A1C with Estimated Average Glucose Result: 6.1 % (03-19-23 @ 11:46)

## 2023-12-05 NOTE — DIETITIAN INITIAL EVALUATION ADULT - PROBLEM SELECTOR PLAN 1
Last EF was 25 % in 3/2025   Pt follows up with ARUNA Berrios and Dr Yi   Received Lasix in the ED   Cont with Lasix 40mg BID, monitor response and electrolytes   HOLD ENtresto in setting of Hypotension and ZEESHAN  No other GDMT   cardio consult   TTE is  ordered   cont Tele monitor   MOnitor weight , intake, Output and electrolytes  Delta troponin is stable,, but will get one more troponin

## 2023-12-05 NOTE — DIETITIAN INITIAL EVALUATION ADULT - REASON
Nutrition-focused physical examination deferred at this time - pt with stable dry wt hx, reporting good PO intake in-house and PTA. No overt signs of fat/muscle wasting visually observed.

## 2023-12-05 NOTE — DIETITIAN INITIAL EVALUATION ADULT - OTHER INFO
Reports  lbs. Endorses recent wt gain due to fluid retention.  Dosing wt: 220 lbs (12-03, stated)  Wt history per chart: 227 lbs (12-05, standing), 232 lbs (05-12), 230 lbs (02-24). Wt fluctuations likely in setting of fluid shifts, hx HF. RD to continue to monitor weight trends as able/available.     Additional nutrition-related concerns:  - a/w acute on chronic HF  - ZEESHAN likely in setting of HF exacerbation   - ordered for PO synthroid

## 2023-12-05 NOTE — DIETITIAN INITIAL EVALUATION ADULT - NSFNSGIIOFT_GEN_A_CORE
Reports nausea with vomiting this AM after getting pain medicine; Denies constipation, diarrhea. Reports last BM 12/4. Pt not currently ordered for bowel regimen.

## 2023-12-05 NOTE — DIETITIAN INITIAL EVALUATION ADULT - REASON INDICATOR FOR ASSESSMENT
RD consult for assessment, education, and "Decompensated heart failure, salt and fluid restriction."  Source: pt, medical record. Chart reviewed, events noted.

## 2023-12-05 NOTE — DIETITIAN INITIAL EVALUATION ADULT - NS FNS DIET ORDER
Diet, DASH/TLC:   Sodium & Cholesterol Restricted  1500mL Fluid Restriction ([id number]) (12-03-23 @ 16:49) [Active] Diet, DASH/TLC:   Sodium & Cholesterol Restricted  1500mL Fluid Restriction ([id number]) (12-03-23 @ 16:49) [Active]

## 2023-12-05 NOTE — DIETITIAN INITIAL EVALUATION ADULT - PERSON TAUGHT/METHOD
Reviewed CHF diet education. Discussed Na restriction, foods high in Na to avoid, reading food labels, tips for limiting Na in your diet, fluid restriction. Reviewed daily weights, Wt gain parameters to contact MD. Discussed Na intake in relation to fluid retention, edema and Wt gain. Pt verbalized understanding and made aware RD remains available for diet education review./verbal instruction/patient instructed

## 2023-12-05 NOTE — DIETITIAN INITIAL EVALUATION ADULT - ADD RECOMMEND
1) Continue DASH diet, fluid restriction per team.   2) Monitor PO intake, GI tolerance, skin integrity, labs, weight, and bowel movement regularity.   3) Honor food preferences as feasible. Assist with meals PRN and encourage PO intake.  4) RD remains available upon request and will follow-up per protocol.

## 2023-12-05 NOTE — DIETITIAN INITIAL EVALUATION ADULT - REASON FOR ADMISSION
Heart failure    Per chart, pt is a 68-year-old male past medical history of HFrEF,  Cardiac Amyloidosis, Cardiomyopathy, Neuropathy, Carotid stenosis -> s/p stents, Hypothyroidism, Pituitary Adenoma, Hyperprolactinemia, BPH, HTN, laryngeal cancer s/p radiation presents  for BILLY with increased leg and scrotal swelling for 4 days and no orthopnea. Patient is found to have evidence of Acute on chronic heart failure and admitted to the hospital for further evaluation and treatment.

## 2023-12-05 NOTE — DIETITIAN INITIAL EVALUATION ADULT - NSFNSNUTRHOMESUPPLEMENTFT_GEN_A_CORE
Pt reports use of a probiotic supplement PTA. Denies use of any additional nutrition/dietary supplements PTA.

## 2023-12-05 NOTE — DIETITIAN INITIAL EVALUATION ADULT - PERTINENT MEDS FT
MEDICATIONS  (STANDING):  atorvastatin 40 milliGRAM(s) Oral at bedtime  cabergoline 1 milliGRAM(s) Oral <User Schedule>  cefTRIAXone   IVPB      cefTRIAXone   IVPB 1000 milliGRAM(s) IV Intermittent every 24 hours  finasteride 5 milliGRAM(s) Oral daily  heparin   Injectable 5000 Unit(s) SubCutaneous every 12 hours  levothyroxine 175 MICROGram(s) Oral daily  tamsulosin 0.4 milliGRAM(s) Oral at bedtime    MEDICATIONS  (PRN):  acetaminophen     Tablet .. 650 milliGRAM(s) Oral every 6 hours PRN Temp greater or equal to 38C (100.4F), Mild Pain (1 - 3)  albuterol/ipratropium for Nebulization 3 milliLiter(s) Nebulizer every 6 hours PRN Shortness of Breath and/or Wheezing  ALPRAZolam 0.25 milliGRAM(s) Oral daily PRN Anxiety  aluminum hydroxide/magnesium hydroxide/simethicone Suspension 30 milliLiter(s) Oral every 4 hours PRN Dyspepsia  melatonin 3 milliGRAM(s) Oral at bedtime PRN Insomnia  ondansetron Injectable 4 milliGRAM(s) IV Push every 8 hours PRN Nausea and/or Vomiting

## 2023-12-05 NOTE — PROGRESS NOTE ADULT - SUBJECTIVE AND OBJECTIVE BOX
[facility] Division of Hospital Medicine  Haile Rainey MD  Available via MS Teams  Pager: [phone]    SUBJECTIVE / OVERNIGHT EVENTS:  - no events overnight, states his scrotal and lower extremity edema is improving. denies any shortness of breath, cough, chest pain, nausea, vomiting, abdominal pain, diarrhea, constipation.     MEDICATIONS  (STANDING):  atorvastatin 40 milliGRAM(s) Oral at bedtime  cabergoline 1 milliGRAM(s) Oral <User Schedule>  cefTRIAXone   IVPB      cefTRIAXone   IVPB 1000 milliGRAM(s) IV Intermittent every 24 hours  finasteride 5 milliGRAM(s) Oral daily  heparin   Injectable 5000 Unit(s) SubCutaneous every 12 hours  levothyroxine 175 MICROGram(s) Oral daily  tamsulosin 0.4 milliGRAM(s) Oral at bedtime    MEDICATIONS  (PRN):  acetaminophen     Tablet .. 650 milliGRAM(s) Oral every 6 hours PRN Temp greater or equal to 38C (100.4F), Mild Pain (1 - 3)  albuterol/ipratropium for Nebulization 3 milliLiter(s) Nebulizer every 6 hours PRN Shortness of Breath and/or Wheezing  ALPRAZolam 0.25 milliGRAM(s) Oral daily PRN Anxiety  aluminum hydroxide/magnesium hydroxide/simethicone Suspension 30 milliLiter(s) Oral every 4 hours PRN Dyspepsia  melatonin 3 milliGRAM(s) Oral at bedtime PRN Insomnia  ondansetron Injectable 4 milliGRAM(s) IV Push every 8 hours PRN Nausea and/or Vomiting      I&O's Summary    04 Dec 2023 07:01  -  05 Dec 2023 07:00  --------------------------------------------------------  IN: 360 mL / OUT: 625 mL / NET: -265 mL        PHYSICAL EXAM:  Vital Signs Last 24 Hrs  T(C): 36.5 (05 Dec 2023 11:20), Max: 36.7 (04 Dec 2023 11:57)  T(F): 97.7 (05 Dec 2023 11:20), Max: 98.1 (04 Dec 2023 11:57)  HR: 66 (05 Dec 2023 11:20) (66 - 88)  BP: 124/79 (05 Dec 2023 11:20) (101/64 - 124/79)  BP(mean): --  RR: 18 (05 Dec 2023 11:20) (18 - 18)  SpO2: 98% (05 Dec 2023 11:20) (91% - 98%)    Parameters below as of 05 Dec 2023 11:20  Patient On (Oxygen Delivery Method): room air      CONSTITUTIONAL: NAD, well-developed,   EYES: PERRLA; conjunctiva and sclera clear  ENMT: Moist oral mucosa, no pharyngeal injection or exudates;   NECK: Supple, no palpable masses;   RESPIRATORY: Normal respiratory effort; lungs clear to auscultation bilaterally, no crackles, wheezing, rhonchi   CARDIOVASCULAR: Regular rate and rhythm, normal S1 and S2, no murmur/rub/gallop; R>L lower extremity edema although improved form prior, 1+ pitting.  Peripheral pulses are 2+ bilaterally  ABDOMEN: Nontender to palpation, normoactive bowel sounds, no rebound/guarding;   MUSCULOSKELETAL; no clubbing or cyanosis of digits; no joint swelling or tenderness to palpation  PSYCH: A+O to person, place, and time; affect appropriate  NEUROLOGY: CN 2-12 are intact and symmetric; no gross sensory deficits   SKIN: No rashes; no palpable lesions    LABS:                        11.2   4.71  )-----------( 161      ( 05 Dec 2023 07:04 )             35.0     12-05    140  |  105  |  36<H>  ----------------------------<  111<H>  4.7   |  22  |  2.46<H>    Ca    8.9      05 Dec 2023 07:01  Phos  4.0     12-05  Mg     2.0     12-05    TPro  6.0  /  Alb  3.7  /  TBili  0.5  /  DBili  x   /  AST  19  /  ALT  19  /  AlkPhos  68  12-05    PT/INR - ( 04 Dec 2023 07:58 )   PT: 13.4 sec;   INR: 1.29 ratio               Urinalysis Basic - ( 05 Dec 2023 07:01 )    Color: x / Appearance: x / SG: x / pH: x  Gluc: 111 mg/dL / Ketone: x  / Bili: x / Urobili: x   Blood: x / Protein: x / Nitrite: x   Leuk Esterase: x / RBC: x / WBC x   Sq Epi: x / Non Sq Epi: x / Bacteria: x [facility] Division of Hospital Medicine  Haile Rainey MD  Available via MS Teams  Pager: [phone]    SUBJECTIVE / OVERNIGHT EVENTS:  - no events overnight, states his scrotal and lower extremity edema is improving. denies any shortness of breath, cough, chest pain, nausea, vomiting, abdominal pain, diarrhea, constipation.     MEDICATIONS  (STANDING):  atorvastatin 40 milliGRAM(s) Oral at bedtime  cabergoline 1 milliGRAM(s) Oral <User Schedule>  cefTRIAXone   IVPB      cefTRIAXone   IVPB 1000 milliGRAM(s) IV Intermittent every 24 hours  finasteride 5 milliGRAM(s) Oral daily  heparin   Injectable 5000 Unit(s) SubCutaneous every 12 hours  levothyroxine 175 MICROGram(s) Oral daily  tamsulosin 0.4 milliGRAM(s) Oral at bedtime    MEDICATIONS  (PRN):  acetaminophen     Tablet .. 650 milliGRAM(s) Oral every 6 hours PRN Temp greater or equal to 38C (100.4F), Mild Pain (1 - 3)  albuterol/ipratropium for Nebulization 3 milliLiter(s) Nebulizer every 6 hours PRN Shortness of Breath and/or Wheezing  ALPRAZolam 0.25 milliGRAM(s) Oral daily PRN Anxiety  aluminum hydroxide/magnesium hydroxide/simethicone Suspension 30 milliLiter(s) Oral every 4 hours PRN Dyspepsia  melatonin 3 milliGRAM(s) Oral at bedtime PRN Insomnia  ondansetron Injectable 4 milliGRAM(s) IV Push every 8 hours PRN Nausea and/or Vomiting      I&O's Summary    04 Dec 2023 07:01  -  05 Dec 2023 07:00  --------------------------------------------------------  IN: 360 mL / OUT: 625 mL / NET: -265 mL        PHYSICAL EXAM:  Vital Signs Last 24 Hrs  T(C): 36.5 (05 Dec 2023 11:20), Max: 36.7 (04 Dec 2023 11:57)  T(F): 97.7 (05 Dec 2023 11:20), Max: 98.1 (04 Dec 2023 11:57)  HR: 66 (05 Dec 2023 11:20) (66 - 88)  BP: 124/79 (05 Dec 2023 11:20) (101/64 - 124/79)  BP(mean): --  RR: 18 (05 Dec 2023 11:20) (18 - 18)  SpO2: 98% (05 Dec 2023 11:20) (91% - 98%)    Parameters below as of 05 Dec 2023 11:20  Patient On (Oxygen Delivery Method): room air      CONSTITUTIONAL: NAD, well-developed,   EYES: PERRLA; conjunctiva and sclera clear  ENMT: Moist oral mucosa, no pharyngeal injection or exudates;   NECK: Supple, no palpable masses;   RESPIRATORY: Normal respiratory effort; lungs clear to auscultation bilaterally, no crackles, wheezing, rhonchi   CARDIOVASCULAR: Regular rate and rhythm, normal S1 and S2, no murmur/rub/gallop; R>L lower extremity edema although improved form prior, 1+ pitting.  Peripheral pulses are 2+ bilaterally  ABDOMEN: Nontender to palpation, normoactive bowel sounds, no rebound/guarding;   MUSCULOSKELETAL; no clubbing or cyanosis of digits; no joint swelling or tenderness to palpation  PSYCH: A+O to person, place, and time; affect appropriate  NEUROLOGY: CN 2-12 are intact and symmetric; no gross sensory deficits   SKIN: No rashes; no palpable lesions    LABS:                        11.2   4.71  )-----------( 161      ( 05 Dec 2023 07:04 )             35.0     12-05    140  |  105  |  36<H>  ----------------------------<  111<H>  4.7   |  22  |  2.46<H>    Ca    8.9      05 Dec 2023 07:01  Phos  4.0     12-05  Mg     2.0     12-05    TPro  6.0  /  Alb  3.7  /  TBili  0.5  /  DBili  x   /  AST  19  /  ALT  19  /  AlkPhos  68  12-05    PT/INR - ( 04 Dec 2023 07:58 )   PT: 13.4 sec;   INR: 1.29 ratio               Urinalysis Basic - ( 05 Dec 2023 07:01 )    Color: x / Appearance: x / SG: x / pH: x  Gluc: 111 mg/dL / Ketone: x  / Bili: x / Urobili: x   Blood: x / Protein: x / Nitrite: x   Leuk Esterase: x / RBC: x / WBC x   Sq Epi: x / Non Sq Epi: x / Bacteria: x

## 2023-12-05 NOTE — CONSULT NOTE ADULT - SUBJECTIVE AND OBJECTIVE BOX
Patient seen and evaluated @ 9 am on 6 [place]  Chief Complaint: shortness of breath and lower extremity edema (with scrotal edema)    HPI:      68-year-old male past medical history of HFrEF,  Cardiac Amyloidosis, Cardiomyopathy, Neuropathy, Carotid stenosis -> s/p stents, Hypothyroidism, Pituitary Adenoma, Hyperprolactinemia,  BPH, HTN, laryngeal cancer s/p radiation presents  for BILLY with increased leg and scrotal swelling for 4 days and no orthopnea.  Patient states symptoms similar to prior CHF exacerbation.   NO orthospnea, no PND and no chest pain , no cough.  Patient noted swelling with increased pain on the scrotum about 5 days ago and his Urologist ( started him on ABX as he states ) .  NO fever , no chest pain, no palpitation, no change in diet, no change in medication and he states to be compliant with his medications Endorses symptoms of scrotal swelling, dysuria, extremity tenderness.  Denies fever, chills, nausea, vomiting, chest pain, SOB, calf tenderness, abdominal pain, urinary symptoms, no penile DC , no trauma.   No prior STIs and has been  for 17 years.    In the ED : patient received Lasix 40mg IV X1 and IV Tylenol CX 1    (03 Dec 2023 15:36)    PMH:   Low back pain    Redundant prepuce and phimosis    Arthritis    Hypothyroid    Hypertension    Thyroid cancer    Throat cancer    History of pituitary disease    Gout    S/P excision of vocal cord nodule    Laryngeal cancer    Pituitary mass    Carotid occlusion, bilateral    Obese      PSH:   S/P excision of vocal cord nodule    H/O shoulder surgery    H/O thyroidectomy    History of back surgery    History of lumbar spinal fusion    H/O arthroscopy of shoulder    Status post carotid surgery      Medications:   acetaminophen     Tablet .. 650 milliGRAM(s) Oral every 6 hours PRN  albuterol/ipratropium for Nebulization 3 milliLiter(s) Nebulizer every 6 hours PRN  ALPRAZolam 0.25 milliGRAM(s) Oral daily PRN  aluminum hydroxide/magnesium hydroxide/simethicone Suspension 30 milliLiter(s) Oral every 4 hours PRN  atorvastatin 40 milliGRAM(s) Oral at bedtime  cabergoline 1 milliGRAM(s) Oral <User Schedule>  cefTRIAXone   IVPB 1000 milliGRAM(s) IV Intermittent every 24 hours  cefTRIAXone   IVPB      chlorhexidine 2% Cloths 1 Application(s) Topical <User Schedule>  finasteride 5 milliGRAM(s) Oral daily  heparin   Injectable 5000 Unit(s) SubCutaneous every 12 hours  levothyroxine 175 MICROGram(s) Oral daily  melatonin 3 milliGRAM(s) Oral at bedtime PRN  ondansetron Injectable 4 milliGRAM(s) IV Push every 8 hours PRN  tamsulosin 0.4 milliGRAM(s) Oral at bedtime    Allergies:  No Known Allergies    FAMILY HISTORY:  Family history of stroke (Mother)    Family history of Alzheimer's disease (Father)    FH: stroke (Mother, Father)    Social History:  Smoking:  Alcohol:  Drugs:    Review of Systems:    Constitutional: No fever, chills, fatigue, or changes in weight  HEENT: No blurry vision, eye pain, headache, runny nose, or sore throat  Respiratory: No shortness of breath, cough, or wheezing  Cardiovascular: No chest pain or palpitations  Gastrointestinal: No nausea, vomiting, diarrhea, or abdominal pain  Genitourinary: No dysuria or incontinence  Extremities: No lower extremity swelling  Neurologic: No focal findings  Lymphatic: No lymphadenopathy   Skin: No rash  Psychiatry: No anxiety or depression  10 point review of systems is otherwise negative except as mentioned above            Physical Exam:  T(C): 36.5 (23 @ 11:20), Max: 36.6 (23 @ 21:39)  HR: 74 (23 @ 16:42) (66 - 88)  BP: 118/81 (23 @ 16:42) (101/64 - 129/90)  RR: 18 (23 @ 11:20) (18 - 18)  SpO2: 98% (23 @ 11:47) (91% - 98%)  Wt(kg): --     @ 07:  -   @ 07:00  --------------------------------------------------------  IN: 360 mL / OUT: 625 mL / NET: -265 mL     @ 07:01  -   @ 19:32  --------------------------------------------------------  IN: 480 mL / OUT: 850 mL / NET: -370 mL      Daily     Daily Weight in k (05 Dec 2023 08:00)    Appearance: Normal, well groomed, NAD  Eyes: PERRLA, EOMI, pink conjunctiva, no scleral icterus   HENT: Normal oral mucosa  Cardiovascular: RRR, S1, S2, no murmur, rub, or gallop; no edema; no JVD  Procedural Access Site: Clean, dry, intact, without hematoma  Respiratory: Clear to auscultation bilaterally  Gastrointestinal: Soft, non-tender, non-distended, BS+  Musculoskeletal: No clubbing or joint deformity   Neurologic: No focal weakness  Lymphatic: No lymphadenopathy  Psychiatry: AAOx3 with appropriate mood and affect  Skin: No rashes, ecchymoses, or cyanosis    Cardiovascular Diagnostic Testing:  ECG:    Echo: < from: TTE with Doppler (w/Cont) (23 @ 08:54) >  ------------------------------------------------------------------------  Dimensions:    Normal Values:  LA:     5.1    2.0 - 4.0 cm  Ao:     3.9    2.0 - 3.8 cm  SEPTUM: 1.5    0.6 - 1.2 cm  PWT:    1.6    0.6 -1.1 cm  LVIDd:  5.2    3.0 - 5.6 cm  LVIDs:  4.6    1.8 - 4.0 cm  Derived variables:  LVMI: 163 g/m2  RWT: 0.61  Fractional short: 12 %  EF (Alberto Rule): 28 %Doppler Peak Velocity (m/sec):  AoV=0.9  ------------------------------------------------------------------------  Observations:  Mitral Valve: Normal mitral valve. Mild mitral  regurgitation.  Aortic Valve/Aorta: Normal trileaflet aortic valve. Mild  aortic regurgitation.  Aortic Root: 3.9 cm.  Ascending Aorta: 3.8 cm.  LVOT diameter: 2.3cm.  Left Atrium: Severely dilated left atrium.  LA volume index  = 69 cc/m2.  Left Ventricle: Severe global left ventricular systolic  dysfunction. There is severe global hypokinesis. LVEF  calculated using biplane Alberto's method is 28%. No left  ventricular thrombus. Severe concentric left ventricular  hypertrophy. Severe reversible diastolic dysfunction (Stage  III). Increased E/e'  is consistent with elevated left  ventricular filling pressure.  Right Heart: Severe right atrial enlargement.The right  ventricle is not well visualized. Using UEA, the RV appears  dilated and decreased in function. Normal tricuspid valve.  Mild-moderate tricuspid regurgitation. Normal pulmonic  valve. Mild pulmonic regurgitation.  Pericardium/Pleura: No pericardial effusion seen.  Hemodynamic: Estimated right atrial pressure is 8 mm Hg.  Estimated right ventricular systolic pressure equals 29 mm  Hg, assuming right atrial pressure equals 8 mm Hg,  consistent with normal pulmonary pressures.  ------------------------------------------------------------------------  Conclusions:  1. Normal mitral valve.  2. Normal trileaflet aortic valve.Mild aortic  regurgitation.  3. Severely dilated left atrium.  LA volume index = 69  cc/m2. Severe concentric leftventricular  hypertrophy.Severe global left ventricular systolic  dysfunction. There is severe global hypokinesis. LVEF  calculated using biplane Alberto's method is 28%. No left  ventricular thrombus.Severe reversible diastolic  dysfunction (Stage III).Increased E/e'  is consistent with  elevated left ventricular filling pressure.  4. Severe right atrial enlargement.The right ventricle is  not well visualized. Using UEA, the RV appears dilated and  decreased in function.  5. Normal tricuspid valve. Mild-moderate tricuspid  regurgitation.Estimated pulmonary artery systolic pressure  equals 29 mm Hg, assuming right atrial pressure equals 8  mm Hg, consistent with normal pulmonary pressures.  6. No pericardial effusion seen.  *** Compared with echocardiogram of 2021, there is  decreased LVEF and has severe diastolic dysfunction.  ------------------------------------------------------------------------  Confirmed on  3/20/2023 - 11:18:10 by Samantha Fofana M.D.  ------------------------------------------------------------------------    < end of copied text >    Stress Testing:    Cath:  < from: Cardiac Cath Lab - Adult (05.10.21 @ 14:12) >  CORONARY VESSELS: The coronary circulation is right dominant.  LM:   --  LM: Normal.  LAD:   --  LAD: Angiography showed minor luminal irregularities with no  flow limiting lesions.  CX:   --  Circumflex: Angiography showed minor luminal irregularities with  no flow limiting lesions.  RCA:   --  RCA: Angiography showed minor luminal irregularities with no  flow limiting lesions.    < end of copied text >      Interpretation of Telemetry:    Imaging:  < from: NM Amyloidosis SPECT/CT, Single Area Single Day (23 @ 20:00) >  FINDINGS: The technical quality of the images was excellent.  There is   diffusely increased pyrophosphate uptake throughout the LEFT ventricular   myocardium, with mild activity in the walls of the RIGHT ventricle.    Left Ventricular Myocardium to Bone (visual at 3 hrs): Grade: 3  (normal:   0)    Right Ventricular Myocardium to Bone (visual at 3 hrs): Grade: 1    (normal: 0)    Low-dose CT additionally demonstrates: Cardiomegaly. Punctate   atherosclerotic calcification of nonaneurysmal thoracic aorta. Bibasilar   atelectasis. Small hiatal hernia. Degenerative changes seen throughout   spine.    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    --- End of Report ---    ANICETO MENA MD; Attending Radiologist   This document has been electronically signed. 2023  3:50PM    < end of copied text >      Labs:                        11.2   4.71  )-----------( 161      ( 05 Dec 2023 07:04 )             35.0     12-    140  |  105  |  36<H>  ----------------------------<  111<H>  4.7   |  22  |  2.46<H>    Ca    8.9      05 Dec 2023 07:01  Phos  4.0     12-  Mg     2.0         TPro  6.0  /  Alb  3.7  /  TBili  0.5  /  DBili  x   /  AST  19  /  ALT  19  /  AlkPhos  68  12-05    PT/INR - ( 04 Dec 2023 07:58 )   PT: 13.4 sec;   INR: 1.29 ratio                 Total Cholesterol: 109  LDL: --  HDL: 40  T      Thyroid Stimulating Hormone, Serum: 5.53 uIU/mL ( @ 07:58)   Patient seen and evaluated @ 9 am on 6 [place]  Chief Complaint: shortness of breath and lower extremity edema (with scrotal edema)    HPI:      68-year-old male past medical history of HFrEF,  Cardiac Amyloidosis, Cardiomyopathy, Neuropathy, Carotid stenosis -> s/p stents, Hypothyroidism, Pituitary Adenoma, Hyperprolactinemia,  BPH, HTN, laryngeal cancer s/p radiation presents  for BILLY with increased leg and scrotal swelling for 4 days and no orthopnea.  Patient states symptoms similar to prior CHF exacerbation.   NO orthospnea, no PND and no chest pain , no cough.  Patient noted swelling with increased pain on the scrotum about 5 days ago and his Urologist ( started him on ABX as he states ) .  NO fever , no chest pain, no palpitation, no change in diet, no change in medication and he states to be compliant with his medications Endorses symptoms of scrotal swelling, dysuria, extremity tenderness.  Denies fever, chills, nausea, vomiting, chest pain, SOB, calf tenderness, abdominal pain, urinary symptoms, no penile DC , no trauma.   No prior STIs and has been  for 17 years.    In the ED : patient received Lasix 40mg IV X1 and IV Tylenol CX 1    (03 Dec 2023 15:36)    PMH:   Low back pain    Redundant prepuce and phimosis    Arthritis    Hypothyroid    Hypertension    Thyroid cancer    Throat cancer    History of pituitary disease    Gout    S/P excision of vocal cord nodule    Laryngeal cancer    Pituitary mass    Carotid occlusion, bilateral    Obese      PSH:   S/P excision of vocal cord nodule    H/O shoulder surgery    H/O thyroidectomy    History of back surgery    History of lumbar spinal fusion    H/O arthroscopy of shoulder    Status post carotid surgery      Medications:   acetaminophen     Tablet .. 650 milliGRAM(s) Oral every 6 hours PRN  albuterol/ipratropium for Nebulization 3 milliLiter(s) Nebulizer every 6 hours PRN  ALPRAZolam 0.25 milliGRAM(s) Oral daily PRN  aluminum hydroxide/magnesium hydroxide/simethicone Suspension 30 milliLiter(s) Oral every 4 hours PRN  atorvastatin 40 milliGRAM(s) Oral at bedtime  cabergoline 1 milliGRAM(s) Oral <User Schedule>  cefTRIAXone   IVPB 1000 milliGRAM(s) IV Intermittent every 24 hours  cefTRIAXone   IVPB      chlorhexidine 2% Cloths 1 Application(s) Topical <User Schedule>  finasteride 5 milliGRAM(s) Oral daily  heparin   Injectable 5000 Unit(s) SubCutaneous every 12 hours  levothyroxine 175 MICROGram(s) Oral daily  melatonin 3 milliGRAM(s) Oral at bedtime PRN  ondansetron Injectable 4 milliGRAM(s) IV Push every 8 hours PRN  tamsulosin 0.4 milliGRAM(s) Oral at bedtime    Allergies:  No Known Allergies    FAMILY HISTORY:  Family history of stroke (Mother)    Family history of Alzheimer's disease (Father)    FH: stroke (Mother, Father)    Social History:  Smoking:  Alcohol:  Drugs:    Review of Systems:    Constitutional: No fever, chills, fatigue, or changes in weight  HEENT: No blurry vision, eye pain, headache, runny nose, or sore throat  Respiratory: No shortness of breath, cough, or wheezing  Cardiovascular: No chest pain or palpitations  Gastrointestinal: No nausea, vomiting, diarrhea, or abdominal pain  Genitourinary: No dysuria or incontinence  Extremities: No lower extremity swelling  Neurologic: No focal findings  Lymphatic: No lymphadenopathy   Skin: No rash  Psychiatry: No anxiety or depression  10 point review of systems is otherwise negative except as mentioned above            Physical Exam:  T(C): 36.5 (23 @ 11:20), Max: 36.6 (23 @ 21:39)  HR: 74 (23 @ 16:42) (66 - 88)  BP: 118/81 (23 @ 16:42) (101/64 - 129/90)  RR: 18 (23 @ 11:20) (18 - 18)  SpO2: 98% (23 @ 11:47) (91% - 98%)  Wt(kg): --     @ 07:  -   @ 07:00  --------------------------------------------------------  IN: 360 mL / OUT: 625 mL / NET: -265 mL     @ 07:01  -   @ 19:32  --------------------------------------------------------  IN: 480 mL / OUT: 850 mL / NET: -370 mL      Daily     Daily Weight in k (05 Dec 2023 08:00)    Appearance: Normal, well groomed, NAD  Eyes: PERRLA, EOMI, pink conjunctiva, no scleral icterus   HENT: Normal oral mucosa  Cardiovascular: RRR, S1, S2, no murmur, rub, or gallop; no edema; no JVD  Procedural Access Site: Clean, dry, intact, without hematoma  Respiratory: Clear to auscultation bilaterally  Gastrointestinal: Soft, non-tender, non-distended, BS+  Musculoskeletal: No clubbing or joint deformity   Neurologic: No focal weakness  Lymphatic: No lymphadenopathy  Psychiatry: AAOx3 with appropriate mood and affect  Skin: No rashes, ecchymoses, or cyanosis    Cardiovascular Diagnostic Testing:  ECG:    Echo: < from: TTE with Doppler (w/Cont) (23 @ 08:54) >  ------------------------------------------------------------------------  Dimensions:    Normal Values:  LA:     5.1    2.0 - 4.0 cm  Ao:     3.9    2.0 - 3.8 cm  SEPTUM: 1.5    0.6 - 1.2 cm  PWT:    1.6    0.6 -1.1 cm  LVIDd:  5.2    3.0 - 5.6 cm  LVIDs:  4.6    1.8 - 4.0 cm  Derived variables:  LVMI: 163 g/m2  RWT: 0.61  Fractional short: 12 %  EF (Alberto Rule): 28 %Doppler Peak Velocity (m/sec):  AoV=0.9  ------------------------------------------------------------------------  Observations:  Mitral Valve: Normal mitral valve. Mild mitral  regurgitation.  Aortic Valve/Aorta: Normal trileaflet aortic valve. Mild  aortic regurgitation.  Aortic Root: 3.9 cm.  Ascending Aorta: 3.8 cm.  LVOT diameter: 2.3cm.  Left Atrium: Severely dilated left atrium.  LA volume index  = 69 cc/m2.  Left Ventricle: Severe global left ventricular systolic  dysfunction. There is severe global hypokinesis. LVEF  calculated using biplane Alberto's method is 28%. No left  ventricular thrombus. Severe concentric left ventricular  hypertrophy. Severe reversible diastolic dysfunction (Stage  III). Increased E/e'  is consistent with elevated left  ventricular filling pressure.  Right Heart: Severe right atrial enlargement.The right  ventricle is not well visualized. Using UEA, the RV appears  dilated and decreased in function. Normal tricuspid valve.  Mild-moderate tricuspid regurgitation. Normal pulmonic  valve. Mild pulmonic regurgitation.  Pericardium/Pleura: No pericardial effusion seen.  Hemodynamic: Estimated right atrial pressure is 8 mm Hg.  Estimated right ventricular systolic pressure equals 29 mm  Hg, assuming right atrial pressure equals 8 mm Hg,  consistent with normal pulmonary pressures.  ------------------------------------------------------------------------  Conclusions:  1. Normal mitral valve.  2. Normal trileaflet aortic valve.Mild aortic  regurgitation.  3. Severely dilated left atrium.  LA volume index = 69  cc/m2. Severe concentric leftventricular  hypertrophy.Severe global left ventricular systolic  dysfunction. There is severe global hypokinesis. LVEF  calculated using biplane Alberto's method is 28%. No left  ventricular thrombus.Severe reversible diastolic  dysfunction (Stage III).Increased E/e'  is consistent with  elevated left ventricular filling pressure.  4. Severe right atrial enlargement.The right ventricle is  not well visualized. Using UEA, the RV appears dilated and  decreased in function.  5. Normal tricuspid valve. Mild-moderate tricuspid  regurgitation.Estimated pulmonary artery systolic pressure  equals 29 mm Hg, assuming right atrial pressure equals 8  mm Hg, consistent with normal pulmonary pressures.  6. No pericardial effusion seen.  *** Compared with echocardiogram of 2021, there is  decreased LVEF and has severe diastolic dysfunction.  ------------------------------------------------------------------------  Confirmed on  3/20/2023 - 11:18:10 by Samantha Fofana M.D.  ------------------------------------------------------------------------    < end of copied text >    Stress Testing:    Cath:  < from: Cardiac Cath Lab - Adult (05.10.21 @ 14:12) >  CORONARY VESSELS: The coronary circulation is right dominant.  LM:   --  LM: Normal.  LAD:   --  LAD: Angiography showed minor luminal irregularities with no  flow limiting lesions.  CX:   --  Circumflex: Angiography showed minor luminal irregularities with  no flow limiting lesions.  RCA:   --  RCA: Angiography showed minor luminal irregularities with no  flow limiting lesions.    < end of copied text >      Interpretation of Telemetry:    Imaging:  < from: NM Amyloidosis SPECT/CT, Single Area Single Day (23 @ 20:00) >  FINDINGS: The technical quality of the images was excellent.  There is   diffusely increased pyrophosphate uptake throughout the LEFT ventricular   myocardium, with mild activity in the walls of the RIGHT ventricle.    Left Ventricular Myocardium to Bone (visual at 3 hrs): Grade: 3  (normal:   0)    Right Ventricular Myocardium to Bone (visual at 3 hrs): Grade: 1    (normal: 0)    Low-dose CT additionally demonstrates: Cardiomegaly. Punctate   atherosclerotic calcification of nonaneurysmal thoracic aorta. Bibasilar   atelectasis. Small hiatal hernia. Degenerative changes seen throughout   spine.    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    --- End of Report ---    ANICETO MENA MD; Attending Radiologist   This document has been electronically signed. 2023  3:50PM    < end of copied text >      Labs:                        11.2   4.71  )-----------( 161      ( 05 Dec 2023 07:04 )             35.0     12-    140  |  105  |  36<H>  ----------------------------<  111<H>  4.7   |  22  |  2.46<H>    Ca    8.9      05 Dec 2023 07:01  Phos  4.0     12-  Mg     2.0         TPro  6.0  /  Alb  3.7  /  TBili  0.5  /  DBili  x   /  AST  19  /  ALT  19  /  AlkPhos  68  12-05    PT/INR - ( 04 Dec 2023 07:58 )   PT: 13.4 sec;   INR: 1.29 ratio                 Total Cholesterol: 109  LDL: --  HDL: 40  T      Thyroid Stimulating Hormone, Serum: 5.53 uIU/mL ( @ 07:58)

## 2023-12-06 ENCOUNTER — APPOINTMENT (OUTPATIENT)
Dept: NEUROLOGY | Facility: CLINIC | Age: 68
End: 2023-12-06

## 2023-12-06 LAB
ANION GAP SERPL CALC-SCNC: 13 MMOL/L — SIGNIFICANT CHANGE UP (ref 5–17)
ANION GAP SERPL CALC-SCNC: 13 MMOL/L — SIGNIFICANT CHANGE UP (ref 5–17)
BUN SERPL-MCNC: 33 MG/DL — HIGH (ref 7–23)
BUN SERPL-MCNC: 33 MG/DL — HIGH (ref 7–23)
CALCIUM SERPL-MCNC: 9.1 MG/DL — SIGNIFICANT CHANGE UP (ref 8.4–10.5)
CALCIUM SERPL-MCNC: 9.1 MG/DL — SIGNIFICANT CHANGE UP (ref 8.4–10.5)
CHLORIDE SERPL-SCNC: 105 MMOL/L — SIGNIFICANT CHANGE UP (ref 96–108)
CHLORIDE SERPL-SCNC: 105 MMOL/L — SIGNIFICANT CHANGE UP (ref 96–108)
CO2 SERPL-SCNC: 23 MMOL/L — SIGNIFICANT CHANGE UP (ref 22–31)
CO2 SERPL-SCNC: 23 MMOL/L — SIGNIFICANT CHANGE UP (ref 22–31)
CREAT SERPL-MCNC: 1.98 MG/DL — HIGH (ref 0.5–1.3)
CREAT SERPL-MCNC: 1.98 MG/DL — HIGH (ref 0.5–1.3)
EGFR: 36 ML/MIN/1.73M2 — LOW
EGFR: 36 ML/MIN/1.73M2 — LOW
GLUCOSE SERPL-MCNC: 81 MG/DL — SIGNIFICANT CHANGE UP (ref 70–99)
GLUCOSE SERPL-MCNC: 81 MG/DL — SIGNIFICANT CHANGE UP (ref 70–99)
HCT VFR BLD CALC: 35.4 % — LOW (ref 39–50)
HCT VFR BLD CALC: 35.4 % — LOW (ref 39–50)
HGB BLD-MCNC: 11.6 G/DL — LOW (ref 13–17)
HGB BLD-MCNC: 11.6 G/DL — LOW (ref 13–17)
MAGNESIUM SERPL-MCNC: 1.9 MG/DL — SIGNIFICANT CHANGE UP (ref 1.6–2.6)
MAGNESIUM SERPL-MCNC: 1.9 MG/DL — SIGNIFICANT CHANGE UP (ref 1.6–2.6)
MCHC RBC-ENTMCNC: 30.2 PG — SIGNIFICANT CHANGE UP (ref 27–34)
MCHC RBC-ENTMCNC: 30.2 PG — SIGNIFICANT CHANGE UP (ref 27–34)
MCHC RBC-ENTMCNC: 32.8 GM/DL — SIGNIFICANT CHANGE UP (ref 32–36)
MCHC RBC-ENTMCNC: 32.8 GM/DL — SIGNIFICANT CHANGE UP (ref 32–36)
MCV RBC AUTO: 92.2 FL — SIGNIFICANT CHANGE UP (ref 80–100)
MCV RBC AUTO: 92.2 FL — SIGNIFICANT CHANGE UP (ref 80–100)
MRSA PCR RESULT.: SIGNIFICANT CHANGE UP
MRSA PCR RESULT.: SIGNIFICANT CHANGE UP
NRBC # BLD: 0 /100 WBCS — SIGNIFICANT CHANGE UP (ref 0–0)
NRBC # BLD: 0 /100 WBCS — SIGNIFICANT CHANGE UP (ref 0–0)
PHOSPHATE SERPL-MCNC: 3.3 MG/DL — SIGNIFICANT CHANGE UP (ref 2.5–4.5)
PHOSPHATE SERPL-MCNC: 3.3 MG/DL — SIGNIFICANT CHANGE UP (ref 2.5–4.5)
PLATELET # BLD AUTO: 144 K/UL — LOW (ref 150–400)
PLATELET # BLD AUTO: 144 K/UL — LOW (ref 150–400)
POTASSIUM SERPL-MCNC: 4.3 MMOL/L — SIGNIFICANT CHANGE UP (ref 3.5–5.3)
POTASSIUM SERPL-MCNC: 4.3 MMOL/L — SIGNIFICANT CHANGE UP (ref 3.5–5.3)
POTASSIUM SERPL-SCNC: 4.3 MMOL/L — SIGNIFICANT CHANGE UP (ref 3.5–5.3)
POTASSIUM SERPL-SCNC: 4.3 MMOL/L — SIGNIFICANT CHANGE UP (ref 3.5–5.3)
RBC # BLD: 3.84 M/UL — LOW (ref 4.2–5.8)
RBC # BLD: 3.84 M/UL — LOW (ref 4.2–5.8)
RBC # FLD: 16 % — HIGH (ref 10.3–14.5)
RBC # FLD: 16 % — HIGH (ref 10.3–14.5)
S AUREUS DNA NOSE QL NAA+PROBE: SIGNIFICANT CHANGE UP
S AUREUS DNA NOSE QL NAA+PROBE: SIGNIFICANT CHANGE UP
SODIUM SERPL-SCNC: 141 MMOL/L — SIGNIFICANT CHANGE UP (ref 135–145)
SODIUM SERPL-SCNC: 141 MMOL/L — SIGNIFICANT CHANGE UP (ref 135–145)
UUN UR-MCNC: 609 MG/DL — SIGNIFICANT CHANGE UP
UUN UR-MCNC: 609 MG/DL — SIGNIFICANT CHANGE UP
WBC # BLD: 4.33 K/UL — SIGNIFICANT CHANGE UP (ref 3.8–10.5)
WBC # BLD: 4.33 K/UL — SIGNIFICANT CHANGE UP (ref 3.8–10.5)
WBC # FLD AUTO: 4.33 K/UL — SIGNIFICANT CHANGE UP (ref 3.8–10.5)
WBC # FLD AUTO: 4.33 K/UL — SIGNIFICANT CHANGE UP (ref 3.8–10.5)

## 2023-12-06 PROCEDURE — 99232 SBSQ HOSP IP/OBS MODERATE 35: CPT

## 2023-12-06 PROCEDURE — 99231 SBSQ HOSP IP/OBS SF/LOW 25: CPT

## 2023-12-06 RX ADMIN — Medication 175 MICROGRAM(S): at 05:01

## 2023-12-06 RX ADMIN — Medication 650 MILLIGRAM(S): at 22:09

## 2023-12-06 RX ADMIN — Medication 650 MILLIGRAM(S): at 21:09

## 2023-12-06 RX ADMIN — CHLORHEXIDINE GLUCONATE 1 APPLICATION(S): 213 SOLUTION TOPICAL at 05:04

## 2023-12-06 RX ADMIN — HEPARIN SODIUM 5000 UNIT(S): 5000 INJECTION INTRAVENOUS; SUBCUTANEOUS at 17:23

## 2023-12-06 RX ADMIN — TAMSULOSIN HYDROCHLORIDE 0.4 MILLIGRAM(S): 0.4 CAPSULE ORAL at 21:08

## 2023-12-06 RX ADMIN — CEFTRIAXONE 100 MILLIGRAM(S): 500 INJECTION, POWDER, FOR SOLUTION INTRAMUSCULAR; INTRAVENOUS at 17:23

## 2023-12-06 RX ADMIN — ATORVASTATIN CALCIUM 40 MILLIGRAM(S): 80 TABLET, FILM COATED ORAL at 21:05

## 2023-12-06 RX ADMIN — FINASTERIDE 5 MILLIGRAM(S): 5 TABLET, FILM COATED ORAL at 11:49

## 2023-12-06 RX ADMIN — HEPARIN SODIUM 5000 UNIT(S): 5000 INJECTION INTRAVENOUS; SUBCUTANEOUS at 05:01

## 2023-12-06 NOTE — CHART NOTE - NSCHARTNOTEFT_GEN_A_CORE
Reason for consult: ZEESHAN    Chart reviewed.    Admitted for scrotal swelling and fever    Patient has a baseline serum creatinine of 1.4-1.5mg/dL (in May 2023). During admission, serum creatinine was 2.0mg/dL, and went up to 2.4. He was initially started on IV furosemide 40mg BID for scrotal swelling and leg edema. Furosemide and entresto were discontinued because of worsening kidney function. Serum creatinine came down to 1.98mg/dL  US scrotum showed that patient has epididymitis. Currently on IV ceftriaxone. SBP trends throughout the hospitalization were ranging between 100 to 120s. No exposure to IV contrast. No NSAID during the hospitalization.     Urinalysis - bland  UPCR 0.1  Urine Na > 20  No prior kidney ultrasound    Assessment:  1. ZEESHAN on CKD    Preliminary plan:  1. Get bladder scan to rule out urinary retention.   2. Renal ultrasound kidney and bladder  3. Agree with holding diuretics and entresto for now.    We will evaluate patient fully on 12/7/23 AM.     Plans discussed with Medicine Team.       Iris Haley MD  Attending Nephrologist  [phone] or via [employer] Reason for consult: ZEESHAN    Chart reviewed.    Admitted for scrotal swelling and fever    Patient has a baseline serum creatinine of 1.4-1.5mg/dL (in May 2023). During admission, serum creatinine was 2.0mg/dL, and went up to 2.4. He was initially started on IV furosemide 40mg BID for scrotal swelling and leg edema. Furosemide and entresto were discontinued because of worsening kidney function. Serum creatinine came down to 1.98mg/dL  US scrotum showed that patient has epididymitis. Currently on IV ceftriaxone. SBP trends throughout the hospitalization were ranging between 100 to 120s. No exposure to IV contrast. No NSAID during the hospitalization.     Urinalysis - bland  UPCR 0.1  Urine Na > 20  No prior kidney ultrasound    Assessment:  1. ZEESHAN on CKD    Preliminary plan:  1. Get bladder scan to rule out urinary retention.   2. Renal ultrasound kidney and bladder  3. Agree with holding diuretics and entresto for now.    We will evaluate patient fully on 12/7/23 AM.     Plans discussed with Medicine Team.       Iris Haley MD  Attending Nephrologist  [phone] or via [employer]

## 2023-12-06 NOTE — PROGRESS NOTE ADULT - PROBLEM SELECTOR PLAN 2
F/up with Dr Faulkner  Pt is awaiting on Amyloidosis meds approval from the company, outpatient follow up
F/up with Dr Faulkner  Pt is awaiting on Amyloidosis meds approval from the company
F/up with ARUNA Berrios   Pt is awaiting on Amyloidosis meds approval from the company

## 2023-12-06 NOTE — PROGRESS NOTE ADULT - PROBLEM SELECTOR PLAN 5
initially from HF exacerbation, but now worsening likely from dehydration from lasix. baseline seems to be 1.4 (in february)   f/u Urine studies   HOLD nephrotoxic agents  - Cardiorenal consult requested
initially from HF exacerbation, but now worsening likely from dehydration from lasix. baseline seems to be 1.4 (in february)   f/u Urine studies   HOLD nephrotoxic agents  if SCr worsens while holding lasix 12/5, would consult nephrology for further assistance
most likely in setting of HF exacerbation, baseline seems to be 1.4 (in february)   Will get bladder scan, Urine studies   HOLD nephrotoxic agents

## 2023-12-06 NOTE — PROGRESS NOTE ADULT - PROBLEM/PLAN-5
DISPLAY PLAN FREE TEXT
Detail Level: Simple
Hide Include Location In Plan Question?: No
Include Location In Plan?: Yes
DISPLAY PLAN FREE TEXT
DISPLAY PLAN FREE TEXT

## 2023-12-06 NOTE — PROGRESS NOTE ADULT - PROBLEM SELECTOR PLAN 1
Last EF was 25 % in 3/2025. Pt follows up with Dr Faulkner, and Dr Yi   - holding Lasix 40mg BID 12/5 given ZEESHAN now, and improvement in LE swelling   - Currently holding Entresto in setting of Hypotension and ZEESHAN  - No other GDMT   - TTE ordered   - Monitor weight , I/O  - Cardiology consulted, appreciate recommendations   - Cardiorenal consult requested
Last EF was 25 % in 3/2025   Pt follows up with Dr Faulkner, and Dr Yi   Received Lasix in the ED   Cont with Lasix 40mg BID, monitor response and electrolytes   HOLD ENtresto in setting of Hypotension and ZEESHAN  No other GDMT   TTE ordered   cont Tele monitor   MOnitor weight , intake, Output and electrolytes  Delta troponin is stable,, but will get one more troponin
Last EF was 25 % in 3/2025. Pt follows up with Dr Faulkner, and Dr Yi   - holding Lasix 40mg BID 12/5 given ZEESHAN now, and improvement in LE swelling   - HOLD ENtresto in setting of Hypotension and ZEESHAN  - No other GDMT   - TTE ordered   - cont Tele monitor   - MOnitor weight , intake, Output and electrolytes  - Cardiology, Dr. Faulkner consulted, f/u recs

## 2023-12-06 NOTE — PROGRESS NOTE ADULT - ASSESSMENT
68-year-old male past medical history of HFrEF,  Cardiac Amyloidosis, Cardiomyopathy, Neuropathy, Carotid stenosis -> s/p stents, Hypothyroidism, Pituitary Adenoma, Hyperprolactinemia,  BPH, HTN, laryngeal cancer s/p radiation presents  for BILLY with increased leg and scrotal swelling for 4 days and no orthopnea. Patient is found to have evidence of Acute on chronic heart failure and admitted to the hospital for further evaluation and treatment .  
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  Admitted with acute on chronic heart failure failure and volume overload.  Diuresis as tolerated.  Keep O > I, K > 4.0, Mag > 2.0.  Can escalate to Bumex gtt if initial dosing is ineffective.  Monitor renal function.     Renal function improving with diuretics.    Plan to start TTR stabilizer therapy and silencer therapy as outpatient.  
 68-year-old male past medical history of HFrEF,  Cardiac Amyloidosis, Cardiomyopathy, Neuropathy, Carotid stenosis -> s/p stents, Hypothyroidism, Pituitary Adenoma, Hyperprolactinemia,  BPH, HTN, laryngeal cancer s/p radiation presents  for BILLY with increased leg and scrotal swelling for 4 days and no orthopnea. Patient is found to have evidence of Acute on chronic heart failure and admitted to the hospital for further evaluation and treatment .  
 68-year-old male past medical history of HFrEF,  Cardiac Amyloidosis, Cardiomyopathy, Neuropathy, Carotid stenosis -> s/p stents, Hypothyroidism, Pituitary Adenoma, Hyperprolactinemia,  BPH, HTN, laryngeal cancer s/p radiation presents  for BILLY with increased leg and scrotal swelling for 4 days and no orthopnea. Patient is found to have evidence of Acute on chronic heart failure and admitted to the hospital for further evaluation and treatment , with course complicated by ZEESHAN which is now resolving.

## 2023-12-06 NOTE — PROGRESS NOTE ADULT - SUBJECTIVE AND OBJECTIVE BOX
[facility] Division of Hospital Medicine  Lowell Mcgill MD  Available via MS Teams    SUBJECTIVE / OVERNIGHT EVENTS: Patient seen and examined at bedside, resting comfortably and in no acute distress. Denies dysuria or hematuria. Endorses scrotal edema, however reports this is improving. Denies fevers or chills. ROS otherwise noncontributory.     MEDICATIONS  (STANDING):  atorvastatin 40 milliGRAM(s) Oral at bedtime  cabergoline 1 milliGRAM(s) Oral <User Schedule>  cefTRIAXone   IVPB 1000 milliGRAM(s) IV Intermittent every 24 hours  cefTRIAXone   IVPB      chlorhexidine 2% Cloths 1 Application(s) Topical <User Schedule>  finasteride 5 milliGRAM(s) Oral daily  heparin   Injectable 5000 Unit(s) SubCutaneous every 12 hours  levothyroxine 175 MICROGram(s) Oral daily  tamsulosin 0.4 milliGRAM(s) Oral at bedtime    MEDICATIONS  (PRN):  acetaminophen     Tablet .. 650 milliGRAM(s) Oral every 6 hours PRN Temp greater or equal to 38C (100.4F), Mild Pain (1 - 3)  albuterol/ipratropium for Nebulization 3 milliLiter(s) Nebulizer every 6 hours PRN Shortness of Breath and/or Wheezing  ALPRAZolam 0.25 milliGRAM(s) Oral daily PRN Anxiety  aluminum hydroxide/magnesium hydroxide/simethicone Suspension 30 milliLiter(s) Oral every 4 hours PRN Dyspepsia  melatonin 3 milliGRAM(s) Oral at bedtime PRN Insomnia  ondansetron Injectable 4 milliGRAM(s) IV Push every 8 hours PRN Nausea and/or Vomiting      I&O's Summary    05 Dec 2023 07:01  -  06 Dec 2023 07:00  --------------------------------------------------------  IN: 480 mL / OUT: 1350 mL / NET: -870 mL    06 Dec 2023 07:01  -  06 Dec 2023 12:22  --------------------------------------------------------  IN: 320 mL / OUT: 0 mL / NET: 320 mL        PHYSICAL EXAM:  Vital Signs Last 24 Hrs  T(C): 36.5 (06 Dec 2023 11:13), Max: 36.7 (05 Dec 2023 20:27)  T(F): 97.7 (06 Dec 2023 11:13), Max: 98.1 (05 Dec 2023 20:27)  HR: 74 (06 Dec 2023 11:13) (74 - 81)  BP: 109/75 (06 Dec 2023 11:13) (103/69 - 118/81)  BP(mean): --  RR: 18 (06 Dec 2023 11:13) (18 - 18)  SpO2: 97% (06 Dec 2023 11:13) (96% - 97%)    Parameters below as of 06 Dec 2023 11:13  Patient On (Oxygen Delivery Method): room air      CONSTITUTIONAL: NAD, well-groomed  EYES: PERRLA; conjunctiva and sclera clear  ENMT: Moist oral mucosa, no pharyngeal injection or exudates; normal dentition  NECK: Supple, no palpable masses; no thyromegaly  RESPIRATORY: Normal respiratory effort; lungs are clear to auscultation bilaterally  CARDIOVASCULAR: normal S1 and S2, no murmur/rub/gallop; No lower extremity edema  ABDOMEN: Nontender to palpation, normoactive bowel sounds, no rebound/guarding; No hepatosplenomegaly. Scrotal edema noted  MUSCULOSKELETAL:  no clubbing or cyanosis of digits; no joint swelling or tenderness to palpation  PSYCH: A+O to person, place, and time; affect appropriate  NEUROLOGY: CN 2-12 are intact and symmetric; no gross sensory deficits   SKIN: No rashes; no palpable lesions    LABS:                        11.6   4.33  )-----------( 144      ( 06 Dec 2023 07:09 )             35.4     12-06    141  |  105  |  33<H>  ----------------------------<  81  4.3   |  23  |  1.98<H>    Ca    9.1      06 Dec 2023 07:07  Phos  3.3     12-06  Mg     1.9     12-06    TPro  6.0  /  Alb  3.7  /  TBili  0.5  /  DBili  x   /  AST  19  /  ALT  19  /  AlkPhos  68  12-05          Urinalysis Basic - ( 06 Dec 2023 07:07 )    Color: x / Appearance: x / SG: x / pH: x  Gluc: 81 mg/dL / Ketone: x  / Bili: x / Urobili: x   Blood: x / Protein: x / Nitrite: x   Leuk Esterase: x / RBC: x / WBC x   Sq Epi: x / Non Sq Epi: x / Bacteria: x [facility] Division of Hospital Medicine  Lowell Mcgill MD  Available via MS Teams    SUBJECTIVE / OVERNIGHT EVENTS: Patient seen and examined at bedside, resting comfortably and in no acute distress. Denies dysuria or hematuria. Endorses scrotal edema, however reports this is improving. Denies fevers or chills. ROS otherwise noncontributory.     MEDICATIONS  (STANDING):  atorvastatin 40 milliGRAM(s) Oral at bedtime  cabergoline 1 milliGRAM(s) Oral <User Schedule>  cefTRIAXone   IVPB 1000 milliGRAM(s) IV Intermittent every 24 hours  cefTRIAXone   IVPB      chlorhexidine 2% Cloths 1 Application(s) Topical <User Schedule>  finasteride 5 milliGRAM(s) Oral daily  heparin   Injectable 5000 Unit(s) SubCutaneous every 12 hours  levothyroxine 175 MICROGram(s) Oral daily  tamsulosin 0.4 milliGRAM(s) Oral at bedtime    MEDICATIONS  (PRN):  acetaminophen     Tablet .. 650 milliGRAM(s) Oral every 6 hours PRN Temp greater or equal to 38C (100.4F), Mild Pain (1 - 3)  albuterol/ipratropium for Nebulization 3 milliLiter(s) Nebulizer every 6 hours PRN Shortness of Breath and/or Wheezing  ALPRAZolam 0.25 milliGRAM(s) Oral daily PRN Anxiety  aluminum hydroxide/magnesium hydroxide/simethicone Suspension 30 milliLiter(s) Oral every 4 hours PRN Dyspepsia  melatonin 3 milliGRAM(s) Oral at bedtime PRN Insomnia  ondansetron Injectable 4 milliGRAM(s) IV Push every 8 hours PRN Nausea and/or Vomiting      I&O's Summary    05 Dec 2023 07:01  -  06 Dec 2023 07:00  --------------------------------------------------------  IN: 480 mL / OUT: 1350 mL / NET: -870 mL    06 Dec 2023 07:01  -  06 Dec 2023 12:22  --------------------------------------------------------  IN: 320 mL / OUT: 0 mL / NET: 320 mL        PHYSICAL EXAM:  Vital Signs Last 24 Hrs  T(C): 36.5 (06 Dec 2023 11:13), Max: 36.7 (05 Dec 2023 20:27)  T(F): 97.7 (06 Dec 2023 11:13), Max: 98.1 (05 Dec 2023 20:27)  HR: 74 (06 Dec 2023 11:13) (74 - 81)  BP: 109/75 (06 Dec 2023 11:13) (103/69 - 118/81)  BP(mean): --  RR: 18 (06 Dec 2023 11:13) (18 - 18)  SpO2: 97% (06 Dec 2023 11:13) (96% - 97%)    Parameters below as of 06 Dec 2023 11:13  Patient On (Oxygen Delivery Method): room air      CONSTITUTIONAL: NAD, well-groomed  EYES: PERRLA; conjunctiva and sclera clear  ENMT: Moist oral mucosa, no pharyngeal injection or exudates; normal dentition  NECK: Supple, no palpable masses; no thyromegaly  RESPIRATORY: Normal respiratory effort; lungs are clear to auscultation bilaterally  CARDIOVASCULAR: normal S1 and S2, no murmur/rub/gallop; No lower extremity edema  ABDOMEN: Nontender to palpation, normoactive bowel sounds, no rebound/guarding; No hepatosplenomegaly. Scrotal edema noted  MUSCULOSKELETAL:  no clubbing or cyanosis of digits; no joint swelling or tenderness to palpation  PSYCH: A+O to person, place, and time; affect appropriate  NEUROLOGY: CN 2-12 are intact and symmetric; no gross sensory deficits   SKIN: No rashes; no palpable lesions    LABS:                        11.6   4.33  )-----------( 144      ( 06 Dec 2023 07:09 )             35.4     12-06    141  |  105  |  33<H>  ----------------------------<  81  4.3   |  23  |  1.98<H>    Ca    9.1      06 Dec 2023 07:07  Phos  3.3     12-06  Mg     1.9     12-06    TPro  6.0  /  Alb  3.7  /  TBili  0.5  /  DBili  x   /  AST  19  /  ALT  19  /  AlkPhos  68  12-05          Urinalysis Basic - ( 06 Dec 2023 07:07 )    Color: x / Appearance: x / SG: x / pH: x  Gluc: 81 mg/dL / Ketone: x  / Bili: x / Urobili: x   Blood: x / Protein: x / Nitrite: x   Leuk Esterase: x / RBC: x / WBC x   Sq Epi: x / Non Sq Epi: x / Bacteria: x

## 2023-12-06 NOTE — PROGRESS NOTE ADULT - PROBLEM SELECTOR PLAN 4
-C/w home Cabergoline (home meds) 1 mg PO Qd
COnt with Cabergoline ( home meds ) 0.5mg ( 2 tabs oral daily
COnt with Cabergoline ( home meds ) 0.5mg ( 2 tabs oral daily

## 2023-12-06 NOTE — PROGRESS NOTE ADULT - SUBJECTIVE AND OBJECTIVE BOX
HPI:  Patient seen and examined at bedside on 6 [place].  No events overnight.    Review Of Systems:           Respiratory: No shortness of breath, cough, or wheezing  Cardiovascular: No chest pain or palpitations  10 point review of systems is otherwise negative except as mentioned above        Medications:  acetaminophen     Tablet .. 650 milliGRAM(s) Oral every 6 hours PRN  albuterol/ipratropium for Nebulization 3 milliLiter(s) Nebulizer every 6 hours PRN  ALPRAZolam 0.25 milliGRAM(s) Oral daily PRN  aluminum hydroxide/magnesium hydroxide/simethicone Suspension 30 milliLiter(s) Oral every 4 hours PRN  atorvastatin 40 milliGRAM(s) Oral at bedtime  cabergoline 1 milliGRAM(s) Oral <User Schedule>  cefTRIAXone   IVPB      cefTRIAXone   IVPB 1000 milliGRAM(s) IV Intermittent every 24 hours  chlorhexidine 2% Cloths 1 Application(s) Topical <User Schedule>  finasteride 5 milliGRAM(s) Oral daily  heparin   Injectable 5000 Unit(s) SubCutaneous every 12 hours  levothyroxine 175 MICROGram(s) Oral daily  melatonin 3 milliGRAM(s) Oral at bedtime PRN  ondansetron Injectable 4 milliGRAM(s) IV Push every 8 hours PRN  tamsulosin 0.4 milliGRAM(s) Oral at bedtime    PAST MEDICAL & SURGICAL HISTORY:  Low back pain  Herniated disc on lumbar region      Redundant prepuce and phimosis      Arthritis  on knees      Hypothyroid  not taking any meds      Hypertension      Thyroid cancer  pt denies any thyroid cancer      History of pituitary disease  on caber      Gout      S/P excision of vocal cord nodule   and radiation -      Laryngeal cancer  "stage 0"      Pituitary mass      Carotid occlusion, bilateral      Obese      S/P excision of vocal cord nodule  radiation -       History of lumbar spinal fusion        H/O arthroscopy of shoulder  right-2017      Status post carotid surgery  right-2022, pt. states unsuccesful        Vitals:  T(C): 36.6 (23 @ 20:18), Max: 36.6 (23 @ 20:18)  HR: 78 (23 @ 20:18) (73 - 80)  BP: 123/90 (23 @ 20:18) (109/75 - 124/83)  BP(mean): --  RR: 18 (12-06-23 @ 20:18) (18 - 18)  SpO2: 97% (- @ 20:18) (96% - 97%)  Wt(kg): --  Daily     Daily Weight in k (06 Dec 2023 07:41)  I&O's Summary    05 Dec 2023 07:01  -  06 Dec 2023 07:00  --------------------------------------------------------  IN: 480 mL / OUT: 1350 mL / NET: -870 mL    06 Dec 2023 07:01  -  06 Dec 2023 23:43  --------------------------------------------------------  IN: 600 mL / OUT: 550 mL / NET: 50 mL        Physical Exam:  Appearance: No acute distress; well appearing  Eyes: PERRL, EOMI, pink conjunctiva  HENT: Normal oral mucosa  Cardiovascular: RRR, S1, S2, no murmurs, rubs, or gallops; no edema; no JVD  Respiratory: Clear to auscultation bilaterally  Gastrointestinal: soft, non-tender, non-distended with normal bowel sounds  Musculoskeletal: No clubbing; no joint deformity   Neurologic: Non-focal  Lymphatic: No lymphadenopathy  Psychiatry: AAOx3, mood & affect appropriate  Skin: No rashes, ecchymoses, or cyanosis                          11.6   4.33  )-----------( 144      ( 06 Dec 2023 07:09 )             35.4     12-    141  |  105  |  33<H>  ----------------------------<  81  4.3   |  23  |  1.98<H>    Ca    9.1      06 Dec 2023 07:07  Phos  3.3     12-  Mg     1.9     12-    TPro  6.0  /  Alb  3.7  /  TBili  0.5  /  DBili  x   /  AST  19  /  ALT  19  /  AlkPhos  68  12-        Cardiovascular Diagnostic Testing:  ECG:    Echo: < from: TTE with Doppler (w/Cont) (23 @ 08:54) >  ------------------------------------------------------------------------  Dimensions:    Normal Values:  LA:     5.1    2.0 - 4.0 cm  Ao:     3.9    2.0 - 3.8 cm  SEPTUM: 1.5    0.6 - 1.2 cm  PWT:    1.6    0.6 -1.1 cm  LVIDd:  5.2    3.0 - 5.6 cm  LVIDs:  4.6    1.8 - 4.0 cm  Derived variables:  LVMI: 163 g/m2  RWT: 0.61  Fractional short: 12 %  EF (Alberto Rule): 28 %Doppler Peak Velocity (m/sec):  AoV=0.9  ------------------------------------------------------------------------  Observations:  Mitral Valve: Normal mitral valve. Mild mitral  regurgitation.  Aortic Valve/Aorta: Normal trileaflet aortic valve. Mild  aortic regurgitation.  Aortic Root: 3.9 cm.  Ascending Aorta: 3.8 cm.  LVOT diameter: 2.3cm.  Left Atrium: Severely dilated left atrium.  LA volume index  = 69 cc/m2.  Left Ventricle: Severe global left ventricular systolic  dysfunction. There is severe global hypokinesis. LVEF  calculated using biplane Alberto's method is 28%. No left  ventricular thrombus. Severe concentric left ventricular  hypertrophy. Severe reversible diastolic dysfunction (Stage  III). Increased E/e'  is consistent with elevated left  ventricular filling pressure.  Right Heart: Severe right atrial enlargement.The right  ventricle is not well visualized. Using UEA, the RV appears  dilated and decreased in function. Normal tricuspid valve.  Mild-moderate tricuspid regurgitation. Normal pulmonic  valve. Mild pulmonic regurgitation.  Pericardium/Pleura: No pericardial effusion seen.  Hemodynamic: Estimated right atrial pressure is 8 mm Hg.  Estimated right ventricular systolic pressure equals 29 mm  Hg, assuming right atrial pressure equals 8 mm Hg,  consistent with normal pulmonary pressures.  ------------------------------------------------------------------------  Conclusions:  1. Normal mitral valve.  2. Normal trileaflet aortic valve.Mild aortic  regurgitation.  3. Severely dilated left atrium.  LA volume index = 69  cc/m2. Severe concentric leftventricular  hypertrophy.Severe global left ventricular systolic  dysfunction. There is severe global hypokinesis. LVEF  calculated using biplane Alberto's method is 28%. No left  ventricular thrombus.Severe reversible diastolic  dysfunction (Stage III).Increased E/e'  is consistent with  elevated left ventricular filling pressure.  4. Severe right atrial enlargement.The right ventricle is  not well visualized. Using UEA, the RV appears dilated and  decreased in function.  5. Normal tricuspid valve. Mild-moderate tricuspid  regurgitation.Estimated pulmonary artery systolic pressure  equals 29 mm Hg, assuming right atrial pressure equals 8  mm Hg, consistent with normal pulmonary pressures.  6. No pericardial effusion seen.  *** Compared with echocardiogram of 2021, there is  decreased LVEF and has severe diastolic dysfunction.  ------------------------------------------------------------------------  Confirmed on  3/20/2023 - 11:18:10 by Samantha Fofana M.D.  ------------------------------------------------------------------------    < end of copied text >    Stress Testing:    Cath:  < from: Cardiac Cath Lab - Adult (05.10.21 @ 14:12) >  CORONARY VESSELS: The coronary circulation is right dominant.  LM:   --  LM: Normal.  LAD:   --  LAD: Angiography showed minor luminal irregularities with no  flow limiting lesions.  CX:   --  Circumflex: Angiography showed minor luminal irregularities with  no flow limiting lesions.  RCA:   --  RCA: Angiography showed minor luminal irregularities with no  flow limiting lesions.    < end of copied text >      Interpretation of Telemetry:    Imaging:  < from: NM Amyloidosis SPECT/CT, Single Area Single Day (23 @ 20:00) >  FINDINGS: The technical quality of the images was excellent.  There is   diffusely increased pyrophosphate uptake throughout the LEFT ventricular   myocardium, with mild activity in the walls of the RIGHT ventricle.    Left Ventricular Myocardium to Bone (visual at 3 hrs): Grade: 3  (normal:   0)    Right Ventricular Myocardium to Bone (visual at 3 hrs): Grade: 1    (normal: 0)    Low-dose CT additionally demonstrates: Cardiomegaly. Punctate   atherosclerotic calcification of nonaneurysmal thoracic aorta. Bibasilar   atelectasis. Small hiatal hernia. Degenerative changes seen throughout   spine.    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    --- End of Report ---    ANICETO MENA MD; Attending Radiologist   This document has been electronically signed. 2023  3:50PM    < end of copied text > HPI:  Patient seen and examined at bedside on 6 [place].  No events overnight.    Review Of Systems:           Respiratory: No shortness of breath, cough, or wheezing  Cardiovascular: No chest pain or palpitations  10 point review of systems is otherwise negative except as mentioned above        Medications:  acetaminophen     Tablet .. 650 milliGRAM(s) Oral every 6 hours PRN  albuterol/ipratropium for Nebulization 3 milliLiter(s) Nebulizer every 6 hours PRN  ALPRAZolam 0.25 milliGRAM(s) Oral daily PRN  aluminum hydroxide/magnesium hydroxide/simethicone Suspension 30 milliLiter(s) Oral every 4 hours PRN  atorvastatin 40 milliGRAM(s) Oral at bedtime  cabergoline 1 milliGRAM(s) Oral <User Schedule>  cefTRIAXone   IVPB      cefTRIAXone   IVPB 1000 milliGRAM(s) IV Intermittent every 24 hours  chlorhexidine 2% Cloths 1 Application(s) Topical <User Schedule>  finasteride 5 milliGRAM(s) Oral daily  heparin   Injectable 5000 Unit(s) SubCutaneous every 12 hours  levothyroxine 175 MICROGram(s) Oral daily  melatonin 3 milliGRAM(s) Oral at bedtime PRN  ondansetron Injectable 4 milliGRAM(s) IV Push every 8 hours PRN  tamsulosin 0.4 milliGRAM(s) Oral at bedtime    PAST MEDICAL & SURGICAL HISTORY:  Low back pain  Herniated disc on lumbar region      Redundant prepuce and phimosis      Arthritis  on knees      Hypothyroid  not taking any meds      Hypertension      Thyroid cancer  pt denies any thyroid cancer      History of pituitary disease  on caber      Gout      S/P excision of vocal cord nodule   and radiation -      Laryngeal cancer  "stage 0"      Pituitary mass      Carotid occlusion, bilateral      Obese      S/P excision of vocal cord nodule  radiation -       History of lumbar spinal fusion        H/O arthroscopy of shoulder  right-2017      Status post carotid surgery  right-2022, pt. states unsuccesful        Vitals:  T(C): 36.6 (23 @ 20:18), Max: 36.6 (23 @ 20:18)  HR: 78 (23 @ 20:18) (73 - 80)  BP: 123/90 (23 @ 20:18) (109/75 - 124/83)  BP(mean): --  RR: 18 (12-06-23 @ 20:18) (18 - 18)  SpO2: 97% (- @ 20:18) (96% - 97%)  Wt(kg): --  Daily     Daily Weight in k (06 Dec 2023 07:41)  I&O's Summary    05 Dec 2023 07:01  -  06 Dec 2023 07:00  --------------------------------------------------------  IN: 480 mL / OUT: 1350 mL / NET: -870 mL    06 Dec 2023 07:01  -  06 Dec 2023 23:43  --------------------------------------------------------  IN: 600 mL / OUT: 550 mL / NET: 50 mL        Physical Exam:  Appearance: No acute distress; well appearing  Eyes: PERRL, EOMI, pink conjunctiva  HENT: Normal oral mucosa  Cardiovascular: RRR, S1, S2, no murmurs, rubs, or gallops; no edema; no JVD  Respiratory: Clear to auscultation bilaterally  Gastrointestinal: soft, non-tender, non-distended with normal bowel sounds  Musculoskeletal: No clubbing; no joint deformity   Neurologic: Non-focal  Lymphatic: No lymphadenopathy  Psychiatry: AAOx3, mood & affect appropriate  Skin: No rashes, ecchymoses, or cyanosis                          11.6   4.33  )-----------( 144      ( 06 Dec 2023 07:09 )             35.4     12-    141  |  105  |  33<H>  ----------------------------<  81  4.3   |  23  |  1.98<H>    Ca    9.1      06 Dec 2023 07:07  Phos  3.3     12-  Mg     1.9     12-    TPro  6.0  /  Alb  3.7  /  TBili  0.5  /  DBili  x   /  AST  19  /  ALT  19  /  AlkPhos  68  12-        Cardiovascular Diagnostic Testing:  ECG:    Echo: < from: TTE with Doppler (w/Cont) (23 @ 08:54) >  ------------------------------------------------------------------------  Dimensions:    Normal Values:  LA:     5.1    2.0 - 4.0 cm  Ao:     3.9    2.0 - 3.8 cm  SEPTUM: 1.5    0.6 - 1.2 cm  PWT:    1.6    0.6 -1.1 cm  LVIDd:  5.2    3.0 - 5.6 cm  LVIDs:  4.6    1.8 - 4.0 cm  Derived variables:  LVMI: 163 g/m2  RWT: 0.61  Fractional short: 12 %  EF (Alberto Rule): 28 %Doppler Peak Velocity (m/sec):  AoV=0.9  ------------------------------------------------------------------------  Observations:  Mitral Valve: Normal mitral valve. Mild mitral  regurgitation.  Aortic Valve/Aorta: Normal trileaflet aortic valve. Mild  aortic regurgitation.  Aortic Root: 3.9 cm.  Ascending Aorta: 3.8 cm.  LVOT diameter: 2.3cm.  Left Atrium: Severely dilated left atrium.  LA volume index  = 69 cc/m2.  Left Ventricle: Severe global left ventricular systolic  dysfunction. There is severe global hypokinesis. LVEF  calculated using biplane Alberto's method is 28%. No left  ventricular thrombus. Severe concentric left ventricular  hypertrophy. Severe reversible diastolic dysfunction (Stage  III). Increased E/e'  is consistent with elevated left  ventricular filling pressure.  Right Heart: Severe right atrial enlargement.The right  ventricle is not well visualized. Using UEA, the RV appears  dilated and decreased in function. Normal tricuspid valve.  Mild-moderate tricuspid regurgitation. Normal pulmonic  valve. Mild pulmonic regurgitation.  Pericardium/Pleura: No pericardial effusion seen.  Hemodynamic: Estimated right atrial pressure is 8 mm Hg.  Estimated right ventricular systolic pressure equals 29 mm  Hg, assuming right atrial pressure equals 8 mm Hg,  consistent with normal pulmonary pressures.  ------------------------------------------------------------------------  Conclusions:  1. Normal mitral valve.  2. Normal trileaflet aortic valve.Mild aortic  regurgitation.  3. Severely dilated left atrium.  LA volume index = 69  cc/m2. Severe concentric leftventricular  hypertrophy.Severe global left ventricular systolic  dysfunction. There is severe global hypokinesis. LVEF  calculated using biplane Alberto's method is 28%. No left  ventricular thrombus.Severe reversible diastolic  dysfunction (Stage III).Increased E/e'  is consistent with  elevated left ventricular filling pressure.  4. Severe right atrial enlargement.The right ventricle is  not well visualized. Using UEA, the RV appears dilated and  decreased in function.  5. Normal tricuspid valve. Mild-moderate tricuspid  regurgitation.Estimated pulmonary artery systolic pressure  equals 29 mm Hg, assuming right atrial pressure equals 8  mm Hg, consistent with normal pulmonary pressures.  6. No pericardial effusion seen.  *** Compared with echocardiogram of 2021, there is  decreased LVEF and has severe diastolic dysfunction.  ------------------------------------------------------------------------  Confirmed on  3/20/2023 - 11:18:10 by Samantha Fofana M.D.  ------------------------------------------------------------------------    < end of copied text >    Stress Testing:    Cath:  < from: Cardiac Cath Lab - Adult (05.10.21 @ 14:12) >  CORONARY VESSELS: The coronary circulation is right dominant.  LM:   --  LM: Normal.  LAD:   --  LAD: Angiography showed minor luminal irregularities with no  flow limiting lesions.  CX:   --  Circumflex: Angiography showed minor luminal irregularities with  no flow limiting lesions.  RCA:   --  RCA: Angiography showed minor luminal irregularities with no  flow limiting lesions.    < end of copied text >      Interpretation of Telemetry:    Imaging:  < from: NM Amyloidosis SPECT/CT, Single Area Single Day (23 @ 20:00) >  FINDINGS: The technical quality of the images was excellent.  There is   diffusely increased pyrophosphate uptake throughout the LEFT ventricular   myocardium, with mild activity in the walls of the RIGHT ventricle.    Left Ventricular Myocardium to Bone (visual at 3 hrs): Grade: 3  (normal:   0)    Right Ventricular Myocardium to Bone (visual at 3 hrs): Grade: 1    (normal: 0)    Low-dose CT additionally demonstrates: Cardiomegaly. Punctate   atherosclerotic calcification of nonaneurysmal thoracic aorta. Bibasilar   atelectasis. Small hiatal hernia. Degenerative changes seen throughout   spine.    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    --- End of Report ---    ANICETO MENA MD; Attending Radiologist   This document has been electronically signed. 2023  3:50PM    < end of copied text >

## 2023-12-06 NOTE — PROGRESS NOTE ADULT - PROBLEM SELECTOR PLAN 3
DOppler and US of Scrotum with Rt Hydrocele and Prominence of Rt Epidymidis , possible Epididymitis and 1 cm Rt Epididymal lesion  --> URO consult is called   Pt follows with Dr Cha R  Urine NAAT for GC and CHlamydia both negative   on CTX, no urine cultures, will treat for total 7-10 days
Doppler and US of Scrotum with Rt Hydrocele and Prominence of Rt Epidymidis , possible Epididymitis and 1 cm Rt Epididymal lesion Urine NAAT for GC and CHlamydia both negative   on CTX, no urine cultures, will treat for total 7-10 days, may transition to alternative PO therapy on discharge, likely ciprofloxacin
DOppler and US of Scrotum with Rt Hydrocele and Prominence of Rt Epidymidis , possible Epididymitis and 1 cm Rt Epididymal lesion  --> URO consult is called   Pt follows with BALJIT Carrizales  WIll get urine NAAT for GC and CHlamydia  on CTX, no urine cultures, will treat for total 7-10 days

## 2023-12-07 ENCOUNTER — TRANSCRIPTION ENCOUNTER (OUTPATIENT)
Age: 68
End: 2023-12-07

## 2023-12-07 VITALS
OXYGEN SATURATION: 94 % | DIASTOLIC BLOOD PRESSURE: 71 MMHG | SYSTOLIC BLOOD PRESSURE: 153 MMHG | RESPIRATION RATE: 18 BRPM | HEART RATE: 62 BPM | TEMPERATURE: 98 F

## 2023-12-07 LAB
ANION GAP SERPL CALC-SCNC: 9 MMOL/L — SIGNIFICANT CHANGE UP (ref 5–17)
ANION GAP SERPL CALC-SCNC: 9 MMOL/L — SIGNIFICANT CHANGE UP (ref 5–17)
BUN SERPL-MCNC: 32 MG/DL — HIGH (ref 7–23)
BUN SERPL-MCNC: 32 MG/DL — HIGH (ref 7–23)
CALCIUM SERPL-MCNC: 8.9 MG/DL — SIGNIFICANT CHANGE UP (ref 8.4–10.5)
CALCIUM SERPL-MCNC: 8.9 MG/DL — SIGNIFICANT CHANGE UP (ref 8.4–10.5)
CHLORIDE SERPL-SCNC: 107 MMOL/L — SIGNIFICANT CHANGE UP (ref 96–108)
CHLORIDE SERPL-SCNC: 107 MMOL/L — SIGNIFICANT CHANGE UP (ref 96–108)
CO2 SERPL-SCNC: 23 MMOL/L — SIGNIFICANT CHANGE UP (ref 22–31)
CO2 SERPL-SCNC: 23 MMOL/L — SIGNIFICANT CHANGE UP (ref 22–31)
CREAT SERPL-MCNC: 1.61 MG/DL — HIGH (ref 0.5–1.3)
CREAT SERPL-MCNC: 1.61 MG/DL — HIGH (ref 0.5–1.3)
CULTURE RESULTS: SIGNIFICANT CHANGE UP
CULTURE RESULTS: SIGNIFICANT CHANGE UP
EGFR: 46 ML/MIN/1.73M2 — LOW
EGFR: 46 ML/MIN/1.73M2 — LOW
GLUCOSE SERPL-MCNC: 88 MG/DL — SIGNIFICANT CHANGE UP (ref 70–99)
GLUCOSE SERPL-MCNC: 88 MG/DL — SIGNIFICANT CHANGE UP (ref 70–99)
HCT VFR BLD CALC: 34.9 % — LOW (ref 39–50)
HCT VFR BLD CALC: 34.9 % — LOW (ref 39–50)
HGB BLD-MCNC: 11.6 G/DL — LOW (ref 13–17)
HGB BLD-MCNC: 11.6 G/DL — LOW (ref 13–17)
MCHC RBC-ENTMCNC: 30.1 PG — SIGNIFICANT CHANGE UP (ref 27–34)
MCHC RBC-ENTMCNC: 30.1 PG — SIGNIFICANT CHANGE UP (ref 27–34)
MCHC RBC-ENTMCNC: 33.2 GM/DL — SIGNIFICANT CHANGE UP (ref 32–36)
MCHC RBC-ENTMCNC: 33.2 GM/DL — SIGNIFICANT CHANGE UP (ref 32–36)
MCV RBC AUTO: 90.6 FL — SIGNIFICANT CHANGE UP (ref 80–100)
MCV RBC AUTO: 90.6 FL — SIGNIFICANT CHANGE UP (ref 80–100)
NRBC # BLD: 0 /100 WBCS — SIGNIFICANT CHANGE UP (ref 0–0)
NRBC # BLD: 0 /100 WBCS — SIGNIFICANT CHANGE UP (ref 0–0)
PLATELET # BLD AUTO: 137 K/UL — LOW (ref 150–400)
PLATELET # BLD AUTO: 137 K/UL — LOW (ref 150–400)
POTASSIUM SERPL-MCNC: 4.3 MMOL/L — SIGNIFICANT CHANGE UP (ref 3.5–5.3)
POTASSIUM SERPL-MCNC: 4.3 MMOL/L — SIGNIFICANT CHANGE UP (ref 3.5–5.3)
POTASSIUM SERPL-SCNC: 4.3 MMOL/L — SIGNIFICANT CHANGE UP (ref 3.5–5.3)
POTASSIUM SERPL-SCNC: 4.3 MMOL/L — SIGNIFICANT CHANGE UP (ref 3.5–5.3)
RBC # BLD: 3.85 M/UL — LOW (ref 4.2–5.8)
RBC # BLD: 3.85 M/UL — LOW (ref 4.2–5.8)
RBC # FLD: 15.8 % — HIGH (ref 10.3–14.5)
RBC # FLD: 15.8 % — HIGH (ref 10.3–14.5)
SODIUM SERPL-SCNC: 139 MMOL/L — SIGNIFICANT CHANGE UP (ref 135–145)
SODIUM SERPL-SCNC: 139 MMOL/L — SIGNIFICANT CHANGE UP (ref 135–145)
SPECIMEN SOURCE: SIGNIFICANT CHANGE UP
SPECIMEN SOURCE: SIGNIFICANT CHANGE UP
WBC # BLD: 5.05 K/UL — SIGNIFICANT CHANGE UP (ref 3.8–10.5)
WBC # BLD: 5.05 K/UL — SIGNIFICANT CHANGE UP (ref 3.8–10.5)
WBC # FLD AUTO: 5.05 K/UL — SIGNIFICANT CHANGE UP (ref 3.8–10.5)
WBC # FLD AUTO: 5.05 K/UL — SIGNIFICANT CHANGE UP (ref 3.8–10.5)

## 2023-12-07 PROCEDURE — 85730 THROMBOPLASTIN TIME PARTIAL: CPT

## 2023-12-07 PROCEDURE — 96374 THER/PROPH/DIAG INJ IV PUSH: CPT

## 2023-12-07 PROCEDURE — 87641 MR-STAPH DNA AMP PROBE: CPT

## 2023-12-07 PROCEDURE — 83036 HEMOGLOBIN GLYCOSYLATED A1C: CPT

## 2023-12-07 PROCEDURE — 76870 US EXAM SCROTUM: CPT

## 2023-12-07 PROCEDURE — 80061 LIPID PANEL: CPT

## 2023-12-07 PROCEDURE — 93975 VASCULAR STUDY: CPT

## 2023-12-07 PROCEDURE — 84295 ASSAY OF SERUM SODIUM: CPT

## 2023-12-07 PROCEDURE — 82947 ASSAY GLUCOSE BLOOD QUANT: CPT

## 2023-12-07 PROCEDURE — 83935 ASSAY OF URINE OSMOLALITY: CPT

## 2023-12-07 PROCEDURE — 87086 URINE CULTURE/COLONY COUNT: CPT

## 2023-12-07 PROCEDURE — 76770 US EXAM ABDO BACK WALL COMP: CPT

## 2023-12-07 PROCEDURE — 82570 ASSAY OF URINE CREATININE: CPT

## 2023-12-07 PROCEDURE — 84156 ASSAY OF PROTEIN URINE: CPT

## 2023-12-07 PROCEDURE — 85018 HEMOGLOBIN: CPT

## 2023-12-07 PROCEDURE — 99222 1ST HOSP IP/OBS MODERATE 55: CPT | Mod: GC

## 2023-12-07 PROCEDURE — 80053 COMPREHEN METABOLIC PANEL: CPT

## 2023-12-07 PROCEDURE — 84540 ASSAY OF URINE/UREA-N: CPT

## 2023-12-07 PROCEDURE — 81001 URINALYSIS AUTO W/SCOPE: CPT

## 2023-12-07 PROCEDURE — 85027 COMPLETE CBC AUTOMATED: CPT

## 2023-12-07 PROCEDURE — 96375 TX/PRO/DX INJ NEW DRUG ADDON: CPT

## 2023-12-07 PROCEDURE — 36415 COLL VENOUS BLD VENIPUNCTURE: CPT

## 2023-12-07 PROCEDURE — 82435 ASSAY OF BLOOD CHLORIDE: CPT

## 2023-12-07 PROCEDURE — 80048 BASIC METABOLIC PNL TOTAL CA: CPT

## 2023-12-07 PROCEDURE — 83605 ASSAY OF LACTIC ACID: CPT

## 2023-12-07 PROCEDURE — 99239 HOSP IP/OBS DSCHRG MGMT >30: CPT

## 2023-12-07 PROCEDURE — 85025 COMPLETE CBC W/AUTO DIFF WBC: CPT

## 2023-12-07 PROCEDURE — 76376 3D RENDER W/INTRP POSTPROCES: CPT | Mod: 26

## 2023-12-07 PROCEDURE — 93356 MYOCRD STRAIN IMG SPCKL TRCK: CPT

## 2023-12-07 PROCEDURE — 85610 PROTHROMBIN TIME: CPT

## 2023-12-07 PROCEDURE — 76376 3D RENDER W/INTRP POSTPROCES: CPT

## 2023-12-07 PROCEDURE — 84560 ASSAY OF URINE/URIC ACID: CPT

## 2023-12-07 PROCEDURE — 76770 US EXAM ABDO BACK WALL COMP: CPT | Mod: 26

## 2023-12-07 PROCEDURE — 84300 ASSAY OF URINE SODIUM: CPT

## 2023-12-07 PROCEDURE — 99285 EMERGENCY DEPT VISIT HI MDM: CPT | Mod: 25

## 2023-12-07 PROCEDURE — 97161 PT EVAL LOW COMPLEX 20 MIN: CPT

## 2023-12-07 PROCEDURE — 87640 STAPH A DNA AMP PROBE: CPT

## 2023-12-07 PROCEDURE — 93970 EXTREMITY STUDY: CPT

## 2023-12-07 PROCEDURE — 71045 X-RAY EXAM CHEST 1 VIEW: CPT

## 2023-12-07 PROCEDURE — 97165 OT EVAL LOW COMPLEX 30 MIN: CPT

## 2023-12-07 PROCEDURE — 82803 BLOOD GASES ANY COMBINATION: CPT

## 2023-12-07 PROCEDURE — 93306 TTE W/DOPPLER COMPLETE: CPT | Mod: 26

## 2023-12-07 PROCEDURE — 93306 TTE W/DOPPLER COMPLETE: CPT

## 2023-12-07 PROCEDURE — 81003 URINALYSIS AUTO W/O SCOPE: CPT

## 2023-12-07 PROCEDURE — 83880 ASSAY OF NATRIURETIC PEPTIDE: CPT

## 2023-12-07 PROCEDURE — 84443 ASSAY THYROID STIM HORMONE: CPT

## 2023-12-07 PROCEDURE — 84484 ASSAY OF TROPONIN QUANT: CPT

## 2023-12-07 PROCEDURE — 82330 ASSAY OF CALCIUM: CPT

## 2023-12-07 PROCEDURE — 84132 ASSAY OF SERUM POTASSIUM: CPT

## 2023-12-07 PROCEDURE — 84133 ASSAY OF URINE POTASSIUM: CPT

## 2023-12-07 PROCEDURE — 87591 N.GONORRHOEAE DNA AMP PROB: CPT

## 2023-12-07 PROCEDURE — 83735 ASSAY OF MAGNESIUM: CPT

## 2023-12-07 PROCEDURE — 87491 CHLMYD TRACH DNA AMP PROBE: CPT

## 2023-12-07 PROCEDURE — 84100 ASSAY OF PHOSPHORUS: CPT

## 2023-12-07 PROCEDURE — 85014 HEMATOCRIT: CPT

## 2023-12-07 RX ORDER — OXYCODONE HYDROCHLORIDE 5 MG/1
2.5 TABLET ORAL ONCE
Refills: 0 | Status: DISCONTINUED | OUTPATIENT
Start: 2023-12-07 | End: 2023-12-07

## 2023-12-07 RX ORDER — FUROSEMIDE 40 MG
1 TABLET ORAL
Refills: 0 | DISCHARGE

## 2023-12-07 RX ORDER — LEVOFLOXACIN 5 MG/ML
1 INJECTION, SOLUTION INTRAVENOUS
Qty: 10 | Refills: 0
Start: 2023-12-07 | End: 2023-12-16

## 2023-12-07 RX ORDER — SACUBITRIL AND VALSARTAN 24; 26 MG/1; MG/1
1 TABLET, FILM COATED ORAL
Refills: 0 | DISCHARGE

## 2023-12-07 RX ADMIN — Medication 175 MICROGRAM(S): at 05:15

## 2023-12-07 RX ADMIN — CHLORHEXIDINE GLUCONATE 1 APPLICATION(S): 213 SOLUTION TOPICAL at 05:20

## 2023-12-07 RX ADMIN — OXYCODONE HYDROCHLORIDE 2.5 MILLIGRAM(S): 5 TABLET ORAL at 02:28

## 2023-12-07 RX ADMIN — FINASTERIDE 5 MILLIGRAM(S): 5 TABLET, FILM COATED ORAL at 12:00

## 2023-12-07 RX ADMIN — HEPARIN SODIUM 5000 UNIT(S): 5000 INJECTION INTRAVENOUS; SUBCUTANEOUS at 05:15

## 2023-12-07 RX ADMIN — OXYCODONE HYDROCHLORIDE 2.5 MILLIGRAM(S): 5 TABLET ORAL at 03:28

## 2023-12-07 NOTE — CONSULT NOTE ADULT - ASSESSMENT
68-year-old male past medical history of HFrEF,  Cardiac Amyloidosis, Cardiomyopathy, Neuropathy, Carotid stenosis -> s/p stents, Hypothyroidism, Pituitary Adenoma, Hyperprolactinemia,  BPH, HTN, laryngeal cancer s/p radiation here with CHF exacerbation, found to have R epididymitis on US    #R epididymitis and 1cm hypoechoic R epididymal lesion   - antibiotics  - f/u urine cx  - scrotal support/elevation  - outpatient follow up with Dr. Lyon for epididymitis and for the 1cm hypoechoic R epididymal lesion   - d/w Dr. Garcia
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  Admitted with acute on chronic heart failure failure and volume overload.  Diuresis as tolerated.  Keep O > I, K > 4.0, Mag > 2.0.  Can escalate to Bumex gtt if initial dosing is ineffective.  Monitor renal function.     Plan to start TTR stabilizer therapy and silencer therapy as outpatient.
68 y.o M w/ PMHx of HFrEF, cardiac amyloidosis, cardiomyopathy, neuropathy, carotid stenosis s/p stents, hypothyroidism, pituitary adenoma, hyperprolactinemia, BPH, HTN, laryngeal cancer s/p radiation presents for BILLY with increased leg and scrotal swelling for 4 days and no orthopnea. Nephrology consulted for ZEESHAN

## 2023-12-07 NOTE — DISCHARGE NOTE PROVIDER - CARE PROVIDERS DIRECT ADDRESSES
,[email or web address],DirectAddress_Unknown [email or web address],DirectAddress_Unknown

## 2023-12-07 NOTE — CONSULT NOTE ADULT - PROBLEM SELECTOR RECOMMENDATION 9
Pt with ZEESAHN on CKD likely in setting of overdiuresis. Patient with baseline creatinine 1.4-1.5, was 2.05 on 12/3 and peaked to 2.46 on 12/5 from lasix IV BID. Lasix held on 12/5 and now has improved to 1.61 now. UA bland on 12/5. Currently euvolemic. Can resume home lasix on discharge. Can be discharged from a nephrology standpoint.     Upon discharge, patient needs close follow up with nephrology.  Please have patient call [phone] or email [email or web address] to make a followup appointment.  Office located [address] [place] [address]. [id number]. [id number]    If you have any questions, please feel free to contact me  Piyush Abernathy  Nephrology Fellow  [phone]; Prefer [employer] TEAMS  (After 5pm or on weekends please page the on-call fellow). Pt with ZEESHAN on CKD likely in setting of overdiuresis. Patient with baseline creatinine 1.4-1.5, was 2.05 on 12/3 and peaked to 2.46 on 12/5 from lasix IV BID. Lasix held on 12/5 and now has improved to 1.61 now. UA bland on 12/5. Currently euvolemic. Can resume home lasix on discharge. Can be discharged from a nephrology standpoint.     Upon discharge, patient needs close follow up with nephrology.  Please have patient call [phone] or email [email or web address] to make a followup appointment.  Office located [address] [place] [address]. [id number]. [id number]    If you have any questions, please feel free to contact me  Piyush Abernathy  Nephrology Fellow  [phone]; Prefer [employer] TEAMS  (After 5pm or on weekends please page the on-call fellow).

## 2023-12-07 NOTE — DISCHARGE NOTE PROVIDER - ATTENDING DISCHARGE PHYSICAL EXAMINATION:
.  VITAL SIGNS:  T(C): 36.6 (12-07-23 @ 11:24), Max: 36.6 (12-06-23 @ 20:18)  T(F): 97.9 (12-07-23 @ 11:24), Max: 97.9 (12-07-23 @ 02:02)  HR: 87 (12-07-23 @ 11:24) (73 - 89)  BP: 129/83 (12-07-23 @ 11:24) (123/90 - 139/98)  BP(mean): --  RR: 18 (12-07-23 @ 11:24) (17 - 18)  SpO2: 95% (12-07-23 @ 11:24) (95% - 97%)  Wt(kg): --    PHYSICAL EXAM:    Constitutional: WDWN resting comfortably in bed; NAD  Head: NC/AT  Eyes: PERRL, EOMI, anicteric sclera  ENT: no nasal discharge; uvula midline, no oropharyngeal erythema or exudates; MMM  Neck: supple; no JVD or thyromegaly  Respiratory: CTA B/L; no W/R/R, no retractions  Cardiac: +S1/S2; RRR; no M/R/G; PMI non-displaced, no LE edema, mild scrotal edema improved form admission   Gastrointestinal: abdomen soft, NT/ND; no rebound or guarding; +BSx4  Extremities: WWP, no clubbing or cyanosis; no peripheral edema  Vascular: 2+ radial, femoral, DP/PT pulses B/L  Lymphatic: no submandibular or cervical LAD  Neurologic: AAOx3; CNII-XII grossly intact; no focal deficits  Psychiatric: affect and characteristics of appearance, verbalizations, behaviors are appropriate

## 2023-12-07 NOTE — DISCHARGE NOTE PROVIDER - HOSPITAL COURSE
HPI:      68-year-old male past medical history of HFrEF,  Cardiac Amyloidosis, Cardiomyopathy, Neuropathy, Carotid stenosis -> s/p stents, Hypothyroidism, Pituitary Adenoma, Hyperprolactinemia,  BPH, HTN, laryngeal cancer s/p radiation presents  for BILLY with increased leg and scrotal swelling for 4 days and no orthopnea.  Patient states symptoms similar to prior CHF exacerbation.   NO orthopnea, no PND and no chest pain , no cough.  Patient noted swelling with increased pain on the scrotum about 5 days ago and his Urologist ( started him on ABX as he states ) .  NO fever , no chest pain, no palpitation, no change in diet, no change in medication and he states to be compliant with his medications Endorses symptoms of scrotal swelling, dysuria, extremity tenderness.  Denies fever, chills, nausea, vomiting, chest pain, SOB, calf tenderness, abdominal pain, urinary symptoms, no penile DC , no trauma.   No prior STIs and has been  for 17 years.    In the ED : patient received Lasix 40mg IV X1 and IV Tylenol CX 1    (03 Dec 2023 15:36)    Hospital Course: Pt resents  for BILLY with increased leg and scrotal swelling for 4 days and no orthopnea. Patient is found to have evidence of Acute on chronic heart failure and admitted to the hospital for further evaluation and treatment , with course complicated by ZEESHAN which is now resolving.  Last EF was 25 % in 3/2025. Repeat Echo _______**** Pt follows up with Dr Faulkner, and Dr Yi. Currently holding Lasix 40mg BID 12/5 given ZEESHAN now, and improvement in LE swelling.  Now holding Entresto in setting of Hypotension and ZEESHAN, no other GDMT. Cardiorenal consulted ____________***** US kidney/bladder ___________*** Of note , pt with Scrotal pain. Doppler and US of Scrotum with Rt Hydrocele and Prominence of Rt Epidymidis , possible Epididymitis and 1 cm Rt Epididymal lesion Urine NAAT for GC and CHlamydia both negative. Pt on CTX, no urine cultures, will treat for total 7-10 days, likely transition to alternative PO therapy on discharge_______________****      Important Medication Changes and Reason:    Active or Pending Issues Requiring Follow-up:    Advanced Directives:   [ x] Full code  [ ] DNR  [ ] Hospice    Discharge Diagnoses:  HFrEF    Cardiac Amyloidosis   Cardiomyopathy   Neuropathy   Carotid stenosis    Hypothyroidism   Pituitary Adenoma   Hyperprolactinemia    BPH   HTN   laryngeal cancer

## 2023-12-07 NOTE — CONSULT NOTE ADULT - SUBJECTIVE AND OBJECTIVE BOX
[facility] DIVISION OF KIDNEY DISEASES AND HYPERTENSION -- [phone]  -- INITIAL CONSULT NOTE  --------------------------------------------------------------------------------  HPI:  68 y.o M w/ PMHx of HFrEF, cardiac amyloidosis, cardiomyopathy, neuropathy, carotid stenosis s/p stents, hypothyroidism, pituitary adenoma, hyperprolactinemia, BPH, HTN, laryngeal cancer s/p radiation presents for BILLY with increased leg and scrotal swelling for 4 days and no orthopnea. Patient was treated for ADHF with lasix 40 mg IV BID but stopped 12/5 given ZEESHAN. Entresto also held given hypotension. Nephrology consulted for further eval.     Patient seen this am at bedside. Appeared comfortable sitting up. He reports being on a water pill at home (lasix 20 BID) but noted to have worsening scrotal edema and dyspnea so came in to the ED. He says his swelling is now improved. Denies any headaches, fevers/chills, chest pain, palpitations, SOB, and leg swelling.      PAST HISTORY  --------------------------------------------------------------------------------  PAST MEDICAL & SURGICAL HISTORY:  Low back pain  Herniated disc on lumbar region      Redundant prepuce and phimosis      Arthritis  on knees      Hypothyroid  not taking any meds      Hypertension      Thyroid cancer  pt denies any thyroid cancer      History of pituitary disease  on caber      Gout      S/P excision of vocal cord nodule  2007 and radiation -      Laryngeal cancer  "stage 0"      Pituitary mass      Carotid occlusion, bilateral      Obese      S/P excision of vocal cord nodule  radiation - 2007      History of lumbar spinal fusion  2018      H/O arthroscopy of shoulder  right-2017      Status post carotid surgery  right-5/13/2022, pt. states unsuccesful        FAMILY HISTORY:  Family history of stroke (Mother)    Family history of Alzheimer's disease (Father)    FH: stroke (Mother, Father)      PAST SOCIAL HISTORY:    ALLERGIES & MEDICATIONS  --------------------------------------------------------------------------------  Allergies    No Known Allergies    Intolerances      Standing Inpatient Medications  atorvastatin 40 milliGRAM(s) Oral at bedtime  cabergoline 1 milliGRAM(s) Oral <User Schedule>  cefTRIAXone   IVPB      cefTRIAXone   IVPB 1000 milliGRAM(s) IV Intermittent every 24 hours  chlorhexidine 2% Cloths 1 Application(s) Topical <User Schedule>  finasteride 5 milliGRAM(s) Oral daily  heparin   Injectable 5000 Unit(s) SubCutaneous every 12 hours  levothyroxine 175 MICROGram(s) Oral daily  tamsulosin 0.4 milliGRAM(s) Oral at bedtime    PRN Inpatient Medications  acetaminophen     Tablet .. 650 milliGRAM(s) Oral every 6 hours PRN  albuterol/ipratropium for Nebulization 3 milliLiter(s) Nebulizer every 6 hours PRN  ALPRAZolam 0.25 milliGRAM(s) Oral daily PRN  aluminum hydroxide/magnesium hydroxide/simethicone Suspension 30 milliLiter(s) Oral every 4 hours PRN  melatonin 3 milliGRAM(s) Oral at bedtime PRN  ondansetron Injectable 4 milliGRAM(s) IV Push every 8 hours PRN      REVIEW OF SYSTEMS  --------------------------------------------------------------------------------  per above    VITALS/PHYSICAL EXAM  --------------------------------------------------------------------------------  T(C): 36.6 (12-07-23 @ 11:24), Max: 36.6 (12-06-23 @ 20:18)  HR: 87 (12-07-23 @ 11:24) (73 - 89)  BP: 129/83 (12-07-23 @ 11:24) (123/90 - 139/98)  RR: 18 (12-07-23 @ 11:24) (17 - 18)  SpO2: 95% (12-07-23 @ 11:24) (95% - 97%)  Wt(kg): --        12-06-23 @ 07:01  -  12-07-23 @ 07:00  --------------------------------------------------------  IN: 600 mL / OUT: 1050 mL / NET: -450 mL        Physical Exam:  	Gen: NAD  	HEENT: Anicteric  	Pulm: CTA B/L  	CV: S1S2+  	Abd: Soft, +BS        	Ext: No LE edema B/L  	Neuro: Awake              Psych: normal mood and affect         	Skin: Warm and dry  	Dialysis access:     LABS/STUDIES  --------------------------------------------------------------------------------              11.6   5.05  >-----------<  137      [12-07-23 @ 05:43]              34.9     139  |  107  |  32  ----------------------------<  88      [12-07-23 @ 05:43]  4.3   |  23  |  1.61        Ca     8.9     [12-07-23 @ 05:43]      Mg     1.9     [12-06-23 @ 07:07]      Phos  3.3     [12-06-23 @ 07:07]            Creatinine Trend:  SCr 1.61 [12-07 @ 05:43]  SCr 1.98 [12-06 @ 07:07]  SCr 2.46 [12-05 @ 07:01]  SCr 2.13 [12-04 @ 07:58]  SCr 2.24 [12-04 @ 00:48]    Urinalysis - [12-07-23 @ 05:43]      Color  / Appearance  / SG  / pH       Gluc 88 / Ketone   / Bili  / Urobili        Blood  / Protein  / Leuk Est  / Nitrite       RBC  / WBC  / Hyaline  / Gran  / Sq Epi  / Non Sq Epi  / Bacteria     Urine Creatinine 125      [12-05-23 @ 18:04]  Urine Protein 15      [12-05-23 @ 18:04]  Urine Sodium 52      [12-05-23 @ 18:04]  Urine Urea Nitrogen 609      [12-05-23 @ 18:04]  Urine Potassium 44      [12-05-23 @ 18:04]  Urine Osmolality 413      [12-05-23 @ 18:04]    HCV 0.48, Nonreact      [05-05-21 @ 08:52]    Free Light Chains: kappa 3.20, lambda 2.18, ratio = 1.47      [03-21 @ 15:38]  Immunofixation Serum:   No Monoclonal Band Identified    Reference Range: None Detected      [03-21-23 @ 15:38]  Immunofixation Urine:   Reference Range: None Detected      [03-21-23 @ 18:42]    Tacrolimus  Cyclosporine  Sirolimus  Mycophenolate  BK PCR  CMV PCR  Parvo PCR  EBV PCR [facility] DIVISION OF KIDNEY DISEASES AND HYPERTENSION -- [phone]  -- INITIAL CONSULT NOTE  --------------------------------------------------------------------------------  HPI:  68 y.o M w/ PMHx of HFrEF, cardiac amyloidosis, cardiomyopathy, neuropathy, carotid stenosis s/p stents, hypothyroidism, pituitary adenoma, hyperprolactinemia, BPH, HTN, laryngeal cancer s/p radiation presents for BILLY with increased leg and scrotal swelling for 4 days and no orthopnea. Patient was treated for ADHF with lasix 40 mg IV BID but stopped 12/5 given ZEESHAN. Entresto also held given hypotension. Nephrology consulted for further eval.     Patient seen this am at bedside. Appeared comfortable sitting up. He reports being on a water pill at home (lasix 20 BID) but noted to have worsening scrotal edema and dyspnea so came in to the ED. He says his swelling is now improved. Denies any headaches, fevers/chills, chest pain, palpitations, SOB, and leg swelling.      PAST HISTORY  --------------------------------------------------------------------------------  PAST MEDICAL & SURGICAL HISTORY:  Low back pain  Herniated disc on lumbar region      Redundant prepuce and phimosis      Arthritis  on knees      Hypothyroid  not taking any meds      Hypertension      Thyroid cancer  pt denies any thyroid cancer      History of pituitary disease  on caber      Gout      S/P excision of vocal cord nodule  2007 and radiation -      Laryngeal cancer  "stage 0"      Pituitary mass      Carotid occlusion, bilateral      Obese      S/P excision of vocal cord nodule  radiation - 2007      History of lumbar spinal fusion  2018      H/O arthroscopy of shoulder  right-2017      Status post carotid surgery  right-5/13/2022, pt. states unsuccesful        FAMILY HISTORY:  Family history of stroke (Mother)    Family history of Alzheimer's disease (Father)    FH: stroke (Mother, Father)      PAST SOCIAL HISTORY:    ALLERGIES & MEDICATIONS  --------------------------------------------------------------------------------  Allergies    No Known Allergies    Intolerances      Standing Inpatient Medications  atorvastatin 40 milliGRAM(s) Oral at bedtime  cabergoline 1 milliGRAM(s) Oral <User Schedule>  cefTRIAXone   IVPB      cefTRIAXone   IVPB 1000 milliGRAM(s) IV Intermittent every 24 hours  chlorhexidine 2% Cloths 1 Application(s) Topical <User Schedule>  finasteride 5 milliGRAM(s) Oral daily  heparin   Injectable 5000 Unit(s) SubCutaneous every 12 hours  levothyroxine 175 MICROGram(s) Oral daily  tamsulosin 0.4 milliGRAM(s) Oral at bedtime    PRN Inpatient Medications  acetaminophen     Tablet .. 650 milliGRAM(s) Oral every 6 hours PRN  albuterol/ipratropium for Nebulization 3 milliLiter(s) Nebulizer every 6 hours PRN  ALPRAZolam 0.25 milliGRAM(s) Oral daily PRN  aluminum hydroxide/magnesium hydroxide/simethicone Suspension 30 milliLiter(s) Oral every 4 hours PRN  melatonin 3 milliGRAM(s) Oral at bedtime PRN  ondansetron Injectable 4 milliGRAM(s) IV Push every 8 hours PRN      REVIEW OF SYSTEMS  --------------------------------------------------------------------------------  per above    VITALS/PHYSICAL EXAM  --------------------------------------------------------------------------------  T(C): 36.6 (12-07-23 @ 11:24), Max: 36.6 (12-06-23 @ 20:18)  HR: 87 (12-07-23 @ 11:24) (73 - 89)  BP: 129/83 (12-07-23 @ 11:24) (123/90 - 139/98)  RR: 18 (12-07-23 @ 11:24) (17 - 18)  SpO2: 95% (12-07-23 @ 11:24) (95% - 97%)  Wt(kg): --        12-06-23 @ 07:01  -  12-07-23 @ 07:00  --------------------------------------------------------  IN: 600 mL / OUT: 1050 mL / NET: -450 mL        Physical Exam:  	Gen: NAD  	HEENT: Anicteric  	Pulm: CTA B/L  	CV: S1S2+  	Abd: Soft, +BS        	Ext: No LE edema B/L  	Neuro: Awake              Psych: normal mood and affect         	Skin: Warm and dry  	Dialysis access:     LABS/STUDIES  --------------------------------------------------------------------------------              11.6   5.05  >-----------<  137      [12-07-23 @ 05:43]              34.9     139  |  107  |  32  ----------------------------<  88      [12-07-23 @ 05:43]  4.3   |  23  |  1.61        Ca     8.9     [12-07-23 @ 05:43]      Mg     1.9     [12-06-23 @ 07:07]      Phos  3.3     [12-06-23 @ 07:07]            Creatinine Trend:  SCr 1.61 [12-07 @ 05:43]  SCr 1.98 [12-06 @ 07:07]  SCr 2.46 [12-05 @ 07:01]  SCr 2.13 [12-04 @ 07:58]  SCr 2.24 [12-04 @ 00:48]    Urinalysis - [12-07-23 @ 05:43]      Color  / Appearance  / SG  / pH       Gluc 88 / Ketone   / Bili  / Urobili        Blood  / Protein  / Leuk Est  / Nitrite       RBC  / WBC  / Hyaline  / Gran  / Sq Epi  / Non Sq Epi  / Bacteria     Urine Creatinine 125      [12-05-23 @ 18:04]  Urine Protein 15      [12-05-23 @ 18:04]  Urine Sodium 52      [12-05-23 @ 18:04]  Urine Urea Nitrogen 609      [12-05-23 @ 18:04]  Urine Potassium 44      [12-05-23 @ 18:04]  Urine Osmolality 413      [12-05-23 @ 18:04]    HCV 0.48, Nonreact      [05-05-21 @ 08:52]    Free Light Chains: kappa 3.20, lambda 2.18, ratio = 1.47      [03-21 @ 15:38]  Immunofixation Serum:   No Monoclonal Band Identified    Reference Range: None Detected      [03-21-23 @ 15:38]  Immunofixation Urine:   Reference Range: None Detected      [03-21-23 @ 18:42]    Tacrolimus  Cyclosporine  Sirolimus  Mycophenolate  BK PCR  CMV PCR  Parvo PCR  EBV PCR

## 2023-12-07 NOTE — DISCHARGE NOTE NURSING/CASE MANAGEMENT/SOCIAL WORK - PATIENT PORTAL LINK FT
You can access the FollowMyHealth Patient Portal offered by [facility] by registering at the following website: [email or web address]. By joining [employer]’s FollowMyHealth portal, you will also be able to view your health information using other applications (apps) compatible with our system. You can access the FollowMyHealth Patient Portal offered by [facility] by registering at the following website: [email or web address]. By joining [employer]’s FollowMyHealth portal, you will also be able to view your health information using other applications (apps) compatible with our system.

## 2023-12-07 NOTE — DISCHARGE NOTE PROVIDER - CARE PROVIDER_API CALL
Marcio Faulkner  Cardiology  [address], [address]  [place] [address]  Phone: [phone]  Fax: [phone]  Established Patient  Follow Up Time:     Piyush Abernathy  Phone: ()-  Fax: ()-  Established Patient  Follow Up Time:    Marcio Faulkner  Cardiology  [address], [address]  [place] [address]  Phone: [phone]  Fax: [phone]  Established Patient  Follow Up Time:     Piyush Abernathy  Phone: ()-  Fax: ()-  Established Patient  Follow Up Time:

## 2023-12-07 NOTE — DISCHARGE NOTE PROVIDER - NSDCCPCAREPLAN_GEN_ALL_CORE_FT
PRINCIPAL DISCHARGE DIAGNOSIS  Diagnosis: CHF, acute on chronic  Assessment and Plan of Treatment: You were being treated for heart failure with diuretics, althgh currently stopped due to acute kidney function.   Please follow up with your Cardiologist   Weigh yourself daily.  If you gain 3lbs in 3 days, or 5lbs in a week call your Health Care Provider.  Do not eat or drink foods containing more than 2000mg of salt (sodium) in your diet every day.  Call your Health Care Provider if you have any swelling or increased swelling in your feet, ankles, and/or stomach.  Take all of your medication as directed.  If you become dizzy call your Health Care ProviPder.        SECONDARY DISCHARGE DIAGNOSES  Diagnosis: ZEESHAN (acute kidney injury)  Assessment and Plan of Treatment: Initially from HF exacerbation, but now worsening likely from dehydration from lasix. Diuretics and Entresto are currenlty on hold .          Diagnosis: Scrotal pain  Assessment and Plan of Treatment: Doppler and US of Scrotum with Rt Hydrocele and Prominence of Rt Epidymidis , possible Epididymitis and 1 cm Rt Epididymal lesion Urine NAAT for GC and CHlamydia both negative .  Please continue antibiotics as prescibe _______________*****    Diagnosis: Amyloidosis  Assessment and Plan of Treatment: F/up with Dr Faulkner  Pt is awaiting on Amyloidosis meds approval from the company, outpatient follow up.       PRINCIPAL DISCHARGE DIAGNOSIS  Diagnosis: CHF, acute on chronic  Assessment and Plan of Treatment: You were being treated for heart failure with diuretics, althgh currently stopped due to acute kidney function.   Please follow up with your Cardiologist       Can resume home lasix on discharge.  Weigh yourself daily.  If you gain 3lbs in 3 days, or 5lbs in a week call your Health Care Provider.  Do not eat or drink foods containing more than 2000mg of salt (sodium) in your diet every day.  Call your Health Care Provider if you have any swelling or increased swelling in your feet, ankles, and/or stomach.  Take all of your medication as directed.  If you become dizzy call your Health Care ProviPder.        SECONDARY DISCHARGE DIAGNOSES  Diagnosis: Amyloidosis  Assessment and Plan of Treatment: F/up with Dr Faulkner  Pt is awaiting on Amyloidosis meds approval from the company, outpatient follow up.      Diagnosis: Scrotal pain  Assessment and Plan of Treatment: Doppler and US of Scrotum with Rt Hydrocele and Prominence of Rt Epidymidis , possible Epididymitis and 1 cm Rt Epididymal lesion Urine NAAT for GC and CHlamydia both negative .  Please continue antibiotics as prescibe _Levaquin 500mg daily x 10 days__*****    Diagnosis: ZEESHAN (acute kidney injury)  Assessment and Plan of Treatment: Initially from HF exacerbation, but now worsening likely from dehydration from lasix. Diuretics and Entresto are currenlty on hold .   Upon discharge, patient needs close follow up with nephrology.  Please have patient call [phone] or email [email or web address] to make a followup appointment.  Office located [address] [place] [address]. [id number] [id number]       PRINCIPAL DISCHARGE DIAGNOSIS  Diagnosis: CHF, acute on chronic  Assessment and Plan of Treatment: You were being treated for heart failure with diuretics, althgh currently stopped due to acute kidney function.   Please follow up with your Cardiologist       Can resume home lasix on discharge.  Weigh yourself daily.  If you gain 3lbs in 3 days, or 5lbs in a week call your Health Care Provider.  Do not eat or drink foods containing more than 2000mg of salt (sodium) in your diet every day.  Call your Health Care Provider if you have any swelling or increased swelling in your feet, ankles, and/or stomach.  Take all of your medication as directed.  If you become dizzy call your Health Care ProviPder.        SECONDARY DISCHARGE DIAGNOSES  Diagnosis: Amyloidosis  Assessment and Plan of Treatment: F/up with Dr Faulkner  Pt is awaiting on Amyloidosis meds approval from the company, outpatient follow up.      Diagnosis: Scrotal pain  Assessment and Plan of Treatment: Doppler and US of Scrotum with Rt Hydrocele and Prominence of Rt Epidymidis , possible Epididymitis and 1 cm Rt Epididymal lesion Urine NAAT for GC and CHlamydia both negative .  Please continue antibiotics as prescibe _Levaquin 500mg daily x 10 days__*****    Diagnosis: ZEESHAN (acute kidney injury)  Assessment and Plan of Treatment: Initially from HF exacerbation, but now worsening likely from dehydration from lasix. Diuretics and Entresto are currenlty on hold .   Upon discharge, patient needs close follow up with nephrology.  Please have patient call [phone] or email [email or web address] to make a followup appointment.  Office located [address] [place] [address]. [phone] [phone]

## 2023-12-07 NOTE — CONSULT NOTE ADULT - ATTENDING COMMENTS
# Eloisa/ATN - Baseline creatinine 1.4-1.5mg/dL. LIkely developed ELOISA in the setting of overdiuresis and hemodynamic ATN. Serum creatinine peaked at 2.4 and now trending down to 1.6 after holding entresto and IV lasix. He is euvolemic now. To resume home dose of furosemide.   In terms of entresto --> best to resume as outpatient when creatinine is stable.     US kidney:  ---------------  Right kidney: 9 cm. Limited evaluation of the lower pole secondary to   overlying bowel. No renal stones or hydronephrosis.  Left kidney: 11 cm.No renal stones or hydronephrosis. Subcentimeter   hyperechoic wedge-shaped junctional defect along the upper/interpolar   region, may correspond to similar wedge shaped appearing hypodensity seen   on CT of 12/4/2022 and may represent scarring.  -------------------------  The rest of the recommendations as per fellow's note.    Iris Haley MD  Attending Nephrologist  [phone] or via [employer] # Eloisa/ATN - Baseline creatinine 1.4-1.5mg/dL. LIkely developed ELOISA in the setting of overdiuresis and hemodynamic ATN. Serum creatinine peaked at 2.4 and now trending down to 1.6 after holding entresto and IV lasix. He is euvolemic now. To resume home dose of furosemide.   In terms of entresto --> best to resume as outpatient when creatinine is stable.     US kidney:  ---------------  Right kidney: 9 cm. Limited evaluation of the lower pole secondary to   overlying bowel. No renal stones or hydronephrosis.  Left kidney: 11 cm.No renal stones or hydronephrosis. Subcentimeter   hyperechoic wedge-shaped junctional defect along the upper/interpolar   region, may correspond to similar wedge shaped appearing hypodensity seen   on CT of 12/4/2022 and may represent scarring.  -------------------------  The rest of the recommendations as per fellow's note.    Iris Haley MD  Attending Nephrologist  [phone] or via [employer]

## 2023-12-07 NOTE — DISCHARGE NOTE PROVIDER - PROVIDER TOKENS
PROVIDER:[TOKEN:[[id number]],ESTABLISHEDPATIENT:[T]],PROVIDER:[TOKEN:[[id number]],ESTABLISHEDPATIENT:[T]] PROVIDER:[TOKEN:[[id number]],ESTABLISHEDPATIENT:[T]],PROVIDER:[TOKEN:[[id number]],ESTABLISHEDPATIENT:[T]]

## 2023-12-07 NOTE — DISCHARGE NOTE NURSING/CASE MANAGEMENT/SOCIAL WORK - NSDCPEFALRISK_GEN_ALL_CORE
For information on Fall & Injury Prevention, visit: [email or web address] OR  [email or web address] OR  https://www.cdc.gov/steadi/patient.html For information on Fall & Injury Prevention, visit: [email or web address] OR  [email or web address] OR  https://www.cdc.gov/steadi/patient.html

## 2023-12-07 NOTE — DISCHARGE NOTE PROVIDER - NSDCMRMEDTOKEN_GEN_ALL_CORE_FT
Advair Diskus 100 mcg-50 mcg inhalation powder: 1 puff(s) inhaled 2 times a day as needed  Albuterol (Eqv-ProAir HFA) 90 mcg/inh inhalation aerosol: 1 puff(s) inhaled once a day (at bedtime) and as needed  ALPRAZolam 0.25 mg oral tablet: 1 tab(s) orally once a day as needed  atorvastatin 40 mg oral tablet: 1 tab(s) orally once a day (at bedtime)  Bactrim  mg-160 mg oral tablet: 1 tab(s) orally 2 times a day for 14 days  cabergoline 0.5 mg oral tablet: 2 tab(s) orally once a week - fridays  Entresto 24 mg-26 mg oral tablet: 1 tab(s) orally 2 times a day  finasteride 5 mg oral tablet: 1 tab(s) orally once a day  furosemide 20 mg oral tablet: 1 tab(s) orally 2 times a day  Synthroid 175 mcg (0.175 mg) oral tablet: 1 tab(s) orally once a day  tamsulosin 0.4 mg oral capsule: 1 cap(s) orally 2 times a day   Advair Diskus 100 mcg-50 mcg inhalation powder: 1 puff(s) inhaled 2 times a day as needed  Albuterol (Eqv-ProAir HFA) 90 mcg/inh inhalation aerosol: 1 puff(s) inhaled once a day (at bedtime) and as needed  ALPRAZolam 0.25 mg oral tablet: 1 tab(s) orally once a day as needed  atorvastatin 40 mg oral tablet: 1 tab(s) orally once a day (at bedtime)  Bactrim  mg-160 mg oral tablet: 1 tab(s) orally 2 times a day for 14 days  cabergoline 0.5 mg oral tablet: 2 tab(s) orally once a week - fridays  finasteride 5 mg oral tablet: 1 tab(s) orally once a day  levoFLOXacin 500 mg oral tablet: 1 tab(s) orally once a day  Synthroid 175 mcg (0.175 mg) oral tablet: 1 tab(s) orally once a day  tamsulosin 0.4 mg oral capsule: 1 cap(s) orally 2 times a day

## 2023-12-21 ENCOUNTER — NON-APPOINTMENT (OUTPATIENT)
Age: 68
End: 2023-12-21

## 2023-12-21 ENCOUNTER — APPOINTMENT (OUTPATIENT)
Dept: CARDIOLOGY | Facility: CLINIC | Age: 68
End: 2023-12-21
Payer: MEDICARE

## 2023-12-21 VITALS
HEART RATE: 78 BPM | WEIGHT: 237 LBS | SYSTOLIC BLOOD PRESSURE: 122 MMHG | DIASTOLIC BLOOD PRESSURE: 83 MMHG | OXYGEN SATURATION: 97 % | BODY MASS INDEX: 34.01 KG/M2

## 2023-12-21 DIAGNOSIS — I43 ORGAN-LIMITED AMYLOIDOSIS: ICD-10-CM

## 2023-12-21 DIAGNOSIS — G63 NEUROPATHIC HEREDOFAMILIAL AMYLOIDOSIS: ICD-10-CM

## 2023-12-21 DIAGNOSIS — Z92.89 PERSONAL HISTORY OF OTHER MEDICAL TREATMENT: ICD-10-CM

## 2023-12-21 DIAGNOSIS — E85.4 ORGAN-LIMITED AMYLOIDOSIS: ICD-10-CM

## 2023-12-21 DIAGNOSIS — E85.1 NEUROPATHIC HEREDOFAMILIAL AMYLOIDOSIS: ICD-10-CM

## 2023-12-21 PROCEDURE — 93000 ELECTROCARDIOGRAM COMPLETE: CPT

## 2023-12-21 PROCEDURE — 99215 OFFICE O/P EST HI 40 MIN: CPT

## 2023-12-21 RX ORDER — FUROSEMIDE 40 MG/1
40 TABLET ORAL
Qty: 90 | Refills: 1 | Status: ACTIVE | COMMUNITY
Start: 2023-12-21 | End: 1900-01-01

## 2023-12-22 LAB
ALBUMIN SERPL ELPH-MCNC: 4.1 G/DL
ALP BLD-CCNC: 85 U/L
ALT SERPL-CCNC: 19 U/L
ANION GAP SERPL CALC-SCNC: 12 MMOL/L
AST SERPL-CCNC: 20 U/L
BILIRUB SERPL-MCNC: 0.7 MG/DL
BUN SERPL-MCNC: 29 MG/DL
CALCIUM SERPL-MCNC: 8.9 MG/DL
CHLORIDE SERPL-SCNC: 109 MMOL/L
CO2 SERPL-SCNC: 22 MMOL/L
CREAT SERPL-MCNC: 1.52 MG/DL
EGFR: 49 ML/MIN/1.73M2
GLUCOSE SERPL-MCNC: 102 MG/DL
HCT VFR BLD CALC: 37.6 %
HGB BLD-MCNC: 12.7 G/DL
MCHC RBC-ENTMCNC: 30.5 PG
MCHC RBC-ENTMCNC: 33.8 GM/DL
MCV RBC AUTO: 90.2 FL
NT-PROBNP SERPL-MCNC: 2296 PG/ML
PLATELET # BLD AUTO: 188 K/UL
POTASSIUM SERPL-SCNC: 4.8 MMOL/L
PREALB SERPL NEPH-MCNC: 10 MG/DL
PROT SERPL-MCNC: 6.2 G/DL
RBC # BLD: 4.17 M/UL
RBC # FLD: 15.7 %
SODIUM SERPL-SCNC: 144 MMOL/L
WBC # FLD AUTO: 6.48 K/UL

## 2023-12-25 NOTE — DISCUSSION/SUMMARY
[EKG obtained to assist in diagnosis and management of assessed problem(s)] : EKG obtained to assist in diagnosis and management of assessed problem(s) [FreeTextEntry1] : Patient is a 68 year-old Black gentleman with cardiac MRI suggestive of cardiac amyloidosis. Inpatient light chain studies were normal. Technetium pyrophosphate scan consistent with ATTR-CM. 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   Plan to start TTR stabilizer therapy with Vyndamax. Plan to begin silencer therapy for patient with familial amyloid polyneuropathy.  To address his volume overload, will plan to restart furosemide 80 mg daily x 3 days in addition to low dose metolazone 2.5 mg. Telephone follow up early next week.  Continue daily home weight log.  Labs today.   Follow up in office in 2 weeks.

## 2023-12-25 NOTE — CARDIOLOGY SUMMARY
[de-identified] : 4/17/2023, sinus 89 bpm, nonspecific T abnormality, poor R-wave progression, low voltage ECG [de-identified] : 3/20/2023, septum 1.5 cm, PWT 1.6 cm, dilated LA, severely reduced LV systolic function, LVEF 28% [de-identified] : 3/21/2023, cardiac MRI with diffuse LGE suggestive of amyloidosis, LVEF 28% [de-identified] : 4/27/2023, technetium pyrophosphate scan, grade 3

## 2023-12-25 NOTE — HISTORY OF PRESENT ILLNESS
[FreeTextEntry1] : Patient is a 67 year-old Black gentleman with known past medical history of hypertension, hypothyroidism, pituitary adenoma, and recently diagnosed with a dilated cardiomyopathy, cardiac MRI suggestive of infiltrative disease (cardiac amyloidosis). Labs during his recent hospitalization showed normal light chains.  He has been taking Entresto and Farxiga, and he has been tolerating them well at current doses.   He gets numbness in his toes. He has a brother in [place] who also has cardiomyopathy. He has three adult children from his first marriage.   He weighs himself each morning. AM weight has recently risen by 7 lbs from 220 to 227 lbs.  May 2023 - Patient returns today for follow-up of his amyloid cardiomyopathy. 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 He has not yet started stabilizer or silencer therapy. He gets occasional numbness in the feet. He has no new cardiovascular complaints, but he did recently increase his loop diuretic to address some lower extremity edema.  6/6/2023 Jamie Joaquin returns today for scheduled follow up. He continues to feel well in general. For his lower extremity edema, he has been monitoring his daily weights and increasing his furosemide as necessary. On the average, he doubles his furosemide dose 1-2 times a week. He does report issues with swallowing and frequent coughing fits with associated lightheadedness. His exertional dyspnea is unchanged. He is yet to begin stabilizer therapy with Vyndamax due to ongoing insurance issues. He has not yet seen neurology but has an appointment for evaluation on October 24, 2023.  TeleHealth Video Encounter Initiated by: Patient election for TeleHealth visit Patient was consented for TeleHealth visit Patient Location: Home Physician Location: Office ([address] [place] [address], [address], [place], [place], [address]) Duration of Encounter: 30 minutes, at least 50% of which was spent in direct counseling and coordination of care.   7/5/2023 He was seen and evaluated by neurology for sensory polyneuropathy and left carpal tunnel syndrome along with autonomic dysfunction. He is now pending EMG and nerve conduction studies to confirm and quantitate his peripheral neurologic disorder. His breathing has been stable as has his lower extremity edema. He has not had to take any additional doses of furosemide. Otherwise today he has no specific complaints aside from awaiting the recommended medical therapies.

## 2023-12-25 NOTE — REASON FOR VISIT
[Other: ____] : [unfilled] [FreeTextEntry3] : Sen Yi MD [FreeTextEntry1] : 12/21/2023 Jamie Joaquin returns today for follow up after a recent hospitalization at [facility] for acute decompensated heart failure. His stay was complicated by acute kidney injury and he was discharged with a creatinine of 1.62 mg/dL off all diuretics (not currently on Entresto, Farxiga or furosemide). Today he reports feeling fairly well overall aside from ongoing issues with orthopnea and lower extremity edema. Per his home scale, he is around 237 pounds (previously 220). He has not yet started Vyndamax or Amvuttra due to ongoing issues with his insurance.

## 2023-12-27 RX ORDER — METOLAZONE 2.5 MG/1
2.5 TABLET ORAL DAILY
Qty: 30 | Refills: 0 | Status: ACTIVE | COMMUNITY
Start: 2023-12-21 | End: 1900-01-01

## 2024-01-07 NOTE — BRIEF OPERATIVE NOTE - NSICDXBRIEFPROCEDURE_GEN_ALL_CORE_FT
PROCEDURES:  Exploration of right side of neck 13-May-2022 13:25:37  Nellie De La Torre   coverage for Dr. Garcia    INTERVAL Hx:  No acute events overnight.  Turcios draining slightly blood tinged urine.  Pt without complaints.    MEDICATIONS  (STANDING):  atorvastatin 10 milliGRAM(s) Oral at bedtime  cefepime   IVPB 1000 milliGRAM(s) IV Intermittent daily  dextrose 5%. 1000 milliLiter(s) (100 mL/Hr) IV Continuous <Continuous>  dextrose 5%. 1000 milliLiter(s) (50 mL/Hr) IV Continuous <Continuous>  dextrose 50% Injectable 25 Gram(s) IV Push once  dextrose 50% Injectable 12.5 Gram(s) IV Push once  dextrose 50% Injectable 25 Gram(s) IV Push once  glucagon  Injectable 1 milliGRAM(s) IntraMuscular once  heparin  Infusion.  Unit(s)/Hr (11 mL/Hr) IV Continuous <Continuous>  insulin lispro (ADMELOG) corrective regimen sliding scale   SubCutaneous three times a day before meals  insulin lispro (ADMELOG) corrective regimen sliding scale   SubCutaneous at bedtime  nadolol 20 milliGRAM(s) Oral daily  tamsulosin 0.4 milliGRAM(s) Oral at bedtime    MEDICATIONS  (PRN):  dextrose Oral Gel 15 Gram(s) Oral once PRN Blood Glucose LESS THAN 70 milliGRAM(s)/deciliter  heparin   Injectable 2500 Unit(s) IV Push every 6 hours PRN For aPTT between 40 - 57  heparin   Injectable 5000 Unit(s) IV Push every 6 hours PRN For aPTT less than 40        Vital Signs Last 24 Hrs  T(C): 36.7 (07 Jan 2024 04:40), Max: 36.7 (07 Jan 2024 04:40)  T(F): 98.1 (07 Jan 2024 04:40), Max: 98.1 (07 Jan 2024 04:40)  HR: 80 (07 Jan 2024 04:40) (80 - 98)  BP: 119/81 (07 Jan 2024 04:40) (119/81 - 130/60)  BP(mean): --  RR: 18 (07 Jan 2024 04:40) (18 - 19)  SpO2: 91% (07 Jan 2024 04:40) (91% - 92%)    Parameters below as of 07 Jan 2024 04:40  Patient On (Oxygen Delivery Method): room air      LABS:                        10.9   10.41 )-----------( 237      ( 06 Jan 2024 09:15 )             32.6     01-06    136  |  104  |  44<H>  ----------------------------<  175<H>  3.8   |  26  |  1.80<H>    Ca    8.1<L>      06 Jan 2024 09:15    TPro  6.5  /  Alb  2.1<L>  /  TBili  0.6  /  DBili  x   /  AST  21  /  ALT  19  /  AlkPhos  59  01-06    Urine culture:  01-05 @ 13:10 --   No growth

## 2024-02-03 ENCOUNTER — INPATIENT (INPATIENT)
Facility: HOSPITAL | Age: 69
LOS: 2 days | Discharge: HOME | DRG: 65 | End: 2024-02-06
Attending: PSYCHIATRY & NEUROLOGY | Admitting: PSYCHIATRY & NEUROLOGY
Payer: MEDICARE

## 2024-02-03 VITALS
TEMPERATURE: 97 F | DIASTOLIC BLOOD PRESSURE: 94 MMHG | HEART RATE: 84 BPM | RESPIRATION RATE: 16 BRPM | OXYGEN SATURATION: 97 % | SYSTOLIC BLOOD PRESSURE: 144 MMHG | HEIGHT: 70 IN | WEIGHT: 201.94 LBS

## 2024-02-03 DIAGNOSIS — Z98.1 ARTHRODESIS STATUS: Chronic | ICD-10-CM

## 2024-02-03 DIAGNOSIS — Z98.890 OTHER SPECIFIED POSTPROCEDURAL STATES: Chronic | ICD-10-CM

## 2024-02-03 DIAGNOSIS — R20.2 PARESTHESIA OF SKIN: ICD-10-CM

## 2024-02-03 DIAGNOSIS — Z98.89 OTHER SPECIFIED POSTPROCEDURAL STATES: Chronic | ICD-10-CM

## 2024-02-03 LAB
ALBUMIN SERPL ELPH-MCNC: 4.2 G/DL — SIGNIFICANT CHANGE UP (ref 3.3–5)
ALP SERPL-CCNC: 70 U/L — SIGNIFICANT CHANGE UP (ref 40–120)
ALT FLD-CCNC: 31 U/L — SIGNIFICANT CHANGE UP (ref 10–45)
ANION GAP SERPL CALC-SCNC: 13 MMOL/L — SIGNIFICANT CHANGE UP (ref 5–17)
APTT BLD: 33.2 SEC — SIGNIFICANT CHANGE UP (ref 24.5–35.6)
AST SERPL-CCNC: 24 U/L — SIGNIFICANT CHANGE UP (ref 10–40)
BASE EXCESS BLDV CALC-SCNC: 8.5 MMOL/L — HIGH (ref -2–3)
BASOPHILS # BLD AUTO: 0.01 K/UL — SIGNIFICANT CHANGE UP (ref 0–0.2)
BASOPHILS NFR BLD AUTO: 0.1 % — SIGNIFICANT CHANGE UP (ref 0–2)
BILIRUB SERPL-MCNC: 0.7 MG/DL — SIGNIFICANT CHANGE UP (ref 0.2–1.2)
BUN SERPL-MCNC: 54 MG/DL — HIGH (ref 7–23)
CA-I SERPL-SCNC: 1.11 MMOL/L — LOW (ref 1.15–1.33)
CALCIUM SERPL-MCNC: 9 MG/DL — SIGNIFICANT CHANGE UP (ref 8.4–10.5)
CHLORIDE BLDV-SCNC: 102 MMOL/L — SIGNIFICANT CHANGE UP (ref 96–108)
CHLORIDE SERPL-SCNC: 100 MMOL/L — SIGNIFICANT CHANGE UP (ref 96–108)
CO2 BLDV-SCNC: 35 MMOL/L — HIGH (ref 22–26)
CO2 SERPL-SCNC: 30 MMOL/L — SIGNIFICANT CHANGE UP (ref 22–31)
CREAT SERPL-MCNC: 1.65 MG/DL — HIGH (ref 0.5–1.3)
EGFR: 44 ML/MIN/1.73M2 — LOW
EOSINOPHIL # BLD AUTO: 0.11 K/UL — SIGNIFICANT CHANGE UP (ref 0–0.5)
EOSINOPHIL NFR BLD AUTO: 1.3 % — SIGNIFICANT CHANGE UP (ref 0–6)
GAS PNL BLDV: 138 MMOL/L — SIGNIFICANT CHANGE UP (ref 136–145)
GAS PNL BLDV: SIGNIFICANT CHANGE UP
GLUCOSE BLDV-MCNC: 103 MG/DL — HIGH (ref 70–99)
GLUCOSE SERPL-MCNC: 105 MG/DL — HIGH (ref 70–99)
HCO3 BLDV-SCNC: 34 MMOL/L — HIGH (ref 22–29)
HCT VFR BLD CALC: 43.3 % — SIGNIFICANT CHANGE UP (ref 39–50)
HCT VFR BLDA CALC: 43 % — SIGNIFICANT CHANGE UP (ref 39–51)
HGB BLD CALC-MCNC: 14.4 G/DL — SIGNIFICANT CHANGE UP (ref 12.6–17.4)
HGB BLD-MCNC: 14.1 G/DL — SIGNIFICANT CHANGE UP (ref 13–17)
IMM GRANULOCYTES NFR BLD AUTO: 0.5 % — SIGNIFICANT CHANGE UP (ref 0–0.9)
INR BLD: 1.23 RATIO — HIGH (ref 0.85–1.18)
LACTATE BLDV-MCNC: 0.9 MMOL/L — SIGNIFICANT CHANGE UP (ref 0.5–2)
LYMPHOCYTES # BLD AUTO: 1.39 K/UL — SIGNIFICANT CHANGE UP (ref 1–3.3)
LYMPHOCYTES # BLD AUTO: 16.7 % — SIGNIFICANT CHANGE UP (ref 13–44)
MCHC RBC-ENTMCNC: 29.2 PG — SIGNIFICANT CHANGE UP (ref 27–34)
MCHC RBC-ENTMCNC: 32.6 GM/DL — SIGNIFICANT CHANGE UP (ref 32–36)
MCV RBC AUTO: 89.6 FL — SIGNIFICANT CHANGE UP (ref 80–100)
MONOCYTES # BLD AUTO: 0.62 K/UL — SIGNIFICANT CHANGE UP (ref 0–0.9)
MONOCYTES NFR BLD AUTO: 7.5 % — SIGNIFICANT CHANGE UP (ref 2–14)
NEUTROPHILS # BLD AUTO: 6.14 K/UL — SIGNIFICANT CHANGE UP (ref 1.8–7.4)
NEUTROPHILS NFR BLD AUTO: 73.9 % — SIGNIFICANT CHANGE UP (ref 43–77)
NRBC # BLD: 0 /100 WBCS — SIGNIFICANT CHANGE UP (ref 0–0)
PCO2 BLDV: 46 MMHG — SIGNIFICANT CHANGE UP (ref 42–55)
PH BLDV: 7.47 — HIGH (ref 7.32–7.43)
PLATELET # BLD AUTO: 180 K/UL — SIGNIFICANT CHANGE UP (ref 150–400)
PO2 BLDV: 14 MMHG — LOW (ref 25–45)
POTASSIUM BLDV-SCNC: 3.4 MMOL/L — LOW (ref 3.5–5.1)
POTASSIUM SERPL-MCNC: 3.3 MMOL/L — LOW (ref 3.5–5.3)
POTASSIUM SERPL-SCNC: 3.3 MMOL/L — LOW (ref 3.5–5.3)
PROT SERPL-MCNC: 7.2 G/DL — SIGNIFICANT CHANGE UP (ref 6–8.3)
PROTHROM AB SERPL-ACNC: 13.5 SEC — HIGH (ref 9.5–13)
RBC # BLD: 4.83 M/UL — SIGNIFICANT CHANGE UP (ref 4.2–5.8)
RBC # FLD: 15.1 % — HIGH (ref 10.3–14.5)
SAO2 % BLDV: 15.8 % — LOW (ref 67–88)
SODIUM SERPL-SCNC: 143 MMOL/L — SIGNIFICANT CHANGE UP (ref 135–145)
TROPONIN T, HIGH SENSITIVITY RESULT: 121 NG/L — HIGH (ref 0–51)
WBC # BLD: 8.31 K/UL — SIGNIFICANT CHANGE UP (ref 3.8–10.5)
WBC # FLD AUTO: 8.31 K/UL — SIGNIFICANT CHANGE UP (ref 3.8–10.5)

## 2024-02-03 PROCEDURE — 99291 CRITICAL CARE FIRST HOUR: CPT

## 2024-02-03 PROCEDURE — 0042T: CPT | Mod: MA

## 2024-02-03 PROCEDURE — 70496 CT ANGIOGRAPHY HEAD: CPT | Mod: 26,MA

## 2024-02-03 PROCEDURE — 70450 CT HEAD/BRAIN W/O DYE: CPT | Mod: 26,MA,XU

## 2024-02-03 PROCEDURE — 70498 CT ANGIOGRAPHY NECK: CPT | Mod: 26,MA

## 2024-02-03 RX ORDER — ALPRAZOLAM 0.25 MG
0.25 TABLET ORAL DAILY
Refills: 0 | Status: DISCONTINUED | OUTPATIENT
Start: 2024-02-03 | End: 2024-02-04

## 2024-02-03 RX ORDER — POTASSIUM CHLORIDE 20 MEQ
40 PACKET (EA) ORAL ONCE
Refills: 0 | Status: COMPLETED | OUTPATIENT
Start: 2024-02-03 | End: 2024-02-03

## 2024-02-03 RX ORDER — POTASSIUM CHLORIDE 20 MEQ
40 PACKET (EA) ORAL ONCE
Refills: 0 | Status: DISCONTINUED | OUTPATIENT
Start: 2024-02-03 | End: 2024-02-04

## 2024-02-03 RX ORDER — ALBUTEROL 90 UG/1
1 AEROSOL, METERED ORAL DAILY
Refills: 0 | Status: DISCONTINUED | OUTPATIENT
Start: 2024-02-03 | End: 2024-02-06

## 2024-02-03 RX ORDER — FINASTERIDE 5 MG/1
5 TABLET, FILM COATED ORAL DAILY
Refills: 0 | Status: DISCONTINUED | OUTPATIENT
Start: 2024-02-03 | End: 2024-02-06

## 2024-02-03 RX ORDER — DIAZEPAM 5 MG
2 TABLET ORAL
Refills: 0 | Status: DISCONTINUED | OUTPATIENT
Start: 2024-02-03 | End: 2024-02-04

## 2024-02-03 RX ORDER — CLOPIDOGREL BISULFATE 75 MG/1
75 TABLET, FILM COATED ORAL DAILY
Refills: 0 | Status: DISCONTINUED | OUTPATIENT
Start: 2024-02-03 | End: 2024-02-06

## 2024-02-03 RX ORDER — ATORVASTATIN CALCIUM 80 MG/1
80 TABLET, FILM COATED ORAL AT BEDTIME
Refills: 0 | Status: DISCONTINUED | OUTPATIENT
Start: 2024-02-03 | End: 2024-02-05

## 2024-02-03 RX ORDER — SODIUM CHLORIDE 9 MG/ML
1000 INJECTION INTRAMUSCULAR; INTRAVENOUS; SUBCUTANEOUS
Refills: 0 | Status: DISCONTINUED | OUTPATIENT
Start: 2024-02-03 | End: 2024-02-04

## 2024-02-03 RX ORDER — APIXABAN 2.5 MG/1
2.5 TABLET, FILM COATED ORAL EVERY 12 HOURS
Refills: 0 | Status: DISCONTINUED | OUTPATIENT
Start: 2024-02-03 | End: 2024-02-03

## 2024-02-03 RX ORDER — CABERGOLINE 0.5 MG/1
2 TABLET ORAL
Refills: 0 | DISCHARGE

## 2024-02-03 RX ORDER — LEVOTHYROXINE SODIUM 125 MCG
175 TABLET ORAL DAILY
Refills: 0 | Status: DISCONTINUED | OUTPATIENT
Start: 2024-02-03 | End: 2024-02-06

## 2024-02-03 RX ORDER — ASPIRIN/CALCIUM CARB/MAGNESIUM 324 MG
81 TABLET ORAL DAILY
Refills: 0 | Status: DISCONTINUED | OUTPATIENT
Start: 2024-02-03 | End: 2024-02-04

## 2024-02-03 RX ORDER — TAMSULOSIN HYDROCHLORIDE 0.4 MG/1
0.4 CAPSULE ORAL EVERY 12 HOURS
Refills: 0 | Status: DISCONTINUED | OUTPATIENT
Start: 2024-02-03 | End: 2024-02-06

## 2024-02-03 RX ADMIN — Medication 40 MILLIEQUIVALENT(S): at 21:55

## 2024-02-03 RX ADMIN — SODIUM CHLORIDE 75 MILLILITER(S): 9 INJECTION INTRAMUSCULAR; INTRAVENOUS; SUBCUTANEOUS at 23:25

## 2024-02-03 NOTE — ED PROVIDER NOTE - CARE PLAN
1 Principal Discharge DX:	Paresthesias   Principal Discharge DX:	Paresthesias  Secondary Diagnosis:	Atrial fibrillation

## 2024-02-03 NOTE — ED PROVIDER NOTE - OTHER FINDINGS
ECG recorded at 2041 independently interpreted by me, Dr George Leroy, shows A-fib rate 98 QTc 495 ms no acute diagnostic ischemic findings

## 2024-02-03 NOTE — ED PROVIDER NOTE - CLINICAL SUMMARY MEDICAL DECISION MAKING FREE TEXT BOX
68Y M PMH CHF, cardiac amyloidosis, HTN, carotid stenosis s/p stenting, laryngeal cancer presenting with acute onset of left arm paresthesias and loss of sensation at approximately 6PM this evening. Called as code stroke at triage due to acute onset of symptoms. 68Y M PMH CHF, cardiac amyloidosis, HTN, carotid stenosis s/p stenting, laryngeal cancer presenting with acute onset of left arm paresthesias and loss of sensation at approximately 6PM this evening. Called as code stroke at triage due to acute onset of symptoms. will obtain stroke labs and CTs, neuro at bedside. will await scan results and neuro recs.

## 2024-02-03 NOTE — H&P ADULT - NSVTERISKASSESS_GEN_ALL_CORE FT
Area of cytotoxic cerebral edema identified when reviewing brain imaging in the territory of the L middle cerebral artery. There is no mass effect associated with it. We will continue to monitor the patients clinical exam for any worsening of symptoms which may indicate expansion of the stroke or the area of the edema resulting in the clinical change. The pattern is suggestive of small vessel etiology.   Medical Assessment Completed on: 03-Feb-2024 23:11

## 2024-02-03 NOTE — ED PROVIDER NOTE - PHYSICAL EXAMINATION
GENERAL: Awake, alert, NAD  HEAD: NC/AT, neck supple, moist mucous membranes  EYES: PERRL, EOM grossly intact, sclera anicteric  LUNGS: normal WOB on RA, CTAB, no wheezes or crackles   CARDIAC: RRR, no m/r/g  ABDOMEN: Soft, non tender, non distended, no rebound, no guarding  BACK: No midline spinal tenderness, no CVA tenderness  EXT: No edema, no calf tenderness, no deformities.  NEURO: A&Ox3. Moving all extremities without focal deficit. 5/5 strength in all extremities. Ambulatory without assistance. Decreased sensation over RUE. No pronator drift. CN II-XII grossly intact bilaterally.   SKIN: Warm and dry. No rash.  PSYCH: Normal affect.

## 2024-02-03 NOTE — H&P ADULT - HISTORY OF PRESENT ILLNESS
68y M with Hx L carotid stent (placed 2 years ago), pituitary mass, gout, HTN, hypothyroidism, who presents w/ CC of L hand paresthesias. LKW at 6pm on 2/3. He was otherwise in his usual state of health when suddenly felt tingling in L hand. Never had symptoms like this before. He is able to coordinate movements in that hand, such as touching his nose or tapping his fingers, however feels unaware when the hand is touched. He denies weakness in the hands or legs. No slurred speech, headache, or visual changes. He is independent in ADLs at baseline. He currently only takes Plavix for blood thinners. He denies prior history of heart arrhythmias, although EKG in ED today is showing afib

## 2024-02-03 NOTE — H&P ADULT - NSHPPHYSICALEXAM_GEN_ALL_CORE
General: NAD, pleasant, well-developed male  HENT: normocephalic  Lungs: breathing comfortably on RA  CV: warm, non-edematous extremities    Mental status: awake, alert, oriented to self, month, year, situation. Cooperative with exam.  Language: speech is fluent, comprehension intact  CN: PERRL. VFF. EOM intact. No gaze preference. Facial expression symmetric b/l. Facial sensation intact to LT b/l. Palate elevation intact. Hearing intact. Tongue protrudes at midline.  Motor: no drift in any extremities. 5/5 strength in b/l hand , deltoids, biceps, triceps, hip flexors, ankle plantarflexion  Sensation: subjectively diminished sensation localized to the L hand, otherwise intact in all extremities  Coordination: intact FTN b/l. Able to tap fingers b/l  Reflexes: 2+ in biceps, triceps, brachioradialis, patellar, and Achilles  Gait: did not assess due to acute illness and fall risk

## 2024-02-03 NOTE — H&P ADULT - NSHPADDITIONALINFOADULT_GEN_ALL_CORE
Neurovascular Fellow Attestation    68M with transient left arm paresthesias. PMH of cardiac amyloid with 25% EF, radiation therapy to vocal cord paralysis, left carotid stent, as well as failed right carotid stent, pituitary mass, gout, HTN, hypothyroidism. CTH was unremarkable. CTA showed multifocal steno-occlusive disease with patent left carotid stent. There is occlusion of left vertebral origin with reconstitution at V4, as well as right carotid and M3 stenosis with correlating penumbra of at risk tissue in the right parietal region. Of note, found to be in new onset Afib upon arrival. Given the patient’s clinical history, nonfocal examination and imaging findings, patient’s symptoms are likely secondary to TIA and would recommend continuing Plavix for now and would benefit from initiation of heparin infusion with goal PTT 50-70 for secondary stroke prevention given new onset atrial fibrillation. Would also recommend P2Y12 if angiogram indicated after initial workup. PENDING MRI/MRA/Nova, Echo, A1c, LDL for further risk stratification.    Augustine Etienne  Neurovascular Fellow Neurovascular Fellow Attestation    68M with transient left arm paresthesias. PMH of cardiac amyloid with 25% EF, radiation therapy to vocal cord paralysis, left carotid stent, as well as failed right carotid CEA, pituitary mass, gout, HTN, hypothyroidism. hx of laryngeal cancer s/p radiation. CTH was unremarkable. CTA showed multifocal steno-occlusive disease with patent left carotid stent. There is occlusion of left vertebral origin with reconstitution at V4, as well as right carotid and M3 stenosis with correlating penumbra of at risk tissue in the right parietal region. Of note, found to be in new onset Afib upon arrival. Given the patient’s clinical history, nonfocal examination and imaging findings, patient’s symptoms are likely secondary to TIA and would recommend continuing Plavix for now and would benefit from initiation of heparin infusion with goal PTT 50-70 for secondary stroke prevention given new onset atrial fibrillation. Would also recommend P2Y12 if angiogram indicated after initial workup. PENDING MRI/MRA/Nova, Echo, A1c, LDL for further risk stratification.    Augustine Etienne  Neurovascular Fellow

## 2024-02-03 NOTE — H&P ADULT - NSHPLABSRESULTS_GEN_ALL_CORE
CT head w/o contrast:  IMPRESSION:    No CT evidence of acute intracranial hemorrhage, mass effect, or midline   shift. If not contraindicated, consider MRI for more sensitive evaluation.      CTA Neck: The left vertebralartery is occluded from its origin, felt to   be chronic in nature, given abnormal appearance of the left V4 segment   flow void on a prior MRI in 2021. Noncalcified plaque of the right common   carotid artery results in up to approximately 70% stenosis. Noncalcified   plaque of the left common carotid artery results in up to approximately   50% stenosis. A left proximal internal carotid artery stent is patent.    CTA Head: A posterior right M3 branch demonstrates tapering of the   contrast column, concerning for moderate to severe stenosis. No large   vessel occlusion about the [place]. Patchy reconstitution of the   intradural left vertebral artery.    CT Perfusion: Diffusely elevated Tmax without corresponding large vessel   occlusion likely secondary to slow flow phenomenon given extensive   atherosclerotic disease in bilateral common carotid arteries. Please note   that the superior cerebral hemispheres are excluded from the   field-of-view, and infarct along the high right frontoparietal region is   not excluded based on this study. In the right frontoparietal region,   there is focal region of markedly elevated Tmax, which may reflect an   area of relatively increased hypoperfusion, which appears to extend   beyond thesuperior margin of the field-of-view. Given these findings,   MRI should be considered for further assessment, if not contraindicated.

## 2024-02-03 NOTE — H&P ADULT - ASSESSMENT
68y M with Hx L carotid stent (placed 2 years ago), pituitary mass, gout, HTN, hypothyroidism, who presents w/ CC of L hand paresthesias      LKW: 6pm 2/3  NIHSS:  0  Baseline MRS: 0  Not a Teneceteplase candidate due to NIHSS 0  Not a thrombectomy candidate due to  lack of LVO    CT head: neg  CTA/P: partially occluded R M3    Impression:  acute L hand paresthesias, localizing to R brain dysfunction. Suspected ischemic etiology. Found to have new diagnosis of afib    Mechanism: Cardioembolic    Plan:  [] admit to stroke floors (Dr. Wells)  [] start Eliquis 2.5mg BID (Cr>1.5)  [] c/w home Plavix 75mg daily  [] increase Lipitor to 80mg daily  [] HgbA1C, fasting lipid panel, CBC, CMP, coag panel, troponin  [] MRI brain w/o con  [] TTE w/ bubble study  [] telemetry to check for arrhythmia, EKG, will discuss loop recorder    - Tight glucose control (long-term goal HgbA1c < 6%)  - Stroke education and counseling  - Neuro-checks and VS q4h  - Permissive HTN up to 220/120 for 24-48h from symptom onset  - Dysphagia screen. If fails, speech/swallow eval  - aspiration, fall precautions  - STAT CT head non-contrast for change in neuro exam.   - PT/ OT evaluations    Discussed with stroke fellow  Dr. Augustine Etienne     and under supervision of attending Dr. Marly Wells      regarding decision against candidacy for Tenecteplase / thrombectomy. Will be formally staffed on morning rounds with attending. Recommendations will be complete once signed by attending.

## 2024-02-03 NOTE — H&P ADULT - ATTENDING COMMENTS
I reviewed available diagnostic studies, examined patient and reviewed images personally. I agree with resident & fellow 's history, exam, orders placed, and plan of care. Cardiac dysfunction as above, repeat echo. Afib discovered here on arrival. Pt has been compliant on plavix. Hx of L stent. R carotid stenosis likely also 2/2 radiation and appears known, as prior records show failed attempt to surgically revascularize R carotid given anatomy (likely failed CEA). Stent may be an option check P2Y12. - eval after MRI, MR nova, cont plavix, Heparin gtt for Afib 2' stroke prevention. F up MRI and stroke risk factors.

## 2024-02-03 NOTE — ED PROVIDER NOTE - OBJECTIVE STATEMENT
68Y M PMH CHF, HTN, laryngeal cancer s/p radiation and excision, presenting with acute onset of left arm numbness at approximately 6PM this evening. Has been in normal state of health over last several days. Had sudden onset of left arm numbness/decreased sensation and loss of coordination at 6PM. Also endorsing mild frontal headache; notes hx migraines and current headache feels similar to his typical headaches. No current AC/or antiplatelet use. 68Y M PMH CHF, cardiac amyloidosis, HTN, carotid stenosis s/p stenting, laryngeal cancer s/p radiation and excision, presenting with acute onset of left arm numbness at approximately 6PM this evening. Has been in normal state of health over last several days. Had sudden onset of left arm numbness/decreased sensation and loss of coordination at 6PM. Also endorsing mild frontal headache; notes hx migraines and current headache feels similar to his typical headaches. No current AC/or antiplatelet use.

## 2024-02-03 NOTE — ED PROVIDER NOTE - ATTENDING CONTRIBUTION TO CARE
Attending MD Leroy:  I have seen and examined this patient and fully participated in the care of this patient as the teaching attending. I personally made/approved the management plan and take responsibility for the patient management.      68-year-old gentleman with a history of cardiac amyloidosis, carotid stenosis with stents on Plavix, BPH, hypertension hyperlipidemia is presenting for evaluation of acute onset of left arm numbness and tingling.  He states his symptoms began at 6 PM this evening.  States the left arm feels "like it is not my arm".  Was also having blurred vision and loss of balance, but no dizziness.  Code stroke was activated in triage.    Patient sitting in the stretcher no apparent distress, heart irregularly irregular clear lungs no appreciable peripheral edema.    NIH Stroke Scale Calculator    1.  a. Level of consciousness: Alert (0 points)      b. Patient asked current month and age: Answers both correctly (0 points)      c. Patient asked to open And close eyes: Obeys both correctly (0 points)  2.  Best gaze: Normal (0 points)  3.  Visual field testing: No visual field loss (0 points)  4.  Facial paresis: Normal symmetrical movement (0 points)  5.  a. Motor function - left arm: Normal (0 points)      b. Motor function - right arm: Normal (0 points)  6.  a. Motor function - left leg: Normal (0 points)      b. Motor function - right leg: Normal (0 points)  7.  Limb ataxia: No ataxia (0 points)  8.  Sensory: Normal (0 points)  9.  Best language: No aphasia (0 points)  10. Dysarthria: Normal articulation (0 points)  11. Exinction and inattention: Normal (0 points)    Total: 0 points.    Plan to follow-up on CT head CT angiogram head and neck and neurology stroke recommendations.  ECG shows atrial fibrillation, patient denies any known history of A-fib.  Given lateralizing symptoms and possibly new onset A-fib, history is concerning for possible high risk TIA or mild CVA.  Neurology stroke resident in consultation with stroke fellow have determined that given NIH stroke scale is 0, thrombolysis will not be performed in this patient.  Patient will require admission for further stroke workup and likely initiation of DOAC in the setting of presumably new onset atrial fibrillation      *The above represents an initial assessment/impression. Please refer to progress notes for potential changes in patient clinical course*

## 2024-02-03 NOTE — ED ADULT NURSE NOTE - NSFALLUNIVINTERV_ED_ALL_ED
Bed/Stretcher in lowest position, wheels locked, appropriate side rails in place/Call bell, personal items and telephone in reach/Instruct patient to call for assistance before getting out of bed/chair/stretcher/Non-slip footwear applied when patient is off stretcher/[place] to call system/Physically safe environment - no spills, clutter or unnecessary equipment/Purposeful proactive rounding/Room/bathroom lighting operational, light cord in reach

## 2024-02-03 NOTE — ED ADULT NURSE NOTE - OBJECTIVE STATEMENT
Pt is a 68y M PMH VHF, HTN, migraines, laryngeal cancer c/o sudden onset L arm numbness, weakness, tingling that started approx 6PM endorsed loss of coordination at the time. Per wife pt was attempting to get dressed but could not. Pt endorsed having mild ha at the time, stated it felt similar to migraine. Pt is A&Ox3, breathing unlabored and spontaneous, full ROM of all extremities. Pt resting in stretcher, placed on cardiac monitor, bed in lowest position, aware of plan of care. Comfort and safety measures maintained.

## 2024-02-03 NOTE — ED ADULT TRIAGE NOTE - HEIGHT IN CM
[FreeTextEntry1] : This is a new patient visit for Ms. ANGULO who is referred from Dr. Batista for evaluation for potential rectal prolapse prior to having surgery for a uterovaginal prolapse. Ms. PISANO has been seen and evaluated for uterovaginal prolapse. She recently had a pessary placed. Surgery is being planned. She is referred now for rectal prolapse. Ms. ANGULO denies any issues with her bowel movements with the exception of occasional constipation. She denies a sensation of a rectal fullness or incomplete evacuation. She denies rectal bleeding. She has had a colonoscopy in the past 2 years.
177.8

## 2024-02-04 LAB
A1C WITH ESTIMATED AVERAGE GLUCOSE RESULT: 6.3 % — HIGH (ref 4–5.6)
ANION GAP SERPL CALC-SCNC: 13 MMOL/L — SIGNIFICANT CHANGE UP (ref 5–17)
APTT BLD: 44.7 SEC — HIGH (ref 24.5–35.6)
BUN SERPL-MCNC: 49 MG/DL — HIGH (ref 7–23)
CALCIUM SERPL-MCNC: 8.1 MG/DL — LOW (ref 8.4–10.5)
CHLORIDE SERPL-SCNC: 103 MMOL/L — SIGNIFICANT CHANGE UP (ref 96–108)
CHOLEST SERPL-MCNC: 104 MG/DL — SIGNIFICANT CHANGE UP
CO2 SERPL-SCNC: 25 MMOL/L — SIGNIFICANT CHANGE UP (ref 22–31)
CREAT SERPL-MCNC: 1.47 MG/DL — HIGH (ref 0.5–1.3)
EGFR: 51 ML/MIN/1.73M2 — LOW
ESTIMATED AVERAGE GLUCOSE: 134 MG/DL — HIGH (ref 68–114)
GLUCOSE SERPL-MCNC: 86 MG/DL — SIGNIFICANT CHANGE UP (ref 70–99)
HCT VFR BLD CALC: 37.1 % — LOW (ref 39–50)
HDLC SERPL-MCNC: 46 MG/DL — SIGNIFICANT CHANGE UP
HGB BLD-MCNC: 12.3 G/DL — LOW (ref 13–17)
LIPID PNL WITH DIRECT LDL SERPL: 46 MG/DL — SIGNIFICANT CHANGE UP
MAGNESIUM SERPL-MCNC: 1.5 MG/DL — LOW (ref 1.6–2.6)
MCHC RBC-ENTMCNC: 29.5 PG — SIGNIFICANT CHANGE UP (ref 27–34)
MCHC RBC-ENTMCNC: 33.2 GM/DL — SIGNIFICANT CHANGE UP (ref 32–36)
MCV RBC AUTO: 89 FL — SIGNIFICANT CHANGE UP (ref 80–100)
NON HDL CHOLESTEROL: 58 MG/DL — SIGNIFICANT CHANGE UP
NRBC # BLD: 0 /100 WBCS — SIGNIFICANT CHANGE UP (ref 0–0)
PA ADP PRP-ACNC: 143 PRU — LOW (ref 194–417)
PHOSPHATE SERPL-MCNC: 3.6 MG/DL — SIGNIFICANT CHANGE UP (ref 2.5–4.5)
PLATELET # BLD AUTO: 153 K/UL — SIGNIFICANT CHANGE UP (ref 150–400)
POTASSIUM SERPL-MCNC: 3.2 MMOL/L — LOW (ref 3.5–5.3)
POTASSIUM SERPL-SCNC: 3.2 MMOL/L — LOW (ref 3.5–5.3)
RBC # BLD: 4.17 M/UL — LOW (ref 4.2–5.8)
RBC # FLD: 15.2 % — HIGH (ref 10.3–14.5)
SODIUM SERPL-SCNC: 141 MMOL/L — SIGNIFICANT CHANGE UP (ref 135–145)
TRIGL SERPL-MCNC: 49 MG/DL — SIGNIFICANT CHANGE UP
TROPONIN T, HIGH SENSITIVITY RESULT: 116 NG/L — HIGH (ref 0–51)
WBC # BLD: 5.54 K/UL — SIGNIFICANT CHANGE UP (ref 3.8–10.5)
WBC # FLD AUTO: 5.54 K/UL — SIGNIFICANT CHANGE UP (ref 3.8–10.5)

## 2024-02-04 PROCEDURE — 99223 1ST HOSP IP/OBS HIGH 75: CPT

## 2024-02-04 RX ORDER — HEPARIN SODIUM 5000 [USP'U]/ML
1050 INJECTION INTRAVENOUS; SUBCUTANEOUS
Qty: 25000 | Refills: 0 | Status: DISCONTINUED | OUTPATIENT
Start: 2024-02-04 | End: 2024-02-05

## 2024-02-04 RX ORDER — HEPARIN SODIUM 5000 [USP'U]/ML
1000 INJECTION INTRAVENOUS; SUBCUTANEOUS
Qty: 25000 | Refills: 0 | Status: DISCONTINUED | OUTPATIENT
Start: 2024-02-04 | End: 2024-02-04

## 2024-02-04 RX ORDER — SODIUM CHLORIDE 0.65 %
1 AEROSOL, SPRAY (ML) NASAL
Refills: 0 | Status: DISCONTINUED | OUTPATIENT
Start: 2024-02-04 | End: 2024-02-06

## 2024-02-04 RX ORDER — SODIUM CHLORIDE 9 MG/ML
1000 INJECTION INTRAMUSCULAR; INTRAVENOUS; SUBCUTANEOUS
Refills: 0 | Status: DISCONTINUED | OUTPATIENT
Start: 2024-02-04 | End: 2024-02-05

## 2024-02-04 RX ORDER — ENOXAPARIN SODIUM 100 MG/ML
40 INJECTION SUBCUTANEOUS EVERY 24 HOURS
Refills: 0 | Status: DISCONTINUED | OUTPATIENT
Start: 2024-02-04 | End: 2024-02-04

## 2024-02-04 RX ORDER — ASPIRIN/CALCIUM CARB/MAGNESIUM 324 MG
81 TABLET ORAL ONCE
Refills: 0 | Status: COMPLETED | OUTPATIENT
Start: 2024-02-04 | End: 2024-02-04

## 2024-02-04 RX ORDER — CLOPIDOGREL BISULFATE 75 MG/1
75 TABLET, FILM COATED ORAL DAILY
Refills: 0 | Status: DISCONTINUED | OUTPATIENT
Start: 2024-02-04 | End: 2024-02-05

## 2024-02-04 RX ADMIN — CLOPIDOGREL BISULFATE 75 MILLIGRAM(S): 75 TABLET, FILM COATED ORAL at 00:46

## 2024-02-04 RX ADMIN — Medication 1 SPRAY(S): at 18:47

## 2024-02-04 RX ADMIN — CLOPIDOGREL BISULFATE 75 MILLIGRAM(S): 75 TABLET, FILM COATED ORAL at 12:32

## 2024-02-04 RX ADMIN — ALBUTEROL 1 PUFF(S): 90 AEROSOL, METERED ORAL at 12:34

## 2024-02-04 RX ADMIN — FINASTERIDE 5 MILLIGRAM(S): 5 TABLET, FILM COATED ORAL at 12:32

## 2024-02-04 RX ADMIN — Medication 175 MICROGRAM(S): at 06:18

## 2024-02-04 RX ADMIN — HEPARIN SODIUM 10 UNIT(S)/HR: 5000 INJECTION INTRAVENOUS; SUBCUTANEOUS at 11:23

## 2024-02-04 RX ADMIN — ATORVASTATIN CALCIUM 80 MILLIGRAM(S): 80 TABLET, FILM COATED ORAL at 21:18

## 2024-02-04 RX ADMIN — TAMSULOSIN HYDROCHLORIDE 0.4 MILLIGRAM(S): 0.4 CAPSULE ORAL at 06:19

## 2024-02-04 RX ADMIN — Medication 81 MILLIGRAM(S): at 00:46

## 2024-02-04 RX ADMIN — HEPARIN SODIUM 10.5 UNIT(S)/HR: 5000 INJECTION INTRAVENOUS; SUBCUTANEOUS at 19:59

## 2024-02-04 RX ADMIN — SODIUM CHLORIDE 100 MILLILITER(S): 9 INJECTION INTRAMUSCULAR; INTRAVENOUS; SUBCUTANEOUS at 06:19

## 2024-02-04 NOTE — SPEECH LANGUAGE PATHOLOGY EVALUATION - COMMENTS
Continued history:   LKW: 6pm 2/3  NIHSS:  0  Baseline MRS: 0  Not a Teneceteplase candidate due to NIHSS 0  Not a thrombectomy candidate due to  lack of LVO    CTA Neck: The left vertebralartery is occluded from its origin, felt to be chronic in nature, given abnormal appearance of the left V4 segment flow void on a prior MRI in 2021. Noncalcified plaque of the right common carotid artery results in up to approximately 70% stenosis. Noncalcified plaque of the left common carotid artery results in up to approximately 50% stenosis. A left proximal internal carotid artery stent is patent.    CTA Head: A posterior right M3 branch demonstrates tapering of the contrast column, concerning for moderate to severe stenosis. No large   vessel occlusion about the [place]. Patchy reconstitution of the intradural left vertebral artery.    CT Perfusion: Diffusely elevated Tmax without corresponding large vessel occlusion likely secondary to slow flow phenomenon given extensive   atherosclerotic disease in bilateral common carotid arteries. Please note that the superior cerebral hemispheres are excluded from the field-of-view, and infarct along the high right frontoparietal region is not excluded based on this study. In the right frontoparietal region, there is focal region of markedly elevated Tmax, which may reflect an area of relatively increased hypoperfusion, which appears to extend beyond thesuperior margin of the field-of-view. Given these findings, MRI should be considered for further assessment, if not contraindicated.  -02/03 2105-passed screen for dysphagia     Speech & Swallow - known to service from 2023 ; clinical swallow evaluation and MBS completed that course.   -recommendations for Soft and Bite Sized Diet and Thin Liquids with HEAD TURN LEFT for every sip of liquid (with an additional 1-2 dry swallows in head turned L position). Able to write name Clock- pt able to draw a [place], unable to place numbers on [place]- out of order - following several minutes re-attempted at [place] and placed correctly. Hands placed correctly for 0930. +Conceptual deficits. GOAL-Sp-Pt to communicate wants/needs effectively during hospital course so as to be an active participant in his care.

## 2024-02-04 NOTE — SPEECH LANGUAGE PATHOLOGY EVALUATION - SLP DIAGNOSIS
This is a 68y M who presents w/ CC of L hand paresthesias; acute L hand paresthesias, localizing to R brain dysfunction. Suspected ischemic etiology. Found to have new diagnosis of afib This is a 68y M who presents w/ CC of L hand paresthesias; acute L hand paresthesias, localizing to R brain dysfunction. Suspected ischemic etiology. Found to have new diagnosis of afib. Appearing to have mild-moderate language and cognitive linguistic deficits as related to STM, abstract reasoning, and clock drawing./organization

## 2024-02-04 NOTE — PHYSICAL THERAPY INITIAL EVALUATION ADULT - WEIGHT-BEARING RESTRICTIONS: STAND/SIT, REHAB EVAL
Preventive Health Recommendations  Female Ages 26 - 39  Yearly exam:   See your health care provider every year in order to    Review health changes.     Discuss preventive care.      Review your medicines if you your doctor has prescribed any.    Until age 30: Get a Pap test every three years (more often if you have had an abnormal result).    After age 30: Talk to your doctor about whether you should have a Pap test every 3 years or have a Pap test with HPV screening every 5 years.   You do not need a Pap test if your uterus was removed (hysterectomy) and you have not had cancer.  You should be tested each year for STDs (sexually transmitted diseases), if you're at risk.   Talk to your provider about how often to have your cholesterol checked.  If you are at risk for diabetes, you should have a diabetes test (fasting glucose).  Shots: Get a flu shot each year. Get a tetanus shot every 10 years.   Nutrition:     Eat at least 5 servings of fruits and vegetables each day.    Eat whole-grain bread, whole-wheat pasta and brown rice instead of white grains and rice.    Talk to your provider about Calcium and Vitamin D.     Lifestyle    Exercise at least 150 minutes a week (30 minutes a day, 5 days of the week). This will help you control your weight and prevent disease.    Limit alcohol to one drink per day.    No smoking.     Wear sunscreen to prevent skin cancer.    See your dentist every six months for an exam and cleaning.        Avoid aspirin 10 days before the surgery. Avoid nonsteroidal anti-inflammatory pain medication like ibuprofen, Motrin, or Aleve 3 days before the surgery.  Tylenol is okay to use for pain.  Avoid any OTC multivitamins or herbal supplement 7 days before surgery   Resume after surgery    Labs today  Follow up in one year for physical   Seek sooner medical attention if there is any worsening of symptoms or problems.           full weight-bearing

## 2024-02-04 NOTE — PHYSICAL THERAPY INITIAL EVALUATION ADULT - NUMBER OF STAIRS, REHAB EVAL
Telephone Encounter by Ceasar Ji at 07/06/18 11:24 AM     Author:  Ceasar Ji Service:  (none) Author Type:  Certified Medical Assistant     Filed:  07/06/18 11:25 AM Encounter Date:  7/6/2018 Status:  Signed     :  Ceasar Ji (Certified Medical Assistant)            Rx(s) sent via e-prescribing to the pharmacy.[CS1.1T]       Revision History        User Key Date/Time User Provider Type Action    > CS1.1 07/06/18 11:25 AM Ceasar Ji Certified Medical Assistant Sign    T - Template
3

## 2024-02-04 NOTE — OCCUPATIONAL THERAPY INITIAL EVALUATION ADULT - PERTINENT HX OF CURRENT PROBLEM, REHAB EVAL
68y M with Hx L carotid stent (placed 2 years ago), pituitary mass, gout, HTN, hypothyroidism, who presents w/ CC of L hand paresthesias. LKW at 6pm on 2/3. He was otherwise in his usual state of health when suddenly felt tingling in L hand. Never had symptoms like this before. He is able to coordinate movements in that hand, such as touching his nose or tapping his fingers, however feels unaware when the hand is touched. He denies weakness in the hands or legs. No slurred speech, headache, or visual changes. He is independent in ADLs at baseline. He currently only takes Plavix for blood thinners. He denies prior history of heart arrhythmias, although EKG in ED today is showing A fib.   CT head: neg  MRI pending

## 2024-02-04 NOTE — PHYSICAL THERAPY INITIAL EVALUATION ADULT - GAIT DEVIATIONS NOTED, PT EVAL
without cane: increased lateral sway with slight antalgic gait pattern/decreased chu/decreased step length

## 2024-02-04 NOTE — SPEECH LANGUAGE PATHOLOGY EVALUATION - H & P REVIEW
MUSTAPHA CANTU is a 68y M with Hx L carotid stent (placed 2 years ago), pituitary mass, gout, HTN, hypothyroidism, who presents w/ CC of L hand paresthesias. LKW at 6pm on 2/3. He was otherwise in his usual state of health when suddenly felt tingling in L hand. Never had symptoms like this before. He is able to coordinate movements in that hand, such as touching his nose or tapping his fingers, however feels unaware when the hand is touched. No slurred speech, headache, or visual changes. He currently only takes Plavix for blood thinners./yes

## 2024-02-04 NOTE — OCCUPATIONAL THERAPY INITIAL EVALUATION ADULT - NSOTDMEREC_GEN_A_CORE
Discontinue Regimen: -\\n- clobetasol 0.05 % topical ointment : Apply to affected area BID x 2 weeks. Then PRN\\n-  Zoryve 0.3% : apply to affected areas once daily Samples Given: -\\n- Zoryve 0.3% Detail Level: Zone Plan: Review with Dr. Dupree and agrees shave removal plus LN2 \\nProfessional translation provided by [employer]. none

## 2024-02-04 NOTE — SPEECH LANGUAGE PATHOLOGY EVALUATION - SLP PERTINENT HISTORY OF CURRENT PROBLEM
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

## 2024-02-04 NOTE — PHYSICAL THERAPY INITIAL EVALUATION ADULT - ADDITIONAL COMMENTS
lives with wife in private house with 4 steps to enter with bilateral rails, 1 flight inside with 1 rail, right hand dominant, wears glasses for distance and reading  pt reports that he has a cane at home that he occasionally uses when he has a gout attack

## 2024-02-04 NOTE — PHYSICAL THERAPY INITIAL EVALUATION ADULT - PERTINENT HX OF CURRENT PROBLEM, REHAB EVAL
PMHx: L carotid stent (placed 2 years ago), pituitary mass, gout, HTN, hypothyroidism.  presents with c/o L hand paresthesias. LKW at 6pm on 2/3, otherwise in usual state of health when suddenly felt tingling in L hand. He is able to coordinate movements in that hand, such as touching his nose or tapping his fingers, however feels unaware when the hand is touched. He denies weakness in the hands or legs. No slurred speech, headache, or visual changes. He currently only takes Plavix for blood thinners. He denies prior history of heart arrhythmias, although EKG in ED today is showing afib. NIHSS: 0. CT head: neg. CTA Head: A posterior R M3 branch demonstrates tapering of the contrast column, concerning for moderate to severe stenosis. Not a Teneceteplase candidate due to NIHSS 0. Not a thrombectomy candidate due to  lack of LVO.     CTH: No evidence of acute ICH, mass effect, or midline shift.     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 Noncalcified plaque of the R common carotid artery results in up to ~70% stenosis. Noncalcified plaque of the L common carotid artery results in up to ~50% stenosis. A left proximal ICA stent is patent.    CTA Head: A posterior R M3 branch demonstrates tapering of the contrast column, concerning for moderate to severe stenosis. No large vessel occlusion about the [place] of [place]. Patchy reconstitution of the intradural left vertebral artery.    CTP: Diffusely elevated Tmax without corresponding large vessel occlusion likely secondary to slow flow phenomenon given extensive atherosclerotic disease in bilateral common carotid arteries. Please note that the superior cerebral hemispheres are excluded from the field-of-view, and infarct along the high right frontoparietal region is not excluded based on this study. In the R frontoparietal region, there is focal region of markedly elevated Tmax, which may reflect an area of relatively increased hypoperfusion, which appears to extend beyond the superior margin of the field-of-view.

## 2024-02-05 ENCOUNTER — TRANSCRIPTION ENCOUNTER (OUTPATIENT)
Age: 69
End: 2024-02-05

## 2024-02-05 LAB
ANION GAP SERPL CALC-SCNC: 14 MMOL/L — SIGNIFICANT CHANGE UP (ref 5–17)
APTT BLD: 51.7 SEC — HIGH (ref 24.5–35.6)
APTT BLD: 52 SEC — HIGH (ref 24.5–35.6)
BUN SERPL-MCNC: 44 MG/DL — HIGH (ref 7–23)
CALCIUM SERPL-MCNC: 7.7 MG/DL — LOW (ref 8.4–10.5)
CHLORIDE SERPL-SCNC: 106 MMOL/L — SIGNIFICANT CHANGE UP (ref 96–108)
CO2 SERPL-SCNC: 23 MMOL/L — SIGNIFICANT CHANGE UP (ref 22–31)
CREAT SERPL-MCNC: 1.57 MG/DL — HIGH (ref 0.5–1.3)
EGFR: 47 ML/MIN/1.73M2 — LOW
GLUCOSE SERPL-MCNC: 107 MG/DL — HIGH (ref 70–99)
HCT VFR BLD CALC: 37.7 % — LOW (ref 39–50)
HGB BLD-MCNC: 12.6 G/DL — LOW (ref 13–17)
MCHC RBC-ENTMCNC: 29.8 PG — SIGNIFICANT CHANGE UP (ref 27–34)
MCHC RBC-ENTMCNC: 33.4 GM/DL — SIGNIFICANT CHANGE UP (ref 32–36)
MCV RBC AUTO: 89.1 FL — SIGNIFICANT CHANGE UP (ref 80–100)
NRBC # BLD: 0 /100 WBCS — SIGNIFICANT CHANGE UP (ref 0–0)
PLATELET # BLD AUTO: 140 K/UL — LOW (ref 150–400)
POTASSIUM SERPL-MCNC: 3.3 MMOL/L — LOW (ref 3.5–5.3)
POTASSIUM SERPL-SCNC: 3.3 MMOL/L — LOW (ref 3.5–5.3)
RBC # BLD: 4.23 M/UL — SIGNIFICANT CHANGE UP (ref 4.2–5.8)
RBC # FLD: 15.3 % — HIGH (ref 10.3–14.5)
SODIUM SERPL-SCNC: 143 MMOL/L — SIGNIFICANT CHANGE UP (ref 135–145)
WBC # BLD: 5.38 K/UL — SIGNIFICANT CHANGE UP (ref 3.8–10.5)
WBC # FLD AUTO: 5.38 K/UL — SIGNIFICANT CHANGE UP (ref 3.8–10.5)

## 2024-02-05 PROCEDURE — 70547 MR ANGIOGRAPHY NECK W/O DYE: CPT | Mod: 26

## 2024-02-05 PROCEDURE — 76376 3D RENDER W/INTRP POSTPROCES: CPT | Mod: 26

## 2024-02-05 PROCEDURE — 99232 SBSQ HOSP IP/OBS MODERATE 35: CPT

## 2024-02-05 PROCEDURE — 93356 MYOCRD STRAIN IMG SPCKL TRCK: CPT

## 2024-02-05 PROCEDURE — 93306 TTE W/DOPPLER COMPLETE: CPT | Mod: 26

## 2024-02-05 PROCEDURE — 70551 MRI BRAIN STEM W/O DYE: CPT | Mod: 26

## 2024-02-05 PROCEDURE — 70544 MR ANGIOGRAPHY HEAD W/O DYE: CPT | Mod: 26,XU

## 2024-02-05 PROCEDURE — 99223 1ST HOSP IP/OBS HIGH 75: CPT

## 2024-02-05 RX ORDER — APIXABAN 2.5 MG/1
5 TABLET, FILM COATED ORAL EVERY 12 HOURS
Refills: 0 | Status: DISCONTINUED | OUTPATIENT
Start: 2024-02-05 | End: 2024-02-06

## 2024-02-05 RX ORDER — ATORVASTATIN CALCIUM 80 MG/1
40 TABLET, FILM COATED ORAL AT BEDTIME
Refills: 0 | Status: DISCONTINUED | OUTPATIENT
Start: 2024-02-05 | End: 2024-02-06

## 2024-02-05 RX ADMIN — FINASTERIDE 5 MILLIGRAM(S): 5 TABLET, FILM COATED ORAL at 14:17

## 2024-02-05 RX ADMIN — APIXABAN 5 MILLIGRAM(S): 2.5 TABLET, FILM COATED ORAL at 17:08

## 2024-02-05 RX ADMIN — Medication 175 MICROGRAM(S): at 05:40

## 2024-02-05 RX ADMIN — TAMSULOSIN HYDROCHLORIDE 0.4 MILLIGRAM(S): 0.4 CAPSULE ORAL at 17:08

## 2024-02-05 RX ADMIN — ALBUTEROL 1 PUFF(S): 90 AEROSOL, METERED ORAL at 14:18

## 2024-02-05 RX ADMIN — ATORVASTATIN CALCIUM 40 MILLIGRAM(S): 80 TABLET, FILM COATED ORAL at 21:51

## 2024-02-05 RX ADMIN — CLOPIDOGREL BISULFATE 75 MILLIGRAM(S): 75 TABLET, FILM COATED ORAL at 14:17

## 2024-02-05 RX ADMIN — TAMSULOSIN HYDROCHLORIDE 0.4 MILLIGRAM(S): 0.4 CAPSULE ORAL at 05:40

## 2024-02-05 NOTE — PROGRESS NOTE ADULT - SUBJECTIVE AND OBJECTIVE BOX
THE PATIENT WAS SEEN AND EXAMINED BY ME WITH THE HOUSESTAFF AND STROKE TEAM DURING MORNING ROUNDS.   HPI:  68y M with Hx L carotid stent (placed 2 years ago), pituitary mass, gout, HTN, hypothyroidism, who presents w/ CC of L hand paresthesias. LKW at 6pm on 2/3. He was otherwise in his usual state of health when suddenly felt tingling in L hand. Never had symptoms like this before. He is able to coordinate movements in that hand, such as touching his nose or tapping his fingers, however feels unaware when the hand is touched. He denies weakness in the hands or legs. No slurred speech, headache, or visual changes. He is independent in ADLs at baseline. He currently only takes Plavix for blood thinners. He denies prior history of heart arrhythmias, although EKG in ED today is showing afib.      SUBJECTIVE: No events overnight.  No new neurologic complaints.      albuterol    90 MICROgram(s) HFA Inhaler 1 Puff(s) Inhalation daily  atorvastatin 80 milliGRAM(s) Oral at bedtime  clopidogrel Tablet 75 milliGRAM(s) Oral daily  clopidogrel Tablet 75 milliGRAM(s) Oral daily  finasteride 5 milliGRAM(s) Oral daily  heparin  Infusion 1050 Unit(s)/Hr IV Continuous <Continuous>  levothyroxine 175 MICROGram(s) Oral daily  sodium chloride 0.65% Nasal 1 Spray(s) Both Nostrils two times a day PRN  sodium chloride 0.9%. 1000 milliLiter(s) IV Continuous <Continuous>  tamsulosin 0.4 milliGRAM(s) Oral every 12 hours      PHYSICAL EXAM:   Vital Signs Last 24 Hrs  T(C): 36.7 (05 Feb 2024 04:48), Max: 36.9 (04 Feb 2024 21:03)  T(F): 98 (05 Feb 2024 04:48), Max: 98.5 (04 Feb 2024 21:03)  HR: 82 (05 Feb 2024 04:48) (62 - 100)  BP: 110/71 (05 Feb 2024 04:48) (102/65 - 135/95)  BP(mean): --  RR: 18 (05 Feb 2024 04:48) (18 - 20)  SpO2: 98% (05 Feb 2024 04:48) (95% - 100%)    Parameters below as of 05 Feb 2024 04:48  Patient On (Oxygen Delivery Method): room air        General: No acute distress  HEENT: EOM intact, visual fields full  Abdomen: Soft, nontender, nondistended   Extremities: No edema    NEUROLOGICAL EXAM:  Mental status: Awake, alert, oriented x3, no aphasia, no neglect, normal memory   Cranial Nerves: No facial asymmetry, no nystagmus, no dysarthria,  tongue midline  Motor exam: Normal tone, no drift, 5/5 RUE, 5/5 RLE, 5/5 LUE, 5/5 LLE, normal fine finger movements.  Sensation: Intact to light touch   Coordination/ Gait: No dysmetria, YASMINE intact and symmetric bilaterally    LABS:                        12.6   5.38  )-----------( 140      ( 05 Feb 2024 06:18 )             37.7    02-05    143  |  106  |  44<H>  ----------------------------<  107<H>  3.3<L>   |  23  |  1.57<H>    Ca    7.7<L>      05 Feb 2024 06:17  Phos  3.6     02-04  Mg     1.5     02-04    TPro  7.2  /  Alb  4.2  /  TBili  0.7  /  DBili  x   /  AST  24  /  ALT  31  /  AlkPhos  70  02-03  PT/INR - ( 03 Feb 2024 20:40 )   PT: 13.5 sec;   INR: 1.23 ratio         PTT - ( 05 Feb 2024 02:12 )  PTT:51.7 sec      IMAGING: Reviewed by me.      THE PATIENT WAS SEEN AND EXAMINED BY ME WITH THE HOUSESTAFF AND STROKE TEAM DURING MORNING ROUNDS.   HPI:  68y M with Hx L carotid stent (placed 2 years ago), pituitary mass, gout, HTN, hypothyroidism, who presents w/ CC of L hand paresthesias. LKW at 6pm on 2/3. He was otherwise in his usual state of health when suddenly felt tingling in L hand. Never had symptoms like this before. He is able to coordinate movements in that hand, such as touching his nose or tapping his fingers, however feels unaware when the hand is touched. He denies weakness in the hands or legs. No slurred speech, headache, or visual changes. He is independent in ADLs at baseline. He currently only takes Plavix for blood thinners. He denies prior history of heart arrhythmias, although EKG in ED today is showing afib.      SUBJECTIVE: No events overnight.  No new neurologic complaints.      albuterol    90 MICROgram(s) HFA Inhaler 1 Puff(s) Inhalation daily  atorvastatin 80 milliGRAM(s) Oral at bedtime  clopidogrel Tablet 75 milliGRAM(s) Oral daily  clopidogrel Tablet 75 milliGRAM(s) Oral daily  finasteride 5 milliGRAM(s) Oral daily  heparin  Infusion 1050 Unit(s)/Hr IV Continuous <Continuous>  levothyroxine 175 MICROGram(s) Oral daily  sodium chloride 0.65% Nasal 1 Spray(s) Both Nostrils two times a day PRN  sodium chloride 0.9%. 1000 milliLiter(s) IV Continuous <Continuous>  tamsulosin 0.4 milliGRAM(s) Oral every 12 hours      PHYSICAL EXAM:   Vital Signs Last 24 Hrs  T(C): 36.7 (05 Feb 2024 04:48), Max: 36.9 (04 Feb 2024 21:03)  T(F): 98 (05 Feb 2024 04:48), Max: 98.5 (04 Feb 2024 21:03)  HR: 82 (05 Feb 2024 04:48) (62 - 100)  BP: 110/71 (05 Feb 2024 04:48) (102/65 - 135/95)  BP(mean): --  RR: 18 (05 Feb 2024 04:48) (18 - 20)  SpO2: 98% (05 Feb 2024 04:48) (95% - 100%)    Parameters below as of 05 Feb 2024 04:48  Patient On (Oxygen Delivery Method): room air        General: No acute distress  HEENT: EOM intact, visual fields full  Abdomen: Soft, nontender, nondistended   Extremities: No edema    NEUROLOGICAL EXAM:  Mental status: Awake, alert, oriented x3, no aphasia, no neglect, normal memory   Cranial Nerves: No facial asymmetry, no nystagmus, no dysarthria,  tongue midline  Motor exam: Normal tone, no drift, 5/5 RUE, 5/5 RLE, 5/5 LUE, 5/5 LLE, normal fine finger movements.  Sensation: Intact to light touch   Coordination/ Gait: No dysmetria, YASMINE intact and symmetric bilaterally    LABS:                        12.6   5.38  )-----------( 140      ( 05 Feb 2024 06:18 )             37.7    02-05    143  |  106  |  44<H>  ----------------------------<  107<H>  3.3<L>   |  23  |  1.57<H>    Ca    7.7<L>      05 Feb 2024 06:17  Phos  3.6     02-04  Mg     1.5     02-04    TPro  7.2  /  Alb  4.2  /  TBili  0.7  /  DBili  x   /  AST  24  /  ALT  31  /  AlkPhos  70  02-03  PT/INR - ( 03 Feb 2024 20:40 )   PT: 13.5 sec;   INR: 1.23 ratio         PTT - ( 05 Feb 2024 02:12 )  PTT:51.7 sec      IMAGING: Reviewed by me.   02/03/24: CT HEAD: No CT evidence of acute intracranial hemorrhage, mass effect, or midline   shift.     CTA Neck: The left vertebral artery is occluded from its origin, felt to   be chronic in nature, given abnormal appearance of the left V4 segment   flow void on a prior MRI in 2021. Noncalcified plaque of the right common   carotid artery results in up to approximately 70% stenosis. Noncalcified   plaque of the left common carotid artery results in up to approximately   50% stenosis. A left proximal internal carotid artery stent is patent.    CTA Head: A posterior right M3 branch demonstrates tapering of the   contrast column, concerning for moderate to severe stenosis. No large   vessel occlusion about the [place]. Patchy reconstitution of the   intradural left vertebral artery.    CT Perfusion: Diffusely elevated Tmax without corresponding large vessel   occlusion likely secondary to slow flow phenomenon given extensive   atherosclerotic disease in bilateral common carotid arteries. Please note   that the superior cerebral hemispheres are excluded from the   field-of-view, and infarct along the high right frontoparietal region is   not excluded based on this study. In the right frontoparietal region,   there is focal region of markedly elevated Tmax, which may reflect an   area of relatively increased hypoperfusion, which appears to extend   beyond the superior margin of the field-of-view

## 2024-02-05 NOTE — PROGRESS NOTE ADULT - ASSESSMENT
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 hx of laryngeal cancer s/p radiation who presents w/ CC of L hand paresthesias    LKW: 6pm 2/3  NIHSS:  0  Baseline MRS: 0  Not a Teneceteplase candidate due to NIHSS 0  Not a thrombectomy candidate due to  lack of LVO      Impression: acute L hand paresthesias likely due to ischemic infarct etiology cardioembolic, new A. Fib    NEURO: Continue close monitoring for neurologic deterioration, permissive HTN, LDL 46: continue home dose statin,  MRI Brain w/o, MRA Head w/o and Neck w/contrast. Physical therapy/OT/Speech eval/treatment.     ANTITHROMBOTIC THERAPY: heparin gtt ( A. Fib) and Plavix    PULMONARY: CXR clear, protecting airway, saturating well     CARDIOVASCULAR: check TTE, cardiac monitoring                              SBP goal:     GASTROINTESTINAL:  dysphagia screen       Diet:     RENAL: BUN/Cr stable, good urine output      Na Goal: Greater than 135     Peterson:    HEMATOLOGY: H/H stable, Platelets normal      DVT ppx: Heparin s.c [] LMWH []     ID: afebrile, no leukocytosis     OTHER:     DISPOSITION: Rehab or home depending on PT eval once stable and workup is complete      CORE MEASURES:        Admission NIHSS: 0     Tenectaplase: [] YES [x] NO      LDL/HDL: 46/46     Depression Screen: 0     Statin Therapy: y     Dysphagia Screen: [x] PASS [] FAIL     Smoking [] YES [x] NO      Afib [x] YES [] NO     Stroke Education [x] YES [] NO    Obtain screening lower extremity venous ultrasound in patients who meet 1 or more of the following criteria as patient is high risk for DVT/PE on admission:   [] History of DVT/PE  []Hypercoagulable states (Factor V Leiden, Cancer, OCP, etc. )  []Prolonged immobility (hemiplegia/hemiparesis/post operative or any other extended immobilization)  [] Transferred from outside facility (Rehab or Long term care)  [] Age </= to 50. 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 hx of laryngeal cancer s/p radiation who presents w/ CC of L hand paresthesias    LKW: 6pm 2/3  NIHSS:  0  Baseline MRS: 0  Not a Teneceteplase candidate due to NIHSS 0  Not a thrombectomy candidate due to  lack of LVO      Impression: acute L hand paresthesias likely due to ischemic infarct etiology cardioembolic, new A. Fib    NEURO: Continue close monitoring for neurologic deterioration, permissive HTN, LDL 46: continue home dose statin,  MRI Brain w/o, MRA Head w/o and Neck w/contrast pending, unable to perform NOVA. Physical therapy/OT/Speech eval/treatment.     ANTITHROMBOTIC THERAPY: heparin gtt ( A. Fib) and Plavix     PULMONARY: CXR clear, protecting airway, saturating well     CARDIOVASCULAR:  TTE: Left ventricular cavity is normal. Left ventricular wall thicknessis normal. Left ventricular systolic function is moderately decreased with an ejection fraction of 40 % by 3D. There are no regional wall motion abnormalities seen. Multiple segmental abnormalities exist. Elevated filling pressure. Analysis of left ventricular diastolic function and filling pressure is made challenging by the presence of merged E and A wave.Moderately enlarged right ventricular cavity size, wall thickness, and reduced systolic function.The leftatrium is severely dilated.The right atrium is severely dilated.   cardiac monitoring, will transition to oral AC based on cardiologist recommendation                             SBP goal: <180    GASTROINTESTINAL:  dysphagia screen -passed, tolerating diet      Diet: Regular    RENAL: BUN/Cr stable, good urine output      Na Goal: Greater than 135     Peterson: N    HEMATOLOGY: H/H stable, Platelets normal      DVT ppx: Heparin gtt    ID: afebrile, no leukocytosis     OTHER: plan of care discussed with patient at bedside    DISPOSITION: Home with outpatient therapy.       CORE MEASURES:        Admission NIHSS: 0     Tenectaplase: [] YES [x] NO      LDL/HDL: 46/46     Depression Screen: 0     Statin Therapy: y     Dysphagia Screen: [x] PASS [] FAIL     Smoking [] YES [x] NO      Afib [x] YES [] NO     Stroke Education [x] YES [] NO    Obtain screening lower extremity venous ultrasound in patients who meet 1 or more of the following criteria as patient is high risk for DVT/PE on admission:   [] History of DVT/PE  []Hypercoagulable states (Factor V Leiden, Cancer, OCP, etc. )  []Prolonged immobility (hemiplegia/hemiparesis/post operative or any other extended immobilization)  [] Transferred from outside facility (Rehab or Long term care)  [] Age </= to 50. 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 hx of laryngeal cancer s/p radiation who presents w/ CC of L hand paresthesias    LKW: 6pm 2/3  NIHSS:  0  Baseline MRS: 0  Not a Teneceteplase candidate due to NIHSS 0  Not a thrombectomy candidate due to  lack of LVO      Impression: acute L hand paresthesias likely due to ischemic infarct etiology embolic in the setting of new A. Fib may be cardioembolic can not exclude large atherosclerosis disease due to known right common carotid stenosis that may result in artery to artery embolism    NEURO: Continue close monitoring for neurologic deterioration, permissive HTN, LDL 46: continue home dose statin,  MRI Brain w/o, MRA Head w/o and Neck w/contrast pending, unable to perform NOVA. Physical therapy/OT/Speech eval/treatment.     ANTITHROMBOTIC THERAPY: heparin gtt ( A. Fib) and Plavix     PULMONARY: CXR clear, protecting airway, saturating well     CARDIOVASCULAR:  TTE: Left ventricular cavity is normal. Left ventricular wall thickness is normal. Left ventricular systolic function is moderately decreased with an ejection fraction of 40 % by 3D. There are no regional wall motion abnormalities seen. Multiple segmental abnormalities exist. Elevated filling pressure. Analysis of left ventricular diastolic function and filling pressure is made challenging by the presence of merged E and A wave. Moderately enlarged right ventricular cavity size, wall thickness, and reduced systolic function. The left atrium is severely dilated. The right atrium is severely dilated.   cardiac monitoring, will transition to oral AC based on cardiologist recommendation                             SBP goal: <180    GASTROINTESTINAL:  dysphagia screen -passed, tolerating diet      Diet: Regular    RENAL: BUN/Cr stable, good urine output      Na Goal: Greater than 135     Peterson: N    HEMATOLOGY: H/H stable, Platelets normal      DVT ppx: Heparin gtt    ID: afebrile, no leukocytosis     OTHER: plan of care discussed with patient at bedside    DISPOSITION: Home with outpatient therapy.       CORE MEASURES:        Admission NIHSS: 0     Tenectaplase: [] YES [x] NO      LDL/HDL: 46/46     Depression Screen: 0     Statin Therapy: y     Dysphagia Screen: [x] PASS [] FAIL     Smoking [] YES [x] NO      Afib [x] YES [] NO     Stroke Education [x] YES [] NO    Obtain screening lower extremity venous ultrasound in patients who meet 1 or more of the following criteria as patient is high risk for DVT/PE on admission:   [] History of DVT/PE  []Hypercoagulable states (Factor V Leiden, Cancer, OCP, etc. )  []Prolonged immobility (hemiplegia/hemiparesis/post operative or any other extended immobilization)  [] Transferred from outside facility (Rehab or Long term care)  [] Age </= to 50. 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 hx of laryngeal cancer s/p radiation who presents w/ CC of L hand paresthesias    LKW: 6pm 2/3  NIHSS:  0  Baseline MRS: 0  Not a Teneceteplase candidate due to NIHSS 0  Not a thrombectomy candidate due to  lack of LVO      Impression: acute L hand paresthesias likely due to ischemic infarct etiology embolic in the setting of new A. Fib may be cardioembolic can not exclude large atherosclerosis disease due to known right common carotid stenosis that may result in artery to artery embolism.    NEURO: Continue close monitoring for neurologic deterioration, permissive HTN, LDL 46: continue home dose statin,  MRI Brain w/o, MRA Head w/o and Neck w/contrast pending, unable to perform NOVA. Physical therapy/OT/Speech eval/treatment.     ANTITHROMBOTIC THERAPY: Eliquis 5mg BID    PULMONARY: CXR clear, protecting airway, saturating well     CARDIOVASCULAR:  TTE: Left ventricular cavity is normal. Left ventricular wall thickness is normal. Left ventricular systolic function is moderately decreased with an ejection fraction of 40 % by 3D. There are no regional wall motion abnormalities seen. Multiple segmental abnormalities exist. Elevated filling pressure. Analysis of left ventricular diastolic function and filling pressure is made challenging by the presence of merged E and A wave. Moderately enlarged right ventricular cavity size, wall thickness, and reduced systolic function. The left atrium is severely dilated. The right atrium is severely dilated.   cardiac monitoring, will transition to oral AC based on cardiologist recommendation                             SBP goal: <180    GASTROINTESTINAL:  dysphagia screen -passed, tolerating diet      Diet: Regular    RENAL: BUN/Cr stable, good urine output      Na Goal: Greater than 135     Peterson: N    HEMATOLOGY: H/H stable, Platelets normal      DVT ppx: Eliquis     ID: afebrile, no leukocytosis     OTHER: plan of care discussed with patient at bedside    DISPOSITION: Home with outpatient therapy.       CORE MEASURES:        Admission NIHSS: 0     Tenectaplase: [] YES [x] NO      LDL/HDL: 46/46     Depression Screen: 0     Statin Therapy: y     Dysphagia Screen: [x] PASS [] FAIL     Smoking [] YES [x] NO      Afib [x] YES [] NO     Stroke Education [x] YES [] NO    Obtain screening lower extremity venous ultrasound in patients who meet 1 or more of the following criteria as patient is high risk for DVT/PE on admission:   [] History of DVT/PE  []Hypercoagulable states (Factor V Leiden, Cancer, OCP, etc. )  []Prolonged immobility (hemiplegia/hemiparesis/post operative or any other extended immobilization)  [] Transferred from outside facility (Rehab or Long term care)  [] Age </= to 50.

## 2024-02-05 NOTE — DISCHARGE NOTE PROVIDER - NSRESEARCHGRANT_MLMHIDDEN_GEN_A_CORE
yes Express needs in a calm non-threatening voice. Do not hold it in or you may explode one day. The key here is to learn to talk about what bothers you and not let things fester.

## 2024-02-05 NOTE — CONSULT NOTE ADULT - ASSESSMENT
1. acute CVA  2. PAF  3. amyloid heart echo unchanged c/w moderate systolic and severe diastolic dysfunction  4. amyloid ATTR wild typer genetic mutation    Recommend  cardiac stable  discharge as per neurology  add eliquis 5 BID for PAF  followup with amyloid clinic

## 2024-02-05 NOTE — DISCHARGE NOTE PROVIDER - HOSPITAL COURSE
HPI: 68y M with Hx L carotid stent (placed 2 years ago), pituitary mass, gout, HTN, hypothyroidism, who presents w/ CC of L hand paresthesias. LKW at 6pm on 2/3. He was otherwise in his usual state of health when suddenly felt tingling in L hand. Never had symptoms like this before. He is able to coordinate movements in that hand, such as touching his nose or tapping his fingers, however feels unaware when the hand is touched. He denies weakness in the hands or legs. No slurred speech, headache, or visual changes. He is independent in ADLs at baseline. He currently only takes Plavix for blood thinners. He denies prior history of heart arrhythmias, although EKG in ED today is showing afib.    Imaging:   MRI Brain/ MRA H&N:  Age-appropriate involutional and ischemic gliotic changes.   Small acute right posterior frontal cortical infarct with diffusion   restriction. No acute hemorrhage.    Moderate mid to distal right common carotid artery stenosis with a more   normal distal common carotid and carotid bulb appearance consistent with   a post endarterectomy graft appearance. This is suspicious for   significant endothelial proliferation.    Left carotid stent with signal dropout due to the metallic stent without   evidence for significant stenosis.    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    02/03/24: CT HEAD: No CT evidence of acute intracranial hemorrhage, mass effect, or midline   shift.     CTA Neck: The left vertebral artery is occluded from its origin, felt to   be chronic in nature, given abnormal appearance of the left V4 segment   flow void on a prior MRI in 2021. Noncalcified plaque of the right common   carotid artery results in up to approximately 70% stenosis. Noncalcified   plaque of the left common carotid artery results in up to approximately   50% stenosis. A left proximal internal carotid artery stent is patent.    CTA Head: A posterior right M3 branch demonstrates tapering of the   contrast column, concerning for moderate to severe stenosis. No large   vessel occlusion about the [place]. Patchy reconstitution of the   intradural left vertebral artery.    CT Perfusion: Diffusely elevated Tmax without corresponding large vessel   occlusion likely secondary to slow flow phenomenon given extensive   atherosclerotic disease in bilateral common carotid arteries. Please note   that the superior cerebral hemispheres are excluded from the   field-of-view, and infarct along the high right frontoparietal region is   not excluded based on this study. In the right frontoparietal region,   there is focal region of markedly elevated Tmax, which may reflect an   area of relatively increased hypoperfusion, which appears to extend   beyond the superior margin of the field-of-view    Impression: Acute L hand paresthesias likely due to right posterior frontal cortical infarct, etiology cardioembolic, new A. Fib  Antithrombotic therapy: heparin gtt ( A. Fib) and Plavix. Transition to eliqius 5mg BID?    TTE:  Left ventricular cavity is normal. Left ventricular wall thicknessis normal. Left ventricular systolic function is moderately decreased with an ejection fraction of 40 % by 3D. There are no regional wall motion abnormalities seen. Multiple segmental abnormalities exist. Elevated filling pressure. Analysis of left ventricular diastolic function and filling pressure is made challenging by the presence of merged E and A wave.Moderately enlarged right ventricular cavity size, wall thickness, and reduced systolic function.The leftatrium is severely dilated.The right atrium is severely dilated.     LDL 46: continue home dose statin     Disposition: Recommended for outpatient PT and Outpatient OT (for cognition and L hand decreased coordination). Patient is stable and cleared for discharge. HPI: 68y M with Hx L carotid stent (placed 2 years ago), pituitary mass, gout, HTN, hypothyroidism, who presents w/ CC of L hand paresthesias. LKW at 6pm on 2/3. He was otherwise in his usual state of health when suddenly felt tingling in L hand. Never had symptoms like this before. He is able to coordinate movements in that hand, such as touching his nose or tapping his fingers, however feels unaware when the hand is touched. He denies weakness in the hands or legs. No slurred speech, headache, or visual changes. He is independent in ADLs at baseline. He currently only takes Plavix for blood thinners. He denies prior history of heart arrhythmias, although EKG in ED today is showing afib.    Imaging:   MRI Brain/ MRA H&N:  Age-appropriate involutional and ischemic gliotic changes.   Small acute right posterior frontal cortical infarct with diffusion   restriction. No acute hemorrhage.    Moderate mid to distal right common carotid artery stenosis with a more   normal distal common carotid and carotid bulb appearance consistent with   a post endarterectomy graft appearance. This is suspicious for   significant endothelial proliferation.    Left carotid stent with signal dropout due to the metallic stent without   evidence for significant stenosis.    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    02/03/24: CT HEAD: No CT evidence of acute intracranial hemorrhage, mass effect, or midline   shift.     CTA Neck: The left vertebral artery is occluded from its origin, felt to   be chronic in nature, given abnormal appearance of the left V4 segment   flow void on a prior MRI in 2021. Noncalcified plaque of the right common   carotid artery results in up to approximately 70% stenosis. Noncalcified   plaque of the left common carotid artery results in up to approximately   50% stenosis. A left proximal internal carotid artery stent is patent.    CTA Head: A posterior right M3 branch demonstrates tapering of the   contrast column, concerning for moderate to severe stenosis. No large   vessel occlusion about the [place]. Patchy reconstitution of the   intradural left vertebral artery.    CT Perfusion: Diffusely elevated Tmax without corresponding large vessel   occlusion likely secondary to slow flow phenomenon given extensive   atherosclerotic disease in bilateral common carotid arteries. Please note   that the superior cerebral hemispheres are excluded from the   field-of-view, and infarct along the high right frontoparietal region is   not excluded based on this study. In the right frontoparietal region,   there is focal region of markedly elevated Tmax, which may reflect an   area of relatively increased hypoperfusion, which appears to extend   beyond the superior margin of the field-of-view    Impression: Acute L hand paresthesias due to right posterior frontal cortical infarct, etiology likely cardioembolic, new A. Fib. Cannot exclude large atherosclerosis disease due to known right common carotid stenosis that may result in artery to artery embolism.  Antithrombotic therapy: Eliquis 5mg BID and Plavix 75mg daily    TTE:  Left ventricular cavity is normal. Left ventricular wall thickness is normal. Left ventricular systolic function is moderately decreased with an ejection fraction of 40 % by 3D. There are no regional wall motion abnormalities seen. Multiple segmental abnormalities exist. Elevated filling pressure. Analysis of left ventricular diastolic function and filling pressure is made challenging by the presence of merged E and A wave. Moderately enlarged right ventricular cavity size, wall thickness, and reduced systolic function. The left atrium is severely dilated. The right atrium is severely dilated.    LDL 46: continue home dose statin     A1C 6.3.     Disposition: Recommended for outpatient PT and outpatient OT. Patient is neurologically stable and cleared for discharge. Recommend follow up with Dr. Leary for assessment and possible treatment of R carotid stenosis. HPI: 68y M with Hx L carotid stent (placed 2 years ago), pituitary mass, gout, HTN, hypothyroidism, who presents w/ CC of L hand paresthesias. LKW at 6pm on 2/3. He was otherwise in his usual state of health when suddenly felt tingling in L hand. Never had symptoms like this before. He is able to coordinate movements in that hand, such as touching his nose or tapping his fingers, however feels unaware when the hand is touched. He denies weakness in the hands or legs. No slurred speech, headache, or visual changes. He is independent in ADLs at baseline. He currently only takes Plavix for blood thinners. He denies prior history of heart arrhythmias, although EKG in ED today is showing afib.    Imaging:   MRI Brain/ MRA H&N:  Age-appropriate involutional and ischemic gliotic changes.   Small acute right posterior frontal cortical infarct with diffusion   restriction. No acute hemorrhage.    Moderate mid to distal right common carotid artery stenosis with a more   normal distal common carotid and carotid bulb appearance consistent with   a post endarterectomy graft appearance. This is suspicious for   significant endothelial proliferation.    Left carotid stent with signal dropout due to the metallic stent without   evidence for significant stenosis.    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    02/03/24: CT HEAD: No CT evidence of acute intracranial hemorrhage, mass effect, or midline   shift.     CTA Neck: The left vertebral artery is occluded from its origin, felt to   be chronic in nature, given abnormal appearance of the left V4 segment   flow void on a prior MRI in 2021. Noncalcified plaque of the right common   carotid artery results in up to approximately 70% stenosis. Noncalcified   plaque of the left common carotid artery results in up to approximately   50% stenosis. A left proximal internal carotid artery stent is patent.    CTA Head: A posterior right M3 branch demonstrates tapering of the   contrast column, concerning for moderate to severe stenosis. No large   vessel occlusion about the [place]. Patchy reconstitution of the   intradural left vertebral artery.    CT Perfusion: Diffusely elevated Tmax without corresponding large vessel   occlusion likely secondary to slow flow phenomenon given extensive   atherosclerotic disease in bilateral common carotid arteries. Please note   that the superior cerebral hemispheres are excluded from the   field-of-view, and infarct along the high right frontoparietal region is   not excluded based on this study. In the right frontoparietal region,   there is focal region of markedly elevated Tmax, which may reflect an   area of relatively increased hypoperfusion, which appears to extend   beyond the superior margin of the field-of-view    Impression: Acute L hand paresthesias due to right posterior frontal cortical infarct, etiology likely cardioembolic, new A. Fib. Cannot exclude large atherosclerosis disease due to known right common carotid stenosis that may result in artery to artery embolism.  Antithrombotic therapy: Eliquis 5mg BID and Plavix 75mg daily    TTE:  Left ventricular cavity is normal. Left ventricular wall thickness is normal. Left ventricular systolic function is moderately decreased with an ejection fraction of 40 % by 3D. There are no regional wall motion abnormalities seen. Multiple segmental abnormalities exist. Elevated filling pressure. Analysis of left ventricular diastolic function and filling pressure is made challenging by the presence of merged E and A wave. Moderately enlarged right ventricular cavity size, wall thickness, and reduced systolic function. The left atrium is severely dilated. The right atrium is severely dilated.    LDL 46: continue home dose statin     A1C 6.3.     Disposition: Recommended for outpatient PT and outpatient OT. Patient is neurologically stable and cleared for discharge. Recommend follow up with Dr. Leary for assessment and possible treatment of R carotid stenosis.

## 2024-02-05 NOTE — DISCHARGE NOTE PROVIDER - CARE PROVIDER_API CALL
Neela Campuzano  Neurology  [address], [address]  [place] [address]  Phone: [phone]  Fax: [phone]  Follow Up Time: 2 weeks

## 2024-02-05 NOTE — PROGRESS NOTE ADULT - TIME BILLING
A total of 35 minutes was dedicated to the encounter, during which time a comprehensive physical evaluation of the patient was conducted. This included meticulous analysis of laboratory results and a thorough review of the most recent pertinent images. Any necessary modifications to the documentation were made to ensure accuracy and completeness. Furthermore, I addressed any inquiries or concerns from family or relatives present during the evaluation, ensuring a comprehensive and patient-centered approach to care.

## 2024-02-05 NOTE — DISCHARGE NOTE PROVIDER - NSDCCPCAREPLAN_GEN_ALL_CORE_FT
PRINCIPAL DISCHARGE DIAGNOSIS  Diagnosis: Stroke  Assessment and Plan of Treatment: .Please follow up with neurologist in 1 week. Continue taking medications as prescribed. Monitor your blood pressure. Reduce fat, cholesterol and salt in your diet. Increase intake of fruits and vegetables. Limit alcohol to minimum and do not smoke. You may be at risk for falling, make changes to your home to help you walk easier. Keep up to date on vaccinations.  If you experience any symptoms of facial drooping, slurred speech, arm or leg weakness, severe headache, vision changes or any worsening symptoms, notify provider immediatley and return to ER.      SECONDARY DISCHARGE DIAGNOSES  Diagnosis: Atrial fibrillation  Assessment and Plan of Treatment:      PRINCIPAL DISCHARGE DIAGNOSIS  Diagnosis: Stroke  Assessment and Plan of Treatment: .Please follow up with neurologist in 1 week. Continue taking medications as prescribed. Monitor your blood pressure. Reduce fat, cholesterol and salt in your diet. Increase intake of fruits and vegetables. Limit alcohol to minimum and do not smoke. You may be at risk for falling, make changes to your home to help you walk easier. Keep up to date on vaccinations.  If you experience any symptoms of facial drooping, slurred speech, arm or leg weakness, severe headache, vision changes or any worsening symptoms, notify provider immediatley and return to ER.      SECONDARY DISCHARGE DIAGNOSES  Diagnosis: Atrial fibrillation  Assessment and Plan of Treatment: You were started on Eliquis 5mg twice a day.

## 2024-02-05 NOTE — DISCHARGE NOTE PROVIDER - NSDCMRMEDTOKEN_GEN_ALL_CORE_FT
Advair Diskus 100 mcg-50 mcg inhalation powder: 1 puff(s) inhaled 2 times a day as needed  Albuterol (Eqv-ProAir HFA) 90 mcg/inh inhalation aerosol: 1 puff(s) inhaled once a day (at bedtime) and as needed  ALPRAZolam 0.25 mg oral tablet: 1 tab(s) orally once a day as needed  atorvastatin 40 mg oral tablet: 1 tab(s) orally once a day (at bedtime)  diazePAM 2 mg oral tablet: 1 tab(s) orally 2 times a day as needed for  anxiety  finasteride 5 mg oral tablet: 1 tab(s) orally once a day  furosemide 20 mg oral tablet: 1 tab(s) orally 2 times a day  Plavix 75 mg oral tablet: 1 tab(s) orally once a day  Synthroid 175 mcg (0.175 mg) oral tablet: 1 tab(s) orally once a day  tamsulosin 0.4 mg oral capsule: 1 cap(s) orally 2 times a day   Advair Diskus 100 mcg-50 mcg inhalation powder: 1 puff(s) inhaled 2 times a day as needed  Albuterol (Eqv-ProAir HFA) 90 mcg/inh inhalation aerosol: 1 puff(s) inhaled once a day (at bedtime) and as needed  ALPRAZolam 0.25 mg oral tablet: 1 tab(s) orally once a day as needed  apixaban 5 mg oral tablet: 1 tab(s) orally every 12 hours  atorvastatin 40 mg oral tablet: 1 tab(s) orally once a day (at bedtime)  diazePAM 2 mg oral tablet: 1 tab(s) orally 2 times a day as needed for  anxiety  finasteride 5 mg oral tablet: 1 tab(s) orally once a day  furosemide 20 mg oral tablet: 1 tab(s) orally 2 times a day  Plavix 75 mg oral tablet: 1 tab(s) orally once a day  Synthroid 175 mcg (0.175 mg) oral tablet: 1 tab(s) orally once a day  tamsulosin 0.4 mg oral capsule: 1 cap(s) orally 2 times a day

## 2024-02-05 NOTE — CONSULT NOTE ADULT - SUBJECTIVE AND OBJECTIVE BOX
Bharat mei has known amyloidosis amyloid heart followed by me and amyloid clinic. here with suspected CVA had PAF  HPI:  68y M with Hx L carotid stent (placed 2 years ago), pituitary mass, gout, HTN, hypothyroidism, who presents w/ CC of L hand paresthesias. LKW at 6pm on 2/3. He was otherwise in his usual state of health when suddenly felt tingling in L hand. Never had symptoms like this before. He is able to coordinate movements in that hand, such as touching his nose or tapping his fingers, however feels unaware when the hand is touched. He denies weakness in the hands or legs. No slurred speech, headache, or visual changes. He is independent in ADLs at baseline. He currently only takes Plavix for blood thinners. He denies prior history of heart arrhythmias, although EKG in ED today is showing afib (03 Feb 2024 22:55)  Had episode of PAF  MRI c/w acute stroke  Patient presently feels well NAD neurosx of left arm numness have resolved  MEDICATIONS:  MEDICATIONS  (STANDING):  albuterol    90 MICROgram(s) HFA Inhaler 1 Puff(s) Inhalation daily  atorvastatin 40 milliGRAM(s) Oral at bedtime  clopidogrel Tablet 75 milliGRAM(s) Oral daily  finasteride 5 milliGRAM(s) Oral daily  heparin  Infusion 1050 Unit(s)/Hr (10.5 mL/Hr) IV Continuous <Continuous>  levothyroxine 175 MICROGram(s) Oral daily  sodium chloride 0.9%. 1000 milliLiter(s) (100 mL/Hr) IV Continuous <Continuous>  tamsulosin 0.4 milliGRAM(s) Oral every 12 hours      PHYSICAL EXAM:  T(C): 36.4 (02-05-24 @ 08:36), Max: 36.9 (02-04-24 @ 21:03)  HR: 82 (02-05-24 @ 08:36) (82 - 100)  BP: 125/84 (02-05-24 @ 08:36) (102/65 - 135/95)  RR: 20 (02-05-24 @ 08:36) (18 - 20)  SpO2: 98% (02-05-24 @ 08:36) (95% - 98%)  Wt(kg): --  I&O's Summary    04 Feb 2024 07:01  -  05 Feb 2024 07:00  --------------------------------------------------------  IN: 260 mL / OUT: 350 mL / NET: -90 mL    05 Feb 2024 07:01  -  05 Feb 2024 16:15  --------------------------------------------------------  IN: 360 mL / OUT: 350 mL / NET: 10 mL          Appearance: Normal	NAD looks well in good apirits  HEENT:   Normal oral mucosa, PERRL, EOMI	  Cardiovascular: Normal S1 S2,rhthm regular NSR  No JVD, No murmurs ,  Respiratory: Lungs clear to auscultation, normal effort 	  Gastrointestinal:  Soft, Non-tender, + BS	  Musculoskeletal: Normal range of motion, normal strength  Psychiatry:  Mood & affect appropriate  Ext: No edema  Peripheral pulses palpable 2+ bilaterally      LABS:    CARDIAC MARKERS:                                12.6   5.38  )-----------( 140      ( 05 Feb 2024 06:18 )             37.7     02-05    143  |  106  |  44<H>  ----------------------------<  107<H>  3.3<L>   |  23  |  1.57<H>    Ca    7.7<L>      05 Feb 2024 06:17  Phos  3.6     02-04  Mg     1.5     02-04    TPro  7.2  /  Alb  4.2  /  TBili  0.7  /  DBili  x   /  AST  24  /  ALT  31  /  AlkPhos  70  02-03    proBNP:   Lipid Profile:   HgA1c:   TSH:           TELEMETRY: 	    ECG:  	NSR  RADIOLOGY:   < from: MR Head No Cont (02.05.24 @ 14:20) >  IMPRESSION: Age-appropriate involutional and ischemic gliotic changes.   Small acute right posterior frontal cortical infarct with diffusion   restriction. No acute hemorrhage.    Moderate mid to distal right common carotid artery stenosis with a more   normal distal common carotid and carotid bulb appearance consistent with   a post endarterectomy graft appearance. This is suspicious for   significant endothelial proliferation.    Left carotid stent with signal dropout due to the metallic stent without   evidence for significant stenosis.    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    -< from: CT Brain Perfusion Maps Stroke (02.03.24 @ 21:04) >  IMPRESSION:  CTA Neck: The left vertebralartery is occluded from its origin, felt to   be chronic in nature, given abnormal appearance of the left V4 segment   flow void on a prior MRI in 2021. Noncalcified plaque of the right common   carotid artery results in up to approximately 70% stenosis. Noncalcified   plaque of the left common carotid artery results in up to approximately   50% stenosis. A left proximal internal carotid artery stent is patent.    CTA Head: A posterior right M3 branch demonstrates tapering of the   contrast column, concerning for moderate to severe stenosis. No large   vessel occlusion about the [place]. Patchy reconstitution of the   intradural left vertebral artery.    CT Perfusion: Diffusely elevated Tmax without corresponding large vessel   occlusion likely secondary to slow flow phenomenon given extensive   atherosclerotic disease in bilateral common carotid arteries. Please note   that the superior cerebral hemispheres are excluded from the   field-of-view, and infarct along the high right frontoparietal region is   not excluded based on this study. In the right frontoparietal region,   there is focal region of markedly elevated Tmax, which may reflect an   area of relatively increased hypoperfusion, which appears to extend   beyond thesuperior margin of the field-of-view. Given these findings,   MRI should be considered for further assessment, if not contraindicated.        Findings were discussed with the referring service 2/3/2024 9:54 PM by   Dr. Alonso Her with read back confirmation.      < from: TTE W or WO Ultrasound Enhancing Agent (02.05.24 @ 08:14) >  CONCLUSIONS:      1. Left ventricular cavity is normal. Left ventricular wall thicknessis normal. Left ventricular systolic function is moderately decreased with an ejection fraction of 40 % by 3D. There are no regional wall motion abnormalities seen.   2. Multiple segmental abnormalities exist. See findings.   3. Elevated filling pressure. Analysis of left ventricular diastolic function and filling pressure is made challenging by the presence of merged E and A wave.   4. Moderately enlarged right ventricular cavity size, wall thickness, and reduced systolic function.   5. The leftatrium is severely dilated.   6. The right atrium is severely dilated.   7. There is trace aortic regurgitation.   8. There is mild to moderate mitral regurgitation.   9. There is moderate tricuspid regurgitation. Estimated pulmonary artery systolic pressure is 31 mmHg.  10. No pericardial effusion seen.  11. Compared to the transthoracic echocardiogram performed on 12/7/2023, there have been no significant interval changes.    ____________________________________________________________________  Recommendations:  Recommend cardiac MRI. There is increase left ventricular mass, and in the absence of hypertension, would consider an infiltrative cardiomyopathy (such as amyloid heart disease).  ________________________________________________________________________________________  FINDINGS:     Left Ventricle:  The left ventricular cavity is normal. Left ventricular wall thickness is normal. Left ventricular systolic function is moderately decreased with a calculated ejection fraction of 40% by 3D. There are no regional wall motion abnormalities seen. Elevated filling pressure. Analysis of left ventricular diastolic function and filling pressure is made challenging by the presence of merged E and A wave. Mild left ventricular hypertrophy. There is increased LV mass and concentric hypertrophy.  LV Wall Scoring: There is diffuse hypokinesis.          Right Ventricle:  The right ventricular cavity is moderately enlarged in size, normal wall thickness and reduced systolic function. Right ventricular free wall strain is --10 %. Right ventricular four chamber strain is --8.0 %. Tricuspid annular plane systolic excursion (TAPSE) is 1.0 cm (normal >=1.7 cm). 3D RVEF 37%.     Left Atrium:  The left atrium is severely dilated with an indexed volume of 41.78 ml/m².     Right Atrium:  The right atrium is severely dilated.     Interatrial Septum:  The interatrial septum appears intact.     Aortic Valve:  The aortic valve is tricuspid with normal leaflet excursion. There is no aortic valve stenosis. There is trace aortic regurgitation.     Mitral Valve:  Structurally normal mitral valve with normal leaflet excursion. There is no mitral valve stenosis. There is mild to moderate mitral regurgitation.     Tricuspid Valve:  Structurally normal tricuspid valve with normal leaflet excursion. There is moderate tricuspid regurgitation. Estimated pulmonary artery systolic pressure is 31 mmHg.     Pulmonic Valve:  Structurally normal pulmonic valve with normal leaflet excursion. There is mild pulmonic regurgitation.     Aorta:  The aortic annulus and aortic root appear normal in size. The aortic root at the sinuses of Valsalva is normal in size, measuring 3.40 cm (indexed 1.63 cm/m²).     Pericardium:  No pericardial effusion seen.     Systemic Veins:  The inferior vena cava is normal in size (normal <2.1cm) with normal inspiratory collapse (normal >50%) consistent with normal right atrial pressure (~3, range 0-5mmHg).  ____________________________________________________________________  QUANTITATIVE DATA:  Left Ventricle Measurements: (Indexed to BSA)     IVSd (2D):   1.3 cm  LVPWd (2D):  1.4 cm                           Strain Measurements:  LVIDd (2D):  4.9 cm                           Global Pk Long Strain:  -8.7 %  LVIDs (2D):  4.2 cm                           Endo Pk Strain A4C:     -9.8 %  LV Mass:     271 g  129.4 g/m²                Endo Pk Strain A2C:     -10.3 %  3D LV EF%: 40 %                               Endo Pk Strain A3C:     -5.8 %     MV E Vmax:    0.86 m/s  e' lateral:   7.40 cm/s  e' medial:    5.98 cm/s  E/e' lateral: 11.69  E/e' medial:  14.46  E/e' Average: 12.93    Aorta Measurements: (normal range) (Indexed to BSA)     Sinuses of Valsalva: 3.40 cm (3.1 - 3.7 cm)       Left Atrium Measurements:(Indexed to BSA)  LA Diam 2D:        5.00 cm  LA Vol s, MOD A4C: 90.70 ml.  LA Vol s, MOD A2C: 84.50 ml.  LA Vol s, MOD BP:  87.40 ml  41.78 ml/m²    Right Ventricle Measurements:     TAPSE: 1.0 cm    Mitral Valve Measurements:     MV E Vmax: 0.9 m/s       Tricuspid Valve Measurements:     TR Vmax:          2.7 m/s  TR Peak Gradient: 28.3 mmHg  RA Pressure:      3 mmHg  PASP:             31 mmHg    ________________________________________________________________________________________  Electronically signed on 2/5/2024 at 1:07:05 PM by Samantha Fofana

## 2024-02-06 ENCOUNTER — TRANSCRIPTION ENCOUNTER (OUTPATIENT)
Age: 69
End: 2024-02-06

## 2024-02-06 VITALS
DIASTOLIC BLOOD PRESSURE: 78 MMHG | TEMPERATURE: 98 F | RESPIRATION RATE: 17 BRPM | HEART RATE: 89 BPM | OXYGEN SATURATION: 97 % | SYSTOLIC BLOOD PRESSURE: 121 MMHG

## 2024-02-06 PROCEDURE — 36415 COLL VENOUS BLD VENIPUNCTURE: CPT

## 2024-02-06 PROCEDURE — 83735 ASSAY OF MAGNESIUM: CPT

## 2024-02-06 PROCEDURE — 70551 MRI BRAIN STEM W/O DYE: CPT

## 2024-02-06 PROCEDURE — 80053 COMPREHEN METABOLIC PANEL: CPT

## 2024-02-06 PROCEDURE — 0042T: CPT | Mod: MA

## 2024-02-06 PROCEDURE — 97165 OT EVAL LOW COMPLEX 30 MIN: CPT

## 2024-02-06 PROCEDURE — 97116 GAIT TRAINING THERAPY: CPT

## 2024-02-06 PROCEDURE — 83036 HEMOGLOBIN GLYCOSYLATED A1C: CPT

## 2024-02-06 PROCEDURE — 93356 MYOCRD STRAIN IMG SPCKL TRCK: CPT

## 2024-02-06 PROCEDURE — 82435 ASSAY OF BLOOD CHLORIDE: CPT

## 2024-02-06 PROCEDURE — 93306 TTE W/DOPPLER COMPLETE: CPT

## 2024-02-06 PROCEDURE — 70498 CT ANGIOGRAPHY NECK: CPT | Mod: MA

## 2024-02-06 PROCEDURE — 80048 BASIC METABOLIC PNL TOTAL CA: CPT

## 2024-02-06 PROCEDURE — 85730 THROMBOPLASTIN TIME PARTIAL: CPT

## 2024-02-06 PROCEDURE — 84484 ASSAY OF TROPONIN QUANT: CPT

## 2024-02-06 PROCEDURE — 80061 LIPID PANEL: CPT

## 2024-02-06 PROCEDURE — 85018 HEMOGLOBIN: CPT

## 2024-02-06 PROCEDURE — 99232 SBSQ HOSP IP/OBS MODERATE 35: CPT

## 2024-02-06 PROCEDURE — 82947 ASSAY GLUCOSE BLOOD QUANT: CPT

## 2024-02-06 PROCEDURE — 85025 COMPLETE CBC W/AUTO DIFF WBC: CPT

## 2024-02-06 PROCEDURE — 85014 HEMATOCRIT: CPT

## 2024-02-06 PROCEDURE — 82803 BLOOD GASES ANY COMBINATION: CPT

## 2024-02-06 PROCEDURE — 85027 COMPLETE CBC AUTOMATED: CPT

## 2024-02-06 PROCEDURE — 70450 CT HEAD/BRAIN W/O DYE: CPT | Mod: MA

## 2024-02-06 PROCEDURE — 84295 ASSAY OF SERUM SODIUM: CPT

## 2024-02-06 PROCEDURE — 84132 ASSAY OF SERUM POTASSIUM: CPT

## 2024-02-06 PROCEDURE — 70544 MR ANGIOGRAPHY HEAD W/O DYE: CPT

## 2024-02-06 PROCEDURE — 99291 CRITICAL CARE FIRST HOUR: CPT

## 2024-02-06 PROCEDURE — 76376 3D RENDER W/INTRP POSTPROCES: CPT

## 2024-02-06 PROCEDURE — 97530 THERAPEUTIC ACTIVITIES: CPT

## 2024-02-06 PROCEDURE — 92523 SPEECH SOUND LANG COMPREHEN: CPT

## 2024-02-06 PROCEDURE — 85610 PROTHROMBIN TIME: CPT

## 2024-02-06 PROCEDURE — 82330 ASSAY OF CALCIUM: CPT

## 2024-02-06 PROCEDURE — 82962 GLUCOSE BLOOD TEST: CPT

## 2024-02-06 PROCEDURE — 85520 HEPARIN ASSAY: CPT

## 2024-02-06 PROCEDURE — 70496 CT ANGIOGRAPHY HEAD: CPT | Mod: MA

## 2024-02-06 PROCEDURE — 85576 BLOOD PLATELET AGGREGATION: CPT

## 2024-02-06 PROCEDURE — 83605 ASSAY OF LACTIC ACID: CPT

## 2024-02-06 PROCEDURE — 94640 AIRWAY INHALATION TREATMENT: CPT

## 2024-02-06 PROCEDURE — 97161 PT EVAL LOW COMPLEX 20 MIN: CPT

## 2024-02-06 PROCEDURE — 70547 MR ANGIOGRAPHY NECK W/O DYE: CPT

## 2024-02-06 PROCEDURE — 84100 ASSAY OF PHOSPHORUS: CPT

## 2024-02-06 PROCEDURE — 83880 ASSAY OF NATRIURETIC PEPTIDE: CPT

## 2024-02-06 RX ORDER — APIXABAN 2.5 MG/1
1 TABLET, FILM COATED ORAL
Qty: 60 | Refills: 0
Start: 2024-02-06 | End: 2024-03-06

## 2024-02-06 RX ADMIN — CLOPIDOGREL BISULFATE 75 MILLIGRAM(S): 75 TABLET, FILM COATED ORAL at 11:16

## 2024-02-06 RX ADMIN — APIXABAN 5 MILLIGRAM(S): 2.5 TABLET, FILM COATED ORAL at 05:56

## 2024-02-06 RX ADMIN — Medication 175 MICROGRAM(S): at 05:17

## 2024-02-06 RX ADMIN — TAMSULOSIN HYDROCHLORIDE 0.4 MILLIGRAM(S): 0.4 CAPSULE ORAL at 05:16

## 2024-02-06 RX ADMIN — FINASTERIDE 5 MILLIGRAM(S): 5 TABLET, FILM COATED ORAL at 11:16

## 2024-02-06 NOTE — DISCHARGE NOTE NURSING/CASE MANAGEMENT/SOCIAL WORK - FLU SEASON?
Assessment & Plan     1. Acute bilateral low back pain without sciatica  - tiZANidine (ZANAFLEX) 2 MG tablet; Take 1 tablet (2 mg) by mouth every 8 hours as needed for muscle spasms  Dispense: 60 tablet; Refill: 0    Continue working with [facility].  Tizanidine refill sent to pharmacy as requested.  Counseled that this is sedating and may have compounding sedating effect the Robaxin.     2. Encounter for tobacco use cessation counseling  - nicotine (NICODERM CQ) 14 MG/24HR 24 hr patch; Place 1 patch onto the skin every 24 hours  Dispense: 42 patch; Refill: 0  - nicotine (NICODERM CQ) 7 MG/24HR 24 hr patch; Place 1 patch onto the skin every 24 hours for 14 days  Dispense: 14 patch; Refill: 0    Respiratory bronchitis symptoms have resolved.   Interested in smoking cessation. Nicotine patch prescription sent to pharmacy.    3. Immunization due  - GA RIV4 (FLUBLOK) VACCINE RECOMBINANT DNA PRSRV ANTIBIO FREE, IM ([id number])  - COVID-19,PF,PFIZER (12+ Yrs PURPLE LABEL)     Return in about 6 months (around 6/29/2022) for Physical Exam.    Maru Emerson, [facility]    Renetta Ceja is a 56 year old who presents for the following health issues    Chief Complaints and History of Present Illnesses   Patient presents with     Follow Up       HPI     Not doing so well.  It has been a rough couple months.  Significant other with significant DVT in his leg.    Back is fusing together, having pain.  Following with [facility].  Was prescribed Lyrica and Robaxin.  States she was given a prescription for tizanidine but did not pick it up from the pharmacy.  Requesting prescription today.    Here to follow up for breathing. No recurrent cough, shortness of breath, wheezing, or chest tightness.   Know she needs to quit smoking.     Smokes 1/2 ppd, starts at drive in.  Is interested in smoking cessation.  Willing to try nicotine patches.    Has a home blood pressure cuff.  Will  "start checking blood pressure again.    Review of Systems   See HPI above.       Objective    BP (!) 138/91   Pulse 70   Ht 1.708 m (5' 7.25\")   Wt 108.1 kg (238 lb 4 oz)   LMP  (LMP Unknown)   BMI 37.04 kg/m    Body mass index is 37.04 kg/m .  Physical Exam   GENERAL: Brenna is a pleasant, nontoxic female, obese.  HEART: Regular rate and rhythm, no murmurs.  LUNGS: Clear to auscultation bilaterally, unlabored.        " Yes...

## 2024-02-06 NOTE — DISCHARGE NOTE NURSING/CASE MANAGEMENT/SOCIAL WORK - PATIENT PORTAL LINK FT
You can access the FollowMyHealth Patient Portal offered by [facility] by registering at the following website: [email or web address]. By joining [employer]’s FollowMyHealth portal, you will also be able to view your health information using other applications (apps) compatible with our system.

## 2024-02-06 NOTE — PROGRESS NOTE ADULT - SUBJECTIVE AND OBJECTIVE BOX
THE PATIENT WAS SEEN AND EXAMINED BY ME WITH THE HOUSESTAFF AND STROKE TEAM DURING MORNING ROUNDS.   HPI:  68y M with Hx L carotid stent (placed 2 years ago), pituitary mass, gout, HTN, hypothyroidism, who presents w/ CC of L hand paresthesias. LKW at 6pm on 2/3. He was otherwise in his usual state of health when suddenly felt tingling in L hand. Never had symptoms like this before. He is able to coordinate movements in that hand, such as touching his nose or tapping his fingers, however feels unaware when the hand is touched. He denies weakness in the hands or legs. No slurred speech, headache, or visual changes. He is independent in ADLs at baseline. He currently only takes Plavix for blood thinners. He denies prior history of heart arrhythmias, although EKG in ED today is showing afib (03 Feb 2024 22:55)      SUBJECTIVE: No events overnight.  No new neurologic complaints.      albuterol    90 MICROgram(s) HFA Inhaler 1 Puff(s) Inhalation daily  apixaban 5 milliGRAM(s) Oral every 12 hours  atorvastatin 40 milliGRAM(s) Oral at bedtime  clopidogrel Tablet 75 milliGRAM(s) Oral daily  finasteride 5 milliGRAM(s) Oral daily  levothyroxine 175 MICROGram(s) Oral daily  sodium chloride 0.65% Nasal 1 Spray(s) Both Nostrils two times a day PRN  tamsulosin 0.4 milliGRAM(s) Oral every 12 hours      PHYSICAL EXAM:   Vital Signs Last 24 Hrs  T(C): 36.9 (06 Feb 2024 05:07), Max: 36.9 (05 Feb 2024 23:43)  T(F): 98.4 (06 Feb 2024 05:07), Max: 98.4 (05 Feb 2024 23:43)  HR: 82 (06 Feb 2024 05:07) (82 - 93)  BP: 108/73 (06 Feb 2024 05:07) (108/73 - 128/85)  BP(mean): --  RR: 18 (06 Feb 2024 05:07) (18 - 20)  SpO2: 99% (06 Feb 2024 05:07) (94% - 99%)    Parameters below as of 06 Feb 2024 05:07  Patient On (Oxygen Delivery Method): room air        General: No acute distress  HEENT: EOM intact, visual fields full  Abdomen: Soft, nontender, nondistended   Extremities: No edema    NEUROLOGICAL EXAM:  Mental status: Awake, alert, oriented x3, no aphasia, no neglect, normal memory   Cranial Nerves: No facial asymmetry, no nystagmus, no dysarthria,  tongue midline  Motor exam: Normal tone, no drift, 5/5 RUE, 5/5 RLE, 5/5 LUE, 5/5 LLE, normal fine finger movements.  Sensation: Intact to light touch   Coordination/ Gait: No dysmetria, YASMINE intact and symmetric bilaterally    LABS:                        12.6   5.38  )-----------( 140      ( 05 Feb 2024 06:18 )             37.7    02-05    143  |  106  |  44<H>  ----------------------------<  107<H>  3.3<L>   |  23  |  1.57<H>    Ca    7.7<L>      05 Feb 2024 06:17    PTT - ( 05 Feb 2024 08:16 )  PTT:52.0 sec      IMAGING: Reviewed by me.      THE PATIENT WAS SEEN AND EXAMINED BY ME WITH THE HOUSESTAFF AND STROKE TEAM DURING MORNING ROUNDS.     HPI:  68y M with Hx L carotid stent (placed 2 years ago), pituitary mass, gout, HTN, hypothyroidism, who presents w/ CC of L hand paresthesias. LKW at 6pm on 2/3. He was otherwise in his usual state of health when suddenly felt tingling in L hand. Never had symptoms like this before. He is able to coordinate movements in that hand, such as touching his nose or tapping his fingers, however feels unaware when the hand is touched. He denies weakness in the hands or legs. No slurred speech, headache, or visual changes. He is independent in ADLs at baseline. He currently only takes Plavix for blood thinners. He denies prior history of heart arrhythmias, although EKG in ED today is showing afib.    SUBJECTIVE: No events overnight.  No new neurologic complaints.      albuterol    90 MICROgram(s) HFA Inhaler 1 Puff(s) Inhalation daily  apixaban 5 milliGRAM(s) Oral every 12 hours  atorvastatin 40 milliGRAM(s) Oral at bedtime  clopidogrel Tablet 75 milliGRAM(s) Oral daily  finasteride 5 milliGRAM(s) Oral daily  levothyroxine 175 MICROGram(s) Oral daily  sodium chloride 0.65% Nasal 1 Spray(s) Both Nostrils two times a day PRN  tamsulosin 0.4 milliGRAM(s) Oral every 12 hours    PHYSICAL EXAM:   Vital Signs Last 24 Hrs  T(C): 36.7 (06 Feb 2024 08:51), Max: 36.9 (05 Feb 2024 23:43)  T(F): 98.1 (06 Feb 2024 08:51), Max: 98.4 (05 Feb 2024 23:43)  HR: 89 (06 Feb 2024 08:51) (82 - 93)  BP: 121/78 (06 Feb 2024 08:51) (108/73 - 128/85)  RR: 17 (06 Feb 2024 08:51) (17 - 19)  SpO2: 97% (06 Feb 2024 08:51) (94% - 99%)    Parameters below as of 06 Feb 2024 08:51  Patient On (Oxygen Delivery Method): room air    General: No acute distress  HEENT: EOM intact, visual fields full  Abdomen: Soft, nontender, nondistended   Extremities: No edema    NEUROLOGICAL EXAM:  Mental status: Awake, alert, oriented x3, no aphasia, no neglect, normal memory   Cranial Nerves: No facial asymmetry, no nystagmus, no dysarthria,  tongue midline  Motor exam: Normal tone, no drift, 5/5 RUE, 5/5 RLE, 5/5 LUE, 5/5 LLE, normal fine finger movements.  Sensation: Intact to light touch   Coordination/ Gait: No dysmetria, YASMINE intact and symmetric bilaterally      LABS:                        12.6   5.38  )-----------( 140      ( 05 Feb 2024 06:18 )             37.7    02-05    143  |  106  |  44<H>  ----------------------------<  107<H>  3.3<L>   |  23  |  1.57<H>    Ca    7.7<L>      05 Feb 2024 06:17    PTT - ( 05 Feb 2024 08:16 )  PTT:52.0 sec      IMAGING: Reviewed by me.     02/03/24: CT HEAD:   No CT evidence of acute intracranial hemorrhage, mass effect, or midline   shift.     CTA Neck: The left vertebral artery is occluded from its origin, felt to   be chronic in nature, given abnormal appearance of the left V4 segment   flow void on a prior MRI in 2021. Noncalcified plaque of the right common   carotid artery results in up to approximately 70% stenosis. Noncalcified   plaque of the left common carotid artery results in up to approximately   50% stenosis. A left proximal internal carotid artery stent is patent.    CTA Head: A posterior right M3 branch demonstrates tapering of the   contrast column, concerning for moderate to severe stenosis. No large   vessel occlusion about the [place]. Patchy reconstitution of the   intradural left vertebral artery.    CT Perfusion: Diffusely elevated Tmax without corresponding large vessel   occlusion likely secondary to slow flow phenomenon given extensive   atherosclerotic disease in bilateral common carotid arteries. Please note   that the superior cerebral hemispheres are excluded from the   field-of-view, and infarct along the high right frontoparietal region is   not excluded based on this study. In the right frontoparietal region,   there is focal region of markedly elevated Tmax, which may reflect an   area of relatively increased hypoperfusion, which appears to extend   beyond the superior margin of the field-of-view

## 2024-02-06 NOTE — PROGRESS NOTE ADULT - ASSESSMENT
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 hx of laryngeal cancer s/p radiation who presents w/ CC of L hand paresthesias    LKW: 6pm 2/3  NIHSS:  0  Baseline MRS: 0  Not a Teneceteplase candidate due to NIHSS 0  Not a thrombectomy candidate due to  lack of LVO      Impression: acute L hand paresthesias likely due to ischemic infarct etiology embolic in the setting of new A. Fib may be cardioembolic can not exclude large atherosclerosis disease due to known right common carotid stenosis that may result in artery to artery embolism    NEURO: Continue close monitoring for neurologic deterioration, permissive HTN, LDL 46: continue home dose statin,  MRI Brain w/o, MRA Head w/o and Neck w/contrast pending, unable to perform NOVA. Physical therapy/OT/Speech eval/treatment.     ANTITHROMBOTIC THERAPY: heparin gtt ( A. Fib) and Plavix     PULMONARY: CXR clear, protecting airway, saturating well     CARDIOVASCULAR:  TTE: Left ventricular cavity is normal. Left ventricular wall thickness is normal. Left ventricular systolic function is moderately decreased with an ejection fraction of 40 % by 3D. There are no regional wall motion abnormalities seen. Multiple segmental abnormalities exist. Elevated filling pressure. Analysis of left ventricular diastolic function and filling pressure is made challenging by the presence of merged E and A wave. Moderately enlarged right ventricular cavity size, wall thickness, and reduced systolic function. The left atrium is severely dilated. The right atrium is severely dilated.   cardiac monitoring, will transition to oral AC based on cardiologist recommendation                             SBP goal: <180    GASTROINTESTINAL:  dysphagia screen -passed, tolerating diet      Diet: Regular    RENAL: BUN/Cr stable, good urine output      Na Goal: Greater than 135     Peterson: N    HEMATOLOGY: H/H stable, Platelets normal      DVT ppx: Heparin gtt    ID: afebrile, no leukocytosis     OTHER: plan of care discussed with patient at bedside    DISPOSITION: Home with outpatient therapy.       CORE MEASURES:        Admission NIHSS: 0     Tenectaplase: [] YES [x] NO      LDL/HDL: 46/46     Depression Screen: 0     Statin Therapy: y     Dysphagia Screen: [x] PASS [] FAIL     Smoking [] YES [x] NO      Afib [x] YES [] NO     Stroke Education [x] YES [] NO    Obtain screening lower extremity venous ultrasound in patients who meet 1 or more of the following criteria as patient is high risk for DVT/PE on admission:   [] History of DVT/PE  []Hypercoagulable states (Factor V Leiden, Cancer, OCP, etc. )  []Prolonged immobility (hemiplegia/hemiparesis/post operative or any other extended immobilization)  [] Transferred from outside facility (Rehab or Long term care)  [] Age </= to 50.     69 YO M with Hx cardiac amyloid with 25% EF, radiation therapy to vocal cord paralysis, left carotid stent, as well as failed right carotid CEA, pituitary mass, gout, HTN, hypothyroidism. hx of laryngeal cancer s/p radiation who presents w/ CC of L hand paresthesias    LKW: 6pm 2/3  NIHSS:  0  Baseline MRS: 0  Not a Teneceteplase candidate due to NIHSS 0  Not a thrombectomy candidate due to  lack of LVO    IMPRESSION: Acute L hand paresthesias likely due to ischemic infarct etiology embolic in the setting of new A. Fib may be cardioembolic can not exclude large atherosclerosis disease due to known right common carotid stenosis that may result in artery to artery embolism    NEURO: Continue close monitoring for neurologic deterioration, permissive HTN, LDL 46: continue home dose statin,  MRI Brain w/o, MRA Head w/o and Neck w/contrast pending, unable to perform NOVA. Physical therapy/OT/Speech eval/treatment.     ANTITHROMBOTIC THERAPY: heparin gtt ( A. Fib) and Plavix. Transitioned to Eliquis/AFIB.     PULMONARY: CXR clear, protecting airway, saturating well     CARDIOVASCULAR:  TTE: Left ventricular cavity is normal. Left ventricular wall thickness is normal. Left ventricular systolic function is moderately decreased with an ejection fraction of 40 % by 3D. There are no regional wall motion abnormalities seen. Multiple segmental abnormalities exist. Elevated filling pressure. Analysis of left ventricular diastolic function and filling pressure is made challenging by the presence of merged E and A wave. Moderately enlarged right ventricular cavity size, wall thickness, and reduced systolic function. The left atrium is severely dilated. The right atrium is severely dilated. Started on Eliquis for AFIB.    cardiac monitoring, will transition to oral AC based on cardiologist recommendation                             SBP goal: <180    GASTROINTESTINAL:  dysphagia screen -passed, tolerating diet      Diet: Regular    RENAL: BUN/Cr stable, good urine output      Na Goal: Greater than 135     Peterson: NO     HEMATOLOGY: H/H stable, Platelets normal      DVT ppx: Eliquis     ID: afebrile, no leukocytosis     OTHER: plan of care discussed with patient at bedside    DISPOSITION: Home with outpatient physical and occupational therapy. Discharge Home on 2/6      CORE MEASURES:        Admission NIHSS: 0     Tenectaplase: [] YES [x] NO      LDL/HDL: 46/46     Depression Screen: 0     Statin Therapy: y     Dysphagia Screen: [x] PASS [] FAIL     Smoking [] YES [x] NO      Afib [x] YES [] NO     Stroke Education [x] YES [] NO    Obtain screening lower extremity venous ultrasound in patients who meet 1 or more of the following criteria as patient is high risk for DVT/PE on admission:   [] History of DVT/PE  [] Hypercoagulable states (Factor V Leiden, Cancer, OCP, etc. )  [] Prolonged immobility (hemiplegia/hemiparesis/post operative or any other extended immobilization)  [] Transferred from outside facility (Rehab or Long term care)  [] Age </= to 50.

## 2024-02-10 ENCOUNTER — INPATIENT (INPATIENT)
Facility: HOSPITAL | Age: 69
LOS: 1 days | Discharge: HOME | DRG: 683 | End: 2024-02-12
Attending: INTERNAL MEDICINE | Admitting: INTERNAL MEDICINE
Payer: MEDICARE

## 2024-02-10 VITALS
TEMPERATURE: 98 F | SYSTOLIC BLOOD PRESSURE: 110 MMHG | WEIGHT: 214.95 LBS | DIASTOLIC BLOOD PRESSURE: 75 MMHG | RESPIRATION RATE: 18 BRPM | OXYGEN SATURATION: 94 % | HEIGHT: 70 IN | HEART RATE: 79 BPM

## 2024-02-10 DIAGNOSIS — Z98.890 OTHER SPECIFIED POSTPROCEDURAL STATES: Chronic | ICD-10-CM

## 2024-02-10 DIAGNOSIS — Z98.89 OTHER SPECIFIED POSTPROCEDURAL STATES: Chronic | ICD-10-CM

## 2024-02-10 DIAGNOSIS — Z98.1 ARTHRODESIS STATUS: Chronic | ICD-10-CM

## 2024-02-10 PROCEDURE — 99285 EMERGENCY DEPT VISIT HI MDM: CPT

## 2024-02-10 NOTE — ED ADULT TRIAGE NOTE - CHIEF COMPLAINT QUOTE
right leg pain and numbness x 1 day right leg pain and numbness since last night around 8pm  no facial droop, no difficulty with speech, no headache

## 2024-02-10 NOTE — ED ADULT TRIAGE NOTE - STATUS:
Well Care - Tips for Parents of Teens: Care Instructions  Your Care Instructions  The natural changes your teen goes through during adolescence can be hard for both you and your teen. Your love, understanding, and guidance can help your teen make good decisions. Follow-up care is a key part of your child's treatment and safety. Be sure to make and go to all appointments, and call your doctor if your child is having problems. It's also a good idea to know your child's test results and keep a list of the medicines your child takes. How can you care for your child at home? Be involved and supportive  · Try to accept the natural changes in your relationship. It is normal for teens to want more independence. · Recognize that your teen may not want to be a part of all family events. But it is good for your teen to stay involved in some family events. · Respect your teen's need for privacy. Talk with your teen if you have safety concerns. · Be flexible. Allow your teen to test, explore, and communicate within limits. But be sure to stay firm and consistent. · Set realistic family rules. If these rules are broken, set clear limits and consequences. When your teen seems ready, give him or her more responsibility. · Pay attention to your teen. When he or she wants to talk, try to stop what you are doing and really listen. This will help build his or her confidence. · Decide together which activities are okay for your teen to do on his or her own. These may include staying home alone or going out with friends who drive. · Spend personal, fun time with your teen. Try to keep a sense of humor. Praise positive behaviors. · If you have trouble getting along with your teen, talk with other parents, family members, or a counselor. Healthy habits  · Encourage your teen to be active for at least 1 hour each day. Plan family activities.  These may include trips to the park, walks, bike rides, swimming, and gardening. · Encourage good eating habits. Your teen needs healthy meals and snacks every day. Stock up on fruits and vegetables. Have nonfat and low-fat dairy foods available. · Limit TV or video to 1 or 2 hours a day. Check programs for violence, bad language, and sex. Immunizations  The flu vaccine is recommended once a year for all people age 7 months and older. Talk to your doctor if your teen did not yet get the vaccines for human papillomavirus (HPV), meningococcal disease, and tetanus, diphtheria, and pertussis. What to expect at this age  Most teens are learning to think in more complex ways. They start to think about the future results of their actions. It's normal for teens to focus a lot on how they look, talk, or view politics. This is a way for teens to help define who they are. Friendships are very important in the early teen years. When should you call for help? Watch closely for changes in your child's health, and be sure to contact your doctor if:  · You need information about raising your teen. This may include questions about:  ¨ Your teen's diet and nutrition. ¨ Your teen's sexuality or about sexually transmitted infections (STIs). ¨ Helping your teen take charge of his or her own health and medical care. ¨ Vaccinations your teen might need. ¨ Alcohol, illegal drugs, or smoking. ¨ Your teen's mood. · You have other questions or concerns. Where can you learn more? Go to http://lazaro-tank.info/. Enter K482 in the search box to learn more about \"Well Care - Tips for Parents of Teens: Care Instructions. \"  Current as of: May 4, 2017  Content Version: 11.3  © 9272-5821 [employer]. Care instructions adapted under license by [employer] (which disclaims liability or warranty for this information).  If you have questions about a medical condition or this instruction, always ask your healthcare professional. [place] [address] any warranty or liability for your use of this information. [employer]

## 2024-02-11 DIAGNOSIS — N17.9 ACUTE KIDNEY FAILURE, UNSPECIFIED: ICD-10-CM

## 2024-02-11 LAB
ALBUMIN SERPL ELPH-MCNC: 3.5 G/DL — SIGNIFICANT CHANGE UP (ref 3.3–5)
ALP SERPL-CCNC: 85 U/L — SIGNIFICANT CHANGE UP (ref 40–120)
ALT FLD-CCNC: 32 U/L — SIGNIFICANT CHANGE UP (ref 10–45)
ANION GAP SERPL CALC-SCNC: 12 MMOL/L — SIGNIFICANT CHANGE UP (ref 5–17)
ANION GAP SERPL CALC-SCNC: 12 MMOL/L — SIGNIFICANT CHANGE UP (ref 5–17)
APPEARANCE UR: CLEAR — SIGNIFICANT CHANGE UP
APTT BLD: 34 SEC — SIGNIFICANT CHANGE UP (ref 24.5–35.6)
AST SERPL-CCNC: 24 U/L — SIGNIFICANT CHANGE UP (ref 10–40)
BASOPHILS # BLD AUTO: 0.02 K/UL — SIGNIFICANT CHANGE UP (ref 0–0.2)
BASOPHILS NFR BLD AUTO: 0.3 % — SIGNIFICANT CHANGE UP (ref 0–2)
BILIRUB SERPL-MCNC: 0.6 MG/DL — SIGNIFICANT CHANGE UP (ref 0.2–1.2)
BILIRUB UR-MCNC: NEGATIVE — SIGNIFICANT CHANGE UP
BUN SERPL-MCNC: 66 MG/DL — HIGH (ref 7–23)
BUN SERPL-MCNC: 74 MG/DL — HIGH (ref 7–23)
CALCIUM SERPL-MCNC: 8.4 MG/DL — SIGNIFICANT CHANGE UP (ref 8.4–10.5)
CALCIUM SERPL-MCNC: 8.7 MG/DL — SIGNIFICANT CHANGE UP (ref 8.4–10.5)
CALCIUM UR-MCNC: 1.1 MG/DL — SIGNIFICANT CHANGE UP
CHLORIDE SERPL-SCNC: 105 MMOL/L — SIGNIFICANT CHANGE UP (ref 96–108)
CHLORIDE SERPL-SCNC: 106 MMOL/L — SIGNIFICANT CHANGE UP (ref 96–108)
CHLORIDE UR-SCNC: 34 MMOL/L — SIGNIFICANT CHANGE UP
CO2 SERPL-SCNC: 23 MMOL/L — SIGNIFICANT CHANGE UP (ref 22–31)
CO2 SERPL-SCNC: 23 MMOL/L — SIGNIFICANT CHANGE UP (ref 22–31)
COLOR SPEC: YELLOW — SIGNIFICANT CHANGE UP
CREAT ?TM UR-MCNC: 64 MG/DL — SIGNIFICANT CHANGE UP
CREAT SERPL-MCNC: 1.94 MG/DL — HIGH (ref 0.5–1.3)
CREAT SERPL-MCNC: 2.12 MG/DL — HIGH (ref 0.5–1.3)
DIFF PNL FLD: NEGATIVE — SIGNIFICANT CHANGE UP
EGFR: 33 ML/MIN/1.73M2 — LOW
EGFR: 37 ML/MIN/1.73M2 — LOW
EOSINOPHIL # BLD AUTO: 0.14 K/UL — SIGNIFICANT CHANGE UP (ref 0–0.5)
EOSINOPHIL NFR BLD AUTO: 1.8 % — SIGNIFICANT CHANGE UP (ref 0–6)
GLUCOSE SERPL-MCNC: 103 MG/DL — HIGH (ref 70–99)
GLUCOSE SERPL-MCNC: 114 MG/DL — HIGH (ref 70–99)
GLUCOSE UR QL: NEGATIVE MG/DL — SIGNIFICANT CHANGE UP
HCT VFR BLD CALC: 41.3 % — SIGNIFICANT CHANGE UP (ref 39–50)
HGB BLD-MCNC: 13.8 G/DL — SIGNIFICANT CHANGE UP (ref 13–17)
IMM GRANULOCYTES NFR BLD AUTO: 0.4 % — SIGNIFICANT CHANGE UP (ref 0–0.9)
INR BLD: 1.61 RATIO — HIGH (ref 0.85–1.18)
KETONES UR-MCNC: NEGATIVE MG/DL — SIGNIFICANT CHANGE UP
LEUKOCYTE ESTERASE UR-ACNC: NEGATIVE — SIGNIFICANT CHANGE UP
LYMPHOCYTES # BLD AUTO: 0.9 K/UL — LOW (ref 1–3.3)
LYMPHOCYTES # BLD AUTO: 11.3 % — LOW (ref 13–44)
MAGNESIUM SERPL-MCNC: 1.7 MG/DL — SIGNIFICANT CHANGE UP (ref 1.6–2.6)
MCHC RBC-ENTMCNC: 30.1 PG — SIGNIFICANT CHANGE UP (ref 27–34)
MCHC RBC-ENTMCNC: 33.4 GM/DL — SIGNIFICANT CHANGE UP (ref 32–36)
MCV RBC AUTO: 90 FL — SIGNIFICANT CHANGE UP (ref 80–100)
MONOCYTES # BLD AUTO: 0.7 K/UL — SIGNIFICANT CHANGE UP (ref 0–0.9)
MONOCYTES NFR BLD AUTO: 8.8 % — SIGNIFICANT CHANGE UP (ref 2–14)
NEUTROPHILS # BLD AUTO: 6.19 K/UL — SIGNIFICANT CHANGE UP (ref 1.8–7.4)
NEUTROPHILS NFR BLD AUTO: 77.4 % — HIGH (ref 43–77)
NITRITE UR-MCNC: NEGATIVE — SIGNIFICANT CHANGE UP
NRBC # BLD: 0 /100 WBCS — SIGNIFICANT CHANGE UP (ref 0–0)
OSMOLALITY UR: 462 MOS/KG — SIGNIFICANT CHANGE UP (ref 300–900)
PH UR: 6 — SIGNIFICANT CHANGE UP (ref 5–8)
PHOSPHATE SERPL-MCNC: 4.3 MG/DL — SIGNIFICANT CHANGE UP (ref 2.5–4.5)
PLATELET # BLD AUTO: 219 K/UL — SIGNIFICANT CHANGE UP (ref 150–400)
POTASSIUM SERPL-MCNC: 3.4 MMOL/L — LOW (ref 3.5–5.3)
POTASSIUM SERPL-MCNC: 4.6 MMOL/L — SIGNIFICANT CHANGE UP (ref 3.5–5.3)
POTASSIUM SERPL-SCNC: 3.4 MMOL/L — LOW (ref 3.5–5.3)
POTASSIUM SERPL-SCNC: 4.6 MMOL/L — SIGNIFICANT CHANGE UP (ref 3.5–5.3)
PROT ?TM UR-MCNC: <7 MG/DL — SIGNIFICANT CHANGE UP (ref 0–12)
PROT SERPL-MCNC: 6.3 G/DL — SIGNIFICANT CHANGE UP (ref 6–8.3)
PROT UR-MCNC: NEGATIVE MG/DL — SIGNIFICANT CHANGE UP
PROT/CREAT UR-RTO: SIGNIFICANT CHANGE UP (ref 0–0.2)
PROTHROM AB SERPL-ACNC: 16.7 SEC — HIGH (ref 9.5–13)
RBC # BLD: 4.59 M/UL — SIGNIFICANT CHANGE UP (ref 4.2–5.8)
RBC # FLD: 15.4 % — HIGH (ref 10.3–14.5)
SODIUM SERPL-SCNC: 140 MMOL/L — SIGNIFICANT CHANGE UP (ref 135–145)
SODIUM SERPL-SCNC: 141 MMOL/L — SIGNIFICANT CHANGE UP (ref 135–145)
SODIUM UR-SCNC: 61 MMOL/L — SIGNIFICANT CHANGE UP
SP GR SPEC: 1.01 — SIGNIFICANT CHANGE UP (ref 1–1.03)
URATE UR-MCNC: 32.9 MG/DL — SIGNIFICANT CHANGE UP
UROBILINOGEN FLD QL: 0.2 MG/DL — SIGNIFICANT CHANGE UP (ref 0.2–1)
WBC # BLD: 7.98 K/UL — SIGNIFICANT CHANGE UP (ref 3.8–10.5)
WBC # FLD AUTO: 7.98 K/UL — SIGNIFICANT CHANGE UP (ref 3.8–10.5)

## 2024-02-11 PROCEDURE — 99222 1ST HOSP IP/OBS MODERATE 55: CPT

## 2024-02-11 PROCEDURE — 70450 CT HEAD/BRAIN W/O DYE: CPT | Mod: 26,MA

## 2024-02-11 PROCEDURE — 93971 EXTREMITY STUDY: CPT | Mod: 26,RT

## 2024-02-11 PROCEDURE — 76770 US EXAM ABDO BACK WALL COMP: CPT | Mod: 26

## 2024-02-11 PROCEDURE — 73630 X-RAY EXAM OF FOOT: CPT | Mod: 26,RT

## 2024-02-11 RX ORDER — ALPRAZOLAM 0.25 MG
1 TABLET ORAL
Refills: 0 | DISCHARGE

## 2024-02-11 RX ORDER — ACETAMINOPHEN 500 MG
650 TABLET ORAL EVERY 6 HOURS
Refills: 0 | Status: DISCONTINUED | OUTPATIENT
Start: 2024-02-11 | End: 2024-02-12

## 2024-02-11 RX ORDER — LEVOTHYROXINE SODIUM 125 MCG
175 TABLET ORAL DAILY
Refills: 0 | Status: DISCONTINUED | OUTPATIENT
Start: 2024-02-11 | End: 2024-02-12

## 2024-02-11 RX ORDER — FLUTICASONE PROPIONATE AND SALMETEROL 50; 250 UG/1; UG/1
1 POWDER ORAL; RESPIRATORY (INHALATION)
Refills: 0 | DISCHARGE

## 2024-02-11 RX ORDER — DIAZEPAM 5 MG
1 TABLET ORAL
Refills: 0 | DISCHARGE

## 2024-02-11 RX ORDER — SODIUM CHLORIDE 9 MG/ML
500 INJECTION INTRAMUSCULAR; INTRAVENOUS; SUBCUTANEOUS ONCE
Refills: 0 | Status: COMPLETED | OUTPATIENT
Start: 2024-02-11 | End: 2024-02-11

## 2024-02-11 RX ORDER — TAMSULOSIN HYDROCHLORIDE 0.4 MG/1
0.4 CAPSULE ORAL
Refills: 0 | Status: DISCONTINUED | OUTPATIENT
Start: 2024-02-11 | End: 2024-02-12

## 2024-02-11 RX ORDER — FINASTERIDE 5 MG/1
5 TABLET, FILM COATED ORAL DAILY
Refills: 0 | Status: DISCONTINUED | OUTPATIENT
Start: 2024-02-11 | End: 2024-02-12

## 2024-02-11 RX ORDER — OXYCODONE HYDROCHLORIDE 5 MG/1
5 TABLET ORAL EVERY 6 HOURS
Refills: 0 | Status: DISCONTINUED | OUTPATIENT
Start: 2024-02-11 | End: 2024-02-12

## 2024-02-11 RX ORDER — ACETAMINOPHEN 500 MG
650 TABLET ORAL EVERY 6 HOURS
Refills: 0 | Status: DISCONTINUED | OUTPATIENT
Start: 2024-02-11 | End: 2024-02-11

## 2024-02-11 RX ORDER — CLOPIDOGREL BISULFATE 75 MG/1
75 TABLET, FILM COATED ORAL DAILY
Refills: 0 | Status: DISCONTINUED | OUTPATIENT
Start: 2024-02-11 | End: 2024-02-12

## 2024-02-11 RX ORDER — ATORVASTATIN CALCIUM 80 MG/1
40 TABLET, FILM COATED ORAL AT BEDTIME
Refills: 0 | Status: DISCONTINUED | OUTPATIENT
Start: 2024-02-11 | End: 2024-02-12

## 2024-02-11 RX ORDER — ACETAMINOPHEN 500 MG
1000 TABLET ORAL ONCE
Refills: 0 | Status: COMPLETED | OUTPATIENT
Start: 2024-02-11 | End: 2024-02-11

## 2024-02-11 RX ORDER — APIXABAN 2.5 MG/1
5 TABLET, FILM COATED ORAL EVERY 12 HOURS
Refills: 0 | Status: DISCONTINUED | OUTPATIENT
Start: 2024-02-11 | End: 2024-02-12

## 2024-02-11 RX ORDER — POTASSIUM CHLORIDE 20 MEQ
40 PACKET (EA) ORAL ONCE
Refills: 0 | Status: COMPLETED | OUTPATIENT
Start: 2024-02-11 | End: 2024-02-11

## 2024-02-11 RX ADMIN — Medication 20 MILLIGRAM(S): at 14:39

## 2024-02-11 RX ADMIN — SODIUM CHLORIDE 500 MILLILITER(S): 9 INJECTION INTRAMUSCULAR; INTRAVENOUS; SUBCUTANEOUS at 06:25

## 2024-02-11 RX ADMIN — Medication 40 MILLIEQUIVALENT(S): at 09:57

## 2024-02-11 RX ADMIN — ATORVASTATIN CALCIUM 40 MILLIGRAM(S): 80 TABLET, FILM COATED ORAL at 22:10

## 2024-02-11 RX ADMIN — TAMSULOSIN HYDROCHLORIDE 0.4 MILLIGRAM(S): 0.4 CAPSULE ORAL at 17:12

## 2024-02-11 RX ADMIN — OXYCODONE HYDROCHLORIDE 5 MILLIGRAM(S): 5 TABLET ORAL at 22:12

## 2024-02-11 RX ADMIN — Medication 650 MILLIGRAM(S): at 14:39

## 2024-02-11 RX ADMIN — FINASTERIDE 5 MILLIGRAM(S): 5 TABLET, FILM COATED ORAL at 14:39

## 2024-02-11 RX ADMIN — Medication 400 MILLIGRAM(S): at 03:00

## 2024-02-11 RX ADMIN — CLOPIDOGREL BISULFATE 75 MILLIGRAM(S): 75 TABLET, FILM COATED ORAL at 14:39

## 2024-02-11 RX ADMIN — APIXABAN 5 MILLIGRAM(S): 2.5 TABLET, FILM COATED ORAL at 17:12

## 2024-02-11 NOTE — ED ADULT NURSE NOTE - OBJECTIVE STATEMENT
69yo M pmh HTN, hypothyroid, L carotid stent (placed 2 years ago), pituitary mass, gout, A-fib on Eliquis and ASA, presents to ED from home with c/o RLE tingling and pain. wife present at the bedside. pt reports he was recently admitted to [facility] after presenting to the hospital for L sided numbness and was found to have had a "mini stroke", states he was in new a-fib and was told the mini stroke was likely due to this. pt now endorsing constant R foot/lower leg pain and tingling since 7pm yesterday. pt denies any recent falls or trauma to the foot/leg; denies pmhx diabetes. pt also denies any associated headache, dizziness, cp, sob, 69yo M pmh HTN, hypothyroid, L carotid stent (placed 2 years ago), pituitary mass, gout, A-fib on Eliquis and ASA, presents to ED from home with c/o RLE tingling and pain. wife present at the bedside. pt reports he was recently admitted to [facility] after presenting to the hospital for L sided numbness and was found to have had a "mini stroke", states he was in new a-fib and was told the mini stroke was likely due to this. pt now endorsing constant R foot/lower leg pain and tingling since 7pm yesterday. pt denies any recent falls or trauma to the foot/leg; denies pmhx diabetes. pt also denies any associated headache, dizziness, cp, sob, vision changes, difficulty speaking or swallowing. on exam pt is A&Ox4, breathing even and unlabored, vital signs stable. ambulatory with cane. pt SCHNEIDER with 5/5 strength x4. no drift noted to b/l upper and lower extremities. PERRL. 2+ pedal pulses b/l. sensation intact to lower extremities b/l. no facial droop noted. no slurred speech. Patient undressed and placed into gown,  appropriate side rails up for safety. pt placed on CCM. pending MD exam at this time.

## 2024-02-11 NOTE — H&P ADULT - NSHPLABSRESULTS_GEN_ALL_CORE
LABS:                        13.8   7.98  )-----------( 219      ( 11 Feb 2024 03:11 )             41.3     02-11    141  |  106  |  66<H>  ----------------------------<  103<H>  3.4<L>   |  23  |  1.94<H>    Ca    8.4      11 Feb 2024 08:18  Phos  4.3     02-11  Mg     1.7     02-11    TPro  6.3  /  Alb  3.5  /  TBili  0.6  /  DBili  x   /  AST  24  /  ALT  32  /  AlkPhos  85  02-11    PT/INR - ( 11 Feb 2024 03:11 )   PT: 16.7 sec;   INR: 1.61 ratio         PTT - ( 11 Feb 2024 03:11 )  PTT:34.0 sec  Urinalysis Basic - ( 11 Feb 2024 08:18 )    Color: x / Appearance: x / SG: x / pH: x  Gluc: 103 mg/dL / Ketone: x  / Bili: x / Urobili: x   Blood: x / Protein: x / Nitrite: x   Leuk Esterase: x / RBC: x / WBC x   Sq Epi: x / Non Sq Epi: x / Bacteria: x            RADIOLOGY & ADDITIONAL TESTS:

## 2024-02-11 NOTE — H&P ADULT - ASSESSMENT
68-year-old male with history of left carotid stent, pituitary mass, gout, hypertension, hypothyroidism, CVA, atrial fibrillation on Eliquis, who presents with right foot paresthesia, numbness, pain and swelling.  Symptoms started on Friday at 7 PM with no acute trauma/injury.  Patient with recent admission from 2/3 until 2/6/2024 for left hand paresthesia and was found to have small acute right posterior frontal cortical infarct, concerning for cardioembolic etiology with new atrial fibrillation and was treated with Eliquis, as well as Plavix .Pt he has right great toe pain similar to prior episodes of his gout. he reports he went to the doctor 2 days ago for the same symptoms, at that time he was started on steroids and "given a shot", which he has been taking. he clarifies that there is not numbness, but just pain. pain localized only to the right great toe with is erythematous and warm. patient will require admission for radhames -    68-year-old male with history of left carotid stent, pituitary mass, gout, hypertension, hypothyroidism, CVA, atrial fibrillation on Eliquis, who presents with right foot paresthesia, numbness, pain and swelling.  Symptoms started on Friday at 7 PM with no acute trauma/injury.  Patient with recent admission from 2/3 until 2/6/2024 for left hand paresthesia and was found to have small acute right posterior frontal cortical infarct, concerning for cardioembolic etiology with new atrial fibrillation and was treated with Eliquis, as well as Plavix .Pt he has right great toe pain similar to prior episodes of his gout. he reports he went to the doctor 2 days ago for the same symptoms, at that time he was started on steroids and "given a shot", which he has been taking. he clarifies that there is not numbness, but just pain. pain localized only to the right great toe with is erythematous and warm. patient will require admission for zeeshan -       ZEESHAN on CKD of unclear etiology,   although low BP reading noted this morning. On review of Joshua, Scr was 1.57 (2/5). Scr initially during this admission was elevated at 2.12 (2/11). Pt. received IVFs. Scr remains elevated/stable at 1.94 today. Baseline Scr appears to be ~1.4-1.6. UOP is not documented. No UA or renal imaging available for review. Pt. clinically stable. Labs reviewed. Recommend IV normal saline at 100 cc/hr for 1L total given low BP and BUN:Cr >30. Obtain UA and urine lytes. Obtain kidney sonogram. Monitor labs and urine output. Avoid nephrotoxins. Dose medications as per eGFR.   - nephrology consult appreciated    gout  - prednisone 20 po x 1    a fib  - c/w eliquis    bph  - flomax and proscar    hypothyroid  - c/w synthroid

## 2024-02-11 NOTE — ED PROVIDER NOTE - CARE PLAN
10-Jul-2019 22:42 Principal Discharge DX:	ZEESHAN (acute kidney injury)  Secondary Diagnosis:	Gout flare   1

## 2024-02-11 NOTE — ED ADULT NURSE NOTE - CHIEF COMPLAINT QUOTE
right leg pain and numbness since last night around 8pm  no facial droop, no difficulty with speech, no headache

## 2024-02-11 NOTE — CONSULT NOTE ADULT - PROBLEM SELECTOR RECOMMENDATION 9
Pt. with ZEESHAN on CKD of unclear etiology, although low BP reading noted this morning. On review of Rosenberg, Scr was 1.57 (2/5). Scr initially during this admission was elevated at 2.12 (2/11). Pt. received IVFs. Scr remains elevated/stable at 1.94 today. Baseline Scr appears to be ~1.4-1.6. UOP is not documented. No UA or renal imaging available for review. Pt. clinically stable. Labs reviewed. Recommend IV normal saline at 100 cc/hr for 1L total given low BP and BUN:Cr >30. Obtain UA and urine lytes. Obtain kidney sonogram. Monitor labs and urine output. Avoid nephrotoxins. Dose medications as per eGFR.     If you have any questions, please feel free to contact me  Nico Lopez  Nephrology Fellow  [phone] / [employer] Teams(Preferred)  (After 5pm or on weekends please page the on-call fellow)

## 2024-02-11 NOTE — CONSULT NOTE ADULT - SUBJECTIVE AND OBJECTIVE BOX
[facility] DIVISION OF KIDNEY DISEASES AND HYPERTENSION -- [phone]  -- INITIAL CONSULT NOTE  --------------------------------------------------------------------------------    HPI: 68 y.o M w/ PMHx of Stage 3 CKD, HFrEF, cardiac amyloidosis, cardiomyopathy, neuropathy, carotid stenosis s/p stents, hypothyroidism, pituitary adenoma, hyperprolactinemia, BPH, HTN, laryngeal cancer s/p radiation presenting with foot pain, being treated for gout flare. Nephrology was consulted for AKD management. On review of [place], Scr was 1.57 (2/5). Scr initially during this admission was elevated at 2.12 (2/11). Pt. received IVFs. Scr remains elevated/stable at 1.94 today.    Pt. was seen and evaluated in the ER earlier today. Pt. reports R foot shooting pain. Otherwise feels well. Pt. was seen by nephrology team during a recent hospitalization but has not followed up with a nephrologist outpatient. Pt. feels well otherwise. No recent NSAID use. Urinating well, no blood in urine or foamy urine. Denies any headaches, fevers/chills, chest pain, SOB, and abdominal pain.      PAST HISTORY  --------------------------------------------------------------------------------  PAST MEDICAL & SURGICAL HISTORY:  Low back pain  Herniated disc on lumbar region  Redundant prepuce and phimosis  Arthritis  on knees  Hypothyroid  not taking any meds  Hypertension  Thyroid cancer  pt denies any thyroid cancer  History of pituitary disease  on caber  Gout  S/P excision of vocal cord nodule  2007 and radiation -  Laryngeal cancer  "stage 0"  Pituitary mass  Carotid occlusion, bilateral  Obese  S/P excision of vocal cord nodule  radiation - 2007  History of lumbar spinal fusion  2018  H/O arthroscopy of shoulder  right-2017  Status post carotid surgery  right-5/13/2022, pt. states unsuccesful      FAMILY HISTORY:  Family history of stroke (Mother)    Family history of Alzheimer's disease (Father)    FH: stroke (Mother, Father)      PAST SOCIAL HISTORY:    ALLERGIES & MEDICATIONS  --------------------------------------------------------------------------------  Allergies    No Known Allergies    Intolerances      Standing Inpatient Medications    PRN Inpatient Medications      REVIEW OF SYSTEMS  --------------------------------------------------------------------------------  Gen: No fevers/chills  Skin: No rashes  Head/Eyes/Ears: No headache  Respiratory: No dyspnea, cough  CV: No chest pain  GI: No abdominal pain, diarrhea  : No dysuria, hematuria  MSK: See HPI  Heme: No easy bruising or bleeding    All other systems were reviewed and are negative, except as noted.    VITALS/PHYSICAL EXAM  --------------------------------------------------------------------------------  T(C): 36.6 (02-11-24 @ 11:30), Max: 36.7 (02-11-24 @ 01:48)  HR: 80 (02-11-24 @ 11:30) (68 - 84)  BP: 96/64 (02-11-24 @ 11:30) (96/64 - 121/84)  RR: 18 (02-11-24 @ 11:30) (16 - 20)  SpO2: 97% (02-11-24 @ 11:30) (94% - 100%)  Wt(kg): --  Height (cm): 177.8 (02-10-24 @ 23:38)  Weight (kg): 97.5 (02-10-24 @ 23:38)  BMI (kg/m2): 30.8 (02-10-24 @ 23:38)  BSA (m2): 2.15 (02-10-24 @ 23:38)    Physical Exam:  Gen: NAD  HEENT: MMM	  Pulm: CTA B/L  CV: S1S  Abd: Soft, +BS   Ext: trace RLE edema, no edema on LLE  Neuro: awake and alert  Skin: Warm and dry  Vascular access:    LABS/STUDIES  --------------------------------------------------------------------------------              13.8   7.98  >-----------<  219      [02-11-24 @ 03:11]              41.3     141  |  106  |  66  ----------------------------<  103      [02-11-24 @ 08:18]  3.4   |  23  |  1.94        Ca     8.4     [02-11-24 @ 08:18]      Mg     1.7     [02-11-24 @ 03:11]      Phos  4.3     [02-11-24 @ 03:11]    TPro  6.3  /  Alb  3.5  /  TBili  0.6  /  DBili  x   /  AST  24  /  ALT  32  /  AlkPhos  85  [02-11-24 @ 03:11]    PT/INR: PT 16.7 , INR 1.61       [02-11-24 @ 03:11]  PTT: 34.0       [02-11-24 @ 03:11]    Creatinine Trend:  SCr 1.94 [02-11 @ 08:18]  SCr 2.12 [02-11 @ 03:11]  SCr 1.57 [02-05 @ 06:17]  SCr 1.47 [02-04 @ 06:57]  SCr 1.65 [02-03 @ 20:40]    TSH 5.53      [12-04-23 @ 07:58]  Lipid: chol 104, TG 49, HDL 46, LDL --      [02-04-24 @ 07:11]    HCV 0.48, Nonreact      [05-05-21 @ 08:52]    Free Light Chains: kappa 3.20, lambda 2.18, ratio = 1.47      [03-21 @ 15:38]  Immunofixation Serum:   No Monoclonal Band Identified    Reference Range: None Detected      [03-21-23 @ 15:38]  Immunofixation Urine:   Reference Range: None Detected      [03-21-23 @ 18:42]

## 2024-02-11 NOTE — ED PROVIDER NOTE - ATTENDING CONTRIBUTION TO CARE
Justice Barnes MD, Emergency Medicine Attending Physician:     HPI: 68-year-old male with history of left carotid stent, pituitary mass, gout, hypertension, hypothyroidism, CVA, atrial fibrillation on Eliquis, who presents with right foot paresthesia, numbness, pain and swelling.  Symptoms started on Friday at 7 PM with no acute trauma/injury.  Patient with recent admission from 2/3 until 2/6/2024 for left hand paresthesia and was found to have small acute right posterior frontal cortical infarct, concerning for cardioembolic etiology with new atrial fibrillation and was treated with Eliquis, as well as Plavix.    ROS: denies trauma, fevers, chills, headache, dizziness, vision changes, neck pain or stiffness, weakness, vomiting, slurred speech, imbalance, chest pain, shortness of breath, presyncope/syncope.    Exam: Stable vitals  GEN: In no acute distress, AAOx3  HENT: NCAT, no stridor  EYES: No icterus  RESP: No respiratory distress, CTAB  CV: No tachycardia, normal perfusion  ABD: Soft, nontender, nondistended  NEURO: (+) Decreased sensation to the sole of the right foot, but normal strength 5/5.  CN III through XII intact, normal speech.    MDM: Will obtain labs to evaluate for hematologic disorder, metabolic derangements, hepatic and renal function, and screen for coagulopathy.  Will obtain x-ray of the affected extremity.  Will obtain US to evaluate for DVT.  Patient out of time window for thrombolysis and thrombectomy.  Since patient with abnormal kidney function in the past with recent admission, will obtain kidney function prior to imaging with possible CT and CTA.    Labs with ZEESHAN with creatinine 2.12.  Will obtain CT head and avoid contrast at this time.    Ultrasound negative for DVT.    My independent interpretation of the right foot x-ray images shows no acute fracture or dislocation.   More details to be seen in the official radiologist read.    Neurology consulted.  Signed out at 0700 pending bladder scan, repeat creatinine, CT head, neurology recommendations and close reassessments for further treatment and disposition decisions.

## 2024-02-11 NOTE — CONSULT NOTE ADULT - ATTENDING COMMENTS
I have seen this patient with the fellow and agree with their assessment and plan. I was physically present for significant portions of the evaluation and management (E/M) service provided.  I agree with the above history, physical, and plan which I have reviewed and edited where appropriate.    Pt doesn't have established CKD care  Please have patient susi with Nephrology office at  with one of the physicians in 1 month  You can email [email or web address] to get follow up scheduled or call above number.    Our address is [address] [place] [address]   (Phill Meyers, Karie, Dre, Nicole, Stevo, Dejuan, Sachi, Joann, Tera, Ayla, Nguyễn, Justin, Des, Verito, or [facility])    Wayne Karimi MD  Office   Contact me directly via Microsoft Teams     (After 5 pm or on weekends please page the on-call fellow/attending, can check [email or web address] for schedule. Login is brian tapia, schedule under [facility] medicine, psych, derm)

## 2024-02-11 NOTE — ED ADULT NURSE NOTE - NSFALLRISKFACTORS_ED_ALL_ED
Uses cane/Coagulation: Bleeding disorder either through use of anticoagulants or underlying clinical condition(s)

## 2024-02-11 NOTE — ED PROVIDER NOTE - PROGRESS NOTE DETAILS
reassesed patient, he reports feeling somewhat better. the patient indicates to us that he has right great toe pain similar to prior episodes of his gout. he reports he went to the doctor 2 days ago for the same symptoms, at that time he was started on steroids and "given a shot", which he has been taking. he clarifies that there is not numbness, but just pain. pain localized only to the right great toe with is erythematous and warm. patient will require admission for radhames - Malena Saldivar PA-C received s/o on pt who came in for R great toe pain, states he has hx of gout and this is similar sx to prior gout, pt denies numbness nor weakness in the leg, he admits he went to see PCP was started on steroids and received a shot for gout, pt states sx were worsening which prompted er visit, pt w/ no headache no vision changes no arm/leg weakness numbness

## 2024-02-12 ENCOUNTER — TRANSCRIPTION ENCOUNTER (OUTPATIENT)
Age: 69
End: 2024-02-12

## 2024-02-12 VITALS
RESPIRATION RATE: 17 BRPM | SYSTOLIC BLOOD PRESSURE: 114 MMHG | OXYGEN SATURATION: 100 % | HEART RATE: 87 BPM | DIASTOLIC BLOOD PRESSURE: 76 MMHG | TEMPERATURE: 98 F

## 2024-02-12 LAB
ANION GAP SERPL CALC-SCNC: 14 MMOL/L — SIGNIFICANT CHANGE UP (ref 5–17)
BUN SERPL-MCNC: 58 MG/DL — HIGH (ref 7–23)
CALCIUM SERPL-MCNC: 8.4 MG/DL — SIGNIFICANT CHANGE UP (ref 8.4–10.5)
CHLORIDE SERPL-SCNC: 106 MMOL/L — SIGNIFICANT CHANGE UP (ref 96–108)
CO2 SERPL-SCNC: 20 MMOL/L — LOW (ref 22–31)
CREAT SERPL-MCNC: 1.74 MG/DL — HIGH (ref 0.5–1.3)
EGFR: 42 ML/MIN/1.73M2 — LOW
FOLATE SERPL-MCNC: 10 NG/ML — SIGNIFICANT CHANGE UP
GLUCOSE SERPL-MCNC: 127 MG/DL — HIGH (ref 70–99)
HCT VFR BLD CALC: 42.8 % — SIGNIFICANT CHANGE UP (ref 39–50)
HGB BLD-MCNC: 14.1 G/DL — SIGNIFICANT CHANGE UP (ref 13–17)
MAGNESIUM SERPL-MCNC: 1.8 MG/DL — SIGNIFICANT CHANGE UP (ref 1.6–2.6)
MCHC RBC-ENTMCNC: 29.2 PG — SIGNIFICANT CHANGE UP (ref 27–34)
MCHC RBC-ENTMCNC: 32.9 GM/DL — SIGNIFICANT CHANGE UP (ref 32–36)
MCV RBC AUTO: 88.6 FL — SIGNIFICANT CHANGE UP (ref 80–100)
NRBC # BLD: 0 /100 WBCS — SIGNIFICANT CHANGE UP (ref 0–0)
PHOSPHATE SERPL-MCNC: 3.2 MG/DL — SIGNIFICANT CHANGE UP (ref 2.5–4.5)
PLATELET # BLD AUTO: 233 K/UL — SIGNIFICANT CHANGE UP (ref 150–400)
POTASSIUM SERPL-MCNC: 4.4 MMOL/L — SIGNIFICANT CHANGE UP (ref 3.5–5.3)
POTASSIUM SERPL-SCNC: 4.4 MMOL/L — SIGNIFICANT CHANGE UP (ref 3.5–5.3)
RBC # BLD: 4.83 M/UL — SIGNIFICANT CHANGE UP (ref 4.2–5.8)
RBC # FLD: 15.4 % — HIGH (ref 10.3–14.5)
SODIUM SERPL-SCNC: 140 MMOL/L — SIGNIFICANT CHANGE UP (ref 135–145)
TSH SERPL-MCNC: 1.2 UIU/ML — SIGNIFICANT CHANGE UP (ref 0.27–4.2)
VIT B12 SERPL-MCNC: 708 PG/ML — SIGNIFICANT CHANGE UP (ref 232–1245)
WBC # BLD: 7.24 K/UL — SIGNIFICANT CHANGE UP (ref 3.8–10.5)
WBC # FLD AUTO: 7.24 K/UL — SIGNIFICANT CHANGE UP (ref 3.8–10.5)

## 2024-02-12 PROCEDURE — 93971 EXTREMITY STUDY: CPT

## 2024-02-12 PROCEDURE — 80048 BASIC METABOLIC PNL TOTAL CA: CPT

## 2024-02-12 PROCEDURE — 82746 ASSAY OF FOLIC ACID SERUM: CPT

## 2024-02-12 PROCEDURE — 70450 CT HEAD/BRAIN W/O DYE: CPT | Mod: MA

## 2024-02-12 PROCEDURE — 84100 ASSAY OF PHOSPHORUS: CPT

## 2024-02-12 PROCEDURE — 84560 ASSAY OF URINE/URIC ACID: CPT

## 2024-02-12 PROCEDURE — 84156 ASSAY OF PROTEIN URINE: CPT

## 2024-02-12 PROCEDURE — 84300 ASSAY OF URINE SODIUM: CPT

## 2024-02-12 PROCEDURE — 81003 URINALYSIS AUTO W/O SCOPE: CPT

## 2024-02-12 PROCEDURE — 85027 COMPLETE CBC AUTOMATED: CPT

## 2024-02-12 PROCEDURE — 85025 COMPLETE CBC W/AUTO DIFF WBC: CPT

## 2024-02-12 PROCEDURE — G0378: CPT

## 2024-02-12 PROCEDURE — 85730 THROMBOPLASTIN TIME PARTIAL: CPT

## 2024-02-12 PROCEDURE — 73630 X-RAY EXAM OF FOOT: CPT

## 2024-02-12 PROCEDURE — 84443 ASSAY THYROID STIM HORMONE: CPT

## 2024-02-12 PROCEDURE — 80053 COMPREHEN METABOLIC PANEL: CPT

## 2024-02-12 PROCEDURE — 76770 US EXAM ABDO BACK WALL COMP: CPT

## 2024-02-12 PROCEDURE — 99285 EMERGENCY DEPT VISIT HI MDM: CPT

## 2024-02-12 PROCEDURE — 97161 PT EVAL LOW COMPLEX 20 MIN: CPT

## 2024-02-12 PROCEDURE — 36415 COLL VENOUS BLD VENIPUNCTURE: CPT

## 2024-02-12 PROCEDURE — 96374 THER/PROPH/DIAG INJ IV PUSH: CPT

## 2024-02-12 PROCEDURE — 82436 ASSAY OF URINE CHLORIDE: CPT

## 2024-02-12 PROCEDURE — 85610 PROTHROMBIN TIME: CPT

## 2024-02-12 PROCEDURE — 82607 VITAMIN B-12: CPT

## 2024-02-12 PROCEDURE — 82340 ASSAY OF CALCIUM IN URINE: CPT

## 2024-02-12 PROCEDURE — 83735 ASSAY OF MAGNESIUM: CPT

## 2024-02-12 PROCEDURE — 83935 ASSAY OF URINE OSMOLALITY: CPT

## 2024-02-12 PROCEDURE — 82570 ASSAY OF URINE CREATININE: CPT

## 2024-02-12 RX ORDER — FUROSEMIDE 40 MG
1 TABLET ORAL
Refills: 0 | DISCHARGE

## 2024-02-12 RX ORDER — OXYCODONE HYDROCHLORIDE 5 MG/1
1 TABLET ORAL
Qty: 12 | Refills: 0
Start: 2024-02-12 | End: 2024-02-14

## 2024-02-12 RX ADMIN — CLOPIDOGREL BISULFATE 75 MILLIGRAM(S): 75 TABLET, FILM COATED ORAL at 10:55

## 2024-02-12 RX ADMIN — FINASTERIDE 5 MILLIGRAM(S): 5 TABLET, FILM COATED ORAL at 10:55

## 2024-02-12 RX ADMIN — OXYCODONE HYDROCHLORIDE 5 MILLIGRAM(S): 5 TABLET ORAL at 10:55

## 2024-02-12 RX ADMIN — TAMSULOSIN HYDROCHLORIDE 0.4 MILLIGRAM(S): 0.4 CAPSULE ORAL at 05:26

## 2024-02-12 RX ADMIN — Medication 20 MILLIGRAM(S): at 12:21

## 2024-02-12 RX ADMIN — OXYCODONE HYDROCHLORIDE 5 MILLIGRAM(S): 5 TABLET ORAL at 00:24

## 2024-02-12 RX ADMIN — OXYCODONE HYDROCHLORIDE 5 MILLIGRAM(S): 5 TABLET ORAL at 12:32

## 2024-02-12 RX ADMIN — Medication 175 MICROGRAM(S): at 05:26

## 2024-02-12 RX ADMIN — APIXABAN 5 MILLIGRAM(S): 2.5 TABLET, FILM COATED ORAL at 05:27

## 2024-02-12 NOTE — PROGRESS NOTE ADULT - PROBLEM SELECTOR PLAN 1
Pt. with ZEESHAN on CKD of unclear etiology, although low BP reading noted this morning. On review of [place], Scr was 1.57 (2/5). Scr initially during this admission was elevated at 2.12 (2/11). Pt. received IVFs. Baseline Scr appears to be ~1.4-1.6. UA bland with Roman of 61. Kidney US done without hydro or significant renal parenchymal disease    Scr now improved to 1.74 with IVFs today. Monitor labs and urine output. Avoid nephrotoxins. Dose medications as per eGFR.     Please have patient f.u with Nephrology office at  with one of the physicians in 1 month. You can email [email or web address] to get follow up scheduled or call above number.    Our address is [address] [place] [address]   (Phill Meyers, Karie, Dre, Nicole, Stevo, Dejuan, Sachi, Joann, Tera, Ayla, Nguyễn, Justin, Des, Verito, or Sage)    If you have any questions, please feel free to contact me  Piyush Abernathy  Nephrology Fellow  [phone]; Prefer [employer] TEAMS  (After 5pm or on weekends please page the on-call fellow).

## 2024-02-12 NOTE — DISCHARGE NOTE PROVIDER - NSFOLLOWUPCLINICS_GEN_ALL_ED_FT
[facility]  Nephrology  [address] [place] [address], [address] [place] [address]  Phone: [phone]  Fax:

## 2024-02-12 NOTE — PHYSICAL THERAPY INITIAL EVALUATION ADULT - PERTINENT HX OF CURRENT PROBLEM, REHAB EVAL
Pt is a 68-year-old male with history of left carotid stent, pituitary mass, gout, hypertension, hypothyroidism, CVA, atrial fibrillation on Eliquis, who presents with right foot paresthesia, numbness, pain and swelling.  Symptoms started on Friday 2/9 at 7 PM with no acute trauma/injury.  Patient with recent admission from 2/3 until 2/6/2024 for left hand paresthesia and was found to have small acute right posterior frontal cortical infarct, concerning for cardioembolic etiology with new atrial fibrillation and was treated with Eliquis, as well as Plavix.  CTH: No acute intracranial hemorrhage or mass effect.  Doppler LE: No evidence of right lower extremity deep venous thrombosis.  Xray right foot: Unremarkable right foot radiograph.

## 2024-02-12 NOTE — DISCHARGE NOTE PROVIDER - CARE PROVIDER_API CALL
Mitchell Harris  Internal Medicine  [address] [facility], [place] [address]  Phone: [phone]  Fax: [phone]  Follow Up Time: 1 week

## 2024-02-12 NOTE — DISCHARGE NOTE PROVIDER - NSDCCPCAREPLAN_GEN_ALL_CORE_FT
PRINCIPAL DISCHARGE DIAGNOSIS  Diagnosis: ZEESHAN (acute kidney injury)  Assessment and Plan of Treatment: Avoid taking NSAIDs (ex: Ibuprofen, Advil, Celebrex, Naprosyn) and other agents that can harm the kidneys such as intravenous contrast for diagnostic testing, combination cold medications, etc. until you are instructed to do so by your Primary Care Physician.  Have all of your medications adjusted for your renal function by your Health Care Provider.  Blood pressure control is important.  Take all medication as prescribed.  Do not overconsume foods that are high in potassium, such as bananas, until you are instructed to do so by your primary care physician.      SECONDARY DISCHARGE DIAGNOSES  Diagnosis: Gout flare  Assessment and Plan of Treatment: Imaging was negative   You received two doses of steroids during admission   Continue pain regimen   Follow up with your PCP outpatient for further management

## 2024-02-12 NOTE — PROGRESS NOTE ADULT - ASSESSMENT
68-year-old male with history of left carotid stent, pituitary mass, gout, hypertension, hypothyroidism, CVA, atrial fibrillation on Eliquis, who presents with right foot paresthesia, numbness, pain and swelling.  Symptoms started on Friday at 7 PM with no acute trauma/injury.  Patient with recent admission from 2/3 until 2/6/2024 for left hand paresthesia and was found to have small acute right posterior frontal cortical infarct, concerning for cardioembolic etiology with new atrial fibrillation and was treated with Eliquis, as well as Plavix .Pt he has right great toe pain similar to prior episodes of his gout. he reports he went to the doctor 2 days ago for the same symptoms, at that time he was started on steroids and "given a shot", which he has been taking. he clarifies that there is not numbness, but just pain. pain localized only to the right great toe with is erythematous and warm. patient will require admission for zeeshan -       ZEESHAN on CKD of unclear etiology,   although low BP reading noted this morning. On review of [place], Scr was 1.57 (2/5). Scr initially during this admission was elevated at 2.12 (2/11). Pt. received IVFs. Scr remains elevated/stable at 1.94 today. Baseline Scr appears to be ~1.4-1.6. UOP is not documented. No UA or renal imaging available for review. Pt. clinically stable. Labs reviewed. Recommend IV normal saline at 100 cc/hr for 1L total given low BP and BUN:Cr >30. Obtain UA and urine lytes. Obtain kidney sonogram. Monitor labs and urine output. Avoid nephrotoxins. Dose medications as per eGFR.   - nephrology consult appreciated    gout  - prednisone 20 po x 1    a fib  - c/w eliquis    bph  - flomax and proscar    hypothyroid  - c/w synthroid  
Pt. with ZEESHAN on CKD

## 2024-02-12 NOTE — DISCHARGE NOTE NURSING/CASE MANAGEMENT/SOCIAL WORK - PATIENT PORTAL LINK FT
You can access the FollowMyHealth Patient Portal offered by [facility] by registering at the following website: [email or web address]. By joining [facility]’s FollowMyHealth portal, you will also be able to view your health information using other applications (apps) compatible with our system.

## 2024-02-12 NOTE — DISCHARGE NOTE PROVIDER - HOSPITAL COURSE
HPI:  68-year-old male with history of left carotid stent, pituitary mass, gout, hypertension, hypothyroidism, CVA, atrial fibrillation on Eliquis, who presents with right foot paresthesia, numbness, pain and swelling.  Symptoms started on Friday at 7 PM with no acute trauma/injury.  Patient with recent admission from 2/3 until 2/6/2024 for left hand paresthesia and was found to have small acute right posterior frontal cortical infarct, concerning for cardioembolic etiology with new atrial fibrillation and was treated with Eliquis, as well as Plavix .Pt he has right great toe pain similar to prior episodes of his gout. he reports he went to the doctor 2 days ago for the same symptoms, at that time he was started on steroids and "given a shot", which he has been taking. he clarifies that there is not numbness, but just pain. pain localized only to the right great toe with is erythematous and warm. patient will require admission for zeeshan -  (11 Feb 2024 11:19)    Hospital Course:  Patient admitted for ZEESHAN on CKD. Nephrology was consulted; patient received fluids     Important Medication Changes and Reason:    Active or Pending Issues Requiring Follow-up:    Advanced Directives:   [ ] Full code  [ ] DNR  [ ] Hospice    Discharge Diagnoses:         HPI:  68-year-old male with history of left carotid stent, pituitary mass, gout, hypertension, hypothyroidism, CVA, atrial fibrillation on Eliquis, who presents with right foot paresthesia, numbness, pain and swelling.  Symptoms started on Friday at 7 PM with no acute trauma/injury.  Patient with recent admission from 2/3 until 2/6/2024 for left hand paresthesia and was found to have small acute right posterior frontal cortical infarct, concerning for cardioembolic etiology with new atrial fibrillation and was treated with Eliquis, as well as Plavix .Pt he has right great toe pain similar to prior episodes of his gout. he reports he went to the doctor 2 days ago for the same symptoms, at that time he was started on steroids and "given a shot", which he has been taking. he clarifies that there is not numbness, but just pain. pain localized only to the right great toe with is erythematous and warm. patient will require admission for zeeshan -  (11 Feb 2024 11:19)    Hospital Course:  Patient admitted for ZEESHAN on CKD. Nephrology was consulted; patient received fluids and creatinine now downtrending. Renal US negative for hydro nephritis. Lasix was held.  Hospital course was complicated by right foot pain likely secondary to a gout flare. Xrays were negative; patient received steroids during admission and is to follow up with PCP outpatient     Important Medication Changes and Reason:  >Hold lasix in the setting of ZEESHAN   >Continue oxycodone as needed for pain     Active or Pending Issues Requiring Follow-up:  >PCP follow up within one week for further outpatient management   >Nephrology follow up for further outpatient management     Advanced Directives:   [X] Full code  [ ] DNR  [ ] Hospice    Discharge Diagnoses:  >ZEESHAN   >R foot pain     Discharge/dispo/med rec discussed with attending Dr. Nieves. Patient is medically cleared for discharge with outpatient follow up       HPI:  68-year-old male with history of left carotid stent, pituitary mass, gout, hypertension, hypothyroidism, CVA, atrial fibrillation on Eliquis, who presents with right foot paresthesia, numbness, pain and swelling.  Symptoms started on Friday at 7 PM with no acute trauma/injury.  Patient with recent admission from 2/3 until 2/6/2024 for left hand paresthesia and was found to have small acute right posterior frontal cortical infarct, concerning for cardioembolic etiology with new atrial fibrillation and was treated with Eliquis, as well as Plavix .Pt he has right great toe pain similar to prior episodes of his gout. he reports he went to the doctor 2 days ago for the same symptoms, at that time he was started on steroids and "given a shot", which he has been taking. he clarifies that there is not numbness, but just pain. pain localized only to the right great toe with is erythematous and warm. patient will require admission for zeeshan -  (11 Feb 2024 11:19)    Hospital Course:  Patient admitted for ZEESHAN on CKD. Nephrology was consulted; patient received fluids and creatinine now downtrending. Renal US negative for hydro nephritis. Lasix was held.  Hospital course was complicated by right foot pain likely secondary to a gout flare. Xrays were negative; patient received steroids during admission and is to follow up with PCP outpatient     Important Medication Changes and Reason:  >Hold lasix in the setting of ZEESHAN   >Continue oxycodone as needed for pain     Active or Pending Issues Requiring Follow-up:  >PCP follow up within one week for further outpatient management   >Nephrology follow up for further outpatient management     Advanced Directives:   [X] Full code  [ ] DNR  [ ] Hospice    Discharge Diagnoses:  >ZEESHAN   >R foot pain

## 2024-02-12 NOTE — DISCHARGE NOTE PROVIDER - NSDCFUSCHEDAPPT_GEN_ALL_CORE_FT
Neela Torres  [facility] Physician [facility]  NEUROSURG [address] [facility]  Scheduled Appointment: 02/21/2024

## 2024-02-12 NOTE — DISCHARGE NOTE PROVIDER - NSDCMRMEDTOKEN_GEN_ALL_CORE_FT
Albuterol (Eqv-ProAir HFA) 90 mcg/inh inhalation aerosol: 1 puff(s) inhaled once a day (at bedtime) and as needed  apixaban 5 mg oral tablet: 1 tab(s) orally every 12 hours  atorvastatin 40 mg oral tablet: 1 tab(s) orally once a day (at bedtime)  finasteride 5 mg oral tablet: 1 tab(s) orally once a day  Plavix 75 mg oral tablet: 1 tab(s) orally once a day  Synthroid 175 mcg (0.175 mg) oral tablet: 1 tab(s) orally once a day  tamsulosin 0.4 mg oral capsule: 1 cap(s) orally 2 times a day   Albuterol (Eqv-ProAir HFA) 90 mcg/inh inhalation aerosol: 1 puff(s) inhaled once a day (at bedtime) and as needed  apixaban 5 mg oral tablet: 1 tab(s) orally every 12 hours  atorvastatin 40 mg oral tablet: 1 tab(s) orally once a day (at bedtime)  finasteride 5 mg oral tablet: 1 tab(s) orally once a day  oxyCODONE 5 mg oral tablet: 1 tab(s) orally every 6 hours as needed for Severe Pain (7 - 10) MDD: 4 tablets a day  Plavix 75 mg oral tablet: 1 tab(s) orally once a day  Synthroid 175 mcg (0.175 mg) oral tablet: 1 tab(s) orally once a day  tamsulosin 0.4 mg oral capsule: 1 cap(s) orally 2 times a day

## 2024-02-12 NOTE — PHYSICAL THERAPY INITIAL EVALUATION ADULT - ADDITIONAL COMMENTS
pt resides in a private home with wife and 3 stairs to enter. pt independent with all ADLs prior to admission and ambulates with no AD.

## 2024-02-12 NOTE — PROGRESS NOTE ADULT - SUBJECTIVE AND OBJECTIVE BOX
Patient is a 68y old  Male who presents with a chief complaint of right toe pain (12 Feb 2024 11:46)      SUBJECTIVE / OVERNIGHT EVENTS:     MEDICATIONS  (STANDING):  apixaban 5 milliGRAM(s) Oral every 12 hours  atorvastatin 40 milliGRAM(s) Oral at bedtime  clopidogrel Tablet 75 milliGRAM(s) Oral daily  finasteride 5 milliGRAM(s) Oral daily  levothyroxine 175 MICROGram(s) Oral daily  tamsulosin 0.4 milliGRAM(s) Oral two times a day    MEDICATIONS  (PRN):  acetaminophen   Oral Liquid .. 650 milliGRAM(s) Oral every 6 hours PRN Moderate Pain (4 - 6)  oxyCODONE    IR 5 milliGRAM(s) Oral every 6 hours PRN Severe Pain (7 - 10)      CAPILLARY BLOOD GLUCOSE        I&O's Summary    T(C): 36.4 (02-12-24 @ 04:46), Max: 36.4 (02-12-24 @ 04:46)  HR: 81 (02-12-24 @ 07:30) (81 - 90)  BP: 122/80 (02-12-24 @ 07:30) (122/80 - 136/70)  RR: 18 (02-12-24 @ 04:46) (18 - 18)  SpO2: 100% (02-12-24 @ 07:30) (97% - 100%)    PHYSICAL EXAM:  GENERAL: NAD, well-developed  HEAD:  Atraumatic, Normocephalic  EYES: EOMI, PERRLA, conjunctiva and sclera clear  NECK: Supple, No JVD  CHEST/LUNG: Clear to auscultation bilaterally; No wheeze  HEART: Regular rate and rhythm; No murmurs, rubs, or gallops  ABDOMEN: Soft, Nontender, Nondistended; Bowel sounds present  EXTREMITIES:  2+ Peripheral Pulses, No clubbing, cyanosis, or edema  PSYCH: AAOx3  NEUROLOGY: non-focal  SKIN: No rashes or lesions    LABS:                        14.1   7.24  )-----------( 233      ( 12 Feb 2024 07:15 )             42.8     02-12    140  |  106  |  58<H>  ----------------------------<  127<H>  4.4   |  20<L>  |  1.74<H>    Ca    8.4      12 Feb 2024 07:15  Phos  3.2     02-12  Mg     1.8     02-12    TPro  6.3  /  Alb  3.5  /  TBili  0.6  /  DBili  x   /  AST  24  /  ALT  32  /  AlkPhos  85  02-11    PT/INR - ( 11 Feb 2024 03:11 )   PT: 16.7 sec;   INR: 1.61 ratio         PTT - ( 11 Feb 2024 03:11 )  PTT:34.0 sec      Urinalysis Basic - ( 12 Feb 2024 07:15 )    Color: x / Appearance: x / SG: x / pH: x  Gluc: 127 mg/dL / Ketone: x  / Bili: x / Urobili: x   Blood: x / Protein: x / Nitrite: x   Leuk Esterase: x / RBC: x / WBC x   Sq Epi: x / Non Sq Epi: x / Bacteria: x        RADIOLOGY & ADDITIONAL TESTS:    Imaging Personally Reviewed:    Consultant(s) Notes Reviewed:      Care Discussed with Consultants/Other Providers:  
[facility] Division of Kidney Diseases & Hypertension  FOLLOW UP NOTE  [phone]--------------------------------------------------------------------------------    Chief Complaint: ZEESHAN on CKD    24 hour events/subjective:  Patient seen this am in the ED. He is complaining of right foot pain -plantar surface. Denies any headaches, fevers/chills, chest pain, palpitations, SOB, and leg swelling.    PAST HISTORY  --------------------------------------------------------------------------------  No significant changes to PMH, PSH, FHx, SHx, unless otherwise noted    ALLERGIES & MEDICATIONS  --------------------------------------------------------------------------------  Allergies    No Known Allergies    Intolerances      Standing Inpatient Medications  apixaban 5 milliGRAM(s) Oral every 12 hours  atorvastatin 40 milliGRAM(s) Oral at bedtime  clopidogrel Tablet 75 milliGRAM(s) Oral daily  finasteride 5 milliGRAM(s) Oral daily  levothyroxine 175 MICROGram(s) Oral daily  tamsulosin 0.4 milliGRAM(s) Oral two times a day    PRN Inpatient Medications  acetaminophen   Oral Liquid .. 650 milliGRAM(s) Oral every 6 hours PRN  oxyCODONE    IR 5 milliGRAM(s) Oral every 6 hours PRN      REVIEW OF SYSTEMS  --------------------------------------------------------------------------------  per above    VITALS/PHYSICAL EXAM  --------------------------------------------------------------------------------  T(C): 36.4 (02-12-24 @ 04:46), Max: 36.6 (02-11-24 @ 11:30)  HR: 81 (02-12-24 @ 07:30) (80 - 90)  BP: 122/80 (02-12-24 @ 07:30) (96/64 - 136/70)  RR: 18 (02-12-24 @ 04:46) (18 - 18)  SpO2: 100% (02-12-24 @ 07:30) (97% - 100%)  Wt(kg): --  Height (cm): 177.8 (02-10-24 @ 23:38)  Weight (kg): 97.5 (02-10-24 @ 23:38)  BMI (kg/m2): 30.8 (02-10-24 @ 23:38)  BSA (m2): 2.15 (02-10-24 @ 23:38)      Physical Exam:  Gen: NAD  HEENT: MMM	  Pulm: CTA B/L  CV: S1S  Abd: Soft, +BS   Ext: trace RLE edema, no edema on LLE  Neuro: awake and alert  Skin: Warm and dry  Vascular access:    LABS/STUDIES  --------------------------------------------------------------------------------              14.1   7.24  >-----------<  233      [02-12-24 @ 07:15]              42.8     140  |  106  |  58  ----------------------------<  127      [02-12-24 @ 07:15]  4.4   |  20  |  1.74        Ca     8.4     [02-12-24 @ 07:15]      Mg     1.8     [02-12-24 @ 07:15]      Phos  3.2     [02-12-24 @ 07:15]    TPro  6.3  /  Alb  3.5  /  TBili  0.6  /  DBili  x   /  AST  24  /  ALT  32  /  AlkPhos  85  [02-11-24 @ 03:11]    PT/INR: PT 16.7 , INR 1.61       [02-11-24 @ 03:11]  PTT: 34.0       [02-11-24 @ 03:11]      Creatinine Trend:  SCr 1.74 [02-12 @ 07:15]  SCr 1.94 [02-11 @ 08:18]  SCr 2.12 [02-11 @ 03:11]  SCr 1.57 [02-05 @ 06:17]  SCr 1.47 [02-04 @ 06:57]    Urinalysis - [02-12-24 @ 07:15]      Color  / Appearance  / SG  / pH       Gluc 127 / Ketone   / Bili  / Urobili        Blood  / Protein  / Leuk Est  / Nitrite       RBC  / WBC  / Hyaline  / Gran  / Sq Epi  / Non Sq Epi  / Bacteria     Urine Creatinine 64      [02-11-24 @ 15:19]  Urine Protein <7      [02-11-24 @ 15:19]  Urine Sodium 61      [02-11-24 @ 15:19]  Urine Chloride 34      [02-11-24 @ 15:19]  Urine Osmolality 462      [02-11-24 @ 15:19]    TSH 1.20      [02-12-24 @ 07:15]

## 2024-02-16 NOTE — HISTORY OF PRESENT ILLNESS
[FreeTextEntry1] : Hospital Course:  Discharge Date 06-Feb-2024  Admission Date 03-Feb-2024 22:15  Reason for Admission suspected stroke  Hospital Course   HPI: 68y M with Hx L carotid stent (placed 2 years ago), pituitary mass, gout,  HTN, hypothyroidism, who presents w/ CC of L hand paresthesias. LKW at 6pm on  2/3. He was otherwise in his usual state of health when suddenly felt tingling  in L hand. Never had symptoms like this before. He is able to coordinate  movements in that hand, such as touching his nose or tapping his fingers,  however feels unaware when the hand is touched. He denies weakness in the hands  or legs. No slurred speech, headache, or visual changes. He is independent in  ADLs at baseline. He currently only takes Plavix for blood thinners. He denies  prior history of heart arrhythmias, although EKG in ED today is showing afib.    Imaging:  MRI Brain/ MRA H&N:  Age-appropriate involutional and ischemic gliotic changes.    Small acute right posterior frontal cortical infarct with diffusion  restriction. No acute hemorrhage.    Moderate mid to distal right common carotid artery stenosis with a more  normal distal common carotid and carotid bulb appearance consistent with  a post endarterectomy graft appearance. This is suspicious for  significant endothelial proliferation.    Left carotid stent with signal dropout due to the metallic stent without  evidence for significant stenosis.    Proximal left vertebral artery occlusion with retrograde flow at the V4  segment.    02/03/24: CT HEAD: No CT evidence of acute intracranial hemorrhage, mass  effect, or midline  shift.    CTA Neck: The left vertebral artery is occluded from its origin, felt to  be chronic in nature, given abnormal appearance of the left V4 segment  flow void on a prior MRI in 2021. Noncalcified plaque of the right common  carotid artery results in up to approximately 70% stenosis. Noncalcified  plaque of the left common carotid artery results in up to approximately  50% stenosis. A left proximal internal carotid artery stent is patent.    CTA Head: A posterior right M3 branch demonstrates tapering of the  contrast column, concerning for moderate to severe stenosis. No large  vessel occlusion about the [place]. Patchy reconstitution of the  intradural left vertebral artery.    CT Perfusion: Diffusely elevated Tmax without corresponding large vessel  occlusion likely secondary to slow flow phenomenon given extensive  atherosclerotic disease in bilateral common carotid arteries. Please note  that the superior cerebral hemispheres are excluded from the  field-of-view, and infarct along the high right frontoparietal region is  not excluded based on this study. In the right frontoparietal region,  there is focal region of markedly elevated Tmax, which may reflect an  area of relatively increased hypoperfusion, which appears to extend  beyond the superior margin of the field-of-view    Impression: Acute L hand paresthesias due to right posterior frontal cortical  infarct, etiology likely cardioembolic, new A. Fib. Cannot exclude large  atherosclerosis disease due to known right common carotid stenosis that may  result in artery to artery embolism.  Antithrombotic therapy: Eliquis 5mg BID and Plavix 75mg daily    TTE:  Left ventricular cavity is normal. Left ventricular wall thickness is  normal. Left ventricular systolic function is moderately decreased with an  ejection fraction of 40 % by 3D. There are no regional wall motion  abnormalities seen. Multiple segmental abnormalities exist. Elevated filling  pressure. Analysis of left ventricular diastolic function and filling pressure  is made challenging by the presence of merged E and A wave. Moderately enlarged  right ventricular cavity size, wall thickness, and reduced systolic function.  The left atrium is severely dilated. The right atrium is severely dilated.    LDL 46: continue home dose statin    A1C 6.3.    Disposition: Recommended for outpatient PT and outpatient OT. Patient is  neurologically stable and cleared for discharge. Recommend follow up with Dr. Leary for assessment and possible treatment of R carotid stenosis.    Med Reconciliation:  Override IMPROVE-DD recommendations due to: IMPROVE-DD Application Not  Available  Recommended Post-Discharge VTE Prophylaxis IMPROVE-DD Application Not Available  Medication Reconciliation Status Admission Reconciliation is Completed  Discharge Reconciliation is Completed  Discharge Medications Advair Diskus 100 mcg-50 mcg inhalation powder: 1 puff(s)  inhaled 2 times a day as needed  Albuterol (Eqv-ProAir HFA) 90 mcg/inh inhalation aerosol: 1 puff(s) inhaled  once a day (at bedtime) and as needed  ALPRAZolam 0.25 mg oral tablet: 1 tab(s) orally once a day as needed  apixaban 5 mg oral tablet: 1 tab(s) orally every 12 hours  atorvastatin 40 mg oral tablet: 1 tab(s) orally once a day (at bedtime)  diazePAM 2 mg oral tablet: 1 tab(s) orally 2 times a day as needed for  anxiety  finasteride 5 mg oral tablet: 1 tab(s) orally once a day  furosemide 20 mg oral tablet: 1 tab(s) orally 2 times a day  Plavix 75 mg oral tablet: 1 tab(s) orally once a day  Synthroid 175 mcg (0.175 mg) oral tablet: 1 tab(s) orally once a day  tamsulosin 0.4 mg oral capsule: 1 cap(s) orally 2 times a day  ,  ,  Care Plan/Procedures:  Discharge Diagnoses, Assessment and Plan of Treatment PRINCIPAL DISCHARGE  DIAGNOSIS  Diagnosis: Stroke  Assessment and Plan of Treatment: .Please follow up with neurologist in 1 week.  Continue taking medications as prescribed. Monitor your blood pressure. Reduce  fat, cholesterol and salt in your diet. Increase intake of fruits and  vegetables. Limit alcohol to minimum and do not smoke. You may be at risk for  falling, make changes to your home to help you walk easier. Keep up to date on  vaccinations.  If you experience any symptoms of facial drooping, slurred  speech, arm or leg weakness, severe headache, vision changes or any worsening  symptoms, notify provider immediatley and return to ER.      SECONDARY DISCHARGE DIAGNOSES  Diagnosis: Atrial fibrillation  Assessment and Plan of Treatment: You were started on Eliquis 5mg twice a day.  Goal(s) To get better and follow your care plan as instructed.  Follow Up:  Care Providers for Follow up (PCP/Outpatient Provider) Neela Campuzano  Neurology  [address] [place] [id number]  Phone: [phone]  Fax: [phone]  Follow Up Time: 2 weeks  Additional Provider Info (For SysAdmin Use Only)   PROVIDER:[TOKEN:[[id number]],FOLLOWUP:[2 weeks]]  Care Providers Direct Addresses (For SYSAdmin Use Only)   [email or web address]  NPI number (For SysAdmin Use Only) : [[id number]]  Discharge Diet DASH Diet  Activity No restrictions  Quality Measures:  Patient Condition Stable  Hospice Patient No  Core Measure Site Yes  Tobacco Usage Within the Last Year No  Final Modified Sapphire Score at Discharge 1 - No significant disability. Able to  carry out all usual activities, despite some symptoms  Rehabilitation Assessment Assessment performed  Cognition: The patient has No difficulties  Does the patient have a principal diagnosis of ischemic stroke, hemorrhagic  stroke, or TIA? Yes...  Patient's Risk Factors for Stroke Atrial fibrillation  History of a stroke or  TIA  Does the patient have a principal diagnosis of Acute Myocardial Infarction?              No  Has the patient had a Percutaneous Coronary Intervention? No  Anticoagulation Therapy for Atrial Fibrillation/Flutter Yes  Antithrombotic Therapy Yes  Statin Therapy Yes  Document Complete:  Physician Section Complete This document is complete and the patient is ready  for discharge.  For questions about your prescriptions, please call: [phone]  Is this contact telephone number correct? Yes  Attending Attestation Statement I have personally seen and examined the  patient. I have collaborated with and supervised the  . on the discharge service for the patient. I have reviewed and made amendments  to the documentation where necessary.      Electronic Signatures:  Rin Blanc) (Signed 06-Feb-2024 10:43)   Authored: Follow Up, Med Reconciliation, Care Plan/Procedures, Quality  Measures, Document Complete, Hospital Course  Neela Torres) (Signed 06-Feb-2024 22:32)   Co-Signer: Hospital Course, Med Reconciliation, Care Plan/Procedures, Follow  Up, Quality Measures, Document Complete    Last Updated: 06-Feb-2024 22:32 by Neela Torres)

## 2024-02-21 ENCOUNTER — APPOINTMENT (OUTPATIENT)
Dept: NEUROSURGERY | Facility: CLINIC | Age: 69
End: 2024-02-21

## 2024-04-07 NOTE — ED PROVIDER NOTE - NS ED MD DISPO ADMITTING SERVICE
.        Emergency Department Attending Note    Patient: Paul Olguinii Sherrizz Age: 62 year old Sex: male   MRN: [id number] : 1962 Encounter Date: 2024     Arrived By:  ***    Limitations:  ***    HISTORY OF PRESENT ILLNESS  Patient is a 62 year old male ***.    REVIEW OF SYSTEMS   Constitutional:  No fever, no chills, no weakness or fatigue   Skin:  No rash, no skin changes, no jaundice   Eye:  No redness, no vision changes   ENT:  No rhinorrhea, no sore throat, no ear pain, no congestion   Respiratory:  No cough, no shortness of breath   Cardiovascular:  No chest pain, no swelling, no palpitations, no syncope      Gastrointestinal:  No nausea, vomiting, abdominal pain, diarrhea or blood in stool   Genitourinary:  No pain, no changes in urination   Musculoskeletal:  No weakness, no pain, no swelling   Neurologic:  No headache, no dizziness, no weakness         PAST HISTORY       No past medical history on file. No past surgical history on file.   Additional PMH: Additional PSH:          No family history on file.   Additional SH: Additional FH:          Prior to Admission medications    Not on File    Not on File       PHYSICAL EXAMINATION  ED Triage Vitals [24 1250]   BP (!) 0/0   Heart Rate (!) 0   Resp (!) 0   Temp    SpO2 (!) 0 %       GEN: Patient is generally well-appearing, in no acute distress  HEAD: Normocephalic, atraumatic  EYES: Pupils equally round and reactive to light bilaterally, extraocular muscles intact, normal conjunctivae  ENT: Moist mucous membranes, no pharyngeal erythema or swelling, TMs appear normal  Neck: No tenderness, no JVD, trachea midline, no bruit  CV: Regular rate and rhythm, no murmurs rubs or gallops, no extra heart sounds, no edema  PULM: No increased work of breathing, lungs are clear to auscultation bilaterally, symmetric lung sounds  GI: Abdomen is soft, nontender, nondistended. Normoactive bowel sounds.  : No suprapubic tenderness, no CVA tenderness  MSK: No  clear bony abnormalities, normal strength, no swelling or deformity  SKIN: No obvious lesions, no ecchymoses  NEURO: Patient is alert and oriented, spontaneously moving all limbs with no focal neurologic abnormalities.  PSYCH: Patient converses appropriately, displays appropriate mood    Labs:   Results for orders placed or performed during the hospital encounter of 04/07/24   BLOOD GAS, ARTERIAL WITH COOXIMETRY - RESPIRATORY   Result Value Ref Range    PCO2, ARTERIAL - RESPIRATORY >120 (HH) 35 - 48 mm Hg    PH, ARTERIAL - RESPIRATORY <6.96 (LL) 7.35 - 7.45 Units    PO2, ARTERIAL - RESPIRATORY <20 (LL) 83 - 108 mm Hg    O2 SATURATION, ARTERIAL - RESPIRATORY 9 (L) 95 - 99 %    CONDITION - RESPIRATORY 15L AMBU BAG     CARBOXYHEMOGLOBIN - RESPIRATORY 0.4 <2.1 %    HEMOGLOBIN - RESPIRATORY 8.8 (L) 13.0 - 17.0 g/dL    METHEMOGLOBIN - RESPIRATORY 0.0 <=1.6 %    OXYHEMOGLOBIN, ARTERIAL - RESPIRATORY <20.0 (L) 94.0 - 98.0 %    SITE - RESPIRATORY Arterial Line     TEMPERATURE - RESPIRATORY 37.0 degrees C   POTASSIUM - RESPIRATORY   Result Value Ref Range    POTASSIUM - RESPIRATORY >8.5 (HH) 3.4 - 5.1 mmol/L   SODIUM - RESPIRATORY   Result Value Ref Range    SODIUM - RESPIRATORY 124 (L) 135 - 145 mmol/L   CALCIUM, IONIZED - RESPIRATORY   Result Value Ref Range    CALCIUM, IONIZED - RESPIRATORY 0.56 (L) 1.15 - 1.29 mmol/L   LACTIC ACID, ARTERIAL - RESPIRATORY   Result Value Ref Range    LACTIC ACID, ARTERIAL - RESPIRATORY 10.2 (HH) <1.6 mmol/L     Imaging:   No orders to display         MEDICAL DECISION MAKING  This is a 62 year old male ***.    Patient is well appearing on exam.  Exam demonstrates:  ***    Differential diagnosis includes:  ***       ED Medication Orders (From admission, onward)      None          Procedures    IMPRESSION AND PLAN  No diagnosis found.  Disposition: {MLDISPOSITION:[id number]}     Teaching-Supervisory Addendum-Brief    I participated in the following activities of this patients care: the medical  history, the physical exam, medical decision making, the procedure.    I personally performed: supervision of the patient's care, the medical history, the physical exam, the medical decision making.    The case was discussed with: the resident.    Procedures: I directly supervised the entire procedure.    Evaluation and management service: I agree with the evaluation and management decisions made in this patient's care.    Results interpretation: I agree with the study interpretation in this patient's care.       Notes:    Plan for *** d/w patient ***, recommended to ***. Pt comfortable, nontoxic, verbalized understanding, and agreed with plan. All of the questions were answered and patient appeared comfortable with evaluation and plan.     Diagnosis:  Shelly Martin MD    MED

## 2024-05-06 ENCOUNTER — INPATIENT (INPATIENT)
Facility: HOSPITAL | Age: 69
LOS: 6 days | Discharge: HOME | DRG: 291 | End: 2024-05-13
Attending: INTERNAL MEDICINE | Admitting: STUDENT IN AN ORGANIZED HEALTH CARE EDUCATION/TRAINING PROGRAM
Payer: MEDICARE

## 2024-05-06 VITALS
OXYGEN SATURATION: 98 % | TEMPERATURE: 97 F | WEIGHT: 199.96 LBS | SYSTOLIC BLOOD PRESSURE: 100 MMHG | DIASTOLIC BLOOD PRESSURE: 65 MMHG | HEIGHT: 70 IN | RESPIRATION RATE: 18 BRPM | HEART RATE: 81 BPM

## 2024-05-06 DIAGNOSIS — Z98.890 OTHER SPECIFIED POSTPROCEDURAL STATES: Chronic | ICD-10-CM

## 2024-05-06 DIAGNOSIS — I50.9 HEART FAILURE, UNSPECIFIED: ICD-10-CM

## 2024-05-06 DIAGNOSIS — Z98.1 ARTHRODESIS STATUS: Chronic | ICD-10-CM

## 2024-05-06 DIAGNOSIS — Z98.89 OTHER SPECIFIED POSTPROCEDURAL STATES: Chronic | ICD-10-CM

## 2024-05-06 LAB
ALBUMIN SERPL ELPH-MCNC: 4.3 G/DL — SIGNIFICANT CHANGE UP (ref 3.3–5)
ALP SERPL-CCNC: 69 U/L — SIGNIFICANT CHANGE UP (ref 40–120)
ALT FLD-CCNC: 22 U/L — SIGNIFICANT CHANGE UP (ref 10–45)
ANION GAP SERPL CALC-SCNC: 16 MMOL/L — SIGNIFICANT CHANGE UP (ref 5–17)
APTT BLD: 37 SEC — HIGH (ref 24.5–35.6)
AST SERPL-CCNC: 26 U/L — SIGNIFICANT CHANGE UP (ref 10–40)
BASOPHILS # BLD AUTO: 0.04 K/UL — SIGNIFICANT CHANGE UP (ref 0–0.2)
BASOPHILS NFR BLD AUTO: 0.9 % — SIGNIFICANT CHANGE UP (ref 0–2)
BILIRUB SERPL-MCNC: 1.3 MG/DL — HIGH (ref 0.2–1.2)
BUN SERPL-MCNC: 20 MG/DL — SIGNIFICANT CHANGE UP (ref 7–23)
CALCIUM SERPL-MCNC: 9.2 MG/DL — SIGNIFICANT CHANGE UP (ref 8.4–10.5)
CHLORIDE SERPL-SCNC: 107 MMOL/L — SIGNIFICANT CHANGE UP (ref 96–108)
CO2 SERPL-SCNC: 22 MMOL/L — SIGNIFICANT CHANGE UP (ref 22–31)
CREAT SERPL-MCNC: 1.81 MG/DL — HIGH (ref 0.5–1.3)
EGFR: 40 ML/MIN/1.73M2 — LOW
EOSINOPHIL # BLD AUTO: 0.08 K/UL — SIGNIFICANT CHANGE UP (ref 0–0.5)
EOSINOPHIL NFR BLD AUTO: 1.8 % — SIGNIFICANT CHANGE UP (ref 0–6)
GIANT PLATELETS BLD QL SMEAR: PRESENT — SIGNIFICANT CHANGE UP
GLUCOSE SERPL-MCNC: 88 MG/DL — SIGNIFICANT CHANGE UP (ref 70–99)
HCT VFR BLD CALC: 40.5 % — SIGNIFICANT CHANGE UP (ref 39–50)
HGB BLD-MCNC: 13.4 G/DL — SIGNIFICANT CHANGE UP (ref 13–17)
INR BLD: 1.44 RATIO — HIGH (ref 0.85–1.18)
LYMPHOCYTES # BLD AUTO: 0.88 K/UL — LOW (ref 1–3.3)
LYMPHOCYTES # BLD AUTO: 20.2 % — SIGNIFICANT CHANGE UP (ref 13–44)
MAGNESIUM SERPL-MCNC: 1.8 MG/DL — SIGNIFICANT CHANGE UP (ref 1.6–2.6)
MANUAL SMEAR VERIFICATION: SIGNIFICANT CHANGE UP
MCHC RBC-ENTMCNC: 29.7 PG — SIGNIFICANT CHANGE UP (ref 27–34)
MCHC RBC-ENTMCNC: 33.1 GM/DL — SIGNIFICANT CHANGE UP (ref 32–36)
MCV RBC AUTO: 89.8 FL — SIGNIFICANT CHANGE UP (ref 80–100)
MONOCYTES # BLD AUTO: 0.27 K/UL — SIGNIFICANT CHANGE UP (ref 0–0.9)
MONOCYTES NFR BLD AUTO: 6.1 % — SIGNIFICANT CHANGE UP (ref 2–14)
NEUTROPHILS # BLD AUTO: 3.11 K/UL — SIGNIFICANT CHANGE UP (ref 1.8–7.4)
NEUTROPHILS NFR BLD AUTO: 71 % — SIGNIFICANT CHANGE UP (ref 43–77)
NT-PROBNP SERPL-SCNC: 3487 PG/ML — HIGH (ref 0–300)
PHOSPHATE SERPL-MCNC: 3.4 MG/DL — SIGNIFICANT CHANGE UP (ref 2.5–4.5)
PLAT MORPH BLD: NORMAL — SIGNIFICANT CHANGE UP
PLATELET # BLD AUTO: 146 K/UL — LOW (ref 150–400)
POTASSIUM SERPL-MCNC: 4.4 MMOL/L — SIGNIFICANT CHANGE UP (ref 3.5–5.3)
POTASSIUM SERPL-SCNC: 4.4 MMOL/L — SIGNIFICANT CHANGE UP (ref 3.5–5.3)
PROT SERPL-MCNC: 7.2 G/DL — SIGNIFICANT CHANGE UP (ref 6–8.3)
PROTHROM AB SERPL-ACNC: 15 SEC — HIGH (ref 9.5–13)
RBC # BLD: 4.51 M/UL — SIGNIFICANT CHANGE UP (ref 4.2–5.8)
RBC # FLD: 16.8 % — HIGH (ref 10.3–14.5)
RBC BLD AUTO: SIGNIFICANT CHANGE UP
SODIUM SERPL-SCNC: 145 MMOL/L — SIGNIFICANT CHANGE UP (ref 135–145)
TROPONIN T, HIGH SENSITIVITY RESULT: 71 NG/L — HIGH (ref 0–51)
TROPONIN T, HIGH SENSITIVITY RESULT: 84 NG/L — HIGH (ref 0–51)
WBC # BLD: 4.38 K/UL — SIGNIFICANT CHANGE UP (ref 3.8–10.5)
WBC # FLD AUTO: 4.38 K/UL — SIGNIFICANT CHANGE UP (ref 3.8–10.5)

## 2024-05-06 PROCEDURE — 99285 EMERGENCY DEPT VISIT HI MDM: CPT

## 2024-05-06 PROCEDURE — 71045 X-RAY EXAM CHEST 1 VIEW: CPT | Mod: 26

## 2024-05-06 PROCEDURE — 93970 EXTREMITY STUDY: CPT | Mod: 26

## 2024-05-06 PROCEDURE — 99223 1ST HOSP IP/OBS HIGH 75: CPT

## 2024-05-06 RX ORDER — ASPIRIN/CALCIUM CARB/MAGNESIUM 324 MG
324 TABLET ORAL ONCE
Refills: 0 | Status: COMPLETED | OUTPATIENT
Start: 2024-05-06 | End: 2024-05-06

## 2024-05-06 RX ORDER — FUROSEMIDE 40 MG
40 TABLET ORAL ONCE
Refills: 0 | Status: COMPLETED | OUTPATIENT
Start: 2024-05-06 | End: 2024-05-06

## 2024-05-06 RX ADMIN — Medication 324 MILLIGRAM(S): at 20:53

## 2024-05-06 RX ADMIN — Medication 40 MILLIGRAM(S): at 20:52

## 2024-05-06 NOTE — ED PROVIDER NOTE - RAPID ASSESSMENT
69yo M with PMH of HTN, hypothyroidism, CVA, a.fib on Eliquis, CHF, amyloidosis on amvuttra, presenting SOB both when sitting up and at night when lying down, + BILLY, and occasional CP. Also reports b/l LE edema. All symptoms ongoing for "months" but worsening for last few weeks. Takes lasix. Reports he spoke with PCP today and advised to come in. Also reports he choked while drinking tea today, started coughing, and passed out. No injury from passing out, reports he was sitting down. Denies fever/chills, cough, recent illness.     AXEL Shea: Patient seen by myself in triage area for rapid assessment only. Full H&P and complete work-up to be performed in main emergency department by ED providers.

## 2024-05-06 NOTE — H&P ADULT - NSHPLABSRESULTS_GEN_ALL_CORE
I have personally reviewed the labs, imaging and ekg. EKG with NSR HR 81 QTc 485 non-specific ST segment findings, CXR w/o focal consolidations

## 2024-05-06 NOTE — H&P ADULT - PROBLEM SELECTOR PLAN 1
BNP somewhat elevated. 02/2024 TTE w/ EF 40%, findings compatible with amyloidosis. Unclear trigger for worsening symptoms. Also having episodes of chest pain, relatively normal C 2021.  -Lasix 40mg IV BID for now  -Entresto BID  -Daily weight and I/Os  -Cardiology consult in AM  -Trend BMP, Mg  -Telemetry

## 2024-05-06 NOTE — ED PROVIDER NOTE - ATTENDING CONTRIBUTION TO CARE
68M w/ hx of HTN, hypothyroidism, CVA afib on eliquis, chf amyloidodis on amvuttra here w/ SOB and CP w/ R>L lower leg edema, pt states that he is noncompliant w/ home meds today but usually is compliant, pt w/ no fevers, no chills, no nausea no vomiting.   I have reviewed the triage vital signs. Const: no acute distress, Well-developed, Eyes: no conjunctival injection and no scleral icterus ENMT: Moist mucus membranes, CVS: +S1/S2, radial pulse 2+ bilaterally  RESP: diminished breath sounds b/l GI: Nontender/Nondistended soft abdomen, no CVA tenderness MSK: R>L lower leg edema R leg swellingPsych: Awake, Alert, & Orientedx3;  Appropriate mood and affect, cooperative  Plan for labs imaging and reassessment dispo pending reuslts. pt appears volume overloaded  vs dvt

## 2024-05-06 NOTE — H&P ADULT - TIME BILLING
Need to interview and examine patient, provide counseling, coordinate care, place orders, document, personally review imaging, ekg and review prior medical records

## 2024-05-06 NOTE — H&P ADULT - ASSESSMENT
68M w/ hxof L carotid stent, HFrEF EF 40% and amyloidosis, gout, HTN, hypothyroid, CVA, afib on Eliquis, BPH p/w SOB and edema concerning for acute on chronic HFrEF exacerbation

## 2024-05-06 NOTE — ED PROVIDER NOTE - PHYSICAL EXAMINATION
Gen: NAD, non-toxic appearing  Head: normal appearing  HEENT: normal conjunctiva, oral mucosa dry   Lung: no respiratory distress, speaking in full sentences, CTA b/l     CV: regular rate and rhythm, no murmurs, +3 pitting edema on R>L, no calf tenderness   Abd: soft, non distended, non tender   MSK: no visible deformities  Neuro: No focal deficits, AAOx3  Skin: Warm  Psych: normal affect

## 2024-05-06 NOTE — ED PROVIDER NOTE - OBJECTIVE STATEMENT
68-year-old male with history of left carotid stent, pituitary mass, gout, hypertension, hypothyroidism, CVA, atrial fibrillation on Eliquis, CHF on Lasix presents to ED for SOB on exertion.  Endorses associated symptoms of orthopnea and occasional chest pain.  Reports increased lower extremity edema is worsened over the last week few weeks.  Reports increasing Lasix to 60 mg twice daily with per PCP suggestion.  Advised to come into ED by PCP for further evaluation and symptoms.  Denies fever, chills, nausea, vomiting, chest pain, cough, nasal congestion, urinary symptoms.  No recent travel or sick contacts.

## 2024-05-06 NOTE — ED PROVIDER NOTE - CLINICAL SUMMARY MEDICAL DECISION MAKING FREE TEXT BOX
Afebrile hemodynamically stable presenting for SOB on exertion with associated orthopnea and lower extremity edema.  On physical exam +3 pitting edema greater on right than left but otherwise clear breath sounds bilaterally.  Ordered basic labs, Trope, proBNP, CXR, gait duplex to rule out PNA versus ACS versus CHF exacerbation.  EKG unchanged from prior with no ischemic changes.  Given aspirin and 40 mg Lasix IV for symptomatic relief.  Will reassess.

## 2024-05-06 NOTE — H&P ADULT - PROBLEM SELECTOR PLAN 2
Diagnosed with amyloidosis. Getting Amvuttra injections every 90 days last injection April 24th. States he paid Copay for Vyndamax and is waiting for cardiologist to tell him when received by office.  -Cardiology consult in AM

## 2024-05-06 NOTE — H&P ADULT - NSHPPHYSICALEXAM_GEN_ALL_CORE
Vital Signs Last 24 Hrs  T(C): 36.5 (05-06-24 @ 20:15), Max: 36.5 (05-06-24 @ 20:15)  T(F): 97.7 (05-06-24 @ 20:15), Max: 97.7 (05-06-24 @ 20:15)  HR: 82 (05-07-24 @ 00:20) (81 - 92)  BP: 124/91 (05-07-24 @ 00:20) (100/65 - 135/102)  BP(mean): 101 (05-07-24 @ 00:20) (101 - 112)  RR: 18 (05-07-24 @ 00:20) (18 - 18)  SpO2: 100% (05-07-24 @ 00:20) (98% - 100%)

## 2024-05-06 NOTE — H&P ADULT - NSICDXPASTMEDICALHX_GEN_ALL_CORE_FT
PAST MEDICAL HISTORY:  Arthritis on knees    Carotid occlusion, bilateral     Gout     History of pituitary disease on caber    Hypertension     Hypothyroid not taking any meds    Laryngeal cancer "stage 0"    Low back pain Herniated disc on lumbar region    Obese     Pituitary mass     Redundant prepuce and phimosis     S/P excision of vocal cord nodule 2007 and radiation -    Thyroid cancer pt denies any thyroid cancer     5

## 2024-05-06 NOTE — ED ADULT NURSE NOTE - ED CARDIAC RHYTHM
irregular Complex Repair And Rotation Flap Text: The defect edges were debeveled with a #15 scalpel blade. The primary defect was closed partially with a complex linear closure. Given the location of the remaining defect, shape of the defect and the proximity to free margins a rotation flap was deemed most appropriate for complete closure of the defect. Using a sterile surgical marker, an appropriate advancement flap was drawn incorporating the defect and placing the expected incisions within the relaxed skin tension lines where possible. The area thus outlined was incised deep to adipose tissue with a #15 scalpel blade. The skin margins were undermined to an appropriate distance in all directions utilizing iris scissors.

## 2024-05-06 NOTE — H&P ADULT - NSHPPOAPULMEMBOLUS_GEN_A_CORE
Render Post-Care Instructions In Note?: no Show Applicator Variable?: Yes Consent: The patient's consent was obtained including but not limited to risks of crusting, scabbing, blistering, scarring, darker or lighter pigmentary change, recurrence, incomplete removal and infection. Duration Of Freeze Thaw-Cycle (Seconds): 0 Post-Care Instructions: I reviewed with the patient in detail post-care instructions. Patient is to wear sunprotection, and avoid picking at any of the treated lesions. Pt may apply Vaseline to crusted or scabbing areas. Detail Level: Detailed Number Of Freeze-Thaw Cycles: 2 freeze-thaw cycles suspected

## 2024-05-06 NOTE — ED ADULT NURSE NOTE - OBJECTIVE STATEMENT
67 y/o M, AXOX4, with a PMH of HTN, CVA, CHF,  Afib on eliquis, presents to the ED reporting dyspnea on exertion and  B/L pedal edema. Pt was sent to the ED by his MD. Pt endorses intermittent chest pain. Pain is described as non radiating pressure. Pt denies N/V, abd pain, headache, respiratory symptoms or urinary symptoms. Pt found in gown on stretcher, breathing unlabored on room air, speaking in complete sentences, strong and equal strength in all extremities, sensations intact, abd soft non tender non distended, pedal edema noted. Pt maintained on tele in afib 92 HR and continuous pulse oximetry 98% on room air. Safety and comfort measures maintained. 67 y/o M, AXOX4, with a PMH of HTN, CVA, CHF,  Afib on eliquis, presents to the ED reporting dyspnea on exertion and  B/L pedal edema x 1 week. Pt was sent to the ED by his MD. Pt endorses intermittent chest pain. Pain is described as non radiating pressure. Pt denies N/V, abd pain, headache, respiratory symptoms or urinary symptoms. Pt found in gown on stretcher, breathing unlabored on room air, speaking in complete sentences, strong and equal strength in all extremities, sensations intact, abd soft non tender non distended, pedal edema noted. Pt maintained on tele in afib 92 HR and pulse oximetry 98% on room air. Safety and comfort measures maintained.

## 2024-05-06 NOTE — H&P ADULT - HISTORY OF PRESENT ILLNESS
68M w/ hxof L carotid stent, HFrEF EF 40% and amyloidosis, gout, HTN, hypothyroid, CVA, afib on Eliquis, BPH p/w SOB and edema. Pt has been noticing worsening SOB, BILLY, orthopnea and LE edema for past 5-7 days. Also noticed some increased abd distension as well. Increased lasix dose from 40mg BID to 60mg BID for past several days for symptoms without significant effect. Did notice some increased urinary output. Endorses intermittent midsternal chest pain without clear triggering or improving factors. Started before this week but has been noted this week. Endorses sometimes cheating on low salt diet but relatively rare and not more than usual recently. Denies fevers, chills or palpitations. Has chronic cough but unchanged.    In ER; Given Lasix 40mg IV, Asa 324mg
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
## 2024-05-07 DIAGNOSIS — I63.9 CEREBRAL INFARCTION, UNSPECIFIED: ICD-10-CM

## 2024-05-07 DIAGNOSIS — M10.9 GOUT, UNSPECIFIED: ICD-10-CM

## 2024-05-07 DIAGNOSIS — Z29.9 ENCOUNTER FOR PROPHYLACTIC MEASURES, UNSPECIFIED: ICD-10-CM

## 2024-05-07 DIAGNOSIS — E85.9 AMYLOIDOSIS, UNSPECIFIED: ICD-10-CM

## 2024-05-07 DIAGNOSIS — R09.89 OTHER SPECIFIED SYMPTOMS AND SIGNS INVOLVING THE CIRCULATORY AND RESPIRATORY SYSTEMS: ICD-10-CM

## 2024-05-07 DIAGNOSIS — E03.9 HYPOTHYROIDISM, UNSPECIFIED: ICD-10-CM

## 2024-05-07 DIAGNOSIS — I48.0 PAROXYSMAL ATRIAL FIBRILLATION: ICD-10-CM

## 2024-05-07 DIAGNOSIS — N40.0 BENIGN PROSTATIC HYPERPLASIA WITHOUT LOWER URINARY TRACT SYMPTOMS: ICD-10-CM

## 2024-05-07 DIAGNOSIS — I50.23 ACUTE ON CHRONIC SYSTOLIC (CONGESTIVE) HEART FAILURE: ICD-10-CM

## 2024-05-07 LAB
ANION GAP SERPL CALC-SCNC: 15 MMOL/L — SIGNIFICANT CHANGE UP (ref 5–17)
BUN SERPL-MCNC: 21 MG/DL — SIGNIFICANT CHANGE UP (ref 7–23)
CALCIUM SERPL-MCNC: 8.7 MG/DL — SIGNIFICANT CHANGE UP (ref 8.4–10.5)
CHLORIDE SERPL-SCNC: 108 MMOL/L — SIGNIFICANT CHANGE UP (ref 96–108)
CO2 SERPL-SCNC: 22 MMOL/L — SIGNIFICANT CHANGE UP (ref 22–31)
CREAT SERPL-MCNC: 1.86 MG/DL — HIGH (ref 0.5–1.3)
EGFR: 38 ML/MIN/1.73M2 — LOW
GLUCOSE SERPL-MCNC: 85 MG/DL — SIGNIFICANT CHANGE UP (ref 70–99)
MAGNESIUM SERPL-MCNC: 1.6 MG/DL — SIGNIFICANT CHANGE UP (ref 1.6–2.6)
POTASSIUM SERPL-MCNC: 4.1 MMOL/L — SIGNIFICANT CHANGE UP (ref 3.5–5.3)
POTASSIUM SERPL-SCNC: 4.1 MMOL/L — SIGNIFICANT CHANGE UP (ref 3.5–5.3)
SODIUM SERPL-SCNC: 145 MMOL/L — SIGNIFICANT CHANGE UP (ref 135–145)
TSH SERPL-MCNC: 10.3 UIU/ML — HIGH (ref 0.27–4.2)

## 2024-05-07 PROCEDURE — 99232 SBSQ HOSP IP/OBS MODERATE 35: CPT

## 2024-05-07 PROCEDURE — 99223 1ST HOSP IP/OBS HIGH 75: CPT

## 2024-05-07 RX ORDER — LANOLIN ALCOHOL/MO/W.PET/CERES
3 CREAM (GRAM) TOPICAL AT BEDTIME
Refills: 0 | Status: DISCONTINUED | OUTPATIENT
Start: 2024-05-07 | End: 2024-05-13

## 2024-05-07 RX ORDER — SACUBITRIL AND VALSARTAN 24; 26 MG/1; MG/1
1 TABLET, FILM COATED ORAL
Refills: 0 | DISCHARGE

## 2024-05-07 RX ORDER — SACUBITRIL AND VALSARTAN 24; 26 MG/1; MG/1
1 TABLET, FILM COATED ORAL
Refills: 0 | Status: DISCONTINUED | OUTPATIENT
Start: 2024-05-07 | End: 2024-05-13

## 2024-05-07 RX ORDER — LEVOTHYROXINE SODIUM 125 MCG
175 TABLET ORAL DAILY
Refills: 0 | Status: DISCONTINUED | OUTPATIENT
Start: 2024-05-07 | End: 2024-05-13

## 2024-05-07 RX ORDER — FUROSEMIDE 40 MG
40 TABLET ORAL
Refills: 0 | Status: DISCONTINUED | OUTPATIENT
Start: 2024-05-07 | End: 2024-05-08

## 2024-05-07 RX ORDER — COLCHICINE 0.6 MG
0.6 TABLET ORAL DAILY
Refills: 0 | Status: DISCONTINUED | OUTPATIENT
Start: 2024-05-07 | End: 2024-05-13

## 2024-05-07 RX ORDER — ATORVASTATIN CALCIUM 80 MG/1
1 TABLET, FILM COATED ORAL
Refills: 0 | DISCHARGE

## 2024-05-07 RX ORDER — TAMSULOSIN HYDROCHLORIDE 0.4 MG/1
0.4 CAPSULE ORAL AT BEDTIME
Refills: 0 | Status: DISCONTINUED | OUTPATIENT
Start: 2024-05-07 | End: 2024-05-13

## 2024-05-07 RX ORDER — FINASTERIDE 5 MG/1
5 TABLET, FILM COATED ORAL DAILY
Refills: 0 | Status: DISCONTINUED | OUTPATIENT
Start: 2024-05-07 | End: 2024-05-13

## 2024-05-07 RX ORDER — MAGNESIUM SULFATE 500 MG/ML
1 VIAL (ML) INJECTION ONCE
Refills: 0 | Status: COMPLETED | OUTPATIENT
Start: 2024-05-07 | End: 2024-05-07

## 2024-05-07 RX ORDER — ATORVASTATIN CALCIUM 80 MG/1
40 TABLET, FILM COATED ORAL AT BEDTIME
Refills: 0 | Status: DISCONTINUED | OUTPATIENT
Start: 2024-05-07 | End: 2024-05-13

## 2024-05-07 RX ORDER — COLCHICINE 0.6 MG
1 TABLET ORAL
Refills: 0 | DISCHARGE

## 2024-05-07 RX ORDER — LEVOTHYROXINE SODIUM 125 MCG
1 TABLET ORAL
Refills: 0 | DISCHARGE

## 2024-05-07 RX ORDER — TAMSULOSIN HYDROCHLORIDE 0.4 MG/1
1 CAPSULE ORAL
Refills: 0 | DISCHARGE

## 2024-05-07 RX ORDER — FINASTERIDE 5 MG/1
1 TABLET, FILM COATED ORAL
Refills: 0 | DISCHARGE

## 2024-05-07 RX ORDER — VUTRISIRAN 25 MG/.5ML
25 INJECTION SUBCUTANEOUS
Refills: 0 | DISCHARGE

## 2024-05-07 RX ORDER — ALBUTEROL 90 UG/1
1 AEROSOL, METERED ORAL
Refills: 0 | DISCHARGE

## 2024-05-07 RX ORDER — APIXABAN 2.5 MG/1
5 TABLET, FILM COATED ORAL EVERY 12 HOURS
Refills: 0 | Status: DISCONTINUED | OUTPATIENT
Start: 2024-05-07 | End: 2024-05-13

## 2024-05-07 RX ADMIN — Medication 40 MILLIGRAM(S): at 13:25

## 2024-05-07 RX ADMIN — FINASTERIDE 5 MILLIGRAM(S): 5 TABLET, FILM COATED ORAL at 11:59

## 2024-05-07 RX ADMIN — SACUBITRIL AND VALSARTAN 1 TABLET(S): 24; 26 TABLET, FILM COATED ORAL at 17:19

## 2024-05-07 RX ADMIN — Medication 100 GRAM(S): at 09:29

## 2024-05-07 RX ADMIN — Medication 40 MILLIGRAM(S): at 06:05

## 2024-05-07 RX ADMIN — ATORVASTATIN CALCIUM 40 MILLIGRAM(S): 80 TABLET, FILM COATED ORAL at 21:38

## 2024-05-07 RX ADMIN — SACUBITRIL AND VALSARTAN 1 TABLET(S): 24; 26 TABLET, FILM COATED ORAL at 06:04

## 2024-05-07 RX ADMIN — APIXABAN 5 MILLIGRAM(S): 2.5 TABLET, FILM COATED ORAL at 06:05

## 2024-05-07 RX ADMIN — Medication 175 MICROGRAM(S): at 06:05

## 2024-05-07 RX ADMIN — Medication 0.6 MILLIGRAM(S): at 11:58

## 2024-05-07 RX ADMIN — APIXABAN 5 MILLIGRAM(S): 2.5 TABLET, FILM COATED ORAL at 17:07

## 2024-05-07 RX ADMIN — TAMSULOSIN HYDROCHLORIDE 0.4 MILLIGRAM(S): 0.4 CAPSULE ORAL at 21:38

## 2024-05-07 NOTE — CONSULT NOTE ADULT - ASSESSMENT
EKG: Personally Reviewed    Echo: Personally Reviewed    Stress Test: Personally Reviewed    Cath Report: Personally Reviewed    Imaging: Personally Reviewed    Interpretation of Telemetry: normal sinus rhythm     Assessment and Plan:     Acute on chronic heart failure exacerbation in the setting of nonadherence with fluid restriction   Lasix 40 mg IV BID  -- BID Lytes  -- goal K > 4, Mg > 2  -- STRICT I/Os  -- goal neg 1-2L  Telemetry monitoring   Resume entresto, plavix, and statin  Resume Dapagliflozin on discharge   No need to repeat echocardiogram given recent imaging 2/5/24    Cardiac Amyloidosis  Following with cardiology outpatient and taking Amruvatta     Paroxysmal atrial fibrillation (currently sinus rhythm)   Resume Eliquis       All recommendations pending attending attestation. We will continue to follow with you.     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, HFrEF (40%), gout, HTN, hypothyroid, CVA, afib on Eliquis, BPH presenting with shortness of breath and lower extremity edema x 5 days  Acute on chronic heart failure exacerbation in the setting of nonadherence with fluid restriction   Lasix 40 mg IV BID  -- BID Lytes  -- goal K > 4, Mg > 2  -- STRICT I/Os  -- goal neg 1-2L  Telemetry monitoring   Resume entresto, plavix, and statin  Resume Dapagliflozin on discharge   No need to repeat echocardiogram given recent imaging 2/5/24    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 We will continue to follow with you.     Erica Velásquez MD  PGY-4, Cardiology  Available on TEAMS    For all new consults  [email or web address]  Login: [email or web address]    Patient seen and examined with the fellow, the note above has been edited to reflect my independent history, physical exam, assessment and plan.      Felipe Velazco MD, PhD  Cardiology Attending  [facility]/ [facility]    For day time coverage Mon-Fri see Non-Service Consult Attending on [email or web address], password: [email or web address]; daytime weekends covered by general cardiology consult service attending.)

## 2024-05-07 NOTE — PROGRESS NOTE ADULT - SUBJECTIVE AND OBJECTIVE BOX
[facility] Division of Hospital Medicine  Minoo Durand MD  Available via MS Teams    SUBJECTIVE / OVERNIGHT EVENTS:  no acute events overnight   pt feeling slightly better, states he is having less BILLY, but still gets slightly winded; feels abdomen is less bloated/distended   still with LE edema; denies any CP     ADDITIONAL REVIEW OF SYSTEMS:  14 point ROS negative except as noted above     MEDICATIONS  (STANDING):  apixaban 5 milliGRAM(s) Oral every 12 hours  atorvastatin 40 milliGRAM(s) Oral at bedtime  colchicine 0.6 milliGRAM(s) Oral daily  finasteride 5 milliGRAM(s) Oral daily  furosemide   Injectable 40 milliGRAM(s) IV Push two times a day  levothyroxine 175 MICROGram(s) Oral daily  sacubitril 24 mG/valsartan 26 mG 1 Tablet(s) Oral two times a day  tamsulosin 0.4 milliGRAM(s) Oral at bedtime    MEDICATIONS  (PRN):  melatonin 3 milliGRAM(s) Oral at bedtime PRN Insomnia      I&O's Summary      PHYSICAL EXAM:  Vital Signs Last 24 Hrs  T(C): 36.3 (07 May 2024 02:00), Max: 36.5 (06 May 2024 20:15)  T(F): 97.4 (07 May 2024 02:00), Max: 97.7 (06 May 2024 20:15)  HR: 82 (07 May 2024 05:00) (81 - 92)  BP: 105/72 (07 May 2024 05:00) (100/65 - 135/102)  BP(mean): 101 (07 May 2024 00:20) (101 - 112)  RR: 18 (07 May 2024 02:00) (18 - 18)  SpO2: 98% (07 May 2024 02:00) (98% - 100%)    Parameters below as of 07 May 2024 02:00  Patient On (Oxygen Delivery Method): room air      PHYSICAL EXAM:  GENERAL: NAD, well-developed  HEAD:  Atraumatic, normocephalic  EYES: EOMI, conjunctiva and sclera clear  NECK: Supple, no JVD  CHEST/LUNG: Clear to auscultation bilaterally; no wheezing or rales  HEART: Regular rate and rhythm; no murmurs, + R>>L LE edema (2+ in RLE to thighs, 1+ in LLE)  ABDOMEN: Soft, nontender, nondistended; bowel sounds present  EXTREMITIES:  2+ Peripheral Pulses, no clubbing, cyanosis  PSYCH: AAOx3, calm affect, not anxious  SKIN: No rashes or lesions      LABS:                        13.4   4.38  )-----------( 146      ( 06 May 2024 18:19 )             40.5     05-07    145  |  108  |  21  ----------------------------<  85  4.1   |  22  |  1.86<H>    Ca    8.7      07 May 2024 07:20  Phos  3.4     05-06  Mg     1.6     05-07    TPro  7.2  /  Alb  4.3  /  TBili  1.3<H>  /  DBili  x   /  AST  26  /  ALT  22  /  AlkPhos  69  05-06    PT/INR - ( 06 May 2024 18:19 )   PT: 15.0 sec;   INR: 1.44 ratio         PTT - ( 06 May 2024 18:19 )  PTT:37.0 sec      Urinalysis Basic - ( 07 May 2024 07:20 )    Color: x / Appearance: x / SG: x / pH: x  Gluc: 85 mg/dL / Ketone: x  / Bili: x / Urobili: x   Blood: x / Protein: x / Nitrite: x   Leuk Esterase: x / RBC: x / WBC x   Sq Epi: x / Non Sq Epi: x / Bacteria: x            RADIOLOGY & ADDITIONAL TESTS:  New Imaging Personally Reviewed Today:  New Electrocardiogram Personally Reviewed Today:  Other Results Reviewed Today:   Prior or Outpatient Records Reviewed Today with Summary:    COORDINATION OF CARE:  Consultant Communication and Details of Discussion (where applicable):

## 2024-05-07 NOTE — PATIENT PROFILE ADULT - HOME ACCESSIBILITY CONCERNS
42M pmhx dm, htn, hld, anxiety, former marijuana use, who presents today for left localized anterior chest tightness with associated palpitations/ heart racing sensation - lasting approx 2-3 minutes while he was seated in a room where others were smoking. Patient reports similar prior episodes that relieve with listening to calming sounds and that he has attributed to anxiety because symptoms will resolve when he relaxes. He denies exertional symptoms such as exertional chest pain or sob. He denies family hx of early CAD - mother does have cardiac stent but he is not sure if it was early. He states father passed from covid. Patient denies associated nausea, vomiting, abd pain, dizziness, back pain or SOB. He states he has not had any chest pain or active symptoms while in ED. Last cardiology follow up approx 8 yrs ago
none

## 2024-05-07 NOTE — PATIENT PROFILE ADULT - OVER THE PAST TWO WEEKS HAVE YOU FELT DOWN, DEPRESSED OR HOPELESS?
Vaccine Information Statement(s) was given today. This has been reviewed, questions answered, and verbal consent given by Patient for injection(s) and administration of Influenza (Inactivated).    1. Does the patient have a moderate to severe fever?  No  2. Has the patient had a serious reaction to a flu shot before?   No  3. Has the patient ever had Guillian Finley Syndrome within 6 weeks of a previous flu shot?  No  4. Is the patient less that 6 months of age?  No    Patient is eligible to receive the vaccine based on all questions being answered as 'No'.    Patient tolerated without incident. See immunization grid for documentation.         no

## 2024-05-07 NOTE — CONSULT NOTE ADULT - SUBJECTIVE AND OBJECTIVE BOX
CARDIOLOGY FELLOW CONSULT NOTE    HPI:  68M w/ hxof L carotid stent, HFrEF EF 40% and amyloidosis, gout, HTN, hypothyroid, CVA, afib on Eliquis, BPH p/w SOB and edema. Pt has been noticing worsening SOB, BILLY, orthopnea and LE edema for past 5-7 days. Also noticed some increased abd distension as well. Increased lasix dose from 40mg BID to 60mg BID for past several days for symptoms without significant effect. Did notice some increased urinary output. Endorses intermittent midsternal chest pain without clear triggering or improving factors. Started before this week but has been noted this week. Endorses sometimes cheating on low salt diet but relatively rare and not more than usual recently. Denies fevers, chills or palpitations. Has chronic cough but unchanged.    In ER; Given Lasix 40mg IV, Asa 324mg (06 May 2024 23:57)        PMHx:   Low back pain    Redundant prepuce and phimosis    Arthritis    Hypothyroid    Hypertension    Thyroid cancer    Throat cancer    History of pituitary disease    Gout    S/P excision of vocal cord nodule    Laryngeal cancer    Pituitary mass    Carotid occlusion, bilateral    Obese        PSHx:   S/P excision of vocal cord nodule    H/O shoulder surgery    H/O thyroidectomy    History of back surgery    History of lumbar spinal fusion    H/O arthroscopy of shoulder    Status post carotid surgery        Allergies:  No Known Allergies      Home Meds:    Current Medications:   apixaban 5 milliGRAM(s) Oral every 12 hours  atorvastatin 40 milliGRAM(s) Oral at bedtime  colchicine 0.6 milliGRAM(s) Oral daily  finasteride 5 milliGRAM(s) Oral daily  furosemide   Injectable 40 milliGRAM(s) IV Push two times a day  levothyroxine 175 MICROGram(s) Oral daily  melatonin 3 milliGRAM(s) Oral at bedtime PRN  sacubitril 24 mG/valsartan 26 mG 1 Tablet(s) Oral two times a day  tamsulosin 0.4 milliGRAM(s) Oral at bedtime      FAMILY HISTORY:  Family history of stroke (Mother)    Family history of Alzheimer's disease (Father)    FH: stroke (Mother, Father)        Social History:  Smoking History:  Alcohol Use:  Drug Use:    REVIEW OF SYSTEMS:  CONSTITUTIONAL: No weakness, fevers or chills  EYES/ENT: No visual changes;  No dysphagia  NECK: No pain or stiffness  RESPIRATORY: No cough, wheezing, hemoptysis; No shortness of breath  CARDIOVASCULAR: No chest pain or palpitations; No lower extremity edema  GASTROINTESTINAL: No abdominal or epigastric pain. No nausea, vomiting, or hematemesis; No diarrhea or constipation. No melena or hematochezia.  BACK: No back pain  GENITOURINARY: No dysuria, frequency or hematuria  NEUROLOGICAL: No numbness or weakness  SKIN: No itching, burning, rashes, or lesions   All other review of systems is negative unless indicated above.    Physical Exam:  T(F): 97.4 (05-07), Max: 97.7 (05-06)  HR: 82 (05-07) (81 - 92)  BP: 105/72 (05-07) (100/65 - 135/102)  RR: 18 (05-07)  SpO2: 98% (05-07)  GEN: comfortable appearing, lying in bed in NAD  HEENT: NCAT, MMM  CV: Regular S1, S2, no m/r/g  RESP: CTAB  ABD: Soft, NTND, +BS  EXT: No LE edema, WWP, pulses palpable throughout  NEURO: No focal deficits, AOx3  SKIN:  No rashes    Labs: Personally reviewed                        13.4   4.38  )-----------( 146      ( 06 May 2024 18:19 )             40.5     05-07    145  |  108  |  21  ----------------------------<  85  4.1   |  22  |  1.86<H>    Ca    8.7      07 May 2024 07:20  Phos  3.4     05-06  Mg     1.6     05-07    TPro  7.2  /  Alb  4.3  /  TBili  1.3<H>  /  DBili  x   /  AST  26  /  ALT  22  /  AlkPhos  69  05-06    PT/INR - ( 06 May 2024 18:19 )   PT: 15.0 sec;   INR: 1.44 ratio         PTT - ( 06 May 2024 18:19 )  PTT:37.0 sec    CARDIAC MARKERS ( 06 May 2024 20:33 )  71 ng/L / x     / x     / x     / x     / x      CARDIAC MARKERS ( 06 May 2024 18:19 )  84 ng/L / x     / x     / x     / x     / x                      EKG: Personally Reviewed    Echo: Personally Reviewed    Stress Test: Personally Reviewed    Cath Report: Personally Reviewed    Imaging: Personally Reviewed    Interpretation of Telemetry     HPI:  68M PMH L carotid stent, 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 He increased his home lasix dose from 40 mg BID to 60 mg BID with no resolution of symptoms. Denied chest pain, nausea, vomiting, abdominal pain, palpitations. States he has occasional syncopal episodes after coughing/choking while drinking. This occurs infrequently but is due to his laryngeal cancer treatments (radiation therapy and vocal cord excision in 2007). He is nonadherent with fluid restriction. He drinks a few pints of water daily and has been drinking around 64 oz of tea while doing a cleanse with his wife. Avoids eating salt and is adherent with his medications.       PMHx:  Low back pain    Redundant prepuce and phimosis    Arthritis    Hypothyroid    Hypertension    Thyroid cancer    Throat cancer    History of pituitary disease    Gout    S/P excision of vocal cord nodule    Laryngeal cancer    Pituitary mass    Carotid occlusion, bilateral    Obese        PSHx:  S/P excision of vocal cord nodule    H/O shoulder surgery    H/O thyroidectomy    History of back surgery    History of lumbar spinal fusion    H/O arthroscopy of shoulder    Status post carotid surgery        Allergies:  No Known Allergies      Current Medications:  apixaban 5 milliGRAM(s) Oral every 12 hours  atorvastatin 40 milliGRAM(s) Oral at bedtime  colchicine 0.6 milliGRAM(s) Oral daily  finasteride 5 milliGRAM(s) Oral daily  furosemide   Injectable 40 milliGRAM(s) IV Push two times a day  levothyroxine 175 MICROGram(s) Oral daily  melatonin 3 milliGRAM(s) Oral at bedtime PRN  sacubitril 24 mG/valsartan 26 mG 1 Tablet(s) Oral two times a day  tamsulosin 0.4 milliGRAM(s) Oral at bedtime      FAMILY HISTORY:  Family history of stroke (Mother)    Family history of Alzheimer's disease (Father)    FH: stroke (Mother, Father)    Social History:  Smoking History: none  Alcohol Use: none   Drug Use: none     REVIEW OF SYSTEMS:  CONSTITUTIONAL: No weakness, fevers or chills  EYES/ENT: No visual changes;  No dysphagia  NECK: No pain or stiffness  RESPIRATORY: No cough, wheezing, hemoptysis; No shortness of breath  CARDIOVASCULAR: No chest pain or palpitations  GASTROINTESTINAL: No abdominal or epigastric pain. No nausea, vomiting, or hematemesis; No diarrhea or constipation. No melena or hematochezia.  BACK: No back pain  GENITOURINARY: No dysuria or hematuria  NEUROLOGICAL: No numbness or weakness  SKIN: No itching, burning, rashes, or lesions  All other review of systems is negative unless indicated above.    Physical Exam:  T(F): 97.4 (05-07), Max: 97.7 (05-06)  HR: 82 (05-07) (81 - 92)  BP: 105/72 (05-07) (100/65 - 135/102)  RR: 18 (05-07)  SpO2: 98% (05-07)  GEN: comfortable appearing, sitting in bed in NAD  HEENT: NCAT, MMM  CV: Regular S1, S2, no m/r/g  RESP: CTAB  ABD: Soft, NTND, +BS  EXT: 1-2+ LE edema bilaterally below the knee   NEURO: No focal deficits, AOx3  SKIN:  No rashes    Labs: Personally reviewed                        13.4  4.38  )-----------( 146      ( 06 May 2024 18:19 )             40.5    05-07    145  |  108  |  21  ----------------------------<  85  4.1   |  22  |  1.86<H>    Ca    8.7      07 May 2024 07:20  Phos  3.4     05-06  Mg     1.6     05-07    TPro  7.2  /  Alb  4.3  /  TBili  1.3<H>  /  DBili  x   /  AST  26  /  ALT  22  /  AlkPhos  69  05-06    PT/INR - ( 06 May 2024 18:19 )   PT: 15.0 sec;   INR: 1.44 ratio         PTT - ( 06 May 2024 18:19 )  PTT:37.0 sec    CARDIAC MARKERS ( 06 May 2024 20:33 )  71 ng/L / x     / x     / x     / x     / x      CARDIAC MARKERS ( 06 May 2024 18:19 )  84 ng/L / x     / x     / x     / x     / x

## 2024-05-07 NOTE — PATIENT PROFILE ADULT - FALL HARM RISK - UNIVERSAL INTERVENTIONS
Bed in lowest position, wheels locked, appropriate side rails in place/Call bell, personal items and telephone in reach/Instruct patient to call for assistance before getting out of bed or chair/Non-slip footwear when patient is out of bed/[place] to call system/Physically safe environment - no spills, clutter or unnecessary equipment/Purposeful Proactive Rounding/Room/bathroom lighting operational, light cord in reach

## 2024-05-07 NOTE — ASSESSMENT & PLAN NOTE
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 with distal reconstitution at V3 segment through distal collateral vessels in left upper neck, 0 7 cm penetrating atheromatous ulcer in proximal brachiocephalic artery  Severe short-segment stenosis > 75% narrowing in right common carotid artery with moderate Left ICA (60% stenosis), right ICA proximal cervical (50% stenosis)   -Initiate stroke protocol  -patient not a tPA candidate, out of treatment time frame  -cardiac monitoring  -check lipid profile, A1c, TSH  -start aspirin and atorvastatin  -check MRIB, echo  -OT/PT  -counseled on risk factor modification with diet, exercise, weight loss  -neurology and vascular consult PSR will reach out to the patient to schedule a pre op with one of our providers as PCP does not have any openings.

## 2024-05-08 LAB
ANION GAP SERPL CALC-SCNC: 12 MMOL/L — SIGNIFICANT CHANGE UP (ref 5–17)
ANION GAP SERPL CALC-SCNC: 14 MMOL/L — SIGNIFICANT CHANGE UP (ref 5–17)
ANION GAP SERPL CALC-SCNC: 15 MMOL/L — SIGNIFICANT CHANGE UP (ref 5–17)
BASOPHILS # BLD AUTO: 0.01 K/UL — SIGNIFICANT CHANGE UP (ref 0–0.2)
BASOPHILS NFR BLD AUTO: 0.3 % — SIGNIFICANT CHANGE UP (ref 0–2)
BUN SERPL-MCNC: 23 MG/DL — SIGNIFICANT CHANGE UP (ref 7–23)
CALCIUM SERPL-MCNC: 9.3 MG/DL — SIGNIFICANT CHANGE UP (ref 8.4–10.5)
CALCIUM SERPL-MCNC: 9.4 MG/DL — SIGNIFICANT CHANGE UP (ref 8.4–10.5)
CALCIUM SERPL-MCNC: 9.5 MG/DL — SIGNIFICANT CHANGE UP (ref 8.4–10.5)
CHLORIDE SERPL-SCNC: 106 MMOL/L — SIGNIFICANT CHANGE UP (ref 96–108)
CO2 SERPL-SCNC: 22 MMOL/L — SIGNIFICANT CHANGE UP (ref 22–31)
CO2 SERPL-SCNC: 23 MMOL/L — SIGNIFICANT CHANGE UP (ref 22–31)
CO2 SERPL-SCNC: 25 MMOL/L — SIGNIFICANT CHANGE UP (ref 22–31)
CREAT SERPL-MCNC: 1.72 MG/DL — HIGH (ref 0.5–1.3)
CREAT SERPL-MCNC: 1.82 MG/DL — HIGH (ref 0.5–1.3)
CREAT SERPL-MCNC: 1.83 MG/DL — HIGH (ref 0.5–1.3)
EGFR: 39 ML/MIN/1.73M2 — LOW
EGFR: 39 ML/MIN/1.73M2 — LOW
EGFR: 42 ML/MIN/1.73M2 — LOW
EOSINOPHIL # BLD AUTO: 0.1 K/UL — SIGNIFICANT CHANGE UP (ref 0–0.5)
EOSINOPHIL NFR BLD AUTO: 2.7 % — SIGNIFICANT CHANGE UP (ref 0–6)
GLUCOSE SERPL-MCNC: 69 MG/DL — LOW (ref 70–99)
GLUCOSE SERPL-MCNC: 84 MG/DL — SIGNIFICANT CHANGE UP (ref 70–99)
GLUCOSE SERPL-MCNC: 85 MG/DL — SIGNIFICANT CHANGE UP (ref 70–99)
HCT VFR BLD CALC: 39.4 % — SIGNIFICANT CHANGE UP (ref 39–50)
HGB BLD-MCNC: 12.9 G/DL — LOW (ref 13–17)
IMM GRANULOCYTES NFR BLD AUTO: 0.3 % — SIGNIFICANT CHANGE UP (ref 0–0.9)
LYMPHOCYTES # BLD AUTO: 0.97 K/UL — LOW (ref 1–3.3)
LYMPHOCYTES # BLD AUTO: 25.8 % — SIGNIFICANT CHANGE UP (ref 13–44)
MAGNESIUM SERPL-MCNC: 1.8 MG/DL — SIGNIFICANT CHANGE UP (ref 1.6–2.6)
MAGNESIUM SERPL-MCNC: 1.9 MG/DL — SIGNIFICANT CHANGE UP (ref 1.6–2.6)
MAGNESIUM SERPL-MCNC: 2 MG/DL — SIGNIFICANT CHANGE UP (ref 1.6–2.6)
MCHC RBC-ENTMCNC: 29.2 PG — SIGNIFICANT CHANGE UP (ref 27–34)
MCHC RBC-ENTMCNC: 32.7 GM/DL — SIGNIFICANT CHANGE UP (ref 32–36)
MCV RBC AUTO: 89.1 FL — SIGNIFICANT CHANGE UP (ref 80–100)
MONOCYTES # BLD AUTO: 0.41 K/UL — SIGNIFICANT CHANGE UP (ref 0–0.9)
MONOCYTES NFR BLD AUTO: 10.9 % — SIGNIFICANT CHANGE UP (ref 2–14)
NEUTROPHILS # BLD AUTO: 2.26 K/UL — SIGNIFICANT CHANGE UP (ref 1.8–7.4)
NEUTROPHILS NFR BLD AUTO: 60 % — SIGNIFICANT CHANGE UP (ref 43–77)
NRBC # BLD: 0 /100 WBCS — SIGNIFICANT CHANGE UP (ref 0–0)
PLATELET # BLD AUTO: 129 K/UL — LOW (ref 150–400)
POTASSIUM SERPL-MCNC: 4.1 MMOL/L — SIGNIFICANT CHANGE UP (ref 3.5–5.3)
POTASSIUM SERPL-SCNC: 4.1 MMOL/L — SIGNIFICANT CHANGE UP (ref 3.5–5.3)
RBC # BLD: 4.42 M/UL — SIGNIFICANT CHANGE UP (ref 4.2–5.8)
RBC # FLD: 16.6 % — HIGH (ref 10.3–14.5)
SODIUM SERPL-SCNC: 142 MMOL/L — SIGNIFICANT CHANGE UP (ref 135–145)
SODIUM SERPL-SCNC: 143 MMOL/L — SIGNIFICANT CHANGE UP (ref 135–145)
SODIUM SERPL-SCNC: 144 MMOL/L — SIGNIFICANT CHANGE UP (ref 135–145)
T4 FREE SERPL-MCNC: 1.7 NG/DL — SIGNIFICANT CHANGE UP (ref 0.9–1.8)
WBC # BLD: 3.76 K/UL — LOW (ref 3.8–10.5)
WBC # FLD AUTO: 3.76 K/UL — LOW (ref 3.8–10.5)

## 2024-05-08 PROCEDURE — 99233 SBSQ HOSP IP/OBS HIGH 50: CPT

## 2024-05-08 RX ORDER — FUROSEMIDE 40 MG
80 TABLET ORAL
Refills: 0 | Status: DISCONTINUED | OUTPATIENT
Start: 2024-05-09 | End: 2024-05-13

## 2024-05-08 RX ORDER — FUROSEMIDE 40 MG
80 TABLET ORAL ONCE
Refills: 0 | Status: COMPLETED | OUTPATIENT
Start: 2024-05-08 | End: 2024-05-08

## 2024-05-08 RX ORDER — SODIUM CHLORIDE 0.65 %
1 AEROSOL, SPRAY (ML) NASAL
Refills: 0 | Status: DISCONTINUED | OUTPATIENT
Start: 2024-05-08 | End: 2024-05-13

## 2024-05-08 RX ORDER — FUROSEMIDE 40 MG
40 TABLET ORAL ONCE
Refills: 0 | Status: COMPLETED | OUTPATIENT
Start: 2024-05-08 | End: 2024-05-08

## 2024-05-08 RX ADMIN — Medication 175 MICROGRAM(S): at 05:45

## 2024-05-08 RX ADMIN — Medication 1 SPRAY(S): at 05:45

## 2024-05-08 RX ADMIN — TAMSULOSIN HYDROCHLORIDE 0.4 MILLIGRAM(S): 0.4 CAPSULE ORAL at 22:01

## 2024-05-08 RX ADMIN — SACUBITRIL AND VALSARTAN 1 TABLET(S): 24; 26 TABLET, FILM COATED ORAL at 05:57

## 2024-05-08 RX ADMIN — Medication 0.6 MILLIGRAM(S): at 11:21

## 2024-05-08 RX ADMIN — ATORVASTATIN CALCIUM 40 MILLIGRAM(S): 80 TABLET, FILM COATED ORAL at 22:01

## 2024-05-08 RX ADMIN — APIXABAN 5 MILLIGRAM(S): 2.5 TABLET, FILM COATED ORAL at 17:23

## 2024-05-08 RX ADMIN — Medication 80 MILLIGRAM(S): at 17:22

## 2024-05-08 RX ADMIN — Medication 40 MILLIGRAM(S): at 05:45

## 2024-05-08 RX ADMIN — Medication 40 MILLIGRAM(S): at 11:22

## 2024-05-08 RX ADMIN — FINASTERIDE 5 MILLIGRAM(S): 5 TABLET, FILM COATED ORAL at 11:21

## 2024-05-08 RX ADMIN — SACUBITRIL AND VALSARTAN 1 TABLET(S): 24; 26 TABLET, FILM COATED ORAL at 17:23

## 2024-05-08 RX ADMIN — APIXABAN 5 MILLIGRAM(S): 2.5 TABLET, FILM COATED ORAL at 05:45

## 2024-05-08 NOTE — DIETITIAN INITIAL EVALUATION ADULT - OTHER INFO
-- HF: on Lasix  -- Gout: on Colchicine   -- Pt with abnormal renal indices, will continue to monitor labs.   -- Most recent HbA1c 6.3 on 2/14/24, no history of DM, indicates pre-DM range.

## 2024-05-08 NOTE — DIETITIAN INITIAL EVALUATION ADULT - EDUCATION DIETARY MODIFICATIONS
Reviewed CHF diet education. Discussed Na restriction, foods high in Na to avoid, reading food labels, tips for limiting Na in your diet. Reviewed daily weights, Wt gain parameters to contact MD. Discussed Na intake in relation to fluid retention, edema and Wt gain. Pt verbalized understanding and accepted Heart Failure Nutrition Therapy Handout.  RD remains available for diet education review./(1) partially meets; needs review/practice/verbalization

## 2024-05-08 NOTE — PROGRESS NOTE ADULT - SUBJECTIVE AND OBJECTIVE BOX
Overnight Events:     Review Of Systems: No chest pain, shortness of breath, or palpitations            Current Meds:  apixaban 5 milliGRAM(s) Oral every 12 hours  atorvastatin 40 milliGRAM(s) Oral at bedtime  colchicine 0.6 milliGRAM(s) Oral daily  finasteride 5 milliGRAM(s) Oral daily  furosemide   Injectable 40 milliGRAM(s) IV Push two times a day  levothyroxine 175 MICROGram(s) Oral daily  melatonin 3 milliGRAM(s) Oral at bedtime PRN  sacubitril 24 mG/valsartan 26 mG 1 Tablet(s) Oral two times a day  sodium chloride 0.65% Nasal 1 Spray(s) Both Nostrils two times a day  tamsulosin 0.4 milliGRAM(s) Oral at bedtime      Vitals:  T(F): 97.9 (05-08), Max: 98.2 (05-07)  HR: 85 (05-08) (74 - 89)  BP: 114/81 (05-08) (102/74 - 131/93)  RR: 18 (05-08)  SpO2: 95% (05-08)  I&O's Summary    07 May 2024 07:01  -  08 May 2024 07:00  --------------------------------------------------------  IN: 600 mL / OUT: 550 mL / NET: 50 mL        Physical Exam:  GEN: comfortable appearing, lying in bed in NAD  HEENT: NCAT, MMM  CV: Regular S1, S2, no m/r/g  RESP: CTAB  ABD: Soft, NTND, +BS  EXT: No LE edema, WWP, pulses palpable throughout  NEURO: No focal deficits, AOx3  SKIN:  No rashes                          12.9   3.76  )-----------( 129      ( 08 May 2024 06:50 )             39.4     05-08    142  |  106  |  23  ----------------------------<  84  4.1   |  22  |  1.83<H>    Ca    9.4      08 May 2024 06:52  Phos  3.4     05-06  Mg     1.9     05-08    TPro  7.2  /  Alb  4.3  /  TBili  1.3<H>  /  DBili  x   /  AST  26  /  ALT  22  /  AlkPhos  69  05-06    PT/INR - ( 06 May 2024 18:19 )   PT: 15.0 sec;   INR: 1.44 ratio         PTT - ( 06 May 2024 18:19 )  PTT:37.0 sec  CARDIAC MARKERS ( 06 May 2024 20:33 )  71 ng/L / x     / x     / x     / x     / x      CARDIAC MARKERS ( 06 May 2024 18:19 )  84 ng/L / x     / x     / x     / x     / x                  New ECG(s): Personally reviewed    Echo:    Stress Testing:     Cath:    Imaging:    Interpretation of Telemetry:           Overnight Events: No acute events overnight. States he was a little short of breath in the middle of the night but did not require any supplemental oxygen. Overall feeling improved since admission.     Review Of Systems: No chest pain, shortness of breath currently, or palpitations            Current Meds:  apixaban 5 milliGRAM(s) Oral every 12 hours  atorvastatin 40 milliGRAM(s) Oral at bedtime  colchicine 0.6 milliGRAM(s) Oral daily  finasteride 5 milliGRAM(s) Oral daily  furosemide   Injectable 40 milliGRAM(s) IV Push two times a day  levothyroxine 175 MICROGram(s) Oral daily  melatonin 3 milliGRAM(s) Oral at bedtime PRN  sacubitril 24 mG/valsartan 26 mG 1 Tablet(s) Oral two times a day  sodium chloride 0.65% Nasal 1 Spray(s) Both Nostrils two times a day  tamsulosin 0.4 milliGRAM(s) Oral at bedtime      Vitals:  T(F): 97.9 (05-08), Max: 98.2 (05-07)  HR: 85 (05-08) (74 - 89)  BP: 114/81 (05-08) (102/74 - 131/93)  RR: 18 (05-08)  SpO2: 95% (05-08)  I&O's Summary    07 May 2024 07:01  -  08 May 2024 07:00  --------------------------------------------------------  IN: 600 mL / OUT: 550 mL / NET: 50 mL        Physical Exam:  GEN: comfortable appearing, lying in bed in NAD  HEENT: NCAT, MMM  CV: Regular S1, S2, no m/r/g  RESP: CTAB  ABD: Soft, NTND, +BS  EXT: 2+ LE edema below the knee, WWP  NEURO: No focal deficits, AOx3  SKIN:  No rashes                          12.9  3.76  )-----------( 129      ( 08 May 2024 06:50 )             39.4    05-08    142  |  106  |  23  ----------------------------<  84  4.1   |  22  |  1.83<H>    Ca    9.4      08 May 2024 06:52  Phos  3.4     05-06  Mg     1.9     05-08    TPro  7.2  /  Alb  4.3  /  TBili  1.3<H>  /  DBili  x   /  AST  26  /  ALT  22  /  AlkPhos  69  05-06    PT/INR - ( 06 May 2024 18:19 )   PT: 15.0 sec;   INR: 1.44 ratio         PTT - ( 06 May 2024 18:19 )  PTT:37.0 sec  CARDIAC MARKERS ( 06 May 2024 20:33 )  71 ng/L / x     / x     / x     / x     / x      CARDIAC MARKERS ( 06 May 2024 18:19 )  84 ng/L / x     / x     / x     / x     / x

## 2024-05-08 NOTE — PROGRESS NOTE ADULT - SUBJECTIVE AND OBJECTIVE BOX
SUBJECTIVE / OVERNIGHT EVENTS:  no acute events overnight   pt feeling slightly better, states he is having less BILLY, but still gets slightly winded; feels abdomen is less bloated/distended   still with LE edema; denies any CP     ADDITIONAL REVIEW OF SYSTEMS:  14 point ROS negative except as noted above       T(C): 36.3 (05-08-24 @ 11:42), Max: 36.3 (05-08-24 @ 11:42)  HR: 84 (05-08-24 @ 17:51) (78 - 84)  BP: 115/80 (05-08-24 @ 17:51) (93/65 - 115/80)  RR: 18 (05-08-24 @ 11:42) (18 - 18)  SpO2: 98% (05-08-24 @ 11:42) (98% - 98%)      MEDICATIONS  (STANDING):  apixaban 5 milliGRAM(s) Oral every 12 hours  atorvastatin 40 milliGRAM(s) Oral at bedtime  colchicine 0.6 milliGRAM(s) Oral daily  finasteride 5 milliGRAM(s) Oral daily  levothyroxine 175 MICROGram(s) Oral daily  sacubitril 24 mG/valsartan 26 mG 1 Tablet(s) Oral two times a day  sodium chloride 0.65% Nasal 1 Spray(s) Both Nostrils two times a day  tamsulosin 0.4 milliGRAM(s) Oral at bedtime    MEDICATIONS  (PRN):  melatonin 3 milliGRAM(s) Oral at bedtime PRN Insomnia    PHYSICAL EXAM:  Vital Signs Last 24 Hrs  T(C): 36.3 (07 May 2024 02:00), Max: 36.5 (06 May 2024 20:15)  T(F): 97.4 (07 May 2024 02:00), Max: 97.7 (06 May 2024 20:15)  HR: 82 (07 May 2024 05:00) (81 - 92)  BP: 105/72 (07 May 2024 05:00) (100/65 - 135/102)  BP(mean): 101 (07 May 2024 00:20) (101 - 112)  RR: 18 (07 May 2024 02:00) (18 - 18)  SpO2: 98% (07 May 2024 02:00) (98% - 100%)    Parameters below as of 07 May 2024 02:00  Patient On (Oxygen Delivery Method): room air      PHYSICAL EXAM:  GENERAL: NAD, well-developed  HEAD:  Atraumatic, normocephalic  EYES: EOMI, conjunctiva and sclera clear  NECK: Supple, no JVD  CHEST/LUNG: Clear to auscultation bilaterally; no wheezing or rales  HEART: Regular rate and rhythm; no murmurs, + R>>L LE edema (2+ in RLE to thighs, 1+ in LLE)  ABDOMEN: Soft, nontender, nondistended; bowel sounds present  EXTREMITIES:  2+ Peripheral Pulses, no clubbing, cyanosis  PSYCH: AAOx3, calm affect, not anxious  SKIN: No rashes or lesions                            12.9   3.76  )-----------( 129      ( 08 May 2024 06:50 )             39.4               144|106|23<69  4.1|23|1.72  9.3,1.8,--  05-08 @ 20:39  142|106|23<84  4.1|22|1.83  9.4,1.9,--  05-08 @ 06:52  143|106|23<85  4.1|25|1.82  9.5,2.0,--  05-08 @ 06:51      Urinalysis Basic - ( 07 May 2024 07:20 )    Color: x / Appearance: x / SG: x / pH: x  Gluc: 85 mg/dL / Ketone: x  / Bili: x / Urobili: x   Blood: x / Protein: x / Nitrite: x   Leuk Esterase: x / RBC: x / WBC x   Sq Epi: x / Non Sq Epi: x / Bacteria: x            RADIOLOGY & ADDITIONAL TESTS:  New Imaging Personally Reviewed Today:  New Electrocardiogram Personally Reviewed Today:  Other Results Reviewed Today:   Prior or Outpatient Records Reviewed Today with Summary:    COORDINATION OF CARE:  Consultant Communication and Details of Discussion (where applicable):

## 2024-05-08 NOTE — DIETITIAN INITIAL EVALUATION ADULT - PHYSCIAL ASSESSMENT
Drug Dosing Weight  Height (cm): 177.8 (06 May 2024 15:47)  Weight (kg): 90.7 (06 May 2024 15:47)- stated wt   BMI (kg/m2): 28.7 (06 May 2024 15:47)    Daily wt (standing Weight in kG): 102.2 (05-08 @ 05:48)    UBW: ~200-220lb per pt, reports weight fluctuation due to fluid shift with Congestive Heart Failure.   Wt history from previous RD notes: 99.3kg (12/5/23), 106.8kg (5/10/21)      ** Weight fluctuations expected in setting of fluid shifts with Congestive Heart Failure. RD will continue to monitor wt trends.     IBW: 166lb, 120% IBW

## 2024-05-08 NOTE — CONSULT NOTE ADULT - SUBJECTIVE AND OBJECTIVE BOX
[facility]  [phone]  NEPHROLOGY      INITIAL CONSULT NOTE  --------------------------------------------------------------------------------  HPI:      68M w/ hxof L carotid stent, HFrEF EF 40% and amyloidosis, gout, HTN, hypothyroid, CVA, afib on Eliquis, BPH p/w SOB and edema. Pt has been noticing worsening SOB, BILLY, orthopnea and LE edema for past 5-7 days. Also noticed some increased abd distension as well. Increased lasix dose from 40mg BID to 60mg BID for past several days for symptoms without significant effect. Did notice some increased urinary output. Endorses intermittent midsternal chest pain without clear triggering or improving factors. Started before this week but has been noted this week. Endorses sometimes cheating on low salt diet but relatively rare and not more than usual recently. Denies fevers, chills or palpitations. Has chronic cough but unchanged.  noticed with chf started on iv lasix. in addition pt with abn creatinine. renal consult called  states recently started himself on high water consumption         PAST HISTORY  --------------------------------------------------------------------------------  PAST MEDICAL & SURGICAL HISTORY:  Low back pain  Herniated disc on lumbar region      Redundant prepuce and phimosis      Arthritis  on knees      Hypothyroid  not taking any meds      Hypertension      Thyroid cancer  pt denies any thyroid cancer      History of pituitary disease  on caber      Gout      S/P excision of vocal cord nodule  2007 and radiation -      Laryngeal cancer  "stage 0"      Pituitary mass      Carotid occlusion, bilateral      Obese      S/P excision of vocal cord nodule  radiation - 2007      History of lumbar spinal fusion  2018      H/O arthroscopy of shoulder  right-2017      Status post carotid surgery  right-5/13/2022, pt. states unsuccesful        FAMILY HISTORY:  Family history of stroke (Mother)    Family history of Alzheimer's disease (Father)    FH: stroke (Mother, Father)      PAST SOCIAL HISTORY:  denies tobacco use     ALLERGIES & MEDICATIONS  --------------------------------------------------------------------------------  Allergies    No Known Allergies    Intolerances      Standing Inpatient Medications  apixaban 5 milliGRAM(s) Oral every 12 hours  atorvastatin 40 milliGRAM(s) Oral at bedtime  colchicine 0.6 milliGRAM(s) Oral daily  finasteride 5 milliGRAM(s) Oral daily  levothyroxine 175 MICROGram(s) Oral daily  sacubitril 24 mG/valsartan 26 mG 1 Tablet(s) Oral two times a day  sodium chloride 0.65% Nasal 1 Spray(s) Both Nostrils two times a day  tamsulosin 0.4 milliGRAM(s) Oral at bedtime    PRN Inpatient Medications  melatonin 3 milliGRAM(s) Oral at bedtime PRN      REVIEW OF SYSTEMS  --------------------------------------------------------------------------------  Gen: No  fevers/chills   Skin: No rashes  Head/Eyes/Ears/Mouth: No headache; Normal hearing;  No sinus pain/discomfort, sore throat  Respiratory:  +  dyspnea,  no cough, wheezing, hemoptysis  CV: No chest pain, orthopnea  GI: No abdominal pain, diarrhea, nausea, vomiting, melena,  : No dysuria, decrease urination or hesitancy urinating  hematuria, nocturia  MSK: No joint pain/swelling; no back pain  Neuro: No dizziness/lightheadedness    also with  +  edema         VITALS/PHYSICAL EXAM  --------------------------------------------------------------------------------  T(C): 36.3 (05-08-24 @ 11:42), Max: 36.6 (05-08-24 @ 05:48)  HR: 84 (05-08-24 @ 17:51) (78 - 85)  BP: 115/80 (05-08-24 @ 17:51) (93/65 - 115/80)  RR: 18 (05-08-24 @ 11:42) (18 - 18)  SpO2: 98% (05-08-24 @ 11:42) (95% - 98%)  Wt(kg): --        05-07-24 @ 07:01  -  05-08-24 @ 07:00  --------------------------------------------------------  IN: 600 mL / OUT: 550 mL / NET: 50 mL    05-08-24 @ 07:01  -  05-08-24 @ 22:40  --------------------------------------------------------  IN: 720 mL / OUT: 0 mL / NET: 720 mL      Physical Exam:  	Gen: Non toxic comfortable appearing   	no jvd   	Pulm: decrease bs  no rales or ronchi or wheezing  	CV: RRR, S1S2; no rub  	Back: No CVA tenderness;  	Abd: +BS, soft, nontender/nondistended  	: No suprapubic tenderness  	UE: Warm, no cyanosis  no clubbing,  no edema  	LE: Warm, no cyanosis  no clubbing, 2-  edema  	Neuro: alert and oriented. speech coherent   	Psych: Normal affect and mood  	Skin: Warm, no decrease skin turgor   	  LABS/STUDIES  --------------------------------------------------------------------------------              12.9   3.76  >-----------<  129      [05-08-24 @ 06:50]              39.4     144  |  106  |  23  ----------------------------<  69      [05-08-24 @ 20:39]  4.1   |  23  |  1.72        Ca     9.3     [05-08-24 @ 20:39]      Mg     1.8     [05-08-24 @ 20:39]            Creatinine Trend:  SCr 1.72 [05-08 @ 20:39]  SCr 1.83 [05-08 @ 06:52]  SCr 1.82 [05-08 @ 06:51]  SCr 1.86 [05-07 @ 07:20]  SCr 1.81 [05-06 @ 18:19]      Urinalysis (02.11.24 @ 15:19)   Blood, Urine: Negative  Glucose Qualitative, Urine: Negative mg/dL  pH Urine: 6.0  Color: Yellow  Urine Appearance: Clear  Bilirubin: Negative  Ketone - Urine: Negative mg/dL  Specific Gravity: 1.014  Protein, Urine: Negative mg/dL  Urobilinogen: 0.2 mg/dL  Nitrite: Negative  Leukocyte Esterase Concentration: Negative  TSH 10.30      [05-07-24 @ 07:20]  Lipid: chol 104, TG 49, HDL 46, LDL --      [02-04-24 @ 07:11]    HCV 0.48, Nonreact      [05-05-21 @ 08:52]    Free Light Chains: kappa 3.20, lambda 2.18, ratio = 1.47      [03-21 @ 15:38]  Immunofixation Serum:   No Monoclonal Band Identified    Reference Range: None Detected      [03-21-23 @ 15:38]  Immunofixation Urine:   Reference Range: None Detected      [03-21-23 @ 18:42]      < from: Xray Chest 1 View AP/PA (05.06.24 @ 23:08) >  ACC: [id number] EXAM:  XR CHEST AP OR PA 1V   ORDERED BY: NITHYA LUGO     PROCEDURE DATE:  05/06/2024          INTERPRETATION:  CLINICAL INFORMATION: Shortness of breath    TECHNIQUE: Frontal radiograph of the chest    COMPARISON: Chest radiograph  12/3/2023    FINDINGS:    No focal consolidation. No pleural effusion. No pneumothorax. Cardiac   silhouette size cannot be accurately assessed on this projection. No   acute osseous findings.    IMPRESSION:    No focal consolidation.    < end of copied text >

## 2024-05-08 NOTE — DIETITIAN INITIAL EVALUATION ADULT - PERTINENT MEDS FT
MEDICATIONS  (STANDING):  apixaban 5 milliGRAM(s) Oral every 12 hours  atorvastatin 40 milliGRAM(s) Oral at bedtime  colchicine 0.6 milliGRAM(s) Oral daily  finasteride 5 milliGRAM(s) Oral daily  furosemide   Injectable 80 milliGRAM(s) IV Push once  levothyroxine 175 MICROGram(s) Oral daily  sacubitril 24 mG/valsartan 26 mG 1 Tablet(s) Oral two times a day  sodium chloride 0.65% Nasal 1 Spray(s) Both Nostrils two times a day  tamsulosin 0.4 milliGRAM(s) Oral at bedtime    MEDICATIONS  (PRN):  melatonin 3 milliGRAM(s) Oral at bedtime PRN Insomnia

## 2024-05-08 NOTE — DIETITIAN INITIAL EVALUATION ADULT - OTHER CALCULATIONS
Fluid needs deferred to team. Energy and protein needs based on IBW of 166lb in consideration of weight fluctuation with wt available and Increased nutrient needs.

## 2024-05-08 NOTE — DIETITIAN INITIAL EVALUATION ADULT - ENERGY INTAKE
Adequate (%) Pt reports good appetite/PO intake since admission, is currently ordered for a DASH diet. Menu at bedside, pt is aware of menu ordering procedure in house, has been ordering preferred foods as needed since admission. Food preferences noted: no pork.

## 2024-05-08 NOTE — CONSULT NOTE ADULT - ASSESSMENT
68M w/ hxof L carotid stent, HFrEF EF 40% and amyloidosis, gout, HTN, hypothyroid, CVA, afib on Eliquis, BPH p/w SOB and edema. Pt has been noticing worsening SOB, BILLY, orthopnea and LE edema for past 5-7 days. Also noticed some increased abd distension as well. Increased lasix dose from 40mg BID to 60mg BID for past several days for symptoms without significant effect. Did notice some increased urinary output. Endorses intermittent midsternal chest pain without clear triggering or improving factors. Started before this week but has been noted this week. Endorses sometimes cheating on low salt diet  recently started himself on high water consumption       1- CKD III  2- CHF   3- HTN   4- gout    lasix 40 mg iv bid with goal to raise dose  cont entreso 24./26 mg bid   to have serum uric acid with goal to change colcrys to allopurinol   2 g na diet  strict I/O  daily weight s

## 2024-05-08 NOTE — DIETITIAN INITIAL EVALUATION ADULT - NSPROEDAREADYLEARN_GEN_A_NUR
Unique Flap 2 Text: The flap is incised and reflected downward. The medial portion of the flap was dissected just deep to the ordicularis oculi, and laterally the flap is dissected to the lateral canthal tendon area. The flap is rotated and advanced into place and sutured in a multilaminal fashion. The mucosa defect does not require closure. At completion, the repair is under little tension and the lid is well supported. All vertical tension is directed onto the temple. none

## 2024-05-08 NOTE — PROGRESS NOTE ADULT - ASSESSMENT
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, HFrEF (40%), gout, HTN, hypothyroid, CVA, afib on Eliquis, BPH presenting with shortness of breath and lower extremity edema x 5 days    Acute on chronic heart failure exacerbation in the setting of nonadherence with fluid restriction  Increase Lasix to 80 mg IV BID  -- BID Lytes  -- goal K > 4, Mg > 2  -- STRICT I/Os  -- goal neg 1-2L  Telemetry monitoring  Resume entresto, plavix, and statin  Resume Dapagliflozin on discharge  No need to repeat echocardiogram given recent imaging 2/5/24    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 Tomorrow will be followed by Dr. Hernandez for non-teaching consult service.     Erica Velásquez MD  PGY-4, Cardiology  Available on TEAMS    For all new consults  [email or web address]  Login: [email or web address]

## 2024-05-08 NOTE — DIETITIAN INITIAL EVALUATION ADULT - ADD RECOMMEND
-- Monitor PO intake, GI tolerance, skin integrity, labs, weight, and bowel movement regularity.   -- Will continue to honor food and beverage preferences within diet restriction of patient in an effort to maximize level of nutrient intake.

## 2024-05-08 NOTE — DIETITIAN INITIAL EVALUATION ADULT - ORAL INTAKE PTA/DIET HISTORY
Pt was eating well with no changes in appetite. Pt reports not using salt in his foods, does not eat pork, eating well-balanced meals, does not monitor fluid intake. Confirms no known food allergies. Denies Hx of chewing or swallowing issues. Denies GI distress. Denies micronutrient or nutrient supplement use.

## 2024-05-08 NOTE — DIETITIAN INITIAL EVALUATION ADULT - PERTINENT LABORATORY DATA
05-08    142  |  106  |  23  ----------------------------<  84  4.1   |  22  |  1.83<H>    Ca    9.4      08 May 2024 06:52  Phos  3.4     05-06  Mg     1.9     05-08    TPro  7.2  /  Alb  4.3  /  TBili  1.3<H>  /  DBili  x   /  AST  26  /  ALT  22  /  AlkPhos  69  05-06  A1C with Estimated Average Glucose Result: 6.3 % (02-04-24 @ 06:55)  A1C with Estimated Average Glucose Result: 5.8 % (12-04-23 @ 07:58)

## 2024-05-09 LAB
ANION GAP SERPL CALC-SCNC: 13 MMOL/L — SIGNIFICANT CHANGE UP (ref 5–17)
ANION GAP SERPL CALC-SCNC: 15 MMOL/L — SIGNIFICANT CHANGE UP (ref 5–17)
BUN SERPL-MCNC: 23 MG/DL — SIGNIFICANT CHANGE UP (ref 7–23)
BUN SERPL-MCNC: 24 MG/DL — HIGH (ref 7–23)
CALCIUM SERPL-MCNC: 8.8 MG/DL — SIGNIFICANT CHANGE UP (ref 8.4–10.5)
CALCIUM SERPL-MCNC: 9.5 MG/DL — SIGNIFICANT CHANGE UP (ref 8.4–10.5)
CHLORIDE SERPL-SCNC: 106 MMOL/L — SIGNIFICANT CHANGE UP (ref 96–108)
CHLORIDE SERPL-SCNC: 109 MMOL/L — HIGH (ref 96–108)
CO2 SERPL-SCNC: 21 MMOL/L — LOW (ref 22–31)
CO2 SERPL-SCNC: 24 MMOL/L — SIGNIFICANT CHANGE UP (ref 22–31)
CREAT SERPL-MCNC: 1.64 MG/DL — HIGH (ref 0.5–1.3)
CREAT SERPL-MCNC: 1.93 MG/DL — HIGH (ref 0.5–1.3)
EGFR: 37 ML/MIN/1.73M2 — LOW
EGFR: 45 ML/MIN/1.73M2 — LOW
GLUCOSE SERPL-MCNC: 82 MG/DL — SIGNIFICANT CHANGE UP (ref 70–99)
GLUCOSE SERPL-MCNC: 89 MG/DL — SIGNIFICANT CHANGE UP (ref 70–99)
HCT VFR BLD CALC: 33.3 % — LOW (ref 39–50)
HGB BLD-MCNC: 11.4 G/DL — LOW (ref 13–17)
MAGNESIUM SERPL-MCNC: 1.7 MG/DL — SIGNIFICANT CHANGE UP (ref 1.6–2.6)
MAGNESIUM SERPL-MCNC: 1.7 MG/DL — SIGNIFICANT CHANGE UP (ref 1.6–2.6)
MCHC RBC-ENTMCNC: 29.8 PG — SIGNIFICANT CHANGE UP (ref 27–34)
MCHC RBC-ENTMCNC: 34.2 GM/DL — SIGNIFICANT CHANGE UP (ref 32–36)
MCV RBC AUTO: 87.2 FL — SIGNIFICANT CHANGE UP (ref 80–100)
NRBC # BLD: 0 /100 WBCS — SIGNIFICANT CHANGE UP (ref 0–0)
PLATELET # BLD AUTO: 116 K/UL — LOW (ref 150–400)
POTASSIUM SERPL-MCNC: 3.8 MMOL/L — SIGNIFICANT CHANGE UP (ref 3.5–5.3)
POTASSIUM SERPL-MCNC: 4.1 MMOL/L — SIGNIFICANT CHANGE UP (ref 3.5–5.3)
POTASSIUM SERPL-SCNC: 3.8 MMOL/L — SIGNIFICANT CHANGE UP (ref 3.5–5.3)
POTASSIUM SERPL-SCNC: 4.1 MMOL/L — SIGNIFICANT CHANGE UP (ref 3.5–5.3)
RBC # BLD: 3.82 M/UL — LOW (ref 4.2–5.8)
RBC # FLD: 16.2 % — HIGH (ref 10.3–14.5)
SODIUM SERPL-SCNC: 143 MMOL/L — SIGNIFICANT CHANGE UP (ref 135–145)
SODIUM SERPL-SCNC: 145 MMOL/L — SIGNIFICANT CHANGE UP (ref 135–145)
WBC # BLD: 3.01 K/UL — LOW (ref 3.8–10.5)
WBC # FLD AUTO: 3.01 K/UL — LOW (ref 3.8–10.5)

## 2024-05-09 PROCEDURE — 99232 SBSQ HOSP IP/OBS MODERATE 35: CPT

## 2024-05-09 RX ADMIN — TAMSULOSIN HYDROCHLORIDE 0.4 MILLIGRAM(S): 0.4 CAPSULE ORAL at 21:23

## 2024-05-09 RX ADMIN — ATORVASTATIN CALCIUM 40 MILLIGRAM(S): 80 TABLET, FILM COATED ORAL at 21:23

## 2024-05-09 RX ADMIN — SACUBITRIL AND VALSARTAN 1 TABLET(S): 24; 26 TABLET, FILM COATED ORAL at 17:31

## 2024-05-09 RX ADMIN — Medication 175 MICROGRAM(S): at 05:31

## 2024-05-09 RX ADMIN — Medication 80 MILLIGRAM(S): at 13:25

## 2024-05-09 RX ADMIN — FINASTERIDE 5 MILLIGRAM(S): 5 TABLET, FILM COATED ORAL at 12:00

## 2024-05-09 RX ADMIN — Medication 1 SPRAY(S): at 06:02

## 2024-05-09 RX ADMIN — APIXABAN 5 MILLIGRAM(S): 2.5 TABLET, FILM COATED ORAL at 05:31

## 2024-05-09 RX ADMIN — Medication 0.6 MILLIGRAM(S): at 12:01

## 2024-05-09 RX ADMIN — Medication 1 SPRAY(S): at 17:33

## 2024-05-09 RX ADMIN — APIXABAN 5 MILLIGRAM(S): 2.5 TABLET, FILM COATED ORAL at 17:31

## 2024-05-09 RX ADMIN — Medication 80 MILLIGRAM(S): at 05:31

## 2024-05-09 NOTE — PROGRESS NOTE ADULT - ASSESSMENT
68M w/ hxof L carotid stent, HFrEF EF 40% and amyloidosis, gout, HTN, hypothyroid, CVA, afib on Eliquis, BPH p/w SOB and edema. Pt has been noticing worsening SOB, BILLY, orthopnea and LE edema for past 5-7 days. Also noticed some increased abd distension as well. Increased lasix dose from 40mg BID to 60mg BID for past several days for symptoms without significant effect. Did notice some increased urinary output. Endorses intermittent midsternal chest pain without clear triggering or improving factors. Started before this week but has been noted this week. Endorses sometimes cheating on low salt diet  recently started himself on high water consumption       1- CKD III  2- CHF   3- HTN   4- gout  5- bph    creatinine is steady  lasix increased to 80 mg iv bid   cont entreso 24./26 mg bid   to have serum uric acid with goal to change colcrys to allopurinol   2 g na diet  strict I/O  daily standing weight  fluid [id number]/day  continue proscar and flomax for bph  trend creatinine and electrolytes daily

## 2024-05-09 NOTE — PROGRESS NOTE ADULT - ASSESSMENT
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   --Volume status appears to be improving but patient still appears somewhat dyspnei.   --Continue IV lasix for now with strict Is and Os and daily weights, salt restriction. Keep O>>I  --Replete lytes as needed.  --Renal input notes; Cr appears to generally be trending doen 1.8-->1.7-->1.6 today.  --Continue telemetry monitoring  --Sacubitril/valsartan has been restarted.    --Will follow. 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   --Volume status appears to be improving but patient still appears somewhat dyspnea.   --Continue IV lasix for now with strict Is and Os and daily weights, salt restriction. Keep O>>I  --Replete lytes as needed.  --Renal input noted; Cr appears to generally be trending down 1.8-->1.7-->1.6 today.  --Continue telemetry monitoring  --Sacubitril/valsartan has been restarted.    --Will follow.

## 2024-05-09 NOTE — PROGRESS NOTE ADULT - SUBJECTIVE AND OBJECTIVE BOX
SUBJECTIVE / OVERNIGHT EVENTS:  no acute events overnight   pt feeling slightly better, states he is having less BILLY, but still gets slightly winded; feels abdomen is less bloated/distended   still with LE edema; denies any CP     ADDITIONAL REVIEW OF SYSTEMS:  14 point ROS negative except as noted above     T(C): 37.1 (05-09-24 @ 21:40), Max: 37.1 (05-09-24 @ 21:40)  HR: 76 (05-09-24 @ 21:40) (75 - 90)  BP: 100/64 (05-09-24 @ 21:40) (95/68 - 115/86)  RR: 18 (05-09-24 @ 21:40) (18 - 18)  SpO2: 96% (05-09-24 @ 21:40) (96% - 96%)      MEDICATIONS  (STANDING):  apixaban 5 milliGRAM(s) Oral every 12 hours  atorvastatin 40 milliGRAM(s) Oral at bedtime  colchicine 0.6 milliGRAM(s) Oral daily  finasteride 5 milliGRAM(s) Oral daily  furosemide   Injectable 80 milliGRAM(s) IV Push two times a day  levothyroxine 175 MICROGram(s) Oral daily  sacubitril 24 mG/valsartan 26 mG 1 Tablet(s) Oral two times a day  sodium chloride 0.65% Nasal 1 Spray(s) Both Nostrils two times a day  tamsulosin 0.4 milliGRAM(s) Oral at bedtime    MEDICATIONS  (PRN):  melatonin 3 milliGRAM(s) Oral at bedtime PRN Insomnia    PHYSICAL EXAM:  Vital Signs Last 24 Hrs  T(C): 36.3 (07 May 2024 02:00), Max: 36.5 (06 May 2024 20:15)  T(F): 97.4 (07 May 2024 02:00), Max: 97.7 (06 May 2024 20:15)  HR: 82 (07 May 2024 05:00) (81 - 92)  BP: 105/72 (07 May 2024 05:00) (100/65 - 135/102)  BP(mean): 101 (07 May 2024 00:20) (101 - 112)  RR: 18 (07 May 2024 02:00) (18 - 18)  SpO2: 98% (07 May 2024 02:00) (98% - 100%)    Parameters below as of 07 May 2024 02:00  Patient On (Oxygen Delivery Method): room air      PHYSICAL EXAM:  GENERAL: NAD, well-developed  HEAD:  Atraumatic, normocephalic  EYES: EOMI, conjunctiva and sclera clear  NECK: Supple, no JVD  CHEST/LUNG: Clear to auscultation bilaterally; no wheezing or rales  HEART: Regular rate and rhythm; no murmurs, + R>>L LE edema (2+ in RLE to thighs, 1+ in LLE)  ABDOMEN: Soft, nontender, nondistended; bowel sounds present  EXTREMITIES:  2+ Peripheral Pulses, no clubbing, cyanosis  PSYCH: AAOx3, calm affect, not anxious  SKIN: No rashes or lesions                            12.9   3.76  )-----------( 129      ( 08 May 2024 06:50 )             39.4               144|106|23<69  4.1|23|1.72  9.3,1.8,--  05-08 @ 20:39  142|106|23<84  4.1|22|1.83  9.4,1.9,--  05-08 @ 06:52  143|106|23<85  4.1|25|1.82  9.5,2.0,--  05-08 @ 06:51      Urinalysis Basic - ( 07 May 2024 07:20 )    Color: x / Appearance: x / SG: x / pH: x  Gluc: 85 mg/dL / Ketone: x  / Bili: x / Urobili: x   Blood: x / Protein: x / Nitrite: x   Leuk Esterase: x / RBC: x / WBC x   Sq Epi: x / Non Sq Epi: x / Bacteria: x            RADIOLOGY & ADDITIONAL TESTS:  New Imaging Personally Reviewed Today:  New Electrocardiogram Personally Reviewed Today:  Other Results Reviewed Today:   Prior or Outpatient Records Reviewed Today with Summary:    COORDINATION OF CARE:  Consultant Communication and Details of Discussion (where applicable):

## 2024-05-09 NOTE — PROGRESS NOTE ADULT - SUBJECTIVE AND OBJECTIVE BOX
[facility]   [phone]  RENAL FOLLOW UP NOTE  --------------------------------------------------------------------------------  Chief Complaint:    24 hour events/subjective:    patient seen and examined  states breathing is better    PAST HISTORY  --------------------------------------------------------------------------------  No significant changes to PMH, PSH, FHx, SHx, unless otherwise noted    ALLERGIES & MEDICATIONS  --------------------------------------------------------------------------------  Allergies    No Known Allergies    Intolerances      Standing Inpatient Medications  apixaban 5 milliGRAM(s) Oral every 12 hours  atorvastatin 40 milliGRAM(s) Oral at bedtime  colchicine 0.6 milliGRAM(s) Oral daily  finasteride 5 milliGRAM(s) Oral daily  furosemide   Injectable 80 milliGRAM(s) IV Push two times a day  levothyroxine 175 MICROGram(s) Oral daily  sacubitril 24 mG/valsartan 26 mG 1 Tablet(s) Oral two times a day  sodium chloride 0.65% Nasal 1 Spray(s) Both Nostrils two times a day  tamsulosin 0.4 milliGRAM(s) Oral at bedtime    PRN Inpatient Medications  melatonin 3 milliGRAM(s) Oral at bedtime PRN      REVIEW OF SYSTEMS  --------------------------------------------------------------------------------    Gen: denies fevers/chills,  CVS: denies chest pain/palpitations  Resp: denies worsening SOB/Cough  GI: Denies N/V/Abd pain  : Denies dysuria/oliguria/hematuria    VITALS/PHYSICAL EXAM  --------------------------------------------------------------------------------  T(C): 36.6 (05-09-24 @ 11:26), Max: 36.6 (05-08-24 @ 22:54)  HR: 86 (05-09-24 @ 13:24) (68 - 88)  BP: 100/66 (05-09-24 @ 13:24) (95/68 - 115/80)  RR: 18 (05-09-24 @ 11:26) (18 - 18)  SpO2: 97% (05-09-24 @ 11:26) (95% - 97%)  Wt(kg): --        05-08-24 @ 07:01  -  05-09-24 @ 07:00  --------------------------------------------------------  IN: 1200 mL / OUT: 1120 mL / NET: 80 mL    05-09-24 @ 07:01  -  05-09-24 @ 16:01  --------------------------------------------------------  IN: 365 mL / OUT: 0 mL / NET: 365 mL      Physical Exam:  	  	Gen: Non toxic comfortable appearing   	Pulm: decrease bs  no rales or ronchi or wheezing  	CV: +JVD. RRR, S1S2; no rub  	Abd: +BS, soft, nontender/nondistended  	: No suprapubic tenderness  	UE: Warm, no cyanosis  no clubbing,  no edema  	LE: Warm, no cyanosis  no clubbing, 2- edema  	Skin: Warm, no decrease skin turgor     LABS/STUDIES  --------------------------------------------------------------------------------              11.4   3.01  >-----------<  116      [05-09-24 @ 05:36]              33.3     143  |  109  |  23  ----------------------------<  82      [05-09-24 @ 05:36]  3.8   |  21  |  1.64        Ca     8.8     [05-09-24 @ 05:36]      Mg     1.7     [05-09-24 @ 05:36]            Creatinine Trend:  SCr 1.64 [05-09 @ 05:36]  SCr 1.72 [05-08 @ 20:39]  SCr 1.83 [05-08 @ 06:52]  SCr 1.82 [05-08 @ 06:51]  SCr 1.86 [05-07 @ 07:20]                TSH 10.30      [05-07-24 @ 07:20]  Lipid: chol 104, TG 49, HDL 46, LDL --      [02-04-24 @ 07:11]

## 2024-05-09 NOTE — PROGRESS NOTE ADULT - SUBJECTIVE AND OBJECTIVE BOX
Overnight Events: Feels well now; states he had an episode of hypoxia overnight.      Telemetry: Sinus rhythm with PVC    Review Of Systems: No chest pain, shortness of breath, or palpitations  CONSTITUTIONAL: no fevers or chills  EYES/ENT: No visual changes;  No vertigo or throat pain   NECK: No pain or stiffness  RESPIRATORY: No cough, wheezing. Some shortness of breath.     CARDIOVASCULAR: No chest pain or palpitations  GASTROINTESTINAL: No abdominal or epigastric pain. No nausea, vomiting. No diarrhea. No melena.  GENITOURINARY: No dysuria, frequency or hematuria  NEUROLOGICAL: No numbness or weakness      Current Meds:  apixaban 5 milliGRAM(s) Oral every 12 hours  atorvastatin 40 milliGRAM(s) Oral at bedtime  colchicine 0.6 milliGRAM(s) Oral daily  finasteride 5 milliGRAM(s) Oral daily  furosemide   Injectable 80 milliGRAM(s) IV Push two times a day  levothyroxine 175 MICROGram(s) Oral daily  melatonin 3 milliGRAM(s) Oral at bedtime PRN  sacubitril 24 mG/valsartan 26 mG 1 Tablet(s) Oral two times a day  sodium chloride 0.65% Nasal 1 Spray(s) Both Nostrils two times a day  tamsulosin 0.4 milliGRAM(s) Oral at bedtime      Vitals:  T(F): 97.7 (05-09), Max: 97.8 (05-08)  HR: 68 (05-09) (68 - 88)  BP: 101/65 (05-09) (93/65 - 115/80)  RR: 18 (05-09)  SpO2: 97% (05-09)  I&O's Summary    08 May 2024 07:01  -  09 May 2024 07:00  --------------------------------------------------------  IN: 1200 mL / OUT: 1120 mL / NET: 80 mL    09 May 2024 07:01  -  09 May 2024 11:06  --------------------------------------------------------  IN: 365 mL / OUT: 0 mL / NET: 365 mL        Physical Exam:  Appearance: No acute distress; on oxygen  Eyes: PERRL, EOMI, pink conjunctiva  HEENT: Normal oral mucosa  Cardiovascular: Regular S1 S2, soft SM at LSB  Respiratory: Clear to auscultation bilaterally  Gastrointestinal: soft, non-tender.  Extremities: Bilateral LE edema, RLE>>LLE, knee to foot   Neurologic: Non-focal  Psychiatry: Pleasant, AAOx3, mood & affect appropriate                            11.4   3.01  )-----------( 116      ( 09 May 2024 05:36 )             33.3     05-09    143  |  109<H>  |  23  ----------------------------<  82  3.8   |  21<L>  |  1.64<H>    Ca    8.8      09 May 2024 05:36  Mg     1.7     05-09        CARDIAC MARKERS ( 06 May 2024 20:33 )  71 ng/L / x     / x     / x     / x     / x      CARDIAC MARKERS ( 06 May 2024 18:19 )  84 ng/L / x     / x     / x     / x     / x          Echo (2/5/2024):  CONCLUSIONS:    1. Left ventricular cavity is normal. Left ventricular wall thicknessis normal. Left ventricular systolic function is moderately decreased with an ejection fraction of 40 % by 3D. There are no regional wall motion abnormalities seen.   2. Multiple segmental abnormalities exist. See findings.   3. Elevated filling pressure. Analysis of left ventricular diastolic function and filling pressure is made challenging by the presence of merged E and A wave.   4. Moderately enlarged right ventricular cavity size, wall thickness, and reduced systolic function.   5. The leftatrium is severely dilated.   6. The right atrium is severely dilated.   7. There is trace aortic regurgitation.   8. There is mild to moderate mitral regurgitation.   9. There is moderate tricuspid regurgitation. Estimated pulmonary artery systolic pressure is 31 mmHg.  10. No pericardial effusion seen.  11. Compared to the transthoracic echocardiogram performed on 12/7/2023, there have been no significant interval changes.      Cath (5/10/2021):  LM:   --  LM: Normal.  LAD:   --  LAD: Angiography showed minor luminal irregularities with no  flow limiting lesions.  CX:   --  Circumflex: Angiography showed minor luminal irregularities with  no flow limiting lesions.  RCA:   --  RCA: Angiography showed minor luminal irregularities with no  flow limiting lesions.      LE Venous Duplex:  IMPRESSION:  No evidence of deep venous thrombosis in either lower extremity.    Doppler waveform patterns bilaterally may suggest central cardiac   dysfunction.    There is bilateral subcutaneous edema.

## 2024-05-10 LAB
ANION GAP SERPL CALC-SCNC: 15 MMOL/L — SIGNIFICANT CHANGE UP (ref 5–17)
BUN SERPL-MCNC: 24 MG/DL — HIGH (ref 7–23)
CALCIUM SERPL-MCNC: 8.9 MG/DL — SIGNIFICANT CHANGE UP (ref 8.4–10.5)
CHLORIDE SERPL-SCNC: 107 MMOL/L — SIGNIFICANT CHANGE UP (ref 96–108)
CO2 SERPL-SCNC: 21 MMOL/L — LOW (ref 22–31)
CREAT SERPL-MCNC: 1.8 MG/DL — HIGH (ref 0.5–1.3)
EGFR: 40 ML/MIN/1.73M2 — LOW
GLUCOSE SERPL-MCNC: 86 MG/DL — SIGNIFICANT CHANGE UP (ref 70–99)
HCT VFR BLD CALC: 38.7 % — LOW (ref 39–50)
HGB BLD-MCNC: 12.6 G/DL — LOW (ref 13–17)
MAGNESIUM SERPL-MCNC: 1.7 MG/DL — SIGNIFICANT CHANGE UP (ref 1.6–2.6)
MCHC RBC-ENTMCNC: 29.4 PG — SIGNIFICANT CHANGE UP (ref 27–34)
MCHC RBC-ENTMCNC: 32.6 GM/DL — SIGNIFICANT CHANGE UP (ref 32–36)
MCV RBC AUTO: 90.2 FL — SIGNIFICANT CHANGE UP (ref 80–100)
NRBC # BLD: 0 /100 WBCS — SIGNIFICANT CHANGE UP (ref 0–0)
PLATELET # BLD AUTO: 122 K/UL — LOW (ref 150–400)
POTASSIUM SERPL-MCNC: 3.8 MMOL/L — SIGNIFICANT CHANGE UP (ref 3.5–5.3)
POTASSIUM SERPL-SCNC: 3.8 MMOL/L — SIGNIFICANT CHANGE UP (ref 3.5–5.3)
RBC # BLD: 4.29 M/UL — SIGNIFICANT CHANGE UP (ref 4.2–5.8)
RBC # FLD: 16.7 % — HIGH (ref 10.3–14.5)
SODIUM SERPL-SCNC: 143 MMOL/L — SIGNIFICANT CHANGE UP (ref 135–145)
URATE SERPL-MCNC: 12.7 MG/DL — HIGH (ref 3.4–8.8)
WBC # BLD: 3.54 K/UL — LOW (ref 3.8–10.5)
WBC # FLD AUTO: 3.54 K/UL — LOW (ref 3.8–10.5)

## 2024-05-10 PROCEDURE — 99232 SBSQ HOSP IP/OBS MODERATE 35: CPT

## 2024-05-10 RX ADMIN — ATORVASTATIN CALCIUM 40 MILLIGRAM(S): 80 TABLET, FILM COATED ORAL at 21:10

## 2024-05-10 RX ADMIN — Medication 80 MILLIGRAM(S): at 05:12

## 2024-05-10 RX ADMIN — Medication 1 SPRAY(S): at 06:45

## 2024-05-10 RX ADMIN — Medication 175 MICROGRAM(S): at 05:12

## 2024-05-10 RX ADMIN — TAMSULOSIN HYDROCHLORIDE 0.4 MILLIGRAM(S): 0.4 CAPSULE ORAL at 21:10

## 2024-05-10 RX ADMIN — Medication 0.6 MILLIGRAM(S): at 11:47

## 2024-05-10 RX ADMIN — SACUBITRIL AND VALSARTAN 1 TABLET(S): 24; 26 TABLET, FILM COATED ORAL at 05:12

## 2024-05-10 RX ADMIN — APIXABAN 5 MILLIGRAM(S): 2.5 TABLET, FILM COATED ORAL at 05:12

## 2024-05-10 RX ADMIN — FINASTERIDE 5 MILLIGRAM(S): 5 TABLET, FILM COATED ORAL at 11:47

## 2024-05-10 RX ADMIN — Medication 80 MILLIGRAM(S): at 17:03

## 2024-05-10 RX ADMIN — APIXABAN 5 MILLIGRAM(S): 2.5 TABLET, FILM COATED ORAL at 17:03

## 2024-05-10 NOTE — PROGRESS NOTE ADULT - SUBJECTIVE AND OBJECTIVE BOX
[facility]   [phone]  RENAL FOLLOW UP NOTE  --------------------------------------------------------------------------------  Chief Complaint:    24 hour events/subjective:    patient seen and examined.   isaimanuel sherman  states he is ambulating    PAST HISTORY  --------------------------------------------------------------------------------  No significant changes to PMH, PSH, FHx, SHx, unless otherwise noted    ALLERGIES & MEDICATIONS  --------------------------------------------------------------------------------  Allergies    No Known Allergies    Intolerances      Standing Inpatient Medications  apixaban 5 milliGRAM(s) Oral every 12 hours  atorvastatin 40 milliGRAM(s) Oral at bedtime  colchicine 0.6 milliGRAM(s) Oral daily  finasteride 5 milliGRAM(s) Oral daily  furosemide   Injectable 80 milliGRAM(s) IV Push two times a day  levothyroxine 175 MICROGram(s) Oral daily  sacubitril 24 mG/valsartan 26 mG 1 Tablet(s) Oral two times a day  sodium chloride 0.65% Nasal 1 Spray(s) Both Nostrils two times a day  tamsulosin 0.4 milliGRAM(s) Oral at bedtime    PRN Inpatient Medications  melatonin 3 milliGRAM(s) Oral at bedtime PRN      REVIEW OF SYSTEMS  --------------------------------------------------------------------------------    Gen: denies fevers/chills,  CVS: denies chest pain/palpitations  Resp: denies SOB/Cough  GI: Denies N/V/Abd pain  : Denies dysuria/oliguria/hematuria    VITALS/PHYSICAL EXAM  --------------------------------------------------------------------------------  T(C): 36.6 (05-10-24 @ 11:57), Max: 37.1 (05-09-24 @ 21:40)  HR: 76 (05-10-24 @ 11:57) (76 - 90)  BP: 98/65 (05-10-24 @ 11:57) (98/65 - 115/86)  RR: 18 (05-10-24 @ 11:57) (18 - 18)  SpO2: 100% (05-10-24 @ 11:57) (96% - 100%)  Wt(kg): --        05-09-24 @ 07:01  -  05-10-24 @ 07:00  --------------------------------------------------------  IN: 365 mL / OUT: 800 mL / NET: -435 mL    05-10-24 @ 07:01  -  05-10-24 @ 13:32  --------------------------------------------------------  IN: 360 mL / OUT: 0 mL / NET: 360 mL      Physical Exam:  	  	Gen: Non toxic comfortable appearing on RA  	Pulm: decrease bs  no rales or ronchi or wheezing  	CV: +JVD. RRR, S1S2; no rub  	Abd: +BS, soft, nontender/nondistended  	: No suprapubic tenderness  	UE: Warm, no cyanosis  no clubbing,  no edema  	LE: Warm, no cyanosis  no clubbing, 2- edema  	Skin: Warm, no decrease skin turgor     LABS/STUDIES  --------------------------------------------------------------------------------              12.6   3.54  >-----------<  122      [05-10-24 @ 08:37]              38.7     143  |  107  |  24  ----------------------------<  86      [05-10-24 @ 05:46]  3.8   |  21  |  1.80        Ca     8.9     [05-10-24 @ 05:46]      Mg     1.7     [05-10-24 @ 05:46]          Uric acid 12.7      [05-10-24 @ 05:46]    Creatinine Trend:  SCr 1.80 [05-10 @ 05:46]  SCr 1.93 [05-09 @ 19:37]  SCr 1.64 [05-09 @ 05:36]  SCr 1.72 [05-08 @ 20:39]  SCr 1.83 [05-08 @ 06:52]                  TSH 10.30      [05-07-24 @ 07:20]  Lipid: chol 104, TG 49, HDL 46, LDL --      [02-04-24 @ 07:11]

## 2024-05-10 NOTE — PROGRESS NOTE ADULT - SUBJECTIVE AND OBJECTIVE BOX
Overnight Events: Feels well, on room air now, says breathing has improved.    Telemetry: Sinus rhythm with PVCs currently.    Review Of Systems: No chest pain, improving shortness of breath  CONSTITUTIONAL: no fevers or chills  EYES/ENT: No visual changes;  No vertigo or throat pain   NECK: No pain or stiffness  RESPIRATORY: Improving shortness of breath  CARDIOVASCULAR: No chest pain or palpitations  GASTROINTESTINAL: No abdominal or epigastric pain. No nausea, vomiting. No diarrhea. No melena.  GENITOURINARY: No dysuria, frequency or hematuria  NEUROLOGICAL: No numbness or weakness    Current Meds:  apixaban 5 milliGRAM(s) Oral every 12 hours  atorvastatin 40 milliGRAM(s) Oral at bedtime  colchicine 0.6 milliGRAM(s) Oral daily  finasteride 5 milliGRAM(s) Oral daily  furosemide   Injectable 80 milliGRAM(s) IV Push two times a day  levothyroxine 175 MICROGram(s) Oral daily  melatonin 3 milliGRAM(s) Oral at bedtime PRN  sacubitril 24 mG/valsartan 26 mG 1 Tablet(s) Oral two times a day  sodium chloride 0.65% Nasal 1 Spray(s) Both Nostrils two times a day  tamsulosin 0.4 milliGRAM(s) Oral at bedtime      Vitals:  T(F): 97.8 (05-10), Max: 98.8 (05-09)  HR: 76 (05-10) (76 - 90)  BP: 98/65 (05-10) (98/65 - 115/86)  RR: 18 (05-10)  SpO2: 100% (05-10)  I&O's Summary    09 May 2024 07:01  -  10 May 2024 07:00  --------------------------------------------------------  IN: 365 mL / OUT: 800 mL / NET: -435 mL    10 May 2024 07:01  -  10 May 2024 12:15  --------------------------------------------------------  IN: 360 mL / OUT: 0 mL / NET: 360 mL        Physical Exam:  Appearance: No acute distress; on oxygen  Eyes: PERRL, EOMI, pink conjunctiva  HEENT: Normal oral mucosa  Cardiovascular: Regular S1 S2, soft SM at LSB  Respiratory: Dec BS at the bases.  Gastrointestinal: soft, non-tender.  Extremities: Bilateral LE edema, RLE>>LLE, knee to foot   Neurologic: Non-focal  Psychiatry: Pleasant, AAOx3, mood & affect appropriate                          12.6   3.54  )-----------( 122      ( 10 May 2024 08:37 )             38.7     05-10    143  |  107  |  24<H>  ----------------------------<  86  3.8   |  21<L>  |  1.80<H>    Ca    8.9      10 May 2024 05:46  Mg     1.7     05-10        CARDIAC MARKERS ( 06 May 2024 20:33 )  71 ng/L / x     / x     / x     / x     / x      CARDIAC MARKERS ( 06 May 2024 18:19 )  84 ng/L / x     / x     / x     / x     / x                  Echo:    Stress Testing:     Cath:    Imaging:      Assessment and Recommendations:        Uriel White MD  Cardiology Attending  [facility] - Cardiology  [address], [address], [place] [address]  Office: [phone]  Fax:  [phone]    All Cardiology service information can be found 24/7 on [email or web address], password: [email or web address]

## 2024-05-10 NOTE — PROGRESS NOTE ADULT - ASSESSMENT
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   --Volume status appears to be improving but patient still appears somewhat dyspnea.   --Strict Is and Os and daily weights, salt restriction. Keep O>>I  --Replete lytes as needed.  --Slight bump in Cr up to 1.93 then down to 1.80; continue to trend.   --Volume status appears to be improving; would consider decreasing furosemide to 40 mg IV bid tomorrow if continues to improve with eventual transition to PO.  --Continue telemetry monitoring  --Sacubitril/valsartan has been restarted.    --Will follow.

## 2024-05-10 NOTE — PROGRESS NOTE ADULT - SUBJECTIVE AND OBJECTIVE BOX
SUBJECTIVE / OVERNIGHT EVENTS:  no acute events overnight   pt feeling slightly better, states he is having less BILLY, but still gets slightly winded; feels abdomen is less bloated/distended   still with LE edema; denies any CP     ADDITIONAL REVIEW OF SYSTEMS:  14 point ROS negative except as noted above     T(C): 36.6 (05-10-24 @ 11:57), Max: 36.6 (05-10-24 @ 04:57)  HR: 76 (05-10-24 @ 11:57) (76 - 80)  BP: 98/65 (05-10-24 @ 11:57) (98/65 - 104/67)  RR: 18 (05-10-24 @ 11:57) (18 - 18)  SpO2: 100% (05-10-24 @ 11:57) (97% - 100%)      MEDICATIONS  (STANDING):  apixaban 5 milliGRAM(s) Oral every 12 hours  atorvastatin 40 milliGRAM(s) Oral at bedtime  colchicine 0.6 milliGRAM(s) Oral daily  finasteride 5 milliGRAM(s) Oral daily  furosemide   Injectable 80 milliGRAM(s) IV Push two times a day  levothyroxine 175 MICROGram(s) Oral daily  sacubitril 24 mG/valsartan 26 mG 1 Tablet(s) Oral two times a day  sodium chloride 0.65% Nasal 1 Spray(s) Both Nostrils two times a day  tamsulosin 0.4 milliGRAM(s) Oral at bedtime    MEDICATIONS  (PRN):  melatonin 3 milliGRAM(s) Oral at bedtime PRN Insomnia      PHYSICAL EXAM:    PHYSICAL EXAM:  GENERAL: NAD, well-developed  HEAD:  Atraumatic, normocephalic  EYES: EOMI, conjunctiva and sclera clear  NECK: Supple, no JVD  CHEST/LUNG: Clear to auscultation bilaterally; no wheezing or rales  HEART: Regular rate and rhythm; no murmurs, + R>>L LE edema (2+ in RLE to thighs, 1+ in LLE)  ABDOMEN: Soft, nontender, nondistended; bowel sounds present  EXTREMITIES:  edema present   PSYCH: AAOx3, calm affect, not anxious  SKIN: No rashes or lesions                            12.9   3.76  )-----------( 129      ( 08 May 2024 06:50 )             39.4               144|106|23<69  4.1|23|1.72  9.3,1.8,--  05-08 @ 20:39  142|106|23<84  4.1|22|1.83  9.4,1.9,--  05-08 @ 06:52  143|106|23<85  4.1|25|1.82  9.5,2.0,--  05-08 @ 06:51      Urinalysis Basic - ( 07 May 2024 07:20 )    Color: x / Appearance: x / SG: x / pH: x  Gluc: 85 mg/dL / Ketone: x  / Bili: x / Urobili: x   Blood: x / Protein: x / Nitrite: x   Leuk Esterase: x / RBC: x / WBC x   Sq Epi: x / Non Sq Epi: x / Bacteria: x            RADIOLOGY & ADDITIONAL TESTS:  New Imaging Personally Reviewed Today:  New Electrocardiogram Personally Reviewed Today:  Other Results Reviewed Today:   Prior or Outpatient Records Reviewed Today with Summary:    COORDINATION OF CARE:  Consultant Communication and Details of Discussion (where applicable):

## 2024-05-10 NOTE — PROGRESS NOTE ADULT - ASSESSMENT
68M w/ hxof L carotid stent, HFrEF EF 40% and amyloidosis, gout, HTN, hypothyroid, CVA, afib on Eliquis, BPH p/w SOB and edema. Pt has been noticing worsening SOB, BILLY, orthopnea and LE edema for past 5-7 days. Also noticed some increased abd distension as well. Increased lasix dose from 40mg BID to 60mg BID for past several days for symptoms without significant effect. Did notice some increased urinary output. Endorses intermittent midsternal chest pain without clear triggering or improving factors. Started before this week but has been noted this week. Endorses sometimes cheating on low salt diet  recently started himself on high water consumption       1- CKD III  2- CHF   3- HTN   4- gout  5- bph    creatinine is steady  continue lasix 80 mg iv bid today  cont entreso 24./26 mg bid   hyperuricemia, add allopurinol 200 mg daily  2 g na diet  strict I/O  daily standing weight, weigh is improving  fluid restriction 1L/day  continue proscar and flomax for bph  trend creatinine and electrolytes daily

## 2024-05-11 LAB
ANION GAP SERPL CALC-SCNC: 12 MMOL/L — SIGNIFICANT CHANGE UP (ref 5–17)
ANION GAP SERPL CALC-SCNC: 14 MMOL/L — SIGNIFICANT CHANGE UP (ref 5–17)
BUN SERPL-MCNC: 26 MG/DL — HIGH (ref 7–23)
BUN SERPL-MCNC: 26 MG/DL — HIGH (ref 7–23)
CALCIUM SERPL-MCNC: 8.5 MG/DL — SIGNIFICANT CHANGE UP (ref 8.4–10.5)
CALCIUM SERPL-MCNC: 9.2 MG/DL — SIGNIFICANT CHANGE UP (ref 8.4–10.5)
CHLORIDE SERPL-SCNC: 105 MMOL/L — SIGNIFICANT CHANGE UP (ref 96–108)
CHLORIDE SERPL-SCNC: 106 MMOL/L — SIGNIFICANT CHANGE UP (ref 96–108)
CO2 SERPL-SCNC: 21 MMOL/L — LOW (ref 22–31)
CO2 SERPL-SCNC: 25 MMOL/L — SIGNIFICANT CHANGE UP (ref 22–31)
CREAT SERPL-MCNC: 1.79 MG/DL — HIGH (ref 0.5–1.3)
CREAT SERPL-MCNC: 1.96 MG/DL — HIGH (ref 0.5–1.3)
EGFR: 36 ML/MIN/1.73M2 — LOW
EGFR: 40 ML/MIN/1.73M2 — LOW
GLUCOSE SERPL-MCNC: 88 MG/DL — SIGNIFICANT CHANGE UP (ref 70–99)
GLUCOSE SERPL-MCNC: 89 MG/DL — SIGNIFICANT CHANGE UP (ref 70–99)
MAGNESIUM SERPL-MCNC: 1.8 MG/DL — SIGNIFICANT CHANGE UP (ref 1.6–2.6)
POTASSIUM SERPL-MCNC: 3.8 MMOL/L — SIGNIFICANT CHANGE UP (ref 3.5–5.3)
POTASSIUM SERPL-MCNC: 6.5 MMOL/L — CRITICAL HIGH (ref 3.5–5.3)
POTASSIUM SERPL-SCNC: 3.8 MMOL/L — SIGNIFICANT CHANGE UP (ref 3.5–5.3)
POTASSIUM SERPL-SCNC: 6.5 MMOL/L — CRITICAL HIGH (ref 3.5–5.3)
SODIUM SERPL-SCNC: 138 MMOL/L — SIGNIFICANT CHANGE UP (ref 135–145)
SODIUM SERPL-SCNC: 145 MMOL/L — SIGNIFICANT CHANGE UP (ref 135–145)

## 2024-05-11 RX ORDER — ALLOPURINOL 300 MG
200 TABLET ORAL DAILY
Refills: 0 | Status: DISCONTINUED | OUTPATIENT
Start: 2024-05-11 | End: 2024-05-13

## 2024-05-11 RX ADMIN — Medication 175 MICROGRAM(S): at 05:56

## 2024-05-11 RX ADMIN — Medication 80 MILLIGRAM(S): at 13:23

## 2024-05-11 RX ADMIN — FINASTERIDE 5 MILLIGRAM(S): 5 TABLET, FILM COATED ORAL at 13:23

## 2024-05-11 RX ADMIN — ATORVASTATIN CALCIUM 40 MILLIGRAM(S): 80 TABLET, FILM COATED ORAL at 21:08

## 2024-05-11 RX ADMIN — APIXABAN 5 MILLIGRAM(S): 2.5 TABLET, FILM COATED ORAL at 17:45

## 2024-05-11 RX ADMIN — Medication 0.6 MILLIGRAM(S): at 13:23

## 2024-05-11 RX ADMIN — Medication 200 MILLIGRAM(S): at 13:27

## 2024-05-11 RX ADMIN — TAMSULOSIN HYDROCHLORIDE 0.4 MILLIGRAM(S): 0.4 CAPSULE ORAL at 21:08

## 2024-05-11 RX ADMIN — Medication 80 MILLIGRAM(S): at 05:56

## 2024-05-11 RX ADMIN — APIXABAN 5 MILLIGRAM(S): 2.5 TABLET, FILM COATED ORAL at 05:59

## 2024-05-11 NOTE — PROGRESS NOTE ADULT - SUBJECTIVE AND OBJECTIVE BOX
SUBJECTIVE / OVERNIGHT EVENTS:  no acute events overnight     T(C): 36.6 (05-11-24 @ 20:58), Max: 36.6 (05-11-24 @ 20:58)  HR: 77 (05-11-24 @ 20:58) (73 - 86)  BP: 110/76 (05-11-24 @ 20:58) (96/64 - 110/76)  RR: 18 (05-11-24 @ 20:58) (18 - 18)  SpO2: 99% (05-11-24 @ 20:58) (99% - 99%)        MEDICATIONS  (STANDING):  allopurinol 200 milliGRAM(s) Oral daily  apixaban 5 milliGRAM(s) Oral every 12 hours  atorvastatin 40 milliGRAM(s) Oral at bedtime  colchicine 0.6 milliGRAM(s) Oral daily  finasteride 5 milliGRAM(s) Oral daily  furosemide   Injectable 80 milliGRAM(s) IV Push two times a day  levothyroxine 175 MICROGram(s) Oral daily  sacubitril 24 mG/valsartan 26 mG 1 Tablet(s) Oral two times a day  sodium chloride 0.65% Nasal 1 Spray(s) Both Nostrils two times a day  tamsulosin 0.4 milliGRAM(s) Oral at bedtime    MEDICATIONS  (PRN):  melatonin 3 milliGRAM(s) Oral at bedtime PRN Insomnia    PHYSICAL EXAM:    PHYSICAL EXAM:  GENERAL: NAD, well-developed  HEAD:  Atraumatic, normocephalic  EYES: EOMI, conjunctiva and sclera clear  NECK: Supple, no JVD  CHEST/LUNG: Clear to auscultation bilaterally; no wheezing or rales  HEART: Regular rate and rhythm; no murmurs, + R>>L LE edema (2+ in RLE to thighs, 1+ in LLE)  ABDOMEN: Soft, nontender, nondistended; bowel sounds present  EXTREMITIES:  edema present   PSYCH: AAOx3, calm affect, not anxious  SKIN: No rashes or lesions                                 12.6   3.54  )-----------( 122      ( 10 May 2024 08:37 )             38.7               145|106|26<89  3.8|25|1.96  9.2,--,--  05-11 @ 07:32  138|105|26<88  6.5|21|1.79  8.5,1.8,--  05-11 @ 06:22          RADIOLOGY & ADDITIONAL TESTS:  New Imaging Personally Reviewed Today:  New Electrocardiogram Personally Reviewed Today:  Other Results Reviewed Today:   Prior or Outpatient Records Reviewed Today with Summary:    COORDINATION OF CARE:  Consultant Communication and Details of Discussion (where applicable):

## 2024-05-11 NOTE — PROGRESS NOTE ADULT - ASSESSMENT
68M w/ hxof L carotid stent, HFrEF EF 40% and amyloidosis, gout, HTN, hypothyroid, CVA, afib on Eliquis, BPH p/w SOB and edema. Pt has been noticing worsening SOB, BILLY, orthopnea and LE edema for past 5-7 days. Also noticed some increased abd distension as well. Increased lasix dose from 40mg BID to 60mg BID for past several days for symptoms without significant effect. Did notice some increased urinary output. Endorses intermittent midsternal chest pain without clear triggering or improving factors. Started before this week but has been noted this week. Endorses sometimes cheating on low salt diet  recently started himself on high water consumption       1- CKD III  2- CHF   3- HTN   4- gout  5- bph      creatinine is steady  fluid status is improving, however still appears hypervolemic  continue lasix 80 mg iv bid   cont entreso 24./26 mg bid   hyperuricemia, add allopurinol 200 mg daily  2 g na diet  strict I/O  daily standing weight, weight is improving  fluid restriction 1L/day  continue proscar and flomax for bph  trend creatinine and electrolytes daily

## 2024-05-11 NOTE — PROGRESS NOTE ADULT - SUBJECTIVE AND OBJECTIVE BOX
[facility]   [phone]  RENAL FOLLOW UP NOTE  --------------------------------------------------------------------------------  Chief Complaint:    24 hour events/subjective:    patient seen and examined.   denies sob    PAST HISTORY  --------------------------------------------------------------------------------  No significant changes to PMH, PSH, FHx, SHx, unless otherwise noted    ALLERGIES & MEDICATIONS  --------------------------------------------------------------------------------  Allergies    No Known Allergies    Intolerances      Standing Inpatient Medications  allopurinol 200 milliGRAM(s) Oral daily  apixaban 5 milliGRAM(s) Oral every 12 hours  atorvastatin 40 milliGRAM(s) Oral at bedtime  colchicine 0.6 milliGRAM(s) Oral daily  finasteride 5 milliGRAM(s) Oral daily  furosemide   Injectable 80 milliGRAM(s) IV Push two times a day  levothyroxine 175 MICROGram(s) Oral daily  sacubitril 24 mG/valsartan 26 mG 1 Tablet(s) Oral two times a day  sodium chloride 0.65% Nasal 1 Spray(s) Both Nostrils two times a day  tamsulosin 0.4 milliGRAM(s) Oral at bedtime    PRN Inpatient Medications  melatonin 3 milliGRAM(s) Oral at bedtime PRN      REVIEW OF SYSTEMS  --------------------------------------------------------------------------------    Gen: denies fevers/chills,  CVS: denies chest pain/palpitations  Resp: denies SOB/Cough  GI: Denies N/V/Abd pain  : Denies dysuria/oliguria/hematuria    VITALS/PHYSICAL EXAM  --------------------------------------------------------------------------------  T(C): 36.6 (05-11-24 @ 04:39), Max: 36.6 (05-10-24 @ 11:57)  HR: 80 (05-11-24 @ 04:39) (76 - 86)  BP: 100/65 (05-11-24 @ 04:39) (98/65 - 108/72)  RR: 18 (05-11-24 @ 04:39) (18 - 18)  SpO2: 95% (05-11-24 @ 04:39) (94% - 100%)  Wt(kg): --        05-10-24 @ 07:01  -  05-11-24 @ 07:00  --------------------------------------------------------  IN: 1200 mL / OUT: 1525 mL / NET: -325 mL    05-11-24 @ 07:01  -  05-11-24 @ 11:22  --------------------------------------------------------  IN: 240 mL / OUT: 330 mL / NET: -90 mL      Physical Exam:  	  	Gen: Non toxic comfortable appearing on RA  	Pulm: decrease bs  no rales or ronchi or wheezing  	CV: +JVD. RRR, S1S2; no rub  	Abd: +BS, soft, nontender/nondistended  	: No suprapubic tenderness  	UE: Warm, no cyanosis  no clubbing,  no edema  	LE: Warm, no cyanosis  no clubbing, 2- edema R>L  	Skin: Warm, no decrease skin turgor     LABS/STUDIES  --------------------------------------------------------------------------------              12.6   3.54  >-----------<  122      [05-10-24 @ 08:37]              38.7     145  |  106  |  26  ----------------------------<  89      [05-11-24 @ 07:32]  3.8   |  25  |  1.96        Ca     9.2     [05-11-24 @ 07:32]      Mg     1.8     [05-11-24 @ 06:22]          Uric acid 12.7      [05-10-24 @ 05:46]    Creatinine Trend:  SCr 1.96 [05-11 @ 07:32]  SCr 1.79 [05-11 @ 06:22]  SCr 1.80 [05-10 @ 05:46]  SCr 1.93 [05-09 @ 19:37]  SCr 1.64 [05-09 @ 05:36]            TSH 10.30      [05-07-24 @ 07:20]  Lipid: chol 104, TG 49, HDL 46, LDL --      [02-04-24 @ 07:11]

## 2024-05-12 LAB
ANION GAP SERPL CALC-SCNC: 13 MMOL/L — SIGNIFICANT CHANGE UP (ref 5–17)
BUN SERPL-MCNC: 27 MG/DL — HIGH (ref 7–23)
CALCIUM SERPL-MCNC: 9 MG/DL — SIGNIFICANT CHANGE UP (ref 8.4–10.5)
CHLORIDE SERPL-SCNC: 107 MMOL/L — SIGNIFICANT CHANGE UP (ref 96–108)
CO2 SERPL-SCNC: 25 MMOL/L — SIGNIFICANT CHANGE UP (ref 22–31)
CREAT SERPL-MCNC: 1.77 MG/DL — HIGH (ref 0.5–1.3)
EGFR: 41 ML/MIN/1.73M2 — LOW
GLUCOSE SERPL-MCNC: 91 MG/DL — SIGNIFICANT CHANGE UP (ref 70–99)
HCT VFR BLD CALC: 34.2 % — LOW (ref 39–50)
HGB BLD-MCNC: 11.7 G/DL — LOW (ref 13–17)
MAGNESIUM SERPL-MCNC: 1.7 MG/DL — SIGNIFICANT CHANGE UP (ref 1.6–2.6)
MCHC RBC-ENTMCNC: 29.8 PG — SIGNIFICANT CHANGE UP (ref 27–34)
MCHC RBC-ENTMCNC: 34.2 GM/DL — SIGNIFICANT CHANGE UP (ref 32–36)
MCV RBC AUTO: 87 FL — SIGNIFICANT CHANGE UP (ref 80–100)
NRBC # BLD: 0 /100 WBCS — SIGNIFICANT CHANGE UP (ref 0–0)
PHOSPHATE SERPL-MCNC: 3.7 MG/DL — SIGNIFICANT CHANGE UP (ref 2.5–4.5)
PLATELET # BLD AUTO: 122 K/UL — LOW (ref 150–400)
POTASSIUM SERPL-MCNC: 3.7 MMOL/L — SIGNIFICANT CHANGE UP (ref 3.5–5.3)
POTASSIUM SERPL-SCNC: 3.7 MMOL/L — SIGNIFICANT CHANGE UP (ref 3.5–5.3)
RBC # BLD: 3.93 M/UL — LOW (ref 4.2–5.8)
RBC # FLD: 15.9 % — HIGH (ref 10.3–14.5)
SODIUM SERPL-SCNC: 145 MMOL/L — SIGNIFICANT CHANGE UP (ref 135–145)
WBC # BLD: 3.35 K/UL — LOW (ref 3.8–10.5)
WBC # FLD AUTO: 3.35 K/UL — LOW (ref 3.8–10.5)

## 2024-05-12 RX ORDER — POTASSIUM CHLORIDE 20 MEQ
40 PACKET (EA) ORAL ONCE
Refills: 0 | Status: COMPLETED | OUTPATIENT
Start: 2024-05-12 | End: 2024-05-12

## 2024-05-12 RX ORDER — POLYETHYLENE GLYCOL 3350 17 G/17G
17 POWDER, FOR SOLUTION ORAL DAILY
Refills: 0 | Status: DISCONTINUED | OUTPATIENT
Start: 2024-05-12 | End: 2024-05-13

## 2024-05-12 RX ORDER — MAGNESIUM SULFATE 500 MG/ML
1 VIAL (ML) INJECTION ONCE
Refills: 0 | Status: COMPLETED | OUTPATIENT
Start: 2024-05-12 | End: 2024-05-12

## 2024-05-12 RX ADMIN — POLYETHYLENE GLYCOL 3350 17 GRAM(S): 17 POWDER, FOR SOLUTION ORAL at 21:19

## 2024-05-12 RX ADMIN — APIXABAN 5 MILLIGRAM(S): 2.5 TABLET, FILM COATED ORAL at 17:28

## 2024-05-12 RX ADMIN — Medication 200 MILLIGRAM(S): at 12:46

## 2024-05-12 RX ADMIN — FINASTERIDE 5 MILLIGRAM(S): 5 TABLET, FILM COATED ORAL at 12:46

## 2024-05-12 RX ADMIN — ATORVASTATIN CALCIUM 40 MILLIGRAM(S): 80 TABLET, FILM COATED ORAL at 21:20

## 2024-05-12 RX ADMIN — TAMSULOSIN HYDROCHLORIDE 0.4 MILLIGRAM(S): 0.4 CAPSULE ORAL at 21:20

## 2024-05-12 RX ADMIN — APIXABAN 5 MILLIGRAM(S): 2.5 TABLET, FILM COATED ORAL at 05:13

## 2024-05-12 RX ADMIN — Medication 0.6 MILLIGRAM(S): at 12:46

## 2024-05-12 RX ADMIN — Medication 80 MILLIGRAM(S): at 17:21

## 2024-05-12 RX ADMIN — SACUBITRIL AND VALSARTAN 1 TABLET(S): 24; 26 TABLET, FILM COATED ORAL at 17:21

## 2024-05-12 RX ADMIN — Medication 175 MICROGRAM(S): at 05:13

## 2024-05-12 RX ADMIN — Medication 80 MILLIGRAM(S): at 05:13

## 2024-05-12 RX ADMIN — Medication 40 MILLIEQUIVALENT(S): at 21:19

## 2024-05-12 RX ADMIN — Medication 100 GRAM(S): at 21:20

## 2024-05-12 NOTE — PROGRESS NOTE ADULT - SUBJECTIVE AND OBJECTIVE BOX
[facility]   [phone]  RENAL FOLLOW UP NOTE  --------------------------------------------------------------------------------  Chief Complaint:    24 hour events/subjective:    seen earlier denies worsening sob   + c/o edema     PAST HISTORY  --------------------------------------------------------------------------------  No significant changes to PMH, PSH, FHx, SHx, unless otherwise noted    ALLERGIES & MEDICATIONS  --------------------------------------------------------------------------------  Allergies    No Known Allergies    Intolerances      Standing Inpatient Medications  allopurinol 200 milliGRAM(s) Oral daily  apixaban 5 milliGRAM(s) Oral every 12 hours  atorvastatin 40 milliGRAM(s) Oral at bedtime  colchicine 0.6 milliGRAM(s) Oral daily  finasteride 5 milliGRAM(s) Oral daily  furosemide   Injectable 80 milliGRAM(s) IV Push two times a day  levothyroxine 175 MICROGram(s) Oral daily  magnesium sulfate  IVPB 1 Gram(s) IV Intermittent once  polyethylene glycol 3350 17 Gram(s) Oral daily  potassium chloride    Tablet ER 40 milliEquivalent(s) Oral once  sacubitril 24 mG/valsartan 26 mG 1 Tablet(s) Oral two times a day  sodium chloride 0.65% Nasal 1 Spray(s) Both Nostrils two times a day  tamsulosin 0.4 milliGRAM(s) Oral at bedtime    PRN Inpatient Medications  melatonin 3 milliGRAM(s) Oral at bedtime PRN      REVIEW OF SYSTEMS  --------------------------------------------------------------------------------    Gen: denies  fevers/chills,  CVS: denies chest pain/palpitations  Resp: denies SOB/Cough  GI: Denies N/V/Abd pain  : Denies dysuria    VITALS/PHYSICAL EXAM  --------------------------------------------------------------------------------  T(C): 36.4 (05-12-24 @ 12:33), Max: 36.6 (05-11-24 @ 20:58)  HR: 82 (05-12-24 @ 19:12) (62 - 87)  BP: 116/78 (05-12-24 @ 17:24) (96/65 - 117/71)  RR: 18 (05-12-24 @ 12:33) (18 - 18)  SpO2: 100% (05-12-24 @ 12:33) (94% - 100%)  Wt(kg): --        05-11-24 @ 07:01  -  05-12-24 @ 07:00  --------------------------------------------------------  IN: 480 mL / OUT: 580 mL / NET: -100 mL    05-12-24 @ 07:01  -  05-12-24 @ 20:22  --------------------------------------------------------  IN: 480 mL / OUT: 1150 mL / NET: -670 mL      Physical Exam:  	    Gen: Non toxic comfortable appearing on RA  	Pulm: decrease bs  no rales or ronchi or wheezing  	CV: +JVD. RRR, S1S2; no rub  	Abd: +BS, soft, nontender/nondistended  	: No suprapubic tenderness  	UE: Warm, no cyanosis  no clubbing,  no edema  	LE: Warm, no cyanosis  no clubbing, 1+ edema R>L  	Skin: Warm, no decrease skin turgor     LABS/STUDIES  --------------------------------------------------------------------------------              11.7   3.35  >-----------<  122      [05-12-24 @ 05:10]              34.2     145  |  107  |  27  ----------------------------<  91      [05-12-24 @ 05:10]  3.7   |  25  |  1.77        Ca     9.0     [05-12-24 @ 05:10]      Mg     1.7     [05-12-24 @ 05:10]      Phos  3.7     [05-12-24 @ 05:10]            Creatinine Trend:  SCr 1.77 [05-12 @ 05:10]  SCr 1.96 [05-11 @ 07:32]  SCr 1.79 [05-11 @ 06:22]  SCr 1.80 [05-10 @ 05:46]  SCr 1.93 [05-09 @ 19:37]            TSH 10.30      [05-07-24 @ 07:20]  Lipid: chol 104, TG 49, HDL 46, LDL --      [02-04-24 @ 07:11]

## 2024-05-12 NOTE — PROGRESS NOTE ADULT - PROBLEM SELECTOR PLAN 7
-Cont. flomax  -Cont. finasteride

## 2024-05-12 NOTE — PROVIDER CONTACT NOTE (OTHER) - ACTION/TREATMENT ORDERED:
provider notified, lab ordered. will cont to monitor provider notified, awaiting for orders. will cont to monitor

## 2024-05-12 NOTE — PROGRESS NOTE ADULT - PROBLEM SELECTOR PLAN 4
S/p carotid stent  -Cont. Eliquis  -Off plavix as per pt  -Cont. atorvastatin QHS

## 2024-05-12 NOTE — PROGRESS NOTE ADULT - PROBLEM SELECTOR PLAN 5
-Cont. levothyroxine

## 2024-05-12 NOTE — PROGRESS NOTE ADULT - PROBLEM SELECTOR PLAN 8
DVT PPx  -Pt on Eliquis

## 2024-05-12 NOTE — PROGRESS NOTE ADULT - PROBLEM SELECTOR PLAN 2
Diagnosed with amyloidosis. Getting Amvuttra injections every 90 days last injection April 24th.   States he has been approved for Vyndamax and is waiting for cardiologist to tell him when received by office.  -Cardiology follow-up (pt follows with Dr Faulkner)

## 2024-05-12 NOTE — PROGRESS NOTE ADULT - ASSESSMENT
68M w/ hxof L carotid stent, HFrEF EF 40% and amyloidosis, gout, HTN, hypothyroid, CVA, afib on Eliquis, BPH p/w SOB and edema. Pt has been noticing worsening SOB, BILLY, orthopnea and LE edema for past 5-7 days. Also noticed some increased abd distension as well. Increased lasix dose from 40mg BID to 60mg BID for past several days for symptoms without significant effect. Did notice some increased urinary output. Endorses intermittent midsternal chest pain without clear triggering or improving factors. Started before this week but has been noted this week. Endorses sometimes cheating on low salt diet  recently started himself on high water consumption       1- CKD III  2- CHF   3- HTN   4- gout  5- bph      creatinine is steady range   fluid status is improving  continue lasix 80 mg iv bid  today and change to lasix 80 mg po twice daily   cont entreso 24./26 mg bid   hyperuricemia,  allopurinol 200 mg daily  2 g na diet  strict I/O  daily standing weight  fluid restriction 1L/day  continue proscar and flomax for bph  trend creatinine and electrolytes daily

## 2024-05-12 NOTE — PROGRESS NOTE ADULT - PROBLEM SELECTOR PLAN 1
BNP elevated. 02/2024 TTE w/ EF 40%, findings compatible with amyloidosis. Unclear trigger for worsening symptoms. Also having episodes of chest pain, relatively normal LHC 2021.  Symptoms slightly improved this morning; of note pts LLE is always bigger than RLE, currently with 2+ edema in LLE, 1+ in RLE    - c/w Lasix 40mg IV BID for now  - c/w Entresto BID  - - monitor on telemetry  Diueresis as per renal/ cards
BNP elevated. 02/2024 TTE w/ EF 40%, findings compatible with amyloidosis. Unclear trigger for worsening symptoms. Also having episodes of chest pain, relatively normal LHC 2021.  Symptoms slightly improved this morning; of note pts LLE is always bigger than RLE, currently with 2+ edema in LLE, 1+ in RLE    - c/w Lasix 40mg IV BID for now  - c/w Entresto BID  - - monitor on telemetry    Renal consult called
BNP elevated. 02/2024 TTE w/ EF 40%, findings compatible with amyloidosis. Unclear trigger for worsening symptoms. Also having episodes of chest pain, relatively normal LHC 2021.  Symptoms slightly improved this morning; of note pts LLE is always bigger than RLE, currently with 2+ edema in LLE, 1+ in RLE    - c/w Lasix 80  IV BID for nowc and change to po bid   - c/w Entresto BID  - - monitor on telemetry  Diueresis as per renal/ cards
BNP elevated. 02/2024 TTE w/ EF 40%, findings compatible with amyloidosis. Unclear trigger for worsening symptoms. Also having episodes of chest pain, relatively normal LHC 2021.  Symptoms slightly improved this morning; of note pts LLE is always bigger than RLE, currently with 2+ edema in LLE, 1+ in RLE    - c/w Lasix 40mg IV BID for now  - c/w Entresto BID  - Daily weight and I/Os  - Cardiology consulted, pending recs   - Trend BMP, Mg  - monitor on telemetry
BNP elevated. 02/2024 TTE w/ EF 40%, findings compatible with amyloidosis. Unclear trigger for worsening symptoms. Also having episodes of chest pain, relatively normal LHC 2021.  Symptoms slightly improved this morning; of note pts LLE is always bigger than RLE, currently with 2+ edema in LLE, 1+ in RLE    - c/w Lasix 40mg IV BID for now  - c/w Entresto BID  - - monitor on telemetry    Renal consult called
BNP elevated. 02/2024 TTE w/ EF 40%, findings compatible with amyloidosis. Unclear trigger for worsening symptoms. Also having episodes of chest pain, relatively normal LHC 2021.  Symptoms slightly improved this morning; of note pts LLE is always bigger than RLE, currently with 2+ edema in LLE, 1+ in RLE    - c/w Lasix 40mg IV BID for now  - c/w Entresto BID  - - monitor on telemetry    Renal consult called

## 2024-05-12 NOTE — CHART NOTE - NSCHARTNOTEFT_GEN_A_CORE
[facility] ACP note    Cc; Unsustained WCT  patient had 8 beats of WCT, followed by another 8 baets of WCT  asymptomatic, patient was sleeping during event per RN  tele reviewed, Sr at baseline,   Evaluated the pt at bedside, he denies Dizziness, SOB, CP, palpations     Vital Signs Last 24 Hrs  T(C): 36.5 (12 May 2024 03:12), Max: 36.6 (11 May 2024 20:58)  T(F): 97.7 (12 May 2024 03:12), Max: 97.8 (11 May 2024 20:58)  HR: 82 (12 May 2024 03:12) (73 - 86)  BP: 96/65 (12 May 2024 03:12) (96/64 - 110/76)  BP(mean): 87 (11 May 2024 20:58) (87 - 87)  RR: 18 (12 May 2024 03:12) (18 - 18)  SpO2: 94% (12 May 2024 03:12) (94% - 99%)    Parameters below as of 12 May 2024 03:12  Patient On (Oxygen Delivery Method): room air      < from: TTE W or WO Ultrasound Enhancing Agent (02.05.24 @ 08:14) >     1. Left ventricular cavity is normal. Left ventricular wall thicknessis normal. Left ventricular systolic function is moderately decreased with an ejection fraction of 40 % by 3D. There are no regional wall motion abnormalities seen.   2. Multiple segmental abnormalities exist. See findings.   3. Elevated filling pressure. Analysis of left ventricular diastolic function and filling pressure is made challenging by the presence of merged E and A wave.   4. Moderately enlarged right ventricular cavity size, wall thickness, and reduced systolic function.   5. The leftatrium is severely dilated.   6. The right atrium is severely dilated.   7. There is trace aortic regurgitation.   8. There is mild to moderate mitral regurgitation.   9. There is moderate tricuspid regurgitation. Estimated pulmonary artery systolic pressure is 31 mmHg.  10. No pericardial effusion seen.  11. Compared to the transthoracic echocardiogram performed on 12/7/2023, there have been no significant interval changes.    · Assessment  	  68M w/ hxof L carotid stent, HFrEF EF 40% and amyloidosis, gout, HTN, hypothyroid, CVA, afib on Eliquis, BPH p/w SOB and edema concerning for acute on chronic HFrEF exacerbation  now with unsustained WCT  Pt asymptomatic  stat BMP, mg, phosphorous   Supplement electrolytes prn to keep k+ > 4.0, mg> 2,0, phos > 3.0  C/W IV diuresis, currently off BB  C/W tele  F/U cardiology am  Will sign oiy to day team    Jatin Calvillo P BC  [id number] Medicine ACP note    Cc; Unsustained WCT  patient had 8 beats of WCT, followed by another 8 baets of WCT  asymptomatic, patient was sleeping during event per RN  tele reviewed, Sr at baseline,   Evaluated the pt at bedside, he denies Dizziness, SOB, CP, palpations     Vital Signs Last 24 Hrs  T(C): 36.5 (12 May 2024 03:12), Max: 36.6 (11 May 2024 20:58)  T(F): 97.7 (12 May 2024 03:12), Max: 97.8 (11 May 2024 20:58)  HR: 82 (12 May 2024 03:12) (73 - 86)  BP: 96/65 (12 May 2024 03:12) (96/64 - 110/76)  BP(mean): 87 (11 May 2024 20:58) (87 - 87)  RR: 18 (12 May 2024 03:12) (18 - 18)  SpO2: 94% (12 May 2024 03:12) (94% - 99%)    Parameters below as of 12 May 2024 03:12  Patient On (Oxygen Delivery Method): room air      < from: TTE W or WO Ultrasound Enhancing Agent (02.05.24 @ 08:14) >     1. Left ventricular cavity is normal. Left ventricular wall thicknessis normal. Left ventricular systolic function is moderately decreased with an ejection fraction of 40 % by 3D. There are no regional wall motion abnormalities seen.   2. Multiple segmental abnormalities exist. See findings.   3. Elevated filling pressure. Analysis of left ventricular diastolic function and filling pressure is made challenging by the presence of merged E and A wave.   4. Moderately enlarged right ventricular cavity size, wall thickness, and reduced systolic function.   5. The leftatrium is severely dilated.   6. The right atrium is severely dilated.   7. There is trace aortic regurgitation.   8. There is mild to moderate mitral regurgitation.   9. There is moderate tricuspid regurgitation. Estimated pulmonary artery systolic pressure is 31 mmHg.  10. No pericardial effusion seen.  11. Compared to the transthoracic echocardiogram performed on 12/7/2023, there have been no significant interval changes.    · Assessment  	  68M w/ hxof L carotid stent, HFrEF EF 40% and amyloidosis, gout, HTN, hypothyroid, CVA, afib on Eliquis, BPH p/w SOB and edema concerning for acute on chronic HFrEF exacerbation  now with unsustained WCT  Pt asymptomatic  stat BMP, mg, phosphorous   Supplement electrolytes prn to keep k+ > 4.0, mg> 2,0, phos > 3.0  C/W IV diuresis, currently off BB  C/W tele  F/U cardiology am  Will sign oiy to day team    Jatin Calvillo P BC  [id number] Medicine ACP note    Cc; Unsustained WCT  patient had 23 beats of WCT for first time since admission   asymptomatic, patient was sleeping during event per RN  tele reviewed, Sr at baseline,   Evaluated the pt at bedside, he denies Dizziness, SOB, CP, palpations     Vital Signs Last 24 Hrs  T(C): 36.5 (12 May 2024 03:12), Max: 36.6 (11 May 2024 20:58)  T(F): 97.7 (12 May 2024 03:12), Max: 97.8 (11 May 2024 20:58)  HR: 82 (12 May 2024 03:12) (73 - 86)  BP: 96/65 (12 May 2024 03:12) (96/64 - 110/76)  BP(mean): 87 (11 May 2024 20:58) (87 - 87)  RR: 18 (12 May 2024 03:12) (18 - 18)  SpO2: 94% (12 May 2024 03:12) (94% - 99%)    Parameters below as of 12 May 2024 03:12  Patient On (Oxygen Delivery Method): room air      < from: TTE W or WO Ultrasound Enhancing Agent (02.05.24 @ 08:14) >     1. Left ventricular cavity is normal. Left ventricular wall thicknessis normal. Left ventricular systolic function is moderately decreased with an ejection fraction of 40 % by 3D. There are no regional wall motion abnormalities seen.   2. Multiple segmental abnormalities exist. See findings.   3. Elevated filling pressure. Analysis of left ventricular diastolic function and filling pressure is made challenging by the presence of merged E and A wave.   4. Moderately enlarged right ventricular cavity size, wall thickness, and reduced systolic function.   5. The leftatrium is severely dilated.   6. The right atrium is severely dilated.   7. There is trace aortic regurgitation.   8. There is mild to moderate mitral regurgitation.   9. There is moderate tricuspid regurgitation. Estimated pulmonary artery systolic pressure is 31 mmHg.  10. No pericardial effusion seen.  11. Compared to the transthoracic echocardiogram performed on 12/7/2023, there have been no significant interval changes.    · Assessment  	  68M w/ hxof L carotid stent, HFrEF EF 40% and amyloidosis, gout, HTN, hypothyroid, CVA, afib on Eliquis, BPH p/w SOB and edema concerning for acute on chronic HFrEF exacerbation  now with unsustained WCT  Pt asymptomatic  stat BMP, mg, phosphorous   Supplement electrolytes prn to keep k+ > 4.0, mg> 2,0, phos > 3.0  C/W IV diuresis, currently off BB  C/W tele  F/U cardiology am  Will sign oiy to day team    Jatin Calvillo P BC  [id number]

## 2024-05-12 NOTE — PROGRESS NOTE ADULT - PROBLEM SELECTOR PLAN 6
-Cont. Colchicine daily, not taking allopurinol

## 2024-05-12 NOTE — PROGRESS NOTE ADULT - SUBJECTIVE AND OBJECTIVE BOX
SUBJECTIVE / OVERNIGHT EVENTS:  no acute events overnight     T(C): 36.4 (05-12-24 @ 20:30), Max: 36.4 (05-12-24 @ 12:33)  HR: 82 (05-12-24 @ 20:30) (62 - 82)  BP: 97/67 (05-12-24 @ 20:30) (97/67 - 117/71)  RR: 18 (05-12-24 @ 20:30) (18 - 18)  SpO2: 96% (05-12-24 @ 20:30) (96% - 100%)      MEDICATIONS  (STANDING):  allopurinol 200 milliGRAM(s) Oral daily  apixaban 5 milliGRAM(s) Oral every 12 hours  atorvastatin 40 milliGRAM(s) Oral at bedtime  colchicine 0.6 milliGRAM(s) Oral daily  finasteride 5 milliGRAM(s) Oral daily  furosemide   Injectable 80 milliGRAM(s) IV Push two times a day  levothyroxine 175 MICROGram(s) Oral daily  polyethylene glycol 3350 17 Gram(s) Oral daily  sacubitril 24 mG/valsartan 26 mG 1 Tablet(s) Oral two times a day  sodium chloride 0.65% Nasal 1 Spray(s) Both Nostrils two times a day  tamsulosin 0.4 milliGRAM(s) Oral at bedtime    MEDICATIONS  (PRN):  melatonin 3 milliGRAM(s) Oral at bedtime PRN Insomnia    PHYSICAL EXAM:    PHYSICAL EXAM:  GENERAL: NAD, well-developed  HEAD:  Atraumatic, normocephalic  EYES: EOMI, conjunctiva and sclera clear  NECK: Supple, no JVD  CHEST/LUNG: Clear to auscultation bilaterally; no wheezing or rales  HEART: Regular rate and rhythm; no murmurs, + R>>L LE edema (2+ in RLE to thighs, 1+ in LLE)  ABDOMEN: Soft, nontender, nondistended; bowel sounds present  EXTREMITIES:  edema present   PSYCH: AAOx3, calm affect, not anxious  SKIN: No rashes or lesions                                 12.6   3.54  )-----------( 122      ( 10 May 2024 08:37 )             38.7               145|106|26<89  3.8|25|1.96  9.2,--,--  05-11 @ 07:32  138|105|26<88  6.5|21|1.79  8.5,1.8,--  05-11 @ 06:22          RADIOLOGY & ADDITIONAL TESTS:  New Imaging Personally Reviewed Today:  New Electrocardiogram Personally Reviewed Today:  Other Results Reviewed Today:   Prior or Outpatient Records Reviewed Today with Summary:    COORDINATION OF CARE:  Consultant Communication and Details of Discussion (where applicable):

## 2024-05-13 ENCOUNTER — TRANSCRIPTION ENCOUNTER (OUTPATIENT)
Age: 69
End: 2024-05-13

## 2024-05-13 VITALS
RESPIRATION RATE: 18 BRPM | SYSTOLIC BLOOD PRESSURE: 96 MMHG | TEMPERATURE: 98 F | DIASTOLIC BLOOD PRESSURE: 59 MMHG | HEART RATE: 85 BPM | OXYGEN SATURATION: 96 %

## 2024-05-13 LAB
ADD ON TEST-SPECIMEN IN LAB: SIGNIFICANT CHANGE UP
ANION GAP SERPL CALC-SCNC: 14 MMOL/L — SIGNIFICANT CHANGE UP (ref 5–17)
BUN SERPL-MCNC: 30 MG/DL — HIGH (ref 7–23)
CALCIUM SERPL-MCNC: 9.1 MG/DL — SIGNIFICANT CHANGE UP (ref 8.4–10.5)
CHLORIDE SERPL-SCNC: 105 MMOL/L — SIGNIFICANT CHANGE UP (ref 96–108)
CO2 SERPL-SCNC: 23 MMOL/L — SIGNIFICANT CHANGE UP (ref 22–31)
CREAT SERPL-MCNC: 1.82 MG/DL — HIGH (ref 0.5–1.3)
EGFR: 39 ML/MIN/1.73M2 — LOW
GLUCOSE SERPL-MCNC: 87 MG/DL — SIGNIFICANT CHANGE UP (ref 70–99)
HCT VFR BLD CALC: 37.1 % — LOW (ref 39–50)
HGB BLD-MCNC: 12.3 G/DL — LOW (ref 13–17)
MAGNESIUM SERPL-MCNC: 1.8 MG/DL — SIGNIFICANT CHANGE UP (ref 1.6–2.6)
MCHC RBC-ENTMCNC: 29.3 PG — SIGNIFICANT CHANGE UP (ref 27–34)
MCHC RBC-ENTMCNC: 33.2 GM/DL — SIGNIFICANT CHANGE UP (ref 32–36)
MCV RBC AUTO: 88.3 FL — SIGNIFICANT CHANGE UP (ref 80–100)
NRBC # BLD: 0 /100 WBCS — SIGNIFICANT CHANGE UP (ref 0–0)
PHOSPHATE SERPL-MCNC: 3.7 MG/DL — SIGNIFICANT CHANGE UP (ref 2.5–4.5)
PLATELET # BLD AUTO: 131 K/UL — LOW (ref 150–400)
POTASSIUM SERPL-MCNC: 3.9 MMOL/L — SIGNIFICANT CHANGE UP (ref 3.5–5.3)
POTASSIUM SERPL-SCNC: 3.9 MMOL/L — SIGNIFICANT CHANGE UP (ref 3.5–5.3)
RBC # BLD: 4.2 M/UL — SIGNIFICANT CHANGE UP (ref 4.2–5.8)
RBC # FLD: 15.9 % — HIGH (ref 10.3–14.5)
SODIUM SERPL-SCNC: 142 MMOL/L — SIGNIFICANT CHANGE UP (ref 135–145)
URATE SERPL-MCNC: 12.5 MG/DL — HIGH (ref 3.4–8.8)
WBC # BLD: 4.16 K/UL — SIGNIFICANT CHANGE UP (ref 3.8–10.5)
WBC # FLD AUTO: 4.16 K/UL — SIGNIFICANT CHANGE UP (ref 3.8–10.5)

## 2024-05-13 PROCEDURE — 84443 ASSAY THYROID STIM HORMONE: CPT

## 2024-05-13 PROCEDURE — 84484 ASSAY OF TROPONIN QUANT: CPT

## 2024-05-13 PROCEDURE — 85027 COMPLETE CBC AUTOMATED: CPT

## 2024-05-13 PROCEDURE — 83880 ASSAY OF NATRIURETIC PEPTIDE: CPT

## 2024-05-13 PROCEDURE — 93970 EXTREMITY STUDY: CPT

## 2024-05-13 PROCEDURE — 36415 COLL VENOUS BLD VENIPUNCTURE: CPT

## 2024-05-13 PROCEDURE — 85610 PROTHROMBIN TIME: CPT

## 2024-05-13 PROCEDURE — 84100 ASSAY OF PHOSPHORUS: CPT

## 2024-05-13 PROCEDURE — 80048 BASIC METABOLIC PNL TOTAL CA: CPT

## 2024-05-13 PROCEDURE — 71045 X-RAY EXAM CHEST 1 VIEW: CPT

## 2024-05-13 PROCEDURE — 83735 ASSAY OF MAGNESIUM: CPT

## 2024-05-13 PROCEDURE — 85730 THROMBOPLASTIN TIME PARTIAL: CPT

## 2024-05-13 PROCEDURE — 84439 ASSAY OF FREE THYROXINE: CPT

## 2024-05-13 PROCEDURE — 99233 SBSQ HOSP IP/OBS HIGH 50: CPT

## 2024-05-13 PROCEDURE — 96374 THER/PROPH/DIAG INJ IV PUSH: CPT

## 2024-05-13 PROCEDURE — 99285 EMERGENCY DEPT VISIT HI MDM: CPT | Mod: 25

## 2024-05-13 PROCEDURE — 84550 ASSAY OF BLOOD/URIC ACID: CPT

## 2024-05-13 PROCEDURE — 80053 COMPREHEN METABOLIC PANEL: CPT

## 2024-05-13 PROCEDURE — 85025 COMPLETE CBC W/AUTO DIFF WBC: CPT

## 2024-05-13 RX ORDER — ALLOPURINOL 300 MG
1 TABLET ORAL
Qty: 30 | Refills: 0
Start: 2024-05-13 | End: 2024-06-11

## 2024-05-13 RX ORDER — FUROSEMIDE 40 MG
1 TABLET ORAL
Qty: 60 | Refills: 0
Start: 2024-05-13 | End: 2024-06-11

## 2024-05-13 RX ORDER — POTASSIUM CHLORIDE 20 MEQ
20 PACKET (EA) ORAL ONCE
Refills: 0 | Status: COMPLETED | OUTPATIENT
Start: 2024-05-13 | End: 2024-05-13

## 2024-05-13 RX ORDER — CLOPIDOGREL BISULFATE 75 MG/1
1 TABLET, FILM COATED ORAL
Refills: 0 | DISCHARGE

## 2024-05-13 RX ORDER — FUROSEMIDE 40 MG
80 TABLET ORAL
Refills: 0 | Status: DISCONTINUED | OUTPATIENT
Start: 2024-05-13 | End: 2024-05-13

## 2024-05-13 RX ORDER — FUROSEMIDE 40 MG
3 TABLET ORAL
Refills: 0 | DISCHARGE

## 2024-05-13 RX ORDER — MAGNESIUM SULFATE 500 MG/ML
1 VIAL (ML) INJECTION ONCE
Refills: 0 | Status: COMPLETED | OUTPATIENT
Start: 2024-05-13 | End: 2024-05-13

## 2024-05-13 RX ADMIN — Medication 0.6 MILLIGRAM(S): at 11:24

## 2024-05-13 RX ADMIN — Medication 80 MILLIGRAM(S): at 06:36

## 2024-05-13 RX ADMIN — Medication 100 GRAM(S): at 11:22

## 2024-05-13 RX ADMIN — Medication 200 MILLIGRAM(S): at 11:23

## 2024-05-13 RX ADMIN — Medication 175 MICROGRAM(S): at 06:36

## 2024-05-13 RX ADMIN — Medication 20 MILLIEQUIVALENT(S): at 11:22

## 2024-05-13 RX ADMIN — APIXABAN 5 MILLIGRAM(S): 2.5 TABLET, FILM COATED ORAL at 06:36

## 2024-05-13 RX ADMIN — FINASTERIDE 5 MILLIGRAM(S): 5 TABLET, FILM COATED ORAL at 11:22

## 2024-05-13 NOTE — DISCHARGE NOTE PROVIDER - NSDCFUADDAPPT_GEN_ALL_CORE_FT
APPTS ARE READY TO BE MADE: [x ] YES    Best Family or Patient Contact (if needed):    Additional Information about above appointments (if needed):    1:   2:   3:     Other comments or requests:    APPTS ARE READY TO BE MADE: [x ] YES    Best Family or Patient Contact (if needed):    Additional Information about above appointments (if needed):    1:   2:   3:     Other comments or requests:   Patient was outreached but did not answer. A voicemail was left for the patient to return our call.

## 2024-05-13 NOTE — DISCHARGE NOTE PROVIDER - CARE PROVIDERS DIRECT ADDRESSES
,DirectAddress_Unknown,[email or web address] ,DirectAddress_Unknown,[email or web address],DirectAddress_Unknown

## 2024-05-13 NOTE — PROVIDER CONTACT NOTE (OTHER) - REASON
pt c/o 8/10 burning pain on the right foot and not be about to feel his right big toe
23 beats WCT first time

## 2024-05-13 NOTE — DISCHARGE NOTE PROVIDER - HOSPITAL COURSE
HPI:  68M w/ hxof L carotid stent, HFrEF EF 40% and amyloidosis, gout, HTN, hypothyroid, CVA, afib on Eliquis, BPH p/w SOB and edema. Pt has been noticing worsening SOB, BILLY, orthopnea and LE edema for past 5-7 days. Also noticed some increased abd distension as well. Increased lasix dose from 40mg BID to 60mg BID for past several days for symptoms without significant effect. Did notice some increased urinary output. Endorses intermittent midsternal chest pain without clear triggering or improving factors. Started before this week but has been noted this week. Endorses sometimes cheating on low salt diet but relatively rare and not more than usual recently. Denies fevers, chills or palpitations. Has chronic cough but unchanged.  LE dopplers negative for DVT.  IV lasix converted to PO. Acute issues resolved.  Patient has been medically cleared for discharge as per Dr. Velazquez.  Patient has been given appropriate discharge instructions including medication regimen, access site management and follow up. Medications that patient needs refills on (+/- new medications) have been e-prescribed to preferred pharmacy. Patient will f/u with Dr. Harris and cardiology in 1-2 weeks for further management.     In ER; Given Lasix 40mg IV, Asa 324mg (06 May 2024 23:57)    Hospital Course:  Pt admitted [facility] Acute CHF- exacerbation, started on Lasix IV and Entresto.  Pt with Cardiac Amyloidosis. - on Amvuttra- injections q 90 days and PAF- cont Eliquis.  Followed by cardiology.  Vyndamax pending outpt.  Pt developed R big toe burning/pain w/ numbness/tingling -- suspect gout flare; IV tylenol ordered.  Received mag and KCL M supplement.  Started on colchicine and allopurinol.  Acute issues resolved. Patient has been medically cleared for discharge as per Dr. Velazquez.  Patient has been given appropriate discharge instructions including medication regimen, access site management and follow up. Medications that patient needs refills on (+/- new medications) have been e-prescribed to preferred pharmacy. Patient will f/u with Dr. Harris and cardiology in 1-2 weeks for further management.         Important Medication Changes and Reason:    Active or Pending Issues Requiring Follow-up:    Advanced Directives:   [X] Full code  [ ] DNR  [ ] Hospice    Discharge Diagnoses:  Acute CHF exacerbation  Gout flare      HPI:  68M w/ hxof L carotid stent, HFrEF EF 40% and amyloidosis, gout, HTN, hypothyroid, CVA, afib on Eliquis, BPH p/w SOB and edema. Pt has been noticing worsening SOB, BILLY, orthopnea and LE edema for past 5-7 days. Also noticed some increased abd distension as well. Increased lasix dose from 40mg BID to 60mg BID for past several days for symptoms without significant effect. Did notice some increased urinary output. Endorses intermittent midsternal chest pain without clear triggering or improving factors. Started before this week but has been noted this week. Endorses sometimes cheating on low salt diet but relatively rare and not more than usual recently. Denies fevers, chills or palpitations. Has chronic cough but unchanged.  LE dopplers negative for DVT.  IV lasix converted to PO. Acute issues resolved.  Patient has been medically cleared for discharge as per Dr. Velazquez.  Patient has been given appropriate discharge instructions including medication regimen, access site management and follow up. Medications that patient needs refills on (+/- new medications) have been e-prescribed to preferred pharmacy. Patient will f/u with Dr. Harris and cardiology in 1-2 weeks for further management.     In ER; Given Lasix 40mg IV, Asa 324mg (06 May 2024 23:57)    Hospital Course:  Pt admitted [facility] Acute CHF- exacerbation, started on Lasix IV and Entresto.  Pt with Cardiac Amyloidosis. - on Amvuttra- injections q 90 days and PAF- cont Eliquis.  Followed by cardiology.  Vyndamax pending outpt.  Pt developed R big toe burning/pain w/ numbness/tingling -- suspect gout flare; IV tylenol ordered.  Received mag and KCL M supplement.  Started on colchicine and allopurinol.  Acute issues resolved. Patient has been medically cleared for discharge as per Dr. Velazquez.  Patient has been given appropriate discharge instructions including medication regimen, access site management and follow up. Medications that patient needs refills on (+/- new medications) have been e-prescribed to preferred pharmacy. Patient will f/u with Dr. Harris and cardiology in 1-2 weeks for further management.         Important Medication Changes and Reason: Lasix 80 PO BID    Active or Pending Issues Requiring Follow-up:  Follow up with cardiology.     Advanced Directives:   [X] Full code  [ ] DNR  [ ] Hospice    Discharge Diagnoses:  Acute CHF exacerbation  Gout flare      HPI:  68M w/ hxof L carotid stent, HFrEF EF 40% and amyloidosis, gout, HTN, hypothyroid, CVA, afib on Eliquis, BPH p/w SOB and edema. Pt has been noticing worsening SOB, BILLY, orthopnea and LE edema for past 5-7 days. Also noticed some increased abd distension as well. Increased lasix dose from 40mg BID to 60mg BID for past several days for symptoms without significant effect. Did notice some increased urinary output. Endorses intermittent midsternal chest pain without clear triggering or improving factors. Started before this week but has been noted this week. Endorses sometimes cheating on low salt diet but relatively rare and not more than usual recently. Denies fevers, chills or palpitations. Has chronic cough but unchanged.  LE dopplers negative for DVT.  IV lasix converted to PO. Acute issues resolved.  Patient has been medically cleared for discharge as per Dr. Velazquez.  Patient has been given appropriate discharge instructions including medication regimen, access site management and follow up. Medications that patient needs refills on (+/- new medications) have been e-prescribed to preferred pharmacy. Patient will f/u with Dr. Harris and cardiology in 1-2 weeks for further management.     In ER; Given Lasix 40mg IV, Asa 324mg (06 May 2024 23:57)    Hospital Course:  Pt admitted [facility] Acute CHF- exacerbation, started on Lasix IV and Entresto.  Pt with Cardiac Amyloidosis. - on Amvuttra- injections q 90 days and PAF- cont Eliquis.  Followed by cardiology.  Vyndamax pending outpt.  Pt developed R big toe burning/pain w/ numbness/tingling -- suspect gout flare; IV tylenol ordered.  Received mag and KCL M supplement.  Started on colchicine and allopurinol.  Acute issues resolved. Patient has been medically cleared for discharge as per Dr. Velazquez.  Patient has been given appropriate discharge instructions including medication regimen, access site management and follow up. Medications that patient needs refills on (+/- new medications) have been e-prescribed to preferred pharmacy. Patient will f/u with Dr. Harris and cardiology in 1-2 weeks for further management.       Important Medication Changes and Reason: Lasix 80 PO BID    Active or Pending Issues Requiring Follow-up:  Follow up with cardiology.     Advanced Directives:   [X] Full code  [ ] DNR  [ ] Hospice    Discharge Diagnoses:  Acute CHF exacerbation  Gout flare

## 2024-05-13 NOTE — DISCHARGE NOTE NURSING/CASE MANAGEMENT/SOCIAL WORK - NSDCPEFALRISK_GEN_ALL_CORE
For information on Fall & Injury Prevention, visit: [email or web address] OR  [email or web address] OR  https://www.cdc.gov/steadi/patient.html

## 2024-05-13 NOTE — PROVIDER CONTACT NOTE (OTHER) - ACTION/TREATMENT ORDERED:
provider notified, add on lab orders, IV Tylenol ordered. provider will come at bedside. will cont to monitor

## 2024-05-13 NOTE — DISCHARGE NOTE PROVIDER - CARE PROVIDER_API CALL
Mitchell Harris  [address] [address]  Phone: [phone]  Follow Up Time: 1 week    Shawn Barton  Cardiovascular Disease  [address] [place] [address]  Phone: [phone]  Fax: [phone]  Follow Up Time: 1 week   Mitchell Harris  [address] [facility]  [address]  [place] [address]  Phone: [phone]  Follow Up Time: 1 week    Mane Yi  Cardiovascular Disease  [address] [facility] [address]  [place] [address]  Phone: [phone]  Fax: [phone]  Follow Up Time: 1 week    Paris Rodriguez  Nephrology  [address] [address]  Phone: [phone]  Fax: [phone]  Follow Up Time: 1 week

## 2024-05-13 NOTE — DISCHARGE NOTE PROVIDER - NSDCCPCAREPLAN_GEN_ALL_CORE_FT
PRINCIPAL DISCHARGE DIAGNOSIS  Diagnosis: CHF exacerbation  Assessment and Plan of Treatment: Weigh yourself daily.  If you gain 3lbs in 3 days, or 5lbs in a week call your Health Care Provider.  Do not eat or drink foods containing more than 2000mg of salt (sodium) in your diet every day.  Call your Health Care Provider if you have any swelling or increased swelling in your feet, ankles, and/or stomach.  The Pt was provided with CHF diet instruction (low sodium diet, daily weights, label reading, Heart Healthy Cooking Tips & Heart Healthy shopping Tips).  Take all of your medication as directed.  If you become dizzy call your Health Care Provider.      SECONDARY DISCHARGE DIAGNOSES  Diagnosis: Gout flare  Assessment and Plan of Treatment: Continue current medications     PRINCIPAL DISCHARGE DIAGNOSIS  Diagnosis: CHF exacerbation  Assessment and Plan of Treatment: Weigh yourself daily.  If you gain 3lbs in 3 days, or 5lbs in a week call your Health Care Provider.  Do not eat or drink foods containing more than 2000mg of salt (sodium) in your diet every day.  Call your Health Care Provider if you have any swelling or increased swelling in your feet, ankles, and/or stomach.  Take all of your medication as directed.  If you become dizzy call your Health Care Provider.  You were given IV lasix in the hospital.  Continue with oral lasix 80mg two times a day.      SECONDARY DISCHARGE DIAGNOSES  Diagnosis: Gout flare  Assessment and Plan of Treatment: Continue current medications in addition to Allopurinol.     PRINCIPAL DISCHARGE DIAGNOSIS  Diagnosis: CHF exacerbation  Assessment and Plan of Treatment: Weigh yourself daily.  1 liter fluid restriction   If you gain 3lbs in 3 days, or 5lbs in a week call your Health Care Provider.  Do not eat or drink foods containing more than 2000mg of salt (sodium) in your diet every day.  Call your Health Care Provider if you have any swelling or increased swelling in your feet, ankles, and/or stomach.  Take all of your medication as directed.  If you become dizzy call your Health Care Provider.  You were given IV lasix in the hospital.  Continue with oral lasix 80mg two times a day.      SECONDARY DISCHARGE DIAGNOSES  Diagnosis: Gout flare  Assessment and Plan of Treatment: Continue current medications in addition to Allopurinol.

## 2024-05-13 NOTE — DISCHARGE NOTE PROVIDER - PROVIDER TOKENS
PROVIDER:[TOKEN:[[id number]],FOLLOWUP:[1 week]],PROVIDER:[TOKEN:[[id number]],FOLLOWUP:[1 week]] PROVIDER:[TOKEN:[[id number]:[id number]],FOLLOWUP:[1 week]],PROVIDER:[TOKEN:[[id number]],FOLLOWUP:[1 week]],PROVIDER:[TOKEN:[[id number]],FOLLOWUP:[1 week]]

## 2024-05-13 NOTE — DISCHARGE NOTE PROVIDER - NSDCMRMEDTOKEN_GEN_ALL_CORE_FT
Albuterol (Eqv-ProAir HFA) 90 mcg/inh inhalation aerosol: 1 puff(s) inhaled once a day (at bedtime) and as needed  Amvuttra 25 mg/0.5 mL subcutaneous solution: 25 milligram(s) subcutaneously every 3 months  apixaban 5 mg oral tablet: 1 tab(s) orally every 12 hours  atorvastatin 40 mg oral tablet: 1 tab(s) orally once a day (at bedtime)  colchicine 0.6 mg oral tablet: 1 tab(s) orally once a day  Entresto 24 mg-26 mg oral tablet: 1 tab(s) orally 2 times a day  finasteride 5 mg oral tablet: 1 tab(s) orally once a day  furosemide 20 mg oral tablet: 3 tab(s) orally 2 times a day  Plavix 75 mg oral tablet: 1 tab(s) orally once a day  Synthroid 175 mcg (0.175 mg) oral tablet: 1 tab(s) orally once a day  tamsulosin 0.4 mg oral capsule: 1 cap(s) orally 2 times a day   Albuterol (Eqv-ProAir HFA) 90 mcg/inh inhalation aerosol: 1 puff(s) inhaled once a day (at bedtime) and as needed  Amvuttra 25 mg/0.5 mL subcutaneous solution: 25 milligram(s) subcutaneously every 3 months  apixaban 5 mg oral tablet: 1 tab(s) orally every 12 hours  atorvastatin 40 mg oral tablet: 1 tab(s) orally once a day (at bedtime)  colchicine 0.6 mg oral tablet: 1 tab(s) orally once a day  Entresto 24 mg-26 mg oral tablet: 1 tab(s) orally 2 times a day  finasteride 5 mg oral tablet: 1 tab(s) orally once a day  furosemide 20 mg oral tablet: 3 tab(s) orally 2 times a day  Synthroid 175 mcg (0.175 mg) oral tablet: 1 tab(s) orally once a day  tamsulosin 0.4 mg oral capsule: 1 cap(s) orally 2 times a day   Albuterol (Eqv-ProAir HFA) 90 mcg/inh inhalation aerosol: 1 puff(s) inhaled once a day (at bedtime) and as needed  allopurinol 200 mg oral tablet: 1 tab(s) orally once a day  Amvuttra 25 mg/0.5 mL subcutaneous solution: 25 milligram(s) subcutaneously every 3 months  apixaban 5 mg oral tablet: 1 tab(s) orally every 12 hours  atorvastatin 40 mg oral tablet: 1 tab(s) orally once a day (at bedtime)  colchicine 0.6 mg oral tablet: 1 tab(s) orally once a day  Entresto 24 mg-26 mg oral tablet: 1 tab(s) orally 2 times a day  finasteride 5 mg oral tablet: 1 tab(s) orally once a day  Lasix 80 mg oral tablet: 1 tab(s) orally 2 times a day  Synthroid 175 mcg (0.175 mg) oral tablet: 1 tab(s) orally once a day  tamsulosin 0.4 mg oral capsule: 1 cap(s) orally 2 times a day

## 2024-05-13 NOTE — PROVIDER CONTACT NOTE (OTHER) - ASSESSMENT
pt is able to move his right big toes, no discoloration, warm to touch. pt stated he is not able to feel his right big toe when RN assess. pt stated he has been not able to feel his right big toes, but the burning pain is new pt is able to move his right big toes, no discoloration. pt stated he is not able to feel his right big toe when RN assess. pt stated he has been not able to feel his right big toes, but the burning pain is new

## 2024-05-13 NOTE — PROGRESS NOTE ADULT - ASSESSMENT
68M w/ hxof L carotid stent, HFrEF EF 40% and amyloidosis, gout, HTN, hypothyroid, CVA, afib on Eliquis, BPH p/w SOB and edema. Pt has been noticing worsening SOB, BILLY, orthopnea and LE edema for past 5-7 days. Also noticed some increased abd distension as well. Increased lasix dose from 40mg BID to 60mg BID for past several days for symptoms without significant effect. Did notice some increased urinary output. Endorses intermittent midsternal chest pain without clear triggering or improving factors. Started before this week but has been noted this week. Endorses sometimes cheating on low salt diet  recently started himself on high water consumption       1- CKD III  2- CHF   3- HTN   4- gout  5- bph      creatinine is steady range   fluid status is improving  transition to lasix 80 mg po bid   cont entreso 24./26 mg bid   hyperuricemia,  allopurinol 200 mg daily  2 g na diet  strict I/O  daily standing weight  fluid restriction 1L/day  continue proscar and flomax for bph  trend creatinine and electrolytes daily

## 2024-05-13 NOTE — PROGRESS NOTE ADULT - SUBJECTIVE AND OBJECTIVE BOX
INTERVAL EVENTS/SUBJ:  No events     Home Medications:  Albuterol (Eqv-ProAir HFA) 90 mcg/inh inhalation aerosol: 1 puff(s) inhaled once a day (at bedtime) and as needed (07 May 2024 01:08)  Amvuttra 25 mg/0.5 mL subcutaneous solution: 25 milligram(s) subcutaneously every 3 months (07 May 2024 01:10)  atorvastatin 40 mg oral tablet: 1 tab(s) orally once a day (at bedtime) (07 May 2024 01:08)  colchicine 0.6 mg oral tablet: 1 tab(s) orally once a day (07 May 2024 01:07)  Entresto 24 mg-26 mg oral tablet: 1 tab(s) orally 2 times a day (07 May 2024 01:07)  finasteride 5 mg oral tablet: 1 tab(s) orally once a day (07 May 2024 01:08)  furosemide 20 mg oral tablet: 3 tab(s) orally 2 times a day (07 May 2024 01:06)  Plavix 75 mg oral tablet: 1 tab(s) orally once a day (07 May 2024 01:08)  Synthroid 175 mcg (0.175 mg) oral tablet: 1 tab(s) orally once a day (07 May 2024 01:08)  tamsulosin 0.4 mg oral capsule: 1 cap(s) orally 2 times a day (07 May 2024 01:08)      MEDICATIONS  (STANDING):  allopurinol 200 milliGRAM(s) Oral daily  apixaban 5 milliGRAM(s) Oral every 12 hours  atorvastatin 40 milliGRAM(s) Oral at bedtime  colchicine 0.6 milliGRAM(s) Oral daily  finasteride 5 milliGRAM(s) Oral daily  furosemide   Injectable 80 milliGRAM(s) IV Push two times a day  levothyroxine 175 MICROGram(s) Oral daily  magnesium sulfate  IVPB 1 Gram(s) IV Intermittent once  polyethylene glycol 3350 17 Gram(s) Oral daily  potassium chloride    Tablet ER 20 milliEquivalent(s) Oral once  sacubitril 24 mG/valsartan 26 mG 1 Tablet(s) Oral two times a day  sodium chloride 0.65% Nasal 1 Spray(s) Both Nostrils two times a day  tamsulosin 0.4 milliGRAM(s) Oral at bedtime    MEDICATIONS  (PRN):  melatonin 3 milliGRAM(s) Oral at bedtime PRN Insomnia      Vital Signs Last 24 Hrs  T(C): 36.7 (13 May 2024 04:02), Max: 36.7 (13 May 2024 04:02)  T(F): 98 (13 May 2024 04:02), Max: 98 (13 May 2024 04:02)  HR: 75 (13 May 2024 04:02) (62 - 82)  BP: 102/61 (13 May 2024 04:02) (97/67 - 117/71)  BP(mean): 77 (12 May 2024 20:30) (77 - 77)  RR: 18 (13 May 2024 04:02) (18 - 18)  SpO2: 94% (13 May 2024 04:02) (94% - 100%)    Parameters below as of 13 May 2024 04:02  Patient On (Oxygen Delivery Method): room air        REVIEW OF SYSTEMS:  As per HPI, otherwise unremarkable.     PHYSICAL EXAM:  Constitutional/Appearance: Normal, Well-developed  HEENT:   Normal oral mucosa, no drainage or redness, supple neck  Lymphatic: No lymphadenopathy  Cardiovascular: Normal S1 S2, No edema, II/VI WOJCIECH  Respiratory: Lungs clear to auscultation, respirations non-labored  Psychiatry: A & O x 3, appropriate affect.   Gastrointestinal:  Soft, Non-tender, no distention  Skin: No rashes, No ecchymoses, No cyanosis	  Neurologic: Non-focal, Alert and oriented x 3  Extremities: Normal range of motion  Vascular: Peripheral pulses palpable 2+ bilaterally (radial)    LABS:  CBC Full  -  ( 13 May 2024 06:21 )  WBC Count : 4.16 K/uL  RBC Count : 4.20 M/uL  Hemoglobin : 12.3 g/dL  Hematocrit : 37.1 %  Platelet Count - Automated : 131 K/uL  Mean Cell Volume : 88.3 fl  Mean Cell Hemoglobin : 29.3 pg  Mean Cell Hemoglobin Concentration : 33.2 gm/dL  Auto Neutrophil # : x  Auto Lymphocyte # : x  Auto Monocyte # : x  Auto Eosinophil # : x  Auto Basophil # : x  Auto Neutrophil % : x  Auto Lymphocyte % : x  Auto Monocyte % : x  Auto Eosinophil % : x  Auto Basophil % : x      05-13    142  |  105  |  30<H>  ----------------------------<  87  3.9   |  23  |  1.82<H>    Ca    9.1      13 May 2024 06:21  Phos  3.7     05-13  Mg     1.8     05-13      IMPRESSION AND PLAN: 68M w NICM 2/2 amyloid, HFrEF 40%, HTN, CVA, AF on eliquis here w ADHF.  -tele  -lasix 80 IV bid to PO  -entresto   -vyndamax pending outpt  -cont eliquis  -cont statin      ***    Ajit Barton MD, MPhil, [facility]  Cardiologist, [facility]  ; [employer] at [facility]  email: [email or web address] on-service contact/call information, go to [email or web address] and use [email or web address]" to login.  Outpatient Cardiology appointments, call  [phone] to arrange with a colleague; I do not have outpatient Cardiology clinic.

## 2024-05-13 NOTE — PROGRESS NOTE ADULT - PROVIDER SPECIALTY LIST ADULT
Cardiology
Nephrology
Cardiology
Nephrology
Cardiology
Cardiology
Nephrology
Hospitalist

## 2024-05-13 NOTE — PROGRESS NOTE ADULT - SUBJECTIVE AND OBJECTIVE BOX
[facility]   [phone]  RENAL FOLLOW UP NOTE  --------------------------------------------------------------------------------  Chief Complaint:    24 hour events/subjective:    patient seen and examined.   appears comfortable    PAST HISTORY  --------------------------------------------------------------------------------  No significant changes to PMH, PSH, FHx, SHx, unless otherwise noted    ALLERGIES & MEDICATIONS  --------------------------------------------------------------------------------  Allergies    No Known Allergies    Intolerances      Standing Inpatient Medications  allopurinol 200 milliGRAM(s) Oral daily  apixaban 5 milliGRAM(s) Oral every 12 hours  atorvastatin 40 milliGRAM(s) Oral at bedtime  colchicine 0.6 milliGRAM(s) Oral daily  finasteride 5 milliGRAM(s) Oral daily  furosemide   Injectable 80 milliGRAM(s) IV Push two times a day  levothyroxine 175 MICROGram(s) Oral daily  polyethylene glycol 3350 17 Gram(s) Oral daily  sacubitril 24 mG/valsartan 26 mG 1 Tablet(s) Oral two times a day  sodium chloride 0.65% Nasal 1 Spray(s) Both Nostrils two times a day  tamsulosin 0.4 milliGRAM(s) Oral at bedtime    PRN Inpatient Medications  melatonin 3 milliGRAM(s) Oral at bedtime PRN      REVIEW OF SYSTEMS  --------------------------------------------------------------------------------    Gen: denies fevers/chills,  CVS: denies chest pain/palpitations  Resp: denies SOB/Cough  GI: Denies N/V/Abd pain  : Denies dysuria    VITALS/PHYSICAL EXAM  --------------------------------------------------------------------------------  T(C): 36.8 (05-13-24 @ 11:44), Max: 36.8 (05-13-24 @ 11:44)  HR: 85 (05-13-24 @ 11:44) (62 - 85)  BP: 96/59 (05-13-24 @ 11:44) (96/59 - 117/71)  RR: 18 (05-13-24 @ 11:44) (18 - 18)  SpO2: 96% (05-13-24 @ 11:44) (94% - 96%)  Wt(kg): --        05-12-24 @ 07:01  -  05-13-24 @ 07:00  --------------------------------------------------------  IN: 720 mL / OUT: 1900 mL / NET: -1180 mL      Physical Exam:  	              Gen: Non toxic comfortable appearing on RA  	Pulm: decrease bs  no rales or ronchi or wheezing  	CV: +JVD. RRR, S1S2; no rub  	Abd: +BS, soft, nontender/nondistended  	: No suprapubic tenderness  	UE: Warm, no cyanosis  no clubbing,  no edema  	LE: Warm, no cyanosis  no clubbing, 1+ edema R>L  	Skin: Warm, no decrease skin turgor     LABS/STUDIES  --------------------------------------------------------------------------------              12.3   4.16  >-----------<  131      [05-13-24 @ 06:21]              37.1     142  |  105  |  30  ----------------------------<  87      [05-13-24 @ 06:21]  3.9   |  23  |  1.82        Ca     9.1     [05-13-24 @ 06:21]      Mg     1.8     [05-13-24 @ 06:21]      Phos  3.7     [05-13-24 @ 06:21]          Uric acid 12.5      [05-13-24 @ 06:21]    Creatinine Trend:  SCr 1.82 [05-13 @ 06:21]  SCr 1.77 [05-12 @ 05:10]  SCr 1.96 [05-11 @ 07:32]  SCr 1.79 [05-11 @ 06:22]  SCr 1.80 [05-10 @ 05:46]              TSH 10.30      [05-07-24 @ 07:20]  Lipid: chol 104, TG 49, HDL 46, LDL --      [02-04-24 @ 07:11]

## 2024-05-13 NOTE — PROGRESS NOTE ADULT - NS ATTEND AMEND GEN_ALL_CORE FT
Seen, examined with, formulated plan with and  agree with above as scribed by NP Nikki [Kristin]     lungs decrease bs   + JVD  ext mid calf edema       CKD III   CHF    cr steady   lasix 80 mg iv bid as bp tolerates
Seen, examined with, formulated plan with and  agree with above as scribed by NP Nikki [Kristin]     lungs decrease bs  ext + edema 2- RLE LLE 1+     CKD III   CHF  hyperuricemia    cr slightly higher   still fluid overloaded   cont lasix 80 mg iv bid   goal to increase entresto once fluid status improves   hyperuricemia allopurinol 200 mg daily
Seen, examined with, formulated plan with and  agree with above as scribed by NP Nikki [Kristin]     fluid status improving   change to po lasix 80 mg bid   cr steady range   cont entresto with goal to increase dose as outpt

## 2024-05-13 NOTE — DISCHARGE NOTE NURSING/CASE MANAGEMENT/SOCIAL WORK - PATIENT PORTAL LINK FT
You can access the FollowMyHealth Patient Portal offered by [facility] by registering at the following website: [email or web address]. By joining [employer]’s FollowMyHealth portal, you will also be able to view your health information using other applications (apps) compatible with our system.

## 2024-05-28 NOTE — ED ADULT NURSE NOTE - DRUG PRE-SCREENING (DAST -1)
Statement Selected [Fussy] : fussy [Crying] : crying [Cough] : cough [Spitting Up] : spitting up [Birthmarks] : birthmarks [Irritable] : no irritability [Inconsolable] : consolable [Nasal Discharge] : no nasal discharge [Nasal Congestion] : no nasal congestion [Cyanosis] : no cyanosis [Diaphoresis] : not diaphoretic [Tachypnea] : not tachypneic [Wheezing] : no wheezing [Intolerance to feeds] : tolerance to feeds [Constipation] : no constipation [Vomiting] : no vomiting [Hypotonicity] : not hypotonic [Restriction of Motion] : no restriction of motion [Rash] : no rash

## 2024-06-08 NOTE — DIETITIAN INITIAL EVALUATION ADULT - NSPROEDALEARNPREF_GEN_A_NUR
Diagnosis: Chest pain [008637]   Future Attending Provider: NKECHI GAITAN [[id number]]   Admit to which facility:: [facility] [[id number]]   Reason for IP Medical Treatment  (Clinical interventions that can only be accomplished in the IP setting? ) :: chest pain   I certify that Inpatient services for greater than or equal to 2 midnights are medically necessary:: Yes   Plans for Post-Acute care--if anticipated (pick the single best option):: A. No post acute care anticipated at this time   Special Needs:: No Special Needs [1]  
verbal instruction

## 2024-07-11 NOTE — ED ADULT NURSE NOTE - CODE STROKE ACTIVATED DT
Operative Report    Patient: Ilene Davidson MRN: [id number]      YOB: 1989  Age: 34 y.o.  Sex: female            Indications:  BRBPR    Preoperative Evaluation: The patient was evaluated prior to the procedure in the GI lab admission area, the patient ASA was recorded .  Consent was obtained from the patient with the risk of perforation bleeding and aspiration.    Anesthesia: HANSA-per anesthesia    Complications: None; patient tolerated the procedure well.    EBL -insignificant      Procedure: The patient was sedated in the left lateral decubitus position.  Scope was advanced from the rectum to the cecum. The scope was withdrawn to the rectum, retroflexed view was performed.  The rectal exam was normal.  Preparation was adequate. [place] score of 9 .    Findings:    Normal terminal ileum.   Normal colon.  Small internal hemorrhoids.     Postoperative Diagnosis:   Internal hemorrhoids.     Recommendations:   Repeat colonoscopy at age 43.     Signed By:  Julio Hurtado MD     July 11, 2024                         03-Feb-2024 20:26

## 2024-08-02 ENCOUNTER — APPOINTMENT (OUTPATIENT)
Dept: CARDIOLOGY | Facility: CLINIC | Age: 69
End: 2024-08-02

## 2024-10-18 ENCOUNTER — OUTPATIENT (OUTPATIENT)
Dept: OUTPATIENT SERVICES | Facility: HOSPITAL | Age: 69
LOS: 1 days | End: 2024-10-18

## 2024-10-18 ENCOUNTER — APPOINTMENT (OUTPATIENT)
Dept: INTERNAL MEDICINE | Facility: CLINIC | Age: 69
End: 2024-10-18

## 2024-10-18 DIAGNOSIS — Z98.1 ARTHRODESIS STATUS: Chronic | ICD-10-CM

## 2024-10-18 DIAGNOSIS — Z98.890 OTHER SPECIFIED POSTPROCEDURAL STATES: Chronic | ICD-10-CM

## 2024-10-18 DIAGNOSIS — Z98.89 OTHER SPECIFIED POSTPROCEDURAL STATES: Chronic | ICD-10-CM

## 2024-10-23 NOTE — PRE-ANESTHESIA EVALUATION ADULT - NSANTHSUBSTSD_GEN_ALL_CORE
No

## 2024-10-24 ENCOUNTER — NON-APPOINTMENT (OUTPATIENT)
Age: 69
End: 2024-10-24

## 2024-10-24 ENCOUNTER — APPOINTMENT (OUTPATIENT)
Dept: CARDIOLOGY | Facility: CLINIC | Age: 69
End: 2024-10-24
Payer: MEDICARE

## 2024-10-24 VITALS
SYSTOLIC BLOOD PRESSURE: 106 MMHG | DIASTOLIC BLOOD PRESSURE: 78 MMHG | WEIGHT: 203 LBS | HEART RATE: 80 BPM | BODY MASS INDEX: 29.13 KG/M2 | OXYGEN SATURATION: 100 %

## 2024-10-24 PROCEDURE — 99215 OFFICE O/P EST HI 40 MIN: CPT

## 2024-10-24 PROCEDURE — G2211 COMPLEX E/M VISIT ADD ON: CPT

## 2024-10-24 PROCEDURE — 93000 ELECTROCARDIOGRAM COMPLETE: CPT

## 2024-10-24 RX ORDER — TORSEMIDE 20 MG/1
20 TABLET ORAL
Qty: 360 | Refills: 2 | Status: ACTIVE | COMMUNITY
Start: 2024-10-24 | End: 1900-01-01

## 2024-10-25 LAB
ALBUMIN SERPL ELPH-MCNC: 4.3 G/DL
ALP BLD-CCNC: 79 U/L
ALT SERPL-CCNC: 19 U/L
ANION GAP SERPL CALC-SCNC: 16 MMOL/L
AST SERPL-CCNC: 28 U/L
BASOPHILS # BLD AUTO: 0.02 K/UL
BASOPHILS NFR BLD AUTO: 0.4 %
BILIRUB SERPL-MCNC: 1 MG/DL
BUN SERPL-MCNC: 39 MG/DL
CALCIUM SERPL-MCNC: 9.3 MG/DL
CHLORIDE SERPL-SCNC: 110 MMOL/L
CO2 SERPL-SCNC: 18 MMOL/L
CREAT SERPL-MCNC: 2.07 MG/DL
EGFR: 34 ML/MIN/1.73M2
EOSINOPHIL # BLD AUTO: 0.09 K/UL
EOSINOPHIL NFR BLD AUTO: 1.9 %
GLUCOSE SERPL-MCNC: 98 MG/DL
HCT VFR BLD CALC: 42 %
HGB BLD-MCNC: 13.6 G/DL
IMM GRANULOCYTES NFR BLD AUTO: 0.2 %
LYMPHOCYTES # BLD AUTO: 0.86 K/UL
LYMPHOCYTES NFR BLD AUTO: 17.7 %
MAN DIFF?: NORMAL
MCHC RBC-ENTMCNC: 29.7 PG
MCHC RBC-ENTMCNC: 32.4 GM/DL
MCV RBC AUTO: 91.7 FL
MONOCYTES # BLD AUTO: 0.43 K/UL
MONOCYTES NFR BLD AUTO: 8.8 %
NEUTROPHILS # BLD AUTO: 3.45 K/UL
NEUTROPHILS NFR BLD AUTO: 71 %
NT-PROBNP SERPL-MCNC: 4323 PG/ML
PLATELET # BLD AUTO: 138 K/UL
POTASSIUM SERPL-SCNC: 4.2 MMOL/L
PREALB SERPL NEPH-MCNC: <3 MG/DL
PROT SERPL-MCNC: 6.9 G/DL
RBC # BLD: 4.58 M/UL
RBC # FLD: 15.8 %
SODIUM SERPL-SCNC: 144 MMOL/L
WBC # FLD AUTO: 4.86 K/UL

## 2024-10-30 ENCOUNTER — EMERGENCY (EMERGENCY)
Facility: HOSPITAL | Age: 69
LOS: 1 days | Discharge: HOME | End: 2024-10-30
Attending: EMERGENCY MEDICINE
Payer: MEDICARE

## 2024-10-30 VITALS
HEART RATE: 74 BPM | OXYGEN SATURATION: 98 % | DIASTOLIC BLOOD PRESSURE: 68 MMHG | RESPIRATION RATE: 18 BRPM | TEMPERATURE: 98 F | SYSTOLIC BLOOD PRESSURE: 95 MMHG

## 2024-10-30 VITALS
SYSTOLIC BLOOD PRESSURE: 90 MMHG | RESPIRATION RATE: 16 BRPM | HEART RATE: 85 BPM | TEMPERATURE: 97 F | OXYGEN SATURATION: 100 % | DIASTOLIC BLOOD PRESSURE: 60 MMHG | HEIGHT: 70 IN | WEIGHT: 197.09 LBS

## 2024-10-30 DIAGNOSIS — Z98.890 OTHER SPECIFIED POSTPROCEDURAL STATES: Chronic | ICD-10-CM

## 2024-10-30 DIAGNOSIS — Z98.1 ARTHRODESIS STATUS: Chronic | ICD-10-CM

## 2024-10-30 DIAGNOSIS — Z98.89 OTHER SPECIFIED POSTPROCEDURAL STATES: Chronic | ICD-10-CM

## 2024-10-30 LAB
ALBUMIN SERPL ELPH-MCNC: 4.2 G/DL — SIGNIFICANT CHANGE UP (ref 3.3–5)
ALP SERPL-CCNC: 84 U/L — SIGNIFICANT CHANGE UP (ref 40–120)
ALT FLD-CCNC: 17 U/L — SIGNIFICANT CHANGE UP (ref 10–45)
ANION GAP SERPL CALC-SCNC: 14 MMOL/L — SIGNIFICANT CHANGE UP (ref 5–17)
ANION GAP SERPL CALC-SCNC: 16 MMOL/L — SIGNIFICANT CHANGE UP (ref 5–17)
APPEARANCE UR: CLEAR — SIGNIFICANT CHANGE UP
AST SERPL-CCNC: 38 U/L — SIGNIFICANT CHANGE UP (ref 10–40)
BACTERIA # UR AUTO: NEGATIVE /HPF — SIGNIFICANT CHANGE UP
BASOPHILS # BLD AUTO: 0.01 K/UL — SIGNIFICANT CHANGE UP (ref 0–0.2)
BASOPHILS NFR BLD AUTO: 0.2 % — SIGNIFICANT CHANGE UP (ref 0–2)
BILIRUB SERPL-MCNC: 0.9 MG/DL — SIGNIFICANT CHANGE UP (ref 0.2–1.2)
BILIRUB UR-MCNC: NEGATIVE — SIGNIFICANT CHANGE UP
BUN SERPL-MCNC: 48 MG/DL — HIGH (ref 7–23)
BUN SERPL-MCNC: 48 MG/DL — HIGH (ref 7–23)
CALCIUM SERPL-MCNC: 8.2 MG/DL — LOW (ref 8.4–10.5)
CALCIUM SERPL-MCNC: 8.3 MG/DL — LOW (ref 8.4–10.5)
CAST: 50 /LPF — HIGH (ref 0–4)
CHLORIDE SERPL-SCNC: 106 MMOL/L — SIGNIFICANT CHANGE UP (ref 96–108)
CHLORIDE SERPL-SCNC: 106 MMOL/L — SIGNIFICANT CHANGE UP (ref 96–108)
CO2 SERPL-SCNC: 18 MMOL/L — LOW (ref 22–31)
CO2 SERPL-SCNC: 19 MMOL/L — LOW (ref 22–31)
COLOR SPEC: YELLOW — SIGNIFICANT CHANGE UP
CREAT SERPL-MCNC: 2.41 MG/DL — HIGH (ref 0.5–1.3)
CREAT SERPL-MCNC: 2.6 MG/DL — HIGH (ref 0.5–1.3)
DIFF PNL FLD: NEGATIVE — SIGNIFICANT CHANGE UP
EGFR: 26 ML/MIN/1.73M2 — LOW
EGFR: 26 ML/MIN/1.73M2 — LOW
EGFR: 28 ML/MIN/1.73M2 — LOW
EGFR: 28 ML/MIN/1.73M2 — LOW
EOSINOPHIL # BLD AUTO: 0.1 K/UL — SIGNIFICANT CHANGE UP (ref 0–0.5)
EOSINOPHIL NFR BLD AUTO: 1.5 % — SIGNIFICANT CHANGE UP (ref 0–6)
GLUCOSE SERPL-MCNC: 105 MG/DL — HIGH (ref 70–99)
GLUCOSE SERPL-MCNC: 89 MG/DL — SIGNIFICANT CHANGE UP (ref 70–99)
GLUCOSE UR QL: NEGATIVE MG/DL — SIGNIFICANT CHANGE UP
HCT VFR BLD CALC: 41.9 % — SIGNIFICANT CHANGE UP (ref 39–50)
HGB BLD-MCNC: 13.8 G/DL — SIGNIFICANT CHANGE UP (ref 13–17)
HYALINE CASTS # UR AUTO: PRESENT
IMM GRANULOCYTES NFR BLD AUTO: 0.5 % — SIGNIFICANT CHANGE UP (ref 0–0.9)
KETONES UR-MCNC: ABNORMAL MG/DL
LEUKOCYTE ESTERASE UR-ACNC: NEGATIVE — SIGNIFICANT CHANGE UP
LIDOCAIN IGE QN: 20 U/L — SIGNIFICANT CHANGE UP (ref 7–60)
LYMPHOCYTES # BLD AUTO: 0.68 K/UL — LOW (ref 1–3.3)
LYMPHOCYTES # BLD AUTO: 10.3 % — LOW (ref 13–44)
MCHC RBC-ENTMCNC: 29.5 PG — SIGNIFICANT CHANGE UP (ref 27–34)
MCHC RBC-ENTMCNC: 32.9 G/DL — SIGNIFICANT CHANGE UP (ref 32–36)
MCV RBC AUTO: 89.5 FL — SIGNIFICANT CHANGE UP (ref 80–100)
MONOCYTES # BLD AUTO: 0.67 K/UL — SIGNIFICANT CHANGE UP (ref 0–0.9)
MONOCYTES NFR BLD AUTO: 10.1 % — SIGNIFICANT CHANGE UP (ref 2–14)
MUCOUS THREADS # UR AUTO: PRESENT
NEUTROPHILS # BLD AUTO: 5.12 K/UL — SIGNIFICANT CHANGE UP (ref 1.8–7.4)
NEUTROPHILS NFR BLD AUTO: 77.4 % — HIGH (ref 43–77)
NITRITE UR-MCNC: NEGATIVE — SIGNIFICANT CHANGE UP
NRBC # BLD: 0 /100 WBCS — SIGNIFICANT CHANGE UP (ref 0–0)
NRBC BLD-RTO: 0 /100 WBCS — SIGNIFICANT CHANGE UP (ref 0–0)
PH UR: 5 — SIGNIFICANT CHANGE UP (ref 5–8)
PLATELET # BLD AUTO: 156 K/UL — SIGNIFICANT CHANGE UP (ref 150–400)
POTASSIUM SERPL-MCNC: 3.5 MMOL/L — SIGNIFICANT CHANGE UP (ref 3.5–5.3)
POTASSIUM SERPL-MCNC: 4.6 MMOL/L — SIGNIFICANT CHANGE UP (ref 3.5–5.3)
POTASSIUM SERPL-SCNC: 3.5 MMOL/L — SIGNIFICANT CHANGE UP (ref 3.5–5.3)
POTASSIUM SERPL-SCNC: 4.6 MMOL/L — SIGNIFICANT CHANGE UP (ref 3.5–5.3)
PROT SERPL-MCNC: 7.2 G/DL — SIGNIFICANT CHANGE UP (ref 6–8.3)
PROT UR-MCNC: 30 MG/DL
RBC # BLD: 4.68 M/UL — SIGNIFICANT CHANGE UP (ref 4.2–5.8)
RBC # FLD: 15.2 % — HIGH (ref 10.3–14.5)
RBC CASTS # UR COMP ASSIST: 1 /HPF — SIGNIFICANT CHANGE UP (ref 0–4)
REVIEW: SIGNIFICANT CHANGE UP
SODIUM SERPL-SCNC: 138 MMOL/L — SIGNIFICANT CHANGE UP (ref 135–145)
SODIUM SERPL-SCNC: 141 MMOL/L — SIGNIFICANT CHANGE UP (ref 135–145)
SP GR SPEC: 1.02 — SIGNIFICANT CHANGE UP (ref 1–1.03)
SQUAMOUS # UR AUTO: 1 /HPF — SIGNIFICANT CHANGE UP (ref 0–5)
UROBILINOGEN FLD QL: 0.2 MG/DL — SIGNIFICANT CHANGE UP (ref 0.2–1)
WBC # BLD: 6.61 K/UL — SIGNIFICANT CHANGE UP (ref 3.8–10.5)
WBC # FLD AUTO: 6.61 K/UL — SIGNIFICANT CHANGE UP (ref 3.8–10.5)
WBC UR QL: 2 /HPF — SIGNIFICANT CHANGE UP (ref 0–5)

## 2024-10-30 PROCEDURE — 85025 COMPLETE CBC W/AUTO DIFF WBC: CPT

## 2024-10-30 PROCEDURE — 83690 ASSAY OF LIPASE: CPT

## 2024-10-30 PROCEDURE — 99284 EMERGENCY DEPT VISIT MOD MDM: CPT

## 2024-10-30 PROCEDURE — 81001 URINALYSIS AUTO W/SCOPE: CPT

## 2024-10-30 PROCEDURE — 87507 IADNA-DNA/RNA PROBE TQ 12-25: CPT

## 2024-10-30 PROCEDURE — 80048 BASIC METABOLIC PNL TOTAL CA: CPT

## 2024-10-30 PROCEDURE — 36000 PLACE NEEDLE IN VEIN: CPT

## 2024-10-30 PROCEDURE — 74176 CT ABD & PELVIS W/O CONTRAST: CPT | Mod: 26,MC

## 2024-10-30 PROCEDURE — 80053 COMPREHEN METABOLIC PANEL: CPT

## 2024-10-30 PROCEDURE — 74176 CT ABD & PELVIS W/O CONTRAST: CPT | Mod: MC

## 2024-10-30 PROCEDURE — 99284 EMERGENCY DEPT VISIT MOD MDM: CPT | Mod: 25

## 2024-10-30 PROCEDURE — 87086 URINE CULTURE/COLONY COUNT: CPT

## 2024-10-30 RX ORDER — SODIUM CHLORIDE 9 G/1000ML
500 INJECTION, SOLUTION INTRAVENOUS ONCE
Refills: 0 | Status: COMPLETED | OUTPATIENT
Start: 2024-10-30 | End: 2024-10-30

## 2024-10-30 RX ADMIN — SODIUM CHLORIDE 1000 MILLILITER(S): 9 INJECTION, SOLUTION INTRAVENOUS at 16:35

## 2024-10-31 ENCOUNTER — APPOINTMENT (OUTPATIENT)
Dept: CARDIOLOGY | Facility: CLINIC | Age: 69
End: 2024-10-31

## 2024-10-31 DIAGNOSIS — E85.1 NEUROPATHIC HEREDOFAMILIAL AMYLOIDOSIS: ICD-10-CM

## 2024-10-31 DIAGNOSIS — R06.02 SHORTNESS OF BREATH: ICD-10-CM

## 2024-10-31 DIAGNOSIS — E85.4 ORGAN-LIMITED AMYLOIDOSIS: ICD-10-CM

## 2024-10-31 DIAGNOSIS — I43 ORGAN-LIMITED AMYLOIDOSIS: ICD-10-CM

## 2024-10-31 DIAGNOSIS — G63 NEUROPATHIC HEREDOFAMILIAL AMYLOIDOSIS: ICD-10-CM

## 2024-10-31 LAB
CULTURE RESULTS: SIGNIFICANT CHANGE UP
GI PCR PANEL: ABNORMAL
NOROVIRUS GI+II RNA STL QL NAA+NON-PROBE: ABNORMAL
SPECIMEN SOURCE: SIGNIFICANT CHANGE UP

## 2024-10-31 PROCEDURE — G2211 COMPLEX E/M VISIT ADD ON: CPT

## 2024-10-31 PROCEDURE — 99214 OFFICE O/P EST MOD 30 MIN: CPT

## 2024-12-06 ENCOUNTER — INPATIENT (INPATIENT)
Facility: HOSPITAL | Age: 69
LOS: 2 days | Discharge: HOME HEALTH | DRG: 291 | End: 2024-12-09
Attending: INTERNAL MEDICINE | Admitting: INTERNAL MEDICINE
Payer: MEDICARE

## 2024-12-06 ENCOUNTER — RESULT REVIEW (OUTPATIENT)
Age: 69
End: 2024-12-06

## 2024-12-06 VITALS
WEIGHT: 179.02 LBS | TEMPERATURE: 97 F | DIASTOLIC BLOOD PRESSURE: 63 MMHG | RESPIRATION RATE: 28 BRPM | HEIGHT: 70 IN | OXYGEN SATURATION: 96 % | SYSTOLIC BLOOD PRESSURE: 92 MMHG | HEART RATE: 67 BPM

## 2024-12-06 DIAGNOSIS — N40.0 BENIGN PROSTATIC HYPERPLASIA WITHOUT LOWER URINARY TRACT SYMPTOMS: ICD-10-CM

## 2024-12-06 DIAGNOSIS — E03.9 HYPOTHYROIDISM, UNSPECIFIED: ICD-10-CM

## 2024-12-06 DIAGNOSIS — Z98.1 ARTHRODESIS STATUS: Chronic | ICD-10-CM

## 2024-12-06 DIAGNOSIS — B33.8 OTHER SPECIFIED VIRAL DISEASES: ICD-10-CM

## 2024-12-06 DIAGNOSIS — Z98.89 OTHER SPECIFIED POSTPROCEDURAL STATES: Chronic | ICD-10-CM

## 2024-12-06 DIAGNOSIS — Z98.890 OTHER SPECIFIED POSTPROCEDURAL STATES: Chronic | ICD-10-CM

## 2024-12-06 DIAGNOSIS — E78.5 HYPERLIPIDEMIA, UNSPECIFIED: ICD-10-CM

## 2024-12-06 DIAGNOSIS — I48.0 PAROXYSMAL ATRIAL FIBRILLATION: ICD-10-CM

## 2024-12-06 DIAGNOSIS — Z29.9 ENCOUNTER FOR PROPHYLACTIC MEASURES, UNSPECIFIED: ICD-10-CM

## 2024-12-06 DIAGNOSIS — I95.9 HYPOTENSION, UNSPECIFIED: ICD-10-CM

## 2024-12-06 DIAGNOSIS — M79.89 OTHER SPECIFIED SOFT TISSUE DISORDERS: ICD-10-CM

## 2024-12-06 DIAGNOSIS — I50.22 CHRONIC SYSTOLIC (CONGESTIVE) HEART FAILURE: ICD-10-CM

## 2024-12-06 LAB
ALBUMIN SERPL ELPH-MCNC: 3.7 G/DL — SIGNIFICANT CHANGE UP (ref 3.3–5)
ALBUMIN SERPL ELPH-MCNC: 4 G/DL — SIGNIFICANT CHANGE UP (ref 3.3–5)
ALP SERPL-CCNC: 111 U/L — SIGNIFICANT CHANGE UP (ref 40–120)
ALP SERPL-CCNC: 117 U/L — SIGNIFICANT CHANGE UP (ref 40–120)
ALT FLD-CCNC: 36 U/L — SIGNIFICANT CHANGE UP (ref 10–45)
ALT FLD-CCNC: 39 U/L — SIGNIFICANT CHANGE UP (ref 10–45)
ANION GAP SERPL CALC-SCNC: 14 MMOL/L — SIGNIFICANT CHANGE UP (ref 5–17)
ANION GAP SERPL CALC-SCNC: 15 MMOL/L — SIGNIFICANT CHANGE UP (ref 5–17)
APTT BLD: 35.9 SEC — HIGH (ref 24.5–35.6)
AST SERPL-CCNC: 46 U/L — HIGH (ref 10–40)
AST SERPL-CCNC: 60 U/L — HIGH (ref 10–40)
BASOPHILS # BLD AUTO: 0.01 K/UL — SIGNIFICANT CHANGE UP (ref 0–0.2)
BASOPHILS NFR BLD AUTO: 0.2 % — SIGNIFICANT CHANGE UP (ref 0–2)
BILIRUB SERPL-MCNC: 1 MG/DL — SIGNIFICANT CHANGE UP (ref 0.2–1.2)
BILIRUB SERPL-MCNC: 1.1 MG/DL — SIGNIFICANT CHANGE UP (ref 0.2–1.2)
BUN SERPL-MCNC: 65 MG/DL — HIGH (ref 7–23)
BUN SERPL-MCNC: 70 MG/DL — HIGH (ref 7–23)
CALCIUM SERPL-MCNC: 8.7 MG/DL — SIGNIFICANT CHANGE UP (ref 8.4–10.5)
CALCIUM SERPL-MCNC: 8.8 MG/DL — SIGNIFICANT CHANGE UP (ref 8.4–10.5)
CHLORIDE SERPL-SCNC: 103 MMOL/L — SIGNIFICANT CHANGE UP (ref 96–108)
CHLORIDE SERPL-SCNC: 105 MMOL/L — SIGNIFICANT CHANGE UP (ref 96–108)
CO2 SERPL-SCNC: 21 MMOL/L — LOW (ref 22–31)
CO2 SERPL-SCNC: 23 MMOL/L — SIGNIFICANT CHANGE UP (ref 22–31)
CREAT SERPL-MCNC: 2.26 MG/DL — HIGH (ref 0.5–1.3)
CREAT SERPL-MCNC: 2.3 MG/DL — HIGH (ref 0.5–1.3)
EGFR: 30 ML/MIN/1.73M2 — LOW
EGFR: 30 ML/MIN/1.73M2 — LOW
EOSINOPHIL # BLD AUTO: 0.16 K/UL — SIGNIFICANT CHANGE UP (ref 0–0.5)
EOSINOPHIL NFR BLD AUTO: 3.7 % — SIGNIFICANT CHANGE UP (ref 0–6)
FLUAV AG NPH QL: SIGNIFICANT CHANGE UP
FLUBV AG NPH QL: SIGNIFICANT CHANGE UP
GAS PNL BLDV: SIGNIFICANT CHANGE UP
GLUCOSE SERPL-MCNC: 88 MG/DL — SIGNIFICANT CHANGE UP (ref 70–99)
GLUCOSE SERPL-MCNC: 92 MG/DL — SIGNIFICANT CHANGE UP (ref 70–99)
HCT VFR BLD CALC: 37.8 % — LOW (ref 39–50)
HGB BLD-MCNC: 12.5 G/DL — LOW (ref 13–17)
IMM GRANULOCYTES NFR BLD AUTO: 0.2 % — SIGNIFICANT CHANGE UP (ref 0–0.9)
INR BLD: 1.73 RATIO — HIGH (ref 0.85–1.16)
LYMPHOCYTES # BLD AUTO: 1.25 K/UL — SIGNIFICANT CHANGE UP (ref 1–3.3)
LYMPHOCYTES # BLD AUTO: 28.7 % — SIGNIFICANT CHANGE UP (ref 13–44)
MCHC RBC-ENTMCNC: 29.4 PG — SIGNIFICANT CHANGE UP (ref 27–34)
MCHC RBC-ENTMCNC: 33.1 G/DL — SIGNIFICANT CHANGE UP (ref 32–36)
MCV RBC AUTO: 88.9 FL — SIGNIFICANT CHANGE UP (ref 80–100)
MONOCYTES # BLD AUTO: 0.48 K/UL — SIGNIFICANT CHANGE UP (ref 0–0.9)
MONOCYTES NFR BLD AUTO: 11 % — SIGNIFICANT CHANGE UP (ref 2–14)
NEUTROPHILS # BLD AUTO: 2.45 K/UL — SIGNIFICANT CHANGE UP (ref 1.8–7.4)
NEUTROPHILS NFR BLD AUTO: 56.2 % — SIGNIFICANT CHANGE UP (ref 43–77)
NRBC # BLD: 0 /100 WBCS — SIGNIFICANT CHANGE UP (ref 0–0)
NT-PROBNP SERPL-SCNC: 8518 PG/ML — HIGH (ref 0–300)
PLATELET # BLD AUTO: 204 K/UL — SIGNIFICANT CHANGE UP (ref 150–400)
POTASSIUM SERPL-MCNC: 3.8 MMOL/L — SIGNIFICANT CHANGE UP (ref 3.5–5.3)
POTASSIUM SERPL-MCNC: 5.7 MMOL/L — HIGH (ref 3.5–5.3)
POTASSIUM SERPL-SCNC: 3.8 MMOL/L — SIGNIFICANT CHANGE UP (ref 3.5–5.3)
POTASSIUM SERPL-SCNC: 5.7 MMOL/L — HIGH (ref 3.5–5.3)
PROT SERPL-MCNC: 6.4 G/DL — SIGNIFICANT CHANGE UP (ref 6–8.3)
PROT SERPL-MCNC: 7 G/DL — SIGNIFICANT CHANGE UP (ref 6–8.3)
PROTHROM AB SERPL-ACNC: 19.8 SEC — HIGH (ref 9.9–13.4)
RBC # BLD: 4.25 M/UL — SIGNIFICANT CHANGE UP (ref 4.2–5.8)
RBC # FLD: 16.1 % — HIGH (ref 10.3–14.5)
RSV RNA NPH QL NAA+NON-PROBE: DETECTED
SARS-COV-2 RNA SPEC QL NAA+PROBE: SIGNIFICANT CHANGE UP
SODIUM SERPL-SCNC: 139 MMOL/L — SIGNIFICANT CHANGE UP (ref 135–145)
SODIUM SERPL-SCNC: 142 MMOL/L — SIGNIFICANT CHANGE UP (ref 135–145)
TROPONIN T, HIGH SENSITIVITY RESULT: 104 NG/L — HIGH (ref 0–51)
TROPONIN T, HIGH SENSITIVITY RESULT: 107 NG/L — HIGH (ref 0–51)
TROPONIN T, HIGH SENSITIVITY RESULT: 97 NG/L — HIGH (ref 0–51)
WBC # BLD: 4.36 K/UL — SIGNIFICANT CHANGE UP (ref 3.8–10.5)
WBC # FLD AUTO: 4.36 K/UL — SIGNIFICANT CHANGE UP (ref 3.8–10.5)

## 2024-12-06 PROCEDURE — 99285 EMERGENCY DEPT VISIT HI MDM: CPT

## 2024-12-06 PROCEDURE — 93970 EXTREMITY STUDY: CPT | Mod: 26

## 2024-12-06 PROCEDURE — 71045 X-RAY EXAM CHEST 1 VIEW: CPT | Mod: 26

## 2024-12-06 PROCEDURE — 93356 MYOCRD STRAIN IMG SPCKL TRCK: CPT

## 2024-12-06 PROCEDURE — 99223 1ST HOSP IP/OBS HIGH 75: CPT

## 2024-12-06 PROCEDURE — 93306 TTE W/DOPPLER COMPLETE: CPT | Mod: 26

## 2024-12-06 RX ORDER — SACUBITRIL AND VALSARTAN 24; 26 MG/1; MG/1
1 TABLET, FILM COATED ORAL
Refills: 0 | Status: DISCONTINUED | OUTPATIENT
Start: 2024-12-06 | End: 2024-12-09

## 2024-12-06 RX ORDER — TAMSULOSIN HYDROCHLORIDE 0.4 MG/1
0.4 CAPSULE ORAL AT BEDTIME
Refills: 0 | Status: DISCONTINUED | OUTPATIENT
Start: 2024-12-06 | End: 2024-12-09

## 2024-12-06 RX ORDER — LEVOTHYROXINE SODIUM 150 MCG
175 TABLET ORAL DAILY
Refills: 0 | Status: DISCONTINUED | OUTPATIENT
Start: 2024-12-06 | End: 2024-12-09

## 2024-12-06 RX ORDER — APIXABAN 2.5 MG/1
5 TABLET, FILM COATED ORAL EVERY 12 HOURS
Refills: 0 | Status: DISCONTINUED | OUTPATIENT
Start: 2024-12-06 | End: 2024-12-09

## 2024-12-06 RX ORDER — SODIUM CHLORIDE 9 MG/ML
250 INJECTION, SOLUTION INTRAMUSCULAR; INTRAVENOUS; SUBCUTANEOUS ONCE
Refills: 0 | Status: COMPLETED | OUTPATIENT
Start: 2024-12-06 | End: 2024-12-06

## 2024-12-06 RX ORDER — FUROSEMIDE 40 MG/1
1 TABLET ORAL
Refills: 0 | DISCHARGE

## 2024-12-06 RX ORDER — COLCHICINE 0.6 MG
0.6 TABLET ORAL DAILY
Refills: 0 | Status: DISCONTINUED | OUTPATIENT
Start: 2024-12-06 | End: 2024-12-09

## 2024-12-06 RX ORDER — CABERGOLINE 0.5 MG/1
1 TABLET ORAL
Refills: 0 | DISCHARGE

## 2024-12-06 RX ADMIN — SODIUM CHLORIDE 250 MILLILITER(S): 9 INJECTION, SOLUTION INTRAMUSCULAR; INTRAVENOUS; SUBCUTANEOUS at 03:22

## 2024-12-06 RX ADMIN — APIXABAN 5 MILLIGRAM(S): 2.5 TABLET, FILM COATED ORAL at 17:15

## 2024-12-06 RX ADMIN — Medication 40 MILLIGRAM(S): at 21:35

## 2024-12-06 RX ADMIN — SACUBITRIL AND VALSARTAN 1 TABLET(S): 24; 26 TABLET, FILM COATED ORAL at 17:14

## 2024-12-06 RX ADMIN — TAMSULOSIN HYDROCHLORIDE 0.4 MILLIGRAM(S): 0.4 CAPSULE ORAL at 21:35

## 2024-12-06 NOTE — ED ADULT NURSE NOTE - SUICIDE SCREENING DEPRESSION
Medication refill:    Medication Name: metformin    Medication last refilled: 07/30/2021 for 90 and 3 for patient to be taking 1 time daily    Provider: Nicole Chau NP     [facility]: [facility]    Last office visit: 07/30/2021    Controlled, continue to monitor; Metformin adjusted to once daily for which recommended patient to take daily. Recheck A1c in 3 months. Due for diabetic eye exam.    Follow up: 3 months    Last lab related to medication: 07/23/2021    Upcoming appointments: 11/01/2021    Refill outcome: denied due to change in sig.      
Negative

## 2024-12-06 NOTE — ED ADULT NURSE NOTE - OBJECTIVE STATEMENT
pt is a 68yo male PMH CHF on lasix, AFib on eliquis presenting to the ED complaining. pt reports waking up around 0055 tonight and experiencing difficulty breathing, shortness of breath a/w nausea, diaphoresis, and productive cough. pt reports improvement of symptoms on arrival to ED but still endorsing some difficulty breathing at this time. pt reports hx panic attacks but states these symptoms feel different. pt A&Ox3 gross neuro intact, no difficulty speaking in complete sentences, pulses x 4, [place] x4, abdomen soft nontender nondistended, skin intact. pt denies chest pain, ha, n/v/d, abdominal pain, f/c, urinary symptoms, hematuria

## 2024-12-06 NOTE — H&P ADULT - ASSESSMENT
Patient is a 69 year old male from home AAOx3, with PMH of HFrEF, pAfib on eliquis, hypothyroidism, cardiac amyloidosis and BPH, who presented to the ED due to SOB.     Hgb 12.5. Creatinine 2.26. Currently on 2L NC. BP noted to be on softer side. BNP 8578. Troponin noted to be elevated but now downtrending. RSV noted to be positive. CXR reviewed; appears clear with no signs of infection. EKG reviewed; no ischemic changes noted. NSR on tele monitor.

## 2024-12-06 NOTE — ED PROVIDER NOTE - PHYSICAL EXAMINATION
GENERAL: Awake, alert, appears uncomfortable but nontoxic, mentating appropriately   HEAD: NC/AT, neck supple, moist mucous membranes  EYES: PERRL, EOM grossly intact, sclera anicteric  LUNGS: increased WOB on room air but CTAB, no wheezes or crackles   CARDIAC: RRR, no m/r/g  ABDOMEN: Soft, non tender, non distended, no rebound, no guarding  BACK: No midline spinal tenderness, no CVA tenderness  EXT: trace lower ext edema, R>L no calf tenderness, no deformities.  NEURO: A&Ox3. Moving all extremities without focal deficit.  SKIN: Warm and dry. No rash.  PSYCH: Normal affect.

## 2024-12-06 NOTE — ED PROVIDER NOTE - CLINICAL SUMMARY MEDICAL DECISION MAKING FREE TEXT BOX
69Y M hx as above presenting with acute onset SOB upon waking up around 1AM this morning. On initial eval patient appears short of breath, although lungs clear; O2 fluctuating in ED, on RA is satting mid to upper 90s, will leave on 2L O2 for comfort. Plan CXR, labs including trop/proBNP to eval HF exacerbation, viral swab, reassess. LE with R>L edema, particularly around the ankle joint, will also obtain DVT study to rule out underlying thrombus.

## 2024-12-06 NOTE — PATIENT PROFILE ADULT - FUNCTIONAL ASSESSMENT - DAILY ACTIVITY 6.
Pt states she has had increased blood pressure since yesterday and took double dose of losartan to try to reduce it. It was unsuccessful. Pt stated she has no dizziness, weakness, or vision changes.    4 = No assist / stand by assistance

## 2024-12-06 NOTE — ED PROVIDER NOTE - ATTENDING CONTRIBUTION TO CARE
Phu Cintron MD (Attending Physician):    I performed a history and physical exam of the patient and discussed their management with the resident/fellow/ACP/student. I have reviewed the resident/fellow/ACP/student note and agree with the documented findings and plan of care, except as noted. I have personally performed a substantive portion of the visit including all aspects of the medical decision making. My medical decision making and observations are found below. Please refer to any progress notes for updates on clinical course.    HPI:  69Y M PMH CHF (last EF 40% 2/2024), HTN, HLD, hypothyroidism, presenting with SOB. Patient reports that his symptoms woke him from sleep this around 1AM today. [place] fine when he went to bed around 10PM. Denies chest pain, increased lower extremity swelling, fevers/chills, cough; no sick contacts. Patient takes Lasix although admits he occasionally misses doses because he forgets to take it.    PE:  GEN - +In mild discomfort, A&Ox3  HEAD - NC/AT  EYES - PERRL, EOMI  ENT - Airway patent, mucous membranes moist  PULMONARY - +Mildly increased wob, CTA b/l, symmetric breath sounds, no W/R/R, satting 94-95% on RA  CARDIAC - +S1S2, RRR, no M/G/R, no JVD  ABDOMEN - +BS, ND, NT, soft, no guarding, no rebound, no masses, no rigidity   - No CVA TTP b/l  EXTREMITIES - FROM, symmetric pulses. +Minimal pitting edema to b/l LE (R>L). B/l calves NT.  SKIN - No rash or bruising  NEUROLOGIC - Alert, speech clear, moving all extremities spontaneously, no focal deficits  PSYCH - Normal mood/affect, normal insight    MDM:  DDx includes, but not limited to: ACS, PNA, viral syndrome, PTX, CHF exacerbation, lower extremity DVT, PE. Cannot perform CTA chest due to pt's elevated creatinine. ekg, cxr, b/l LE duplex venous US, labs, supplemental O2 as needed, keep on cardiac monitor, possible IV lasix. Will most likely require admission.

## 2024-12-06 NOTE — H&P ADULT - PROBLEM SELECTOR PLAN 1
- presented due to SOB  - noted to be RSV positive; likely cause of symptoms   - supportive care  - isolation precautions  - monitor O2 sats; currently on 2L NC and saturating well; can likely wean off soon (patient not on O2 at home)

## 2024-12-06 NOTE — ED ADULT NURSE NOTE - IS THE PATIENT ABLE TO BE SCREENED?
Heart Rate: 57

RR Interval: 1053

P-R Interval: 176

QRSD Interval: 84

QT Interval: 416

QTC Interval: 405

P Axis: 0

QRS Axis: 9

T Wave Axis: 29

EKG Severity - NORMAL ECG -

EKG Impression: SINUS RHYTHM

Electronically Signed By: McCollester, Laughlin 07-Mar-2018 20:52:00
Yes

## 2024-12-06 NOTE — ED PROVIDER NOTE - OBJECTIVE STATEMENT
69Y M PMH CHF (last EF 40% 2/2024), HTN, HLD, hypothyroidism, presenting with SOB. Patient reports that his symptoms woke him from sleep this around 1AM today. [place] fine when he went to bed around 10PM. Denies chest pain, increased lower extremity swelling, fevers/chills, cough; no sick contacts. patient takes lasix although admits he occasionally misses doses because he forgets to take it.

## 2024-12-06 NOTE — CONSULT NOTE ADULT - SUBJECTIVE AND OBJECTIVE BOX
DATE OF SERVICE: 12-06-24 @ 16:42    CHIEF COMPLAINT:Patient is a 69y old  Male who presents with a chief complaint of SOB (06 Dec 2024 11:38)      HISTORY OF PRESENT ILLNESS:HPI:  Patient is a 69 year old male from home AAOx3, with PMH of HFrEF, pAfib on eliquis, hypothyroidism, cardiac amyloidosis and BPH, who presented to the ED due to SOB. Patient states that he was feeling fine yesterday and went to bed but had awoken around 1am due to feeling SOB. States SOB lasted till around 1:30am or so when he arrived to the ED. States having productive cough but has had this cough for a while ever since he had throat surgery and is not worse than his baseline. Has mild headache but otherwise denies any lightheadedness, nausea or vomiting. No chest pain. Unsure of any sick contacts but did have dinner with extended family members for Thanksgiving and had started feeling some symptoms since then. Patient's wife is also currently recovering from a cold. Patient's RLE bigger than LLE but is normally like that and does not cause him any pain. No other concerns or complaints at this time.  (06 Dec 2024 11:38)      PAST MEDICAL & SURGICAL HISTORY:  Low back pain  Herniated disc on lumbar region      Redundant prepuce and phimosis      Arthritis  on knees      Hypothyroid  not taking any meds      Hypertension      Thyroid cancer  pt denies any thyroid cancer      History of pituitary disease  on caber      Gout      S/P excision of vocal cord nodule  2007 and radiation -      Laryngeal cancer  "stage 0"      Pituitary mass      Carotid occlusion, bilateral      Obese      S/P excision of vocal cord nodule  radiation - 2007      History of lumbar spinal fusion  2018      H/O arthroscopy of shoulder  right-2017      Status post carotid surgery  right-5/13/2022, pt. states unsuccesful              MEDICATIONS:  apixaban 5 milliGRAM(s) Oral every 12 hours  sacubitril 24 mG/valsartan 26 mG 1 Tablet(s) Oral two times a day            atorvastatin 40 milliGRAM(s) Oral at bedtime  colchicine 0.6 milliGRAM(s) Oral daily  finasteride 5 milliGRAM(s) Oral daily  levothyroxine 175 MICROGram(s) Oral daily    tamsulosin 0.4 milliGRAM(s) Oral at bedtime      FAMILY HISTORY:  Family history of stroke (Mother)    Family history of Alzheimer's disease (Father)    FH: stroke (Mother, Father)        Non-contributory    SOCIAL HISTORY:    [X] Tobacco - nonsmoker      Allergies    No Known Allergies    Intolerances    	    REVIEW OF SYSTEMS:  CONSTITUTIONAL: No fever  EYES: No eye pain, visual disturbances, or discharge  ENMT:  No difficulty hearing, tinnitus  NECK: No pain or stiffness  RESPIRATORY: No cough, wheezing,  CARDIOVASCULAR: No chest pain, palpitations, passing out, dizziness, or leg swelling  GASTROINTESTINAL:  No nausea, vomiting, diarrhea or constipation. No melena.  GENITOURINARY: No dysuria, hematuria  NEUROLOGICAL: No stroke like symptoms  SKIN: No burning or lesions   ENDOCRINE: No heat or cold intolerance  MUSCULOSKELETAL: No joint pain or swelling  PSYCHIATRIC: No  anxiety, mood swings  HEME/LYMPH: No bleeding gums  ALLERGY AND IMMUNOLOGIC: No hives or eczema	    All other ROS negative    PHYSICAL EXAM:  T(C): 36.6 (12-06-24 @ 14:48), Max: 37.2 (12-06-24 @ 09:26)  HR: 76 (12-06-24 @ 14:48) (67 - 86)  BP: 91/58 (12-06-24 @ 14:48) (86/66 - 100/71)  RR: 18 (12-06-24 @ 14:48) (16 - 28)  SpO2: 99% (12-06-24 @ 14:48) (95% - 100%)  Wt(kg): --  I&O's Summary    06 Dec 2024 07:01  -  06 Dec 2024 16:42  --------------------------------------------------------  IN: 480 mL / OUT: 300 mL / NET: 180 mL        Appearance: Normal	  HEENT:   Normal oral mucosa, EOMI	  Cardiovascular:  S1 S2, No JVD,    Respiratory: Lungs clear to auscultation	  Psychiatry: Alert  Gastrointestinal:  Soft, Non-tender, + BS	  Skin: No rashes   Neurologic: Non-focal  Extremities:  No edema  Vascular: Peripheral pulses palpable    	    	  	  CARDIAC MARKERS:  Labs personally reviewed by me      Pro-Brain Natriuretic Peptide: 8518 pg/mL (12.06.24 @ 01:39)   Pro-Brain Natriuretic Peptide: 3487 pg/mL (05.06.24 @ 18:19)                             12.5   4.36  )-----------( 204      ( 06 Dec 2024 01:39 )             37.8     12-06    142  |  105  |  65[H]  ----------------------------<  88  3.8   |  23  |  2.26[H]    Ca    8.7      06 Dec 2024 03:22    TPro  6.4  /  Alb  3.7  /  TBili  1.0  /  DBili  x   /  AST  46[H]  /  ALT  36  /  AlkPhos  111  12-06          EKG: Personally reviewed by me - PENDING     Radiology: Personally reviewed by me -     < from: Xray Chest 1 View- PORTABLE-Urgent (12.06.24 @ 02:16) >  FINDINGS:    The lungs are clear. No pleural effusion. No pneumothorax.  The heart size is similar.  No acute osseous abnormalities.    IMPRESSION:  Clear lungs.      < from: TTE Limited W or WO Ultrasound Enhancing Agent (12.06.24 @ 14:00) >  CONCLUSIONS:      1. Left ventricular systolic function is moderately decreased with an ejection fraction of 47 % by Alberto's method of disks. Global left ventricular hypokinesis.   2. Moderate left ventricular hypertrophy.   3. There is moderate (grade 2) left ventricular diastolic dysfunction.   4. Enlarged right ventricular cavity size and borderline reduced right ventricular systolic function.   5. Left atrium is severely dilated.   6. The right atrium is severely dilated.   7. Mild aortic regurgitation.   8. Moderate tricuspid regurgitation.   9. Estimated pulmonary artery systolic pressure is 35 mmHg.  10. Compared to the transthoracic echocardiogram performed on 2/5/2024, LVEF is slightly better.    < from: TTE W or WO Ultrasound Enhancing Agent (02.05.24 @ 08:14) >  CONCLUSIONS:      1. Left ventricular cavity is normal. Left ventricular wall thicknessis normal. Left ventricular systolic function is moderately decreased with an ejection fraction of 40 % by 3D. There are no regional wall motion abnormalities seen.   2. Multiple segmental abnormalities exist. See findings.   3. Elevated filling pressure. Analysis of left ventricular diastolic function and filling pressure is made challenging by the presence of merged E and A wave.   4. Moderately enlarged right ventricular cavity size, wall thickness, and reduced systolic function.   5. The leftatrium is severely dilated.   6. The right atrium is severely dilated.   7. There is trace aortic regurgitation.   8. There is mild to moderate mitral regurgitation.   9. There is moderate tricuspid regurgitation. Estimated pulmonary artery systolic pressure is 31 mmHg.  10. No pericardial effusion seen.  11. Compared to the transthoracic echocardiogram performed on 12/7/2023, there have been no significant interval changes.      < from: MR Cardiac w/wo IV Cont (03.21.23 @ 08:58) >    IMPRESSION:    Findings consistent with cardiac amyloidosis.      Small right pleural effusion with partial right lower lobe atelectasis.      < from: Cardiac Cath Lab - Adult (05.10.21 @ 14:12) >  CORONARY VESSELS: The coronary circulation is right dominant.  LM:   --  LM: Normal.  LAD:   --  LAD: Angiography showed minor luminal irregularities with no  flow limiting lesions.  CX:   --  Circumflex: Angiography showed minor luminal irregularities with  no flow limiting lesions.  RCA:   --  RCA: Angiography showed minor luminal irregularities with no  flow limiting lesions.  COMPLICATIONS: There were no complications.  DIAGNOSTIC RECOMMENDATIONS: The patient should continue with the present  medications.      Assessment /Plan:     Patient is a 69 year old male from home AAOx3, with PMH of HFrEF, pAfib on eliquis, hypothyroidism, cardiac amyloidosis and BPH, who presented to the ED due to SOB. Patient states that he was feeling fine yesterday and went to bed but had awoken around 1am due to feeling SOB. States SOB lasted till around 1:30am or so when he arrived to the ED. States having productive cough but has had this cough for a while ever since he had throat surgery and is not worse than his baseline. Has mild headache but otherwise denies any lightheadedness, nausea or vomiting. No chest pain.     Problem 1: Acute on Chronic Systolic HF   - Pt with hx of amyloidosis   - TTE 12/6 - EF 47%, left ventricular hypokinesis, moderate LV hypertrophy, mod LV diastolic dysfunction, LA severely dilated, moderate TR, pulm pressure 35mmHg   ---- compared to echo 2/2024 with EF 40%, moderate TR, no WMA, pulm pressure 31mmHg  - proBNP 8518 (up from 3487 in 5/2024)  - C/w Entresto 24/26mg BID  - Holding home Lasix in setting of low BP, resume when BP stabilized     Problem 2: Hypotension  - Holding Lasix in setting of low BP  - s/p 250cc NS bolus in ED  - monitor BP and adjust meds as needed.    Problem 3: pAfib  - C/w Eliquis 5mg BID  - Not on BB given cardiac amyloid  - Pending EKG  - monitor on tele    Problem 4: Cardiac Amyloid  - TTE 12/6 - EF 47%, left ventricular hypokinesis, moderate LV hypertrophy, mod LV diastolic dysfunction, LA severely dilated, moderate TR, pulm pressure 35mmHg  - Holding home Lasix in setting of low BP, resume when BP stabilized     Problem 5: RSV  - Care per primary team  - supportive care & isolation precautions  - monitor O2 sats - on 2L NC since admission, plan to wean off as tolerated - patient not on O2 at home      Follows OP with Cardio Dr. Marcio Faulkner     Differential diagnosis and plan of care discussed with patient after the evaluation. Counseling on diet, nutritional counseling, weight management, exercise and medication compliance was done.   Advanced care planning/advanced directives discussed with patient/family. DNR status including forceful chest compressions to attempt to restart the heart, ventilator support/artificial breathing, electric shock, artificial nutrition, health care proxy, Molst form all discussed with pt. Pt wishes to consider. Sixteen minutes spent on discussing advanced directives.       DIPIKA Page DO [facility]  Cardiovascular Medicine  [address], [address]  Available for call or text via [employer]  Office [phone]     DATE OF SERVICE: 12-06-24 @ 16:42    CHIEF COMPLAINT:Patient is a 69y old  Male who presents with a chief complaint of SOB (06 Dec 2024 11:38)      HISTORY OF PRESENT ILLNESS:HPI:  Patient is a 69 year old male from home AAOx3, with PMH of HFrEF, pAfib on eliquis, hypothyroidism, cardiac amyloidosis and BPH, who presented to the ED due to SOB. Patient states that he was feeling fine yesterday and went to bed but had awoken around 1am due to feeling SOB. States SOB lasted till around 1:30am or so when he arrived to the ED. States having productive cough but has had this cough for a while ever since he had throat surgery and is not worse than his baseline. Has mild headache but otherwise denies any lightheadedness, nausea or vomiting. No chest pain. Unsure of any sick contacts but did have dinner with extended family members for Thanksgiving and had started feeling some symptoms since then. Patient's wife is also currently recovering from a cold. Patient's RLE bigger than LLE but is normally like that and does not cause him any pain. No other concerns or complaints at this time.  (06 Dec 2024 11:38)      PAST MEDICAL & SURGICAL HISTORY:  Low back pain  Herniated disc on lumbar region      Redundant prepuce and phimosis      Arthritis  on knees      Hypothyroid  not taking any meds      Hypertension      Thyroid cancer  pt denies any thyroid cancer      History of pituitary disease  on caber      Gout      S/P excision of vocal cord nodule  2007 and radiation -      Laryngeal cancer  "stage 0"      Pituitary mass      Carotid occlusion, bilateral      Obese      S/P excision of vocal cord nodule  radiation - 2007      History of lumbar spinal fusion  2018      H/O arthroscopy of shoulder  right-2017      Status post carotid surgery  right-5/13/2022, pt. states unsuccesful              MEDICATIONS:  apixaban 5 milliGRAM(s) Oral every 12 hours  sacubitril 24 mG/valsartan 26 mG 1 Tablet(s) Oral two times a day            atorvastatin 40 milliGRAM(s) Oral at bedtime  colchicine 0.6 milliGRAM(s) Oral daily  finasteride 5 milliGRAM(s) Oral daily  levothyroxine 175 MICROGram(s) Oral daily    tamsulosin 0.4 milliGRAM(s) Oral at bedtime      FAMILY HISTORY:  Family history of stroke (Mother)    Family history of Alzheimer's disease (Father)    FH: stroke (Mother, Father)        Non-contributory    SOCIAL HISTORY:    [X] Tobacco - nonsmoker      Allergies    No Known Allergies    Intolerances    	    REVIEW OF SYSTEMS:  CONSTITUTIONAL: No fever  EYES: No eye pain, visual disturbances, or discharge  ENMT:  No difficulty hearing, tinnitus  NECK: No pain or stiffness  RESPIRATORY: No cough, wheezing,  CARDIOVASCULAR: No chest pain, palpitations, passing out, dizziness, or leg swelling  GASTROINTESTINAL:  No nausea, vomiting, diarrhea or constipation. No melena.  GENITOURINARY: No dysuria, hematuria  NEUROLOGICAL: No stroke like symptoms  SKIN: No burning or lesions   ENDOCRINE: No heat or cold intolerance  MUSCULOSKELETAL: No joint pain or swelling  PSYCHIATRIC: No  anxiety, mood swings  HEME/LYMPH: No bleeding gums  ALLERGY AND IMMUNOLOGIC: No hives or eczema	    All other ROS negative    PHYSICAL EXAM:  T(C): 36.6 (12-06-24 @ 14:48), Max: 37.2 (12-06-24 @ 09:26)  HR: 76 (12-06-24 @ 14:48) (67 - 86)  BP: 91/58 (12-06-24 @ 14:48) (86/66 - 100/71)  RR: 18 (12-06-24 @ 14:48) (16 - 28)  SpO2: 99% (12-06-24 @ 14:48) (95% - 100%)  Wt(kg): --  I&O's Summary    06 Dec 2024 07:01  -  06 Dec 2024 16:42  --------------------------------------------------------  IN: 480 mL / OUT: 300 mL / NET: 180 mL        Appearance: Normal	  HEENT:   Normal oral mucosa, EOMI	  Cardiovascular:  S1 S2, No JVD,    Respiratory: Lungs clear to auscultation	  Psychiatry: Alert  Gastrointestinal:  Soft, Non-tender, + BS	  Skin: No rashes   Neurologic: Non-focal  Extremities:  No edema  Vascular: Peripheral pulses palpable    	    	  	  CARDIAC MARKERS:  Labs personally reviewed by me      Pro-Brain Natriuretic Peptide: 8518 pg/mL (12.06.24 @ 01:39)   Pro-Brain Natriuretic Peptide: 3487 pg/mL (05.06.24 @ 18:19)                             12.5   4.36  )-----------( 204      ( 06 Dec 2024 01:39 )             37.8     12-06    142  |  105  |  65[H]  ----------------------------<  88  3.8   |  23  |  2.26[H]    Ca    8.7      06 Dec 2024 03:22    TPro  6.4  /  Alb  3.7  /  TBili  1.0  /  DBili  x   /  AST  46[H]  /  ALT  36  /  AlkPhos  111  12-06          EKG: Personally reviewed by me - PENDING     Radiology: Personally reviewed by me -     < from: Xray Chest 1 View- PORTABLE-Urgent (12.06.24 @ 02:16) >  FINDINGS:    The lungs are clear. No pleural effusion. No pneumothorax.  The heart size is similar.  No acute osseous abnormalities.    IMPRESSION:  Clear lungs.      < from: TTE Limited W or WO Ultrasound Enhancing Agent (12.06.24 @ 14:00) >  CONCLUSIONS:      1. Left ventricular systolic function is moderately decreased with an ejection fraction of 47 % by Alberto's method of disks. Global left ventricular hypokinesis.   2. Moderate left ventricular hypertrophy.   3. There is moderate (grade 2) left ventricular diastolic dysfunction.   4. Enlarged right ventricular cavity size and borderline reduced right ventricular systolic function.   5. Left atrium is severely dilated.   6. The right atrium is severely dilated.   7. Mild aortic regurgitation.   8. Moderate tricuspid regurgitation.   9. Estimated pulmonary artery systolic pressure is 35 mmHg.  10. Compared to the transthoracic echocardiogram performed on 2/5/2024, LVEF is slightly better.    < from: TTE W or WO Ultrasound Enhancing Agent (02.05.24 @ 08:14) >  CONCLUSIONS:      1. Left ventricular cavity is normal. Left ventricular wall thicknessis normal. Left ventricular systolic function is moderately decreased with an ejection fraction of 40 % by 3D. There are no regional wall motion abnormalities seen.   2. Multiple segmental abnormalities exist. See findings.   3. Elevated filling pressure. Analysis of left ventricular diastolic function and filling pressure is made challenging by the presence of merged E and A wave.   4. Moderately enlarged right ventricular cavity size, wall thickness, and reduced systolic function.   5. The leftatrium is severely dilated.   6. The right atrium is severely dilated.   7. There is trace aortic regurgitation.   8. There is mild to moderate mitral regurgitation.   9. There is moderate tricuspid regurgitation. Estimated pulmonary artery systolic pressure is 31 mmHg.  10. No pericardial effusion seen.  11. Compared to the transthoracic echocardiogram performed on 12/7/2023, there have been no significant interval changes.      < from: MR Cardiac w/wo IV Cont (03.21.23 @ 08:58) >    IMPRESSION:    Findings consistent with cardiac amyloidosis.      Small right pleural effusion with partial right lower lobe atelectasis.      < from: Cardiac Cath Lab - Adult (05.10.21 @ 14:12) >  CORONARY VESSELS: The coronary circulation is right dominant.  LM:   --  LM: Normal.  LAD:   --  LAD: Angiography showed minor luminal irregularities with no  flow limiting lesions.  CX:   --  Circumflex: Angiography showed minor luminal irregularities with  no flow limiting lesions.  RCA:   --  RCA: Angiography showed minor luminal irregularities with no  flow limiting lesions.  COMPLICATIONS: There were no complications.  DIAGNOSTIC RECOMMENDATIONS: The patient should continue with the present  medications.          Assessment /Plan:     Patient is a 69 year old male from home AAOx3, with PMH of HFrEF, pAfib on eliquis, hypothyroidism, cardiac amyloidosis and BPH, who presented to the ED due to SOB. Patient states that he was feeling fine yesterday and went to bed but had awoken around 1am due to feeling SOB. States SOB lasted till around 1:30am or so when he arrived to the ED. States having productive cough but has had this cough for a while ever since he had throat surgery and is not worse than his baseline. Has mild headache but otherwise denies any lightheadedness, nausea or vomiting. No chest pain.     Problem 1: Acute on Chronic Systolic HF   - Pt with hx of amyloidosis   - TTE 12/6 - EF 47%, left ventricular hypokinesis, moderate LV hypertrophy, mod LV diastolic dysfunction, LA severely dilated, moderate TR, pulm pressure 35mmHg   ---- compared to echo 2/2024 with EF 40%, moderate TR, no WMA, pulm pressure 31mmHg  - proBNP 8518 (up from 3487 in 5/2024)  - C/w Entresto 24/26mg BID  - Holding home Lasix in setting of low BP, resume when BP stabilized     Problem 2: Hypotension  - Holding Lasix in setting of low BP  - s/p 250cc NS bolus in ED  - monitor BP and adjust meds as needed.    Problem 3: pAfib  - C/w Eliquis 5mg BID  - Not on BB given cardiac amyloid  - Pending EKG  - monitor on tele    Problem 4: Cardiac Amyloid  - TTE 12/6 - EF 47%, left ventricular hypokinesis, moderate LV hypertrophy, mod LV diastolic dysfunction, LA severely dilated, moderate TR, pulm pressure 35mmHg  - Holding home Lasix in setting of low BP, resume when BP stabilized     Problem 5: RSV  - Care per primary team  - supportive care & isolation precautions  - monitor O2 sats - on 2L NC since admission, plan to wean off as tolerated - patient not on O2 at home      Follows OP with Cardio Dr. Marcio Faulkner             Differential diagnosis and plan of care discussed with patient after the evaluation. Counseling on diet, nutritional counseling, weight management, exercise and medication compliance was done.   Advanced care planning/advanced directives discussed with patient/family. DNR status including forceful chest compressions to attempt to restart the heart, ventilator support/artificial breathing, electric shock, artificial nutrition, health care proxy, Molst form all discussed with pt. Pt wishes to consider. Sixteen minutes spent on discussing advanced directives.       DIPIKA Page DO [facility]  Cardiovascular Medicine  [address], [address]  Available for call or text via [employer]  Office [phone]

## 2024-12-06 NOTE — H&P ADULT - PROBLEM SELECTOR PLAN 4
- history of HFrEF and amyloidosis   - strict I/O  - daily weight  - continue entresto for now  - hold lasix for now in setting of low BPs  - cardio eval for further recs   - ECHO pending to be done

## 2024-12-06 NOTE — ED PROVIDER NOTE - BIRTH SEX
Male

## 2024-12-06 NOTE — H&P ADULT - PROBLEM SELECTOR PLAN 3
- patient noted to have RLE swelling more than LLE; chronic as per patient   - denies any pain   - VA duplex neg for DVT bilaterally   - monitor for now

## 2024-12-06 NOTE — H&P ADULT - PROBLEM SELECTOR PLAN 2
- noted to have soft BPs with SBP in 80s in ED  - s/p 250cc NS bolus in ED  - patient states BP usually on lower side and SBP in 90s  - BP overall improved now; will hold on lasix for now  - cardio eval for further evaluation   - will continue with entresto for now   - monitor BP and adjust meds as needed

## 2024-12-06 NOTE — ED PROVIDER NOTE - CARE PLAN
1 Principal Discharge DX:	RSV infection   Principal Discharge DX:	RSV infection  Secondary Diagnosis:	Chronic systolic congestive heart failure  Secondary Diagnosis:	Cardiac amyloidosis  Secondary Diagnosis:	Paroxysmal atrial fibrillation  Secondary Diagnosis:	Chronic kidney disease, unspecified CKD stage

## 2024-12-06 NOTE — H&P ADULT - HISTORY OF PRESENT ILLNESS
Patient is a 69 year old male from home AAOx3, with PMH of HFrEF, pAfib on eliquis, hypothyroidism, cardiac amyloidosis and BPH, who presented to the ED due to SOB. Patient states that he was feeling fine yesterday and went to bed but had awoken around 1am due to feeling SOB. States SOB lasted till around 1:30am or so when he arrived to the ED. States having productive cough but has had this cough for a while ever since he had throat surgery and is not worse than his baseline. Has mild headache but otherwise denies any lightheadedness, nausea or vomiting. No chest pain. Unsure of any sick contacts but did have dinner with extended family members for Thanksgiving and had started feeling some symptoms since then. Patient's wife is also currently recovering from a cold. Patient's RLE bigger than LLE but is normally like that and does not cause him any pain. No other concerns or complaints at this time.

## 2024-12-06 NOTE — ED PROVIDER NOTE - PROGRESS NOTE DETAILS
Slime Robison MD PGY-3: patient RSV positive, has been comfortable on 3L NC, satting well. Suspect RSV infection driving HF exacerbation. BNP elevated relative to baseline, >8K today typically runs in 3K range. BPs soft 80s/60s but Mapping in 70s; avoiding diuresis at this time due to soft BPs. plan TBA medicine for cards eval, repeat TTE to assess current cardiac function Slime Robison MD PGY-3: patient RSV positive, has been comfortable on 3L NC, satting well. Suspect RSV infection driving HF exacerbation. BNP elevated relative to baseline, >8K today typically runs in 3K range. BPs soft 80s/60s but Mapping in 70s; avoiding diuresis at this time due to soft BPs. plan TBA medicine for cards eval, repeat TTE to assess current cardiac function.

## 2024-12-06 NOTE — ED PROVIDER NOTE - SECONDARY DIAGNOSIS.
Chronic kidney disease, unspecified CKD stage Paroxysmal atrial fibrillation Cardiac amyloidosis Chronic systolic congestive heart failure

## 2024-12-06 NOTE — ED ADULT NURSE NOTE - NSFALLRISKINTERV_ED_ALL_ED

## 2024-12-07 LAB
ANION GAP SERPL CALC-SCNC: 14 MMOL/L — SIGNIFICANT CHANGE UP (ref 5–17)
BUN SERPL-MCNC: 71 MG/DL — HIGH (ref 7–23)
CALCIUM SERPL-MCNC: 8.5 MG/DL — SIGNIFICANT CHANGE UP (ref 8.4–10.5)
CHLORIDE SERPL-SCNC: 103 MMOL/L — SIGNIFICANT CHANGE UP (ref 96–108)
CO2 SERPL-SCNC: 23 MMOL/L — SIGNIFICANT CHANGE UP (ref 22–31)
CREAT SERPL-MCNC: 2.3 MG/DL — HIGH (ref 0.5–1.3)
EGFR: 30 ML/MIN/1.73M2 — LOW
GLUCOSE SERPL-MCNC: 89 MG/DL — SIGNIFICANT CHANGE UP (ref 70–99)
HCT VFR BLD CALC: 33.8 % — LOW (ref 39–50)
HGB BLD-MCNC: 10.9 G/DL — LOW (ref 13–17)
MAGNESIUM SERPL-MCNC: 1.8 MG/DL — SIGNIFICANT CHANGE UP (ref 1.6–2.6)
MCHC RBC-ENTMCNC: 28.9 PG — SIGNIFICANT CHANGE UP (ref 27–34)
MCHC RBC-ENTMCNC: 32.2 G/DL — SIGNIFICANT CHANGE UP (ref 32–36)
MCV RBC AUTO: 89.7 FL — SIGNIFICANT CHANGE UP (ref 80–100)
NRBC # BLD: 0 /100 WBCS — SIGNIFICANT CHANGE UP (ref 0–0)
PHOSPHATE SERPL-MCNC: 3.8 MG/DL — SIGNIFICANT CHANGE UP (ref 2.5–4.5)
PLATELET # BLD AUTO: 146 K/UL — LOW (ref 150–400)
POTASSIUM SERPL-MCNC: 3.9 MMOL/L — SIGNIFICANT CHANGE UP (ref 3.5–5.3)
POTASSIUM SERPL-SCNC: 3.9 MMOL/L — SIGNIFICANT CHANGE UP (ref 3.5–5.3)
RBC # BLD: 3.77 M/UL — LOW (ref 4.2–5.8)
RBC # FLD: 15.8 % — HIGH (ref 10.3–14.5)
SODIUM SERPL-SCNC: 140 MMOL/L — SIGNIFICANT CHANGE UP (ref 135–145)
WBC # BLD: 3.18 K/UL — LOW (ref 3.8–10.5)
WBC # FLD AUTO: 3.18 K/UL — LOW (ref 3.8–10.5)

## 2024-12-07 PROCEDURE — 93010 ELECTROCARDIOGRAM REPORT: CPT

## 2024-12-07 RX ADMIN — Medication 5 MILLIGRAM(S): at 11:06

## 2024-12-07 RX ADMIN — SACUBITRIL AND VALSARTAN 1 TABLET(S): 24; 26 TABLET, FILM COATED ORAL at 17:44

## 2024-12-07 RX ADMIN — Medication 0.6 MILLIGRAM(S): at 11:06

## 2024-12-07 RX ADMIN — APIXABAN 5 MILLIGRAM(S): 2.5 TABLET, FILM COATED ORAL at 17:43

## 2024-12-07 RX ADMIN — Medication 175 MICROGRAM(S): at 05:49

## 2024-12-07 RX ADMIN — Medication 40 MILLIGRAM(S): at 21:03

## 2024-12-07 RX ADMIN — TAMSULOSIN HYDROCHLORIDE 0.4 MILLIGRAM(S): 0.4 CAPSULE ORAL at 21:03

## 2024-12-07 RX ADMIN — APIXABAN 5 MILLIGRAM(S): 2.5 TABLET, FILM COATED ORAL at 05:48

## 2024-12-07 NOTE — PROGRESS NOTE ADULT - SUBJECTIVE AND OBJECTIVE BOX
Patient is a 69y old  Male who presents with a chief complaint of SOB (07 Dec 2024 12:50)      SUBJECTIVE / OVERNIGHT EVENTS: no events     MEDICATIONS  (STANDING):  apixaban 5 milliGRAM(s) Oral every 12 hours  atorvastatin 40 milliGRAM(s) Oral at bedtime  colchicine 0.6 milliGRAM(s) Oral daily  finasteride 5 milliGRAM(s) Oral daily  levothyroxine 175 MICROGram(s) Oral daily  sacubitril 24 mG/valsartan 26 mG 1 Tablet(s) Oral two times a day  tamsulosin 0.4 milliGRAM(s) Oral at bedtime    MEDICATIONS  (PRN):      CAPILLARY BLOOD GLUCOSE        I&O's Summary    06 Dec 2024 07:01  -  07 Dec 2024 07:00  --------------------------------------------------------  IN: 780 mL / OUT: 600 mL / NET: 180 mL    07 Dec 2024 07:01  -  07 Dec 2024 23:07  --------------------------------------------------------  IN: 380 mL / OUT: 600 mL / NET: -220 mL      T(C): 36.5 (12-07-24 @ 20:36), Max: 36.5 (12-07-24 @ 20:36)  HR: 76 (12-07-24 @ 20:36) (69 - 78)  BP: 96/65 (12-07-24 @ 20:36) (93/59 - 103/72)  RR: 18 (12-07-24 @ 20:36) (18 - 18)  SpO2: 99% (12-07-24 @ 20:36) (97% - 99%)    PHYSICAL EXAM:  GENERAL: NAD, well-developed  HEAD:  Atraumatic, Normocephalic  EYES: EOMI, PERRLA, conjunctiva and sclera clear  NECK: Supple, No JVD  CHEST/LUNG: Clear to auscultation bilaterally; No wheeze  HEART: Regular rate and rhythm; No murmurs, rubs, or gallops  ABDOMEN: Soft, Nontender, Nondistended; Bowel sounds present  EXTREMITIES:  2+ Peripheral Pulses, No clubbing, cyanosis, or edema  PSYCH: AAOx3  NEUROLOGY: non-focal  SKIN: No rashes or lesions    LABS:                        10.9   3.18  )-----------( 146      ( 07 Dec 2024 06:44 )             33.8     12-07    140  |  103  |  71[H]  ----------------------------<  89  3.9   |  23  |  2.30[H]    Ca    8.5      07 Dec 2024 06:44  Phos  3.8     12-07  Mg     1.8     12-07    TPro  6.4  /  Alb  3.7  /  TBili  1.0  /  DBili  x   /  AST  46[H]  /  ALT  36  /  AlkPhos  111  12-06    PT/INR - ( 06 Dec 2024 01:39 )   PT: 19.8 sec;   INR: 1.73 ratio         PTT - ( 06 Dec 2024 01:39 )  PTT:35.9 sec      Urinalysis Basic - ( 07 Dec 2024 06:44 )    Color: x / Appearance: x / SG: x / pH: x  Gluc: 89 mg/dL / Ketone: x  / Bili: x / Urobili: x   Blood: x / Protein: x / Nitrite: x   Leuk Esterase: x / RBC: x / WBC x   Sq Epi: x / Non Sq Epi: x / Bacteria: x        RADIOLOGY & ADDITIONAL TESTS:    Imaging Personally Reviewed:    Consultant(s) Notes Reviewed:      Care Discussed with Consultants/Other Providers:

## 2024-12-07 NOTE — PROGRESS NOTE ADULT - SUBJECTIVE AND OBJECTIVE BOX
DATE OF SERVICE: 12-07-24 @ 12:51    Patient is a 69y old  Male who presents with a chief complaint of SOB (06 Dec 2024 16:41)      INTERVAL HISTORY: no acute events noted    REVIEW OF SYSTEMS:  CONSTITUTIONAL: No weakness  EYES/ENT: No visual changes;  No throat pain   NECK: No pain or stiffness  RESPIRATORY: No cough, wheezing; No shortness of breath  CARDIOVASCULAR: No chest pain or palpitations  GASTROINTESTINAL: No abdominal  pain. No nausea, vomiting, or hematemesis  GENITOURINARY: No dysuria, frequency or hematuria  NEUROLOGICAL: No stroke like symptoms  SKIN: No rashes    TELEMETRY Personally reviewed: SR 60s-70s  	  MEDICATIONS:  sacubitril 24 mG/valsartan 26 mG 1 Tablet(s) Oral two times a day        PHYSICAL EXAM:  T(C): 36.4 (12-07-24 @ 12:09), Max: 36.6 (12-06-24 @ 14:48)  HR: 69 (12-07-24 @ 12:09) (68 - 76)  BP: 93/59 (12-07-24 @ 12:09) (90/55 - 101/68)  RR: 18 (12-07-24 @ 12:09) (18 - 18)  SpO2: 97% (12-07-24 @ 12:09) (97% - 99%)  Wt(kg): --  I&O's Summary    06 Dec 2024 07:01  -  07 Dec 2024 07:00  --------------------------------------------------------  IN: 780 mL / OUT: 600 mL / NET: 180 mL    07 Dec 2024 07:01  -  07 Dec 2024 12:51  --------------------------------------------------------  IN: 0 mL / OUT: 600 mL / NET: -600 mL          Appearance: In no distress	  HEENT:    PERRL, EOMI	  Cardiovascular:  S1 S2, No JVD  Respiratory: Lungs clear to auscultation	  Gastrointestinal:  Soft, Non-tender, + BS	  Vascularature:  No edema of LE  Psychiatric: Appropriate affect   Neuro: no acute focal deficits                               10.9   3.18  )-----------( 146      ( 07 Dec 2024 06:44 )             33.8     12-07    140  |  103  |  71[H]  ----------------------------<  89  3.9   |  23  |  2.30[H]    Ca    8.5      07 Dec 2024 06:44  Phos  3.8     12-07  Mg     1.8     12-07    TPro  6.4  /  Alb  3.7  /  TBili  1.0  /  DBili  x   /  AST  46[H]  /  ALT  36  /  AlkPhos  111  12-06        Labs personally reviewed      ASSESSMENT/PLAN: 	    Patient is a 69 year old male from home AAOx3, with PMH of HFrEF, pAfib on eliquis, hypothyroidism, cardiac amyloidosis and BPH, who presented to the ED due to SOB. Patient states that he was feeling fine yesterday and went to bed but had awoken around 1am due to feeling SOB. States SOB lasted till around 1:30am or so when he arrived to the ED. States having productive cough but has had this cough for a while ever since he had throat surgery and is not worse than his baseline. Has mild headache but otherwise denies any lightheadedness, nausea or vomiting. No chest pain.     Problem 1: Acute on Chronic Systolic HF   - Pt with hx of amyloidosis   - TTE 12/6 - EF 47%, left ventricular hypokinesis, moderate LV hypertrophy, mod LV diastolic dysfunction, LA severely dilated, moderate TR, pulm pressure 35mmHg   ---- compared to echo 2/2024 with EF 40%, moderate TR, no WMA, pulm pressure 31mmHg  - proBNP 8518 (up from 3487 in 5/2024)  - C/w Entresto 24/26mg BID  - Holding home Lasix in setting of low BP, resume when BP stabilized     Problem 2: Hypotension  - Holding Lasix in setting of low BP  - s/p 250cc NS bolus in ED  - monitor BP and adjust meds as needed.    Problem 3: pAfib  - C/w Eliquis 5mg BID  - Not on BB given cardiac amyloid  - Pending EKG  - monitor on tele    Problem 4: Cardiac Amyloid  - TTE 12/6 - EF 47%, left ventricular hypokinesis, moderate LV hypertrophy, mod LV diastolic dysfunction, LA severely dilated, moderate TR, pulm pressure 35mmHg  - Holding home Lasix in setting of low BP, resume when BP stabilized     Problem 5: RSV  - Care per primary team  - supportive care & isolation precautions  - monitor O2 sats - on 2L NC since admission, plan to wean off as tolerated - patient not on O2 at home      Follows OP with Cardio Dr. David Stern Iolani Behrbom, AG-NP   Karri Hernandez, DO [facility]  Cardiovascular Medicine  [address] [place] [address], [address]  Available through call or text on [employer] TEAM[employer]  Office: [phone]   DATE OF SERVICE: 12-07-24 @ 12:51    Patient is a 69y old  Male who presents with a chief complaint of SOB (06 Dec 2024 16:41)      INTERVAL HISTORY: no acute events noted    REVIEW OF SYSTEMS:  CONSTITUTIONAL: No weakness  EYES/ENT: No visual changes;  No throat pain   NECK: No pain or stiffness  RESPIRATORY: No cough, wheezing; No shortness of breath  CARDIOVASCULAR: No chest pain or palpitations  GASTROINTESTINAL: No abdominal  pain. No nausea, vomiting, or hematemesis  GENITOURINARY: No dysuria, frequency or hematuria  NEUROLOGICAL: No stroke like symptoms  SKIN: No rashes    TELEMETRY Personally reviewed: SR 60s-70s  	  MEDICATIONS:  sacubitril 24 mG/valsartan 26 mG 1 Tablet(s) Oral two times a day        PHYSICAL EXAM:  T(C): 36.4 (12-07-24 @ 12:09), Max: 36.6 (12-06-24 @ 14:48)  HR: 69 (12-07-24 @ 12:09) (68 - 76)  BP: 93/59 (12-07-24 @ 12:09) (90/55 - 101/68)  RR: 18 (12-07-24 @ 12:09) (18 - 18)  SpO2: 97% (12-07-24 @ 12:09) (97% - 99%)  Wt(kg): --  I&O's Summary    06 Dec 2024 07:01  -  07 Dec 2024 07:00  --------------------------------------------------------  IN: 780 mL / OUT: 600 mL / NET: 180 mL    07 Dec 2024 07:01  -  07 Dec 2024 12:51  --------------------------------------------------------  IN: 0 mL / OUT: 600 mL / NET: -600 mL          Appearance: In no distress	  HEENT:    PERRL, EOMI	  Cardiovascular:  S1 S2, No JVD  Respiratory: Lungs clear to auscultation	  Gastrointestinal:  Soft, Non-tender, + BS	  Vascularature:  No edema of LE  Psychiatric: Appropriate affect   Neuro: no acute focal deficits                               10.9   3.18  )-----------( 146      ( 07 Dec 2024 06:44 )             33.8     12-07    140  |  103  |  71[H]  ----------------------------<  89  3.9   |  23  |  2.30[H]    Ca    8.5      07 Dec 2024 06:44  Phos  3.8     12-07  Mg     1.8     12-07    TPro  6.4  /  Alb  3.7  /  TBili  1.0  /  DBili  x   /  AST  46[H]  /  ALT  36  /  AlkPhos  111  12-06        Labs personally reviewed      ASSESSMENT/PLAN: 	    Patient is a 69 year old male from home AAOx3, with PMH of HFrEF, pAfib on eliquis, hypothyroidism, cardiac amyloidosis and BPH, who presented to the ED due to SOB. Patient states that he was feeling fine yesterday and went to bed but had awoken around 1am due to feeling SOB. States SOB lasted till around 1:30am or so when he arrived to the ED. States having productive cough but has had this cough for a while ever since he had throat surgery and is not worse than his baseline. Has mild headache but otherwise denies any lightheadedness, nausea or vomiting. No chest pain.     Problem 1: Acute on Chronic Systolic HF   - Pt with hx of amyloidosis   - TTE 12/6 - EF 47%, left ventricular hypokinesis, moderate LV hypertrophy, mod LV diastolic dysfunction, LA severely dilated, moderate TR, pulm pressure 35mmHg   ---- compared to echo 2/2024 with EF 40%, moderate TR, no WMA, pulm pressure 31mmHg  - proBNP 8518 (up from 3487 in 5/2024)  - C/w Entresto 24/26mg BID  - Resume home Lasix       Problem 2: Hypotension  - Holding Lasix in setting of low BP  - s/p 250cc NS bolus in ED  - monitor BP and adjust meds as needed.    Problem 3: pAfib  - C/w Eliquis 5mg BID  - Not on BB given cardiac amyloid  - EKG NSR    Problem 4: Cardiac Amyloid  - TTE 12/6 - EF 47%, left ventricular hypokinesis, moderate LV hypertrophy, mod LV diastolic dysfunction, LA severely dilated, moderate TR, pulm pressure 35mmHg    Problem 5: RSV  - Care per primary team  - supportive care & isolation precautions  - monitor O2 sats - on 2L NC since admission, plan to wean off as tolerated - patient not on O2 at home      Follows OP with Cardio Dr. David Stern Iolani Behrbom, AG-NP   Karri Hernandez DO [facility]  Cardiovascular Medicine  [address] [place] [address], [address]  Available through call or text on [employer] TEAMs  Office: [phone]

## 2024-12-08 LAB
ANION GAP SERPL CALC-SCNC: 16 MMOL/L — SIGNIFICANT CHANGE UP (ref 5–17)
BUN SERPL-MCNC: 65 MG/DL — HIGH (ref 7–23)
CALCIUM SERPL-MCNC: 8.8 MG/DL — SIGNIFICANT CHANGE UP (ref 8.4–10.5)
CHLORIDE SERPL-SCNC: 103 MMOL/L — SIGNIFICANT CHANGE UP (ref 96–108)
CO2 SERPL-SCNC: 21 MMOL/L — LOW (ref 22–31)
CREAT SERPL-MCNC: 2.02 MG/DL — HIGH (ref 0.5–1.3)
EGFR: 35 ML/MIN/1.73M2 — LOW
GLUCOSE SERPL-MCNC: 84 MG/DL — SIGNIFICANT CHANGE UP (ref 70–99)
HCT VFR BLD CALC: 33.3 % — LOW (ref 39–50)
HGB BLD-MCNC: 10.9 G/DL — LOW (ref 13–17)
MAGNESIUM SERPL-MCNC: 1.8 MG/DL — SIGNIFICANT CHANGE UP (ref 1.6–2.6)
MCHC RBC-ENTMCNC: 29.2 PG — SIGNIFICANT CHANGE UP (ref 27–34)
MCHC RBC-ENTMCNC: 32.7 G/DL — SIGNIFICANT CHANGE UP (ref 32–36)
MCV RBC AUTO: 89.3 FL — SIGNIFICANT CHANGE UP (ref 80–100)
NRBC # BLD: 0 /100 WBCS — SIGNIFICANT CHANGE UP (ref 0–0)
PLATELET # BLD AUTO: 146 K/UL — LOW (ref 150–400)
POTASSIUM SERPL-MCNC: 3.8 MMOL/L — SIGNIFICANT CHANGE UP (ref 3.5–5.3)
POTASSIUM SERPL-SCNC: 3.8 MMOL/L — SIGNIFICANT CHANGE UP (ref 3.5–5.3)
RBC # BLD: 3.73 M/UL — LOW (ref 4.2–5.8)
RBC # FLD: 15.8 % — HIGH (ref 10.3–14.5)
SODIUM SERPL-SCNC: 140 MMOL/L — SIGNIFICANT CHANGE UP (ref 135–145)
WBC # BLD: 2.98 K/UL — LOW (ref 3.8–10.5)
WBC # FLD AUTO: 2.98 K/UL — LOW (ref 3.8–10.5)

## 2024-12-08 RX ORDER — FUROSEMIDE 40 MG/1
80 TABLET ORAL
Refills: 0 | Status: DISCONTINUED | OUTPATIENT
Start: 2024-12-10 | End: 2024-12-09

## 2024-12-08 RX ORDER — IPRATROPIUM BROMIDE AND ALBUTEROL SULFATE 2.5; .5 MG/3ML; MG/3ML
3 SOLUTION RESPIRATORY (INHALATION) ONCE
Refills: 0 | Status: COMPLETED | OUTPATIENT
Start: 2024-12-08 | End: 2024-12-08

## 2024-12-08 RX ADMIN — TAMSULOSIN HYDROCHLORIDE 0.4 MILLIGRAM(S): 0.4 CAPSULE ORAL at 21:27

## 2024-12-08 RX ADMIN — APIXABAN 5 MILLIGRAM(S): 2.5 TABLET, FILM COATED ORAL at 05:17

## 2024-12-08 RX ADMIN — Medication 175 MICROGRAM(S): at 05:17

## 2024-12-08 RX ADMIN — IPRATROPIUM BROMIDE AND ALBUTEROL SULFATE 3 MILLILITER(S): 2.5; .5 SOLUTION RESPIRATORY (INHALATION) at 20:04

## 2024-12-08 RX ADMIN — Medication 0.6 MILLIGRAM(S): at 12:23

## 2024-12-08 RX ADMIN — APIXABAN 5 MILLIGRAM(S): 2.5 TABLET, FILM COATED ORAL at 20:04

## 2024-12-08 RX ADMIN — SACUBITRIL AND VALSARTAN 1 TABLET(S): 24; 26 TABLET, FILM COATED ORAL at 20:04

## 2024-12-08 RX ADMIN — Medication 40 MILLIGRAM(S): at 21:27

## 2024-12-08 RX ADMIN — Medication 5 MILLIGRAM(S): at 12:23

## 2024-12-08 NOTE — CONSULT NOTE ADULT - ASSESSMENT
69 year old male from home AAOx3, with PMH of HFrEF, pAfib on eliquis, hypothyroidism, cardiac amyloidosis and BPH, who presented to the ED due to SOB. Patient states that he was feeling fine yesterday and went to bed but had awoken around 1am due to feeling SOB. States SOB lasted till around 1:30am or so when he arrived to the ED. States having productive cough but has had this cough for a while ever since he had throat surgery and is not worse than his baseline. Has mild headache but otherwise denies any lightheadedness, nausea or vomiting. No chest pain. Unsure of any sick contacts but did have dinner with extended family members for Thanksgiving and had started feeling some symptoms since then. Patient's wife is also currently recovering from a cold. pt admitted dx with RSV and ckd       1- CKD IV  2- hx gout   3- RSV pneumonia    4- hx CHF   5- hx amyloidosis     diarrhea recently likely due to higher doses of colcrys  cr is baseline   to have urinalysis   decrease colcrys to 0.6 mg qod given his diarrhea   entresto 24/26 mg bid   for bph cont flomax 0.4 mg daily   to have intact pth   supportive care

## 2024-12-08 NOTE — PROGRESS NOTE ADULT - SUBJECTIVE AND OBJECTIVE BOX
DATE OF SERVICE: 12-08-24 @ 12:14    Patient is a 69y old  Male who presents with a chief complaint of SOB (07 Dec 2024 19:02)      INTERVAL HISTORY: no acute events noted.  OOB to chair    REVIEW OF SYSTEMS:  CONSTITUTIONAL: No weakness  EYES/ENT: No visual changes;  No throat pain   NECK: No pain or stiffness  RESPIRATORY: No cough, wheezing; No shortness of breath  CARDIOVASCULAR: No chest pain or palpitations  GASTROINTESTINAL: No abdominal  pain. No nausea, vomiting, or hematemesis  GENITOURINARY: No dysuria, frequency or hematuria  NEUROLOGICAL: No stroke like symptoms  SKIN: No rashes    TELEMETRY Personally reviewed: SR 70s  	  MEDICATIONS:  sacubitril 24 mG/valsartan 26 mG 1 Tablet(s) Oral two times a day        PHYSICAL EXAM:  T(C): 36.4 (12-08-24 @ 12:11), Max: 36.5 (12-07-24 @ 20:36)  HR: 72 (12-08-24 @ 12:11) (70 - 78)  BP: 102/50 (12-08-24 @ 12:11) (92/57 - 103/72)  RR: 18 (12-08-24 @ 12:11) (18 - 18)  SpO2: 98% (12-08-24 @ 12:11) (96% - 99%)  Wt(kg): --  I&O's Summary    07 Dec 2024 07:01  -  08 Dec 2024 07:00  --------------------------------------------------------  IN: 380 mL / OUT: 900 mL / NET: -520 mL          Appearance: In no distress	  HEENT:    PERRL, EOMI	  Cardiovascular:  S1 S2, No JVD  Respiratory: Lungs clear to auscultation	  Gastrointestinal:  Soft, Non-tender, + BS	  Vascularature:  No edema of LE  Psychiatric: Appropriate affect   Neuro: no acute focal deficits                               10.9   2.98  )-----------( 146      ( 08 Dec 2024 06:43 )             33.3     12-08    140  |  103  |  65[H]  ----------------------------<  84  3.8   |  21[L]  |  2.02[H]    Ca    8.8      08 Dec 2024 06:43  Phos  3.8     12-07  Mg     1.8     12-08          Labs personally reviewed      ASSESSMENT/PLAN: 	    Patient is a 69 year old male from home AAOx3, with PMH of HFrEF, pAfib on eliquis, hypothyroidism, cardiac amyloidosis and BPH, who presented to the ED due to SOB. Patient states that he was feeling fine yesterday and went to bed but had awoken around 1am due to feeling SOB. States SOB lasted till around 1:30am or so when he arrived to the ED. States having productive cough but has had this cough for a while ever since he had throat surgery and is not worse than his baseline. Has mild headache but otherwise denies any lightheadedness, nausea or vomiting. No chest pain.     Problem 1: Acute on Chronic Systolic HF   - Pt with hx of amyloidosis   - TTE 12/6 - EF 47%, left ventricular hypokinesis, moderate LV hypertrophy, mod LV diastolic dysfunction, LA severely dilated, moderate TR, pulm pressure 35mmHg   ---- compared to echo 2/2024 with EF 40%, moderate TR, no WMA, pulm pressure 31mmHg  - proBNP 8518 (up from 3487 in 5/2024)  - C/w Entresto 24/26mg BID  - Resume home Lasix       Problem 2: Hypotension  - Holding Lasix in setting of low BP  - s/p 250cc NS bolus in ED  - monitor BP and adjust meds as needed.    Problem 3: pAfib  - C/w Eliquis 5mg BID  - Not on BB given cardiac amyloid  - EKG NSR    Problem 4: Cardiac Amyloid  - TTE 12/6 - EF 47%, left ventricular hypokinesis, moderate LV hypertrophy, mod LV diastolic dysfunction, LA severely dilated, moderate TR, pulm pressure 35mmHg    Problem 5: RSV  - Care per primary team  - supportive care & isolation precautions  - monitor O2 sats - on 2L NC since admission, plan to wean off as tolerated - patient not on O2 at home  - now on RA      Follows OP with Cardio Dr. David Stern Iolani Behrbom, AG-NP   Karri Hernandez, DO [facility]  Cardiovascular Medicine  [address] [facility], [address]  Available through call or text on [employer] TEAM[employer]  Office: [phone]   DATE OF SERVICE: 12-08-24 @ 12:14    Patient is a 69y old  Male who presents with a chief complaint of SOB (07 Dec 2024 19:02)      INTERVAL HISTORY: no acute events noted.  OOB to chair    REVIEW OF SYSTEMS:  CONSTITUTIONAL: No weakness  EYES/ENT: No visual changes;  No throat pain   NECK: No pain or stiffness  RESPIRATORY: No cough, wheezing; No shortness of breath  CARDIOVASCULAR: No chest pain or palpitations  GASTROINTESTINAL: No abdominal  pain. No nausea, vomiting, or hematemesis  GENITOURINARY: No dysuria, frequency or hematuria  NEUROLOGICAL: No stroke like symptoms  SKIN: No rashes    TELEMETRY Personally reviewed: SR 70s  	  MEDICATIONS:  sacubitril 24 mG/valsartan 26 mG 1 Tablet(s) Oral two times a day        PHYSICAL EXAM:  T(C): 36.4 (12-08-24 @ 12:11), Max: 36.5 (12-07-24 @ 20:36)  HR: 72 (12-08-24 @ 12:11) (70 - 78)  BP: 102/50 (12-08-24 @ 12:11) (92/57 - 103/72)  RR: 18 (12-08-24 @ 12:11) (18 - 18)  SpO2: 98% (12-08-24 @ 12:11) (96% - 99%)  Wt(kg): --  I&O's Summary    07 Dec 2024 07:01  -  08 Dec 2024 07:00  --------------------------------------------------------  IN: 380 mL / OUT: 900 mL / NET: -520 mL          Appearance: In no distress	  HEENT:    PERRL, EOMI	  Cardiovascular:  S1 S2, No JVD  Respiratory: Lungs clear to auscultation	  Gastrointestinal:  Soft, Non-tender, + BS	  Vascularature:  No edema of LE  Psychiatric: Appropriate affect   Neuro: no acute focal deficits                               10.9   2.98  )-----------( 146      ( 08 Dec 2024 06:43 )             33.3     12-08    140  |  103  |  65[H]  ----------------------------<  84  3.8   |  21[L]  |  2.02[H]    Ca    8.8      08 Dec 2024 06:43  Phos  3.8     12-07  Mg     1.8     12-08          Labs personally reviewed      ASSESSMENT/PLAN: 	    Patient is a 69 year old male from home AAOx3, with PMH of HFrEF, pAfib on eliquis, hypothyroidism, cardiac amyloidosis and BPH, who presented to the ED due to SOB. Patient states that he was feeling fine yesterday and went to bed but had awoken around 1am due to feeling SOB. States SOB lasted till around 1:30am or so when he arrived to the ED. States having productive cough but has had this cough for a while ever since he had throat surgery and is not worse than his baseline. Has mild headache but otherwise denies any lightheadedness, nausea or vomiting. No chest pain.     Problem 1: Acute on Chronic Systolic HF   - Pt with hx of amyloidosis   - TTE 12/6 - EF 47%, left ventricular hypokinesis, moderate LV hypertrophy, mod LV diastolic dysfunction, LA severely dilated, moderate TR, pulm pressure 35mmHg   ---- compared to echo 2/2024 with EF 40%, moderate TR, no WMA, pulm pressure 31mmHg  - proBNP 8518 (up from 3487 in 5/2024)  - C/w Entresto 24/26mg BID  - Resume home Lasix 80mg PO BID    Problem 2: Hypotension  - Holding Lasix in setting of low BP  - s/p 250cc NS bolus in ED  - monitor BP and adjust meds as needed.    Problem 3: pAfib  - C/w Eliquis 5mg BID  - Not on BB given cardiac amyloid  - EKG NSR    Problem 4: Cardiac Amyloid  - TTE 12/6 - EF 47%, left ventricular hypokinesis, moderate LV hypertrophy, mod LV diastolic dysfunction, LA severely dilated, moderate TR, pulm pressure 35mmHg    Problem 5: RSV  - Care per primary team  - supportive care & isolation precautions  - monitor O2 sats - on 2L NC since admission, plan to wean off as tolerated - patient not on O2 at home  - now on RA      Follows OP with Cardio Dr. David Stern Iolani Behrbom, AG-NP   Karri Hernandez,  [facility]  Cardiovascular Medicine  [address], [address]  Available through call or text on [employer] TE[employer]  Office: [phone]

## 2024-12-08 NOTE — CONSULT NOTE ADULT - SUBJECTIVE AND OBJECTIVE BOX
[facility]  [phone]  NEPHROLOGY      INITIAL CONSULT NOTE  --------------------------------------------------------------------------------  HPI:    69 year old male from home AAOx3, with PMH of HFrEF, pAfib on eliquis, hypothyroidism, cardiac amyloidosis and BPH, who presented to the ED due to SOB. Patient states that he was feeling fine yesterday and went to bed but had awoken around 1am due to feeling SOB. States SOB lasted till around 1:30am or so when he arrived to the ED. States having productive cough but has had this cough for a while ever since he had throat surgery and is not worse than his baseline. Has mild headache but otherwise denies any lightheadedness, nausea or vomiting. No chest pain. Unsure of any sick contacts but did have dinner with extended family members for Thanksgiving and had started feeling some symptoms since then. Patient's wife is also currently recovering from a cold. pt admitted dx with RSV in addition noticed with abn creatinine renal consult called.       PAST HISTORY  --------------------------------------------------------------------------------  PAST MEDICAL & SURGICAL HISTORY:  Low back pain  Herniated disc on lumbar region      Redundant prepuce and phimosis      Arthritis  on knees      Hypothyroid  not taking any meds      Hypertension      Thyroid cancer  pt denies any thyroid cancer      History of pituitary disease  on caber      Gout      S/P excision of vocal cord nodule  2007 and radiation -      Laryngeal cancer  "stage 0"      Pituitary mass      Carotid occlusion, bilateral      Obese      S/P excision of vocal cord nodule  radiation - 2007      History of lumbar spinal fusion  2018      H/O arthroscopy of shoulder  right-2017      Status post carotid surgery  right-5/13/2022, pt. states unsuccesful        FAMILY HISTORY:  Family history of stroke (Mother)    Family history of Alzheimer's disease (Father)    FH: stroke (Mother, Father)      PAST SOCIAL HISTORY:    ALLERGIES & MEDICATIONS  --------------------------------------------------------------------------------  Allergies    No Known Allergies    Intolerances      Standing Inpatient Medications  apixaban 5 milliGRAM(s) Oral every 12 hours  atorvastatin 40 milliGRAM(s) Oral at bedtime  colchicine 0.6 milliGRAM(s) Oral daily  finasteride 5 milliGRAM(s) Oral daily  levothyroxine 175 MICROGram(s) Oral daily  sacubitril 24 mG/valsartan 26 mG 1 Tablet(s) Oral two times a day  tamsulosin 0.4 milliGRAM(s) Oral at bedtime    PRN Inpatient Medications      REVIEW OF SYSTEMS  --------------------------------------------------------------------------------  Gen: No  fevers/chills   Skin: No rashes  Head/Eyes/Ears/Mouth: had  headache; Normal hearing;  No sinus pain/discomfort, sore throat +   Respiratory: + dyspnea, +  cough, + wheezing, hemoptysis -   CV: No chest pain, orthopnea+   GI: No abdominal pain, diarrhea, nausea, vomiting, melena  : No dysuria, decrease urination or hesitancy urinating  hematuria, nocturia  MSK: No joint pain/swelling; no back pain  Neuro: No dizziness/lightheadedness,  also with no edema       VITALS/PHYSICAL EXAM  --------------------------------------------------------------------------------  T(C): 36.4 (12-08-24 @ 20:00), Max: 36.5 (12-08-24 @ 05:10)  HR: 79 (12-08-24 @ 20:00) (70 - 79)  BP: 100/65 (12-08-24 @ 20:00) (92/57 - 102/50)  RR: 18 (12-08-24 @ 20:00) (18 - 18)  SpO2: 98% (12-08-24 @ 20:00) (96% - 98%)  Wt(kg): --        12-07-24 @ 07:01  -  12-08-24 @ 07:00  --------------------------------------------------------  IN: 380 mL / OUT: 900 mL / NET: -520 mL      Physical Exam:  	Gen: Non toxic comfortable appearing   	no jvd  	Pulm: decrease bs  no rales or ronchi  + insp Left  wheezing  	CV: RRR, S1S2; no rub  	Back: No CVA tenderness  	Abd: +BS, soft, nontender/nondistended  	: No suprapubic tenderness  	UE: Warm, no cyanosis  no clubbing,  no edema  	LE: Warm, no cyanosis  no clubbing, no edema  	Neuro: alert and oriented. speech coherent   	Psych: Normal affect and mood  	Skin: Warm, no decrease skin turgor       LABS/STUDIES  --------------------------------------------------------------------------------              10.9   2.98  >-----------<  146      [12-08-24 @ 06:43]              33.3     140  |  103  |  65  ----------------------------<  84      [12-08-24 @ 06:43]  3.8   |  21  |  2.02        Ca     8.8     [12-08-24 @ 06:43]      Mg     1.8     [12-08-24 @ 06:43]      Phos  3.8     [12-07-24 @ 06:44]            Creatinine Trend:  SCr 2.02 [12-08 @ 06:43]  SCr 2.30 [12-07 @ 06:44]  SCr 2.26 [12-06 @ 03:22]  SCr 2.30 [12-06 @ 02:25]        TSH 10.30      [05-07-24 @ 07:20]  Lipid: chol 104, TG 49, HDL 46, LDL --      [02-04-24 @ 07:11]    HCV 0.48, Nonreact      [05-05-21 @ 08:52]    Free Light Chains: kappa 3.20, lambda 2.18, ratio = 1.47      [03-21 @ 15:38]  Immunofixation Serum:   No Monoclonal Band Identified    Reference Range: None Detected      [03-21-23 @ 15:38]  Immunofixation Urine:   Reference Range: None Detected      [03-21-23 @ 18:42]

## 2024-12-09 ENCOUNTER — TRANSCRIPTION ENCOUNTER (OUTPATIENT)
Age: 69
End: 2024-12-09

## 2024-12-09 VITALS
RESPIRATION RATE: 18 BRPM | HEART RATE: 80 BPM | SYSTOLIC BLOOD PRESSURE: 99 MMHG | DIASTOLIC BLOOD PRESSURE: 64 MMHG | OXYGEN SATURATION: 97 % | TEMPERATURE: 97 F

## 2024-12-09 LAB
ANION GAP SERPL CALC-SCNC: 14 MMOL/L — SIGNIFICANT CHANGE UP (ref 5–17)
BUN SERPL-MCNC: 58 MG/DL — HIGH (ref 7–23)
CALCIUM SERPL-MCNC: 9 MG/DL — SIGNIFICANT CHANGE UP (ref 8.4–10.5)
CHLORIDE SERPL-SCNC: 103 MMOL/L — SIGNIFICANT CHANGE UP (ref 96–108)
CO2 SERPL-SCNC: 22 MMOL/L — SIGNIFICANT CHANGE UP (ref 22–31)
CREAT SERPL-MCNC: 2.05 MG/DL — HIGH (ref 0.5–1.3)
EGFR: 34 ML/MIN/1.73M2 — LOW
GLUCOSE SERPL-MCNC: 95 MG/DL — SIGNIFICANT CHANGE UP (ref 70–99)
HCT VFR BLD CALC: 34.5 % — LOW (ref 39–50)
HGB BLD-MCNC: 11 G/DL — LOW (ref 13–17)
MCHC RBC-ENTMCNC: 28.6 PG — SIGNIFICANT CHANGE UP (ref 27–34)
MCHC RBC-ENTMCNC: 31.9 G/DL — LOW (ref 32–36)
MCV RBC AUTO: 89.6 FL — SIGNIFICANT CHANGE UP (ref 80–100)
NRBC # BLD: 0 /100 WBCS — SIGNIFICANT CHANGE UP (ref 0–0)
PLATELET # BLD AUTO: 136 K/UL — LOW (ref 150–400)
POTASSIUM SERPL-MCNC: 3.8 MMOL/L — SIGNIFICANT CHANGE UP (ref 3.5–5.3)
POTASSIUM SERPL-SCNC: 3.8 MMOL/L — SIGNIFICANT CHANGE UP (ref 3.5–5.3)
RBC # BLD: 3.85 M/UL — LOW (ref 4.2–5.8)
RBC # FLD: 15.9 % — HIGH (ref 10.3–14.5)
SODIUM SERPL-SCNC: 139 MMOL/L — SIGNIFICANT CHANGE UP (ref 135–145)
WBC # BLD: 3.94 K/UL — SIGNIFICANT CHANGE UP (ref 3.8–10.5)
WBC # FLD AUTO: 3.94 K/UL — SIGNIFICANT CHANGE UP (ref 3.8–10.5)

## 2024-12-09 PROCEDURE — 99285 EMERGENCY DEPT VISIT HI MDM: CPT | Mod: 25

## 2024-12-09 PROCEDURE — 84100 ASSAY OF PHOSPHORUS: CPT

## 2024-12-09 PROCEDURE — 82330 ASSAY OF CALCIUM: CPT

## 2024-12-09 PROCEDURE — 85025 COMPLETE CBC W/AUTO DIFF WBC: CPT

## 2024-12-09 PROCEDURE — 83880 ASSAY OF NATRIURETIC PEPTIDE: CPT

## 2024-12-09 PROCEDURE — 93306 TTE W/DOPPLER COMPLETE: CPT

## 2024-12-09 PROCEDURE — 85610 PROTHROMBIN TIME: CPT

## 2024-12-09 PROCEDURE — 85027 COMPLETE CBC AUTOMATED: CPT

## 2024-12-09 PROCEDURE — 85014 HEMATOCRIT: CPT

## 2024-12-09 PROCEDURE — 84295 ASSAY OF SERUM SODIUM: CPT

## 2024-12-09 PROCEDURE — 93970 EXTREMITY STUDY: CPT

## 2024-12-09 PROCEDURE — 93005 ELECTROCARDIOGRAM TRACING: CPT

## 2024-12-09 PROCEDURE — 80053 COMPREHEN METABOLIC PANEL: CPT

## 2024-12-09 PROCEDURE — 71045 X-RAY EXAM CHEST 1 VIEW: CPT

## 2024-12-09 PROCEDURE — 93356 MYOCRD STRAIN IMG SPCKL TRCK: CPT

## 2024-12-09 PROCEDURE — 85730 THROMBOPLASTIN TIME PARTIAL: CPT

## 2024-12-09 PROCEDURE — 82435 ASSAY OF BLOOD CHLORIDE: CPT

## 2024-12-09 PROCEDURE — 80048 BASIC METABOLIC PNL TOTAL CA: CPT

## 2024-12-09 PROCEDURE — 83605 ASSAY OF LACTIC ACID: CPT

## 2024-12-09 PROCEDURE — 87637 SARSCOV2&INF A&B&RSV AMP PRB: CPT

## 2024-12-09 PROCEDURE — 82947 ASSAY GLUCOSE BLOOD QUANT: CPT

## 2024-12-09 PROCEDURE — 82803 BLOOD GASES ANY COMBINATION: CPT

## 2024-12-09 PROCEDURE — 94640 AIRWAY INHALATION TREATMENT: CPT

## 2024-12-09 PROCEDURE — 97161 PT EVAL LOW COMPLEX 20 MIN: CPT

## 2024-12-09 PROCEDURE — 85018 HEMOGLOBIN: CPT

## 2024-12-09 PROCEDURE — 83735 ASSAY OF MAGNESIUM: CPT

## 2024-12-09 PROCEDURE — 84132 ASSAY OF SERUM POTASSIUM: CPT

## 2024-12-09 PROCEDURE — 84484 ASSAY OF TROPONIN QUANT: CPT

## 2024-12-09 RX ORDER — COLCHICINE 0.6 MG
0.6 TABLET ORAL EVERY OTHER DAY
Refills: 0 | Status: DISCONTINUED | OUTPATIENT
Start: 2024-12-09 | End: 2024-12-09

## 2024-12-09 RX ORDER — COLCHICINE 0.6 MG
1 TABLET ORAL
Qty: 0 | Refills: 0 | DISCHARGE
Start: 2024-12-09

## 2024-12-09 RX ADMIN — SACUBITRIL AND VALSARTAN 1 TABLET(S): 24; 26 TABLET, FILM COATED ORAL at 06:15

## 2024-12-09 RX ADMIN — Medication 175 MICROGRAM(S): at 06:15

## 2024-12-09 RX ADMIN — APIXABAN 5 MILLIGRAM(S): 2.5 TABLET, FILM COATED ORAL at 06:15

## 2024-12-09 RX ADMIN — Medication 5 MILLIGRAM(S): at 12:06

## 2024-12-09 RX ADMIN — Medication 0.6 MILLIGRAM(S): at 12:05

## 2024-12-09 NOTE — PROGRESS NOTE ADULT - PROBLEM SELECTOR PLAN 7
- continue with flomax and proscar

## 2024-12-09 NOTE — PROGRESS NOTE ADULT - SUBJECTIVE AND OBJECTIVE BOX
[facility]   [phone]  RENAL FOLLOW UP NOTE  --------------------------------------------------------------------------------  Chief Complaint:    24 hour events/subjective:    patient seen and examined.   +cough    PAST HISTORY  --------------------------------------------------------------------------------  No significant changes to PMH, PSH, FHx, SHx, unless otherwise noted    ALLERGIES & MEDICATIONS  --------------------------------------------------------------------------------  Allergies    No Known Allergies    Intolerances      Standing Inpatient Medications  apixaban 5 milliGRAM(s) Oral every 12 hours  atorvastatin 40 milliGRAM(s) Oral at bedtime  colchicine 0.6 milliGRAM(s) Oral every other day  finasteride 5 milliGRAM(s) Oral daily  levothyroxine 175 MICROGram(s) Oral daily  sacubitril 24 mG/valsartan 26 mG 1 Tablet(s) Oral two times a day  tamsulosin 0.4 milliGRAM(s) Oral at bedtime    PRN Inpatient Medications      REVIEW OF SYSTEMS  --------------------------------------------------------------------------------    Gen: denies fevers/chills,  CVS: denies chest pain/palpitations  Resp: denies SOB/Cough  GI: Denies N/V/Abd pain  : Denies dysuria/oliguria/hematuria    VITALS/PHYSICAL EXAM  --------------------------------------------------------------------------------  T(C): 37 (12-09-24 @ 11:50), Max: 37 (12-09-24 @ 11:50)  HR: 84 (12-09-24 @ 11:50) (79 - 84)  BP: 113/74 (12-09-24 @ 11:50) (100/65 - 113/74)  RR: 18 (12-09-24 @ 11:50) (18 - 18)  SpO2: 96% (12-09-24 @ 11:50) (96% - 98%)  Wt(kg): --        12-08-24 @ 07:01  -  12-09-24 @ 07:00  --------------------------------------------------------  IN: 0 mL / OUT: 200 mL / NET: -200 mL      Physical Exam:  	  	Gen: Non toxic comfortable appearing   	Pulm: decrease bs, +coarse  	CV: RRR, S1S2; no rub  	Abd: +BS, soft, nontender/nondistended  	: No suprapubic tenderness  	UE: Warm, no cyanosis  no clubbing,  no edema  	LE: Warm, no cyanosis  no clubbing, no edema    LABS/STUDIES  --------------------------------------------------------------------------------              11.0   3.94  >-----------<  136      [12-09-24 @ 07:08]              34.5     139  |  103  |  58  ----------------------------<  95      [12-09-24 @ 07:08]  3.8   |  22  |  2.05        Ca     9.0     [12-09-24 @ 07:08]      Mg     1.8     [12-08-24 @ 06:43]            Creatinine Trend:  SCr 2.05 [12-09 @ 07:08]  SCr 2.02 [12-08 @ 06:43]  SCr 2.30 [12-07 @ 06:44]  SCr 2.26 [12-06 @ 03:22]  SCr 2.30 [12-06 @ 02:25]            TSH 10.30      [05-07-24 @ 07:20]  Lipid: chol 104, TG 49, HDL 46, LDL --      [02-04-24 @ 07:11]

## 2024-12-09 NOTE — DISCHARGE NOTE PROVIDER - NSDCHHNEEDSERVICE_GEN_ALL_CORE
Medication teaching and assessment/Observation and assessment/Rehabilitation services/Other, specify...

## 2024-12-09 NOTE — CHART NOTE - NSCHARTNOTEFT_GEN_A_CORE
Directed by Dr Velazquez to discharge this patient home today  Patient is on room air w/ SaO2 >96%, Afebrile  Seen by Physical Therapy - recommending home PT  Discharged on current (in-patient) medication regimen as per Dr Velazquez  Out patient follow up with PCP / Cardiology / Nephrology within 1 week of discharge

## 2024-12-09 NOTE — PROGRESS NOTE ADULT - PROBLEM SELECTOR PLAN 4
Acute on Chronic chf - history of HFrEF and amyloidosis   - strict I/O  - daily weight  - continue entresto for now  - hold lasix for now in setting of low BPs  - cardio eval for further recs   - ECHO pending to be done
Acute on Chronic chf - history of HFrEF and amyloidosis   - strict I/O  - daily weight  - continue entresto for now  - Lasix as per cards
- history of HFrEF and amyloidosis   - strict I/O  - daily weight  - continue entresto for now  - hold lasix for now in setting of low BPs  - cardio eval for further recs   - ECHO pending to be done

## 2024-12-09 NOTE — DISCHARGE NOTE PROVIDER - NSDCCPCAREPLAN_GEN_ALL_CORE_FT
PRINCIPAL DISCHARGE DIAGNOSIS  Diagnosis: RSV infection  Assessment and Plan of Treatment: supportive Care      SECONDARY DISCHARGE DIAGNOSES  Diagnosis: Chronic systolic congestive heart failure  Assessment and Plan of Treatment: Weigh yourself daily.  If you gain 3lbs in 3 days, or 5lbs in a week call your Health Care Provider.  Do not eat or drink foods containing more than 2000mg of salt (sodium) in your diet every day.  Call your Health Care Provider if you have any swelling or increased swelling in your feet, ankles, and/or stomach.  Take all of your medication as directed.  If you become dizzy call your Health Care Provider.      Diagnosis: Paroxysmal atrial fibrillation  Assessment and Plan of Treatment: Atrial fibrillation is the most common heart rhythm problem & has the risk of stroke & heart attack  It helps if you control your blood pressure, not drink more than 1-2 alcohol drinks per day, cut down on caffeine, getting treatment for over active thyroid gland, & getting exercise  Call your doctor if you feel your heart racing or beating unusually, chest tightness or pain, lightheaded, faint, shortness of breath especially with exercise  It is important to take your heart medication as prescribed  You may be on anticoagulation which is very important to take as directed - you may need blood work to monitor drug levels      Diagnosis: Cardiac amyloidosis  Assessment and Plan of Treatment: continue to follow-up with Dr Faulkner    Diagnosis: Chronic kidney disease, unspecified CKD stage  Assessment and Plan of Treatment: follow up with your healthcare care provider

## 2024-12-09 NOTE — DISCHARGE NOTE PROVIDER - NSDCFUADDAPPT_GEN_ALL_CORE_FT
APPTS ARE READY TO BE MADE: [X] YES    Best Family or Patient Contact (if needed):    Additional Information about above appointments (if needed):    1:   2:   3:     Other comments or requests:  APPTS ARE READY TO BE MADE: [X] YES    Best Family or Patient Contact (if needed):    Additional Information about above appointments (if needed):    1:   2:   3:     Other comments or requests:     Patient advised they did not want to proceed with scheduling appointments with the providers on their referrals. They will coordinate care on their own.

## 2024-12-09 NOTE — PHYSICAL THERAPY INITIAL EVALUATION ADULT - PERTINENT HX OF CURRENT PROBLEM, REHAB EVAL
69 year old male admitted to [facility] on 12/6/24 from home AAOx3, with PMH of HFrEF, pAfib on eliquis, hypothyroidism, cardiac amyloidosis and BPH, who presented to the ED due to SOB. Patient states that he was feeling fine yesterday and went to bed but had awoken around 1am due to feeling SOB. States SOB lasted till around 1:30am or so when he arrived to the ED. States having productive cough but has had this cough for a while ever since he had throat surgery and is not worse than his baseline. Has mild headache + RSV  B/L venous dopplers (-)

## 2024-12-09 NOTE — DISCHARGE NOTE PROVIDER - CARE PROVIDER_API CALL
Marcio Faulkner  Cardiology  [address], [address]  [place] [address]  Phone: [phone]  Fax: [phone]  Follow Up Time:     Mitchell Harris  Internal Medicine  [address], [address]  [place] [address]  Phone: [phone]  Fax: [phone]  Follow Up Time:     Paris Rodriguez  Nephrology  [address], [address]  [place] [address]  Phone: [phone]  Fax: [phone]  Follow Up Time:

## 2024-12-09 NOTE — DISCHARGE NOTE NURSING/CASE MANAGEMENT/SOCIAL WORK - PATIENT PORTAL LINK FT
You can access the FollowMyHealth Patient Portal offered by [facility] by registering at the following website: [email or web address]. By joining [facility]’s FollowMyHealth portal, you will also be able to view your health information using other applications (apps) compatible with our system.

## 2024-12-09 NOTE — PHYSICAL THERAPY INITIAL EVALUATION ADULT - ADDITIONAL COMMENTS
lives w/ wife in private home 4 steps to enter and flight of stairs to bedroom w/ handrail/ does not use assistive device,  has scooter, grab bars in bathroom, glasses to read, hearing good, pt is right handed

## 2024-12-09 NOTE — DISCHARGE NOTE PROVIDER - PROVIDER TOKENS
PROVIDER:[TOKEN:[[id number]]],PROVIDER:[TOKEN:[[id number]]],PROVIDER:[TOKEN:[[id number]]]

## 2024-12-09 NOTE — PROGRESS NOTE ADULT - ASSESSMENT
69 year old male from home AAOx3, with PMH of HFrEF, pAfib on eliquis, hypothyroidism, cardiac amyloidosis and BPH, who presented to the ED due to SOB. Patient states that he was feeling fine yesterday and went to bed but had awoken around 1am due to feeling SOB. States SOB lasted till around 1:30am or so when he arrived to the ED. States having productive cough but has had this cough for a while ever since he had throat surgery and is not worse than his baseline. Has mild headache but otherwise denies any lightheadedness, nausea or vomiting. No chest pain. Unsure of any sick contacts but did have dinner with extended family members for Thanksgiving and had started feeling some symptoms since then. Patient's wife is also currently recovering from a cold. pt admitted dx with RSV and ckd       1- CKD IV  2- hx gout   3- RSV pneumonia    4- hx CHF   5- hx amyloidosis     creatinine is steady in this range  diarrhea recently likely due to higher doses of colcrys  continue lasix 80 mg BID  to have urinalysis   decrease colcrys to 0.6 mg qod given his diarrhea   entresto 24/26 mg bid   for bph cont flomax 0.4 mg daily   to have intact pth   supportive care   trend creatinine and electrolytes daily
Patient is a 69 year old male from home AAOx3, with PMH of HFrEF, pAfib on eliquis, hypothyroidism, cardiac amyloidosis and BPH, who presented to the ED due to SOB.     Hgb 12.5. Creatinine 2.26. Currently on 2L NC. BP noted to be on softer side. BNP 8578. Troponin noted to be elevated but now downtrending. RSV noted to be positive. CXR reviewed; appears clear with no signs of infection. EKG reviewed; no ischemic changes noted. NSR on tele monitor. 

## 2024-12-09 NOTE — PROGRESS NOTE ADULT - SUBJECTIVE AND OBJECTIVE BOX
DATE OF SERVICE: 12-09-24 @ 16:42    Patient is a 69y old  Male who presents with a chief complaint of SOB (09 Dec 2024 12:31)      INTERVAL HISTORY: In no acute distress.     REVIEW OF SYSTEMS:  CONSTITUTIONAL: No weakness  EYES/ENT: No visual changes;  No throat pain   NECK: No pain or stiffness  RESPIRATORY: No cough, wheezing; No shortness of breath  CARDIOVASCULAR: No chest pain or palpitations  GASTROINTESTINAL: No abdominal  pain. No nausea, vomiting, or hematemesis  GENITOURINARY: No dysuria, frequency or hematuria  NEUROLOGICAL: No stroke like symptoms  SKIN: No rashes    TELEMETRY Personally reviewed: AF 60-90  	  MEDICATIONS:  sacubitril 24 mG/valsartan 26 mG 1 Tablet(s) Oral two times a day        PHYSICAL EXAM:  T(C): 37 (12-09-24 @ 11:50), Max: 37 (12-09-24 @ 11:50)  HR: 84 (12-09-24 @ 11:50) (79 - 103)  BP: 113/74 (12-09-24 @ 11:50) (100/65 - 115/74)  RR: 18 (12-09-24 @ 11:50) (18 - 21)  SpO2: 96% (12-09-24 @ 11:50) (96% - 98%)  Wt(kg): --  I&O's Summary    08 Dec 2024 07:01  -  09 Dec 2024 07:00  --------------------------------------------------------  IN: 0 mL / OUT: 200 mL / NET: -200 mL          Appearance: In no distress	  HEENT:    PERRL, EOMI	  Cardiovascular:  S1 S2, No JVD  Respiratory: Lungs clear to auscultation	  Gastrointestinal:  Soft, Non-tender, + BS	  Vascularature:  No edema of LE  Psychiatric: Appropriate affect   Neuro: no acute focal deficits                               11.0   3.94  )-----------( 136      ( 09 Dec 2024 07:08 )             34.5     12-09    139  |  103  |  58[H]  ----------------------------<  95  3.8   |  22  |  2.05[H]    Ca    9.0      09 Dec 2024 07:08  Mg     1.8     12-08          Labs personally reviewed      ASSESSMENT/PLAN: 	      Patient is a 69 year old male from home AAOx3, with PMH of HFrEF, pAfib on eliquis, hypothyroidism, cardiac amyloidosis and BPH, who presented to the ED due to SOB. Patient states that he was feeling fine yesterday and went to bed but had awoken around 1am due to feeling SOB. States SOB lasted till around 1:30am or so when he arrived to the ED. States having productive cough but has had this cough for a while ever since he had throat surgery and is not worse than his baseline. Has mild headache but otherwise denies any lightheadedness, nausea or vomiting. No chest pain.     Problem 1: Acute on Chronic Systolic HF   - Pt with hx of amyloidosis   - TTE 12/6 - EF 47%, left ventricular hypokinesis, moderate LV hypertrophy, mod LV diastolic dysfunction, LA severely dilated, moderate TR, pulm pressure 35mmHg   ---- compared to echo 2/2024 with EF 40%, moderate TR, no WMA, pulm pressure 31mmHg  - proBNP 8518 (up from 3487 in 5/2024)  - C/w Entresto 24/26mg BID  - Cont home Lasix 80mg PO BID    Problem 2: Hypotension  - Holding Lasix in setting of low BP  - s/p 250cc NS bolus in ED  - monitor BP and adjust meds as needed.    Problem 3: pAfib  - C/w Eliquis 5mg BID  - Not on BB given cardiac amyloid  - EKG NSR    Problem 4: Cardiac Amyloid  - TTE 12/6 - EF 47%, left ventricular hypokinesis, moderate LV hypertrophy, mod LV diastolic dysfunction, LA severely dilated, moderate TR, pulm pressure 35mmHg    Problem 5: RSV  - Care per primary team  - supportive care & isolation precautions  - monitor O2 sats - on 2L NC since admission, plan to wean off as tolerated - patient not on O2 at home  - now on RA      Follows OP with Cardio Dr. Marcio Bullock, AG-NP   Karri Hernandez DO [facility]  Cardiovascular Medicine  [address], [address]  Available through call or text on [employer]  Office: [phone]

## 2024-12-09 NOTE — PHYSICAL THERAPY INITIAL EVALUATION ADULT - ORIENTATION, REHAB EVAL
Normal vision: sees adequately in most situations; can see medication labels, newsprint
oriented to person, place, time and situation

## 2024-12-09 NOTE — DISCHARGE NOTE NURSING/CASE MANAGEMENT/SOCIAL WORK - FINANCIAL ASSISTANCE
[facility] provides services at a reduced cost to those who are determined to be eligible through [facility]’s financial assistance program. Information regarding [facility]’s financial assistance program can be found by going to [email or web address] or by calling [phone].

## 2024-12-09 NOTE — PROGRESS NOTE ADULT - PROBLEM SELECTOR PLAN 2
- S/P Hypotension RESOLVED   - s/p 250cc NS bolus in ED  - patient states BP usually on lower side and SBP in 90s  - BP overall improved now; will hold on lasix for now  -  Cards consulted   Echo shows Bivventricular failure   Lasix as per Cards   - will continue with entresto for now   - monitor BP and adjust meds as needed
- S/P Hypotension RESOLVED   - s/p 250cc NS bolus in ED  - patient states BP usually on lower side and SBP in 90s  - BP overall improved now; will hold on lasix for now  - cardio eval for further evaluation   - will continue with entresto for now   - monitor BP and adjust meds as needed
- S/P Hypotension RESOLVED   - s/p 250cc NS bolus in ED  - patient states BP usually on lower side and SBP in 90s  - BP overall improved now; will hold on lasix for now  -  Cards consulted   Echo shows Bivventricular failure   Lasix as per Cards   - will continue with entresto for now   - monitor BP and adjust meds as needed

## 2024-12-09 NOTE — PROGRESS NOTE ADULT - PROBLEM SELECTOR PLAN 9
- eliquis for AC     DISPO: pending clinical improvement; ECHO; cardio eval

## 2024-12-09 NOTE — DISCHARGE NOTE PROVIDER - HOSPITAL COURSE
HPI:  Patient is a 69 year old male from home AAOx3, with PMH of HFrEF, pAfib on eliquis, hypothyroidism, cardiac amyloidosis and BPH, who presented to the ED due to SOB. Patient states that he was feeling fine yesterday and went to bed but had awoken around 1am due to feeling SOB. States SOB lasted till around 1:30am or so when he arrived to the ED. States having productive cough but has had this cough for a while ever since he had throat surgery and is not worse than his baseline. Has mild headache but otherwise denies any lightheadedness, nausea or vomiting. No chest pain. Unsure of any sick contacts but did have dinner with extended family members for Thanksgiving and had started feeling some symptoms since then. Patient's wife is also currently recovering from a cold. Patient's RLE bigger than LLE but is normally like that and does not cause him any pain. No other concerns or complaints at this time.  (06 Dec 2024 11:38)    Hospital Course:      Important Medication Changes and Reason:    Active or Pending Issues Requiring Follow-up:    Advanced Directives:   [ ] Full code  [ ] DNR  [ ] Hospice    Discharge Diagnoses:         HPI:69 year old male from home AAOx3, with PMH of HFrEF, pAfib on eliquis, hypothyroidism, cardiac amyloidosis and BPH, who presented to the ED due to SOB. Patient states that he was feeling fine yesterday and went to bed but had awoken around 1am due to feeling SOB. States SOB lasted till around 1:30am or so when he arrived to the ED. States having productive cough but has had this cough for a while ever since he had throat surgery and is not worse than his baseline. Has mild headache but otherwise denies any lightheadedness, nausea or vomiting. No chest pain. Unsure of any sick contacts but did have dinner with extended family members for Thanksgiving and had started feeling some symptoms since then. Patient's wife is also currently recovering from a cold. Patient's RLE bigger than LLE but is normally like that and does not cause him any pain. No other concerns or complaints at this time.  (06 Dec 2024 11:38)  Hospital Course: Elevated Creatine, BP noted to be on softer side. BNP 8578. Troponin noted to be elevated but now downtrending. RSV noted to be positive. CXR reviewed; appears clear with no signs of infection. EKG reviewed; no ischemic changes noted. NSR on tele monitor.   *  RSV infection- noted to be RSV positive; likely cause of symptoms   - supportive care, weaned off supplemental oxygen  * Acute on chronic sys failure - cardiology following  Entresto Lasix as per cards   * Hypotension- S/P Hypotension RESOLVED   - s/p 250cc NS bolus in ED  - patient states BP usually on lower side and SBP in 90s  - BP overall improved now; Oral home dosing of lasix resumed 12/9/23  * Echo shows Biventricular failure - follows with outpatient cardiologist  *Right leg swelling - patient noted to have RLE swelling more than LLE; chronic as per patient   - denies any pain   - VA duplex neg for DVT bilaterally   * Chronic systolic congestive heart failure  - history of HFrEF and amyloidosis   * Hypothyroidism- continue synthroid.    Advanced Directives:   [X] Full code  [ ] DNR  [ ] Hospice    Discharge Diagnoses:  RSV  Acute on chronic Heart Failure  Cardiac Amyloidosis  Hypotension  Afib  CKD

## 2024-12-09 NOTE — PROGRESS NOTE ADULT - PROBLEM/PLAN-6
----- Message from Ephraim Barros sent at 10/8/2024  8:58 AM CDT -----  GI staff: please place recall for colonoscopy in 7 years     Thanks    Ephraim Barros MD  [facility]  10/8/2024  8:57 AM      
DISPLAY PLAN FREE TEXT

## 2024-12-09 NOTE — PROGRESS NOTE ADULT - NEGATIVE ENMT SYMPTOMS
no hearing difficulty/no ear pain

## 2024-12-09 NOTE — DISCHARGE NOTE PROVIDER - NSDCMRMEDTOKEN_GEN_ALL_CORE_FT
Amvuttra 25 mg/0.5 mL subcutaneous solution: 25 milligram(s) subcutaneously every 3 months  apixaban 5 mg oral tablet: 1 tab(s) orally every 12 hours  atorvastatin 40 mg oral tablet: 1 tab(s) orally once a day (at bedtime)  cabergoline 0.5 mg oral tablet: 1 tab(s) orally 2 times a week  colchicine 0.6 mg oral tablet: 1 tab(s) orally once a day  Entresto 24 mg-26 mg oral tablet: 1 tab(s) orally 2 times a day  finasteride 5 mg oral tablet: 1 tab(s) orally once a day  Lasix 40 mg oral tablet: 1 tab(s) orally 2 times a day  Synthroid 175 mcg (0.175 mg) oral tablet: 1 tab(s) orally once a day  tamsulosin 0.4 mg oral capsule: 1 cap(s) orally once a day (at bedtime)   Amvuttra 25 mg/0.5 mL subcutaneous solution: 25 milligram(s) subcutaneously every 3 months  apixaban 5 mg oral tablet: 1 tab(s) orally every 12 hours  atorvastatin 40 mg oral tablet: 1 tab(s) orally once a day (at bedtime)  cabergoline 0.5 mg oral tablet: 1 tab(s) orally 2 times a week  colchicine 0.6 mg oral tablet: 1 tab(s) orally every other day THIS WAS CHANGED TO EVERY-OTHER DAY (no longer every day until you follow up with your doctor)  Entresto 24 mg-26 mg oral tablet: 1 tab(s) orally 2 times a day  finasteride 5 mg oral tablet: 1 tab(s) orally once a day  Lasix 40 mg oral tablet: 1 tab(s) orally 2 times a day  Synthroid 175 mcg (0.175 mg) oral tablet: 1 tab(s) orally once a day  tamsulosin 0.4 mg oral capsule: 1 cap(s) orally once a day (at bedtime)   Amvuttra 25 mg/0.5 mL subcutaneous solution: 25 milligram(s) subcutaneously every 3 months  apixaban 5 mg oral tablet: 1 tab(s) orally every 12 hours  atorvastatin 40 mg oral tablet: 1 tab(s) orally once a day (at bedtime)  cabergoline 0.5 mg oral tablet: 1 tab(s) orally 2 times a week  colchicine 0.6 mg oral tablet: 1 tab(s) orally every other day THIS WAS CHANGED TO EVERY-OTHER DAY (no longer every day until you follow up with your doctor)  Entresto 24 mg-26 mg oral tablet: 1 tab(s) orally 2 times a day  finasteride 5 mg oral tablet: 1 tab(s) orally once a day  Lasix 40 mg oral tablet: 1 tab(s) orally 2 times a day  Out Patient Physical Therapy: 1 - 2 times per week  Physical and Cardiac therapy 1 - 2 times per week  Diagnosis: CHF / Cardiac Amyloid / Deconditioned  Synthroid 175 mcg (0.175 mg) oral tablet: 1 tab(s) orally once a day  tamsulosin 0.4 mg oral capsule: 1 cap(s) orally once a day (at bedtime)

## 2024-12-09 NOTE — PROGRESS NOTE ADULT - SUBJECTIVE AND OBJECTIVE BOX
Patient is a 69y old  Male who presents with a chief complaint of SOB (07 Dec 2024 12:50)      SUBJECTIVE / OVERNIGHT EVENTS: no events     T(C): 37 (12-09-24 @ 11:50), Max: 37 (12-09-24 @ 11:50)  HR: 84 (12-09-24 @ 11:50) (83 - 84)  BP: 113/74 (12-09-24 @ 11:50) (101/64 - 113/74)  RR: 18 (12-09-24 @ 11:50) (18 - 18)  SpO2: 96% (12-09-24 @ 11:50) (96% - 98%)      MEDICATIONS  (STANDING):  apixaban 5 milliGRAM(s) Oral every 12 hours  atorvastatin 40 milliGRAM(s) Oral at bedtime  colchicine 0.6 milliGRAM(s) Oral every other day  finasteride 5 milliGRAM(s) Oral daily  levothyroxine 175 MICROGram(s) Oral daily  sacubitril 24 mG/valsartan 26 mG 1 Tablet(s) Oral two times a day  tamsulosin 0.4 milliGRAM(s) Oral at bedtime    MEDICATIONS  (PRN):    PHYSICAL EXAM:  GENERAL: NAD, well-developed  HEAD:  Atraumatic, Normocephalic  EYES: EOMI, PERRLA, conjunctiva and sclera clear  NECK: Supple, No JVD  CHEST/LUNG: Clear to auscultation bilaterally; No wheeze  HEART: Regular rate and rhythm; No murmurs, rubs, or gallops  ABDOMEN: Soft, Nontender, Nondistended; Bowel sounds present  EXTREMITIES:  2+ Peripheral Pulses, No clubbing, cyanosis, or edema  PSYCH: AAOx3  NEUROLOGY: non-focal  SKIN: No rashes or lesions                            11.0   3.94  )-----------( 136      ( 09 Dec 2024 07:08 )             34.5             RADIOLOGY & ADDITIONAL TESTS:    Imaging Personally Reviewed:    Consultant(s) Notes Reviewed:      Care Discussed with Consultants/Other Providers:

## 2024-12-09 NOTE — PROGRESS NOTE ADULT - PROBLEM SELECTOR PLAN 1
- presented due to SOB  - noted to be RSV positive; likely cause of symptoms   - supportive care  - isolation precautions  - monitor O2 sats; currently on 2L NC and saturating well; can likely wean off soon (patient not on O2 at home)    PT eval   Cards consulted
- presented due to SOB  - noted to be RSV positive; likely cause of symptoms   - supportive care  - isolation precautions  - monitor O2 sats; currently on 2L NC and saturating well; can likely wean off soon (patient not on O2 at home)    PT eval   Cards consulted  Acute on chronic sys failure   Entresto Lasix a sper cards     D/C planing
- presented due to SOB  - noted to be RSV positive; likely cause of symptoms   - supportive care  - isolation precautions  - monitor O2 sats; currently on 2L NC and saturating well; can likely wean off soon (patient not on O2 at home)    PT eval   Cards consulted

## 2024-12-09 NOTE — DISCHARGE NOTE PROVIDER - CARE PROVIDERS DIRECT ADDRESSES
,[email or web address]

## 2024-12-09 NOTE — PROGRESS NOTE ADULT - NEGATIVE NEUROLOGICAL SYMPTOMS
Procedure canceled in pre-op due to elevated blood glucose reading of 367. MD notified and at bedside to consult with patient. Patient instructed to obtain glucose reading the night prior to procedure and the morning of procedure upon rescheduling. Patient educated on the risks of elevated glucose and steroid procedures. Clinic contacted via secure messaging to reschedule patient's procedure. Patient escorted to lobby by RN.  
no difficulty walking/no loss of consciousness

## 2025-02-03 ENCOUNTER — INPATIENT (INPATIENT)
Facility: HOSPITAL | Age: 70
LOS: 3 days | Discharge: HOME HEALTH | DRG: 554 | End: 2025-02-07
Attending: GENERAL ACUTE CARE HOSPITAL | Admitting: GENERAL ACUTE CARE HOSPITAL
Payer: MEDICARE

## 2025-02-03 VITALS
SYSTOLIC BLOOD PRESSURE: 112 MMHG | OXYGEN SATURATION: 97 % | RESPIRATION RATE: 18 BRPM | HEART RATE: 85 BPM | TEMPERATURE: 98 F | WEIGHT: 197.09 LBS | HEIGHT: 70 IN | DIASTOLIC BLOOD PRESSURE: 74 MMHG

## 2025-02-03 DIAGNOSIS — Z98.89 OTHER SPECIFIED POSTPROCEDURAL STATES: Chronic | ICD-10-CM

## 2025-02-03 DIAGNOSIS — Z98.890 OTHER SPECIFIED POSTPROCEDURAL STATES: Chronic | ICD-10-CM

## 2025-02-03 DIAGNOSIS — Z98.1 ARTHRODESIS STATUS: Chronic | ICD-10-CM

## 2025-02-03 PROCEDURE — 99285 EMERGENCY DEPT VISIT HI MDM: CPT

## 2025-02-03 RX ORDER — METHYLPREDNISOLONE ACETATE 40 MG/ML
0 VIAL (ML) INJECTION
Refills: 0 | DISCHARGE
Start: 2025-02-03

## 2025-02-04 DIAGNOSIS — M10.9 GOUT, UNSPECIFIED: ICD-10-CM

## 2025-02-04 LAB
ALBUMIN SERPL ELPH-MCNC: 3.6 G/DL — SIGNIFICANT CHANGE UP (ref 3.3–5)
ALBUMIN SERPL ELPH-MCNC: 3.9 G/DL — SIGNIFICANT CHANGE UP (ref 3.3–5)
ALP SERPL-CCNC: 114 U/L — SIGNIFICANT CHANGE UP (ref 40–120)
ALP SERPL-CCNC: 136 U/L — HIGH (ref 40–120)
ALT FLD-CCNC: 29 U/L — SIGNIFICANT CHANGE UP (ref 10–45)
ALT FLD-CCNC: 36 U/L — SIGNIFICANT CHANGE UP (ref 10–45)
ANION GAP SERPL CALC-SCNC: 11 MMOL/L — SIGNIFICANT CHANGE UP (ref 5–17)
ANION GAP SERPL CALC-SCNC: 16 MMOL/L — SIGNIFICANT CHANGE UP (ref 5–17)
ANISOCYTOSIS BLD QL: SLIGHT — SIGNIFICANT CHANGE UP
APTT BLD: 36 SEC — HIGH (ref 24.5–35.6)
AST SERPL-CCNC: 22 U/L — SIGNIFICANT CHANGE UP (ref 10–40)
AST SERPL-CCNC: 28 U/L — SIGNIFICANT CHANGE UP (ref 10–40)
BASOPHILS # BLD AUTO: 0 K/UL — SIGNIFICANT CHANGE UP (ref 0–0.2)
BASOPHILS NFR BLD AUTO: 0 % — SIGNIFICANT CHANGE UP (ref 0–2)
BILIRUB SERPL-MCNC: 1.2 MG/DL — SIGNIFICANT CHANGE UP (ref 0.2–1.2)
BILIRUB SERPL-MCNC: 1.6 MG/DL — HIGH (ref 0.2–1.2)
BUN SERPL-MCNC: 44 MG/DL — HIGH (ref 7–23)
BUN SERPL-MCNC: 46 MG/DL — HIGH (ref 7–23)
CALCIUM SERPL-MCNC: 10.1 MG/DL — SIGNIFICANT CHANGE UP (ref 8.4–10.5)
CALCIUM SERPL-MCNC: 9.6 MG/DL — SIGNIFICANT CHANGE UP (ref 8.4–10.5)
CHLORIDE SERPL-SCNC: 101 MMOL/L — SIGNIFICANT CHANGE UP (ref 96–108)
CHLORIDE SERPL-SCNC: 99 MMOL/L — SIGNIFICANT CHANGE UP (ref 96–108)
CO2 SERPL-SCNC: 21 MMOL/L — LOW (ref 22–31)
CO2 SERPL-SCNC: 25 MMOL/L — SIGNIFICANT CHANGE UP (ref 22–31)
CREAT SERPL-MCNC: 1.66 MG/DL — HIGH (ref 0.5–1.3)
CREAT SERPL-MCNC: 1.78 MG/DL — HIGH (ref 0.5–1.3)
CRP SERPL-MCNC: 207 MG/L — HIGH (ref 0–4)
EGFR: 40 ML/MIN/1.73M2 — LOW
EGFR: 44 ML/MIN/1.73M2 — LOW
ELLIPTOCYTES BLD QL SMEAR: SLIGHT — SIGNIFICANT CHANGE UP
EOSINOPHIL # BLD AUTO: 0 K/UL — SIGNIFICANT CHANGE UP (ref 0–0.5)
EOSINOPHIL NFR BLD AUTO: 0 % — SIGNIFICANT CHANGE UP (ref 0–6)
ERYTHROCYTE [SEDIMENTATION RATE] IN BLOOD: 51 MM/HR — HIGH (ref 0–20)
GIANT PLATELETS BLD QL SMEAR: PRESENT — SIGNIFICANT CHANGE UP
GLUCOSE BLDC GLUCOMTR-MCNC: 92 MG/DL — SIGNIFICANT CHANGE UP (ref 70–99)
GLUCOSE SERPL-MCNC: 108 MG/DL — HIGH (ref 70–99)
GLUCOSE SERPL-MCNC: 62 MG/DL — LOW (ref 70–99)
HCT VFR BLD CALC: 38.8 % — LOW (ref 39–50)
HGB BLD-MCNC: 12.6 G/DL — LOW (ref 13–17)
INR BLD: 1.58 RATIO — HIGH (ref 0.85–1.16)
LYMPHOCYTES # BLD AUTO: 0.62 K/UL — LOW (ref 1–3.3)
LYMPHOCYTES # BLD AUTO: 7.7 % — LOW (ref 13–44)
MANUAL SMEAR VERIFICATION: SIGNIFICANT CHANGE UP
MCHC RBC-ENTMCNC: 30 PG — SIGNIFICANT CHANGE UP (ref 27–34)
MCHC RBC-ENTMCNC: 32.5 G/DL — SIGNIFICANT CHANGE UP (ref 32–36)
MCV RBC AUTO: 92.4 FL — SIGNIFICANT CHANGE UP (ref 80–100)
MONOCYTES # BLD AUTO: 0.35 K/UL — SIGNIFICANT CHANGE UP (ref 0–0.9)
MONOCYTES NFR BLD AUTO: 4.3 % — SIGNIFICANT CHANGE UP (ref 2–14)
NEUTROPHILS # BLD AUTO: 7.11 K/UL — SIGNIFICANT CHANGE UP (ref 1.8–7.4)
NEUTROPHILS NFR BLD AUTO: 88 % — HIGH (ref 43–77)
NT-PROBNP SERPL-SCNC: HIGH PG/ML (ref 0–300)
OVALOCYTES BLD QL SMEAR: SLIGHT — SIGNIFICANT CHANGE UP
PLAT MORPH BLD: NORMAL — SIGNIFICANT CHANGE UP
PLATELET # BLD AUTO: 156 K/UL — SIGNIFICANT CHANGE UP (ref 150–400)
POTASSIUM SERPL-MCNC: 5 MMOL/L — SIGNIFICANT CHANGE UP (ref 3.5–5.3)
POTASSIUM SERPL-MCNC: 5.8 MMOL/L — HIGH (ref 3.5–5.3)
POTASSIUM SERPL-SCNC: 5 MMOL/L — SIGNIFICANT CHANGE UP (ref 3.5–5.3)
POTASSIUM SERPL-SCNC: 5.8 MMOL/L — HIGH (ref 3.5–5.3)
PROT SERPL-MCNC: 6.7 G/DL — SIGNIFICANT CHANGE UP (ref 6–8.3)
PROT SERPL-MCNC: 7.7 G/DL — SIGNIFICANT CHANGE UP (ref 6–8.3)
PROTHROM AB SERPL-ACNC: 18.1 SEC — HIGH (ref 9.9–13.4)
RBC # BLD: 4.2 M/UL — SIGNIFICANT CHANGE UP (ref 4.2–5.8)
RBC # FLD: 16.4 % — HIGH (ref 10.3–14.5)
RBC BLD AUTO: ABNORMAL
SCHISTOCYTES BLD QL AUTO: SLIGHT — SIGNIFICANT CHANGE UP
SODIUM SERPL-SCNC: 136 MMOL/L — SIGNIFICANT CHANGE UP (ref 135–145)
SODIUM SERPL-SCNC: 137 MMOL/L — SIGNIFICANT CHANGE UP (ref 135–145)
TARGETS BLD QL SMEAR: SLIGHT — SIGNIFICANT CHANGE UP
WBC # BLD: 8.08 K/UL — SIGNIFICANT CHANGE UP (ref 3.8–10.5)
WBC # FLD AUTO: 8.08 K/UL — SIGNIFICANT CHANGE UP (ref 3.8–10.5)

## 2025-02-04 PROCEDURE — 93971 EXTREMITY STUDY: CPT | Mod: 26,LT

## 2025-02-04 PROCEDURE — 73590 X-RAY EXAM OF LOWER LEG: CPT | Mod: 26,LT

## 2025-02-04 PROCEDURE — 73630 X-RAY EXAM OF FOOT: CPT | Mod: 26,LT

## 2025-02-04 RX ORDER — DM/PSEUDOEPHED/ACETAMINOPHEN 10-30-250
50 CAPSULE ORAL ONCE
Refills: 0 | Status: COMPLETED | OUTPATIENT
Start: 2025-02-04 | End: 2025-02-04

## 2025-02-04 RX ORDER — SACUBITRIL AND VALSARTAN 49; 51 MG/1; MG/1
1 TABLET, FILM COATED ORAL
Refills: 0 | Status: DISCONTINUED | OUTPATIENT
Start: 2025-02-04 | End: 2025-02-07

## 2025-02-04 RX ORDER — LEVOTHYROXINE SODIUM 25 UG/1
175 TABLET ORAL DAILY
Refills: 0 | Status: DISCONTINUED | OUTPATIENT
Start: 2025-02-04 | End: 2025-02-07

## 2025-02-04 RX ORDER — ACETAMINOPHEN 160 MG/5ML
1000 SUSPENSION ORAL ONCE
Refills: 0 | Status: COMPLETED | OUTPATIENT
Start: 2025-02-04 | End: 2025-02-04

## 2025-02-04 RX ORDER — SODIUM ZIRCONIUM CYCLOSILICATE 5 G/5G
10 POWDER, FOR SUSPENSION ORAL ONCE
Refills: 0 | Status: COMPLETED | OUTPATIENT
Start: 2025-02-04 | End: 2025-02-04

## 2025-02-04 RX ORDER — METHYLPREDNISOLONE ACETATE 40 MG/ML
20 VIAL (ML) INJECTION EVERY 8 HOURS
Refills: 0 | Status: DISCONTINUED | OUTPATIENT
Start: 2025-02-04 | End: 2025-02-05

## 2025-02-04 RX ORDER — TORSEMIDE 20 MG/1
20 TABLET ORAL
Refills: 0 | Status: DISCONTINUED | OUTPATIENT
Start: 2025-02-04 | End: 2025-02-07

## 2025-02-04 RX ORDER — TAMSULOSIN HYDROCHLORIDE 0.4 MG/1
0.4 CAPSULE ORAL AT BEDTIME
Refills: 0 | Status: DISCONTINUED | OUTPATIENT
Start: 2025-02-04 | End: 2025-02-07

## 2025-02-04 RX ORDER — ALBUTEROL 90 MCG
1 AEROSOL REFILL (GRAM) INHALATION EVERY 6 HOURS
Refills: 0 | Status: DISCONTINUED | OUTPATIENT
Start: 2025-02-04 | End: 2025-02-07

## 2025-02-04 RX ORDER — ATORVASTATIN CALCIUM 80 MG/1
40 TABLET, FILM COATED ORAL AT BEDTIME
Refills: 0 | Status: DISCONTINUED | OUTPATIENT
Start: 2025-02-04 | End: 2025-02-07

## 2025-02-04 RX ORDER — FLUTICASONE PROPIONATE AND SALMETEROL 113; 14 UG/1; UG/1
1 POWDER, METERED RESPIRATORY (INHALATION)
Refills: 0 | Status: DISCONTINUED | OUTPATIENT
Start: 2025-02-04 | End: 2025-02-07

## 2025-02-04 RX ADMIN — SACUBITRIL AND VALSARTAN 1 TABLET(S): 49; 51 TABLET, FILM COATED ORAL at 17:30

## 2025-02-04 RX ADMIN — Medication 5 MILLIGRAM(S): at 17:30

## 2025-02-04 RX ADMIN — Medication 1 PUFF(S): at 17:29

## 2025-02-04 RX ADMIN — FLUTICASONE PROPIONATE AND SALMETEROL 1 DOSE(S): 113; 14 POWDER, METERED RESPIRATORY (INHALATION) at 17:29

## 2025-02-04 RX ADMIN — Medication 75 MILLIGRAM(S): at 17:30

## 2025-02-04 RX ADMIN — Medication 20 MILLIGRAM(S): at 21:16

## 2025-02-04 RX ADMIN — ACETAMINOPHEN 400 MILLIGRAM(S): 160 SUSPENSION ORAL at 21:20

## 2025-02-04 RX ADMIN — ACETAMINOPHEN 400 MILLIGRAM(S): 160 SUSPENSION ORAL at 03:19

## 2025-02-04 RX ADMIN — Medication 5 UNIT(S): at 07:18

## 2025-02-04 RX ADMIN — TAMSULOSIN HYDROCHLORIDE 0.4 MILLIGRAM(S): 0.4 CAPSULE ORAL at 21:21

## 2025-02-04 RX ADMIN — SODIUM ZIRCONIUM CYCLOSILICATE 10 GRAM(S): 5 POWDER, FOR SUSPENSION ORAL at 07:21

## 2025-02-04 RX ADMIN — Medication 1 PUFF(S): at 21:22

## 2025-02-04 RX ADMIN — Medication 50 MILLILITER(S): at 07:16

## 2025-02-04 RX ADMIN — ATORVASTATIN CALCIUM 40 MILLIGRAM(S): 80 TABLET, FILM COATED ORAL at 21:21

## 2025-02-04 RX ADMIN — ACETAMINOPHEN 1000 MILLIGRAM(S): 160 SUSPENSION ORAL at 22:33

## 2025-02-04 NOTE — ED ADULT NURSE NOTE - OBJECTIVE STATEMENT
The patient is a 69y male presenting to the ED from home for complaints of lower leg pain. Patient endorsed a PMH of Arthritis, Carotid occlusion, bilateral, Gout, Pituitary Disease, Hypertension, Hypothyroid and Laryngeal cancer. He states since last Wednesday the patient has noted increase swelling to his bilateral lower extremities, in which he followed up with his outpatient provider. Per patient his outpatient provided warranted symptoms to gout, treated him with injections that only helped the pain minimally. Pain and swelling has gotten to the point where patient at baseline was ambulatory but notes being unable to ambulate at this time, patient also stated the swelling as of Sunday has now migrated upward to his knee prompting ED visit. Upon assessment patient presents with swelling to the bilateral extremities with the LLE worse than the RLE. LLE presents with 2+/3+ pitting edema as well as pain noted with movement, repositioning, and palpitation. Pt denies chest pain, palpitations, shortness of breath, headache, visual disturbances, numbness/tingling, fever, chills, diaphoresis,  nausea, vomiting, constipation, diarrhea, or urinary symptoms at this time. Safety and comfort measures provided, bed locked and in lowest position, side rails up for safety. Call bell within reach. Awaiting transport to X-ray & CT Scan. The patient is a 69y male presenting to the ED from home for complaints of lower leg pain. Patient endorsed a PMH of Arthritis, Carotid occlusion, bilateral, Gout, Pituitary Disease, Hypertension, Hypothyroid and Laryngeal cancer. He states since last Wednesday the patient has noted increase swelling to his bilateral lower extremities, in which he followed up with his outpatient provider. Per patient his outpatient provided warranted symptoms to gout, treated him with injections that only helped the pain minimally. Pain and swelling has gotten to the point where patient at baseline was ambulatory but notes being unable to ambulate at this time, patient also stated the swelling as of Sunday has now migrated upward to his knee prompting ED visit. Upon assessment patient presents with swelling to the bilateral extremities with the LLE worse than the RLE. LLE presents with 2+/3+ pitting edema as well as pain noted with movement, repositioning, and palpitation. Pt denies chest pain, palpitations, shortness of breath, headache, visual disturbances, numbness/tingling, fever, chills, diaphoresis,  nausea, vomiting, constipation, diarrhea, or urinary symptoms at this time. Safety and comfort measures provided, bed locked and in lowest position, side rails up for safety. Call bell within reach. Awaiting transport to X-ray & Ultrasound.

## 2025-02-04 NOTE — ED PROVIDER NOTE - IV ALTEPLASE DOOR HIDDEN
----- Message from Silas Mayberry CMA sent at 1/19/2022  9:22 AM CST -----  Regarding: INR FRIDAY JAN 21  Please watch for INR Friday, Jan. 21 in am and have provider review. Patient started antibiotic on Wed for UTI. Patient will be having labs done through [facility] home draw. Thank you     [Pathological] : TNM Stage: p [I] : I [TTNM] : 1c [NTNM] : 0 [MTNM] : x show

## 2025-02-04 NOTE — H&P ADULT - HISTORY OF PRESENT ILLNESS
69Y M PMH CHF, afib on Eliquis, HTN, osteoarthritis, gout, pituitary mass, presenting with left foot pain / L ankle pain and L knee pain   .  Patient reports that he developed pain in his left foot near the MTP about 6 days ago.  He is now also experiencing pain and swelling in the left knee which is not consistent with prior gout flares/ L ankle pain .  no fever or chills   no trauama   has been havivng difficulty ambulating prompting presentation to the ED.

## 2025-02-04 NOTE — H&P ADULT - ASSESSMENT
69Y M PMH CHF, afib on Eliquis, HTN, osteoarthritis, gout, pituitary mass, presenting with left foot pain / L ankle pain and L knee pain   .  Patient reports that he developed pain in his left foot near the MTP about 6 days ago.  He is now also experiencing pain and swelling in the left knee which is not consistent with prior gout flares/ L ankle pain .  no fever or chills   no trauama   has been havivng difficulty ambulating prompting presentation to the ED.  \    \    acute gout polyarticular : rheum consult   start iv steroids   holding eliquis for possible tap     cont other meds   d/w acp and pt

## 2025-02-04 NOTE — ED PROVIDER NOTE - PROGRESS NOTE DETAILS
received s/o chf gout, ckd, swelling of L foot and knee c/w gout no fevers, able to range joints, neg duplex takes eliquis K of 5.8, awaiting EKG

## 2025-02-04 NOTE — PATIENT PROFILE ADULT - NSPROPTRIGHTSUPPORTPERSON_GEN_A_NUR
Debridement Text: The wound edges were debrided prior to proceeding with the closure to facilitate wound healing. declines

## 2025-02-04 NOTE — ED PROVIDER NOTE - ATTENDING CONTRIBUTION TO CARE
Phu Cintron MD (Attending Physician):    I performed a history and physical exam of the patient and discussed their management with the resident/fellow/ACP/student. I have reviewed the resident/fellow/ACP/student note and agree with the documented findings and plan of care, except as noted. I have personally performed a substantive portion of the visit including all aspects of the medical decision making. My medical decision making and observations are found below. Please refer to any progress notes for updates on clinical course.    HPI:  70yo M with PMHx of CHF, afib on Eliquis, HTN, osteoarthritis, gout, pituitary mass, presenting with left foot pain.  Patient reports that he developed pain in his left foot near the MTP about 6 days ago.  Followed up with his primary doctor who gave him "some sort of injection" possibly steroid injection which helped although swelling and pain have since recurred.  He is now also experiencing pain and swelling in the left knee which is not consistent with prior gout flares, notes he has never had a gout flare in his knees in the past.  Denies trauma.  Having difficulty ambulating prompting presentation to the ED.  No fevers or chills, no chest pain or shortness of breath.  Has been using tylenol for pain control without much relief.    PE:  GEN - +In mild discomfort, A&Ox3  HEAD - NC/AT  EYES - PERRL, EOMI  ENT - Airway patent, mucous membranes moist  PULMONARY - Normal wob, CTA b/l, symmetric breath sounds, no W/R/R, satting 98% on RA  CARDIAC - +S1S2, RRR, no M/G/R, no JVD  ABDOMEN - +BS, ND, NT, soft, no guarding, no rebound, no masses, no rigidity   - No CVA TTP b/l  EXTREMITIES - FROM, symmetric pulses. +Erythema, warmth and swelling to the L foot, primarily over the first MTP although entire foot appears edematous. TTP and pain with active ROM of L knee, no obvious palpable effusion, but appears enlarged relative to right knee. No calf tenderness b/l.  NEUROLOGIC - Alert, speech clear, moving all extremities spontaneously, no focal deficits  PSYCH - Normal mood/affect, normal insight    MDM:  DDx includes, but not limited to: acute gout flare, DVT, CHF exacerbation, fracture. Low suspicion for cellulitis. Low suspicion for septic arthritis as pt can fully range all joints affected. ekg, LLE duplex venous US, x-rays LLE, labs, pain meds, reassess. Dispo pending w/u.

## 2025-02-04 NOTE — ED PROVIDER NOTE - OBJECTIVE STATEMENT
69Y M PMH CHF, afib on Eliquis, HTN, osteoarthritis, gout, pituitary mass, presenting with left foot pain.  Patient reports that he developed pain in his left foot near the MTP about 6 days ago.  Followed up with his primary doctor who gave him some sort of injection which helped although swelling and pain have since recurred.  He is now also experiencing pain and swelling in the left knee which is not consistent with prior gout flares, notes he has never had a gout flare in his knee in the past.  Denies trauma.  Having difficulty ambulating prompting presentation to the ED.  No fevers or chills, chest pain or shortness of breath.  Has been using tylenol for pain control without much relief.

## 2025-02-04 NOTE — PATIENT PROFILE ADULT - PRO INTERPRETER NEED 2
Patient noted to be having increased pain throughout treatment as evidenced by patient grimacing and wiping tears from his eyes during treatment. SN reported to STEFANIE Camacho Who followed up , assessed and referred patient to go to the ED .
English

## 2025-02-04 NOTE — ED ADULT NURSE NOTE - NSFALLHARMRISKINTERV_ED_ALL_ED
Communicate risk of Fall with Harm to all staff, patient, and family/Provide patient with walking aids/Provide visual cue: red socks, yellow wristband, yellow gown, etc/Reinforce activity limits and safety measures with patient and family/Bed in lowest position, wheels locked, appropriate side rails in place/Call bell, personal items and telephone in reach/Instruct patient to call for assistance before getting out of bed/chair/stretcher/Non-slip footwear applied when patient is off stretcher/[place] to call system/Physically safe environment - no spills, clutter or unnecessary equipment/Purposeful Proactive Rounding/Room/bathroom lighting operational, light cord in reach

## 2025-02-04 NOTE — ED PROVIDER NOTE - CLINICAL SUMMARY MEDICAL DECISION MAKING FREE TEXT BOX
69-year-old male history as above presenting with left foot and knee pain, worsening over the last 1 week.  Exam as above.  Findings on the left foot are consistent with gout flare especially given that this mirrors patient's prior gout flares, although has never had involvement of the knee joint in the past.  He is afebrile with no other infectious symptoms.  No trauma reported.  Plan labs including inflamatory markers, x-rays of the foot, ankle, tib-fib, and knee on the left side to evaluate for joint effusion, underlying occult fracture, or other bony process. NSAIDs deferred due to DOAC use. Has also taken colchicine in the past but DC'd due to GI symptoms.  Will give IV Tylenol for pain control and reassess after imaging need for higher dose pain medication.

## 2025-02-04 NOTE — ED ADULT NURSE REASSESSMENT NOTE - NS ED NURSE REASSESS COMMENT FT1
Patient in bed, requesting food. MD approved that patient can eat. Patient is still in a bit of leg pain but better when laying down.
Report taken from ANALILIA Gallagher. Patient's potassium was at 5.8, patient placed on monitor, Hyper K+ protocol done. Patient attempted to get up and walk with RN and MD but patient complained of left knee pain due to Gout flareup.
patient given lunch tray. Comfort and safety measures continued.
day(s)

## 2025-02-04 NOTE — ED PROVIDER NOTE - PHYSICAL EXAMINATION
GENERAL: Awake, alert, NAD  HEAD: NC/AT, neck supple, moist mucous membranes  EYES: PERRL, EOM grossly intact, sclera anicteric  LUNGS: normal WOB on RA, CTAB, no wheezes or crackles   CARDIAC: RRR, no m/r/g  ABDOMEN: Soft, non tender, non distended, no rebound, no guarding  BACK: No midline spinal tenderness, no CVA tenderness  EXT: erythema, warmth and swelling to the L foot, primarily over the first MTP although entire foot appears edematous, also with tendereness and pain with active ROM of L knee, no obvious palpable effusion but appears enlarged relative to right; no calf tenderness, palpable DP pulses bilaterally  NEURO: A&Ox3. Moving all extremities.  SKIN: Warm and dry. No rash.  PSYCH: Normal affect.

## 2025-02-04 NOTE — ED PROVIDER NOTE - CARE PLAN
Principal Discharge DX:	Acute gout   1 Principal Discharge DX:	Acute gout  Secondary Diagnosis:	Cardiac amyloidosis  Secondary Diagnosis:	Pulmonary nodule  Secondary Diagnosis:	Chronic atrial fibrillation
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
## 2025-02-05 DIAGNOSIS — M10.9 GOUT, UNSPECIFIED: ICD-10-CM

## 2025-02-05 DIAGNOSIS — R91.1 SOLITARY PULMONARY NODULE: ICD-10-CM

## 2025-02-05 LAB
ANION GAP SERPL CALC-SCNC: 14 MMOL/L — SIGNIFICANT CHANGE UP (ref 5–17)
BUN SERPL-MCNC: 57 MG/DL — HIGH (ref 7–23)
CALCIUM SERPL-MCNC: 9 MG/DL — SIGNIFICANT CHANGE UP (ref 8.4–10.5)
CHLORIDE SERPL-SCNC: 101 MMOL/L — SIGNIFICANT CHANGE UP (ref 96–108)
CO2 SERPL-SCNC: 21 MMOL/L — LOW (ref 22–31)
CREAT SERPL-MCNC: 2.06 MG/DL — HIGH (ref 0.5–1.3)
EGFR: 34 ML/MIN/1.73M2 — LOW
GLUCOSE SERPL-MCNC: 118 MG/DL — HIGH (ref 70–99)
MAGNESIUM SERPL-MCNC: 2 MG/DL — SIGNIFICANT CHANGE UP (ref 1.6–2.6)
PHOSPHATE SERPL-MCNC: 3.9 MG/DL — SIGNIFICANT CHANGE UP (ref 2.5–4.5)
POTASSIUM SERPL-MCNC: 4.7 MMOL/L — SIGNIFICANT CHANGE UP (ref 3.5–5.3)
POTASSIUM SERPL-SCNC: 4.7 MMOL/L — SIGNIFICANT CHANGE UP (ref 3.5–5.3)
SODIUM SERPL-SCNC: 136 MMOL/L — SIGNIFICANT CHANGE UP (ref 135–145)

## 2025-02-05 PROCEDURE — 99223 1ST HOSP IP/OBS HIGH 75: CPT | Mod: GC

## 2025-02-05 PROCEDURE — 93010 ELECTROCARDIOGRAM REPORT: CPT

## 2025-02-05 RX ORDER — BUMETANIDE 2 MG/1
2 TABLET ORAL ONCE
Refills: 0 | Status: COMPLETED | OUTPATIENT
Start: 2025-02-05 | End: 2025-02-05

## 2025-02-05 RX ORDER — METHYLPREDNISOLONE ACETATE 40 MG/ML
30 VIAL (ML) INJECTION DAILY
Refills: 0 | Status: DISCONTINUED | OUTPATIENT
Start: 2025-02-06 | End: 2025-02-07

## 2025-02-05 RX ORDER — APIXABAN 5 MG/1
5 TABLET, FILM COATED ORAL EVERY 12 HOURS
Refills: 0 | Status: DISCONTINUED | OUTPATIENT
Start: 2025-02-05 | End: 2025-02-07

## 2025-02-05 RX ADMIN — Medication 20 MILLIGRAM(S): at 13:32

## 2025-02-05 RX ADMIN — FLUTICASONE PROPIONATE AND SALMETEROL 1 DOSE(S): 113; 14 POWDER, METERED RESPIRATORY (INHALATION) at 05:48

## 2025-02-05 RX ADMIN — Medication 1 PUFF(S): at 12:39

## 2025-02-05 RX ADMIN — BUMETANIDE 2 MILLIGRAM(S): 2 TABLET ORAL at 22:42

## 2025-02-05 RX ADMIN — FLUTICASONE PROPIONATE AND SALMETEROL 1 DOSE(S): 113; 14 POWDER, METERED RESPIRATORY (INHALATION) at 17:01

## 2025-02-05 RX ADMIN — TAMSULOSIN HYDROCHLORIDE 0.4 MILLIGRAM(S): 0.4 CAPSULE ORAL at 22:22

## 2025-02-05 RX ADMIN — Medication 5 MILLIGRAM(S): at 12:39

## 2025-02-05 RX ADMIN — ATORVASTATIN CALCIUM 40 MILLIGRAM(S): 80 TABLET, FILM COATED ORAL at 22:22

## 2025-02-05 RX ADMIN — TORSEMIDE 20 MILLIGRAM(S): 20 TABLET ORAL at 13:33

## 2025-02-05 RX ADMIN — Medication 1 PUFF(S): at 17:00

## 2025-02-05 RX ADMIN — Medication 20 MILLIGRAM(S): at 05:48

## 2025-02-05 RX ADMIN — LEVOTHYROXINE SODIUM 175 MICROGRAM(S): 25 TABLET ORAL at 05:47

## 2025-02-05 RX ADMIN — Medication 1 PUFF(S): at 05:48

## 2025-02-05 RX ADMIN — Medication 75 MILLIGRAM(S): at 12:39

## 2025-02-05 RX ADMIN — SACUBITRIL AND VALSARTAN 1 TABLET(S): 49; 51 TABLET, FILM COATED ORAL at 17:00

## 2025-02-05 RX ADMIN — TORSEMIDE 20 MILLIGRAM(S): 20 TABLET ORAL at 05:47

## 2025-02-05 RX ADMIN — SACUBITRIL AND VALSARTAN 1 TABLET(S): 49; 51 TABLET, FILM COATED ORAL at 05:47

## 2025-02-05 NOTE — CONSULT NOTE ADULT - SUBJECTIVE AND OBJECTIVE BOX
MUSTAPHA CANTU  [id number]    HISTORY OF PRESENT ILLNESS:  69Y M PMH CHF, afib on Eliquis, HTN, osteoarthritis, gout, pituitary mass, presenting with left foot pain / L ankle pain and L knee pain   .  Patient reports that he developed pain in his left foot near the MTP about 6 days ago.  He is now also experiencing pain and swelling in the left knee which is not consistent with prior gout flares/ L ankle pain .  no fever or chills   no trauama   has been havivng difficulty ambulating prompting presentation to the ED.   (04 Feb 2025 18:21)      Has known hx of gout. Hospitalized in May 2024 where pt was noted to have gout flare, uric acid elevated at 12.7. Was discharged with allopurinol 200mg daily, however pt did not continues.   Now on IV solumedrol 20mg TID for gout- s/p 3 doses so far.    Risk factors include CKD and medications (on torsemide for CHF).     Rheum ROS    Denies fevers/chills/weight loss/night sweats/alopecia/sinus disease/asthma history/oral ulcers/dysphagia/vision changes/Raynauds/VTE/miscarraiges/sicca symptoms/urinary changes/edema/SOB/joint pain/swelling/jaw claudication/rash/photosensitivity/morning stiffness    Endorses fevers/chills/weight loss/night sweats/alopecia/sinus disease/asthma history/oral ulcers/dysphagia/vision changes/Raynauds/VTE/miscarraiges/sicca symptoms/urinary changes/edema/SOB/joint pain/swelling/jaw claudication/rash/photosensitivity/morning stiffness      MEDICATIONS  (STANDING):  albuterol    90 MICROgram(s) HFA Inhaler 1 Puff(s) Inhalation every 6 hours  atorvastatin 40 milliGRAM(s) Oral at bedtime  clopidogrel Tablet 75 milliGRAM(s) Oral daily  finasteride 5 milliGRAM(s) Oral daily  fluticasone propionate/ salmeterol 100-50 MICROgram(s) Diskus 1 Dose(s) Inhalation two times a day  levothyroxine 175 MICROGram(s) Oral daily  methylPREDNISolone sodium succinate Injectable 20 milliGRAM(s) IV Push every 8 hours  sacubitril 24 mG/valsartan 26 mG 1 Tablet(s) Oral two times a day  tamsulosin 0.4 milliGRAM(s) Oral at bedtime  torsemide 20 milliGRAM(s) Oral two times a day  Vyndamax 61 mG 1 Tablet(s) 1 Tablet(s) Oral daily    MEDICATIONS  (PRN):      Allergies    No Known Allergies    Intolerances        PERTINENT MEDICATION HISTORY:    SOCIAL HISTORY:  OCCUPATION:  TRAVEL HISTORY:    FAMILY HISTORY:  Family history of stroke (Mother)    Family history of Alzheimer's disease (Father)    FH: stroke (Mother, Father)        Vital Signs Last 24 Hrs  T(C): 36.7 (05 Feb 2025 11:30), Max: 36.7 (04 Feb 2025 17:19)  T(F): 98.1 (05 Feb 2025 11:30), Max: 98.1 (04 Feb 2025 17:19)  HR: 82 (05 Feb 2025 11:30) (68 - 85)  BP: 100/66 (05 Feb 2025 11:30) (95/52 - 113/70)  BP(mean): --  RR: 18 (05 Feb 2025 11:30) (16 - 18)  SpO2: 96% (05 Feb 2025 11:30) (96% - 99%)    Parameters below as of 05 Feb 2025 11:30  Patient On (Oxygen Delivery Method): room air        Physical Exam:  General: No apparent distress  HEENT: EOMI, MMM  CVS: +S1/S2, RRR, no murmurs/rubs/gallops  Resp: CTA b/l. No crackles/wheezing  GI: Soft, NT/ND +BS  MSK: no swelling/warmth/erythema of the joints of the UE/LE  Neuro: AAOx3  Skin: no visible rashes    LABS:                        12.6   8.08  )-----------( 156      ( 04 Feb 2025 03:42 )             38.8     02-05    136  |  101  |  57[H]  ----------------------------<  118[H]  4.7   |  21[L]  |  2.06[H]    Ca    9.0      05 Feb 2025 01:59  Phos  3.9     02-05  Mg     2.0     02-05    TPro  6.7  /  Alb  3.6  /  TBili  1.2  /  DBili  x   /  AST  22  /  ALT  29  /  AlkPhos  114  02-04    PT/INR - ( 04 Feb 2025 03:42 )   PT: 18.1 sec;   INR: 1.58 ratio         PTT - ( 04 Feb 2025 03:42 )  PTT:36.0 sec  Urinalysis Basic - ( 05 Feb 2025 01:59 )    Color: x / Appearance: x / SG: x / pH: x  Gluc: 118 mg/dL / Ketone: x  / Bili: x / Urobili: x   Blood: x / Protein: x / Nitrite: x   Leuk Esterase: x / RBC: x / WBC x   Sq Epi: x / Non Sq Epi: x / Bacteria: x      Rheumatology Work Up    Sedimentation Rate, Erythrocyte: 51 mm/hr *H* [0 - 20] (02-04-25 @ 03:42)  C-Reactive Protein: 207 mg/L *H* [0 - 4] (02-04-25 @ 03:42)  Protein/Creatinine Ratio Calculation: Unable to calculate [0.0 - 0.2] (02-11-24 @ 15:19)  Protein/Creatinine Ratio Calculation: 0.1 Ratio [0.0 - 0.2] (12-05-23 @ 18:04)      RADIOLOGY & ADDITIONAL STUDIES:  < from: Xray Foot AP + Lateral + Oblique, Left (02.04.25 @ 03:58) >  IMPRESSION:  No acute fracture or dislocation.  No tracking gas collections or gross radiographic evidence for   osteomyelitis.    < end of copied text >       Calm MUSTAPHA CANTU  [id number]    HISTORY OF PRESENT ILLNESS:  69Y M PMH CHF, afib on Eliquis, HTN, osteoarthritis, gout, pituitary mass, presenting with left foot pain / L ankle pain and L knee pain   .  Patient reports that he developed pain in his left foot near the MTP about 6 days ago.  He is now also experiencing pain and swelling in the left knee which is not consistent with prior gout flares/ L ankle pain .  no fever or chills   no trauama   has been havivng difficulty ambulating prompting presentation to the ED.   (04 Feb 2025 18:21)      Has known hx of gout. Hospitalized in May 2024 where pt was noted to have gout flare, uric acid elevated at 12.7. Was discharged with allopurinol 200mg daily, however pt did not continue as he had thought that was causing diarrhea. However, was also taking BID colchicine. Pt intermittently taking daily to BID colchicine. Has no outpt gout management. Denies tophi.   Now on IV solumedrol 20mg TID for gout- s/p 3 doses so far with improvement in L knee and L 1st MTP pain. Denies fevers or chills.     Risk factors include CKD and medications (on torsemide for CHF). Denies alcohol use, red meat. Does eat shellfish, bt not recently.     Rheum ROS  See above       MEDICATIONS  (STANDING):  albuterol    90 MICROgram(s) HFA Inhaler 1 Puff(s) Inhalation every 6 hours  atorvastatin 40 milliGRAM(s) Oral at bedtime  clopidogrel Tablet 75 milliGRAM(s) Oral daily  finasteride 5 milliGRAM(s) Oral daily  fluticasone propionate/ salmeterol 100-50 MICROgram(s) Diskus 1 Dose(s) Inhalation two times a day  levothyroxine 175 MICROGram(s) Oral daily  methylPREDNISolone sodium succinate Injectable 20 milliGRAM(s) IV Push every 8 hours  sacubitril 24 mG/valsartan 26 mG 1 Tablet(s) Oral two times a day  tamsulosin 0.4 milliGRAM(s) Oral at bedtime  torsemide 20 milliGRAM(s) Oral two times a day  Vyndamax 61 mG 1 Tablet(s) 1 Tablet(s) Oral daily    MEDICATIONS  (PRN):      Allergies    No Known Allergies    Intolerances        PERTINENT MEDICATION HISTORY:    SOCIAL HISTORY:  OCCUPATION:  TRAVEL HISTORY:    FAMILY HISTORY:  Family history of stroke (Mother)    Family history of Alzheimer's disease (Father)    FH: stroke (Mother, Father)        Vital Signs Last 24 Hrs  T(C): 36.7 (05 Feb 2025 11:30), Max: 36.7 (04 Feb 2025 17:19)  T(F): 98.1 (05 Feb 2025 11:30), Max: 98.1 (04 Feb 2025 17:19)  HR: 82 (05 Feb 2025 11:30) (68 - 85)  BP: 100/66 (05 Feb 2025 11:30) (95/52 - 113/70)  BP(mean): --  RR: 18 (05 Feb 2025 11:30) (16 - 18)  SpO2: 96% (05 Feb 2025 11:30) (96% - 99%)    Parameters below as of 05 Feb 2025 11:30  Patient On (Oxygen Delivery Method): room air    Physical Exam:  General: No apparent distress  HEENT: EOMI  Resp: no increased WOB,   GI: non-distended   MSK: L knee slightly warm, non-tender, + effusion, pt able to fully extend knee with mild discomfort. L 1st MTP with swelling, non-tender on palpation however tender with extension. L foot with pitting edema.   Neuro: AAOx3  Skin: no visible rashes    LABS:                        12.6   8.08  )-----------( 156      ( 04 Feb 2025 03:42 )             38.8     02-05    136  |  101  |  57[H]  ----------------------------<  118[H]  4.7   |  21[L]  |  2.06[H]    Ca    9.0      05 Feb 2025 01:59  Phos  3.9     02-05  Mg     2.0     02-05    TPro  6.7  /  Alb  3.6  /  TBili  1.2  /  DBili  x   /  AST  22  /  ALT  29  /  AlkPhos  114  02-04    PT/INR - ( 04 Feb 2025 03:42 )   PT: 18.1 sec;   INR: 1.58 ratio         PTT - ( 04 Feb 2025 03:42 )  PTT:36.0 sec  Urinalysis Basic - ( 05 Feb 2025 01:59 )    Color: x / Appearance: x / SG: x / pH: x  Gluc: 118 mg/dL / Ketone: x  / Bili: x / Urobili: x   Blood: x / Protein: x / Nitrite: x   Leuk Esterase: x / RBC: x / WBC x   Sq Epi: x / Non Sq Epi: x / Bacteria: x      Rheumatology Work Up    Sedimentation Rate, Erythrocyte: 51 mm/hr *H* [0 - 20] (02-04-25 @ 03:42)  C-Reactive Protein: 207 mg/L *H* [0 - 4] (02-04-25 @ 03:42)  Protein/Creatinine Ratio Calculation: Unable to calculate [0.0 - 0.2] (02-11-24 @ 15:19)  Protein/Creatinine Ratio Calculation: 0.1 Ratio [0.0 - 0.2] (12-05-23 @ 18:04)      RADIOLOGY & ADDITIONAL STUDIES:  < from: Xray Foot AP + Lateral + Oblique, Left (02.04.25 @ 03:58) >  IMPRESSION:  No acute fracture or dislocation.  No tracking gas collections or gross radiographic evidence for   osteomyelitis.    < end of copied text >       MUSTAPHA CANTU  [id number]    HISTORY OF PRESENT ILLNESS:  69Y M PMH CHF, afib on Eliquis, HTN, osteoarthritis, gout, pituitary mass, presenting with left foot pain / L ankle pain and L knee pain   .  Patient reports that he developed pain in his left foot near the MTP about 6 days ago.  He is now also experiencing pain and swelling in the left knee which is not consistent with prior gout flares/ L ankle pain .  no fever or chills   no trauma   has been having difficulty ambulating prompting presentation to the ED.   (04 Feb 2025 18:21)      Has known hx of gout. Hospitalized in May 2024 where pt was noted to have gout flare, uric acid elevated at 12.7. Was discharged with allopurinol 200mg daily, however pt did not continue as he had thought that was causing diarrhea. However, was also taking BID colchicine. Pt intermittently taking daily to BID colchicine. Has no outpt gout management. Denies tophi.   Now on IV solumedrol 20mg TID for gout- s/p 3 doses so far with improvement in L knee and L 1st MTP pain. Denies fevers or chills.     Risk factors include CKD and medications (on torsemide for CHF). Denies alcohol use, red meat. Does eat shellfish, bt not recently.     Rheum ROS  See above       MEDICATIONS  (STANDING):  albuterol    90 MICROgram(s) HFA Inhaler 1 Puff(s) Inhalation every 6 hours  atorvastatin 40 milliGRAM(s) Oral at bedtime  clopidogrel Tablet 75 milliGRAM(s) Oral daily  finasteride 5 milliGRAM(s) Oral daily  fluticasone propionate/ salmeterol 100-50 MICROgram(s) Diskus 1 Dose(s) Inhalation two times a day  levothyroxine 175 MICROGram(s) Oral daily  methylPREDNISolone sodium succinate Injectable 20 milliGRAM(s) IV Push every 8 hours  sacubitril 24 mG/valsartan 26 mG 1 Tablet(s) Oral two times a day  tamsulosin 0.4 milliGRAM(s) Oral at bedtime  torsemide 20 milliGRAM(s) Oral two times a day  Vyndamax 61 mG 1 Tablet(s) 1 Tablet(s) Oral daily    MEDICATIONS  (PRN):      Allergies    No Known Allergies    Intolerances    FAMILY HISTORY:  Family history of stroke (Mother)    Family history of Alzheimer's disease (Father)    FH: stroke (Mother, Father)        Vital Signs Last 24 Hrs  T(C): 36.7 (05 Feb 2025 11:30), Max: 36.7 (04 Feb 2025 17:19)  T(F): 98.1 (05 Feb 2025 11:30), Max: 98.1 (04 Feb 2025 17:19)  HR: 82 (05 Feb 2025 11:30) (68 - 85)  BP: 100/66 (05 Feb 2025 11:30) (95/52 - 113/70)  RR: 18 (05 Feb 2025 11:30) (16 - 18)  SpO2: 96% (05 Feb 2025 11:30) (96% - 99%)    Parameters below as of 05 Feb 2025 11:30  Patient On (Oxygen Delivery Method): room air    Physical Exam:  General: No apparent distress  HEENT: EOMI  Resp: no increased WOB,   GI: non-distended   MSK: L knee slightly warm, non-tender, + effusion, pt able to fully extend knee with mild discomfort. L 1st MTP with swelling, non-tender on palpation however tender with extension. L foot with pitting edema.   Neuro: AAOx3  Skin: no visible rashes    LABS:                        12.6   8.08  )-----------( 156      ( 04 Feb 2025 03:42 )             38.8     02-05    136  |  101  |  57[H]  ----------------------------<  118[H]  4.7   |  21[L]  |  2.06[H]    Ca    9.0      05 Feb 2025 01:59  Phos  3.9     02-05  Mg     2.0     02-05    TPro  6.7  /  Alb  3.6  /  TBili  1.2  /  DBili  x   /  AST  22  /  ALT  29  /  AlkPhos  114  02-04    PT/INR - ( 04 Feb 2025 03:42 )   PT: 18.1 sec;   INR: 1.58 ratio         PTT - ( 04 Feb 2025 03:42 )  PTT:36.0 sec  Urinalysis Basic - ( 05 Feb 2025 01:59 )    Color: x / Appearance: x / SG: x / pH: x  Gluc: 118 mg/dL / Ketone: x  / Bili: x / Urobili: x   Blood: x / Protein: x / Nitrite: x   Leuk Esterase: x / RBC: x / WBC x   Sq Epi: x / Non Sq Epi: x / Bacteria: x      Rheumatology Work Up    Sedimentation Rate, Erythrocyte: 51 mm/hr *H* [0 - 20] (02-04-25 @ 03:42)  C-Reactive Protein: 207 mg/L *H* [0 - 4] (02-04-25 @ 03:42)  Protein/Creatinine Ratio Calculation: Unable to calculate [0.0 - 0.2] (02-11-24 @ 15:19)  Protein/Creatinine Ratio Calculation: 0.1 Ratio [0.0 - 0.2] (12-05-23 @ 18:04)      RADIOLOGY & ADDITIONAL STUDIES:  < from: Xray Foot AP + Lateral + Oblique, Left (02.04.25 @ 03:58) >  IMPRESSION:  No acute fracture or dislocation.  No tracking gas collections or gross radiographic evidence for   osteomyelitis.    < end of copied text >

## 2025-02-05 NOTE — CONSULT NOTE ADULT - ATTENDING COMMENTS
Modifications made to fellows note above   gout - steroids as above   will follow and adjust steroids as needed.   Nickie Austin MD  [facility] Division of Rheumatology      [email or web address]

## 2025-02-05 NOTE — CONSULT NOTE ADULT - ASSESSMENT
70 yo M w/ PMH of CKD, CHF, afib, HTN OA, gout who presents for acute L foot pain/L ankle pain and L knee pain. Rheumatology consulted for gout flare evaluation.     #Acute oligoarthritis  #Hx of Gout  #Hx of elevated uric acid       68 yo M w/ PMH of CKD, CHF, afib, HTN OA, gout who presents for acute L foot pain/L ankle pain and L knee pain. Rheumatology consulted for gout flare evaluation.     #Acute oligoarthritis (L 1st MTP, L knee)   #C/f gout flare   #CKD  #CHF  -Overall, current symptoms concerning for polyarticular gout flare.  -Pt with known elevated uric acid to 12.5 in May 2024 and has not been on urate lowering therapy.   -As well, has risk factors of CKD and diuretic use   -Pt started on steroids (2/4-) with some improvement in swelling, pain on palpation and ROM in knee  -Meets criteria for urate lowering therapy: has had 2+ gout flares in 1 year and has a previous uric acid level >9 in setting of CKD.   -Uric acid level goal should be < 6.     Recommendations  -In setting of lower extremity edema and heart failure hx, will decrease steroids to IV solumedrol 30mg x 2 days (ordered). Will reassess prior to giving final steroid taper   -Will need to be started on urate lowering therapy in the outpt setting. HLA  ordered to assess for risk of allopurinol hypersensitivity prior to starting.  -Pt taking colchicine at home without guidance. Recommended in setting of CKD and statin use, to hold on taking further doses for now unless instructed to do some by a health care provider.   -When pt is ready for discharge, will assist with setting up follow up with Dr. Austin at rheumatology clinic at [address] [place] [address]. Clinic number: [phone]    Discussed with Dr. Geovanna Spears MD   PGY-5  Reachable on TEAMS  Rheumatology Fellow

## 2025-02-05 NOTE — CONSULT NOTE ADULT - PROBLEM SELECTOR RECOMMENDATION 9
PET-CT (read) as an outpatient with RML lung nodule 18 x 16 mm is not hypermetabolic, showing mild activity (SUV 1.3)  -Reviewed images from CT A/P (lower chest) from 10/2024  -Likely hamartoma. No plans for biopsy at this time, suggest eventual repeat CT chest as an outpatient.

## 2025-02-05 NOTE — PROVIDER CONTACT NOTE (OTHER) - BACKGROUND
Pt admitted for gout flare and on solumedrol 20 q8. Hx of hypothryoid, laryngeal cancer, carotid occlusion.

## 2025-02-05 NOTE — PROGRESS NOTE ADULT - SUBJECTIVE AND OBJECTIVE BOX
Rn spoke to pt again to educate her about the need for the abx course. Pt very reluctant to take the same abx but is willing to change out the abx and use benedryl to help aleviate any allergic reaction. RN will discuss with MD on abx options for course.    Date of service: 02-05-25 @ 22:18      Patient is a 69y old  Male who presents with a chief complaint of                                                              INTERVAL HPI/OVERNIGHT EVENTS:    REVIEW OF SYSTEMS: no cp   no sob  L knee / L ankle pain improving                                                                                                                                                                                                                                                                        Medications:  MEDICATIONS  (STANDING):  albuterol    90 MICROgram(s) HFA Inhaler 1 Puff(s) Inhalation every 6 hours  atorvastatin 40 milliGRAM(s) Oral at bedtime  clopidogrel Tablet 75 milliGRAM(s) Oral daily  finasteride 5 milliGRAM(s) Oral daily  fluticasone propionate/ salmeterol 100-50 MICROgram(s) Diskus 1 Dose(s) Inhalation two times a day  levothyroxine 175 MICROGram(s) Oral daily  sacubitril 24 mG/valsartan 26 mG 1 Tablet(s) Oral two times a day  tamsulosin 0.4 milliGRAM(s) Oral at bedtime  torsemide 20 milliGRAM(s) Oral two times a day  Vyndamax 61 mG 1 Tablet(s) 1 Tablet(s) Oral daily    MEDICATIONS  (PRN):       Allergies    No Known Allergies    Intolerances      Vital Signs Last 24 Hrs  T(C): 36.7 (05 Feb 2025 21:03), Max: 36.7 (05 Feb 2025 05:00)  T(F): 98 (05 Feb 2025 21:03), Max: 98.1 (05 Feb 2025 05:00)  HR: 77 (05 Feb 2025 21:03) (68 - 82)  BP: 105/63 (05 Feb 2025 21:03) (95/52 - 113/70)  BP(mean): --  RR: 18 (05 Feb 2025 21:03) (18 - 18)  SpO2: 94% (05 Feb 2025 21:03) (94% - 98%)    Parameters below as of 05 Feb 2025 21:03  Patient On (Oxygen Delivery Method): room air      CAPILLARY BLOOD GLUCOSE          02-04 @ 07:01  -  02-05 @ 07:00  --------------------------------------------------------  IN: 0 mL / OUT: 450 mL / NET: -450 mL    02-05 @ 07:01  -  02-05 @ 22:18  --------------------------------------------------------  IN: 480 mL / OUT: 600 mL / NET: -120 mL      Physical Exam:    Daily     Daily   General:  Well appearing, NAD, not cachetic  HEENT:  Nonicteric, PERRLA  CV:  RRR, S1S2   Lungs:  CTA B/L, no wheezes, rales, rhonchi  Abdomen:  Soft, non-tender, no distended, positive BS  Extremities:  L knee swelling and tenderness   Neuro:  AAOx3, non-focal, grossly intact                                                                                                                                                                                                                                                                                                LABS:                               12.6   8.08  )-----------( 156      ( 04 Feb 2025 03:42 )             38.8                      02-05    136  |  101  |  57[H]  ----------------------------<  118[H]  4.7   |  21[L]  |  2.06[H]    Ca    9.0      05 Feb 2025 01:59  Phos  3.9     02-05  Mg     2.0     02-05    TPro  6.7  /  Alb  3.6  /  TBili  1.2  /  DBili  x   /  AST  22  /  ALT  29  /  AlkPhos  114  02-04                       RADIOLOGY & ADDITIONAL TESTS         I personally reviewed: [  ]EKG   [  ]CXR    [  ] CT      A/P:         Discussed with :     Melonie consultants' Notes   Time spent :

## 2025-02-05 NOTE — CONSULT NOTE ADULT - ASSESSMENT
70 y/o M with PMH of CHF, afib on Eliquis, HTN, osteoarthritis, gout, pituitary mass. Presents with L left foot pain / L ankle pain and L knee pain. Called to consult for pulmonary nodule seen on outpatient CT. Pt had recent PET-CT and nodule was not hypermetabolic.

## 2025-02-05 NOTE — CONSULT NOTE ADULT - SUBJECTIVE AND OBJECTIVE BOX
PULMONARY CONSULT    HPI: 68 y/o M with PMH of CHF, afib on Eliquis, HTN, osteoarthritis, gout, pituitary mass. Presents with L left foot pain / L ankle pain and L knee pain. Called to consult for pulmonary nodule seen on outpatient CT. Pt had recent PET-CT and nodule was not hypermetabolic. Denies cough, SOB, weight loss, C, pleuritic CP.         PAST MEDICAL & SURGICAL HISTORY:  Low back pain  Herniated disc on lumbar region  Redundant prepuce and phimosis  Arthritis on knees  Hypothyroid not taking any meds  Hypertension  History of pituitary disease on caber  Gout  S/P excision of vocal cord nodule 2007 and radiation -  Laryngeal cancer   Pituitary mass  Carotid occlusion, bilateral  Obese  S/P excision of vocal cord nodule radiation - 2007  History of lumbar spinal fusion 2018  H/O arthroscopy of shoulder right-2017  Status post carotid surgery right-5/13/2022, pt. states unsuccessful      Allergies  No Known Allergies      FAMILY HISTORY:  Family history of stroke (Mother)  Family history of Alzheimer's disease (Father)  FH: stroke (Mother, Father)      Social history: never a smoker     Review of Systems:  CONSTITUTIONAL: No fever, chills, or fatigue  EYES: No eye pain, visual disturbances, or discharge  ENMT:  No difficulty hearing, tinnitus, vertigo; No sinus or throat pain  NECK: No pain or stiffness  RESPIRATORY: Per above  CARDIOVASCULAR: No chest pain, palpitations, dizziness  GASTROINTESTINAL: No abdominal or epigastric pain. No nausea, vomiting, or hematemesis; No diarrhea or constipation. No melena or hematochezia.  GENITOURINARY: No dysuria, frequency, hematuria, or incontinence  NEUROLOGICAL: No headaches, memory loss, loss of strength, numbness, or tremors  SKIN: No itching, burning, rashes, or lesions   MUSCULOSKELETAL: Per above   PSYCHIATRIC: No depression, anxiety, mood swings, or difficulty sleeping      Medications:  MEDICATIONS  (STANDING):  albuterol    90 MICROgram(s) HFA Inhaler 1 Puff(s) Inhalation every 6 hours  atorvastatin 40 milliGRAM(s) Oral at bedtime  clopidogrel Tablet 75 milliGRAM(s) Oral daily  finasteride 5 milliGRAM(s) Oral daily  fluticasone propionate/ salmeterol 100-50 MICROgram(s) Diskus 1 Dose(s) Inhalation two times a day  levothyroxine 175 MICROGram(s) Oral daily  methylPREDNISolone sodium succinate Injectable 20 milliGRAM(s) IV Push every 8 hours  sacubitril 24 mG/valsartan 26 mG 1 Tablet(s) Oral two times a day  tamsulosin 0.4 milliGRAM(s) Oral at bedtime  torsemide 20 milliGRAM(s) Oral two times a day  Vyndamax 61 mG 1 Tablet(s) 1 Tablet(s) Oral daily    MEDICATIONS  (PRN):            Vital Signs Last 24 Hrs  T(C): 36.7 (05 Feb 2025 11:30), Max: 36.7 (04 Feb 2025 17:19)  T(F): 98.1 (05 Feb 2025 11:30), Max: 98.1 (04 Feb 2025 17:19)  HR: 82 (05 Feb 2025 11:30) (68 - 85)  BP: 100/66 (05 Feb 2025 11:30) (95/52 - 113/70)  BP(mean): --  RR: 18 (05 Feb 2025 11:30) (16 - 18)  SpO2: 96% (05 Feb 2025 11:30) (96% - 99%)    Parameters below as of 05 Feb 2025 11:30  Patient On (Oxygen Delivery Method): room air                02-04 @ 07:01  -  02-05 @ 07:00  --------------------------------------------------------  IN: 0 mL / OUT: 450 mL / NET: -450 mL          LABS:                        12.6   8.08  )-----------( 156      ( 04 Feb 2025 03:42 )             38.8     02-05    136  |  101  |  57[H]  ----------------------------<  118[H]  4.7   |  21[L]  |  2.06[H]    Ca    9.0      05 Feb 2025 01:59  Phos  3.9     02-05  Mg     2.0     02-05    TPro  6.7  /  Alb  3.6  /  TBili  1.2  /  DBili  x   /  AST  22  /  ALT  29  /  AlkPhos  114  02-04          CAPILLARY BLOOD GLUCOSE      POCT Blood Glucose.: 92 mg/dL (04 Feb 2025 10:46)    PT/INR - ( 04 Feb 2025 03:42 )   PT: 18.1 sec;   INR: 1.58 ratio         PTT - ( 04 Feb 2025 03:42 )  PTT:36.0 sec  Urinalysis Basic - ( 05 Feb 2025 01:59 )    Color: x / Appearance: x / SG: x / pH: x  Gluc: 118 mg/dL / Ketone: x  / Bili: x / Urobili: x   Blood: x / Protein: x / Nitrite: x   Leuk Esterase: x / RBC: x / WBC x   Sq Epi: x / Non Sq Epi: x / Bacteria: x              Physical Examination:    General: No acute distress.      HEENT: Pupils equal, reactive to light.  Symmetric.    PULM: Clear to auscultation bilaterally, no significant sputum production    CVS: S1, S2    ABD: Soft, nondistended, nontender, normoactive bowel sounds, no masses    EXT: No edema, nontender    SKIN: Warm and well perfused, no rashes noted.    NEURO: Alert, oriented, interactive, nonfocal    RADIOLOGY REVIEWED  PET CT 1/29/2025 (read only)  Impression:  The right middle lobe lung base nodule 18 x 16 mm is not hypermetabolic, showing mild activity (SUV 1.3). Because of the location of the nodule, its SUV may be underestimated and actual activity may be higher.   The small lung nodule 6mm is below PET resolution

## 2025-02-05 NOTE — PROGRESS NOTE ADULT - ASSESSMENT
69Y M PMH CHF, afib on Eliquis, HTN, osteoarthritis, gout, pituitary mass, presenting with left foot pain / L ankle pain and L knee pain   .  Patient reports that he developed pain in his left foot near the MTP about 6 days ago.  He is now also experiencing pain and swelling in the left knee which is not consistent with prior gout flares/ L ankle pain .  no fever or chills   no trauama   has been havivng difficulty ambulating prompting presentation to the ED.       acute gout polyarticular : rheum consult appreciated   cont  steroids     afib : restart eliquis     CHF : not in [place] chf clinically however with elevated pro bn p  change to iv diuretics     cont other meds   d/w acp and pt    69Y M PMH CHF, afib on Eliquis, HTN, osteoarthritis, gout, pituitary mass, presenting with left foot pain / L ankle pain and L knee pain   .  Patient reports that he developed pain in his left foot near the MTP about 6 days ago.  He is now also experiencing pain and swelling in the left knee which is not consistent with prior gout flares/ L ankle pain .  no fever or chills   no trauama   has been havivng difficulty ambulating prompting presentation to the ED.       acute gout polyarticular : rheum consult appreciated   cont  steroids     afib : restart eliquis     CHF : not in [place] chf clinically however with elevated pro bn p  change to iv diuretics       Pulmonary nodule. appreciate pul input :   ·  Recommendation: PET-CT (read) as an outpatient with RML lung nodule 18 x 16 mm is not hypermetabolic, showing mild activity (SUV 1.3)  -Reviewed images from CT A/P (lower chest) from 10/2024  -Likely hamartoma. No plans for biopsy at this time, suggest eventual repeat CT chest as an outpatient.  cont other meds   d/w acp and pt

## 2025-02-05 NOTE — CONSULT NOTE ADULT - NS ATTEND AMEND GEN_ALL_CORE FT
reviewed ct from october and reports of recent pet/ct  this is very likely a hamartoma  attempt to obtain old ct's if done  would repeat ct chest in 3 months reviewed ct from october and reports of recent pet/ct, very rounded and smooth with very low suv  this is very likely a hamartoma  would repeat ct chest in 3 months  kentrell [email or web address]

## 2025-02-06 ENCOUNTER — NON-APPOINTMENT (OUTPATIENT)
Age: 70
End: 2025-02-06

## 2025-02-06 LAB
ALBUMIN SERPL ELPH-MCNC: 3.8 G/DL — SIGNIFICANT CHANGE UP (ref 3.3–5)
ALP SERPL-CCNC: 113 U/L — SIGNIFICANT CHANGE UP (ref 40–120)
ALT FLD-CCNC: 25 U/L — SIGNIFICANT CHANGE UP (ref 10–45)
ANION GAP SERPL CALC-SCNC: 14 MMOL/L — SIGNIFICANT CHANGE UP (ref 5–17)
AST SERPL-CCNC: 24 U/L — SIGNIFICANT CHANGE UP (ref 10–40)
BILIRUB SERPL-MCNC: 0.7 MG/DL — SIGNIFICANT CHANGE UP (ref 0.2–1.2)
BUN SERPL-MCNC: 64 MG/DL — HIGH (ref 7–23)
CALCIUM SERPL-MCNC: 9.5 MG/DL — SIGNIFICANT CHANGE UP (ref 8.4–10.5)
CHLORIDE SERPL-SCNC: 98 MMOL/L — SIGNIFICANT CHANGE UP (ref 96–108)
CO2 SERPL-SCNC: 24 MMOL/L — SIGNIFICANT CHANGE UP (ref 22–31)
CREAT SERPL-MCNC: 1.96 MG/DL — HIGH (ref 0.5–1.3)
EGFR: 36 ML/MIN/1.73M2 — LOW
GLUCOSE SERPL-MCNC: 127 MG/DL — HIGH (ref 70–99)
HCT VFR BLD CALC: 36.1 % — LOW (ref 39–50)
HGB BLD-MCNC: 11.9 G/DL — LOW (ref 13–17)
MCHC RBC-ENTMCNC: 29.8 PG — SIGNIFICANT CHANGE UP (ref 27–34)
MCHC RBC-ENTMCNC: 33 G/DL — SIGNIFICANT CHANGE UP (ref 32–36)
MCV RBC AUTO: 90.5 FL — SIGNIFICANT CHANGE UP (ref 80–100)
NRBC # BLD: 0 /100 WBCS — SIGNIFICANT CHANGE UP (ref 0–0)
NRBC BLD-RTO: 0 /100 WBCS — SIGNIFICANT CHANGE UP (ref 0–0)
PLATELET # BLD AUTO: 165 K/UL — SIGNIFICANT CHANGE UP (ref 150–400)
POTASSIUM SERPL-MCNC: 4.5 MMOL/L — SIGNIFICANT CHANGE UP (ref 3.5–5.3)
POTASSIUM SERPL-SCNC: 4.5 MMOL/L — SIGNIFICANT CHANGE UP (ref 3.5–5.3)
PROT SERPL-MCNC: 7.2 G/DL — SIGNIFICANT CHANGE UP (ref 6–8.3)
RBC # BLD: 3.99 M/UL — LOW (ref 4.2–5.8)
RBC # FLD: 16.1 % — HIGH (ref 10.3–14.5)
SODIUM SERPL-SCNC: 136 MMOL/L — SIGNIFICANT CHANGE UP (ref 135–145)
WBC # BLD: 7.9 K/UL — SIGNIFICANT CHANGE UP (ref 3.8–10.5)
WBC # FLD AUTO: 7.9 K/UL — SIGNIFICANT CHANGE UP (ref 3.8–10.5)

## 2025-02-06 PROCEDURE — 99232 SBSQ HOSP IP/OBS MODERATE 35: CPT | Mod: GC

## 2025-02-06 RX ADMIN — FLUTICASONE PROPIONATE AND SALMETEROL 1 DOSE(S): 113; 14 POWDER, METERED RESPIRATORY (INHALATION) at 17:37

## 2025-02-06 RX ADMIN — Medication 1 PUFF(S): at 05:21

## 2025-02-06 RX ADMIN — SACUBITRIL AND VALSARTAN 1 TABLET(S): 49; 51 TABLET, FILM COATED ORAL at 05:20

## 2025-02-06 RX ADMIN — Medication 5 MILLIGRAM(S): at 11:29

## 2025-02-06 RX ADMIN — Medication 1 PUFF(S): at 00:32

## 2025-02-06 RX ADMIN — FLUTICASONE PROPIONATE AND SALMETEROL 1 DOSE(S): 113; 14 POWDER, METERED RESPIRATORY (INHALATION) at 05:21

## 2025-02-06 RX ADMIN — TORSEMIDE 20 MILLIGRAM(S): 20 TABLET ORAL at 13:27

## 2025-02-06 RX ADMIN — Medication 30 MILLIGRAM(S): at 05:20

## 2025-02-06 RX ADMIN — LEVOTHYROXINE SODIUM 175 MICROGRAM(S): 25 TABLET ORAL at 05:20

## 2025-02-06 RX ADMIN — Medication 1 PUFF(S): at 11:30

## 2025-02-06 RX ADMIN — SACUBITRIL AND VALSARTAN 1 TABLET(S): 49; 51 TABLET, FILM COATED ORAL at 17:37

## 2025-02-06 RX ADMIN — Medication 75 MILLIGRAM(S): at 11:29

## 2025-02-06 RX ADMIN — TAMSULOSIN HYDROCHLORIDE 0.4 MILLIGRAM(S): 0.4 CAPSULE ORAL at 21:16

## 2025-02-06 RX ADMIN — ATORVASTATIN CALCIUM 40 MILLIGRAM(S): 80 TABLET, FILM COATED ORAL at 21:16

## 2025-02-06 RX ADMIN — TORSEMIDE 20 MILLIGRAM(S): 20 TABLET ORAL at 05:20

## 2025-02-06 RX ADMIN — APIXABAN 5 MILLIGRAM(S): 5 TABLET, FILM COATED ORAL at 17:37

## 2025-02-06 RX ADMIN — APIXABAN 5 MILLIGRAM(S): 5 TABLET, FILM COATED ORAL at 05:24

## 2025-02-06 RX ADMIN — Medication 1 PUFF(S): at 17:37

## 2025-02-06 NOTE — PHYSICAL THERAPY INITIAL EVALUATION ADULT - PHYSICAL ASSIST/NONPHYSICAL ASSIST, REHAB EVAL
[FreeTextEntry1] : The patient has a history of HTN, dyslipidemia and an enlarged aorta:  Last echo was October (aorta was stable) Last stress was October (normal) He feels well from a cardiac perspective BP at home is good  Meds: Benicar 40 Trimaterene HCT 75/50 Toprol xl  25 Allopurionol ASA Vit D Norvasc 10 
verbal cues/nonverbal cues (demo/gestures)

## 2025-02-06 NOTE — PROGRESS NOTE ADULT - ASSESSMENT
70 yo M w/ PMH of CKD, CHF, afib, HTN OA, gout who presents for acute L foot pain/L ankle pain and L knee pain. Rheumatology consulted for gout flare evaluation.     #Acute oligoarthritis (L 1st MTP, L knee) (improving)   #C/f gout flare   #CKD  #CHF  -Overall, current symptoms concerning for polyarticular gout flare.  -Pt with known elevated uric acid to 12.5 in May 2024 and has not been on urate lowering therapy.   -As well, has risk factors of CKD and diuretic use   -Pt started on steroids (2/4-) with improvement.   -Meets criteria for urate lowering therapy: has had 2+ gout flares in 1 year and has a previous uric acid level >9 in setting of CKD.   -Uric acid level goal should be < 6.     Recommendations  -C/w IV solumedrol 30mg for one more day (2/7). On 2/8, transition to PO prednisone 20mg daily x 3 days, 15mg daily x 3 days, 10mg daily x 3 days, 5mg daily x 3 days.   -Will need to be started on urate lowering therapy in the outpt setting.  F/u HLA  to assess for risk of allopurinol hypersensitivity prior to starting.  -Pt taking colchicine at home without guidance. Recommended in setting of CKD and statin use, to hold on taking further doses for now unless instructed to do some by a health care provider.   -When pt is ready for discharge, will assist with setting up follow up with Dr. Austin at rheumatology clinic at [address] [place] [address]. Clinic number: [phone]. Please give clinic information in discharge paperwork    Discussed with Dr. Geovanna Spears MD   PGY-5  Reachable on TEAMS  Rheumatology Fellow

## 2025-02-06 NOTE — PROGRESS NOTE ADULT - ATTENDING COMMENTS
Modifications made to fellows note above   Gout - improving - steroid taper as above.   Nickie Austin MD  [facility] Division of Rheumatology      [email or web address]

## 2025-02-06 NOTE — PROGRESS NOTE ADULT - SUBJECTIVE AND OBJECTIVE BOX
MUSTAPHA CANTU  [id number]    Subjective: Pt with significant improvement in L knee and L 1st MTP pain. Able to bear weight on LLE and used stairs today.     Objective:   Vital Signs Last 24 Hrs  T(C): 37 (06 Feb 2025 12:30), Max: 37 (06 Feb 2025 12:30)  T(F): 98.6 (06 Feb 2025 12:30), Max: 98.6 (06 Feb 2025 12:30)  HR: 67 (06 Feb 2025 13:00) (67 - 82)  BP: 100/65 (06 Feb 2025 13:00) (100/65 - 115/67)  BP(mean): --  RR: 18 (06 Feb 2025 12:30) (18 - 18)  SpO2: 98% (06 Feb 2025 12:30) (94% - 100%)    Parameters below as of 06 Feb 2025 12:30  Patient On (Oxygen Delivery Method): room air        Physical Exam:  General: No apparent distress  HEENT: EOMI  Resp: no increased WOB,   GI: non-distended   MSK: L knee slightly warm with improvement in + effusion, ROM intact.  L 1st MTP non-tender palpation, mildly tender with extenion foot with pitting edema.   Neuro: AAOx3  Skin: no visible rashes      LABS:  cret                        11.9   7.90  )-----------( 165      ( 06 Feb 2025 06:58 )             36.1     02-06    136  |  98  |  64[H]  ----------------------------<  127[H]  4.5   |  24  |  1.96[H]    Ca    9.5      06 Feb 2025 06:56  Phos  3.9     02-05  Mg     2.0     02-05    TPro  7.2  /  Alb  3.8  /  TBili  0.7  /  DBili  x   /  AST  24  /  ALT  25  /  AlkPhos  113  02-06          Rheumatology Work Up    Sedimentation Rate, Erythrocyte: 51 mm/hr *H* [0 - 20] (02-04-25 @ 03:42)  C-Reactive Protein: 207 mg/L *H* [0 - 4] (02-04-25 @ 03:42)  Protein/Creatinine Ratio Calculation: Unable to calculate [0.0 - 0.2] (02-11-24 @ 15:19)  Protein/Creatinine Ratio Calculation: 0.1 Ratio [0.0 - 0.2] (12-05-23 @ 18:04)      RADIOLOGY & ADDITIONAL STUDIES:  < from: Xray Foot AP + Lateral + Oblique, Left (02.04.25 @ 03:58) >  IMPRESSION:  No acute fracture or dislocation.  No tracking gas collections or gross radiographic evidence for   osteomyelitis.    < end of copied text >       MUSTAPHA CANTU  [id number]    Subjective: Pt with significant improvement in L knee and L 1st MTP pain. Able to bear weight on LLE and used stairs today.     Objective:   Vital Signs Last 24 Hrs  T(C): 37 (06 Feb 2025 12:30), Max: 37 (06 Feb 2025 12:30)  T(F): 98.6 (06 Feb 2025 12:30), Max: 98.6 (06 Feb 2025 12:30)  HR: 67 (06 Feb 2025 13:00) (67 - 82)  BP: 100/65 (06 Feb 2025 13:00) (100/65 - 115/67)  RR: 18 (06 Feb 2025 12:30) (18 - 18)  SpO2: 98% (06 Feb 2025 12:30) (94% - 100%)    Parameters below as of 06 Feb 2025 12:30  Patient On (Oxygen Delivery Method): room air        Physical Exam:  General: No apparent distress  HEENT: EOMI  Resp: no increased WOB,   GI: non-distended   MSK: L knee slightly warm with improvement in + effusion, ROM intact.  L 1st MTP non-tender palpation, mildly tender with extenion foot with pitting edema.   Neuro: AAOx3  Skin: no visible rashes      LABS:  cret                        11.9   7.90  )-----------( 165      ( 06 Feb 2025 06:58 )             36.1     02-06    136  |  98  |  64[H]  ----------------------------<  127[H]  4.5   |  24  |  1.96[H]    Ca    9.5      06 Feb 2025 06:56  Phos  3.9     02-05  Mg     2.0     02-05    TPro  7.2  /  Alb  3.8  /  TBili  0.7  /  DBili  x   /  AST  24  /  ALT  25  /  AlkPhos  113  02-06          Rheumatology Work Up    Sedimentation Rate, Erythrocyte: 51 mm/hr *H* [0 - 20] (02-04-25 @ 03:42)  C-Reactive Protein: 207 mg/L *H* [0 - 4] (02-04-25 @ 03:42)  Protein/Creatinine Ratio Calculation: Unable to calculate [0.0 - 0.2] (02-11-24 @ 15:19)  Protein/Creatinine Ratio Calculation: 0.1 Ratio [0.0 - 0.2] (12-05-23 @ 18:04)      RADIOLOGY & ADDITIONAL STUDIES:  < from: Xray Foot AP + Lateral + Oblique, Left (02.04.25 @ 03:58) >  IMPRESSION:  No acute fracture or dislocation.  No tracking gas collections or gross radiographic evidence for   osteomyelitis.    < end of copied text >

## 2025-02-06 NOTE — PHYSICAL THERAPY INITIAL EVALUATION ADULT - ADDITIONAL COMMENTS
Pt reports that he lives with his wife in a pvt house with +4-5 AUDIE and +17 steps once inside with bilateral handrails. Pt states that he was ambulating independently with no AD prior to admission however at times would use crutches when his gout would flare up. Pt's wife assisted as needed with ADLs and mobility.

## 2025-02-06 NOTE — CONSULT NOTE ADULT - SUBJECTIVE AND OBJECTIVE BOX
DATE OF SERVICE: 02-06-25      CHIEF COMPLAINT:Patient is a 69y old  Male who presents with a chief complaint of     HISTORY OF PRESENT ILLNESS:HPI:  69Y M PMH CHF, afib on Eliquis, HTN, osteoarthritis, gout, pituitary mass, presenting with left foot pain / L ankle pain and L knee pain   Patient reports that he developed pain in his left foot near the MTP about 6 days ago.  He is now also experiencing pain and swelling in the left knee which is not consistent with prior gout flares/ L ankle pain .  no fever or chills   no trauama   has been havivng difficulty ambulating prompting presentation to the ED.   (04 Feb 2025 18:21)      PAST MEDICAL & SURGICAL HISTORY:  Low back pain  Herniated disc on lumbar region      Redundant prepuce and phimosis      Arthritis  on knees      Hypothyroid  not taking any meds      Hypertension      Thyroid cancer  pt denies any thyroid cancer      History of pituitary disease  on caber      Gout      S/P excision of vocal cord nodule  2007 and radiation -      Laryngeal cancer  "stage 0"      Pituitary mass      Carotid occlusion, bilateral      Obese      S/P excision of vocal cord nodule  radiation - 2007      History of lumbar spinal fusion  2018      H/O arthroscopy of shoulder  right-2017      Status post carotid surgery  right-5/13/2022, pt. states unsuccesful              MEDICATIONS:  apixaban 5 milliGRAM(s) Oral every 12 hours  clopidogrel Tablet 75 milliGRAM(s) Oral daily  sacubitril 24 mG/valsartan 26 mG 1 Tablet(s) Oral two times a day  torsemide 20 milliGRAM(s) Oral two times a day      albuterol    90 MICROgram(s) HFA Inhaler 1 Puff(s) Inhalation every 6 hours  fluticasone propionate/ salmeterol 100-50 MICROgram(s) Diskus 1 Dose(s) Inhalation two times a day        atorvastatin 40 milliGRAM(s) Oral at bedtime  finasteride 5 milliGRAM(s) Oral daily  levothyroxine 175 MICROGram(s) Oral daily  methylPREDNISolone sodium succinate Injectable 30 milliGRAM(s) IV Push daily    tamsulosin 0.4 milliGRAM(s) Oral at bedtime      FAMILY HISTORY:  Family history of stroke (Mother)    Family history of Alzheimer's disease (Father)    FH: stroke (Mother, Father)        Non-contributory    SOCIAL HISTORY:    [ ] not a smoker      Allergies    No Known Allergies    Intolerances    	    REVIEW OF SYSTEMS:  CONSTITUTIONAL: No fever  EYES: No eye pain, visual disturbances, or discharge  ENMT:  No difficulty hearing, tinnitus  NECK: No pain or stiffness  RESPIRATORY: No cough, wheezing,  CARDIOVASCULAR: No chest pain, palpitations, passing out, dizziness, or leg swelling  GASTROINTESTINAL:  No nausea, vomiting, diarrhea or constipation. No melena.  GENITOURINARY: No dysuria, hematuria  NEUROLOGICAL: No stroke like symptoms  SKIN: No burning or lesions   ENDOCRINE: No heat or cold intolerance  MUSCULOSKELETAL: No joint pain or swelling  PSYCHIATRIC: No  anxiety, mood swings  HEME/LYMPH: No bleeding gums  ALLERGY AND IMMUNOLOGIC: No hives or eczema	    All other ROS negative    PHYSICAL EXAM:  T(C): 36.9 (02-06-25 @ 21:13), Max: 37 (02-06-25 @ 12:30)  HR: 92 (02-06-25 @ 21:13) (67 - 92)  BP: 124/71 (02-06-25 @ 21:13) (100/65 - 124/71)  RR: 18 (02-06-25 @ 21:13) (18 - 18)  SpO2: 94% (02-06-25 @ 21:13) (94% - 100%)  Wt(kg): --  I&O's Summary    05 Feb 2025 07:01  -  06 Feb 2025 07:00  --------------------------------------------------------  IN: 980 mL / OUT: 2950 mL / NET: -1970 mL    06 Feb 2025 07:01  -  06 Feb 2025 23:11  --------------------------------------------------------  IN: 500 mL / OUT: 1000 mL / NET: -500 mL        Appearance: Normal	  HEENT:   Normal oral mucosa, EOMI	  Cardiovascular:  S1 S2, No JVD,    Respiratory: Lungs clear to auscultation	  Psychiatry: Alert  Gastrointestinal:  Soft, Non-tender, + BS	  Skin: No rashes   Neurologic: Non-focal  Extremities:  No edema  Vascular: Peripheral pulses palpable    	    	  	  CARDIAC MARKERS:  Labs personally reviewed by me                                  11.9   7.90  )-----------( 165      ( 06 Feb 2025 06:58 )             36.1     02-06    136  |  98  |  64[H]  ----------------------------<  127[H]  4.5   |  24  |  1.96[H]    Ca    9.5      06 Feb 2025 06:56  Phos  3.9     02-05  Mg     2.0     02-05    TPro  7.2  /  Alb  3.8  /  TBili  0.7  /  DBili  x   /  AST  24  /  ALT  25  /  AlkPhos  113  02-06          EKG: Personally reviewed by me - NSR  Radiology: Personally reviewed by me - CXR clear lungs      TTE 12/2024 CONCLUSIONS:      1. Left ventricular systolic function is moderately decreased with an ejection fraction of 47 % by Alberto's method of disks. Global left ventricular hypokinesis.   2. Moderate left ventricular hypertrophy.   3. There is moderate (grade 2) left ventricular diastolic dysfunction.   4. Enlarged right ventricular cavity size and borderline reduced right ventricular systolic function.   5. Left atrium is severely dilated.   6. The right atrium is severely dilated.   7. Mild aortic regurgitation.   8. Moderate tricuspid regurgitation.   9. Estimated pulmonary artery systolic pressure is 35 mmHg.  10. Compared to the transthoracic echocardiogram performed on 2/5/2024, LVEF is slightly better.          Assessment /Plan:   69Y M PMH CHF, afib on Eliquis, HTN, osteoarthritis, gout, pituitary mass, presenting with left foot pain / L ankle pain and L knee pain     ASSESSMENT/PLAN: 	      Patient is a 69 year old male from home AAOx3, with PMH of HFrEF, pAfib on eliquis, hypothyroidism, cardiac amyloidosis and BPH, who presented to the ED due to SOB. Patient states that he was feeling fine yesterday and went to bed but had awoken around 1am due to feeling SOB. States SOB lasted till around 1:30am or so when he arrived to the ED. States having productive cough but has had this cough for a while ever since he had throat surgery and is not worse than his baseline. Has mild headache but otherwise denies any lightheadedness, nausea or vomiting. No chest pain.     Problem 1: Acute on Chronic Systolic HF   - Pt with hx of amyloidosis   - TTE 12/6 - EF 47%, left ventricular hypokinesis, moderate LV hypertrophy, mod LV diastolic dysfunction, LA severely dilated, moderate TR, pulm pressure 35mmHg   - proBNP 10K up from 8518 12/24 and 3487 in 5/2024  - C/w Entresto 24/26mg BID  - Cont Torsemide 20mg PO BID  - Will hold off escalation of diuretics at this time given acute gout and no SOB and clear lungs     Problem 2: pAfib  - C/w Eliquis 5mg BID  - Not on BB given cardiac amyloid  - EKG NSR    Problem 3: Cardiac Amyloid  - TTE 12/6 - EF 47%, left ventricular hypokinesis, moderate LV hypertrophy, mod LV diastolic dysfunction, LA severely dilated, moderate TR, pulm pressure 35mmHg  Follows OP with Dr. Marcio Faulkner              Differential diagnosis and plan of care discussed with patient after the evaluation. Counseling on diet, nutritional counseling, weight management, exercise and medication compliance was done.   Advanced care planning/advanced directives discussed with patient/family. DNR status including forceful chest compressions to attempt to restart the heart, ventilator support/artificial breathing, electric shock, artificial nutrition, health care proxy, Molst form all discussed with pt. Pt wishes to consider. Sixteen minutes spent on discussing advanced directives.           Karri Hernandez DO [facility]  Cardiovascular Medicine  [address] [place] [address] Dr, [address]  Office [phone]  Available via call/text on [employer] Teams

## 2025-02-06 NOTE — PHYSICAL THERAPY INITIAL EVALUATION ADULT - PLANNED THERAPY INTERVENTIONS, PT EVAL
stair training: GOAL: Pt will negotiate up/down 17 steps with 2 handrails ascending independently in 2 weeks./balance training/bed mobility training/gait training/strengthening/transfer training

## 2025-02-06 NOTE — PROGRESS NOTE ADULT - SUBJECTIVE AND OBJECTIVE BOX
Date of service: 02-06-25 @ 17:02      Patient is a 69y old  Male who presents with a chief complaint of                                                              INTERVAL HPI/OVERNIGHT EVENTS:    REVIEW OF SYSTEMS:    improving pain and swelling   no cp   no sob                                                                                                                                                                                                                                                                         Medications:  MEDICATIONS  (STANDING):  albuterol    90 MICROgram(s) HFA Inhaler 1 Puff(s) Inhalation every 6 hours  apixaban 5 milliGRAM(s) Oral every 12 hours  atorvastatin 40 milliGRAM(s) Oral at bedtime  clopidogrel Tablet 75 milliGRAM(s) Oral daily  finasteride 5 milliGRAM(s) Oral daily  fluticasone propionate/ salmeterol 100-50 MICROgram(s) Diskus 1 Dose(s) Inhalation two times a day  levothyroxine 175 MICROGram(s) Oral daily  methylPREDNISolone sodium succinate Injectable 30 milliGRAM(s) IV Push daily  sacubitril 24 mG/valsartan 26 mG 1 Tablet(s) Oral two times a day  tamsulosin 0.4 milliGRAM(s) Oral at bedtime  torsemide 20 milliGRAM(s) Oral two times a day  Vyndamax 61 mG 1 Tablet(s) 1 Tablet(s) Oral daily    MEDICATIONS  (PRN):       Allergies    No Known Allergies    Intolerances      Vital Signs Last 24 Hrs  T(C): 37 (06 Feb 2025 12:30), Max: 37 (06 Feb 2025 12:30)  T(F): 98.6 (06 Feb 2025 12:30), Max: 98.6 (06 Feb 2025 12:30)  HR: 67 (06 Feb 2025 13:00) (67 - 82)  BP: 100/65 (06 Feb 2025 13:00) (100/65 - 115/67)  BP(mean): --  RR: 18 (06 Feb 2025 12:30) (18 - 18)  SpO2: 98% (06 Feb 2025 12:30) (94% - 100%)    Parameters below as of 06 Feb 2025 12:30  Patient On (Oxygen Delivery Method): room air      CAPILLARY BLOOD GLUCOSE          02-05 @ 07:01  -  02-06 @ 07:00  --------------------------------------------------------  IN: 980 mL / OUT: 2950 mL / NET: -1970 mL    02-06 @ 07:01  -  02-06 @ 17:02  --------------------------------------------------------  IN: 500 mL / OUT: 1000 mL / NET: -500 mL      Physical Exam:    Daily     Daily   General:  Well appearing, NAD, not cachetic  HEENT:  Nonicteric, PERRLA  CV:  RRR, S1S2   Lungs:  CTA B/L, no wheezes, rales, rhonchi  Abdomen:  Soft, non-tender, no distended, positive BS  Extremities:  decreased L knee swelling                                                                                                                                                                                                                                                                                               LABS:                               11.9   7.90  )-----------( 165      ( 06 Feb 2025 06:58 )             36.1                      02-06    136  |  98  |  64[H]  ----------------------------<  127[H]  4.5   |  24  |  1.96[H]    Ca    9.5      06 Feb 2025 06:56  Phos  3.9     02-05  Mg     2.0     02-05    TPro  7.2  /  Alb  3.8  /  TBili  0.7  /  DBili  x   /  AST  24  /  ALT  25  /  AlkPhos  113  02-06                       RADIOLOGY & ADDITIONAL TESTS         I personally reviewed: [  ]EKG   [  ]CXR    [  ] CT      A/P:         Discussed with :     Melonie consultants' Notes   Time spent :

## 2025-02-06 NOTE — PHYSICAL THERAPY INITIAL EVALUATION ADULT - PERTINENT HX OF CURRENT PROBLEM, REHAB EVAL
69Y M PMH CHF, afib on Eliquis, HTN, osteoarthritis, gout, pituitary mass, presenting with left foot pain.  Patient reports that he developed pain in his left foot near the MTP about 6 days ago.  Followed up with his primary doctor who gave him some sort of injection which helped although swelling and pain have since recurred.  He is now also experiencing pain and swelling in the left knee which is not consistent with prior gout flares, notes he has never had a gout flare in his knee in the past.  Denies trauma.  Having difficulty ambulating prompting presentation to the ED.  No fevers or chills, chest pain or shortness of breath.  Has been using tylenol for pain control without much relief. Hosp Course: 2/4 XR L foot/tib-fib neg for acute fx or dislocation, neg tracking gas collections or gross radiographic evidence for osteomyelitis; 2/4 VA duplex LLE neg for DVT 69Y M PMH CHF, afib on Eliquis, HTN, osteoarthritis, gout, pituitary mass, presenting with left foot pain.  Patient reports that he developed pain in his left foot near the MTP about 6 days ago.  Followed up with his primary doctor who gave him some sort of injection which helped although swelling and pain have since recurred.  He is now also experiencing pain and swelling in the left knee which is not consistent with prior gout flares, notes he has never had a gout flare in his knee in the past.  Denies trauma.  Having difficulty ambulating prompting presentation to the ED.  No fevers or chills, chest pain or shortness of breath.  Has been using tylenol for pain control without much relief. Hosp Course: 2/4 XR L foot/tib-fib neg for acute fx or dislocation, neg tracking gas collections or gross radiographic evidence for osteomyelitis; 2/4 VA duplex LLE neg for DVT.

## 2025-02-07 ENCOUNTER — TRANSCRIPTION ENCOUNTER (OUTPATIENT)
Age: 70
End: 2025-02-07

## 2025-02-07 VITALS
TEMPERATURE: 97 F | SYSTOLIC BLOOD PRESSURE: 108 MMHG | HEART RATE: 80 BPM | RESPIRATION RATE: 18 BRPM | OXYGEN SATURATION: 98 % | DIASTOLIC BLOOD PRESSURE: 69 MMHG

## 2025-02-07 LAB
ANION GAP SERPL CALC-SCNC: 13 MMOL/L — SIGNIFICANT CHANGE UP (ref 5–17)
BUN SERPL-MCNC: 61 MG/DL — HIGH (ref 7–23)
CALCIUM SERPL-MCNC: 9.1 MG/DL — SIGNIFICANT CHANGE UP (ref 8.4–10.5)
CHLORIDE SERPL-SCNC: 103 MMOL/L — SIGNIFICANT CHANGE UP (ref 96–108)
CO2 SERPL-SCNC: 23 MMOL/L — SIGNIFICANT CHANGE UP (ref 22–31)
CREAT SERPL-MCNC: 1.78 MG/DL — HIGH (ref 0.5–1.3)
EGFR: 40 ML/MIN/1.73M2 — LOW
GLUCOSE SERPL-MCNC: 97 MG/DL — SIGNIFICANT CHANGE UP (ref 70–99)
HCT VFR BLD CALC: 35.4 % — LOW (ref 39–50)
HGB BLD-MCNC: 11.7 G/DL — LOW (ref 13–17)
MCHC RBC-ENTMCNC: 29.7 PG — SIGNIFICANT CHANGE UP (ref 27–34)
MCHC RBC-ENTMCNC: 33.1 G/DL — SIGNIFICANT CHANGE UP (ref 32–36)
MCV RBC AUTO: 89.8 FL — SIGNIFICANT CHANGE UP (ref 80–100)
NRBC # BLD: 0 /100 WBCS — SIGNIFICANT CHANGE UP (ref 0–0)
NRBC BLD-RTO: 0 /100 WBCS — SIGNIFICANT CHANGE UP (ref 0–0)
PLATELET # BLD AUTO: 182 K/UL — SIGNIFICANT CHANGE UP (ref 150–400)
POTASSIUM SERPL-MCNC: 4.1 MMOL/L — SIGNIFICANT CHANGE UP (ref 3.5–5.3)
POTASSIUM SERPL-SCNC: 4.1 MMOL/L — SIGNIFICANT CHANGE UP (ref 3.5–5.3)
RBC # BLD: 3.94 M/UL — LOW (ref 4.2–5.8)
RBC # FLD: 15.9 % — HIGH (ref 10.3–14.5)
SODIUM SERPL-SCNC: 139 MMOL/L — SIGNIFICANT CHANGE UP (ref 135–145)
WBC # BLD: 7.35 K/UL — SIGNIFICANT CHANGE UP (ref 3.8–10.5)
WBC # FLD AUTO: 7.35 K/UL — SIGNIFICANT CHANGE UP (ref 3.8–10.5)

## 2025-02-07 PROCEDURE — 96374 THER/PROPH/DIAG INJ IV PUSH: CPT

## 2025-02-07 PROCEDURE — 86140 C-REACTIVE PROTEIN: CPT

## 2025-02-07 PROCEDURE — 93971 EXTREMITY STUDY: CPT

## 2025-02-07 PROCEDURE — 73630 X-RAY EXAM OF FOOT: CPT

## 2025-02-07 PROCEDURE — 85730 THROMBOPLASTIN TIME PARTIAL: CPT

## 2025-02-07 PROCEDURE — 94640 AIRWAY INHALATION TREATMENT: CPT

## 2025-02-07 PROCEDURE — 85610 PROTHROMBIN TIME: CPT

## 2025-02-07 PROCEDURE — 83880 ASSAY OF NATRIURETIC PEPTIDE: CPT

## 2025-02-07 PROCEDURE — 73590 X-RAY EXAM OF LOWER LEG: CPT

## 2025-02-07 PROCEDURE — 97161 PT EVAL LOW COMPLEX 20 MIN: CPT

## 2025-02-07 PROCEDURE — 85027 COMPLETE CBC AUTOMATED: CPT

## 2025-02-07 PROCEDURE — 99285 EMERGENCY DEPT VISIT HI MDM: CPT

## 2025-02-07 PROCEDURE — 81381 HLA I TYPING 1 ALLELE HR: CPT

## 2025-02-07 PROCEDURE — 85025 COMPLETE CBC W/AUTO DIFF WBC: CPT

## 2025-02-07 PROCEDURE — 93005 ELECTROCARDIOGRAM TRACING: CPT

## 2025-02-07 PROCEDURE — 85652 RBC SED RATE AUTOMATED: CPT

## 2025-02-07 PROCEDURE — 80053 COMPREHEN METABOLIC PANEL: CPT

## 2025-02-07 PROCEDURE — 80048 BASIC METABOLIC PNL TOTAL CA: CPT

## 2025-02-07 PROCEDURE — 83735 ASSAY OF MAGNESIUM: CPT

## 2025-02-07 PROCEDURE — 96375 TX/PRO/DX INJ NEW DRUG ADDON: CPT

## 2025-02-07 PROCEDURE — 82962 GLUCOSE BLOOD TEST: CPT

## 2025-02-07 PROCEDURE — 84100 ASSAY OF PHOSPHORUS: CPT

## 2025-02-07 PROCEDURE — 36415 COLL VENOUS BLD VENIPUNCTURE: CPT

## 2025-02-07 RX ORDER — PREDNISONE 5 MG/1
1 TABLET ORAL
Qty: 15 | Refills: 0
Start: 2025-02-07

## 2025-02-07 RX ADMIN — Medication 1 PUFF(S): at 00:02

## 2025-02-07 RX ADMIN — LEVOTHYROXINE SODIUM 175 MICROGRAM(S): 25 TABLET ORAL at 05:14

## 2025-02-07 RX ADMIN — Medication 1 PUFF(S): at 11:39

## 2025-02-07 RX ADMIN — SACUBITRIL AND VALSARTAN 1 TABLET(S): 49; 51 TABLET, FILM COATED ORAL at 05:14

## 2025-02-07 RX ADMIN — TORSEMIDE 20 MILLIGRAM(S): 20 TABLET ORAL at 06:15

## 2025-02-07 RX ADMIN — FLUTICASONE PROPIONATE AND SALMETEROL 1 DOSE(S): 113; 14 POWDER, METERED RESPIRATORY (INHALATION) at 05:15

## 2025-02-07 RX ADMIN — Medication 1 PUFF(S): at 05:15

## 2025-02-07 RX ADMIN — Medication 75 MILLIGRAM(S): at 11:38

## 2025-02-07 RX ADMIN — APIXABAN 5 MILLIGRAM(S): 5 TABLET, FILM COATED ORAL at 05:14

## 2025-02-07 RX ADMIN — Medication 5 MILLIGRAM(S): at 11:38

## 2025-02-07 RX ADMIN — TORSEMIDE 20 MILLIGRAM(S): 20 TABLET ORAL at 15:26

## 2025-02-07 RX ADMIN — Medication 30 MILLIGRAM(S): at 05:14

## 2025-02-07 NOTE — DISCHARGE NOTE PROVIDER - NSDCFUADDAPPT_GEN_ALL_CORE_FT
APPTS ARE READY TO BE MADE: [X] YES    Best Family or Patient Contact (if needed):    Additional Information about above appointments (if needed):    1: Rheumatology appt - Dr. Austin  2: PCP - Dr. Harris  3: Cardiology - Dr. Faulkner      Other comments or requests:    APPTS ARE READY TO BE MADE: [X] YES    Best Family or Patient Contact (if needed):    Additional Information about above appointments (if needed):    1: Rheumatology appt - Dr. Austin  2: PCP - Dr. Harris  3: Cardiology - Dr. Faulkner  4: Pulmonary _ Dr. cabezas    Other comments or requests:    APPTS ARE READY TO BE MADE: [X] YES    Best Family or Patient Contact (if needed):    Additional Information about above appointments (if needed):    1: Rheumatology appt - Dr. Austin  2: PCP - Dr. Harris  3: Cardiology - Dr. Faulkner  4: Pulmonary _ Dr. cabezas    Other comments or requests:   Patient was outreached but did not answer nor could a voicemail be left.      Prior to outreaching the patient, it was visible that the patient has secured a follow up appointment which was not scheduled by our team. Patient is scheduled on 3/24 at 2:20P at [address] [place] [address] with Dr. Nickie Austin

## 2025-02-07 NOTE — DISCHARGE NOTE NURSING/CASE MANAGEMENT/SOCIAL WORK - FINANCIAL ASSISTANCE
[facility] provides services at a reduced cost to those who are determined to be eligible through [facility]’s financial assistance program. Information regarding [facility]’s financial assistance program can be found by going to [email or web address] or by calling [phone].

## 2025-02-07 NOTE — PROGRESS NOTE ADULT - SUBJECTIVE AND OBJECTIVE BOX
DATE OF SERVICE: 02-07-25 @ 08:20    Patient is a 69y old  Male who presents with a chief complaint of Gout (06 Feb 2025 13:10)      INTERVAL HISTORY: in no acute distress    REVIEW OF SYSTEMS:  CONSTITUTIONAL: No weakness  EYES/ENT: No visual changes;  No throat pain   NECK: No pain or stiffness  RESPIRATORY: No cough, wheezing; No shortness of breath  CARDIOVASCULAR: No chest pain or palpitations  GASTROINTESTINAL: No abdominal  pain. No nausea, vomiting, or hematemesis  GENITOURINARY: No dysuria, frequency or hematuria  NEUROLOGICAL: No stroke like symptoms  SKIN: No rashes    TELEMETRY Personally reviewed:  	  MEDICATIONS:  sacubitril 24 mG/valsartan 26 mG 1 Tablet(s) Oral two times a day  torsemide 20 milliGRAM(s) Oral two times a day        PHYSICAL EXAM:  T(C): 36.7 (02-07-25 @ 04:00), Max: 37 (02-06-25 @ 12:30)  HR: 81 (02-07-25 @ 04:00) (67 - 92)  BP: 113/69 (02-07-25 @ 04:00) (100/65 - 124/71)  RR: 18 (02-07-25 @ 04:00) (18 - 18)  SpO2: 96% (02-07-25 @ 04:00) (94% - 98%)  Wt(kg): --  I&O's Summary    06 Feb 2025 07:01  -  07 Feb 2025 07:00  --------------------------------------------------------  IN: 500 mL / OUT: 2200 mL / NET: -1700 mL          Appearance: In no distress	  HEENT:    PERRL, EOMI	  Cardiovascular:  S1 S2, No JVD  Respiratory: Lungs clear to auscultation	  Gastrointestinal:  Soft, Non-tender, + BS	  Vascularature:  No edema of LE  Psychiatric: Appropriate affect   Neuro: no acute focal deficits                               11.7   7.35  )-----------( 182      ( 07 Feb 2025 07:24 )             35.4     02-07    139  |  103  |  61[H]  ----------------------------<  97  4.1   |  23  |  1.78[H]    Ca    9.1      07 Feb 2025 07:25    TPro  7.2  /  Alb  3.8  /  TBili  0.7  /  DBili  x   /  AST  24  /  ALT  25  /  AlkPhos  113  02-06        Labs personally reviewed      ASSESSMENT/PLAN: 	    Assessment /Plan:   69Y M PMH CHF, afib on Eliquis, HTN, osteoarthritis, gout, pituitary mass, presenting with left foot pain / L ankle pain and L knee pain       Problem 1: Acute on Chronic Systolic HF   - Pt with hx of amyloidosis   - TTE 12/6 - EF 47%, left ventricular hypokinesis, moderate LV hypertrophy, mod LV diastolic dysfunction, LA severely dilated, moderate TR, pulm pressure 35mmHg   - proBNP 10K up from 8518 12/24 and 3487 in 5/2024  - C/w Entresto 24/26mg BID  - Cont Torsemide 20mg PO BID  - Will hold off escalation of diuretics at this time given acute gout and no SOB and clear lungs     Problem 2: pAfib  - C/w Eliquis 5mg BID  - Not on BB given cardiac amyloid  - EKG NSR    Problem 3: Cardiac Amyloid  - TTE 12/6 - EF 47%, left ventricular hypokinesis, moderate LV hypertrophy, mod LV diastolic dysfunction, LA severely dilated, moderate TR, pulm pressure 35mmHg    Follows OP with Dr. Marcio David, PAB[facility]  Karri Hernandez DO [facility]  Cardiovascular Medicine  [address] [place] [address], [address]  Available through call or text on [employer]  Office: [phone]     DATE OF SERVICE: 02-07-25 @ 08:20    Patient is a 69y old  Male who presents with a chief complaint of Gout (06 Feb 2025 13:10)      INTERVAL HISTORY: in no acute distress    REVIEW OF SYSTEMS:  CONSTITUTIONAL: No weakness  EYES/ENT: No visual changes;  No throat pain   NECK: No pain or stiffness  RESPIRATORY: No cough, wheezing; No shortness of breath  CARDIOVASCULAR: No chest pain or palpitations  GASTROINTESTINAL: No abdominal  pain. No nausea, vomiting, or hematemesis  GENITOURINARY: No dysuria, frequency or hematuria  NEUROLOGICAL: No stroke like symptoms  SKIN: No rashes    TELEMETRY Personally reviewed: SR 60 - 90 bpm  	  MEDICATIONS:  sacubitril 24 mG/valsartan 26 mG 1 Tablet(s) Oral two times a day  torsemide 20 milliGRAM(s) Oral two times a day        PHYSICAL EXAM:  T(C): 36.7 (02-07-25 @ 04:00), Max: 37 (02-06-25 @ 12:30)  HR: 81 (02-07-25 @ 04:00) (67 - 92)  BP: 113/69 (02-07-25 @ 04:00) (100/65 - 124/71)  RR: 18 (02-07-25 @ 04:00) (18 - 18)  SpO2: 96% (02-07-25 @ 04:00) (94% - 98%)  Wt(kg): --  I&O's Summary    06 Feb 2025 07:01  -  07 Feb 2025 07:00  --------------------------------------------------------  IN: 500 mL / OUT: 2200 mL / NET: -1700 mL          Appearance: In no distress	  HEENT:    PERRL, EOMI	  Cardiovascular:  S1 S2, No JVD  Respiratory: Lungs clear to auscultation	  Gastrointestinal:  Soft, Non-tender, + BS	  Vascularature:  No edema of LE  Psychiatric: Appropriate affect   Neuro: no acute focal deficits                               11.7   7.35  )-----------( 182      ( 07 Feb 2025 07:24 )             35.4     02-07    139  |  103  |  61[H]  ----------------------------<  97  4.1   |  23  |  1.78[H]    Ca    9.1      07 Feb 2025 07:25    TPro  7.2  /  Alb  3.8  /  TBili  0.7  /  DBili  x   /  AST  24  /  ALT  25  /  AlkPhos  113  02-06        Labs personally reviewed      ASSESSMENT/PLAN: 	    Assessment /Plan:   69Y M PMH CHF, afib on Eliquis, HTN, osteoarthritis, gout, pituitary mass, presenting with left foot pain / L ankle pain and L knee pain       Problem 1: Acute on Chronic Systolic HF   - Pt with hx of amyloidosis   - TTE 12/6 - EF 47%, left ventricular hypokinesis, moderate LV hypertrophy, mod LV diastolic dysfunction, LA severely dilated, moderate TR, pulm pressure 35mmHg   - proBNP 10K up from 8518 12/24 and 3487 in 5/2024  - C/w Entresto 24/26mg BID  - Cont Torsemide 20mg PO BID  - Will hold off escalation of diuretics at this time given acute gout and no SOB and clear lungs     Problem 2: pAfib  - C/w Eliquis 5mg BID  - Not on BB given cardiac amyloid  - EKG NSR    Problem 3: Cardiac Amyloid  - TTE 12/6 - EF 47%, left ventricular hypokinesis, moderate LV hypertrophy, mod LV diastolic dysfunction, LA severely dilated, moderate TR, pulm pressure 35mmHg    Follows OP with DIPIKA Ward DO [facility]  Cardiovascular Medicine  [address] [place] [address], Suite [facility]  Available through call or text on [employer]  Office: [phone]     DATE OF SERVICE: 02-07-25 @ 08:20    Patient is a 69y old  Male who presents with a chief complaint of Gout (06 Feb 2025 13:10)      INTERVAL HISTORY: in no acute distress    REVIEW OF SYSTEMS:  CONSTITUTIONAL: No weakness  EYES/ENT: No visual changes;  No throat pain   NECK: No pain or stiffness  RESPIRATORY: No cough, wheezing; No shortness of breath  CARDIOVASCULAR: No chest pain or palpitations  GASTROINTESTINAL: No abdominal  pain. No nausea, vomiting, or hematemesis  GENITOURINARY: No dysuria, frequency or hematuria  NEUROLOGICAL: No stroke like symptoms  SKIN: No rashes    TELEMETRY Personally reviewed: SR 60 - 90 bpm  	  MEDICATIONS:  sacubitril 24 mG/valsartan 26 mG 1 Tablet(s) Oral two times a day  torsemide 20 milliGRAM(s) Oral two times a day        PHYSICAL EXAM:  T(C): 36.7 (02-07-25 @ 04:00), Max: 37 (02-06-25 @ 12:30)  HR: 81 (02-07-25 @ 04:00) (67 - 92)  BP: 113/69 (02-07-25 @ 04:00) (100/65 - 124/71)  RR: 18 (02-07-25 @ 04:00) (18 - 18)  SpO2: 96% (02-07-25 @ 04:00) (94% - 98%)  Wt(kg): --  I&O's Summary    06 Feb 2025 07:01  -  07 Feb 2025 07:00  --------------------------------------------------------  IN: 500 mL / OUT: 2200 mL / NET: -1700 mL          Appearance: In no distress	  HEENT:    PERRL, EOMI	  Cardiovascular:  S1 S2, No JVD  Respiratory: Lungs clear to auscultation	  Gastrointestinal:  Soft, Non-tender, + BS	  Vascularature:  No edema of LE  Psychiatric: Appropriate affect   Neuro: no acute focal deficits                               11.7   7.35  )-----------( 182      ( 07 Feb 2025 07:24 )             35.4     02-07    139  |  103  |  61[H]  ----------------------------<  97  4.1   |  23  |  1.78[H]    Ca    9.1      07 Feb 2025 07:25    TPro  7.2  /  Alb  3.8  /  TBili  0.7  /  DBili  x   /  AST  24  /  ALT  25  /  AlkPhos  113  02-06        Labs personally reviewed      ASSESSMENT/PLAN: 	    Assessment /Plan:   69Y M PMH CHF, afib on Eliquis, HTN, osteoarthritis, gout, pituitary mass, presenting with left foot pain / L ankle pain and L knee pain       Problem 1: Acute on Chronic Systolic HF   - Pt with hx of amyloidosis   - TTE 12/6 - EF 47%, left ventricular hypokinesis, moderate LV hypertrophy, mod LV diastolic dysfunction, LA severely dilated, moderate TR, pulm pressure 35mmHg   - proBNP 10K up from 8518 12/24 and 3487 in 5/2024  - C/w Entresto 24/26mg BID  - Cont Torsemide 20mg PO BID  - Will hold off escalation of diuretics at this time given acute gout and no SOB and clear lungs     Problem 2: pAfib  - C/w Eliquis 5mg BID  - Not on BB given cardiac amyloid  - EKG NSR    Problem 3: Cardiac Amyloid  - TTE 12/6 - EF 47%, left ventricular hypokinesis, moderate LV hypertrophy, mod LV diastolic dysfunction, LA severely dilated, moderate TR, pulm pressure 35mmHg  Follows OP with DIPIKA Ward DO [facility]  Cardiovascular Medicine  [address] [place] [address], Suite [facility]  Available through call or text on [employer]  Office: [phone]

## 2025-02-07 NOTE — DISCHARGE NOTE PROVIDER - HOSPITAL COURSE
HPI:   69-year-old male with a past medical history of chronic systolic heart failure (CHF) secondary to cardiac amyloidosis, paroxysmal atrial fibrillation (pAFib) on Eliquis, hypertension (HTN), osteoarthritis, gout, chronic kidney disease (CKD), and a pituitary mass presented with left foot, ankle, and knee pain. The foot pain began near the first metatarsophalangeal (MTP) joint six days prior to admission and was accompanied by swelling in the left knee, atypical for his usual gout flares. He reported difficulty ambulating but denied fever, chills, or trauma.   (04 Feb 2025 18:21)    Hospital Course:  Admitted with diagnosed with an acute polyarticular gout flare. Rheumatology was consulted and recommended a steroid taper: intravenous (IV) solumedrol 30mg for one day, followed by oral prednisone starting at 20mg daily and tapering down over three weeks. Urate-lowering therapy was recommended upon discharge, with HLA- testing prior to initiation to assess for allopurinol hypersensitivity. The patient was advised to discontinue self-medicating with colchicine due to CKD and statin use.    His CHF was deemed clinically compensated despite an elevated proBNP of 10,000 (up from 8,518 on 12/24/2024 and 3,487 in 5/2024). He was continued on Entresto 24/26mg BID and Torsemide 20mg BID. Diuretic escalation was deferred given the acute gout, absence of shortness of breath, and clear lungs. 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 An EKG showed normal sinus rhythm. A previously identified right middle lobe (RML) pulmonary nodule (18 x 16 mm) was evaluated with a PET-CT scan and deemed likely a hamartoma with low metabolic activity (SUV 1.3). Repeat CT chest imaging was recommended for outpatient follow-up.    Important Medication Changes and Reason:  Prednisone starting at 20mg daily and tapering down over three weeks.   Avoid colchicine unless instructed by a healthcare provider.     Active or Pending Issues Requiring Follow-up:   Follow up with Rheumatology (Dr. Austin, [phone], [address] [place] [address]) for initiation of urate-lowering therapy after HLA- testing.   Follow up with Cardiology as directed. Continue prescribed medications.  Follow up with Pulmonology for repeat CT chest imaging as directed.    Advanced Directives:   [X] Full code  [ ] DNR  [ ] Hospice    Discharge Diagnoses:  Acute polyarticular gout flare  Chronic systolic heart failure secondary to cardiac amyloidosis  Paroxysmal atrial fibrillation  Chronic kidney disease  Hypertension  Osteoarthritis  Pituitary mass  Right middle lobe pulmonary nodule (likely hamartoma)      Medically cleared by Dr. Cornelius to discharge patient home with Home PT today. Med rec discussed       HPI:   69-year-old male with a past medical history of chronic systolic heart failure (CHF) secondary to cardiac amyloidosis, paroxysmal atrial fibrillation (pAFib) on Eliquis, hypertension (HTN), osteoarthritis, gout, chronic kidney disease (CKD), and a pituitary mass presented with left foot, ankle, and knee pain. The foot pain began near the first metatarsophalangeal (MTP) joint six days prior to admission and was accompanied by swelling in the left knee, atypical for his usual gout flares. He reported difficulty ambulating but denied fever, chills, or trauma.   (04 Feb 2025 18:21)    Hospital Course:  Admitted with diagnosed with an acute polyarticular gout flare. Rheumatology was consulted and recommended a steroid taper: intravenous (IV) solumedrol 30mg for one day, followed by oral prednisone starting at 20mg daily and tapering down over three weeks.  On 2/8, transition to PO prednisone 20mg daily x 3 days, 15mg daily x 3 days, 10mg daily x 3 days, 5mg daily x 3 days. Urate-lowering therapy was recommended upon discharge, with HLA- testing prior to initiation to assess for allopurinol hypersensitivity. The patient was advised to discontinue self-medicating with colchicine due to CKD and statin use.    His CHF was deemed clinically compensated despite an elevated proBNP of 10,000 (up from 8,518 on 12/24/2024 and 3,487 in 5/2024), not in fluid overload clinically. Diuretic escalation was deferred given the acute gout, absence of shortness of breath, and clear lungs. He was continued on Entresto 24/26mg BID and Torsemide 20mg BID. 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  - TTE 12/6 - EF 47%, left ventricular hypokinesis, moderate LV hypertrophy, mod LV diastolic dysfunction, LA severely dilated, moderate TR, pulm pressure 35mmHg An EKG showed normal sinus rhythm. Continue Vyndamax and Amvuttra injections. A previously identified right middle lobe (RML) pulmonary nodule (18 x 16 mm) was evaluated with a PET-CT scan and deemed likely a hamartoma with low metabolic activity (SUV 1.3). Repeat CT chest imaging was recommended for outpatient follow-up.    Important Medication Changes and Reason:  On 2/8, transition to PO prednisone 20mg daily x 3 days, 15mg daily x 3 days, 10mg daily x 3 days, 5mg daily x 3 days.  Avoid colchicine unless instructed by a healthcare provider.     Active or Pending Issues Requiring Follow-up:   Follow up with Rheumatology (Dr. Austin, [phone], [address]) for initiation of urate-lowering therapy after HLA- testing.   Follow up with Cardiology as directed. Continue prescribed medications.  Follow up with Pulmonology for repeat CT chest imaging as directed.    Advanced Directives:   [X] Full code  [ ] DNR  [ ] Hospice    Discharge Diagnoses:  Acute polyarticular gout flare  Chronic systolic heart failure secondary to cardiac amyloidosis  Paroxysmal atrial fibrillation  Chronic kidney disease  Hypertension  Osteoarthritis  Pituitary mass  Right middle lobe pulmonary nodule (likely hamartoma)      Medically cleared by Dr. Cornelius to discharge patient home with Home PT today. Med rec discussed

## 2025-02-07 NOTE — DISCHARGE NOTE PROVIDER - NSDCCPCAREPLAN_GEN_ALL_CORE_FT
PRINCIPAL DISCHARGE DIAGNOSIS  Diagnosis: Acute gout  Assessment and Plan of Treatment: You presented with  swelling in the left knee and difficulty ambulating   Admitted with  gout flare.   You were treated with intravenous steroids.  Rheumatology was consulted and recommended a steroid taper to oral prednisone starting at 20mg daily starting on 2/8/25  - Prednisone 20mg daily x 3 days, 15mg daily x 3 days, 10mg daily x 3 days, 5mg daily x 3 days. Discontinue self-medicating with colchicine due to CKD and statin use.  Follow [facility] Rheumatology - Dr. Austin to help with gout management.        SECONDARY DISCHARGE DIAGNOSES  Diagnosis: Cardiac amyloidosis  Assessment and Plan of Treatment: Continue  Entresto 24/26mg 2 x day  and Torsemide 20mg BID. 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  - TTE 12/6 - EF 47%, left ventricular hypokinesis, moderate LV hypertrophy, mod LV diastolic dysfunction, LA severely dilated, moderate TR, pulm pressure 35mmHg.  An EKG showed normal sinus rhythm. Continue Vyndamax and Amvuttra injections.       Diagnosis: Pulmonary nodule  Assessment and Plan of Treatment: A previously identified right middle lobe (RML) pulmonary nodule (18 x 16 mm) was evaluated with a PET-CT scan and deemed likely a hamartoma with low metabolic activity (SUV 1.3).   Repeat CT chest imaging was recommended for outpatient follow-up.     PRINCIPAL DISCHARGE DIAGNOSIS  Diagnosis: Acute gout  Assessment and Plan of Treatment: You presented with  swelling in the left knee and difficulty ambulating   Admitted with  gout flare.   You were treated with intravenous steroids.  Rheumatology was consulted and recommended a steroid taper to oral prednisone starting at 20mg daily starting on 2/8/25  - Prednisone 20mg daily x 3 days, 15mg daily x 3 days, 10mg daily x 3 days, 5mg daily x 3 days. Discontinue self-medicating with colchicine due to CKD and statin use.  Follow [facility] Rheumatology - Dr. Austin to help with gout management.        SECONDARY DISCHARGE DIAGNOSES  Diagnosis: Cardiac amyloidosis  Assessment and Plan of Treatment: Continue  Entresto 24/26mg 2 x day  and Torsemide 20mg BID. 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  - TTE 12/6 - EF 47%, left ventricular hypokinesis, moderate LV hypertrophy, mod LV diastolic dysfunction, LA severely dilated, moderate TR, pulm pressure 35mmHg.  An EKG showed normal sinus rhythm. Continue Vyndamax and Amvuttra injections.       Diagnosis: Pulmonary nodule  Assessment and Plan of Treatment: A previously identified right middle lobe (RML) pulmonary nodule (18 x 16 mm) was evaluated with a PET-CT scan and deemed likely a hamartoma with low metabolic activity (SUV 1.3).   Repeat CT chest imaging was recommended for outpatient follow-up.    Diagnosis: Chronic atrial fibrillation  Assessment and Plan of Treatment: Continue Eliquis - Eliquis to prevent stroke or clots     PRINCIPAL DISCHARGE DIAGNOSIS  Diagnosis: Acute gout  Assessment and Plan of Treatment: You presented with  swelling in the left knee and difficulty ambulating   Admitted with  gout flare.   You were treated with intravenous steroids.  Rheumatology was consulted and recommended a steroid taper to oral prednisone starting at 20mg daily starting on 2/8/25  - Prednisone 20mg daily x 3 days, 15mg daily x 3 days, 10mg daily x 3 days, 5mg daily x 3 days. Discontinue self-medicating with colchicine due to CKD and statin use.  Follow up with Rheumatology - Dr. Austin to help with gout management.        SECONDARY DISCHARGE DIAGNOSES  Diagnosis: Cardiac amyloidosis  Assessment and Plan of Treatment: Continue  Entresto 24/26mg 2 x day  and Torsemide 20mg 2 x day   An EKG showed normal sinus rhythm. Continue Vyndamax and Amvuttra injections.   Follow up [facility] cardiologist - Dr. Faulkner in  week      Diagnosis: Pulmonary nodule  Assessment and Plan of Treatment: A previously identified right middle lobe (RML) pulmonary nodule (18 x 16 mm) was evaluated with a PET-CT scan and deemed likely a hamartoma with low metabolic activity (SUV 1.3).   Repeat CT chest imaging was recommended for outpatient follow-up.  Follow up with Pulmonary - Dr. Garnica in 1 month    Diagnosis: Chronic atrial fibrillation  Assessment and Plan of Treatment: Continue Eliquis - Eliquis to prevent stroke or clots

## 2025-02-07 NOTE — DISCHARGE NOTE PROVIDER - CARE PROVIDER_API CALL
Mitchell Harris  Internal Medicine  [address], [address]  [place] [address]  Phone: [phone]  Fax: [phone]  Follow Up Time: 1 week    Nickie Austin  Rheumatology  [address]  [place] [address]  Phone: [phone]  Fax: [phone]  Follow Up Time: 1 week    Marcio Faulkner  Cardiology  [address]  [place] [address]  Phone: [phone]  Fax: [phone]  Follow Up Time:    Mitchell Harris  Internal Medicine  [address]  [place] [address]  Phone: [phone]  Fax: [phone]  Follow Up Time: 1 week    Nickie Austin  Rheumatology  [address]  [place] [address]  Phone: [phone]  Fax: [phone]  Follow Up Time: 1 week    Marcio Faulkner  Cardiology  [address]  [place] [address]  Phone: [phone]  Fax: [phone]  Follow Up Time:     Ramo Ganrica.  Pulmonary Disease  [address]  [place] [address]  Phone: [phone]  Fax: [phone]  Follow Up Time: 1 month

## 2025-02-07 NOTE — PROGRESS NOTE ADULT - ASSESSMENT
69Y M PMH CHF, afib on Eliquis, HTN, osteoarthritis, gout, pituitary mass, presenting with left foot pain / L ankle pain and L knee pain   .  Patient reports that he developed pain in his left foot near the MTP about 6 days ago.  He is now also experiencing pain and swelling in the left knee which is not consistent with prior gout flares/ L ankle pain .  no fever or chills   no trauama   has been havivng difficulty ambulating prompting presentation to the ED.       acute gout polyarticular : rheum consult appreciated   cont  steroids With taper as an outpatient  Follow-up rheumatology as an outpatient    afib : restarted eliquis     CHF : not in florid chf clinically however with elevated pro bn p  fu with cardio : Continue diuretics as ordered    Pulmonary nodule. appreciate pul input :   ·  Recommendation: PET-CT (read) as an outpatient with RML lung nodule 18 x 16 mm is not hypermetabolic, showing mild activity (SUV 1.3)  -Reviewed images from CT A/P (lower chest) from 10/2024  -Likely hamartoma. No plans for biopsy at this time, suggest eventual repeat CT chest as an outpatient.

## 2025-02-07 NOTE — DISCHARGE NOTE NURSING/CASE MANAGEMENT/SOCIAL WORK - NSDCDMETYPESERV_GEN_ALL_CORE_FT
Department of Anesthesiology  Postprocedure Note    Patient: Allison Berg  MRN: [id number]  YOB: 1951  Date of evaluation: 5/27/2019  Time:  2:38 PM     Procedure Summary     Date:  05/27/19 Room / Location:  [facility] OR 02 / [facility] OR    Anesthesia Start:  2365 Anesthesia Stop:  5936    Procedure:  TIBIA OPEN REDUCTION INTERNAL FIXATION (Right ) Diagnosis:  (tibia fracture)    Surgeon:  Carmelo Day MD Responsible Provider:  Viji Balderas MD    Anesthesia Type:  general ASA Status:  3          Anesthesia Type: general    Christos Phase I:      Christos Phase II:      Last vitals: Reviewed and per EMR flowsheets.        Anesthesia Post Evaluation    Patient location during evaluation: PACU  Patient participation: complete - patient participated  Level of consciousness: awake and alert  Pain score: 2  Airway patency: patent  Nausea & Vomiting: no nausea and no vomiting  Complications: no  Cardiovascular status: blood pressure returned to baseline  Respiratory status: acceptable  Hydration status: euvolemic
Rolling walker- to be delivered to bedside

## 2025-02-07 NOTE — DISCHARGE NOTE NURSING/CASE MANAGEMENT/SOCIAL WORK - PATIENT PORTAL LINK FT
You can access the FollowMyHealth Patient Portal offered by [facility] by registering at the following website: [email or web address]. By joining [email or web address]’s FollowMyHealth portal, you will also be able to view your health information using other applications (apps) compatible with our system.

## 2025-02-07 NOTE — DISCHARGE NOTE PROVIDER - NSDCMRMEDTOKEN_GEN_ALL_CORE_FT
Albuterol (Eqv-Ventolin HFA) 90 mcg/inh inhalation aerosol: 1 puff(s) inhaled every 6 hours as needed  Amvuttra 25 mg/0.5 mL subcutaneous solution: 25 milligram(s) subcutaneously every 3 months  apixaban 5 mg oral tablet: 1 tab(s) orally every 12 hours  atorvastatin 40 mg oral tablet: 1 tab(s) orally once a day (at bedtime)  cabergoline 0.5 mg oral tablet: 2 tab(s) orally once a week (every Friday)  clopidogrel 75 mg oral tablet: 1 tab(s) orally once a day  Entresto 24 mg-26 mg oral tablet: 1 tab(s) orally 2 times a day  finasteride 5 mg oral tablet: 1 tab(s) orally once a day  fluticasone-salmeterol 100 mcg-50 mcg inhalation powder: 1 puff(s) inhaled 2 times a day  levothyroxine 175 mcg (0.175 mg) oral tablet: 1 tab(s) orally once a day  MethylPREDNISolone Dose Pack 4 mg oral tablet: orally use as directed  tamsulosin 0.4 mg oral capsule: 1 cap(s) orally once a day (in the morning)  torsemide 20 mg oral tablet: 2 tab(s) orally 2 times a day  Vyndamax 61 mg oral capsule: 1 cap(s) orally once a day   Albuterol (Eqv-Ventolin HFA) 90 mcg/inh inhalation aerosol: 1 puff(s) inhaled every 6 hours as needed  Amvuttra 25 mg/0.5 mL subcutaneous solution: 25 milligram(s) subcutaneously every 3 months  apixaban 5 mg oral tablet: 1 tab(s) orally every 12 hours  atorvastatin 40 mg oral tablet: 1 tab(s) orally once a day (at bedtime)  cabergoline 0.5 mg oral tablet: 2 tab(s) orally once a week (every Friday)  clopidogrel 75 mg oral tablet: 1 tab(s) orally once a day  Entresto 24 mg-26 mg oral tablet: 1 tab(s) orally 2 times a day  finasteride 5 mg oral tablet: 1 tab(s) orally once a day  fluticasone-salmeterol 100 mcg-50 mcg inhalation powder: 1 puff(s) inhaled 2 times a day  levothyroxine 175 mcg (0.175 mg) oral tablet: 1 tab(s) orally once a day  predniSONE 10 mg oral tablet: 1 tab(s) orally once a day TAKE AS DIRECTED  Take 2 tabs (20mg) daily x 3 days 2/8/25 - 2/10/25  Take 1.5 tabs (15mg) daily x 3 days 2/11/25 - 2/13/25  Take 1 tab (10mg) daily x 3 days 2/14/25 - 2/16/25  Take 1/2 tab (5 mg) x 3 days 2/17/25 - 2/19/25 then Stop  Rolling Walker: Safety  Gout Flare  tamsulosin 0.4 mg oral capsule: 1 cap(s) orally once a day (in the morning)  torsemide 20 mg oral tablet: 2 tab(s) orally 2 times a day  Vyndamax 61 mg oral capsule: 1 cap(s) orally once a day   acetaminophen 325 mg oral tablet: 2 tab(s) orally every 6 hours as needed for  mild-moderate pain  Albuterol (Eqv-Ventolin HFA) 90 mcg/inh inhalation aerosol: 1 puff(s) inhaled every 6 hours as needed  Amvuttra 25 mg/0.5 mL subcutaneous solution: 25 milligram(s) subcutaneously every 3 months  apixaban 5 mg oral tablet: 1 tab(s) orally every 12 hours  atorvastatin 40 mg oral tablet: 1 tab(s) orally once a day (at bedtime)  cabergoline 0.5 mg oral tablet: 2 tab(s) orally once a week (every Friday)  clopidogrel 75 mg oral tablet: 1 tab(s) orally once a day  Entresto 24 mg-26 mg oral tablet: 1 tab(s) orally 2 times a day  finasteride 5 mg oral tablet: 1 tab(s) orally once a day  fluticasone-salmeterol 100 mcg-50 mcg inhalation powder: 1 puff(s) inhaled 2 times a day  levothyroxine 175 mcg (0.175 mg) oral tablet: 1 tab(s) orally once a day  pantoprazole 40 mg oral delayed release tablet: 1 tab(s) orally once a day (before a meal) Take this medication while on steroids (prednisone)  polyethylene glycol 3350 oral powder for reconstitution: 17 gram(s) orally once a day as needed for  constipation  predniSONE 5 mg oral tablet: 4 tab(s) orally once a day You will take 20mg  (4 tabs) orally daily x 2 days (2/25 and 2/26), then decrease to 15mg (3 tabs) orally daily x 3 days (2/27-3/1), then decrease to 10mg (2 tabs) orally daily x 3 days (3/2- 3/4), then decrease to 5mg (1 tab) orally daily x 3 days (3/5-3/7), then discontinue  tamsulosin 0.4 mg oral capsule: 1 cap(s) orally once a day (in the morning)  torsemide 20 mg oral tablet: 2 tab(s) orally 2 times a day  Vyndamax 61 mg oral capsule: 1 cap(s) orally once a day

## 2025-02-07 NOTE — DISCHARGE NOTE NURSING/CASE MANAGEMENT/SOCIAL WORK - NSDCFUADDAPPT_GEN_ALL_CORE_FT
APPTS ARE READY TO BE MADE: [X] YES    Best Family or Patient Contact (if needed):    Additional Information about above appointments (if needed):    1: Rheumatology appt - Dr. Austin  2: PCP - Dr. Harris  3: Cardiology - Dr. Faulkner      Other comments or requests:

## 2025-02-07 NOTE — CHART NOTE - NSCHARTNOTEFT_GEN_A_CORE
Patient will require a rolling walker at home due to their diagnosis of left ankle gout flare, Cardiac Amyloid to help complete MRADLs.    [id number]

## 2025-02-07 NOTE — DISCHARGE NOTE PROVIDER - CARE PROVIDERS DIRECT ADDRESSES
,[email or web address]

## 2025-02-07 NOTE — DISCHARGE NOTE PROVIDER - NSDCFUSCHEDAPPT_GEN_ALL_CORE_FT
Nickie Austin  [facility]  [address]  Scheduled Appointment: 03/24/2025

## 2025-02-07 NOTE — DISCHARGE NOTE NURSING/CASE MANAGEMENT/SOCIAL WORK - NSDCPEFALRISK_GEN_ALL_CORE
22F no PMH p/w several complaints. Was feeling like usual self, laying in bed trying to sleep. She developed sensation of "forgetting how to breathe," followed by b/l UE tingling and mild lightheadedness and feeling like there were bubbles in her heart. Now feeling much better. Similar episode ~1w ago, went to outside hospital where labs reportedly were wnl. Uses occasional MJ. requesting medicine to help her sleep.   Mild triage tachycardia self resolved, other vitals wnl. Exam as above.  ddx: Clinically not ACS, PE, tamponade, dissection, PTX, perf, myocarditis, mediastinitis. possible mild anxiety.   EKG, likely outpt f/u.
For information on Fall & Injury Prevention, visit: [email or web address] OR  [email or web address] OR  https://www.cdc.gov/steadi/patient.html

## 2025-02-07 NOTE — DISCHARGE NOTE PROVIDER - PROVIDER TOKENS
PROVIDER:[TOKEN:[[id number]],FOLLOWUP:[1 week]],PROVIDER:[TOKEN:[[id number]],FOLLOWUP:[1 week]],PROVIDER:[TOKEN:[[id number]]] PROVIDER:[TOKEN:[[id number]],FOLLOWUP:[1 week]],PROVIDER:[TOKEN:[[id number]],FOLLOWUP:[1 week]],PROVIDER:[TOKEN:[[id number]]],PROVIDER:[TOKEN:[[id number]],FOLLOWUP:[1 month]]

## 2025-02-07 NOTE — PROGRESS NOTE ADULT - PARTICIPANTS
Discussed G OC... At this time patient would like to continue discussion with outpatient PMD/Patient

## 2025-02-07 NOTE — PROGRESS NOTE ADULT - SUBJECTIVE AND OBJECTIVE BOX
Date of service: 02-07-25 @ 16:12      Patient is a 69y old  Male who presents with a chief complaint of Gout (06 Feb 2025 13:10)                                                               INTERVAL HPI/OVERNIGHT EVENTS:    REVIEW OF SYSTEMS:     Improving left knee pain and range of motion  Able to weight-bear and use stairs                                                                                                                                                                                                                                                                           Medications:  MEDICATIONS  (STANDING):  albuterol    90 MICROgram(s) HFA Inhaler 1 Puff(s) Inhalation every 6 hours  apixaban 5 milliGRAM(s) Oral every 12 hours  atorvastatin 40 milliGRAM(s) Oral at bedtime  clopidogrel Tablet 75 milliGRAM(s) Oral daily  finasteride 5 milliGRAM(s) Oral daily  fluticasone propionate/ salmeterol 100-50 MICROgram(s) Diskus 1 Dose(s) Inhalation two times a day  levothyroxine 175 MICROGram(s) Oral daily  methylPREDNISolone sodium succinate Injectable 30 milliGRAM(s) IV Push daily  sacubitril 24 mG/valsartan 26 mG 1 Tablet(s) Oral two times a day  tamsulosin 0.4 milliGRAM(s) Oral at bedtime  torsemide 20 milliGRAM(s) Oral two times a day  Vyndamax 61 mG 1 Tablet(s) 1 Tablet(s) Oral daily    MEDICATIONS  (PRN):       Allergies    No Known Allergies    Intolerances      Vital Signs Last 24 Hrs  T(C): 36.3 (07 Feb 2025 11:09), Max: 36.9 (06 Feb 2025 21:13)  T(F): 97.4 (07 Feb 2025 11:09), Max: 98.4 (06 Feb 2025 21:13)  HR: 80 (07 Feb 2025 11:09) (80 - 92)  BP: 108/69 (07 Feb 2025 11:09) (108/69 - 124/71)  BP(mean): --  RR: 18 (07 Feb 2025 11:09) (18 - 18)  SpO2: 98% (07 Feb 2025 11:09) (94% - 98%)    Parameters below as of 07 Feb 2025 11:09  Patient On (Oxygen Delivery Method): room air      CAPILLARY BLOOD GLUCOSE          02-06 @ 07:01  -  02-07 @ 07:00  --------------------------------------------------------  IN: 500 mL / OUT: 2200 mL / NET: -1700 mL    02-07 @ 07:01  -  02-07 @ 16:12  --------------------------------------------------------  IN: 480 mL / OUT: 1800 mL / NET: -1320 mL      Physical Exam:    Daily     Daily   General:  Well appearing, NAD, not cachetic  HEENT:  Nonicteric, PERRLA  CV:  RRR, S1S2   Lungs:  CTA B/L, no wheezes, rales, rhonchi  Abdomen:  Soft, non-tender, no distended, positive BS  Extremities:  Improved range of motion of left knee and ankle  Decrease swelling  Neuro:  AAOx3, non-focal, grossly intact                                                                                                                                                                                                                                                                                                LABS:                               11.7   7.35  )-----------( 182      ( 07 Feb 2025 07:24 )             35.4                      02-07    139  |  103  |  61[H]  ----------------------------<  97  4.1   |  23  |  1.78[H]    Ca    9.1      07 Feb 2025 07:25    TPro  7.2  /  Alb  3.8  /  TBili  0.7  /  DBili  x   /  AST  24  /  ALT  25  /  AlkPhos  113  02-06                       RADIOLOGY & ADDITIONAL TESTS         I personally reviewed: [  ]EKG   [  ]CXR    [  ] CT      A/P:         Discussed with :     Melonie consultants' Notes   Time spent :

## 2025-02-19 ENCOUNTER — INPATIENT (INPATIENT)
Facility: HOSPITAL | Age: 70
LOS: 4 days | Discharge: HOME | DRG: 554 | End: 2025-02-24
Attending: GENERAL ACUTE CARE HOSPITAL | Admitting: GENERAL ACUTE CARE HOSPITAL
Payer: MEDICARE

## 2025-02-19 VITALS
RESPIRATION RATE: 22 BRPM | HEIGHT: 70 IN | HEART RATE: 76 BPM | SYSTOLIC BLOOD PRESSURE: 134 MMHG | WEIGHT: 175.05 LBS | DIASTOLIC BLOOD PRESSURE: 89 MMHG | OXYGEN SATURATION: 96 % | TEMPERATURE: 98 F

## 2025-02-19 DIAGNOSIS — M10.9 GOUT, UNSPECIFIED: ICD-10-CM

## 2025-02-19 DIAGNOSIS — Z98.890 OTHER SPECIFIED POSTPROCEDURAL STATES: Chronic | ICD-10-CM

## 2025-02-19 DIAGNOSIS — Z98.89 OTHER SPECIFIED POSTPROCEDURAL STATES: Chronic | ICD-10-CM

## 2025-02-19 DIAGNOSIS — Z98.1 ARTHRODESIS STATUS: Chronic | ICD-10-CM

## 2025-02-19 LAB
ALBUMIN SERPL ELPH-MCNC: 4 G/DL — SIGNIFICANT CHANGE UP (ref 3.3–5)
ALP SERPL-CCNC: 93 U/L — SIGNIFICANT CHANGE UP (ref 40–120)
ALT FLD-CCNC: 70 U/L — HIGH (ref 10–45)
ANION GAP SERPL CALC-SCNC: 14 MMOL/L — SIGNIFICANT CHANGE UP (ref 5–17)
AST SERPL-CCNC: 39 U/L — SIGNIFICANT CHANGE UP (ref 10–40)
BASOPHILS # BLD AUTO: 0.01 K/UL — SIGNIFICANT CHANGE UP (ref 0–0.2)
BASOPHILS NFR BLD AUTO: 0.1 % — SIGNIFICANT CHANGE UP (ref 0–2)
BILIRUB SERPL-MCNC: 1 MG/DL — SIGNIFICANT CHANGE UP (ref 0.2–1.2)
BUN SERPL-MCNC: 53 MG/DL — HIGH (ref 7–23)
CALCIUM SERPL-MCNC: 9.4 MG/DL — SIGNIFICANT CHANGE UP (ref 8.4–10.5)
CHLORIDE SERPL-SCNC: 99 MMOL/L — SIGNIFICANT CHANGE UP (ref 96–108)
CO2 SERPL-SCNC: 25 MMOL/L — SIGNIFICANT CHANGE UP (ref 22–31)
CREAT SERPL-MCNC: 1.83 MG/DL — HIGH (ref 0.5–1.3)
EGFR: 39 ML/MIN/1.73M2 — LOW
EOSINOPHIL # BLD AUTO: 0.09 K/UL — SIGNIFICANT CHANGE UP (ref 0–0.5)
EOSINOPHIL NFR BLD AUTO: 0.8 % — SIGNIFICANT CHANGE UP (ref 0–6)
GLUCOSE SERPL-MCNC: 106 MG/DL — HIGH (ref 70–99)
HCT VFR BLD CALC: 39.4 % — SIGNIFICANT CHANGE UP (ref 39–50)
HGB BLD-MCNC: 12.6 G/DL — LOW (ref 13–17)
HLA-B: SIGNIFICANT CHANGE UP
HLA-B: SIGNIFICANT CHANGE UP
IMM GRANULOCYTES NFR BLD AUTO: 1.2 % — HIGH (ref 0–0.9)
LYMPHOCYTES # BLD AUTO: 0.81 K/UL — LOW (ref 1–3.3)
LYMPHOCYTES # BLD AUTO: 7.1 % — LOW (ref 13–44)
MCHC RBC-ENTMCNC: 29.2 PG — SIGNIFICANT CHANGE UP (ref 27–34)
MCHC RBC-ENTMCNC: 32 G/DL — SIGNIFICANT CHANGE UP (ref 32–36)
MCV RBC AUTO: 91.2 FL — SIGNIFICANT CHANGE UP (ref 80–100)
MONOCYTES # BLD AUTO: 0.98 K/UL — HIGH (ref 0–0.9)
MONOCYTES NFR BLD AUTO: 8.6 % — SIGNIFICANT CHANGE UP (ref 2–14)
NEUTROPHILS # BLD AUTO: 9.35 K/UL — HIGH (ref 1.8–7.4)
NEUTROPHILS NFR BLD AUTO: 82.2 % — HIGH (ref 43–77)
NRBC BLD AUTO-RTO: 0 /100 WBCS — SIGNIFICANT CHANGE UP (ref 0–0)
PLATELET # BLD AUTO: 255 K/UL — SIGNIFICANT CHANGE UP (ref 150–400)
POTASSIUM SERPL-MCNC: 4.4 MMOL/L — SIGNIFICANT CHANGE UP (ref 3.5–5.3)
POTASSIUM SERPL-SCNC: 4.4 MMOL/L — SIGNIFICANT CHANGE UP (ref 3.5–5.3)
PROT SERPL-MCNC: 7.3 G/DL — SIGNIFICANT CHANGE UP (ref 6–8.3)
RBC # BLD: 4.32 M/UL — SIGNIFICANT CHANGE UP (ref 4.2–5.8)
RBC # FLD: 16.7 % — HIGH (ref 10.3–14.5)
REF LAB TEST METHOD: SIGNIFICANT CHANGE UP
SODIUM SERPL-SCNC: 138 MMOL/L — SIGNIFICANT CHANGE UP (ref 135–145)
URATE SERPL-MCNC: 12.3 MG/DL — HIGH (ref 3.4–8.8)
WBC # BLD: 11.38 K/UL — HIGH (ref 3.8–10.5)
WBC # FLD AUTO: 11.38 K/UL — HIGH (ref 3.8–10.5)

## 2025-02-19 PROCEDURE — 99285 EMERGENCY DEPT VISIT HI MDM: CPT

## 2025-02-19 PROCEDURE — 72050 X-RAY EXAM NECK SPINE 4/5VWS: CPT | Mod: 26

## 2025-02-19 RX ORDER — LEVOTHYROXINE SODIUM 300 MCG
175 TABLET ORAL DAILY
Refills: 0 | Status: DISCONTINUED | OUTPATIENT
Start: 2025-02-19 | End: 2025-02-24

## 2025-02-19 RX ORDER — LEVOTHYROXINE SODIUM 25 UG/1
1 TABLET ORAL
Refills: 0 | DISCHARGE

## 2025-02-19 RX ORDER — OXYCODONE HYDROCHLORIDE 30 MG/1
10 TABLET ORAL EVERY 8 HOURS
Refills: 0 | Status: DISCONTINUED | OUTPATIENT
Start: 2025-02-19 | End: 2025-02-24

## 2025-02-19 RX ORDER — ALBUTEROL 90 MCG
1 AEROSOL REFILL (GRAM) INHALATION
Refills: 0 | DISCHARGE

## 2025-02-19 RX ORDER — SACUBITRIL AND VALSARTAN 49; 51 MG/1; MG/1
1 TABLET, FILM COATED ORAL
Refills: 0 | Status: DISCONTINUED | OUTPATIENT
Start: 2025-02-19 | End: 2025-02-24

## 2025-02-19 RX ORDER — CLOPIDOGREL BISULFATE 75 MG/1
75 TABLET, FILM COATED ORAL DAILY
Refills: 0 | Status: DISCONTINUED | OUTPATIENT
Start: 2025-02-19 | End: 2025-02-24

## 2025-02-19 RX ORDER — FINASTERIDE 1 MG/1
5 TABLET, FILM COATED ORAL DAILY
Refills: 0 | Status: DISCONTINUED | OUTPATIENT
Start: 2025-02-19 | End: 2025-02-24

## 2025-02-19 RX ORDER — TAFAMIDIS 61 MG/1
1 CAPSULE, LIQUID FILLED ORAL
Refills: 0 | DISCHARGE

## 2025-02-19 RX ORDER — METHYLPREDNISOLONE ACETATE 80 MG/ML
40 INJECTION, SUSPENSION INTRA-ARTICULAR; INTRALESIONAL; INTRAMUSCULAR; SOFT TISSUE DAILY
Refills: 0 | Status: DISCONTINUED | OUTPATIENT
Start: 2025-02-19 | End: 2025-02-20

## 2025-02-19 RX ORDER — ACETAMINOPHEN 500 MG/5ML
1000 LIQUID (ML) ORAL ONCE
Refills: 0 | Status: COMPLETED | OUTPATIENT
Start: 2025-02-19 | End: 2025-02-19

## 2025-02-19 RX ORDER — APIXABAN 2.5 MG/1
5 TABLET, FILM COATED ORAL EVERY 12 HOURS
Refills: 0 | Status: DISCONTINUED | OUTPATIENT
Start: 2025-02-19 | End: 2025-02-24

## 2025-02-19 RX ORDER — FLUTICASONE PROPIONATE AND SALMETEROL 113; 14 UG/1; UG/1
1 POWDER, METERED RESPIRATORY (INHALATION)
Qty: 0 | Refills: 0 | DISCHARGE

## 2025-02-19 RX ORDER — POLYETHYLENE GLYCOL 3350 17 G/17G
17 POWDER, FOR SOLUTION ORAL DAILY
Refills: 0 | Status: DISCONTINUED | OUTPATIENT
Start: 2025-02-19 | End: 2025-02-24

## 2025-02-19 RX ORDER — TAMSULOSIN HYDROCHLORIDE 0.4 MG/1
0.4 CAPSULE ORAL AT BEDTIME
Refills: 0 | Status: DISCONTINUED | OUTPATIENT
Start: 2025-02-19 | End: 2025-02-24

## 2025-02-19 RX ORDER — TORSEMIDE 10 MG
20 TABLET ORAL
Refills: 0 | Status: DISCONTINUED | OUTPATIENT
Start: 2025-02-19 | End: 2025-02-24

## 2025-02-19 RX ORDER — TORSEMIDE 20 MG/1
2 TABLET ORAL
Refills: 0 | DISCHARGE

## 2025-02-19 RX ORDER — ACETAMINOPHEN 500 MG/5ML
650 LIQUID (ML) ORAL EVERY 6 HOURS
Refills: 0 | Status: DISCONTINUED | OUTPATIENT
Start: 2025-02-19 | End: 2025-02-24

## 2025-02-19 RX ORDER — ATORVASTATIN CALCIUM 80 MG/1
40 TABLET, FILM COATED ORAL AT BEDTIME
Refills: 0 | Status: DISCONTINUED | OUTPATIENT
Start: 2025-02-19 | End: 2025-02-24

## 2025-02-19 RX ADMIN — Medication 2 MILLIGRAM(S): at 16:00

## 2025-02-19 RX ADMIN — Medication 400 MILLIGRAM(S): at 12:07

## 2025-02-19 RX ADMIN — OXYCODONE HYDROCHLORIDE 10 MILLIGRAM(S): 30 TABLET ORAL at 19:32

## 2025-02-19 RX ADMIN — Medication 2 MILLIGRAM(S): at 15:36

## 2025-02-19 RX ADMIN — Medication 2 MILLIGRAM(S): at 07:45

## 2025-02-19 RX ADMIN — METHYLPREDNISOLONE ACETATE 40 MILLIGRAM(S): 80 INJECTION, SUSPENSION INTRA-ARTICULAR; INTRALESIONAL; INTRAMUSCULAR; SOFT TISSUE at 19:32

## 2025-02-19 RX ADMIN — Medication 1000 MILLIGRAM(S): at 12:30

## 2025-02-19 RX ADMIN — Medication 2 MILLIGRAM(S): at 17:15

## 2025-02-19 RX ADMIN — OXYCODONE HYDROCHLORIDE 10 MILLIGRAM(S): 30 TABLET ORAL at 21:10

## 2025-02-19 RX ADMIN — Medication 4 MILLIGRAM(S): at 14:26

## 2025-02-19 RX ADMIN — Medication 4 MILLIGRAM(S): at 12:06

## 2025-02-19 RX ADMIN — Medication 1000 MILLIGRAM(S): at 14:26

## 2025-02-19 NOTE — H&P ADULT - NSICDXPASTSURGICALHX_GEN_ALL_CORE_FT
(V5) oriented, appropriate
PAST SURGICAL HISTORY:  H/O arthroscopy of shoulder right-2017    History of lumbar spinal fusion 2018    S/P excision of vocal cord nodule radiation - 2007    Status post carotid surgery right-5/13/2022, pt. states unsuccesful

## 2025-02-19 NOTE — ED ADULT NURSE NOTE - NSFALLRISKINTERV_ED_ALL_ED

## 2025-02-19 NOTE — ED ADULT TRIAGE NOTE - BMI (KG/M2)
Returned patient call, he states the IV tape is lifting off but the sensor is still secure. Patient has appointment with [facility] on 10/11 to remove the sensor.     Recommend patient carefully remove the overtape, discussed using alternate such as a bandaid, and cut a hole in the center of the bandaid. If the sensor comes off early he can place in a envelop or plastic bag and bring to his appointment.    Jessica BORDENN, RN, PHN, CDCES    25.1

## 2025-02-19 NOTE — ED PROVIDER NOTE - CLINICAL SUMMARY MEDICAL DECISION MAKING FREE TEXT BOX
likely recurrent gout flare in the setting of prednisone taper. will pursue labs, pain control. do not suspect septic joint given no fevers and not septic in appearance. do not suspect  vascular occlusion as he had bilateral DP pulses with warmth. doubt spinal cord pathology given no numbness/tingling and no back pain. symptoms do not seem radicular in nature. anticipate admission likely recurrent gout flare in the setting of prednisone taper. will pursue labs, pain control. do not suspect septic joint given no fevers and not septic in appearance. do not suspect  vascular occlusion as he had bilateral DP pulses with warmth. doubt spinal cord pathology given no numbness/tingling and no back pain. symptoms do not seem radicular in nature. anticipate admission             Attending note.  ALONDRA - Patient was seen in room #34 to the left.  Patient complaining of severe pain in his left foot, left knee, left hand and about his neck which began last night.  Patient was admitted for acute gouty flare from February 4 to February 7.  He was discharged with a tapering dose of steroids which she finished 2 days ago.  He denies any fevers, chills, sweats, difficulty breathing, nausea, vomiting or recent injury.  Family mother states patient has not been able to ambulate since discharge from hospital without severe pain and was not able to ambulate today.  Patient had been on colchicine which was discontinued during hospitalization and placed on allopurinol.  He has a past medical history of CKD, CHF, paroxysmal atrial fibrillation for which she takes Eliquis, gout, rheumatoid arthritis.  He has no allergies to medication.     ROS-as above, otherwise negative.  PE-patient is alert and in severe distress secondary to gouty pain.  Patient has generalized pain about the neck without masses or lesions.  Thoracic and lumbar spine are nontender.  Chest and ribs are nontender.  Lungs are clear and equal bilaterally.  Heart is regular rate.  Abdomen is soft, nontender nondistended.  There is no CVA tenderness.  Patient has tender swelling about the right hand and maximally over the left middle finger.  Patient has tenderness with slight swelling about the left knee without effusion.  Patient has significant swelling and tenderness about the left foot and ankle.  Neurovascular examination is unremarkable.     A/P-patient with history of CKD and recent admission for gouty flare on the left leg who finished his steroid taper 2 days ago now complaining of severe pain in the left foot and ankle, left knee, left hand and about the neck.  No concern for septic joint.  Analgesia, labs, uric acid, steroids.  Admission as patient is unable to ambulate.

## 2025-02-19 NOTE — H&P ADULT - HISTORY OF PRESENT ILLNESS
69Y M PMH CHF, afib on Eliquis, HTN, osteoarthritis, gout, pituitary mass, presenting with left foot pain / L ankle pain and L knee pain   .  Patient reports that he developed pain in his left foot near the MTP about 6 days ago.  He is now also experiencing pain and swelling in the left knee which is not consistent with prior gout flares/ L ankle pain .  no fever or chills   no trauama   has been havivng difficulty ambulating prompting presentation to the ED.       acute gout polyarticular : rheum consult appreciated   cont  steroids With taper as an outpatient  Follow-up rheumatology as an outpatient    afib : restarted eliquis     CHF : not in [place] chf clinically however with elevated pro bn p  fu with cardio : Continue diuretics as ordered    Pulmonary nodule. appreciate pul input :   ·  Recommendation: PET-CT (read) as an outpatient with RML lung nodule 18 x 16 mm is not hypermetabolic, showing mild activity (SUV 1.3)  -Reviewed images from CT A/P (lower chest) from 10/2024  -Likely hamartoma. No plans for biopsy at this time, suggest eventual repeat CT chest as an outpatient.   69Y M PMH CHF, afib on Eliquis, HTN, osteoarthritis, gout, pituitary mass, reccently discharged where he was admitted for acute gout involving left foot pain / L ankle and ankle.  pt completed his steroids taper however did not have his fu appointment with rheum as of yet..  now presenting with acute on chronic L ankle and L knee swelling pain and swelling   no fever or chills   n duy abarca   also reports neck pain  but no weakness or numbness   has been having difficulty ambulating

## 2025-02-19 NOTE — ED PROVIDER NOTE - HIV OFFER
Spoke to patient and convey labs results stated as below. Patient verbalized and understanding.   Previously Declined (within the last year)

## 2025-02-19 NOTE — H&P ADULT - ASSESSMENT
69Y M PMH CHF, afib on Eliquis, HTN, osteoarthritis, gout, pituitary mass, reccently discharged where he was admitted for acute gout involving left foot pain / L ankle and ankle.  pt completed his steroids taper however did not have his fu appointment with rheum as of yet..  now presenting with acute on chronic L ankle and L knee swelling pain and swelling   no fever or chills   n o tevin   also reports neck pain  but no weakness or numbness   has been having difficulty ambulating           acute gout polyarticular :   start steroids   rheumatology consult     neck pain : check Xray     afib : restarted eliquis     CHF : not in [place] chf clinically     cardiac amyloid : pharmacy  to order       Pulmonary nodule. appreciate pul input :   ·  Recommendation: PET-CT (read) as an outpatient with RML lung nodule 18 x 16 mm is not hypermetabolic, showing mild activity (SUV 1.3)  -Reviewed images from CT A/P (lower chest) from 10/2024  -Likely hamartoma. No plans for biopsy at this time, suggest eventual repeat CT chest as an outpatient.

## 2025-02-19 NOTE — ED ADULT TRIAGE NOTE - CHIEF COMPLAINT QUOTE
gout admission dc on 2/7, patient states the pain is not getting better in the left leg, and left neck pain  facial droop patient states he has had this for 2 years since neck surgery

## 2025-02-19 NOTE — ED ADULT TRIAGE NOTE - ADDITIONAL SAFETY/BANDS...
Post Acute Skilled Nursing Home Initial Visit Note     Date of Service: 1/18/2024  Location seen at: [facility]  Subacute / Skilled Need: Rehabilitation    PCP: Sahil Jacobs MD   Patient Care Team:  Sahil Jacobs MD as PCP - General (Family Practice)  Low Castro OD as Referring Provider (Optometry)  Seen by MONSERRAT Henry today    Mayra Franz is a 85 year old female presenting to Post Acute Skilled Nursing for: Increased weakness and BLE edema.     History of Present Illness: Patient is seen today for an APC Intake visit.  Patient was recently hospitalized for evaluation of increased BLE edema and weakness.  Patient has chronic diastolic heart failure which was not deemed to be in exacerbation.  CXR was negative for acute findings.  Duplex US were negative for DVT.  Magnesium was supplemented.  Ultimately, patient was discharged to subacute setting.    Patient is alert and oriented, pleasant affect. She feels well overall; no fever, chills, SOB, cough, chest pain, nausea, vomiting, diarrhea, constipation or dysuria.  She feels her BLE edema is improving.  Vitals stable this AM.  CBC and BMP ordered for  1/22.    HISTORY  Past Medical History:   Diagnosis Date    Borderline glaucoma with ocular hypertension 12/08/2016    -- Latanoprost  Once daily--Sahil Jacobs MD 12/8/2016.     Cataract     Colon polyp 09/13/2018    Glaucoma (increased eye pressure)     Inversion, nipple-left     surgically fixed    Osteoarthritis of both knees 03/06/2014    -- improves with rest --Sahil Jacobs MD 12/8/2016.     Skin lesions, generalized     MULTIPLE HEAD      reports that she has never smoked. She has never used smokeless tobacco. She reports that she does not drink alcohol and does not use drugs.  Past Surgical History:   Procedure Laterality Date    Appendectomy      Breast surgery      LEFT BREAST DUCT EXCISION    Cataract extraction w/ intraocular lens implant       Colonoscopy  09/13/2018    [facility]    Extracapsular cataract removal w insert io lens prosth wo ecp Right 04/05/2017    Cataract Removal Lens Implant    Extracapsular cataract removal w insert io lens prosth wo ecp Left 04/19/2017    Cataract Removal Lens Implant    Hand surgery      RIGHT HAND- SKIN GRAFT    Hysterectomy      APRIL WITH BSO    Knee surgery  2007    left knee scope    Ovarian cyst removal      LEFT WITH INCIDENTAL APPY    [place] tooth extraction       Family History   Problem Relation Age of Onset    Stroke Mother     Heart disease Mother         CHF    Dementia/Alzheimers Mother     Cancer Father         COLON CARCINOMA    Dementia/Alzheimers Sister     Patient is unaware of any medical problems Sister     Patient is unaware of any medical problems Sister     Other Brother     Dementia/Alzheimers Brother     Heart disease Brother     Other Brother         muscular dystrophy    Dementia/Alzheimers Brother     Heart disease Brother     Dementia/Alzheimers Brother     Cancer Son     Diabetes Grandchild     Other Grandson         psoriatic arthritis     History       Not marked as reviewed during this visit.            PROBLEM LIST:  Patient Active Problem List   Diagnosis    Osteoarthritis of both knees    Borderline glaucoma with ocular hypertension    Left bundle branch block    Obesity    Osteopenia    Colon polyp    Fine tremor    Congestive heart failure (CHF) (CMD)    Family history of Parkinson's disease    Benign essential tremor    Rheumatoid arthritis, involving unspecified site, unspecified whether rheumatoid factor present (CMD)    Generalized weakness       ADVANCE CARE PLANNING:  Advance Care Planning    Location: [facility]   Advance care planning documents on file - yes     Advance care planning discussion offered to participants: MONSERRAT Henry, Patient consented to discussion. Time spent on discussion was 20 minutes.     GOALS OF CARE NOTE    A Goals of  Additional Safety/Bands: Care discussion was held for the following reasons: age greater than 80. Those present have a good understanding of the patient's situation.   Patient-Centered Goals: Prolong survival, regardless of quality of life.  Code Status: Full Resuscitation   Does patient have a state code status order form completed? : Not applicable, patient is full code           FRAILTY SCREENING:       ADVANCED ILLNESS SCREENING:       DEPRESSION SCREENING:  Recent PHQ 2/9 Score    PHQ 2:  PHQ 2 Score Adult PHQ 2 Score Adult PHQ 2 Interpretation Little interest or pleasure in activity?   1/15/2024   6:14 PM 0 No further screening needed 0       PHQ 9:       DEPRESSION ASSESSMENT/PLAN:  Depression screening is negative no further plan needed.    ALLERGIES:  Allergies as of 01/18/2024 - Reviewed 01/15/2024   Allergen Reaction Noted    Doxycycline SWELLING 01/15/2024    Fosamax SWELLING, ARTHRALGIA, and Other (See Comments) 06/13/2023       CURRENT MEDICATIONS:   Current Outpatient Medications   Medication Sig Dispense Refill    magnesium oxide (MAG-OX) 400 (240 Mg) MG tablet Take 1 tablet by mouth daily for 5 doses. 5 tablet 0    famotidine (PEPCID) 10 MG tablet Take 10 mg by mouth in the morning and 10 mg in the evening.      Fexofenadine HCl (ALLEGRA PO) Take 1 tablet by mouth daily.      primidone (MYSOLINE) 50 MG tablet Take 1 tablet by mouth in the morning and 1 tablet in the evening. 180 tablet 3    methotrexate (RHEUMATREX) 2.5 MG tablet Take 10 mg by mouth 1 day a week. ON TUESDAYS      folic acid (FOLATE) 1 MG tablet Take 1 mg by mouth daily.      bumetanide (BUMEX) 1 MG tablet Take two tabs on Monday, Wednesday, and Friday. 180 tablet 3    potassium CHLORIDE (KLOR-CON M) 20 MEQ [place] ER tablet Take 1 tablet by mouth daily. 90 tablet 3    acetaminophen (TYLENOL) 500 MG tablet Take 500 mg by mouth every 6 hours as needed for Pain.      Cholecalciferol (vitamin D) 50 mcg (2,000 units) capsule Take 50 mcg by mouth daily.      Calcium  Carb-Cholecalciferol (CALCIUM + D3 PO) Take 2 tablets by mouth daily. 1200mg  And 1000IU        No current facility-administered medications for this visit.     Medications reviewed     BASELINE FUNCTIONAL STATUS:  Walker    CURRENT FUNCTIONAL STATUS:  2 wheeled walker    DIET:  Consistency: General   Type: regular  Appetite: Normal    REVIEW OF SYSTEMS:  Review of Systems   Constitutional:  Positive for activity change.   Respiratory: Negative.     Cardiovascular:  Positive for leg swelling.   Gastrointestinal: Negative.    Genitourinary: Negative.    Musculoskeletal:  Positive for gait problem.   Skin: Negative.    Psychiatric/Behavioral: Negative.         VITALS:  Visit Vitals  /62   Pulse 62   Temp 97.5 °F (36.4 °C)   Resp 18   Wt 59 kg (130 lb)   SpO2 95%   BMI 22.31 kg/m²       PAIN SCORE:  Vitals:    01/18/24 1033   PainSc:  0       PHYSICAL ASSESSMENT:  Physical Exam  Cardiovascular:      Rate and Rhythm: Normal rate and regular rhythm.      Pulses: Normal pulses.      Heart sounds: Normal heart sounds.   Pulmonary:      Effort: Pulmonary effort is normal. No respiratory distress.      Breath sounds: Normal breath sounds. No wheezing or rales.   Abdominal:      General: Bowel sounds are normal.      Palpations: Abdomen is soft.   Musculoskeletal:      Right lower leg: Edema (2+) present.      Left lower leg: Edema (1+) present.   Skin:     General: Skin is warm.   Neurological:      General: No focal deficit present.      Mental Status: She is alert and oriented to person, place, and time. Mental status is at baseline.   Psychiatric:         Mood and Affect: Mood normal.         Behavior: Behavior normal.         LABS:  CBC:   WBC (K/mcL)   Date Value   01/17/2024 9.9     RBC (mil/mcL)   Date Value   01/17/2024 3.26 (L)     HGB (g/dL)   Date Value   01/17/2024 9.6 (L)     PLT (K/mcL)   Date Value   01/17/2024 298    and BMP:   Sodium (mmol/L)   Date Value   01/17/2024 138     Potassium (mmol/L)   Date  Value   01/17/2024 3.9     Chloride (mmol/L)   Date Value   01/17/2024 102     Glucose (mg/dL)   Date Value   01/17/2024 94     Calcium (mg/dL)   Date Value   01/17/2024 8.1 (L)     Carbon Dioxide (mmol/L)   Date Value   01/17/2024 30     BUN (mg/dL)   Date Value   01/17/2024 11     Creatinine (mg/dL)   Date Value   01/17/2024 0.64       ASSESSMENT AND PLAN    Generalized weakness  (primary encounter diagnosis)    Plan: Chronic, multifactorial.  Continue PT/OT.  Manage chronic conditions to goal.  CBC and BMP weekly.    Chronic diastolic congestive heart failure (CMD)    Plan: Chronic, continue Bumex 2 mg three times a week.  Monitor weight and for signs of fluid volume overload.  BNP as clinically indicated.  CBC and BMP weekly.    Hypertensive heart disease with chronic diastolic congestive heart failure (CMD)    Plan: Chronic, continue Bumex 2 mg three times a week.  Monitor weight and for signs of fluid volume overload.  BNP as clinically indicated.  CBC and BMP weekly.    Rheumatoid arthritis, involving unspecified site, unspecified whether rheumatoid factor present (CMD)    Plan: Chronic, continue DMARD.         FOLLOW UP APPOINTMENTS:  Future Appointments   Date Time Provider Department Center   1/31/2024 10:45 AM Sahil Jacobs MD [facility]   2/7/2024  3:30 PM Low Acosta PA-C [facility]   4/18/2024  8:15 AM [facility]   5/16/2024  3:15 PM Ethan Wheeler III, MD [facility]       DISCHARGE PLANNING:     Prognosis: good    Discussed with: RN / Nursing and Patient    Barriers to discharge: therapy needs    Anticipated disposition: Disposition Not Yet Determined    Total time spent is more than 65 minutes, with more than 50% of the time spent in coordination of care, counseling, review of records and discussion of plan of care with the patient /staff /family.    MONSERRAT Henry

## 2025-02-19 NOTE — ED PROVIDER NOTE - PHYSICAL EXAMINATION
There is edema, warmth and ttp to the left 3rd finger, left knee and left foot/ankle  lungs are ctab, normal s1 and s2  there is no evidence of fluid overload  no midline c/t/l spinous process ttp.  +2DP pulses bilaterally

## 2025-02-19 NOTE — ED PROVIDER NOTE - PROGRESS NOTE DETAILS
Fellow Bob: approached by pt's wife because pt with recurrent pain. Pt admitted to hospitalist team but no pain orders placed yet. Ordered for morphine 2 mg for pain control while awaiting further pain management orders from admitting team. Made Dr. Cornelius aware via teams message

## 2025-02-19 NOTE — ED ADULT NURSE NOTE - OBJECTIVE STATEMENT
69 y.o. male coming in from home via private car for left leg/arm/neck pain x 1 month with worsening x last night. pt states that he was hospitalized and discharged about 1 week ago for a gout flare up and states that he was put on a steroid taper that he finished 2 days ago. PMH gout, HTN, arthritis. A&Ox3, vss, pt endorsing left sided leg, arm, and neck pain, also endorses having left arm spasms that have been occurring for about 1 month and had not had a spasm until today, pt denies CP/SOB/weakness/dizziness/fevers/chills/N/V/D/urinary symptoms, bed in lowest position, call bell in reach and teaching on use completed, verbalized understanding of call bell use.

## 2025-02-19 NOTE — ED PROVIDER NOTE - OBJECTIVE STATEMENT
Fellow Bob: 69M hx CHF,afib on Eliquis, HTN, osteoarthritis, gout, pituitary mass, CKD, recent admission for polyarticular gout who represents today for worsening joint pain. pt states that he was feeling better after discharge and with his prednisone taper. He finished his last prednisone dose about 2 days ago and subsequently started to experience recurrent pain in his left foot, left knee, and Left 3rd finger a/w swelling and pain. states he is also having neck pain and muscle spasms. he has started allopurinol and was scheduled for outpt follow up for allopurinol titration. no fevers, chills, nausea, vomitng, chest pain, sob, abd pain, leg edema, pallor, numbness/tingling.

## 2025-02-20 LAB
CRP SERPL-MCNC: 124 MG/L — HIGH (ref 0–4)
ERYTHROCYTE [SEDIMENTATION RATE] IN BLOOD: 21 MM/HR — HIGH (ref 0–20)

## 2025-02-20 PROCEDURE — 99222 1ST HOSP IP/OBS MODERATE 55: CPT | Mod: GC

## 2025-02-20 RX ORDER — PREDNISONE 20 MG/1
40 TABLET ORAL DAILY
Refills: 0 | Status: DISCONTINUED | OUTPATIENT
Start: 2025-02-21 | End: 2025-02-21

## 2025-02-20 RX ADMIN — Medication 20 MILLIGRAM(S): at 13:57

## 2025-02-20 RX ADMIN — ATORVASTATIN CALCIUM 40 MILLIGRAM(S): 80 TABLET, FILM COATED ORAL at 00:35

## 2025-02-20 RX ADMIN — POLYETHYLENE GLYCOL 3350 17 GRAM(S): 17 POWDER, FOR SOLUTION ORAL at 11:33

## 2025-02-20 RX ADMIN — Medication 175 MICROGRAM(S): at 06:51

## 2025-02-20 RX ADMIN — APIXABAN 5 MILLIGRAM(S): 2.5 TABLET, FILM COATED ORAL at 06:31

## 2025-02-20 RX ADMIN — Medication 20 MILLIGRAM(S): at 06:32

## 2025-02-20 RX ADMIN — SACUBITRIL AND VALSARTAN 1 TABLET(S): 49; 51 TABLET, FILM COATED ORAL at 17:23

## 2025-02-20 RX ADMIN — Medication 650 MILLIGRAM(S): at 23:37

## 2025-02-20 RX ADMIN — SACUBITRIL AND VALSARTAN 1 TABLET(S): 49; 51 TABLET, FILM COATED ORAL at 06:31

## 2025-02-20 RX ADMIN — APIXABAN 5 MILLIGRAM(S): 2.5 TABLET, FILM COATED ORAL at 17:23

## 2025-02-20 RX ADMIN — Medication 50 MILLIGRAM(S): at 18:35

## 2025-02-20 RX ADMIN — CLOPIDOGREL BISULFATE 75 MILLIGRAM(S): 75 TABLET, FILM COATED ORAL at 11:33

## 2025-02-20 RX ADMIN — ATORVASTATIN CALCIUM 40 MILLIGRAM(S): 80 TABLET, FILM COATED ORAL at 21:55

## 2025-02-20 RX ADMIN — FINASTERIDE 5 MILLIGRAM(S): 1 TABLET, FILM COATED ORAL at 11:33

## 2025-02-20 RX ADMIN — TAMSULOSIN HYDROCHLORIDE 0.4 MILLIGRAM(S): 0.4 CAPSULE ORAL at 21:55

## 2025-02-20 RX ADMIN — TAMSULOSIN HYDROCHLORIDE 0.4 MILLIGRAM(S): 0.4 CAPSULE ORAL at 00:34

## 2025-02-20 NOTE — PHYSICAL THERAPY INITIAL EVALUATION ADULT - ADDITIONAL COMMENTS
Pt lives in private home w/ 5 steps to enter and 17 steps to bedroom. Pt lives w/ spouse. Pt independent ambulating w/ RW, states only ambulates in the home. Pt independent w/ ADL's. Pt owns RW, W/C, shower chair, commode, and crutches.

## 2025-02-20 NOTE — CONSULT NOTE ADULT - SUBJECTIVE AND OBJECTIVE BOX
MUSTAPHA CANTU  [id number]    HISTORY OF PRESENT ILLNESS:  69Y M PMH CHF 2/2 cardiac amyloidosis, afib on Eliquis, HTN, osteoarthritis, gout, pituitary mass, recently discharged(Feb 7th) where he was admitted for acute gout involving left foot pain / L ankle and ankle.  also reports neck pain  but no weakness or numbness   has been having difficulty ambulating     Rheum hx:  Seen by rheum for polyarticular gout flare in early feb, was started on IV solumedrol with good response, was sent on steroid taper. However patient only took taper till feb 13th, while taper was suppose to be till feb 19th, says he ran out of medications.  Risk factor for gout include renal impairment CHF with diuretic use.  He also h/o multiple attacks in the past , was taking colchicine, but developed diarrhea    ROS: positive for joint pain and swelling  ROS negative for rash, fever, trauma, diarrhea, oral ulcers, chest pain, SOB, recent increase in diuretics.    Denies alcohol use, smoking or illicit drug use    MEDICATIONS  (STANDING):  apixaban 5 milliGRAM(s) Oral every 12 hours  atorvastatin 40 milliGRAM(s) Oral at bedtime  clopidogrel Tablet 75 milliGRAM(s) Oral daily  finasteride 5 milliGRAM(s) Oral daily  levothyroxine 175 MICROGram(s) Oral daily  methylPREDNISolone sodium succinate Injectable 40 milliGRAM(s) IV Push daily  polyethylene glycol 3350 17 Gram(s) Oral daily  sacubitril 24 mG/valsartan 26 mG 1 Tablet(s) Oral two times a day  tamsulosin 0.4 milliGRAM(s) Oral at bedtime  torsemide 20 milliGRAM(s) Oral two times a day    MEDICATIONS  (PRN):  acetaminophen     Tablet .. 650 milliGRAM(s) Oral every 6 hours PRN Mild Pain (1 - 3)  oxycodone    5 mG/acetaminophen 325 mG 2 Tablet(s) Oral every 6 hours PRN Moderate Pain (4 - 6)  oxyCODONE    IR 10 milliGRAM(s) Oral every 8 hours PRN Severe Pain (7 - 10)      Allergies    No Known Allergies    Intolerances      FAMILY HISTORY:  Family history of stroke (Mother)    Family history of Alzheimer's disease (Father)    FH: stroke (Mother, Father)        Vital Signs Last 24 Hrs  T(C): 37.1 (20 Feb 2025 13:55), Max: 37.1 (20 Feb 2025 13:55)  T(F): 98.7 (20 Feb 2025 13:55), Max: 98.7 (20 Feb 2025 13:55)  HR: 81 (20 Feb 2025 13:55) (81 - 91)  BP: 108/68 (20 Feb 2025 13:55) (102/62 - 118/72)  BP(mean): 83 (19 Feb 2025 21:08) (83 - 83)  RR: 18 (20 Feb 2025 13:55) (18 - 19)  SpO2: 99% (20 Feb 2025 13:55) (95% - 100%)    Parameters below as of 20 Feb 2025 13:55  Patient On (Oxygen Delivery Method): room air        Physical Exam:  General: No apparent distress  HEENT: EOMI, MMM  CVS: +S1/S2, RRR, no murmurs/rubs/gallops  Resp: CTA b/l. No crackles/wheezing  GI: Soft, NT/ND +BS  MSK: no swelling/warmth/erythema of the joints of the UE/LE  Neuro: AAOx3  Skin: no visible rashes    LABS:                        12.6   11.38 )-----------( 255      ( 19 Feb 2025 12:14 )             39.4     02-19    138  |  99  |  53[H]  ----------------------------<  106[H]  4.4   |  25  |  1.83[H]    Ca    9.4      19 Feb 2025 12:14    TPro  7.3  /  Alb  4.0  /  TBili  1.0  /  DBili  x   /  AST  39  /  ALT  70[H]  /  AlkPhos  93  02-19      Urinalysis Basic - ( 19 Feb 2025 12:14 )    Color: x / Appearance: x / SG: x / pH: x  Gluc: 106 mg/dL / Ketone: x  / Bili: x / Urobili: x   Blood: x / Protein: x / Nitrite: x   Leuk Esterase: x / RBC: x / WBC x   Sq Epi: x / Non Sq Epi: x / Bacteria: x            Rheumatology Work Up    Sedimentation Rate, Erythrocyte: 21 mm/hr *H* [0 - 20] (02-20-25 @ 06:40)  C-Reactive Protein: 124 mg/L *H* [0 - 4] (02-20-25 @ 06:39)  Sedimentation Rate, Erythrocyte: 51 mm/hr *H* [0 - 20] (02-04-25 @ 03:42)  C-Reactive Protein: 207 mg/L *H* [0 - 4] (02-04-25 @ 03:42)            RADIOLOGY & ADDITIONAL STUDIES:       MUSTAPHA CANTU  [id number]    HISTORY OF PRESENT ILLNESS:  69Y M PMH CHF 2/2 cardiac amyloidosis, afib on Eliquis, HTN, osteoarthritis, gout, pituitary mass, recently discharged(Feb 7th) where he was admitted for acute gout involving left foot pain / L ankle and ankle.  also reports neck pain  but no weakness or numbness   has been having difficulty ambulating     Rheum hx:  Seen by rheum for polyarticular gout flare in early feb, was started on IV solumedrol with good response, was sent on steroid taper. However patient only took taper till feb 13th, while taper was suppose to be till feb 19th, says he ran out of medications.  Risk factor for gout include renal impairment CHF with diuretic use.  He also h/o multiple attacks in the past , was taking colchicine, but developed diarrhea    ROS: positive for joint pain and swelling  ROS negative for rash, fever, trauma, diarrhea, oral ulcers, chest pain, SOB, recent increase in diuretics.    Denies alcohol use, smoking or illicit drug use    MEDICATIONS  (STANDING):  apixaban 5 milliGRAM(s) Oral every 12 hours  atorvastatin 40 milliGRAM(s) Oral at bedtime  clopidogrel Tablet 75 milliGRAM(s) Oral daily  finasteride 5 milliGRAM(s) Oral daily  levothyroxine 175 MICROGram(s) Oral daily  methylPREDNISolone sodium succinate Injectable 40 milliGRAM(s) IV Push daily  polyethylene glycol 3350 17 Gram(s) Oral daily  sacubitril 24 mG/valsartan 26 mG 1 Tablet(s) Oral two times a day  tamsulosin 0.4 milliGRAM(s) Oral at bedtime  torsemide 20 milliGRAM(s) Oral two times a day    MEDICATIONS  (PRN):  acetaminophen     Tablet .. 650 milliGRAM(s) Oral every 6 hours PRN Mild Pain (1 - 3)  oxycodone    5 mG/acetaminophen 325 mG 2 Tablet(s) Oral every 6 hours PRN Moderate Pain (4 - 6)  oxyCODONE    IR 10 milliGRAM(s) Oral every 8 hours PRN Severe Pain (7 - 10)      Allergies    No Known Allergies    Intolerances      FAMILY HISTORY:  Family history of stroke (Mother)    Family history of Alzheimer's disease (Father)    FH: stroke (Mother, Father)        Vital Signs Last 24 Hrs  T(C): 37.1 (20 Feb 2025 13:55), Max: 37.1 (20 Feb 2025 13:55)  T(F): 98.7 (20 Feb 2025 13:55), Max: 98.7 (20 Feb 2025 13:55)  HR: 81 (20 Feb 2025 13:55) (81 - 91)  BP: 108/68 (20 Feb 2025 13:55) (102/62 - 118/72)  BP(mean): 83 (19 Feb 2025 21:08) (83 - 83)  RR: 18 (20 Feb 2025 13:55) (18 - 19)  SpO2: 99% (20 Feb 2025 13:55) (95% - 100%)    Parameters below as of 20 Feb 2025 13:55  Patient On (Oxygen Delivery Method): room air        Physical Exam:  General: No apparent distress  HEENT: EOMI, MMM  CVS: +S1/S2, RRR, no murmurs/rubs/gallops  Resp: CTA b/l. No crackles/wheezing  GI: Soft, NT/ND +BS  MSK: left third PIP swelling and tenderness noted, left third DIP swelling and tenderness+, Left knee mild effsuion, tenderness+, left ankle warm, swollen and tenderness. ROM is full but painful in these joints. Left first MTP tenderness  Neuro: AAOx3  Skin: no visible rashes    LABS:                        12.6   11.38 )-----------( 255      ( 19 Feb 2025 12:14 )             39.4     02-19    138  |  99  |  53[H]  ----------------------------<  106[H]  4.4   |  25  |  1.83[H]    Ca    9.4      19 Feb 2025 12:14    TPro  7.3  /  Alb  4.0  /  TBili  1.0  /  DBili  x   /  AST  39  /  ALT  70[H]  /  AlkPhos  93  02-19      Urinalysis Basic - ( 19 Feb 2025 12:14 )    Color: x / Appearance: x / SG: x / pH: x  Gluc: 106 mg/dL / Ketone: x  / Bili: x / Urobili: x   Blood: x / Protein: x / Nitrite: x   Leuk Esterase: x / RBC: x / WBC x   Sq Epi: x / Non Sq Epi: x / Bacteria: x            Rheumatology Work Up    Sedimentation Rate, Erythrocyte: 21 mm/hr *H* [0 - 20] (02-20-25 @ 06:40)  C-Reactive Protein: 124 mg/L *H* [0 - 4] (02-20-25 @ 06:39)  Sedimentation Rate, Erythrocyte: 51 mm/hr *H* [0 - 20] (02-04-25 @ 03:42)  C-Reactive Protein: 207 mg/L *H* [0 - 4] (02-04-25 @ 03:42)            RADIOLOGY & ADDITIONAL STUDIES:  IMPRESSION:  C7 and T1 levels obscured on lateral view by superimposed shoulders.    No compression fractures or spondylolistheses.    Obliterated appearing C4-C5 disc space with dynamically stable appearing  slight C4 on C5 retrolisthesis. Preserved remaining visualized upper   cervical intervertebral disc spaces. Remaining vertebral body alignment   maintained and no evidence for atlantoaxial dynamic instability.    Prevertebral soft tissues and predental interval within normal limits.  Atlantoaxial and posterior facet alignment maintained.  Intact odontoid.  No discrete lytic or blastic lesions.    Left carotid region metallic mesh vascular stent.    TECHNIQUE: 2 views of the left tibia and fibula, 3 views of the left foot.    COMPARISON: None available.    FINDINGS:  Diffuse soft tissue swelling. No tracking soft tissue gas collections,   radiopaque foreign bodies, or grossradiographic evidence for   osteomyelitis.  No fractures or dislocations.  Left knee tricompartment osteoarthritis with medial tibiofemoral   compartment predominance.  Plantar calcaneal enthesophyte.  Vascular calcifications.    IMPRESSION:  No acute fracture or dislocation.  No tracking gas collections or gross radiographic evidence for   osteomyelitis.    TECHNIQUE: 2 views of the left tibia and fibula, 3 views of the left foot.    COMPARISON: None available.    FINDINGS:  Diffuse soft tissue swelling. No tracking soft tissue gas collections,   radiopaque foreign bodies, or grossradiographic evidence for   osteomyelitis.  No fractures or dislocations.  Left knee tricompartment osteoarthritis with medial tibiofemoral   compartment predominance.  Plantar calcaneal enthesophyte.  Vascular calcifications.    IMPRESSION:  No acute fracture or dislocation.  No tracking gas collections or gross radiographic evidence for   osteomyelitis.       MUSTAPHA CANTU  [id number]    HISTORY OF PRESENT ILLNESS:  69Y M PMH CHF 2/2 cardiac amyloidosis, afib on Eliquis, HTN, osteoarthritis, gout, pituitary mass, recently discharged(Feb 7th) where he was admitted for acute gout involving left foot pain / L ankle and ankle.  also reports neck pain  but no weakness or numbness   has been having difficulty ambulating     Rheum hx:  Seen by rheum for polyarticular gout flare in early feb, was started on IV solumedrol with good response, was sent on steroid taper. However patient only took taper till feb 13th, while taper was suppose to be till feb 19th, says he ran out of medications.  Risk factor for gout include renal impairment CHF with diuretic use.  He also h/o multiple attacks in the past , was taking colchicine, but developed diarrhea    ROS: positive for joint pain and swelling  ROS negative for rash, fever, trauma, diarrhea, oral ulcers, chest pain, SOB, recent increase in diuretics.    Denies alcohol use, smoking or illicit drug use    MEDICATIONS  (STANDING):  apixaban 5 milliGRAM(s) Oral every 12 hours  atorvastatin 40 milliGRAM(s) Oral at bedtime  clopidogrel Tablet 75 milliGRAM(s) Oral daily  finasteride 5 milliGRAM(s) Oral daily  levothyroxine 175 MICROGram(s) Oral daily  methylPREDNISolone sodium succinate Injectable 40 milliGRAM(s) IV Push daily  polyethylene glycol 3350 17 Gram(s) Oral daily  sacubitril 24 mG/valsartan 26 mG 1 Tablet(s) Oral two times a day  tamsulosin 0.4 milliGRAM(s) Oral at bedtime  torsemide 20 milliGRAM(s) Oral two times a day    MEDICATIONS  (PRN):  acetaminophen     Tablet .. 650 milliGRAM(s) Oral every 6 hours PRN Mild Pain (1 - 3)  oxycodone    5 mG/acetaminophen 325 mG 2 Tablet(s) Oral every 6 hours PRN Moderate Pain (4 - 6)  oxyCODONE    IR 10 milliGRAM(s) Oral every 8 hours PRN Severe Pain (7 - 10)      Allergies    No Known Allergies    Intolerances      FAMILY HISTORY:  Family history of stroke (Mother)    Family history of Alzheimer's disease (Father)    FH: stroke (Mother, Father)        Vital Signs Last 24 Hrs  T(C): 37.1 (20 Feb 2025 13:55), Max: 37.1 (20 Feb 2025 13:55)  T(F): 98.7 (20 Feb 2025 13:55), Max: 98.7 (20 Feb 2025 13:55)  HR: 81 (20 Feb 2025 13:55) (81 - 91)  BP: 108/68 (20 Feb 2025 13:55) (102/62 - 118/72)  BP(mean): 83 (19 Feb 2025 21:08) (83 - 83)  RR: 18 (20 Feb 2025 13:55) (18 - 19)  SpO2: 99% (20 Feb 2025 13:55) (95% - 100%)    Parameters below as of 20 Feb 2025 13:55  Patient On (Oxygen Delivery Method): room air        Physical Exam:  General: No apparent distress  HEENT: EOMI, MMM  CVS: +S1/S2  GI: Soft  MSK: left third PIP swelling and tenderness noted, left third DIP swelling and tenderness+, Left knee mild effusion tenderness+, left ankle warm, swollen and tenderness. ROM is full but painful in these joints. Left first MTP tenderness  Neuro: AA      LABS:                        12.6   11.38 )-----------( 255      ( 19 Feb 2025 12:14 )             39.4     02-19    138  |  99  |  53[H]  ----------------------------<  106[H]  4.4   |  25  |  1.83[H]    Ca    9.4      19 Feb 2025 12:14    TPro  7.3  /  Alb  4.0  /  TBili  1.0  /  DBili  x   /  AST  39  /  ALT  70[H]  /  AlkPhos  93  02-19      Urinalysis Basic - ( 19 Feb 2025 12:14 )    Color: x / Appearance: x / SG: x / pH: x  Gluc: 106 mg/dL / Ketone: x  / Bili: x / Urobili: x   Blood: x / Protein: x / Nitrite: x   Leuk Esterase: x / RBC: x / WBC x   Sq Epi: x / Non Sq Epi: x / Bacteria: x            Rheumatology Work Up    Sedimentation Rate, Erythrocyte: 21 mm/hr *H* [0 - 20] (02-20-25 @ 06:40)  C-Reactive Protein: 124 mg/L *H* [0 - 4] (02-20-25 @ 06:39)  Sedimentation Rate, Erythrocyte: 51 mm/hr *H* [0 - 20] (02-04-25 @ 03:42)  C-Reactive Protein: 207 mg/L *H* [0 - 4] (02-04-25 @ 03:42)            RADIOLOGY & ADDITIONAL STUDIES:  IMPRESSION:  C7 and T1 levels obscured on lateral view by superimposed shoulders.    No compression fractures or spondylolistheses.    Obliterated appearing C4-C5 disc space with dynamically stable appearing  slight C4 on C5 retrolisthesis. Preserved remaining visualized upper   cervical intervertebral disc spaces. Remaining vertebral body alignment   maintained and no evidence for atlantoaxial dynamic instability.    Prevertebral soft tissues and predental interval within normal limits.  Atlantoaxial and posterior facet alignment maintained.  Intact odontoid.  No discrete lytic or blastic lesions.    Left carotid region metallic mesh vascular stent.    TECHNIQUE: 2 views of the left tibia and fibula, 3 views of the left foot.    COMPARISON: None available.    FINDINGS:  Diffuse soft tissue swelling. No tracking soft tissue gas collections,   radiopaque foreign bodies, or grossradiographic evidence for   osteomyelitis.  No fractures or dislocations.  Left knee tricompartment osteoarthritis with medial tibiofemoral   compartment predominance.  Plantar calcaneal enthesophyte.  Vascular calcifications.    IMPRESSION:  No acute fracture or dislocation.  No tracking gas collections or gross radiographic evidence for   osteomyelitis.    TECHNIQUE: 2 views of the left tibia and fibula, 3 views of the left foot.    COMPARISON: None available.    FINDINGS:  Diffuse soft tissue swelling. No tracking soft tissue gas collections,   radiopaque foreign bodies, or grossradiographic evidence for   osteomyelitis.  No fractures or dislocations.  Left knee tricompartment osteoarthritis with medial tibiofemoral   compartment predominance.  Plantar calcaneal enthesophyte.  Vascular calcifications.    IMPRESSION:  No acute fracture or dislocation.  No tracking gas collections or gross radiographic evidence for   osteomyelitis.

## 2025-02-20 NOTE — CONSULT NOTE ADULT - ASSESSMENT
68 yo M w/ PMH of CKD, CHF 2/2 Cardiac amyloidosis, afib, HTN OA, gout who presents for acute L foot pain/L ankle pain and L knee pain. Rheumatology consulted for gout flare evaluation.     #Acute oligoarticular gout flare: left knee, ankle, left 3rd PIP, DIP  -Risk factors: CKD, CHF  -uRIC ACID: 12.5, elevated inflammatory markers  -Good response s/p solumedrol 40mg   -Meets criteria for urate lowering therapy: has had 2+ gout flares in 1 year and has a previous uric acid level >9 in setting of CKD.   -Uric acid level goal should be < 6  -HLA  negative    Recommendations:  -c/W current solumedrol methyl pred 40mg for tomorrow, Decrease to prednisone 30mg for 3 days> 20mg for 3 days>10mg for three days>5mg until follow up  -Will consider starting allopurinol 50mg daily inpatient as patient appears to have recurrent flares, while on steroids  - Outpatient renal evaluation for unclear etiology for renal impairment     D/w Dr.El kingston Gray MD, PGY-4  Rheumatology Fellow  Reachable on TEAMS   70 yo M w/ PMH of CKD, CHF 2/2 Cardiac amyloidosis, afib, HTN OA, gout who presents for acute L foot pain/L ankle pain and L knee pain. Rheumatology consulted for gout flare evaluation.     #Acute oligoarticular gout flare: left knee, ankle, left 3rd PIP, DIP  -Risk factors: CKD, CHF  -uric acid : 12.3, elevated inflammatory markers  -Good response s/p solumedrol 40mg   -Meets criteria for urate lowering therapy: has had 2+ gout flares in 1 year and has a previous uric acid level >9 in setting of CKD.   -Uric acid level goal should be < 6  -HLA  negative  -Xrays knee with OA, ankle xrays without erosions    Recommendations:  -c/W current solumedrol methyl pred 40mg for tomorrow(total of three doses) >Decrease to prednisone 30mg for 3 days> 20mg for 3 days>10mg for three days>5mg until follow up outpatient  -start allopurinol 50mg daily inpatient as patient appears to have recurrent flares, while on steroids.  -Outpatient renal evaluation for unclear etiology for renal impairment   -Outpatient rheumatology follow up   -Monitor renal function closely    D/w Dr.El kingston Gray MD, PGY-4  Rheumatology Fellow  Reachable on TEAMS   70 yo M w/ PMH of CKD, CHF 2/2 Cardiac amyloidosis, afib, HTN OA, gout who presents for acute L foot pain/L ankle pain and L knee pain. Rheumatology consulted for gout flare evaluation.     #Acute oligoarticular gout flare: left knee, ankle, left 3rd PIP  -Risk factors: CKD, CHF  -uric acid : 12.3, elevated inflammatory markers  -Good response s/p solumedrol 40mg   -Meets criteria for urate lowering therapy: has had 2+ gout flares in 1 year and has a previous uric acid level >9 in setting of CKD.   -Uric acid level goal should be < 6  -HLA  negative  -Xrays knee with OA, ankle xrays without erosions    Recommendations:  -c/W current solumedrol 40mg IV od for tomorrow(total of three doses) >Decrease to prednisone 30mg for 3 days> 20mg for 3 days>10mg for three days>5mg until follow up outpatient  -start allopurinol 50mg daily  -Outpatient renal evaluation for unclear etiology for renal impairment   -Outpatient rheumatology follow up   -Monitor renal function closely    D/w Dr.El kingston Gray MD, PGY-4  Rheumatology Fellow  Reachable on TEAMS

## 2025-02-20 NOTE — PHYSICAL THERAPY INITIAL EVALUATION ADULT - PLANNED THERAPY INTERVENTIONS, PT EVAL
STAIR GOAL: Pt will negotiate 17 stairs independent in 2 wks to improve overall functional mobility./balance training/gait training/strengthening/transfer training

## 2025-02-20 NOTE — PHYSICAL THERAPY INITIAL EVALUATION ADULT - TRANSFER TRAINING, PT EVAL
Pt will perform sit to stand transfers independent w/ RW in 2 wks to improve overall functional mobility.

## 2025-02-20 NOTE — PROGRESS NOTE ADULT - ASSESSMENT
69Y M PMH CHF, afib on Eliquis, HTN, osteoarthritis, gout, pituitary mass, reccently discharged where he was admitted for acute gout involving left foot pain / L ankle and ankle.  pt completed his steroids taper however did not have his fu appointment with rheum as of yet..  now presenting with acute on chronic L ankle and L knee swelling pain and swelling   no fever or chills   n o tevin   also reports neck pain  but no weakness or numbness   has been having difficulty ambulating           acute gout polyarticular :   cont steroids   rheumatology consult appreciated     neck pain : check Xray   no acute pathlogy   improving pain       afib : restarted eliquis     CHF : not in [place] chf clinically     cardiac amyloid : pharmacy  to order       Pulmonary nodule. appreciate pul input :   ·  Recommendation: PET-CT (read) as an outpatient with RML lung nodule 18 x 16 mm is not hypermetabolic, showing mild activity (SUV 1.3)  -Reviewed images from CT A/P (lower chest) from 10/2024  -Likely hamartoma. No plans for biopsy at this time, suggest eventual repeat CT chest as an outpatient.

## 2025-02-20 NOTE — PHYSICAL THERAPY INITIAL EVALUATION ADULT - PERTINENT HX OF CURRENT PROBLEM, REHAB EVAL
69Y M PMH CHF, afib on Eliquis, HTN, osteoarthritis, gout, pituitary mass, reccently discharged where he was admitted for acute gout involving left foot pain / L ankle and ankle. Pt completed his steroids taper however did not have his fu appointment with rheum as of yet. Now presenting with acute on chronic L ankle and L knee swelling pain and swelling.

## 2025-02-20 NOTE — CONSULT NOTE ADULT - ATTENDING COMMENTS
Amendments to fellow's assessment and plan as above.  Patient aware of our recommendations and in agreement.  Discussed with primary team  will follow

## 2025-02-21 LAB
ANION GAP SERPL CALC-SCNC: 15 MMOL/L — SIGNIFICANT CHANGE UP (ref 5–17)
BUN SERPL-MCNC: 60 MG/DL — HIGH (ref 7–23)
CALCIUM SERPL-MCNC: 9.7 MG/DL — SIGNIFICANT CHANGE UP (ref 8.4–10.5)
CHLORIDE SERPL-SCNC: 99 MMOL/L — SIGNIFICANT CHANGE UP (ref 96–108)
CO2 SERPL-SCNC: 24 MMOL/L — SIGNIFICANT CHANGE UP (ref 22–31)
CREAT SERPL-MCNC: 2.27 MG/DL — HIGH (ref 0.5–1.3)
EGFR: 30 ML/MIN/1.73M2 — LOW
GLUCOSE SERPL-MCNC: 85 MG/DL — SIGNIFICANT CHANGE UP (ref 70–99)
HCT VFR BLD CALC: 36.9 % — LOW (ref 39–50)
HGB BLD-MCNC: 11.9 G/DL — LOW (ref 13–17)
MCHC RBC-ENTMCNC: 29.2 PG — SIGNIFICANT CHANGE UP (ref 27–34)
MCHC RBC-ENTMCNC: 32.2 G/DL — SIGNIFICANT CHANGE UP (ref 32–36)
MCV RBC AUTO: 90.7 FL — SIGNIFICANT CHANGE UP (ref 80–100)
NRBC BLD AUTO-RTO: 0 /100 WBCS — SIGNIFICANT CHANGE UP (ref 0–0)
PLATELET # BLD AUTO: 234 K/UL — SIGNIFICANT CHANGE UP (ref 150–400)
POTASSIUM SERPL-MCNC: 5 MMOL/L — SIGNIFICANT CHANGE UP (ref 3.5–5.3)
POTASSIUM SERPL-SCNC: 5 MMOL/L — SIGNIFICANT CHANGE UP (ref 3.5–5.3)
RBC # BLD: 4.07 M/UL — LOW (ref 4.2–5.8)
RBC # FLD: 16.6 % — HIGH (ref 10.3–14.5)
SODIUM SERPL-SCNC: 138 MMOL/L — SIGNIFICANT CHANGE UP (ref 135–145)
WBC # BLD: 9.76 K/UL — SIGNIFICANT CHANGE UP (ref 3.8–10.5)
WBC # FLD AUTO: 9.76 K/UL — SIGNIFICANT CHANGE UP (ref 3.8–10.5)

## 2025-02-21 PROCEDURE — 99232 SBSQ HOSP IP/OBS MODERATE 35: CPT | Mod: GC

## 2025-02-21 RX ORDER — METHYLPREDNISOLONE ACETATE 80 MG/ML
20 INJECTION, SUSPENSION INTRA-ARTICULAR; INTRALESIONAL; INTRAMUSCULAR; SOFT TISSUE ONCE
Refills: 0 | Status: COMPLETED | OUTPATIENT
Start: 2025-02-21 | End: 2025-02-21

## 2025-02-21 RX ADMIN — PREDNISONE 40 MILLIGRAM(S): 20 TABLET ORAL at 05:58

## 2025-02-21 RX ADMIN — FINASTERIDE 5 MILLIGRAM(S): 1 TABLET, FILM COATED ORAL at 11:40

## 2025-02-21 RX ADMIN — POLYETHYLENE GLYCOL 3350 17 GRAM(S): 17 POWDER, FOR SOLUTION ORAL at 11:40

## 2025-02-21 RX ADMIN — Medication 20 MILLIGRAM(S): at 14:15

## 2025-02-21 RX ADMIN — APIXABAN 5 MILLIGRAM(S): 2.5 TABLET, FILM COATED ORAL at 17:18

## 2025-02-21 RX ADMIN — SACUBITRIL AND VALSARTAN 1 TABLET(S): 49; 51 TABLET, FILM COATED ORAL at 05:37

## 2025-02-21 RX ADMIN — OXYCODONE HYDROCHLORIDE 10 MILLIGRAM(S): 30 TABLET ORAL at 06:35

## 2025-02-21 RX ADMIN — Medication 175 MICROGRAM(S): at 05:37

## 2025-02-21 RX ADMIN — Medication 650 MILLIGRAM(S): at 00:35

## 2025-02-21 RX ADMIN — SACUBITRIL AND VALSARTAN 1 TABLET(S): 49; 51 TABLET, FILM COATED ORAL at 17:18

## 2025-02-21 RX ADMIN — Medication 50 MILLIGRAM(S): at 11:40

## 2025-02-21 RX ADMIN — TAMSULOSIN HYDROCHLORIDE 0.4 MILLIGRAM(S): 0.4 CAPSULE ORAL at 22:07

## 2025-02-21 RX ADMIN — METHYLPREDNISOLONE ACETATE 20 MILLIGRAM(S): 80 INJECTION, SUSPENSION INTRA-ARTICULAR; INTRALESIONAL; INTRAMUSCULAR; SOFT TISSUE at 14:16

## 2025-02-21 RX ADMIN — CLOPIDOGREL BISULFATE 75 MILLIGRAM(S): 75 TABLET, FILM COATED ORAL at 11:40

## 2025-02-21 RX ADMIN — ATORVASTATIN CALCIUM 40 MILLIGRAM(S): 80 TABLET, FILM COATED ORAL at 22:07

## 2025-02-21 RX ADMIN — OXYCODONE HYDROCHLORIDE 10 MILLIGRAM(S): 30 TABLET ORAL at 05:37

## 2025-02-21 RX ADMIN — Medication 20 MILLIGRAM(S): at 05:37

## 2025-02-21 RX ADMIN — APIXABAN 5 MILLIGRAM(S): 2.5 TABLET, FILM COATED ORAL at 05:37

## 2025-02-21 NOTE — PROGRESS NOTE ADULT - SUBJECTIVE AND OBJECTIVE BOX
Date of service: 02-21-25 @ 15:15      Patient is a 69y old  Male who presents with a chief complaint of                                                              INTERVAL HPI/OVERNIGHT EVENTS:    REVIEW OF SYSTEMS:    severe hand pain and improving L knee /ankle pian improinvg                                                                                                                                                                                                                                                                                Medications:  MEDICATIONS  (STANDING):  apixaban 5 milliGRAM(s) Oral every 12 hours  atorvastatin 40 milliGRAM(s) Oral at bedtime  clopidogrel Tablet 75 milliGRAM(s) Oral daily  finasteride 5 milliGRAM(s) Oral daily  levothyroxine 175 MICROGram(s) Oral daily  polyethylene glycol 3350 17 Gram(s) Oral daily  sacubitril 24 mG/valsartan 26 mG 1 Tablet(s) Oral two times a day  Tafamidis (Vyndamax) Capsules 61 milliGRAM(s) 61 milliGRAM(s) Oral daily  tamsulosin 0.4 milliGRAM(s) Oral at bedtime  torsemide 20 milliGRAM(s) Oral two times a day    MEDICATIONS  (PRN):  acetaminophen     Tablet .. 650 milliGRAM(s) Oral every 6 hours PRN Mild Pain (1 - 3)  oxycodone    5 mG/acetaminophen 325 mG 2 Tablet(s) Oral every 6 hours PRN Moderate Pain (4 - 6)  oxyCODONE    IR 10 milliGRAM(s) Oral every 8 hours PRN Severe Pain (7 - 10)       Allergies    No Known Allergies    Intolerances      Vital Signs Last 24 Hrs  T(C): 36.6 (21 Feb 2025 13:54), Max: 36.8 (20 Feb 2025 20:52)  T(F): 97.8 (21 Feb 2025 13:54), Max: 98.2 (20 Feb 2025 20:52)  HR: 70 (21 Feb 2025 13:54) (70 - 92)  BP: 104/67 (21 Feb 2025 13:54) (104/57 - 117/77)  BP(mean): --  RR: 18 (21 Feb 2025 13:54) (17 - 18)  SpO2: 100% (21 Feb 2025 13:54) (93% - 100%)    Parameters below as of 21 Feb 2025 13:54  Patient On (Oxygen Delivery Method): room air      CAPILLARY BLOOD GLUCOSE          02-20 @ 07:01  -  02-21 @ 07:00  --------------------------------------------------------  IN: 240 mL / OUT: 0 mL / NET: 240 mL    02-21 @ 07:01  -  02-21 @ 15:15  --------------------------------------------------------  IN: 0 mL / OUT: 500 mL / NET: -500 mL      Physical Exam:    Daily     Daily   General:  Well appearing, NAD, not cachetic  HEENT:  Nonicteric, PERRLA  CV:  RRR, S1S2   Lungs:  CTA B/L, no wheezes, rales, rhonchi  Abdomen:  Soft, non-tender, no distended, positive BS  Extremities:  knee swelling   L hand swelling              11.9   9.76  )-----------( 234      ( 21 Feb 2025 07:19 )             36.9                      02-21    138  |  99  |  60[H]  ----------------------------<  85  5.0   |  24  |  2.27[H]    Ca    9.7      21 Feb 2025 07:19                         RADIOLOGY & ADDITIONAL TESTS         I personally reviewed: [  ]EKG   [  ]CXR    [  ] CT      A/P:         Discussed with :     Melonie consultants' Notes   Time spent :

## 2025-02-21 NOTE — PROGRESS NOTE ADULT - ASSESSMENT
69Y M PMH CHF, afib on Eliquis, HTN, osteoarthritis, gout, pituitary mass, reccently discharged where he was admitted for acute gout involving left foot pain / L ankle and ankle.  pt completed his steroids taper however did not have his fu appointment with rheum as of yet..  now presenting with acute on chronic L ankle and L knee swelling pain and swelling   no fever or chills   n o tevin   also reports neck pain  but no weakness or numbness   has been having difficulty ambulating           acute gout polyarticular :   cont steroids as ordered   hold off on alopurinol       neck pain : check Xray   no acute pathlogy   improving pain       afib : restarted eliquis     CHF : not in [facility] clinically     cardiac amyloid : pharmacy  to order       Pulmonary nodule. appreciate pul input :   ·  Recommendation: PET-CT (read) as an outpatient with RML lung nodule 18 x 16 mm is not hypermetabolic, showing mild activity (SUV 1.3)  -Reviewed images from CT A/P (lower chest) from 10/2024  -Likely hamartoma. No plans for biopsy at this time, suggest eventual repeat CT chest as an outpatient.

## 2025-02-21 NOTE — CONSULT NOTE ADULT - SUBJECTIVE AND OBJECTIVE BOX
[facility]  [phone]  NEPHROLOGY      INITIAL CONSULT NOTE  --------------------------------------------------------------------------------  HPI:      69Y M PMH CHF, afib on Eliquis, HTN, osteoarthritis, gout, pituitary mass, reccently discharged where he was admitted for acute gout involving left foot pain / L ankle and ankle.pt completed his steroids taper however did not have his fu appointment with rheum as of yet.. now presenting with acute on chronic L ankle and L knee swelling pain and swelling   has been having difficulty ambulating     dx with acute gout also with abn creatinine renal consult called     PAST HISTORY  --------------------------------------------------------------------------------  PAST MEDICAL & SURGICAL HISTORY:  Low back pain  Herniated disc on lumbar region      Redundant prepuce and phimosis      Arthritis  on knees      Hypothyroid  not taking any meds      Hypertension      Thyroid cancer  pt denies any thyroid cancer      History of pituitary disease  on caber      Gout      S/P excision of vocal cord nodule  2007 and radiation -      Laryngeal cancer  "stage 0"      Pituitary mass      Carotid occlusion, bilateral      Obese      S/P excision of vocal cord nodule  radiation - 2007      History of lumbar spinal fusion  2018      H/O arthroscopy of shoulder  right-2017      Status post carotid surgery  right-5/13/2022, pt. states unsuccesful        FAMILY HISTORY:  Family history of stroke (Mother)    Family history of Alzheimer's disease (Father)    FH: stroke (Mother, Father)      PAST SOCIAL HISTORY:    ALLERGIES & MEDICATIONS  --------------------------------------------------------------------------------  Allergies    No Known Allergies    Intolerances      Standing Inpatient Medications  apixaban 5 milliGRAM(s) Oral every 12 hours  atorvastatin 40 milliGRAM(s) Oral at bedtime  clopidogrel Tablet 75 milliGRAM(s) Oral daily  finasteride 5 milliGRAM(s) Oral daily  levothyroxine 175 MICROGram(s) Oral daily  polyethylene glycol 3350 17 Gram(s) Oral daily  sacubitril 24 mG/valsartan 26 mG 1 Tablet(s) Oral two times a day  Tafamidis (Vyndamax) Capsules 61 milliGRAM(s) 61 milliGRAM(s) Oral daily  tamsulosin 0.4 milliGRAM(s) Oral at bedtime  torsemide 20 milliGRAM(s) Oral two times a day    PRN Inpatient Medications  acetaminophen     Tablet .. 650 milliGRAM(s) Oral every 6 hours PRN  oxycodone    5 mG/acetaminophen 325 mG 2 Tablet(s) Oral every 6 hours PRN  oxyCODONE    IR 10 milliGRAM(s) Oral every 8 hours PRN      REVIEW OF SYSTEMS  --------------------------------------------------------------------------------  Gen: No  fevers/chills  pain left side body L hand and left leg   Skin: No rashes  Head/Eyes/Ears/Mouth: No headache; Normal hearing;  No sinus pain/discomfort, sore throat  Respiratory: No dyspnea, cough, wheezing, hemoptysis  CV: No chest pain, orthopnea  GI: No abdominal pain, diarrhea, nausea, vomiting, melena, hematochezia  : No dysuria, decrease urination or hesitancy urinating  hematuria, nocturia  MSK: Left ankle and left 3rd digit pain and redness   Neuro: No dizziness/lightheadedness,  also with no edema         VITALS/PHYSICAL EXAM  --------------------------------------------------------------------------------  T(C): 36.4 (02-21-25 @ 20:31), Max: 36.7 (02-21-25 @ 05:19)  HR: 73 (02-21-25 @ 20:31) (70 - 86)  BP: 122/74 (02-21-25 @ 20:31) (104/67 - 122/74)  RR: 17 (02-21-25 @ 20:31) (17 - 18)  SpO2: 100% (02-21-25 @ 20:31) (99% - 100%)  Wt(kg): --  Height (cm): 177.8 (02-19-25 @ 23:57)  Weight (kg): 88.451 (02-19-25 @ 23:57)  BMI (kg/m2): 28 (02-19-25 @ 23:57)  BSA (m2): 2.06 (02-19-25 @ 23:57)      02-20-25 @ 07:01  -  02-21-25 @ 07:00  --------------------------------------------------------  IN: 240 mL / OUT: 0 mL / NET: 240 mL    02-21-25 @ 07:01  -  02-21-25 @ 23:31  --------------------------------------------------------  IN: 0 mL / OUT: 500 mL / NET: -500 mL      Physical Exam:  	Gen: Non toxic comfortable appearing   	no jvd  	Pulm: decrease bs  no rales or ronchi or wheezing  	CV: RRR, S1S2; no rub  	Abd: +BS, soft, nontender/nondistended  	: No suprapubic tenderness  	UE: Warm, no cyanosis  no clubbing,  no edema; L 3rd digit tenderness   	LE: Warm, no cyanosis  no clubbing, no edema  	Neuro: alert and oriented. speech coherent   	Psych: Normal affect and mood  	Skin: Warm, no decrease skin turgor   	  LABS/STUDIES  --------------------------------------------------------------------------------              11.9   9.76  >-----------<  234      [02-21-25 @ 07:19]              36.9     138  |  99  |  60  ----------------------------<  85      [02-21-25 @ 07:19]  5.0   |  24  |  2.27        Ca     9.7     [02-21-25 @ 07:19]            Creatinine Trend:  SCr 2.27 [02-21 @ 07:19]  SCr 1.83 [02-19 @ 12:14]  SCr 1.78 [02-07 @ 07:25]  SCr 1.96 [02-06 @ 06:56]  SCr 2.06 [02-05 @ 01:59]        TSH 10.30      [05-07-24 @ 07:20]    HCV 0.48, Nonreact      [05-05-21 @ 08:52]    Free Light Chains: kappa 3.20, lambda 2.18, ratio = 1.47      [03-21 @ 15:38]  Immunofixation Serum:   No Monoclonal Band Identified    Reference Range: None Detected      [03-21-23 @ 15:38]  Immunofixation Urine:   Reference Range: None Detected      [03-21-23 @ 18:42]

## 2025-02-21 NOTE — PROGRESS NOTE ADULT - SUBJECTIVE AND OBJECTIVE BOX
***consult has been received. note is in progress and incomplete without attending attestation***        MUSTAPHAEFREN CANTU  [id number]    Subjective:  Patient was seen bedside Reports left knee and ankle is better. Left 3rd PIP swelling and pain persists.     FAMILY HISTORY:  Family history of stroke (Mother)    Family history of Alzheimer's disease (Father)    FH: stroke (Mother, Father)        Vital Signs Last 24 Hrs  T(C): 36.6 (21 Feb 2025 13:54), Max: 36.8 (20 Feb 2025 20:52)  T(F): 97.8 (21 Feb 2025 13:54), Max: 98.2 (20 Feb 2025 20:52)  HR: 70 (21 Feb 2025 13:54) (70 - 92)  BP: 104/67 (21 Feb 2025 13:54) (104/57 - 117/77)  BP(mean): --  RR: 18 (21 Feb 2025 13:54) (17 - 18)  SpO2: 100% (21 Feb 2025 13:54) (93% - 100%)    Parameters below as of 21 Feb 2025 13:54  Patient On (Oxygen Delivery Method): room air        Physical Exam:  General: No apparent distress  HEENT: EOMI, MMM  CVS: +S1/S2  GI: Soft  MSK: left third PIP swelling and tenderness noted, left third DIP swelling and tenderness+, Left knee mild effusion tenderness+, left ankle warm, swollen and tenderness. ROM is full but painful in these joints. Left first MTP tenderness      LABS:                        11.9   9.76  )-----------( 234      ( 21 Feb 2025 07:19 )             36.9     02-21    138  |  99  |  60[H]  ----------------------------<  85  5.0   |  24  |  2.27[H]    Ca    9.7      21 Feb 2025 07:19        Urinalysis Basic - ( 21 Feb 2025 07:19 )    Color: x / Appearance: x / SG: x / pH: x  Gluc: 85 mg/dL / Ketone: x  / Bili: x / Urobili: x   Blood: x / Protein: x / Nitrite: x   Leuk Esterase: x / RBC: x / WBC x   Sq Epi: x / Non Sq Epi: x / Bacteria: x      Rheumatology Work Up    Sedimentation Rate, Erythrocyte: 21 mm/hr *H* [0 - 20] (02-20-25 @ 06:40)  C-Reactive Protein: 124 mg/L *H* [0 - 4] (02-20-25 @ 06:39)  Sedimentation Rate, Erythrocyte: 51 mm/hr *H* [0 - 20] (02-04-25 @ 03:42)  C-Reactive Protein: 207 mg/L *H* [0 - 4] (02-04-25 @ 03:42)        RADIOLOGY & ADDITIONAL STUDIES:    tECHNIQUE: 2 views of the left tibia and fibula, 3 views of the left foot.    COMPARISON: None available.    FINDINGS:  Diffuse soft tissue swelling. No tracking soft tissue gas collections,   radiopaque foreign bodies, or grossradiographic evidence for   osteomyelitis.  No fractures or dislocations.  Left knee tricompartment osteoarthritis with medial tibiofemoral   compartment predominance.  Plantar calcaneal enthesophyte.  Vascular calcifications.    IMPRESSION:  No acute fracture or dislocation.  No tracking gas collections or gross radiographic evidence for   osteomyelitis.     MUSTAPHAEFREN MORELANDS  [id number]    Subjective:  Patient was seen at bedside   Reports left knee and ankle is better. Left 3rd PIP swelling and pain persists.     ROS  no SOB or chest pain  no new rashes  no other joint pain /new   no fevers or chills     FAMILY HISTORY:  Family history of stroke (Mother)    Family history of Alzheimer's disease (Father)    FH: stroke (Mother, Father)        Vital Signs Last 24 Hrs  T(C): 36.6 (21 Feb 2025 13:54), Max: 36.8 (20 Feb 2025 20:52)  T(F): 97.8 (21 Feb 2025 13:54), Max: 98.2 (20 Feb 2025 20:52)  HR: 70 (21 Feb 2025 13:54) (70 - 92)  BP: 104/67 (21 Feb 2025 13:54) (104/57 - 117/77)  BP(mean): --  RR: 18 (21 Feb 2025 13:54) (17 - 18)  SpO2: 100% (21 Feb 2025 13:54) (93% - 100%)    Parameters below as of 21 Feb 2025 13:54  Patient On (Oxygen Delivery Method): room air        Physical Exam:  General: No apparent distress  HEENT: EOMI, MMM  MSK: left third PIP swelling and tenderness noted, Left knee mild effusion tenderness+, left ankle warm, swollen and tenderness. ROM is full but painful in these joints. Left first MTP tenderness improved       LABS:                        11.9   9.76  )-----------( 234      ( 21 Feb 2025 07:19 )             36.9     02-21    138  |  99  |  60[H]  ----------------------------<  85  5.0   |  24  |  2.27[H]    Ca    9.7      21 Feb 2025 07:19        Urinalysis Basic - ( 21 Feb 2025 07:19 )    Color: x / Appearance: x / SG: x / pH: x  Gluc: 85 mg/dL / Ketone: x  / Bili: x / Urobili: x   Blood: x / Protein: x / Nitrite: x   Leuk Esterase: x / RBC: x / WBC x   Sq Epi: x / Non Sq Epi: x / Bacteria: x      Rheumatology Work Up    Sedimentation Rate, Erythrocyte: 21 mm/hr *H* [0 - 20] (02-20-25 @ 06:40)  C-Reactive Protein: 124 mg/L *H* [0 - 4] (02-20-25 @ 06:39)  Sedimentation Rate, Erythrocyte: 51 mm/hr *H* [0 - 20] (02-04-25 @ 03:42)  C-Reactive Protein: 207 mg/L *H* [0 - 4] (02-04-25 @ 03:42)        RADIOLOGY & ADDITIONAL STUDIES:    tECHNIQUE: 2 views of the left tibia and fibula, 3 views of the left foot.    COMPARISON: None available.    FINDINGS:  Diffuse soft tissue swelling. No tracking soft tissue gas collections,   radiopaque foreign bodies, or grossradiographic evidence for   osteomyelitis.  No fractures or dislocations.  Left knee tricompartment osteoarthritis with medial tibiofemoral   compartment predominance.  Plantar calcaneal enthesophyte.  Vascular calcifications.    IMPRESSION:  No acute fracture or dislocation.  No tracking gas collections or gross radiographic evidence for   osteomyelitis.

## 2025-02-21 NOTE — PROGRESS NOTE ADULT - ASSESSMENT
70 yo M w/ PMH of CKD, CHF 2/2 Cardiac amyloidosis, afib, HTN OA, gout who presents for acute L foot pain/L ankle pain and L knee pain. Rheumatology consulted for gout flare evaluation.     #Acute oligoarticular gout flare: left knee, ankle, left 3rd PIP  -Risk factors: CKD, CHF  -uric acid : 12.3, elevated inflammatory markers  -Good response s/p solumedrol 40mg   -Meets criteria for urate lowering therapy: has had 2+ gout flares in 1 year and has a previous uric acid level >9 in setting of CKD.   -Uric acid level goal should be < 6  -HLA  negative  -Xrays knee with OA, ankle xrays without erosions    Recommendations:  -Give solumedrol 20mg IV stat, start solumedrol 30mg IV for 2/22-2/23, Can switch to prednisone 20mg PO from monday for 3 days> 15mg for 3 days>10 mg for 3 days>5 mg until follow up  -Will hold off allopurinol, until renal function stabilises  -Get xray  of his left hand, If patient continues to have persistent hand swelling, consider MRI hand  -Outpatient renal evaluation for unclear etiology for renal impairment   -Outpatient rheumatology follow up :Dr.El Beach, at [address] [place] [address]  -Monitor renal function closely    D/w Dr.El kingston Gray MD, PGY-4  Rheumatology Fellow  Reachable on TEAMS   70 yo M w/ PMH of CKD, CHF 2/2 Cardiac amyloidosis, afib, HTN OA, gout who presents for acute L foot pain/L ankle pain and L knee pain. Rheumatology consulted for gout flare evaluation.     #Acute oligoarticular gout flare: left knee, ankle, left 3rd PIP  -Risk factors: CKD, CHF  -uric acid : 12.3, elevated inflammatory markers  -Good response s/p solumedrol 40mg   -Meets criteria for urate lowering therapy: has had 2+ gout flares in 1 year and has a previous uric acid level >9 in setting of CKD.   -Uric acid level goal should be < 6  -HLA  negative  -Xrays knee with OA, ankle xrays without erosions    Recommendations:  -Give solumedrol 20mg IV stat one dose today, start solumedrol 30mg IV daily  for 2/22 and 2/23, Can switch to prednisone 20mg PO from monday for 3 days> 15mg for 3 days>10 mg for 3 days>5 mg until follow up  -Will hold off allopurinol, until renal function stabilizes   -Get xray  of his left hand, If patient continues to have persistent isolated finger swelling, consider MRI hand  -Will arrange for outpatient rheumatology follow up  -Monitor renal function closely    D/w Dr.El kingston Gray MD, PGY-4  Rheumatology Fellow  Reachable on TEAMS

## 2025-02-21 NOTE — CONSULT NOTE ADULT - ASSESSMENT
69Y M PMH CHF, afib on Eliquis, HTN, osteoarthritis, gout, pituitary mass, reccently discharged where he was admitted for acute gout involving left foot pain / L ankle and ankle.pt completed his steroids taper however did not have his fu appointment with rheum as of yet.. now presenting with acute on chronic L ankle and L knee swelling pain and swelling   has been having difficulty ambulating     dx with acute gout also with abn creatinine       1- CKD IV borderline   2- acute gout   3- CHF  4- HTn     cr seems to be steady range   torsemide 20 mg daily   to have urinalysis and urine pro/cr ratio   iv solumedrol for gout once acute gout improves to start allopurinol   d/w Dr. mathews

## 2025-02-22 LAB
ANION GAP SERPL CALC-SCNC: 14 MMOL/L — SIGNIFICANT CHANGE UP (ref 5–17)
BUN SERPL-MCNC: 72 MG/DL — HIGH (ref 7–23)
CALCIUM SERPL-MCNC: 9.3 MG/DL — SIGNIFICANT CHANGE UP (ref 8.4–10.5)
CHLORIDE SERPL-SCNC: 100 MMOL/L — SIGNIFICANT CHANGE UP (ref 96–108)
CO2 SERPL-SCNC: 21 MMOL/L — LOW (ref 22–31)
CREAT SERPL-MCNC: 2.13 MG/DL — HIGH (ref 0.5–1.3)
EGFR: 32 ML/MIN/1.73M2 — LOW
GLUCOSE SERPL-MCNC: 135 MG/DL — HIGH (ref 70–99)
POTASSIUM SERPL-MCNC: 5.1 MMOL/L — SIGNIFICANT CHANGE UP (ref 3.5–5.3)
POTASSIUM SERPL-SCNC: 5.1 MMOL/L — SIGNIFICANT CHANGE UP (ref 3.5–5.3)
SODIUM SERPL-SCNC: 135 MMOL/L — SIGNIFICANT CHANGE UP (ref 135–145)

## 2025-02-22 PROCEDURE — 73120 X-RAY EXAM OF HAND: CPT | Mod: 26,LT

## 2025-02-22 RX ORDER — METHYLPREDNISOLONE ACETATE 80 MG/ML
30 INJECTION, SUSPENSION INTRA-ARTICULAR; INTRALESIONAL; INTRAMUSCULAR; SOFT TISSUE DAILY
Refills: 0 | Status: COMPLETED | OUTPATIENT
Start: 2025-02-22 | End: 2025-02-23

## 2025-02-22 RX ADMIN — APIXABAN 5 MILLIGRAM(S): 2.5 TABLET, FILM COATED ORAL at 05:22

## 2025-02-22 RX ADMIN — CLOPIDOGREL BISULFATE 75 MILLIGRAM(S): 75 TABLET, FILM COATED ORAL at 11:36

## 2025-02-22 RX ADMIN — APIXABAN 5 MILLIGRAM(S): 2.5 TABLET, FILM COATED ORAL at 17:01

## 2025-02-22 RX ADMIN — ATORVASTATIN CALCIUM 40 MILLIGRAM(S): 80 TABLET, FILM COATED ORAL at 21:03

## 2025-02-22 RX ADMIN — Medication 175 MICROGRAM(S): at 05:22

## 2025-02-22 RX ADMIN — TAMSULOSIN HYDROCHLORIDE 0.4 MILLIGRAM(S): 0.4 CAPSULE ORAL at 21:03

## 2025-02-22 RX ADMIN — Medication 20 MILLIGRAM(S): at 05:22

## 2025-02-22 RX ADMIN — METHYLPREDNISOLONE ACETATE 30 MILLIGRAM(S): 80 INJECTION, SUSPENSION INTRA-ARTICULAR; INTRALESIONAL; INTRAMUSCULAR; SOFT TISSUE at 05:21

## 2025-02-22 RX ADMIN — POLYETHYLENE GLYCOL 3350 17 GRAM(S): 17 POWDER, FOR SOLUTION ORAL at 11:36

## 2025-02-22 RX ADMIN — SACUBITRIL AND VALSARTAN 1 TABLET(S): 49; 51 TABLET, FILM COATED ORAL at 17:01

## 2025-02-22 RX ADMIN — Medication 20 MILLIGRAM(S): at 17:01

## 2025-02-22 RX ADMIN — SACUBITRIL AND VALSARTAN 1 TABLET(S): 49; 51 TABLET, FILM COATED ORAL at 05:22

## 2025-02-22 RX ADMIN — FINASTERIDE 5 MILLIGRAM(S): 1 TABLET, FILM COATED ORAL at 11:36

## 2025-02-22 NOTE — PROGRESS NOTE ADULT - SUBJECTIVE AND OBJECTIVE BOX
Date of service: 02-22-25 @ 23:18      Patient is a 69y old  Male who presents with a chief complaint of                                                              INTERVAL HPI/OVERNIGHT EVENTS:    REVIEW OF SYSTEMS:     improving pain                                                                                                                                                                                                                                                                      Medications:  MEDICATIONS  (STANDING):  apixaban 5 milliGRAM(s) Oral every 12 hours  atorvastatin 40 milliGRAM(s) Oral at bedtime  clopidogrel Tablet 75 milliGRAM(s) Oral daily  finasteride 5 milliGRAM(s) Oral daily  levothyroxine 175 MICROGram(s) Oral daily  methylPREDNISolone sodium succinate Injectable 30 milliGRAM(s) IV Push daily  polyethylene glycol 3350 17 Gram(s) Oral daily  sacubitril 24 mG/valsartan 26 mG 1 Tablet(s) Oral two times a day  Tafamidis (Vyndamax) Capsules 61 milliGRAM(s) 61 milliGRAM(s) Oral daily  tamsulosin 0.4 milliGRAM(s) Oral at bedtime  torsemide 20 milliGRAM(s) Oral two times a day    MEDICATIONS  (PRN):  acetaminophen     Tablet .. 650 milliGRAM(s) Oral every 6 hours PRN Mild Pain (1 - 3)  oxycodone    5 mG/acetaminophen 325 mG 2 Tablet(s) Oral every 6 hours PRN Moderate Pain (4 - 6)  oxyCODONE    IR 10 milliGRAM(s) Oral every 8 hours PRN Severe Pain (7 - 10)       Allergies    No Known Allergies    Intolerances      Vital Signs Last 24 Hrs  T(C): 36.4 (22 Feb 2025 20:17), Max: 36.7 (22 Feb 2025 04:10)  T(F): 97.5 (22 Feb 2025 20:17), Max: 98 (22 Feb 2025 04:10)  HR: 78 (22 Feb 2025 20:17) (77 - 83)  BP: 98/60 (22 Feb 2025 20:17) (98/60 - 113/72)  BP(mean): --  RR: 18 (22 Feb 2025 20:17) (18 - 18)  SpO2: 94% (22 Feb 2025 20:17) (94% - 97%)    Parameters below as of 22 Feb 2025 20:17  Patient On (Oxygen Delivery Method): room air      CAPILLARY BLOOD GLUCOSE          02-21 @ 07:01  -  02-22 @ 07:00  --------------------------------------------------------  IN: 240 mL / OUT: 1400 mL / NET: -1160 mL    02-22 @ 07:01  -  02-22 @ 23:18  --------------------------------------------------------  IN: 240 mL / OUT: 450 mL / NET: -210 mL      Physical Exam:    Daily     Daily   General:  Well appearing, NAD, not cachetic  HEENT:  Nonicteric, PERRLA  CV:  RRR, S1S2   Lungs:  CTA B/L, no wheezes, rales, rhonchi  Abdomen:  Soft, non-tender, no distended, positive BS  Extremities: L hand improving swelling   L / Ankleswelling improved   Neuro:  AAOx3, non-focal, grossly intact                                                                                                                                                                                                                                                                                                LABS:                               11.9   9.76  )-----------( 234      ( 21 Feb 2025 07:19 )             36.9                      02-22    135  |  100  |  72[H]  ----------------------------<  135[H]  5.1   |  21[L]  |  2.13[H]    Ca    9.3      22 Feb 2025 06:53                         RADIOLOGY & ADDITIONAL TESTS         I personally reviewed: [  ]EKG   [  ]CXR    [  ] CT      A/P:         Discussed with :     Melonie consultants' Notes   Time spent :

## 2025-02-22 NOTE — PROGRESS NOTE ADULT - ASSESSMENT
69Y M PMH CHF, afib on Eliquis, HTN, osteoarthritis, gout, pituitary mass, reccently discharged where he was admitted for acute gout involving left foot pain / L ankle and ankle.  pt completed his steroids taper however did not have his fu appointment with rheum as of yet..  now presenting with acute on chronic L ankle and L knee swelling pain and swelling   no fever or chills   n o tevin   also reports neck pain  but no weakness or numbness   has been having difficulty ambulating       acute gout polyarticular :   cont steroids as ordered   hold off on alopurinol   appreciate rheum input       neck pain : check Xray   no acute pathlogy   improving pain       afib : restarted eliquis     CHF : not in [facility] clinically     cardiac amyloid : cont meds         Pulmonary nodule. appreciate pul input :   ·  Recommendation: PET-CT (read) as an outpatient with RML lung nodule 18 x 16 mm is not hypermetabolic, showing mild activity (SUV 1.3)  -Reviewed images from CT A/P (lower chest) from 10/2024  -Likely hamartoma. No plans for biopsy at this time, suggest eventual repeat CT chest as an outpatient.

## 2025-02-22 NOTE — PROGRESS NOTE ADULT - SUBJECTIVE AND OBJECTIVE BOX
[facility]   [phone]  RENAL FOLLOW UP NOTE  --------------------------------------------------------------------------------  Chief Complaint:    24 hour events/subjective:    seen earlier   states pain in left side joints is improving     PAST HISTORY  --------------------------------------------------------------------------------  No significant changes to PMH, PSH, FHx, SHx, unless otherwise noted    ALLERGIES & MEDICATIONS  --------------------------------------------------------------------------------  Allergies    No Known Allergies    Intolerances      Standing Inpatient Medications  apixaban 5 milliGRAM(s) Oral every 12 hours  atorvastatin 40 milliGRAM(s) Oral at bedtime  clopidogrel Tablet 75 milliGRAM(s) Oral daily  finasteride 5 milliGRAM(s) Oral daily  levothyroxine 175 MICROGram(s) Oral daily  methylPREDNISolone sodium succinate Injectable 30 milliGRAM(s) IV Push daily  polyethylene glycol 3350 17 Gram(s) Oral daily  sacubitril 24 mG/valsartan 26 mG 1 Tablet(s) Oral two times a day  Tafamidis (Vyndamax) Capsules 61 milliGRAM(s) 61 milliGRAM(s) Oral daily  tamsulosin 0.4 milliGRAM(s) Oral at bedtime  torsemide 20 milliGRAM(s) Oral two times a day    PRN Inpatient Medications  acetaminophen     Tablet .. 650 milliGRAM(s) Oral every 6 hours PRN  oxycodone    5 mG/acetaminophen 325 mG 2 Tablet(s) Oral every 6 hours PRN  oxyCODONE    IR 10 milliGRAM(s) Oral every 8 hours PRN      REVIEW OF SYSTEMS  --------------------------------------------------------------------------------    Gen: denies  fevers/chills, or HA or dizziness   CVS: denies chest pain/palpitations  Resp: denies SOB/Cough  GI: Denies N/V/Abd pain  : Denies dysuria    VITALS/PHYSICAL EXAM  --------------------------------------------------------------------------------  T(C): 36.4 (02-22-25 @ 20:17), Max: 36.7 (02-22-25 @ 04:10)  HR: 78 (02-22-25 @ 20:17) (77 - 83)  BP: 98/60 (02-22-25 @ 20:17) (98/60 - 113/72)  RR: 18 (02-22-25 @ 20:17) (18 - 18)  SpO2: 94% (02-22-25 @ 20:17) (94% - 97%)  Wt(kg): --        02-21-25 @ 07:01  -  02-22-25 @ 07:00  --------------------------------------------------------  IN: 240 mL / OUT: 1400 mL / NET: -1160 mL    02-22-25 @ 07:01  -  02-22-25 @ 22:34  --------------------------------------------------------  IN: 240 mL / OUT: 450 mL / NET: -210 mL      Physical Exam:  	    Gen: Non toxic comfortable appearing   	no jvd  	Pulm: decrease bs  no rales or ronchi or wheezing  	CV: RRR, S1S2; no rub  	Abd: +BS, soft, nontender/nondistended  	: No suprapubic tenderness  	UE: Warm, no cyanosis  no clubbing,  no edema; L 3rd digit tenderness   	LE: Warm, no cyanosis  no clubbing, no edema  	Neuro: alert and oriented. speech coherent     LABS/STUDIES  --------------------------------------------------------------------------------              11.9   9.76  >-----------<  234      [02-21-25 @ 07:19]              36.9     135  |  100  |  72  ----------------------------<  135      [02-22-25 @ 06:53]  5.1   |  21  |  2.13        Ca     9.3     [02-22-25 @ 06:53]            Creatinine Trend:  SCr 2.13 [02-22 @ 06:53]  SCr 2.27 [02-21 @ 07:19]  SCr 1.83 [02-19 @ 12:14]  SCr 1.78 [02-07 @ 07:25]  SCr 1.96 [02-06 @ 06:56]          TSH 10.30      [05-07-24 @ 07:20]

## 2025-02-22 NOTE — PROGRESS NOTE ADULT - ASSESSMENT
69Y M PMH CHF, afib on Eliquis, HTN, osteoarthritis, gout, pituitary mass, reccently discharged where he was admitted for acute gout involving left foot pain / L ankle and ankle.pt completed his steroids taper however did not have his fu appointment with rheum as of yet.. now presenting with acute on chronic L ankle and L knee swelling pain and swelling   has been having difficulty ambulating     dx with acute gout also with abn creatinine       1- CKD IV borderline   2- acute gout   3- CHF  4- HTn     cr seems to be steady range   torsemide 20 mg twice daily to cont   to have urinalysis and urine pro/cr ratio   iv solumedrol for gout once acute gout improves to start allopurinol   cont entresto 24/26 bid

## 2025-02-23 LAB
ANION GAP SERPL CALC-SCNC: 15 MMOL/L — SIGNIFICANT CHANGE UP (ref 5–17)
BUN SERPL-MCNC: 73 MG/DL — HIGH (ref 7–23)
CALCIUM SERPL-MCNC: 9.2 MG/DL — SIGNIFICANT CHANGE UP (ref 8.4–10.5)
CHLORIDE SERPL-SCNC: 101 MMOL/L — SIGNIFICANT CHANGE UP (ref 96–108)
CO2 SERPL-SCNC: 21 MMOL/L — LOW (ref 22–31)
CREAT SERPL-MCNC: 2.06 MG/DL — HIGH (ref 0.5–1.3)
EGFR: 34 ML/MIN/1.73M2 — LOW
GLUCOSE SERPL-MCNC: 100 MG/DL — HIGH (ref 70–99)
POTASSIUM SERPL-MCNC: 4.7 MMOL/L — SIGNIFICANT CHANGE UP (ref 3.5–5.3)
POTASSIUM SERPL-SCNC: 4.7 MMOL/L — SIGNIFICANT CHANGE UP (ref 3.5–5.3)
SODIUM SERPL-SCNC: 137 MMOL/L — SIGNIFICANT CHANGE UP (ref 135–145)

## 2025-02-23 RX ORDER — METHYLPREDNISOLONE ACETATE 80 MG/ML
20 INJECTION, SUSPENSION INTRA-ARTICULAR; INTRALESIONAL; INTRAMUSCULAR; SOFT TISSUE DAILY
Refills: 0 | Status: DISCONTINUED | OUTPATIENT
Start: 2025-02-24 | End: 2025-02-24

## 2025-02-23 RX ADMIN — SACUBITRIL AND VALSARTAN 1 TABLET(S): 49; 51 TABLET, FILM COATED ORAL at 17:27

## 2025-02-23 RX ADMIN — APIXABAN 5 MILLIGRAM(S): 2.5 TABLET, FILM COATED ORAL at 05:39

## 2025-02-23 RX ADMIN — ATORVASTATIN CALCIUM 40 MILLIGRAM(S): 80 TABLET, FILM COATED ORAL at 21:05

## 2025-02-23 RX ADMIN — CLOPIDOGREL BISULFATE 75 MILLIGRAM(S): 75 TABLET, FILM COATED ORAL at 11:17

## 2025-02-23 RX ADMIN — SACUBITRIL AND VALSARTAN 1 TABLET(S): 49; 51 TABLET, FILM COATED ORAL at 05:39

## 2025-02-23 RX ADMIN — APIXABAN 5 MILLIGRAM(S): 2.5 TABLET, FILM COATED ORAL at 17:26

## 2025-02-23 RX ADMIN — METHYLPREDNISOLONE ACETATE 30 MILLIGRAM(S): 80 INJECTION, SUSPENSION INTRA-ARTICULAR; INTRALESIONAL; INTRAMUSCULAR; SOFT TISSUE at 05:38

## 2025-02-23 RX ADMIN — Medication 175 MICROGRAM(S): at 05:39

## 2025-02-23 RX ADMIN — Medication 20 MILLIGRAM(S): at 05:39

## 2025-02-23 RX ADMIN — TAMSULOSIN HYDROCHLORIDE 0.4 MILLIGRAM(S): 0.4 CAPSULE ORAL at 21:05

## 2025-02-23 RX ADMIN — FINASTERIDE 5 MILLIGRAM(S): 1 TABLET, FILM COATED ORAL at 11:16

## 2025-02-23 RX ADMIN — POLYETHYLENE GLYCOL 3350 17 GRAM(S): 17 POWDER, FOR SOLUTION ORAL at 11:16

## 2025-02-23 RX ADMIN — Medication 20 MILLIGRAM(S): at 13:03

## 2025-02-23 NOTE — PROGRESS NOTE ADULT - SUBJECTIVE AND OBJECTIVE BOX
Date of service: 02-23-25 @ 19:16      Patient is a 69y old  Male who presents with a chief complaint of                                                              INTERVAL HPI/OVERNIGHT EVENTS:    REVIEW OF SYSTEMS:  improving pain                                                                                                                                                                                                                                                                         Medications:  MEDICATIONS  (STANDING):  apixaban 5 milliGRAM(s) Oral every 12 hours  atorvastatin 40 milliGRAM(s) Oral at bedtime  clopidogrel Tablet 75 milliGRAM(s) Oral daily  finasteride 5 milliGRAM(s) Oral daily  levothyroxine 175 MICROGram(s) Oral daily  polyethylene glycol 3350 17 Gram(s) Oral daily  sacubitril 24 mG/valsartan 26 mG 1 Tablet(s) Oral two times a day  Tafamidis (Vyndamax) Capsules 61 milliGRAM(s) 61 milliGRAM(s) Oral daily  tamsulosin 0.4 milliGRAM(s) Oral at bedtime  torsemide 20 milliGRAM(s) Oral two times a day    MEDICATIONS  (PRN):  acetaminophen     Tablet .. 650 milliGRAM(s) Oral every 6 hours PRN Mild Pain (1 - 3)  oxycodone    5 mG/acetaminophen 325 mG 2 Tablet(s) Oral every 6 hours PRN Moderate Pain (4 - 6)  oxyCODONE    IR 10 milliGRAM(s) Oral every 8 hours PRN Severe Pain (7 - 10)       Allergies    No Known Allergies    Intolerances      Vital Signs Last 24 Hrs  T(C): 36.3 (23 Feb 2025 12:32), Max: 36.7 (23 Feb 2025 05:33)  T(F): 97.4 (23 Feb 2025 12:32), Max: 98 (23 Feb 2025 05:33)  HR: 85 (23 Feb 2025 17:29) (69 - 91)  BP: 120/78 (23 Feb 2025 17:29) (98/60 - 120/78)  BP(mean): --  RR: 18 (23 Feb 2025 12:32) (18 - 18)  SpO2: 98% (23 Feb 2025 12:32) (94% - 98%)    Parameters below as of 23 Feb 2025 12:32  Patient On (Oxygen Delivery Method): room air      CAPILLARY BLOOD GLUCOSE          02-22 @ 07:01  -  02-23 @ 07:00  --------------------------------------------------------  IN: 540 mL / OUT: 1150 mL / NET: -610 mL      Physical Exam:    Daily     Daily   General:  Well appearing, NAD, not cachetic  HEENT:  Nonicteric, PERRLA  CV:  RRR, S1S2   Lungs:  CTA B/L, no wheezes, rales, rhonchi  Abdomen:  Soft, non-tender, no distended, positive BS  Extremities: decreased swellign                                                                                                                                                                                                                                                                            LABS:                            02-23    137  |  101  |  73[H]  ----------------------------<  100[H]  4.7   |  21[L]  |  2.06[H]    Ca    9.2      23 Feb 2025 07:15                         RADIOLOGY & ADDITIONAL TESTS         I personally reviewed: [  ]EKG   [  ]CXR    [  ] CT      A/P:         Discussed with :     Melonie consultants' Notes   Time spent :

## 2025-02-23 NOTE — PROGRESS NOTE ADULT - ASSESSMENT
69Y M PMH CHF, afib on Eliquis, HTN, osteoarthritis, gout, pituitary mass, reccently discharged where he was admitted for acute gout involving left foot pain / L ankle and ankle.  pt completed his steroids taper however did not have his fu appointment with rheum as of yet..  now presenting with acute on chronic L ankle and L knee swelling pain and swelling   no fever or chills   n o tevin   also reports neck pain  but no weakness or numbness   has been having difficulty ambulating       acute gout polyarticular :   cont steroids as ordered   hold off on alopurinol   appreciate rheum input : fu tmmrw       neck pain : check Xray   no acute pathlogy   improving pain       afib : restarted eliquis     CHF : not in [place] chf clinically     cardiac amyloid : cont meds         Pulmonary nodule. appreciate pul input :   ·  Recommendation: PET-CT (read) as an outpatient with RML lung nodule 18 x 16 mm is not hypermetabolic, showing mild activity (SUV 1.3)  -Reviewed images from CT A/P (lower chest) from 10/2024  -Likely hamartoma. No plans for biopsy at this time, suggest eventual repeat CT chest as an outpatient.

## 2025-02-24 ENCOUNTER — NON-APPOINTMENT (OUTPATIENT)
Age: 70
End: 2025-02-24

## 2025-02-24 ENCOUNTER — TRANSCRIPTION ENCOUNTER (OUTPATIENT)
Age: 70
End: 2025-02-24

## 2025-02-24 VITALS
SYSTOLIC BLOOD PRESSURE: 108 MMHG | DIASTOLIC BLOOD PRESSURE: 68 MMHG | TEMPERATURE: 99 F | OXYGEN SATURATION: 98 % | HEART RATE: 78 BPM | RESPIRATION RATE: 18 BRPM

## 2025-02-24 LAB
ANION GAP SERPL CALC-SCNC: 15 MMOL/L — SIGNIFICANT CHANGE UP (ref 5–17)
BUN SERPL-MCNC: 75 MG/DL — HIGH (ref 7–23)
CALCIUM SERPL-MCNC: 9.2 MG/DL — SIGNIFICANT CHANGE UP (ref 8.4–10.5)
CHLORIDE SERPL-SCNC: 101 MMOL/L — SIGNIFICANT CHANGE UP (ref 96–108)
CO2 SERPL-SCNC: 22 MMOL/L — SIGNIFICANT CHANGE UP (ref 22–31)
CREAT SERPL-MCNC: 1.95 MG/DL — HIGH (ref 0.5–1.3)
EGFR: 36 ML/MIN/1.73M2 — LOW
GLUCOSE SERPL-MCNC: 102 MG/DL — HIGH (ref 70–99)
HCT VFR BLD CALC: 40.2 % — SIGNIFICANT CHANGE UP (ref 39–50)
HGB BLD-MCNC: 13.3 G/DL — SIGNIFICANT CHANGE UP (ref 13–17)
MCHC RBC-ENTMCNC: 30.1 PG — SIGNIFICANT CHANGE UP (ref 27–34)
MCHC RBC-ENTMCNC: 33.1 G/DL — SIGNIFICANT CHANGE UP (ref 32–36)
MCV RBC AUTO: 91 FL — SIGNIFICANT CHANGE UP (ref 80–100)
NRBC BLD AUTO-RTO: 0 /100 WBCS — SIGNIFICANT CHANGE UP (ref 0–0)
PLATELET # BLD AUTO: 211 K/UL — SIGNIFICANT CHANGE UP (ref 150–400)
POTASSIUM SERPL-MCNC: 4.5 MMOL/L — SIGNIFICANT CHANGE UP (ref 3.5–5.3)
POTASSIUM SERPL-SCNC: 4.5 MMOL/L — SIGNIFICANT CHANGE UP (ref 3.5–5.3)
RBC # BLD: 4.42 M/UL — SIGNIFICANT CHANGE UP (ref 4.2–5.8)
RBC # FLD: 16.1 % — HIGH (ref 10.3–14.5)
SODIUM SERPL-SCNC: 138 MMOL/L — SIGNIFICANT CHANGE UP (ref 135–145)
WBC # BLD: 9.44 K/UL — SIGNIFICANT CHANGE UP (ref 3.8–10.5)
WBC # FLD AUTO: 9.44 K/UL — SIGNIFICANT CHANGE UP (ref 3.8–10.5)

## 2025-02-24 PROCEDURE — 72050 X-RAY EXAM NECK SPINE 4/5VWS: CPT

## 2025-02-24 PROCEDURE — 85027 COMPLETE CBC AUTOMATED: CPT

## 2025-02-24 PROCEDURE — 96375 TX/PRO/DX INJ NEW DRUG ADDON: CPT

## 2025-02-24 PROCEDURE — 97110 THERAPEUTIC EXERCISES: CPT

## 2025-02-24 PROCEDURE — 97161 PT EVAL LOW COMPLEX 20 MIN: CPT

## 2025-02-24 PROCEDURE — 80048 BASIC METABOLIC PNL TOTAL CA: CPT

## 2025-02-24 PROCEDURE — 86140 C-REACTIVE PROTEIN: CPT

## 2025-02-24 PROCEDURE — 97116 GAIT TRAINING THERAPY: CPT

## 2025-02-24 PROCEDURE — 85652 RBC SED RATE AUTOMATED: CPT

## 2025-02-24 PROCEDURE — 96374 THER/PROPH/DIAG INJ IV PUSH: CPT

## 2025-02-24 PROCEDURE — 96376 TX/PRO/DX INJ SAME DRUG ADON: CPT

## 2025-02-24 PROCEDURE — 85025 COMPLETE CBC W/AUTO DIFF WBC: CPT

## 2025-02-24 PROCEDURE — 84550 ASSAY OF BLOOD/URIC ACID: CPT

## 2025-02-24 PROCEDURE — 73120 X-RAY EXAM OF HAND: CPT

## 2025-02-24 PROCEDURE — 99231 SBSQ HOSP IP/OBS SF/LOW 25: CPT | Mod: GC

## 2025-02-24 PROCEDURE — 99285 EMERGENCY DEPT VISIT HI MDM: CPT | Mod: 25

## 2025-02-24 PROCEDURE — 80053 COMPREHEN METABOLIC PANEL: CPT

## 2025-02-24 RX ORDER — PREDNISONE 20 MG/1
4 TABLET ORAL
Qty: 26 | Refills: 0
Start: 2025-02-24 | End: 2025-02-25

## 2025-02-24 RX ORDER — POLYETHYLENE GLYCOL 3350 17 G/17G
17 POWDER, FOR SOLUTION ORAL
Qty: 0 | Refills: 0 | DISCHARGE
Start: 2025-02-24

## 2025-02-24 RX ORDER — ACETAMINOPHEN 500 MG/5ML
2 LIQUID (ML) ORAL
Qty: 0 | Refills: 0 | DISCHARGE
Start: 2025-02-24

## 2025-02-24 RX ADMIN — Medication 175 MICROGRAM(S): at 05:21

## 2025-02-24 RX ADMIN — SACUBITRIL AND VALSARTAN 1 TABLET(S): 49; 51 TABLET, FILM COATED ORAL at 17:44

## 2025-02-24 RX ADMIN — APIXABAN 5 MILLIGRAM(S): 2.5 TABLET, FILM COATED ORAL at 17:44

## 2025-02-24 RX ADMIN — METHYLPREDNISOLONE ACETATE 20 MILLIGRAM(S): 80 INJECTION, SUSPENSION INTRA-ARTICULAR; INTRALESIONAL; INTRAMUSCULAR; SOFT TISSUE at 05:20

## 2025-02-24 RX ADMIN — CLOPIDOGREL BISULFATE 75 MILLIGRAM(S): 75 TABLET, FILM COATED ORAL at 13:03

## 2025-02-24 RX ADMIN — APIXABAN 5 MILLIGRAM(S): 2.5 TABLET, FILM COATED ORAL at 05:21

## 2025-02-24 RX ADMIN — FINASTERIDE 5 MILLIGRAM(S): 1 TABLET, FILM COATED ORAL at 13:02

## 2025-02-24 RX ADMIN — Medication 20 MILLIGRAM(S): at 05:20

## 2025-02-24 RX ADMIN — SACUBITRIL AND VALSARTAN 1 TABLET(S): 49; 51 TABLET, FILM COATED ORAL at 05:21

## 2025-02-24 RX ADMIN — Medication 20 MILLIGRAM(S): at 13:03

## 2025-02-24 NOTE — PROGRESS NOTE ADULT - SUBJECTIVE AND OBJECTIVE BOX
[facility]   [phone]  RENAL FOLLOW UP NOTE  --------------------------------------------------------------------------------  Chief Complaint:    24 hour events/subjective:    patient seen and examined.   denies left foot pain    PAST HISTORY  --------------------------------------------------------------------------------  No significant changes to PMH, PSH, FHx, SHx, unless otherwise noted    ALLERGIES & MEDICATIONS  --------------------------------------------------------------------------------  Allergies    No Known Allergies    Intolerances      Standing Inpatient Medications  apixaban 5 milliGRAM(s) Oral every 12 hours  atorvastatin 40 milliGRAM(s) Oral at bedtime  clopidogrel Tablet 75 milliGRAM(s) Oral daily  finasteride 5 milliGRAM(s) Oral daily  levothyroxine 175 MICROGram(s) Oral daily  methylPREDNISolone sodium succinate Injectable 20 milliGRAM(s) IV Push daily  pantoprazole    Tablet 40 milliGRAM(s) Oral before breakfast  polyethylene glycol 3350 17 Gram(s) Oral daily  sacubitril 24 mG/valsartan 26 mG 1 Tablet(s) Oral two times a day  Tafamidis (Vyndamax) Capsules 61 milliGRAM(s) 61 milliGRAM(s) Oral daily  tamsulosin 0.4 milliGRAM(s) Oral at bedtime  torsemide 20 milliGRAM(s) Oral two times a day    PRN Inpatient Medications  acetaminophen     Tablet .. 650 milliGRAM(s) Oral every 6 hours PRN  oxycodone    5 mG/acetaminophen 325 mG 2 Tablet(s) Oral every 6 hours PRN  oxyCODONE    IR 10 milliGRAM(s) Oral every 8 hours PRN      REVIEW OF SYSTEMS  --------------------------------------------------------------------------------    Gen: denies fevers/chills,  CVS: denies chest pain/palpitations  Resp: denies SOB/Cough  GI: Denies N/V/Abd pain  : Denies dysuria/oliguria/hematuria    VITALS/PHYSICAL EXAM  --------------------------------------------------------------------------------  T(C): 36.9 (02-24-25 @ 12:30), Max: 36.9 (02-24-25 @ 12:30)  HR: 67 (02-24-25 @ 12:30) (67 - 83)  BP: 102/65 (02-24-25 @ 12:30) (98/64 - 108/70)  RR: 18 (02-24-25 @ 12:30) (18 - 18)  SpO2: 99% (02-24-25 @ 12:30) (99% - 100%)  Wt(kg): --        02-23-25 @ 07:01  -  02-24-25 @ 07:00  --------------------------------------------------------  IN: 920 mL / OUT: 950 mL / NET: -30 mL    02-24-25 @ 07:01  -  02-24-25 @ 18:56  --------------------------------------------------------  IN: 240 mL / OUT: 875 mL / NET: -635 mL      Physical Exam:                Gen: Non toxic comfortable appearing   	no jvd  	Pulm: decrease bs  no rales or ronchi or wheezing  	CV: RRR, S1S2; no rub  	Abd: +BS, soft, nontender/nondistended  	: No suprapubic tenderness  	UE: Warm, no cyanosis  no clubbing,  no edema;   	LE: Warm, no cyanosis  no clubbing, no edema  	Neuro: alert and oriented. speech coherent     LABS/STUDIES  --------------------------------------------------------------------------------              13.3   9.44  >-----------<  211      [02-24-25 @ 07:03]              40.2     138  |  101  |  75  ----------------------------<  102      [02-24-25 @ 07:01]  4.5   |  22  |  1.95        Ca     9.2     [02-24-25 @ 07:01]            Creatinine Trend:  SCr 1.95 [02-24 @ 07:01]  SCr 2.06 [02-23 @ 07:15]  SCr 2.13 [02-22 @ 06:53]  SCr 2.27 [02-21 @ 07:19]  SCr 1.83 [02-19 @ 12:14]              TSH 10.30      [05-07-24 @ 07:20]

## 2025-02-24 NOTE — DISCHARGE NOTE PROVIDER - NSDCCPCAREPLAN_GEN_ALL_CORE_FT
PRINCIPAL DISCHARGE DIAGNOSIS  Diagnosis: Gout flare  Assessment and Plan of Treatment: You were treated with IV solumedrol during this admission.  On discharge, you will take the following oral steroid taper:  Prednisone 20mg oral daily x 2 days (2/25-2/26), then decrease to   Prednsione 15mg oral daily x 3 days (2/27-3/1), then decrease to   Prednisone 10mg oral daily x 3 days (3/2-3/4), then decrease to   Prednisone 5mg oral daily x 3 days (3/5-3/7), then discontinue  You must follow up with your rheumatologist within one week of discharge - please call to make this appointment.  At this appointment, you will need to discuss if/when to restart your allopurinol.      SECONDARY DISCHARGE DIAGNOSES  Diagnosis: Inability to walk  Assessment and Plan of Treatment: Improved  Physical therapy    Diagnosis: Afib  Assessment and Plan of Treatment: Atrial fibrillation is the most common heart rhythm problem.  The condition puts you at risk for has stroke and heart attack  It helps if you control your blood pressure, not drink more than 1-2 alcohol drinks per day, cut down on caffeine, getting treatment for over active thyroid gland, and get regular exercise  Call your doctor if you feel your heart racing or beating unusually, chest tightness or pain, lightheaded, faint, shortness of breath especially with exercise  It is important to take your heart medication as prescribed  You may be on anticoagulation which is very important to take as directed - you may need blood work to monitor drug levels      Diagnosis: CHF (congestive heart failure)  Assessment and Plan of Treatment: Weigh yourself daily.  If you gain 3lbs in 3 days, or 5lbs in a week call your Health Care Provider.  Do not eat or drink foods containing more than 2000mg of salt (sodium) in your diet every day.  Call your Health Care Provider if you have any swelling or increased swelling in your feet, ankles, and/or stomach.  Take all of your medication as directed.  If you become dizzy call your Health Care Provider.      Diagnosis: Cardiac amyloidosis  Assessment and Plan of Treatment: Continue with current medication regimen     PRINCIPAL DISCHARGE DIAGNOSIS  Diagnosis: Gout flare  Assessment and Plan of Treatment: You were treated with IV solumedrol during this admission.  On discharge, you will take the following oral steroid taper:  Prednisone 20mg oral daily x 2 days (2/25-2/26), then decrease to   Prednsione 15mg oral daily x 3 days (2/27-3/1), then decrease to   Prednisone 10mg oral daily x 3 days (3/2-3/4), then decrease to   Prednisone 5mg oral daily x 3 days (3/5-3/7), then discontinue  While on steroids (prednisone), you will take protonix daily to protect your stomach.  You must follow up with your rheumatologist within one week of discharge - please call to make this appointment.  At this appointment, you will need to discuss if/when to restart your allopurinol.      SECONDARY DISCHARGE DIAGNOSES  Diagnosis: Inability to walk  Assessment and Plan of Treatment: Improved  Physical therapy    Diagnosis: Pulmonary nodule  Assessment and Plan of Treatment: You must follow up with your primary medical doctor to determine when to schedule a repeat CT chest to monitor.    Diagnosis: Afib  Assessment and Plan of Treatment: Atrial fibrillation is the most common heart rhythm problem.  The condition puts you at risk for has stroke and heart attack  It helps if you control your blood pressure, not drink more than 1-2 alcohol drinks per day, cut down on caffeine, getting treatment for over active thyroid gland, and get regular exercise  Call your doctor if you feel your heart racing or beating unusually, chest tightness or pain, lightheaded, faint, shortness of breath especially with exercise  It is important to take your heart medication as prescribed  You may be on anticoagulation which is very important to take as directed - you may need blood work to monitor drug levels      Diagnosis: CHF (congestive heart failure)  Assessment and Plan of Treatment: Weigh yourself daily.  If you gain 3lbs in 3 days, or 5lbs in a week call your Health Care Provider.  Do not eat or drink foods containing more than 2000mg of salt (sodium) in your diet every day.  Call your Health Care Provider if you have any swelling or increased swelling in your feet, ankles, and/or stomach.  Take all of your medication as directed.  If you become dizzy call your Health Care Provider.      Diagnosis: Cardiac amyloidosis  Assessment and Plan of Treatment: Continue with current medication regimen

## 2025-02-24 NOTE — PROGRESS NOTE ADULT - PROVIDER SPECIALTY LIST ADULT
Internal Medicine
Internal Medicine
Intervent Cardiology
Internal Medicine
Nephrology
Rheumatology
Rheumatology
Internal Medicine
Nephrology

## 2025-02-24 NOTE — DISCHARGE NOTE NURSING/CASE MANAGEMENT/SOCIAL WORK - FINANCIAL ASSISTANCE
[facility] provides services at a reduced cost to those who are determined to be eligible through [facility]’s financial assistance program. Information regarding [facility]’s financial assistance program can be found by going to [email or web address] or by calling [phone].

## 2025-02-24 NOTE — DISCHARGE NOTE PROVIDER - HOSPITAL COURSE
HPI:  69Y M PMH CHF, afib on Eliquis, HTN, osteoarthritis, gout, pituitary mass, reccently discharged where he was admitted for acute gout involving left foot pain / L ankle and ankle.  pt completed his steroids taper however did not have his fu appointment with rheum as of yet..  now presenting with acute on chronic L ankle and L knee swelling pain and swelling   no fever or chills   n o tevin   also reports neck pain  but no weakness or numbness   has been having difficulty ambulating       Hospital Course:      Important Medication Changes and Reason:  Prednisone taper for acute gout flare    Active or Pending Issues Requiring Follow-up:  f/u with Rheumatology within one week of discharge    Advanced Directives:   [ ] Full code  [ ] DNR  [ ] Hospice    Discharge Diagnoses:         HPI:  69Y M PMH CHF, afib on Eliquis, HTN, osteoarthritis, gout, pituitary mass, reccently discharged where he was admitted for acute gout involving left foot pain / L ankle and ankle.  pt completed his steroids taper however did not have his fu appointment with rheum as of yet..  now presenting with acute on chronic L ankle and L knee swelling pain and swelling   no fever or chills   no truama   also reports neck pain  but no weakness or numbness   has been having difficulty ambulating       Hospital Course:  acute gout polyarticular :   Rheumatology following  started on solumedrol - will transition to steroid taper on discharge  hold off on allopurinol - f/u with rheumatology to determine when okay to restart      neck pain :   Xrays with OA  no acute pathlogy   improving pain       afib : restarted eliquis     CHF : not in [place]id chf clinically     cardiac amyloid : cont meds       Pulmonary nodule. appreciate pul input :   ·  Recommendation: PET-CT (read) as an outpatient with RML lung nodule 18 x 16 mm is not hypermetabolic, showing mild activity (SUV 1.3)  -Reviewed images from CT A/P (lower chest) from 10/2024  -Likely hamartoma. No plans for biopsy at this time, suggest eventual repeat CT chest as an outpatient.        Important Medication Changes and Reason:  Prednisone taper for acute gout flare    Active or Pending Issues Requiring Follow-up:  f/u with Rheumatology within one week of discharge    Advanced Directives:   [ ] Full code  [ ] DNR  [ ] Hospice    Discharge Diagnoses:  Acute gout  Neck pain  Pulmonary nodule

## 2025-02-24 NOTE — DISCHARGE NOTE PROVIDER - CARE PROVIDER_API CALL
Mitchell Harris  Internal Medicine  [address], [address]  [place] [address]  Phone: [phone]  Fax: [phone]  Follow Up Time: 1-3 days    Nickie Austin  Rheumatology  [address]  [place] [address]  Phone: [phone]  Fax: [phone]  Follow Up Time: 1 week

## 2025-02-24 NOTE — PROGRESS NOTE ADULT - SUBJECTIVE AND OBJECTIVE BOX
MUSTAPHAEFREN MORELANDS  [id number]    Subjective:  Continued on IV steroids over the weekend.     Objective:   Vital Signs Last 24 Hrs  T(C): 36.4 (24 Feb 2025 05:09), Max: 36.5 (23 Feb 2025 20:20)  T(F): 97.6 (24 Feb 2025 05:09), Max: 97.7 (23 Feb 2025 20:20)  HR: 79 (24 Feb 2025 05:09) (69 - 85)  BP: 98/64 (24 Feb 2025 05:09) (98/64 - 120/78)  BP(mean): --  RR: 18 (24 Feb 2025 05:09) (18 - 18)  SpO2: 99% (24 Feb 2025 05:09) (98% - 100%)    Parameters below as of 24 Feb 2025 05:09  Patient On (Oxygen Delivery Method): room air    Physical Exam:  General: No apparent distress  HEENT: EOMI, MMM  MSK: left third PIP swelling and tenderness noted, Left knee mild effusion tenderness+, left ankle warm, swollen and tenderness. ROM is full but painful in these joints. Left first MTP tenderness improved         LABS:  cret                        13.3   9.44  )-----------( 211      ( 24 Feb 2025 07:03 )             40.2     02-24    138  |  101  |  75[H]  ----------------------------<  102[H]  4.5   |  22  |  1.95[H]    Ca    9.2      24 Feb 2025 07:01      Rheumatology Work Up    Sedimentation Rate, Erythrocyte: 21 mm/hr *H* [0 - 20] (02-20-25 @ 06:40)  C-Reactive Protein: 124 mg/L *H* [0 - 4] (02-20-25 @ 06:39)  Sedimentation Rate, Erythrocyte: 51 mm/hr *H* [0 - 20] (02-04-25 @ 03:42)  C-Reactive Protein: 207 mg/L *H* [0 - 4] (02-04-25 @ 03:42)        RADIOLOGY & ADDITIONAL STUDIES:    tECHNIQUE: 2 views of the left tibia and fibula, 3 views of the left foot.    COMPARISON: None available.    FINDINGS:  Diffuse soft tissue swelling. No tracking soft tissue gas collections,   radiopaque foreign bodies, or grossradiographic evidence for   osteomyelitis.  No fractures or dislocations.  Left knee tricompartment osteoarthritis with medial tibiofemoral   compartment predominance.  Plantar calcaneal enthesophyte.  Vascular calcifications.    IMPRESSION:  No acute fracture or dislocation.  No tracking gas collections or gross radiographic evidence for   osteomyelitis.     MUSTAPHAEFREN MORELANDS  [id number]    Subjective:  Continued on IV steroids over the weekend. L 3rd PIP improved, still with some swelling. However, has better ROM.   Knee and ankle much better, ambulated without issue.     Objective:   Vital Signs Last 24 Hrs  T(C): 36.4 (24 Feb 2025 05:09), Max: 36.5 (23 Feb 2025 20:20)  T(F): 97.6 (24 Feb 2025 05:09), Max: 97.7 (23 Feb 2025 20:20)  HR: 79 (24 Feb 2025 05:09) (69 - 85)  BP: 98/64 (24 Feb 2025 05:09) (98/64 - 120/78)  BP(mean): --  RR: 18 (24 Feb 2025 05:09) (18 - 18)  SpO2: 99% (24 Feb 2025 05:09) (98% - 100%)    Parameters below as of 24 Feb 2025 05:09  Patient On (Oxygen Delivery Method): room air    Physical Exam:  General: No apparent distress  HEENT: EOMI, MMM  MSK: left third PIP with mild swelling and TTP, however ROM greatly improved.  L ankle and knee with no swelling, improved ROM      LABS:  cret                        13.3   9.44  )-----------( 211      ( 24 Feb 2025 07:03 )             40.2     02-24    138  |  101  |  75[H]  ----------------------------<  102[H]  4.5   |  22  |  1.95[H]    Ca    9.2      24 Feb 2025 07:01      Rheumatology Work Up    Sedimentation Rate, Erythrocyte: 21 mm/hr *H* [0 - 20] (02-20-25 @ 06:40)  C-Reactive Protein: 124 mg/L *H* [0 - 4] (02-20-25 @ 06:39)  Sedimentation Rate, Erythrocyte: 51 mm/hr *H* [0 - 20] (02-04-25 @ 03:42)  C-Reactive Protein: 207 mg/L *H* [0 - 4] (02-04-25 @ 03:42)        RADIOLOGY & ADDITIONAL STUDIES:    tECHNIQUE: 2 views of the left tibia and fibula, 3 views of the left foot.    COMPARISON: None available.    FINDINGS:  Diffuse soft tissue swelling. No tracking soft tissue gas collections,   radiopaque foreign bodies, or grossradiographic evidence for   osteomyelitis.  No fractures or dislocations.  Left knee tricompartment osteoarthritis with medial tibiofemoral   compartment predominance.  Plantar calcaneal enthesophyte.  Vascular calcifications.    IMPRESSION:  No acute fracture or dislocation.  No tracking gas collections or gross radiographic evidence for   osteomyelitis.    < from: Xray Hand 2 Views, Left (02.22.25 @ 10:38) >  IMPRESSION:  Fixed appearing PIP and DIP flexion deformity of 3rd finger.    Chronic small ossicle adjacent to radial margin of 3rd proximal phalanx.   Otherwise no dislocations or suspected acute appearing fractures.    Preserved joint spaces and no joint margin erosions.    Carpal bones normally aligned.    Neutral ulnar variance.    No lytic or blastic lesions.    < end of copied text >

## 2025-02-24 NOTE — DISCHARGE NOTE NURSING/CASE MANAGEMENT/SOCIAL WORK - PATIENT PORTAL LINK FT
You can access the FollowMyHealth Patient Portal offered by [facility] by registering at the following website: [email or web address]. By joining [employer]’s FollowMyHealth portal, you will also be able to view your health information using other applications (apps) compatible with our system.

## 2025-02-24 NOTE — DISCHARGE NOTE NURSING/CASE MANAGEMENT/SOCIAL WORK - NSDCFUADDAPPT_GEN_ALL_CORE_FT
You must follow up with your primary medical doctor within 2-3 days of discharge - please call to make an appointment.    You must follow up with your rheumatologist, Dr. Austin, within one week of discharge - please call to make an appointment.        APPTS ARE READY TO BE MADE: [X] YES    Best Family or Patient Contact (if needed):    Additional Information about above appointments (if needed):    1: Primary medical doctor  2: Rheumatologist  3:     Other comments or requests:

## 2025-02-24 NOTE — DISCHARGE NOTE PROVIDER - PROVIDER TOKENS
PROVIDER:[TOKEN:[[id number]],FOLLOWUP:[1-3 days]],PROVIDER:[TOKEN:[[id number]],FOLLOWUP:[1 week]]

## 2025-02-24 NOTE — PROGRESS NOTE ADULT - ASSESSMENT
70 yo M w/ PMH of CKD, CHF 2/2 Cardiac amyloidosis, afib, HTN OA, gout who presents for acute L foot pain/L ankle pain and L knee pain. Rheumatology consulted for gout flare evaluation.     #Acute oligoarticular gout flare: left knee, ankle, left 3rd PIP  -Risk factors: CKD, CHF  -uric acid : 12.3, elevated inflammatory markers  -Good response s/p solumedrol 40mg for knee and ankle, however still with persist L PIP pain requiring additional IV steroids   -Meets criteria for urate lowering therapy: has had 2+ gout flares in 1 year and has a previous uric acid level >9 in setting of CKD.   -Uric acid level goal should be < 6  -HLA  negative  -Xrays knee with OA, ankle xrays without erosions    Recommendations:  ***Final recs***   -Can switch to prednisone 20mg PO from monday for 3 days> 15mg for 3 days>10 mg for 3 days>5 mg until follow up  -Will hold off allopurinol, until renal function stabilizes   -Get xray  of his left hand, If patient continues to have persistent isolated finger swelling, consider MRI hand  -Will arrange for outpatient rheumatology follow up  -Monitor renal function closely    D/w Dr.El kingston Spears MD   PGY-5  Reachable on TEAMS  Rheumatology Fellow   70 yo M w/ PMH of CKD, CHF 2/2 Cardiac amyloidosis, afib, HTN OA, gout who presents for acute L foot pain/L ankle pain and L knee pain. Rheumatology consulted for gout flare evaluation.     #Acute oligoarticular gout flare: left knee, ankle, left 3rd PIP (improved)   -Risk factors: CKD, CHF  -uric acid : 12.3, elevated inflammatory markers  -Good response s/p solumedrol 40mg for knee and ankle, however still with persist L PIP pain requiring additional IV steroids. Now much better on exam today.   -Meets criteria for urate lowering therapy: has had 2+ gout flares in 1 year and has a previous uric acid level >9 in setting of CKD.   -Uric acid level goal should be < 6  -HLA  negative  -Xrays knee with OA, ankle xrays without erosions    Recommendations:  -Was given IV solumedrol 20mg today. Can switch to prednisone 20mg PO tomorrow for 2 days> 15mg for 3 days>10 mg for 3 days>5 mg for 3 days   -Will start allopurinol in outpt setting.   -Okay for discharge from rheumatology perspective. Will set up follow up appointment with Dr. Austin at [address] [place] [address]. Office will give pt a call for appt, however please give number at [phone]     D/w Dr.El kingston Spears MD   PGY-5  Reachable on TEAMS  Rheumatology Fellow

## 2025-02-24 NOTE — DISCHARGE NOTE NURSING/CASE MANAGEMENT/SOCIAL WORK - NSDCPEFALRISK_GEN_ALL_CORE
For information on Fall & Injury Prevention, visit: [email or web address] OR  [email or web address] OR  https://www.cdc.gov/steadi/patient.html

## 2025-02-24 NOTE — CHART NOTE - NSCHARTNOTEFT_GEN_A_CORE
Request from Dr. Cornelius to facilitate patient discharge.  Medication reconciliation reviewed, revised, and resolved with Dr. Cornelius, who has medically cleared patient for discharge with follow up as advised.  Please refer to discharge note for detailed hospital course.

## 2025-02-24 NOTE — DISCHARGE NOTE PROVIDER - NSDCFUADDAPPT_GEN_ALL_CORE_FT
You must follow up with your primary medical doctor within 2-3 days of discharge - please call to make an appointment.    You must follow up with your rheumatologist, Dr. Austin, within one week of discharge - please call to make an appointment.        APPTS ARE READY TO BE MADE: [X] YES    Best Family or Patient Contact (if needed):    Additional Information about above appointments (if needed):    1: Primary medical doctor  2: Rheumatologist  3:     Other comments or requests:    You must follow up with your primary medical doctor within 2-3 days of discharge - please call to make an appointment.    You must follow up with your rheumatologist, Dr. Austin, within one week of discharge - please call to make an appointment.        APPTS ARE READY TO BE MADE: [X] YES    Best Family or Patient Contact (if needed):    Additional Information about above appointments (if needed):    1: Primary medical doctor  2: Rheumatologist  3:     Other comments or requests:   Patient was outreached but did not answer nor could a voicemail be left.    Prior to outreaching the patient, it was visible that the patient has secured a follow up appointment which was not scheduled by our team. Patient is scheduled on 3/24 at 2:20P at [address] [place] [address] with Dr. Nickie Austin    You must follow up with your primary medical doctor within 2-3 days of discharge - please call to make an appointment.    You must follow up with your rheumatologist, Dr. Austin, within one week of discharge - please call to make an appointment.        APPTS ARE READY TO BE MADE: [X] YES    Best Family or Patient Contact (if needed):    Additional Information about above appointments (if needed):    1: Primary medical doctor  2: Rheumatologist  3:     Other comments or requests:   Patient informed us they already have secured a follow up appointment which is not visible on [facility] and declined to provide appointment details. Patient advised they already followed up with PCP post discharge but did not provide the date.       Prior to outreaching the patient, it was visible that the patient has secured a follow up appointment which was not scheduled by our team. Patient is scheduled on 3/24 at 2:20P at [address] [place] [address] with Dr. Nickie Austin

## 2025-02-24 NOTE — DISCHARGE NOTE PROVIDER - NSDCMRMEDTOKEN_GEN_ALL_CORE_FT
Albuterol (Eqv-Ventolin HFA) 90 mcg/inh inhalation aerosol: 1 puff(s) inhaled every 6 hours as needed  Amvuttra 25 mg/0.5 mL subcutaneous solution: 25 milligram(s) subcutaneously every 3 months  apixaban 5 mg oral tablet: 1 tab(s) orally every 12 hours  atorvastatin 40 mg oral tablet: 1 tab(s) orally once a day (at bedtime)  cabergoline 0.5 mg oral tablet: 2 tab(s) orally once a week (every Friday)  clopidogrel 75 mg oral tablet: 1 tab(s) orally once a day  Entresto 24 mg-26 mg oral tablet: 1 tab(s) orally 2 times a day  finasteride 5 mg oral tablet: 1 tab(s) orally once a day  fluticasone-salmeterol 100 mcg-50 mcg inhalation powder: 1 puff(s) inhaled 2 times a day  levothyroxine 175 mcg (0.175 mg) oral tablet: 1 tab(s) orally once a day  PT at home: for gout flare (ICD10 code: M10.9), inability to walk (ICD10 code: R26.2)  tamsulosin 0.4 mg oral capsule: 1 cap(s) orally once a day (in the morning)  torsemide 20 mg oral tablet: 2 tab(s) orally 2 times a day  Vyndamax 61 mg oral capsule: 1 cap(s) orally once a day   Albuterol (Eqv-Ventolin HFA) 90 mcg/inh inhalation aerosol: 1 puff(s) inhaled every 6 hours as needed  Amvuttra 25 mg/0.5 mL subcutaneous solution: 25 milligram(s) subcutaneously every 3 months  apixaban 5 mg oral tablet: 1 tab(s) orally every 12 hours  atorvastatin 40 mg oral tablet: 1 tab(s) orally once a day (at bedtime)  cabergoline 0.5 mg oral tablet: 2 tab(s) orally once a week (every Friday)  clopidogrel 75 mg oral tablet: 1 tab(s) orally once a day  Entresto 24 mg-26 mg oral tablet: 1 tab(s) orally 2 times a day  finasteride 5 mg oral tablet: 1 tab(s) orally once a day  fluticasone-salmeterol 100 mcg-50 mcg inhalation powder: 1 puff(s) inhaled 2 times a day  levothyroxine 175 mcg (0.175 mg) oral tablet: 1 tab(s) orally once a day  polyethylene glycol 3350 oral powder for reconstitution: 17 gram(s) orally once a day as needed for  constipation  predniSONE 5 mg oral tablet: 4 tab(s) orally once a day You will take 20mg  (4 tabs) orally daily x 2 days (2/25 and 2/26), then decrease to 15mg (3 tabs) orally daily x 3 days (2/27-3/1), then decrease to 10mg (2 tabs) orally daily x 3 days (3/2- 3/4), then decrease to 5mg (1 tab) orally daily x 3 days (3/5-3/7), then discontinue  PT at home: for gout flare (ICD10 code: M10.9), inability to walk (ICD10 code: R26.2)  tamsulosin 0.4 mg oral capsule: 1 cap(s) orally once a day (in the morning)  torsemide 20 mg oral tablet: 2 tab(s) orally 2 times a day  Vyndamax 61 mg oral capsule: 1 cap(s) orally once a day   acetaminophen 325 mg oral tablet: 2 tab(s) orally every 6 hours as needed for  mild-moderate pain  Albuterol (Eqv-Ventolin HFA) 90 mcg/inh inhalation aerosol: 1 puff(s) inhaled every 6 hours as needed  Amvuttra 25 mg/0.5 mL subcutaneous solution: 25 milligram(s) subcutaneously every 3 months  apixaban 5 mg oral tablet: 1 tab(s) orally every 12 hours  atorvastatin 40 mg oral tablet: 1 tab(s) orally once a day (at bedtime)  cabergoline 0.5 mg oral tablet: 2 tab(s) orally once a week (every Friday)  clopidogrel 75 mg oral tablet: 1 tab(s) orally once a day  Entresto 24 mg-26 mg oral tablet: 1 tab(s) orally 2 times a day  finasteride 5 mg oral tablet: 1 tab(s) orally once a day  fluticasone-salmeterol 100 mcg-50 mcg inhalation powder: 1 puff(s) inhaled 2 times a day  levothyroxine 175 mcg (0.175 mg) oral tablet: 1 tab(s) orally once a day  Outpatient Physical Therapy: evaluate and treat  polyethylene glycol 3350 oral powder for reconstitution: 17 gram(s) orally once a day as needed for  constipation  predniSONE 5 mg oral tablet: 4 tab(s) orally once a day You will take 20mg  (4 tabs) orally daily x 2 days (2/25 and 2/26), then decrease to 15mg (3 tabs) orally daily x 3 days (2/27-3/1), then decrease to 10mg (2 tabs) orally daily x 3 days (3/2- 3/4), then decrease to 5mg (1 tab) orally daily x 3 days (3/5-3/7), then discontinue  tamsulosin 0.4 mg oral capsule: 1 cap(s) orally once a day (in the morning)  torsemide 20 mg oral tablet: 2 tab(s) orally 2 times a day  Vyndamax 61 mg oral capsule: 1 cap(s) orally once a day   acetaminophen 325 mg oral tablet: 2 tab(s) orally every 6 hours as needed for  mild-moderate pain  Albuterol (Eqv-Ventolin HFA) 90 mcg/inh inhalation aerosol: 1 puff(s) inhaled every 6 hours as needed  Amvuttra 25 mg/0.5 mL subcutaneous solution: 25 milligram(s) subcutaneously every 3 months  apixaban 5 mg oral tablet: 1 tab(s) orally every 12 hours  atorvastatin 40 mg oral tablet: 1 tab(s) orally once a day (at bedtime)  cabergoline 0.5 mg oral tablet: 2 tab(s) orally once a week (every Friday)  clopidogrel 75 mg oral tablet: 1 tab(s) orally once a day  Entresto 24 mg-26 mg oral tablet: 1 tab(s) orally 2 times a day  finasteride 5 mg oral tablet: 1 tab(s) orally once a day  fluticasone-salmeterol 100 mcg-50 mcg inhalation powder: 1 puff(s) inhaled 2 times a day  levothyroxine 175 mcg (0.175 mg) oral tablet: 1 tab(s) orally once a day  pantoprazole 40 mg oral delayed release tablet: 1 tab(s) orally once a day (before a meal) Take this medication while on steroids (prednisone)  polyethylene glycol 3350 oral powder for reconstitution: 17 gram(s) orally once a day as needed for  constipation  predniSONE 5 mg oral tablet: 4 tab(s) orally once a day You will take 20mg  (4 tabs) orally daily x 2 days (2/25 and 2/26), then decrease to 15mg (3 tabs) orally daily x 3 days (2/27-3/1), then decrease to 10mg (2 tabs) orally daily x 3 days (3/2- 3/4), then decrease to 5mg (1 tab) orally daily x 3 days (3/5-3/7), then discontinue  tamsulosin 0.4 mg oral capsule: 1 cap(s) orally once a day (in the morning)  torsemide 20 mg oral tablet: 2 tab(s) orally 2 times a day  Vyndamax 61 mg oral capsule: 1 cap(s) orally once a day

## 2025-02-24 NOTE — PROGRESS NOTE ADULT - SUBJECTIVE AND OBJECTIVE BOX
pt was seen and exaimened   chart reviewed   case discussed with nurse , pt and acp  improving pain and swelling   pt is stable for dc with steroids taper and close fu with rheum for evaluatino and starting allpurinol   d/w ot and rehum   d/c>45 min   see dc summary

## 2025-02-24 NOTE — PROGRESS NOTE ADULT - ATTENDING COMMENTS
Amendments to fellow's assessment and plan as above.  Patient aware of our recommendations and in agreement.  Discussed with primary team  will follow
ACS (acute coronary syndrome)  1/2015 - cath revealed 100% ostial stenosis not amenable to PCI - medical management  CAD (coronary artery disease)    Cellulitis  2015 left foot  CVA (cerebral infarction)  with no residual, 8 yrs ago, prior to heart surgery - ST memory loss  Diabetes mellitus type 1  Insulin Dependent - Medtronic  Minimed Paradigm Insulin Pump - Novolog  DKA, type 1  1/2015  ESRD (end stage renal disease)  dialysis , tue, thursday, saturday  Gout  past  HTN (hypertension)    Hyperlipidemia    MI, old    Murmur, cardiac    Peripheral vascular disease  occluded left fem-pop bypass 5/2015  TIA (transient ischemic attack)  x 2 - 8-9 years ago prior to ASD/VSD repair
as above  DW primary team   will arrange for outpatient follow up

## 2025-02-24 NOTE — PROGRESS NOTE ADULT - ASSESSMENT
69Y M PMH CHF, afib on Eliquis, HTN, osteoarthritis, gout, pituitary mass, reccently discharged where he was admitted for acute gout involving left foot pain / L ankle and ankle.pt completed his steroids taper however did not have his fu appointment with rheum as of yet.. now presenting with acute on chronic L ankle and L knee swelling pain and swelling   has been having difficulty ambulating     dx with acute gout also with abn creatinine       1- CKD IV borderline   2- acute gout   3- CHF  4- HTn     cr seems to be steady range   torsemide 20 mg twice daily to cont   rheum following for acute gout  iv solumedrol for gout once acute gout improves   to transition to prednisone tomorrow  plan to start allopurinol outpatient  cont entresto 24/26 bid   discharge planning per primary team

## 2025-02-24 NOTE — DISCHARGE NOTE PROVIDER - NSDCFUSCHEDAPPT_GEN_ALL_CORE_FT
Nickie Austin  [facility]  [address]  Scheduled Appointment: 03/24/2025     Nickie Austin  [facility]  RHEUM [address]  Scheduled Appointment: 03/24/2025    KAIA SANCHES  [facility]  CARDIOLOGY [address] [facility]   Scheduled Appointment: 03/26/2025

## 2025-03-05 NOTE — DIETITIAN INITIAL EVALUATION ADULT - NSPROEDAABILITYLEARN_GEN_A_NUR
Bexarotene Pregnancy And Lactation Text: This medication is Pregnancy Category X and should not be given to women who are pregnant or may become pregnant. This medication should not be used if you are breast feeding. Drysol Pregnancy And Lactation Text: This medication is considered safe during pregnancy and breast feeding. Infliximab Counseling:  I discussed with the patient the risks of infliximab including but not limited to myelosuppression, immunosuppression, autoimmune hepatitis, demyelinating diseases, lymphoma, and serious infections.  The patient understands that monitoring is required including a PPD at baseline and must alert us or the primary physician if symptoms of infection or other concerning signs are noted. Bimzelx Counseling:  I discussed with the patient the risks of Bimzelx including but not limited to depression, immunosuppression, allergic reactions and infections.  The patient understands that monitoring is required including a PPD at baseline and must alert us or the primary physician if symptoms of infection or other concerning signs are noted. Topical Metronidazole Counseling: Metronidazole is a topical antibiotic medication. You may experience burning, stinging, redness, or allergic reactions.  Please call our office if you develop any problems from using this medication. Wartpeel Pregnancy And Lactation Text: This medication is Pregnancy Category X and contraindicated in pregnancy and in women who may become pregnant. It is unknown if this medication is excreted in breast milk. Azathioprine Pregnancy And Lactation Text: This medication is Pregnancy Category D and isn't considered safe during pregnancy. It is unknown if this medication is excreted in breast milk. Dapsone Counseling: I discussed with the patient the risks of dapsone including but not limited to hemolytic anemia, agranulocytosis, rashes, methemoglobinemia, kidney failure, peripheral neuropathy, headaches, GI upset, and liver toxicity.  Patients who start dapsone require monitoring including baseline LFTs and weekly CBCs for the first month, then every month thereafter.  The patient verbalized understanding of the proper use and possible adverse effects of dapsone.  All of the patient's questions and concerns were addressed. Finasteride Male Counseling: Finasteride Counseling:  I discussed with the patient the risks of use of finasteride including but not limited to decreased libido, decreased ejaculate volume, gynecomastia, and depression. Women should not handle medication.  All of the patient's questions and concerns were addressed. Protopic Counseling: Patient may experience a mild burning sensation during topical application. Protopic is not approved in children less than 2 years of age. There have been case reports of hematologic and skin malignancies in patients using topical calcineurin inhibitors although causality is questionable. Azithromycin Pregnancy And Lactation Text: This medication is considered safe during pregnancy and is also secreted in breast milk. Oxybutynin Pregnancy And Lactation Text: This medication is Pregnancy Category B and is considered safe during pregnancy. It is unknown if it is excreted in breast milk. Opioid Counseling: I discussed with the patient the potential side effects of opioids including but not limited to addiction, altered mental status, and depression. I stressed avoiding alcohol, benzodiazepines, muscle relaxants and sleep aids unless specifically okayed by a physician. The patient verbalized understanding of the proper use and possible adverse effects of opioids. All of the patient's questions and concerns were addressed. They were instructed to flush the remaining pills down the toilet if they did not need them for pain. Bimzelx Pregnancy And Lactation Text: This medication crosses the placenta and the safety is uncertain during pregnancy. It is unknown if this medication is present in breast milk. Dapsone Pregnancy And Lactation Text: This medication is Pregnancy Category C and is not considered safe during pregnancy or breast feeding. Elidel Counseling: Patient may experience a mild burning sensation during topical application. Elidel is not approved in children less than 2 years of age. There have been case reports of hematologic and skin malignancies in patients using topical calcineurin inhibitors although causality is questionable. Isotretinoin Counseling: Patient should get monthly blood tests, not donate blood, not drive at night if vision affected, not share medication, and not undergo elective surgery for 6 months after tx completed. Side effects reviewed, pt to contact office should one occur. Infliximab Pregnancy And Lactation Text: This medication is Pregnancy Category B and is considered safe during pregnancy. It is unknown if this medication is excreted in breast milk. Cellcept Counseling:  I discussed with the patient the risks of mycophenolate mofetil including but not limited to infection/immunosuppression, GI upset, hypokalemia, hypercholesterolemia, bone marrow suppression, lymphoproliferative disorders, malignancy, GI ulceration/bleed/perforation, colitis, interstitial lung disease, kidney failure, progressive multifocal leukoencephalopathy, and birth defects.  The patient understands that monitoring is required including a baseline creatinine and regular CBC testing. In addition, patient must alert us immediately if symptoms of infection or other concerning signs are noted. Finasteride Female Counseling: Finasteride Counseling:  I discussed with the patient the risks of use of finasteride including but not limited to decreased libido and sexual dysfunction. Explained the teratogenic nature of the medication and stressed the importance of not getting pregnant during treatment. All of the patient's questions and concerns were addressed. Topical Metronidazole Pregnancy And Lactation Text: This medication is Pregnancy Category B and considered safe during pregnancy.  It is also considered safe to use while breastfeeding. Propranolol Counseling:  I discussed with the patient the risks of propranolol including but not limited to low heart rate, low blood pressure, low blood sugar, restlessness and increased cold sensitivity. They should call the office if they experience any of these side effects. Winlevi Counseling:  I discussed with the patient the risks of topical clascoterone including but not limited to erythema, scaling, itching, and stinging. Patient voiced their understanding. Bactrim Counseling:  I discussed with the patient the risks of sulfa antibiotics including but not limited to GI upset, allergic reaction, drug rash, diarrhea, dizziness, photosensitivity, and yeast infections.  Rarely, more serious reactions can occur including but not limited to aplastic anemia, agranulocytosis, methemoglobinemia, blood dyscrasias, liver or kidney failure, lung infiltrates or desquamative/blistering drug rashes. Protopic Pregnancy And Lactation Text: This medication is Pregnancy Category C. It is unknown if this medication is excreted in breast milk when applied topically. Nemluvio Counseling: I discussed with the patient the risks of nemolizumab including but not limited to headache, gastrointestinal complaints, nasopharyngitis, musculoskeletal complaints, injection site reactions, and allergic reactions. The patient understands that monitoring is required and they must alert us or the primary physician if any side effects are noted. Minocycline Counseling: Patient advised regarding possible photosensitivity and discoloration of the teeth, skin, lips, tongue and gums.  Patient instructed to avoid sunlight, if possible.  When exposed to sunlight, patients should wear protective clothing, sunglasses, and sunscreen.  The patient was instructed to call the office immediately if the following severe adverse effects occur:  hearing changes, easy bruising/bleeding, severe headache, or vision changes.  The patient verbalized understanding of the proper use and possible adverse effects of minocycline.  All of the patient's questions and concerns were addressed. Opioid Pregnancy And Lactation Text: These medications can lead to premature delivery and should be avoided during pregnancy. These medications are also present in breast milk in small amounts. Elidel Pregnancy And Lactation Text: This medication is Pregnancy Category C. It is unknown if this medication is excreted in breast milk. Cimzia Counseling:  I discussed with the patient the risks of Cimzia including but not limited to immunosuppression, allergic reactions and infections.  The patient understands that monitoring is required including a PPD at baseline and must alert us or the primary physician if symptoms of infection or other concerning signs are noted. Isotretinoin Pregnancy And Lactation Text: This medication is Pregnancy Category X and is considered extremely dangerous during pregnancy. It is unknown if it is excreted in breast milk. Gabapentin Counseling: I discussed with the patient the risks of gabapentin including but not limited to dizziness, somnolence, fatigue and ataxia. Propranolol Pregnancy And Lactation Text: This medication is Pregnancy Category C and it isn't known if it is safe during pregnancy. It is excreted in breast milk. Finasteride Pregnancy And Lactation Text: This medication is absolutely contraindicated during pregnancy. It is unknown if it is excreted in breast milk. Cibinqo Counseling: I discussed with the patient the risks of Cibinqo therapy including but not limited to common cold, nausea, headache, cold sores, increased blood CPK levels, dizziness, UTIs, fatigue, acne, and vomitting. Live vaccines should be avoided.  This medication has been linked to serious infections; higher rate of mortality; malignancy and lymphoproliferative disorders; major adverse cardiovascular events; thrombosis; thrombocytopenia and lymphopenia; lipid elevations; and retinal detachment. Winlevi Pregnancy And Lactation Text: This medication is considered safe during pregnancy and breastfeeding. Bactrim Pregnancy And Lactation Text: This medication is Pregnancy Category D and is known to cause fetal risk.  It is also excreted in breast milk. Albendazole Counseling:  I discussed with the patient the risks of albendazole including but not limited to cytopenia, kidney damage, nausea/vomiting and severe allergy.  The patient understands that this medication is being used in an off-label manner. Cyclophosphamide Counseling:  I discussed with the patient the risks of cyclophosphamide including but not limited to hair loss, hormonal abnormalities, decreased fertility, abdominal pain, diarrhea, nausea and vomiting, bone marrow suppression and infection. The patient understands that monitoring is required while taking this medication. Nemluvio Pregnancy And Lactation Text: It is not known if Nemluvio causes fetal harm or is present in breast milk. Please proceed with caution if patients who are pregnant or breastfeeding. Qbrexza Counseling:  I discussed with the patient the risks of Qbrexza including but not limited to headache, mydriasis, blurred vision, dry eyes, nasal dryness, dry mouth, dry throat, dry skin, urinary hesitation, and constipation.  Local skin reactions including erythema, burning, stinging, and itching can also occur. Cimzia Pregnancy And Lactation Text: This medication crosses the placenta but can be considered safe in certain situations. Cimzia may be excreted in breast milk. High Dose Vitamin A Counseling: Side effects reviewed, pt to contact office should one occur. Ozempic Counseling: I reviewed the possible side effects including: thyroid tumors, kidney disease, gallbladder disease, abdominal pain, constipation, diarrhea, nausea, vomiting and pancreatitis. Do not take this medication if you have a history or family history of multiple endocrine neoplasia syndrome type 2. Side effects reviewed, pt to contact office should one occur. Gabapentin Pregnancy And Lactation Text: This medication is Pregnancy Category C and isn't considered safe during pregnancy. It is excreted in breast milk. Minocycline Pregnancy And Lactation Text: This medication is Pregnancy Category D and not consider safe during pregnancy. It is also excreted in breast milk. Eucrisa Counseling: Patient may experience a mild burning sensation during topical application. Eucrisa is not approved in children less than 3 months of age. Cephalexin Counseling: I counseled the patient regarding use of cephalexin as an antibiotic for prophylactic and/or therapeutic purposes. Cephalexin (commonly prescribed under brand name Keflex) is a cephalosporin antibiotic which is active against numerous classes of bacteria, including most skin bacteria. Side effects may include nausea, diarrhea, gastrointestinal upset, rash, hives, yeast infections, and in rare cases, hepatitis, kidney disease, seizures, fever, confusion, neurologic symptoms, and others. Patients with severe allergies to penicillin medications are cautioned that there is about a 10% incidence of cross-reactivity with cephalosporins. When possible, patients with penicillin allergies should use alternatives to cephalosporins for antibiotic therapy. VTAMA Counseling: I discussed with the patient that VTAMA is not for use in the eyes, mouth or mouth. They should call the office if they develop any signs of allergic reactions to VTAMA. The patient verbalized understanding of the proper use and possible adverse effects of VTAMA.  All of the patient's questions and concerns were addressed. SSKI Counseling:  I discussed with the patient the risks of SSKI including but not limited to thyroid abnormalities, metallic taste, GI upset, fever, headache, acne, arthralgias, paraesthesias, lymphadenopathy, easy bleeding, arrhythmias, and allergic reaction. Cibinqo Pregnancy And Lactation Text: It is unknown if this medication will adversely affect pregnancy or breast feeding.  You should not take this medication if you are currently pregnant or planning a pregnancy or while breastfeeding. Albendazole Pregnancy And Lactation Text: This medication is Pregnancy Category C and it isn't known if it is safe during pregnancy. It is also excreted in breast milk. Qbrexza Pregnancy And Lactation Text: There is no available data on Qbrexza use in pregnant women.  There is no available data on Qbrexza use in lactation. Fluconazole Counseling:  Patient counseled regarding adverse effects of fluconazole including but not limited to headache, diarrhea, nausea, upset stomach, liver function test abnormalities, taste disturbance, and stomach pain.  There is a rare possibility of liver failure that can occur when taking fluconazole.  The patient understands that monitoring of LFTs and kidney function test may be required, especially at baseline. The patient verbalized understanding of the proper use and possible adverse effects of fluconazole.  All of the patient's questions and concerns were addressed. Sotyktu Counseling:  I discussed the most common side effects of Sotyktu including: common cold, sore throat, sinus infections, cold sores, canker sores, folliculitis, and acne.  I also discussed more serious side effects of Sotyktu including but not limited to: serious allergic reactions; increased risk for infections such as TB; cancers such as lymphomas; rhabdomyolysis and elevated CPK; and elevated triglycerides and liver enzymes.  Birth Control Pills Counseling: Birth Control Pill Counseling: I discussed with the patient the potential side effects of OCPs including but not limited to increased risk of stroke, heart attack, thrombophlebitis, deep venous thrombosis, hepatic adenomas, breast changes, GI upset, headaches, and depression.  The patient verbalized understanding of the proper use and possible adverse effects of OCPs. All of the patient's questions and concerns were addressed. High Dose Vitamin A Pregnancy And Lactation Text: High dose vitamin A therapy is contraindicated during pregnancy and breast feeding. Glycopyrrolate Counseling:  I discussed with the patient the risks of glycopyrrolate including but not limited to skin rash, drowsiness, dry mouth, difficulty urinating, and blurred vision. Rituxan Counseling:  I discussed with the patient the risks of Rituxan infusions. Side effects can include infusion reactions, severe drug rashes including mucocutaneous reactions, reactivation of latent hepatitis and other infections and rarely progressive multifocal leukoencephalopathy.  All of the patient's questions and concerns were addressed. Quinolones Counseling:  I discussed with the patient the risks of fluoroquinolones including but not limited to GI upset, allergic reaction, drug rash, diarrhea, dizziness, photosensitivity, yeast infections, liver function test abnormalities, tendonitis/tendon rupture. none Eucrisa Pregnancy And Lactation Text: This medication has not been assigned a Pregnancy Risk Category but animal studies failed to show danger with the topical medication. It is unknown if the medication is excreted in breast milk. Cosentyx Counseling:  I discussed with the patient the risks of Cosentyx including but not limited to worsening of Crohn's disease, immunosuppression, allergic reactions and infections.  The patient understands that monitoring is required including a PPD at baseline and must alert us or the primary physician if symptoms of infection or other concerning signs are noted. Vtama Pregnancy And Lactation Text: It is unknown if this medication can cause problems during pregnancy and breastfeeding. Oxempic Pregnancy And Lactation Text: The fetal risk of this medication is unknown and taking while pregnant is not recommended. It is unknown if this medication is present in breast milk. Cyclophosphamide Pregnancy And Lactation Text: This medication is Pregnancy Category D and it isn't considered safe during pregnancy. This medication is excreted in breast milk. Topical Steroids Counseling: I discussed with the patient that prolonged use of topical steroids can result in the increased appearance of superficial blood vessels (telangiectasias), lightening (hypopigmentation) and thinning of the skin (atrophy).  Patient understands to avoid using high potency steroids in skin folds, the groin or the face.  The patient verbalized understanding of the proper use and possible adverse effects of topical steroids.  All of the patient's questions and concerns were addressed. Oral Minoxidil Counseling- I discussed with the patient the risks of oral minoxidil including but not limited to shortness of breath, swelling of the feet or ankles, dizziness, lightheadedness, unwanted hair growth and allergic reaction.  The patient verbalized understanding of the proper use and possible adverse effects of oral minoxidil.  All of the patient's questions and concerns were addressed. Arava Pregnancy And Lactation Text: This medication is Pregnancy Category X and is absolutely contraindicated during pregnancy. It is unknown if it is excreted in breast milk. Tazorac Pregnancy And Lactation Text: This medication is not safe during pregnancy. It is unknown if this medication is excreted in breast milk. Detail Level: Simple Mirvaso Pregnancy And Lactation Text: This medication has not been assigned a Pregnancy Risk Category. It is unknown if the medication is excreted in breast milk. Tremfya Pregnancy And Lactation Text: The risk during pregnancy and breastfeeding is uncertain with this medication. Hyrimoz Counseling:  I discussed with the patient the risks of adalimumab including but not limited to myelosuppression, immunosuppression, autoimmune hepatitis, demyelinating diseases, lymphoma, and serious infections.  The patient understands that monitoring is required including a PPD at baseline and must alert us or the primary physician if symptoms of infection or other concerning signs are noted. Topical Clindamycin Counseling: Patient counseled that this medication may cause skin irritation or allergic reactions.  In the event of skin irritation, the patient was advised to reduce the amount of the drug applied or use it less frequently.   The patient verbalized understanding of the proper use and possible adverse effects of clindamycin.  All of the patient's questions and concerns were addressed. Topical Steroids Applications Pregnancy And Lactation Text: Most topical steroids are considered safe to use during pregnancy and lactation.  Any topical steroid applied to the breast or nipple should be washed off before breastfeeding. Cantharidin Counseling:  I discussed with the patient the risks of Cantharidin including but not limited to pain, redness, burning, itching, and blistering. Clofazimine Counseling:  I discussed with the patient the risks of clofazimine including but not limited to skin and eye pigmentation, liver damage, nausea/vomiting, gastrointestinal bleeding and allergy. Include Pregnancy/Lactation Warning?: No Opzelura Counseling:  I discussed with the patient the risks of Opzelura including but not limited to nasopharngitis, bronchitis, ear infection, eosinophila, hives, diarrhea, folliculitis, tonsillitis, and rhinorrhea.  Taken orally, this medication has been linked to serious infections; higher rate of mortality; malignancy and lymphoproliferative disorders; major adverse cardiovascular events; thrombosis; thrombocytopenia, anemia, and neutropenia; and lipid elevations. Oral Minoxidil Pregnancy And Lactation Text: This medication should only be used when clearly needed if you are pregnant, attempting to become pregnant or breast feeding. Acitretin Counseling:  I discussed with the patient the risks of acitretin including but not limited to hair loss, dry lips/skin/eyes, liver damage, hyperlipidemia, depression/suicidal ideation, photosensitivity.  Serious rare side effects can include but are not limited to pancreatitis, pseudotumor cerebri, bony changes, clot formation/stroke/heart attack.  Patient understands that alcohol is contraindicated since it can result in liver toxicity and significantly prolong the elimination of the drug by many years. 5-Fu Counseling: 5-Fluorouracil Counseling:  I discussed with the patient the risks of 5-fluorouracil including but not limited to erythema, scaling, itching, weeping, crusting, and pain. Xolair Counseling:  Patient informed of potential adverse effects including but not limited to fever, muscle aches, rash and allergic reactions.  The patient verbalized understanding of the proper use and possible adverse effects of Xolair.  All of the patient's questions and concerns were addressed. Dutasteride Male Counseling: Dutasteride Counseling:  I discussed with the patient the risks of use of dutasteride including but not limited to decreased libido, decreased ejaculate volume, and gynecomastia. Women who can become pregnant should not handle medication.  All of the patient's questions and concerns were addressed. Otezla Counseling: The side effects of Otezla were discussed with the patient, including but not limited to worsening or new depression, weight loss, diarrhea, nausea, upper respiratory tract infection, and headache. Patient instructed to call the office should any adverse effect occur.  The patient verbalized understanding of the proper use and possible adverse effects of Otezla.  All the patient's questions and concerns were addressed. Topical Sulfur Applications Counseling: Topical Sulfur Counseling: Patient counseled that this medication may cause skin irritation or allergic reactions.  In the event of skin irritation, the patient was advised to reduce the amount of the drug applied or use it less frequently.   The patient verbalized understanding of the proper use and possible adverse effects of topical sulfur application.  All of the patient's questions and concerns were addressed. Ilumya Counseling: I discussed with the patient the risks of tildrakizumab including but not limited to immunosuppression, malignancy, posterior leukoencephalopathy syndrome, and serious infections.  The patient understands that monitoring is required including a PPD at baseline and must alert us or the primary physician if symptoms of infection or other concerning signs are noted. Opzelura Pregnancy And Lactation Text: There is insufficient data to evaluate drug-associated risk for major birth defects, miscarriage, or other adverse maternal or fetal outcomes.  There is a pregnancy registry that monitors pregnancy outcomes in pregnant persons exposed to the medication during pregnancy.  It is unknown if this medication is excreted in breast milk.  Do not breastfeed during treatment and for about 4 weeks after the last dose. Adbry Counseling: I discussed with the patient the risks of tralokinumab including but not limited to eye infection and irritation, cold sores, injection site reactions, worsening of asthma, allergic reactions and increased risk of parasitic infection.  Live vaccines should be avoided while taking tralokinumab. The patient understands that monitoring is required and they must alert us or the primary physician if symptoms of infection or other concerning signs are noted. Xolair Pregnancy And Lactation Text: This medication is Pregnancy Category B and is considered safe during pregnancy. This medication is excreted in breast milk. Acitretin Pregnancy And Lactation Text: This medication is Pregnancy Category X and should not be given to women who are pregnant or may become pregnant in the future. This medication is excreted in breast milk. Colchicine Counseling:  Patient counseled regarding adverse effects including but not limited to stomach upset (nausea, vomiting, stomach pain, or diarrhea).  Patient instructed to limit alcohol consumption while taking this medication.  Colchicine may reduce blood counts especially with prolonged use.  The patient understands that monitoring of kidney function and blood counts may be required, especially at baseline. The patient verbalized understanding of the proper use and possible adverse effects of colchicine.  All of the patient's questions and concerns were addressed. Topical Ketoconazole Counseling: Patient counseled that this medication may cause skin irritation or allergic reactions.  In the event of skin irritation, the patient was advised to reduce the amount of the drug applied or use it less frequently.   The patient verbalized understanding of the proper use and possible adverse effects of ketoconazole.  All of the patient's questions and concerns were addressed. Otezla Pregnancy And Lactation Text: This medication is Pregnancy Category C and it isn't known if it is safe during pregnancy. It is unknown if it is excreted in breast milk. Dutasteride Female Counseling: Dutasteride Counseling:  I discussed with the patient the risks of use of dutasteride including but not limited to decreased libido and sexual dysfunction. Explained the teratogenic nature of the medication and stressed the importance of not getting pregnant during treatment. All of the patient's questions and concerns were addressed. Topical Sulfur Applications Pregnancy And Lactation Text: This medication is considered safe during pregnancy and breast feeding secondary to limited systemic absorption. Picato Counseling:  I discussed with the patient the risks of Picato including but not limited to erythema, scaling, itching, weeping, crusting, and pain. Azathioprine Counseling:  I discussed with the patient the risks of azathioprine including but not limited to myelosuppression, immunosuppression, hepatotoxicity, lymphoma, and infections.  The patient understands that monitoring is required including baseline LFTs, Creatinine, possible TPMP genotyping and weekly CBCs for the first month and then every 2 weeks thereafter.  The patient verbalized understanding of the proper use and possible adverse effects of azathioprine.  All of the patient's questions and concerns were addressed. Adbry Pregnancy And Lactation Text: It is unknown if this medication will adversely affect pregnancy or breast feeding. Bexarotene Counseling:  I discussed with the patient the risks of bexarotene including but not limited to hair loss, dry lips/skin/eyes, liver abnormalities, hyperlipidemia, pancreatitis, depression/suicidal ideation, photosensitivity, drug rash/allergic reactions, hypothyroidism, anemia, leukopenia, infection, cataracts, and teratogenicity.  Patient understands that they will need regular blood tests to check lipid profile, liver function tests, white blood cell count, thyroid function tests and pregnancy test if applicable. Drysol Counseling:  I discussed with the patient the risks of drysol/aluminum chloride including but not limited to skin rash, itching, irritation, burning. Wartpeel Counseling:  I discussed with the patient the risks of Wartpeel including but not limited to erythema, scaling, itching, weeping, crusting, and pain. Oxybutynin Counseling:  I discussed with the patient the risks of oxybutynin including but not limited to skin rash, drowsiness, dry mouth, difficulty urinating, and blurred vision. Dutasteride Pregnancy And Lactation Text: This medication is absolutely contraindicated in women, especially during pregnancy and breast feeding. Feminization of male fetuses is possible if taking while pregnant. Azithromycin Counseling:  I discussed with the patient the risks of azithromycin including but not limited to GI upset, allergic reaction, drug rash, diarrhea, and yeast infections. Hydroxyzine Counseling: Patient advised that the medication is sedating and not to drive a car after taking this medication.  Patient informed of potential adverse effects including but not limited to dry mouth, urinary retention, and blurry vision.  The patient verbalized understanding of the proper use and possible adverse effects of hydroxyzine.  All of the patient's questions and concerns were addressed. Klisyri Counseling:  I discussed with the patient the risks of Klisyri including but not limited to erythema, scaling, itching, weeping, crusting, and pain. Ebglyss Pregnancy And Lactation Text: This medication likely crosses the placenta but the risk for the fetus is uncertain. It is unknown if this medication is excreted in breast milk. Prednisone Counseling:  I discussed with the patient the risks of prolonged use of prednisone including but not limited to weight gain, insomnia, osteoporosis, mood changes, diabetes, susceptibility to infection, glaucoma and high blood pressure.  In cases where prednisone use is prolonged, patients should be monitored with blood pressure checks, serum glucose levels and an eye exam.  Additionally, the patient may need to be placed on GI prophylaxis, PCP prophylaxis, and calcium and vitamin D supplementation and/or a bisphosphonate.  The patient verbalized understanding of the proper use and the possible adverse effects of prednisone.  All of the patient's questions and concerns were addressed. Wegovy Counseling: I reviewed the possible side effects including: thyroid tumors, kidney disease, gallbladder disease, abdominal pain, constipation, diarrhea, nausea, vomiting and pancreatitis. Do not take this medication if you have a history or family history of multiple endocrine neoplasia syndrome type 2. Side effects reviewed, pt to contact office should one occur. Valtrex Counseling: I discussed with the patient the risks of valacyclovir including but not limited to kidney damage, nausea, vomiting and severe allergy.  The patient understands that if the infection seems to be worsening or is not improving, they are to call. Rinvoq Pregnancy And Lactation Text: Based on animal studies, Rinvoq may cause embryo-fetal harm when administered to pregnant women.  The medication should not be used in pregnancy.  Breastfeeding is not recommended during treatment and for 6 days after the last dose. Stelara Counseling:  I discussed with the patient the risks of ustekinumab including but not limited to immunosuppression, malignancy, posterior leukoencephalopathy syndrome, and serious infections.  The patient understands that monitoring is required including a PPD at baseline and must alert us or the primary physician if symptoms of infection or other concerning signs are noted. Erythromycin Counseling:  I discussed with the patient the risks of erythromycin including but not limited to GI upset, allergic reaction, drug rash, diarrhea, increase in liver enzymes, and yeast infections. Benzoyl Peroxide Pregnancy And Lactation Text: This medication is Pregnancy Category C. It is unknown if benzoyl peroxide is excreted in breast milk. Nsaids Counseling: NSAID Counseling: I discussed with the patient that NSAIDs should be taken with food. Prolonged use of NSAIDs can result in the development of stomach ulcers.  Patient advised to stop taking NSAIDs if abdominal pain occurs.  The patient verbalized understanding of the proper use and possible adverse effects of NSAIDs.  All of the patient's questions and concerns were addressed. Ketoconazole Counseling:   Patient counseled regarding improving absorption with orange juice.  Adverse effects include but are not limited to breast enlargement, headache, diarrhea, nausea, upset stomach, liver function test abnormalities, taste disturbance, and stomach pain.  There is a rare possibility of liver failure that can occur when taking ketoconazole. The patient understands that monitoring of LFTs may be required, especially at baseline. The patient verbalized understanding of the proper use and possible adverse effects of ketoconazole.  All of the patient's questions and concerns were addressed. Soolantra Pregnancy And Lactation Text: This medication is Pregnancy Category C. This medication is considered safe during breast feeding. Prednisone Pregnancy And Lactation Text: This medication is Pregnancy Category C and it isn't know if it is safe during pregnancy. This medication is excreted in breast milk. Simponi Counseling:  I discussed with the patient the risks of golimumab including but not limited to myelosuppression, immunosuppression, autoimmune hepatitis, demyelinating diseases, lymphoma, and serious infections.  The patient understands that monitoring is required including a PPD at baseline and must alert us or the primary physician if symptoms of infection or other concerning signs are noted. Klisyri Pregnancy And Lactation Text: It is unknown if this medication can harm a developing fetus or if it is excreted in breast milk. Tetracycline Counseling: Patient counseled regarding possible photosensitivity and increased risk for sunburn.  Patient instructed to avoid sunlight, if possible.  When exposed to sunlight, patients should wear protective clothing, sunglasses, and sunscreen.  The patient was instructed to call the office immediately if the following severe adverse effects occur:  hearing changes, easy bruising/bleeding, severe headache, or vision changes.  The patient verbalized understanding of the proper use and possible adverse effects of tetracycline.  All of the patient's questions and concerns were addressed. Patient understands to avoid pregnancy while on therapy due to potential birth defects. Enbrel Counseling:  I discussed with the patient the risks of etanercept including but not limited to myelosuppression, immunosuppression, autoimmune hepatitis, demyelinating diseases, lymphoma, and infections.  The patient understands that monitoring is required including a PPD at baseline and must alert us or the primary physician if symptoms of infection or other concerning signs are noted. Valtrex Pregnancy And Lactation Text: this medication is Pregnancy Category B and is considered safe during pregnancy. This medication is not directly found in breast milk but it's metabolite acyclovir is present. Xeljanz Counseling: I discussed with the patient the risks of Xeljanz therapy including increased risk of infection, liver issues, headache, diarrhea, or cold symptoms. Live vaccines should be avoided. They were instructed to call if they have any problems. Hydroxyzine Pregnancy And Lactation Text: This medication is not safe during pregnancy and should not be taken. It is also excreted in breast milk and breast feeding isn't recommended. Topical Retinoid counseling:  Patient advised to apply a pea-sized amount only at bedtime and wait 30 minutes after washing their face before applying.  If too drying, patient may add a non-comedogenic moisturizer. The patient verbalized understanding of the proper use and possible adverse effects of retinoids.  All of the patient's questions and concerns were addressed. Erythromycin Pregnancy And Lactation Text: This medication is Pregnancy Category B and is considered safe during pregnancy. It is also excreted in breast milk. Carac Counseling:  I discussed with the patient the risks of Carac including but not limited to erythema, scaling, itching, weeping, crusting, and pain. Ketoconazole Pregnancy And Lactation Text: This medication is Pregnancy Category C and it isn't know if it is safe during pregnancy. It is also excreted in breast milk and breast feeding isn't recommended. Nsaids Pregnancy And Lactation Text: These medications are considered safe up to 30 weeks gestation. It is excreted in breast milk. Libtayo Counseling- I discussed with the patient the risks of Libtayo including but not limited to nausea, vomiting, diarrhea, and bone or muscle pain.  The patient verbalized understanding of the proper use and possible adverse effects of Libtayo.  All of the patient's questions and concerns were addressed. Zepbound Counseling: I reviewed the possible side effects including: thyroid tumors, kidney disease, gallbladder disease, abdominal pain, constipation, diarrhea, nausea, vomiting and pancreatitis. Do not take this medication if you have a history or family history of multiple endocrine neoplasia syndrome type 2. Side effects reviewed, pt to contact office should one occur. Minoxidil Counseling: Minoxidil is a topical medication which can increase blood flow where it is applied. It is uncertain how this medication increases hair growth. Side effects are uncommon and include stinging and allergic reactions. Taltz Counseling: I discussed with the patient the risks of ixekizumab including but not limited to immunosuppression, serious infections, worsening of inflammatory bowel disease and drug reactions.  The patient understands that monitoring is required including a PPD at baseline and must alert us or the primary physician if symptoms of infection or other concerning signs are noted. Xelnelliez Pregnancy And Lactation Text: This medication is Pregnancy Category D and is not considered safe during pregnancy.  The risk during breast feeding is also uncertain. Metronidazole Counseling:  I discussed with the patient the risks of metronidazole including but not limited to seizures, nausea/vomiting, a metallic taste in the mouth, nausea/vomiting and severe allergy. Terbinafine Counseling: Patient counseling regarding adverse effects of terbinafine including but not limited to headache, diarrhea, rash, upset stomach, liver function test abnormalities, itching, taste/smell disturbance, nausea, abdominal pain, and flatulence.  There is a rare possibility of liver failure that can occur when taking terbinafine.  The patient understands that a baseline LFT and kidney function test may be required. The patient verbalized understanding of the proper use and possible adverse effects of terbinafine.  All of the patient's questions and concerns were addressed. Libtayo Pregnancy And Lactation Text: This medication is contraindicated in pregnancy and when breast feeding. Humira Counseling:  I discussed with the patient the risks of adalimumab including but not limited to myelosuppression, immunosuppression, autoimmune hepatitis, demyelinating diseases, lymphoma, and serious infections.  The patient understands that monitoring is required including a PPD at baseline and must alert us or the primary physician if symptoms of infection or other concerning signs are noted. Olanzapine Counseling- I discussed with the patient the common side effects of olanzapine including but are not limited to: lack of energy, dry mouth, increased appetite, sleepiness, tremor, constipation, dizziness, changes in behavior, or restlessness.  Explained that teenagers are more likely to experience headaches, abdominal pain, pain in the arms or legs, tiredness, and sleepiness.  Serious side effects include but are not limited: increased risk of death in elderly patients who are confused, have memory loss, or dementia-related psychosis; hyperglycemia; increased cholesterol and triglycerides; and weight gain. Calcipotriene Counseling:  I discussed with the patient the risks of calcipotriene including but not limited to erythema, scaling, itching, and irritation. Arava Counseling:  Patient counseled regarding adverse effects of Arava including but not limited to nausea, vomiting, abnormalities in liver function tests. Patients may develop mouth sores, rash, diarrhea, and abnormalities in blood counts. The patient understands that monitoring is required including LFTs and blood counts.  There is a rare possibility of scarring of the liver and lung problems that can occur when taking methotrexate. Persistent nausea, loss of appetite, pale stools, dark urine, cough, and shortness of breath should be reported immediately. Patient advised to discontinue Arava treatment and consult with a physician prior to attempting conception. The patient will have to undergo a treatment to eliminate Arava from the body prior to conception. Metronidazole Pregnancy And Lactation Text: This medication is Pregnancy Category B and considered safe during pregnancy.  It is also excreted in breast milk. Terbinafine Pregnancy And Lactation Text: This medication is Pregnancy Category B and is considered safe during pregnancy. It is also excreted in breast milk and breast feeding isn't recommended. Olanzapine Pregnancy And Lactation Text: This medication is pregnancy category C.   There are no adequate and well controlled trials with olanzapine in pregnant females.  Olanzapine should be used during pregnancy only if the potential benefit justifies the potential risk to the fetus.   In a study in lactating healthy women, olanzapine was excreted in breast milk.  It is recommended that women taking olanzapine should not breast feed. Odomzo Counseling- I discussed with the patient the risks of Odomzo including but not limited to nausea, vomiting, diarrhea, constipation, weight loss, changes in the sense of taste, decreased appetite, muscle spasms, and hair loss.  The patient verbalized understanding of the proper use and possible adverse effects of Odomzo.  All of the patient's questions and concerns were addressed. Tazorac Counseling:  Patient advised that medication is irritating and drying.  Patient may need to apply sparingly and wash off after an hour before eventually leaving it on overnight.  The patient verbalized understanding of the proper use and possible adverse effects of tazorac.  All of the patient's questions and concerns were addressed. Mirvaso Counseling: Mirvaso is a topical medication which can decrease superficial blood flow where applied. Side effects are uncommon and include stinging, redness and allergic reactions. Calcipotriene Pregnancy And Lactation Text: The use of this medication during pregnancy or lactation is not recommended as there is insufficient data. Tremfya Counseling: I discussed with the patient the risks of guselkumab including but not limited to immunosuppression, serious infections, and drug reactions.  The patient understands that monitoring is required including a PPD at baseline and must alert us or the primary physician if symptoms of infection or other concerning signs are noted. Birth Control Pills Pregnancy And Lactation Text: This medication should be avoided if pregnant and for the first 30 days post-partum. Aklief counseling:  Patient advised to apply a pea-sized amount only at bedtime and wait 30 minutes after washing their face before applying.  If too drying, patient may add a non-comedogenic moisturizer.  The most commonly reported side effects including irritation, redness, scaling, dryness, stinging, burning, itching, and increased risk of sunburn.  The patient verbalized understanding of the proper use and possible adverse effects of retinoids.  All of the patient's questions and concerns were addressed. Cephalexin Pregnancy And Lactation Text: This medication is Pregnancy Category B and considered safe during pregnancy.  It is also excreted in breast milk but can be used safely for shorter doses. Rhofade Counseling: Rhofade is a topical medication which can decrease superficial blood flow where applied. Side effects are uncommon and include stinging, redness and allergic reactions. Ivermectin Counseling:  Patient instructed to take medication on an empty stomach with a full glass of water.  Patient informed of potential adverse effects including but not limited to nausea, diarrhea, dizziness, itching, and swelling of the extremities or lymph nodes.  The patient verbalized understanding of the proper use and possible adverse effects of ivermectin.  All of the patient's questions and concerns were addressed. Sski Pregnancy And Lactation Text: This medication is Pregnancy Category D and isn't considered safe during pregnancy. It is excreted in breast milk. Litfulo Counseling: I discussed with the patient the risks of Litfulo therapy including but not limited to upper respiratory tract infections, shingles, cold sores, and nausea. Live vaccines should be avoided.  This medication has been linked to serious infections; higher rate of mortality; malignancy and lymphoproliferative disorders; major adverse cardiovascular events; thrombosis; gastrointestinal perforations; neutropenia; lymphopenia; anemia; liver enzyme elevations; and lipid elevations. Fluconazole Pregnancy And Lactation Text: This medication is Pregnancy Category C and it isn't know if it is safe during pregnancy. It is also excreted in breast milk. Sotyktu Pregnancy And Lactation Text: There is insufficient data to evaluate whether or not Sotyktu is safe to use during pregnancy.   It is not known if Sotyktu passes into breast milk and whether or not it is safe to use when breastfeeding.   Cimetidine Counseling:  I discussed with the patient the risks of Cimetidine including but not limited to gynecomastia, headache, diarrhea, nausea, drowsiness, arrhythmias, pancreatitis, skin rashes, psychosis, bone marrow suppression and kidney toxicity. Hydroquinone Counseling:  Patient advised that medication may result in skin irritation, lightening (hypopigmentation), dryness, and burning.  In the event of skin irritation, the patient was advised to reduce the amount of the drug applied or use it less frequently.  Rarely, spots that are treated with hydroquinone can become darker (pseudoochronosis).  Should this occur, patient instructed to stop medication and call the office. The patient verbalized understanding of the proper use and possible adverse effects of hydroquinone.  All of the patient's questions and concerns were addressed. Glycopyrrolate Pregnancy And Lactation Text: This medication is Pregnancy Category B and is considered safe during pregnancy. It is unknown if it is excreted breast milk. Rituxan Pregnancy And Lactation Text: This medication is Pregnancy Category C and it isn't know if it is safe during pregnancy. It is unknown if this medication is excreted in breast milk but similar antibodies are known to be excreted. Cyclosporine Counseling:  I discussed with the patient the risks of cyclosporine including but not limited to hypertension, gingival hyperplasia,myelosuppression, immunosuppression, liver damage, kidney damage, neurotoxicity, lymphoma, and serious infections. The patient understands that monitoring is required including baseline blood pressure, CBC, CMP, lipid panel and uric acid, and then 1-2 times monthly CMP and blood pressure. Thalidomide Counseling: I discussed with the patient the risks of thalidomide including but not limited to birth defects, anxiety, weakness, chest pain, dizziness, cough and severe allergy. Saxenda Counseling: I reviewed the possible side effects including: thyroid tumors, kidney disease, gallbladder disease, abdominal pain, constipation, diarrhea, nausea, vomiting and pancreatitis. Do not take this medication if you have a history or family history of multiple endocrine neoplasia syndrome type 2. Side effects reviewed, pt to contact office should one occur. Griseofulvin Counseling:  I discussed with the patient the risks of griseofulvin including but not limited to photosensitivity, cytopenia, liver damage, nausea/vomiting and severe allergy.  The patient understands that this medication is best absorbed when taken with a fatty meal (e.g., ice cream or french fries). Skyrizi Counseling: I discussed with the patient the risks of risankizumab-rzaa including but not limited to immunosuppression, and serious infections.  The patient understands that monitoring is required including a PPD at baseline and must alert us or the primary physician if symptoms of infection or other concerning signs are noted. Zoryve Counseling:  I discussed with the patient that Zoryve is not for use in the eyes, mouth or vagina. The most commonly reported side effects include diarrhea, headache, insomnia, application site pain, upper respiratory tract infections, and urinary tract infections.  All of the patient's questions and concerns were addressed. Litfulo Pregnancy And Lactation Text: Based on animal studies, Lifulo may cause embryo-fetal harm when administered to pregnant women.  The medication should not be used in pregnancy.  Breastfeeding is not recommended during treatment. Spironolactone Counseling: Patient advised regarding risks of diarrhea, abdominal pain, hyperkalemia, birth defects (for female patients), liver toxicity and renal toxicity. The patient may need blood work to monitor liver and kidney function and potassium levels while on therapy. The patient verbalized understanding of the proper use and possible adverse effects of spironolactone.  All of the patient's questions and concerns were addressed. Aklief Pregnancy And Lactation Text: It is unknown if this medication is safe to use during pregnancy.  It is unknown if this medication is excreted in breast milk.  Breastfeeding women should use the topical cream on the smallest area of the skin for the shortest time needed while breastfeeding.  Do not apply to nipple and areola. Clindamycin Counseling: I counseled the patient regarding use of clindamycin as an antibiotic for prophylactic and/or therapeutic purposes. Clindamycin is active against numerous classes of bacteria, including skin bacteria. Side effects may include nausea, diarrhea, gastrointestinal upset, rash, hives, yeast infections, and in rare cases, colitis. Siliq Counseling:  I discussed with the patient the risks of Siliq including but not limited to new or worsening depression, suicidal thoughts and behavior, immunosuppression, malignancy, posterior leukoencephalopathy syndrome, and serious infections.  The patient understands that monitoring is required including a PPD at baseline and must alert us or the primary physician if symptoms of infection or other concerning signs are noted. There is also a special program designed to monitor depression which is required with Siliq. Cantharidin Pregnancy And Lactation Text: This medication has not been proven safe during pregnancy. It is unknown if this medication is excreted in breast milk. Rifampin Counseling: I discussed with the patient the risks of rifampin including but not limited to liver damage, kidney damage, red-orange body fluids, nausea/vomiting and severe allergy. Dupixent Counseling: I discussed with the patient the risks of dupilumab including but not limited to eye inflammation and irritation, cold sores, injection site reactions, allergic reactions and increased risk of parasitic infection. The patient understands that monitoring is required and they must alert us or the primary physician if symptoms of infection or other concerning signs are noted. Hydroxychloroquine Counseling:  I discussed with the patient that a baseline ophthalmologic exam is needed at the start of therapy and every year thereafter while on therapy. A CBC may also be warranted for monitoring.  The side effects of this medication were discussed with the patient, including but not limited to agranulocytosis, aplastic anemia, seizures, rashes, retinopathy, and liver toxicity. Patient instructed to call the office should any adverse effect occur.  The patient verbalized understanding of the proper use and possible adverse effects of Plaquenil.  All the patient's questions and concerns were addressed. Solaraze Counseling:  I discussed with the patient the risks of Solaraze including but not limited to erythema, scaling, itching, weeping, crusting, and pain. Olumiant Counseling: I discussed with the patient the risks of Olumiant therapy including but not limited to upper respiratory tract infections, shingles, cold sores, and nausea. Live vaccines should be avoided.  This medication has been linked to serious infections; higher rate of mortality; malignancy and lymphoproliferative disorders; major adverse cardiovascular events; thrombosis; gastrointestinal perforations; neutropenia; lymphopenia; anemia; liver enzyme elevations; and lipid elevations. Spironolactone Pregnancy And Lactation Text: This medication can cause feminization of the male fetus and should be avoided during pregnancy. The active metabolite is also found in breast milk. Clindamycin Pregnancy And Lactation Text: This medication can be used in pregnancy if certain situations. Clindamycin is also present in breast milk. Azelaic Acid Counseling: Patient counseled that medicine may cause skin irritation and to avoid applying near the eyes.  In the event of skin irritation, the patient was advised to reduce the amount of the drug applied or use it less frequently.   The patient verbalized understanding of the proper use and possible adverse effects of azelaic acid.  All of the patient's questions and concerns were addressed. Griseofulvin Pregnancy And Lactation Text: This medication is Pregnancy Category X and is known to cause serious birth defects. It is unknown if this medication is excreted in breast milk but breast feeding should be avoided. Low Dose Naltrexone Pregnancy And Lactation Text: Naltrexone is pregnancy category C.  There have been no adequate and well-controlled studies in pregnant women.  It should be used in pregnancy only if the potential benefit justifies the potential risk to the fetus.   Limited data indicates that naltrexone is minimally excreted into breastmilk. Dupixent Pregnancy And Lactation Text: This medication likely crosses the placenta but the risk for the fetus is uncertain. This medication is excreted in breast milk. Methotrexate Counseling:  Patient counseled regarding adverse effects of methotrexate including but not limited to nausea, vomiting, abnormalities in liver function tests. Patients may develop mouth sores, rash, diarrhea, and abnormalities in blood counts. The patient understands that monitoring is required including LFT's and blood counts.  There is a rare possibility of scarring of the liver and lung problems that can occur when taking methotrexate. Persistent nausea, loss of appetite, pale stools, dark urine, cough, and shortness of breath should be reported immediately. Patient advised to discontinue methotrexate treatment at least three months before attempting to become pregnant.  I discussed the need for folate supplements while taking methotrexate.  These supplements can decrease side effects during methotrexate treatment. The patient verbalized understanding of the proper use and possible adverse effects of methotrexate.  All of the patient's questions and concerns were addressed. Doxepin Counseling:  Patient advised that the medication is sedating and not to drive a car after taking this medication. Patient informed of potential adverse effects including but not limited to dry mouth, urinary retention, and blurry vision.  The patient verbalized understanding of the proper use and possible adverse effects of doxepin.  All of the patient's questions and concerns were addressed. Semaglutide Counseling: I reviewed the possible side effects including: thyroid tumors, kidney disease, gallbladder disease, abdominal pain, constipation, diarrhea, nausea, vomiting and pancreatitis. Do not take this medication if you have a history or family history of multiple endocrine neoplasia syndrome type 2. Side effects reviewed, pt to contact office should one occur. Imiquimod Counseling:  I discussed with the patient the risks of imiquimod including but not limited to erythema, scaling, itching, weeping, crusting, and pain.  Patient understands that the inflammatory response to imiquimod is variable from person to person and was educated regarded proper titration schedule.  If flu-like symptoms develop, patient knows to discontinue the medication and contact us. Hydroxychloroquine Pregnancy And Lactation Text: This medication has been shown to cause fetal harm but it isn't assigned a Pregnancy Risk Category. There are small amounts excreted in breast milk. Rifampin Pregnancy And Lactation Text: This medication is Pregnancy Category C and it isn't know if it is safe during pregnancy. It is also excreted in breast milk and should not be used if you are breast feeding. Spevigo Counseling: I discussed with the patient the risks of Spevigo including but not limited to fatigue, nasuea, vomiting, headache, pruritus, urinary tract infection, an infusion related reactions.  The patient understands that monitoring is required including screening for tuberculosis at baseline and yearly screening thereafter while continuing Spevigo therapy. They should contact us if symptoms of infection or other concerning signs are noted. Zyclara Counseling:  I discussed with the patient the risks of imiquimod including but not limited to erythema, scaling, itching, weeping, crusting, and pain.  Patient understands that the inflammatory response to imiquimod is variable from person to person and was educated regarded proper titration schedule.  If flu-like symptoms develop, patient knows to discontinue the medication and contact us. Tranexamic Acid Counseling:  Patient advised of the small risk of bleeding problems with tranexamic acid. They were also instructed to call if they developed any nausea, vomiting or diarrhea. All of the patient's questions and concerns were addressed. Olumiant Pregnancy And Lactation Text: Based on animal studies, Olumiant may cause embryo-fetal harm when administered to pregnant women.  The medication should not be used in pregnancy.  Breastfeeding is not recommended during treatment. Doxycycline Counseling:  Patient counseled regarding possible photosensitivity and increased risk for sunburn.  Patient instructed to avoid sunlight, if possible.  When exposed to sunlight, patients should wear protective clothing, sunglasses, and sunscreen.  The patient was instructed to call the office immediately if the following severe adverse effects occur:  hearing changes, easy bruising/bleeding, severe headache, or vision changes.  The patient verbalized understanding of the proper use and possible adverse effects of doxycycline.  All of the patient's questions and concerns were addressed. Itraconazole Counseling:  I discussed with the patient the risks of itraconazole including but not limited to liver damage, nausea/vomiting, neuropathy, and severe allergy.  The patient understands that this medication is best absorbed when taken with acidic beverages such as non-diet cola or ginger ale.  The patient understands that monitoring is required including baseline LFTs and repeat LFTs at intervals.  The patient understands that they are to contact us or the primary physician if concerning signs are noted. Solaraze Pregnancy And Lactation Text: This medication is Pregnancy Category B and is considered safe. There is some data to suggest avoiding during the third trimester. It is unknown if this medication is excreted in breast milk. Low Dose Naltrexone Counseling- I discussed with the patient the potential risks and side effects of low dose naltrexone including but not limited to: more vivid dreams, headaches, nausea, vomiting, abdominal pain, fatigue, dizziness, and anxiety. Niacinamide Counseling: I recommended taking niacin or niacinamide, also know as vitamin B3, twice daily. Recent evidence suggests that taking vitamin B3 (500 mg twice daily) can reduce the risk of actinic keratoses and non-melanoma skin cancers. Side effects of vitamin B3 include flushing and headache. Sarecycline Counseling: Patient advised regarding possible photosensitivity and discoloration of the teeth, skin, lips, tongue and gums.  Patient instructed to avoid sunlight, if possible.  When exposed to sunlight, patients should wear protective clothing, sunglasses, and sunscreen.  The patient was instructed to call the office immediately if the following severe adverse effects occur:  hearing changes, easy bruising/bleeding, severe headache, or vision changes.  The patient verbalized understanding of the proper use and possible adverse effects of sarecycline.  All of the patient's questions and concerns were addressed. Ebglyss Counseling: I discussed with the patient the risks of lebrikizumab including but not limited to eye inflammation and irritation, cold sores, injection site reactions, allergic reactions and increased risk of parasitic infection. The patient understands that monitoring is required and they must alert us or the primary physician if symptoms of infection or other concerning signs are noted. Doxepin Pregnancy And Lactation Text: This medication is Pregnancy Category C and it isn't known if it is safe during pregnancy. It is also excreted in breast milk and breast feeding isn't recommended. Simlandi Counseling:  I discussed with the patient the risks of adalimumab including but not limited to myelosuppression, immunosuppression, autoimmune hepatitis, demyelinating diseases, lymphoma, and serious infections.  The patient understands that monitoring is required including a PPD at baseline and must alert us or the primary physician if symptoms of infection or other concerning signs are noted. Methotrexate Pregnancy And Lactation Text: This medication is Pregnancy Category X and is known to cause fetal harm. This medication is excreted in breast milk. Benzoyl Peroxide Counseling: Patient counseled that medicine may cause skin irritation and bleach clothing.  In the event of skin irritation, the patient was advised to reduce the amount of the drug applied or use it less frequently.   The patient verbalized understanding of the proper use and possible adverse effects of benzoyl peroxide.  All of the patient's questions and concerns were addressed. Tranexamic Acid Pregnancy And Lactation Text: It is unknown if this medication is safe during pregnancy or breast feeding. Rinvoq Counseling: I discussed with the patient the risks of Rinvoq therapy including but not limited to upper respiratory tract infections, shingles, cold sores, bronchitis, nausea, cough, fever, acne, and headache. Live vaccines should be avoided.  This medication has been linked to serious infections; higher rate of mortality; malignancy and lymphoproliferative disorders; major adverse cardiovascular events; thrombosis; thrombocytopenia, anemia, and neutropenia; lipid elevations; liver enzyme elevations; and gastrointestinal perforations. Spevigo Pregnancy And Lactation Text: The risk during pregnancy and breastfeeding is uncertain with this medication. This medication does cross the placenta. It is unknown if this medication is found in breast milk. Doxycycline Pregnancy And Lactation Text: This medication is Pregnancy Category D and not consider safe during pregnancy. It is also excreted in breast milk but is considered safe for shorter treatment courses. Soolantra Counseling: I discussed with the patients the risks of topial Soolantra. This is a medicine which decreases the number of mites and inflammation in the skin. You experience burning, stinging, eye irritation or allergic reactions.  Please call our office if you develop any problems from using this medication. Erivedge Counseling- I discussed with the patient the risks of Erivedge including but not limited to nausea, vomiting, diarrhea, constipation, weight loss, changes in the sense of taste, decreased appetite, muscle spasms, and hair loss.  The patient verbalized understanding of the proper use and possible adverse effects of Erivedge.  All of the patient's questions and concerns were addressed. Niacinamide Pregnancy And Lactation Text: These medications are considered safe during pregnancy.

## 2025-03-06 ENCOUNTER — INPATIENT (INPATIENT)
Facility: HOSPITAL | Age: 70
LOS: 3 days | Discharge: HOME | End: 2025-03-10
Attending: GENERAL ACUTE CARE HOSPITAL | Admitting: GENERAL ACUTE CARE HOSPITAL
Payer: MEDICARE

## 2025-03-06 VITALS
HEART RATE: 95 BPM | SYSTOLIC BLOOD PRESSURE: 132 MMHG | WEIGHT: 179.02 LBS | HEIGHT: 70 IN | TEMPERATURE: 98 F | RESPIRATION RATE: 18 BRPM | DIASTOLIC BLOOD PRESSURE: 86 MMHG | OXYGEN SATURATION: 96 %

## 2025-03-06 DIAGNOSIS — Z98.1 ARTHRODESIS STATUS: Chronic | ICD-10-CM

## 2025-03-06 DIAGNOSIS — R07.9 CHEST PAIN, UNSPECIFIED: ICD-10-CM

## 2025-03-06 DIAGNOSIS — Z98.890 OTHER SPECIFIED POSTPROCEDURAL STATES: Chronic | ICD-10-CM

## 2025-03-06 DIAGNOSIS — Z98.89 OTHER SPECIFIED POSTPROCEDURAL STATES: Chronic | ICD-10-CM

## 2025-03-06 LAB
ALBUMIN SERPL ELPH-MCNC: 3.7 G/DL — SIGNIFICANT CHANGE UP (ref 3.3–5)
ALP SERPL-CCNC: 96 U/L — SIGNIFICANT CHANGE UP (ref 40–120)
ALT FLD-CCNC: 69 U/L — HIGH (ref 4–41)
ANION GAP SERPL CALC-SCNC: 16 MMOL/L — HIGH (ref 7–14)
AST SERPL-CCNC: 44 U/L — HIGH (ref 4–40)
BASOPHILS # BLD AUTO: 0.02 K/UL — SIGNIFICANT CHANGE UP (ref 0–0.2)
BASOPHILS NFR BLD AUTO: 0.2 % — SIGNIFICANT CHANGE UP (ref 0–2)
BILIRUB SERPL-MCNC: 0.9 MG/DL — SIGNIFICANT CHANGE UP (ref 0.2–1.2)
BLOOD GAS VENOUS COMPREHENSIVE RESULT: SIGNIFICANT CHANGE UP
BUN SERPL-MCNC: 40 MG/DL — HIGH (ref 7–23)
CALCIUM SERPL-MCNC: 9.2 MG/DL — SIGNIFICANT CHANGE UP (ref 8.4–10.5)
CHLORIDE SERPL-SCNC: 104 MMOL/L — SIGNIFICANT CHANGE UP (ref 98–107)
CK MB BLD-MCNC: 4 % — HIGH (ref 0–2.5)
CK MB CFR SERPL CALC: 2.9 NG/ML — SIGNIFICANT CHANGE UP
CK SERPL-CCNC: 73 U/L — SIGNIFICANT CHANGE UP (ref 30–200)
CO2 SERPL-SCNC: 18 MMOL/L — LOW (ref 22–31)
CREAT SERPL-MCNC: 1.41 MG/DL — HIGH (ref 0.5–1.3)
EGFR: 53 ML/MIN/1.73M2 — LOW
EGFR: 53 ML/MIN/1.73M2 — LOW
EOSINOPHIL # BLD AUTO: 0.19 K/UL — SIGNIFICANT CHANGE UP (ref 0–0.5)
EOSINOPHIL NFR BLD AUTO: 1.7 % — SIGNIFICANT CHANGE UP (ref 0–6)
FLUAV AG NPH QL: SIGNIFICANT CHANGE UP
FLUBV AG NPH QL: SIGNIFICANT CHANGE UP
GLUCOSE SERPL-MCNC: 103 MG/DL — HIGH (ref 70–99)
HCT VFR BLD CALC: 39.1 % — SIGNIFICANT CHANGE UP (ref 39–50)
HGB BLD-MCNC: 12.8 G/DL — LOW (ref 13–17)
IANC: 9.21 K/UL — HIGH (ref 1.8–7.4)
IMM GRANULOCYTES NFR BLD AUTO: 1.2 % — HIGH (ref 0–0.9)
LYMPHOCYTES # BLD AUTO: 0.87 K/UL — LOW (ref 1–3.3)
LYMPHOCYTES # BLD AUTO: 7.8 % — LOW (ref 13–44)
MCHC RBC-ENTMCNC: 30 PG — SIGNIFICANT CHANGE UP (ref 27–34)
MCHC RBC-ENTMCNC: 32.7 G/DL — SIGNIFICANT CHANGE UP (ref 32–36)
MCV RBC AUTO: 91.8 FL — SIGNIFICANT CHANGE UP (ref 80–100)
MONOCYTES # BLD AUTO: 0.74 K/UL — SIGNIFICANT CHANGE UP (ref 0–0.9)
MONOCYTES NFR BLD AUTO: 6.6 % — SIGNIFICANT CHANGE UP (ref 2–14)
NEUTROPHILS # BLD AUTO: 9.21 K/UL — HIGH (ref 1.8–7.4)
NEUTROPHILS NFR BLD AUTO: 82.5 % — HIGH (ref 43–77)
NRBC # BLD AUTO: 0 K/UL — SIGNIFICANT CHANGE UP (ref 0–0)
NRBC # FLD: 0 K/UL — SIGNIFICANT CHANGE UP (ref 0–0)
NRBC BLD AUTO-RTO: 0 /100 WBCS — SIGNIFICANT CHANGE UP (ref 0–0)
NT-PROBNP SERPL-SCNC: 6678 PG/ML — HIGH
PLATELET # BLD AUTO: 207 K/UL — SIGNIFICANT CHANGE UP (ref 150–400)
POTASSIUM SERPL-MCNC: 5.1 MMOL/L — SIGNIFICANT CHANGE UP (ref 3.5–5.3)
POTASSIUM SERPL-SCNC: 5.1 MMOL/L — SIGNIFICANT CHANGE UP (ref 3.5–5.3)
PROT SERPL-MCNC: 7.6 G/DL — SIGNIFICANT CHANGE UP (ref 6–8.3)
RBC # BLD: 4.26 M/UL — SIGNIFICANT CHANGE UP (ref 4.2–5.8)
RBC # FLD: 16.9 % — HIGH (ref 10.3–14.5)
RSV RNA NPH QL NAA+NON-PROBE: SIGNIFICANT CHANGE UP
SARS-COV-2 RNA SPEC QL NAA+PROBE: SIGNIFICANT CHANGE UP
SODIUM SERPL-SCNC: 138 MMOL/L — SIGNIFICANT CHANGE UP (ref 135–145)
TROPONIN T, HIGH SENSITIVITY RESULT: 149 NG/L — CRITICAL HIGH
TROPONIN T, HIGH SENSITIVITY RESULT: 169 NG/L — CRITICAL HIGH
WBC # BLD: 11.16 K/UL — HIGH (ref 3.8–10.5)
WBC # FLD AUTO: 11.16 K/UL — HIGH (ref 3.8–10.5)

## 2025-03-06 PROCEDURE — 99222 1ST HOSP IP/OBS MODERATE 55: CPT | Mod: GC

## 2025-03-06 PROCEDURE — 71250 CT THORAX DX C-: CPT | Mod: 26

## 2025-03-06 PROCEDURE — 71045 X-RAY EXAM CHEST 1 VIEW: CPT | Mod: 26

## 2025-03-06 PROCEDURE — 99285 EMERGENCY DEPT VISIT HI MDM: CPT

## 2025-03-06 RX ORDER — TAMSULOSIN HYDROCHLORIDE 0.4 MG/1
0.4 CAPSULE ORAL AT BEDTIME
Refills: 0 | Status: DISCONTINUED | OUTPATIENT
Start: 2025-03-06 | End: 2025-03-10

## 2025-03-06 RX ORDER — CLOPIDOGREL BISULFATE 75 MG/1
75 TABLET, FILM COATED ORAL DAILY
Refills: 0 | Status: DISCONTINUED | OUTPATIENT
Start: 2025-03-06 | End: 2025-03-10

## 2025-03-06 RX ORDER — METHYLPREDNISOLONE ACETATE 80 MG/ML
30 INJECTION, SUSPENSION INTRA-ARTICULAR; INTRALESIONAL; INTRAMUSCULAR; SOFT TISSUE ONCE
Refills: 0 | Status: COMPLETED | OUTPATIENT
Start: 2025-03-06 | End: 2025-03-06

## 2025-03-06 RX ORDER — COLCHICINE 0.6 MG/1
0.6 TABLET, FILM COATED ORAL
Refills: 0 | Status: DISCONTINUED | OUTPATIENT
Start: 2025-03-06 | End: 2025-03-07

## 2025-03-06 RX ORDER — METHYLPREDNISOLONE ACETATE 80 MG/ML
40 INJECTION, SUSPENSION INTRA-ARTICULAR; INTRALESIONAL; INTRAMUSCULAR; SOFT TISSUE ONCE
Refills: 0 | Status: COMPLETED | OUTPATIENT
Start: 2025-03-06 | End: 2025-03-06

## 2025-03-06 RX ORDER — LEVOTHYROXINE SODIUM 300 MCG
175 TABLET ORAL DAILY
Refills: 0 | Status: DISCONTINUED | OUTPATIENT
Start: 2025-03-06 | End: 2025-03-10

## 2025-03-06 RX ORDER — CABERGOLINE 0.5 MG/1
1 TABLET ORAL
Refills: 0 | Status: DISCONTINUED | OUTPATIENT
Start: 2025-03-06 | End: 2025-03-10

## 2025-03-06 RX ORDER — FINASTERIDE 1 MG/1
5 TABLET, FILM COATED ORAL DAILY
Refills: 0 | Status: DISCONTINUED | OUTPATIENT
Start: 2025-03-06 | End: 2025-03-10

## 2025-03-06 RX ORDER — SACUBITRIL AND VALSARTAN 49; 51 MG/1; MG/1
1 TABLET, FILM COATED ORAL
Refills: 0 | Status: DISCONTINUED | OUTPATIENT
Start: 2025-03-06 | End: 2025-03-10

## 2025-03-06 RX ORDER — METHYLPREDNISOLONE ACETATE 80 MG/ML
20 INJECTION, SUSPENSION INTRA-ARTICULAR; INTRALESIONAL; INTRAMUSCULAR; SOFT TISSUE EVERY 8 HOURS
Refills: 0 | Status: DISCONTINUED | OUTPATIENT
Start: 2025-03-06 | End: 2025-03-06

## 2025-03-06 RX ORDER — POLYETHYLENE GLYCOL 3350 17 G/17G
17 POWDER, FOR SOLUTION ORAL DAILY
Refills: 0 | Status: DISCONTINUED | OUTPATIENT
Start: 2025-03-06 | End: 2025-03-10

## 2025-03-06 RX ORDER — FUROSEMIDE 10 MG/ML
40 INJECTION INTRAMUSCULAR; INTRAVENOUS
Refills: 0 | Status: DISCONTINUED | OUTPATIENT
Start: 2025-03-06 | End: 2025-03-07

## 2025-03-06 RX ORDER — FUROSEMIDE 10 MG/ML
40 INJECTION INTRAMUSCULAR; INTRAVENOUS ONCE
Refills: 0 | Status: COMPLETED | OUTPATIENT
Start: 2025-03-06 | End: 2025-03-06

## 2025-03-06 RX ORDER — ATORVASTATIN CALCIUM 80 MG/1
40 TABLET, FILM COATED ORAL AT BEDTIME
Refills: 0 | Status: DISCONTINUED | OUTPATIENT
Start: 2025-03-06 | End: 2025-03-10

## 2025-03-06 RX ORDER — ACETAMINOPHEN 500 MG/5ML
1000 LIQUID (ML) ORAL ONCE
Refills: 0 | Status: COMPLETED | OUTPATIENT
Start: 2025-03-06 | End: 2025-03-06

## 2025-03-06 RX ORDER — APIXABAN 2.5 MG/1
5 TABLET, FILM COATED ORAL EVERY 12 HOURS
Refills: 0 | Status: DISCONTINUED | OUTPATIENT
Start: 2025-03-06 | End: 2025-03-10

## 2025-03-06 RX ORDER — METHYLPREDNISOLONE ACETATE 80 MG/ML
40 INJECTION, SUSPENSION INTRA-ARTICULAR; INTRALESIONAL; INTRAMUSCULAR; SOFT TISSUE ONCE
Refills: 0 | Status: COMPLETED | OUTPATIENT
Start: 2025-03-07 | End: 2025-03-07

## 2025-03-06 RX ADMIN — FUROSEMIDE 40 MILLIGRAM(S): 10 INJECTION INTRAMUSCULAR; INTRAVENOUS at 06:02

## 2025-03-06 RX ADMIN — ATORVASTATIN CALCIUM 40 MILLIGRAM(S): 80 TABLET, FILM COATED ORAL at 21:27

## 2025-03-06 RX ADMIN — Medication 4 MILLIGRAM(S): at 05:00

## 2025-03-06 RX ADMIN — COLCHICINE 0.6 MILLIGRAM(S): 0.6 TABLET, FILM COATED ORAL at 17:54

## 2025-03-06 RX ADMIN — APIXABAN 5 MILLIGRAM(S): 2.5 TABLET, FILM COATED ORAL at 17:53

## 2025-03-06 RX ADMIN — METHYLPREDNISOLONE ACETATE 40 MILLIGRAM(S): 80 INJECTION, SUSPENSION INTRA-ARTICULAR; INTRALESIONAL; INTRAMUSCULAR; SOFT TISSUE at 17:54

## 2025-03-06 RX ADMIN — Medication 400 MILLIGRAM(S): at 03:58

## 2025-03-06 RX ADMIN — TAMSULOSIN HYDROCHLORIDE 0.4 MILLIGRAM(S): 0.4 CAPSULE ORAL at 21:27

## 2025-03-06 RX ADMIN — METHYLPREDNISOLONE ACETATE 30 MILLIGRAM(S): 80 INJECTION, SUSPENSION INTRA-ARTICULAR; INTRALESIONAL; INTRAMUSCULAR; SOFT TISSUE at 04:59

## 2025-03-06 RX ADMIN — FINASTERIDE 5 MILLIGRAM(S): 1 TABLET, FILM COATED ORAL at 15:11

## 2025-03-06 RX ADMIN — CLOPIDOGREL BISULFATE 75 MILLIGRAM(S): 75 TABLET, FILM COATED ORAL at 15:11

## 2025-03-06 RX ADMIN — Medication 4 MILLIGRAM(S): at 07:01

## 2025-03-06 NOTE — H&P ADULT - HISTORY OF PRESENT ILLNESS
69Y M PMH CHF, afib on Eliquis, HTN, osteoarthritis, gout, pituitary mass, with two recent admissions for acute polyarticular gout involving left foot / L ankle .. now presenting with tcp and sob aswell as RLE pain     CP /SOB this am .   no fever or chills   compliant with his meds     RLE pain most in foot and ankle   has been tapering his steorids as he was instructed however now feels flare   pt improvent wit sob post diuresis   imprving R foot pain s/p steroids

## 2025-03-06 NOTE — CONSULT NOTE ADULT - ATTENDING COMMENTS
Pt with CKD and recurrent crystalline arthritis   steroids and colchicine as above  will follow with you      Dr. Jazmine Engel  Rheumatology Attending  [facility]  Rheumatology at [place]     Contact:   call the office:: [phone] or reach [place] [employer] Teams, weekdays before 5 pm   after 5 pm or on weekends, contact [phone]

## 2025-03-06 NOTE — ED PROVIDER NOTE - CLINICAL SUMMARY MEDICAL DECISION MAKING FREE TEXT BOX
This is a 69-year-old male history of CHF, A-fib, on Eliquis, HTN, OA, gout, pituitary mass, recently discharged for gout flare, presenting to the ED for chest pain or shortness of breath.  The patient states that he was laying down in bed when he suddenly began having chest pain and shortness of breath.  The patient also states that he feels like he is having a gout flare.  The patient did not take anything for his symptoms prior to arrival.  The patient denies fevers, chills,  headache, visual changes, nausea or vomiting.  ROS is otherwise negative.    VS.  Patient hypoxic 86% on room air.  Placed on 4 L nasal cannula.  Patient afebrile, normotensive, not-tachycardic.  PE. patient with increased work of breathing, uncomfortable on exam. tophi of the right great toe. No reproducible TTP of the leg or knee, no effusions notice otherwise.  No wheezing or crackles heard on lung exam.    Differential includes but is not limited to   ACS, CHF exacerbation, pneumonia, will assess for electrolyte/hematological derangements.  Will obtain basic labs, cardiac labs, EKG, chest x-ray.  Final disposition pending labs imaging and reassessment.  Will give steroids and Tylenol for gout flare.

## 2025-03-06 NOTE — ED ADULT NURSE REASSESSMENT NOTE - NS ED NURSE REASSESS COMMENT FT1
report received from break coverage ANALILIA Cole. pt A&Ox4, resting in stretcher. pt appears uncomfortable, states his right foot is in severe pain. VS as noted, NSR on tele. medicated per MAR. respirations even and unlabored. call bell in reach. safety maintained
Patient received from night shift RN, A&OX4, ambulatory at baseline, Patient denies fevers, chills, chest pain, sob, headache, dizziness, blurry vision, n/v/d, abdominal pain, and urinary symptoms, Respirations even and unlabored, normal sinus rhythm on the tele monitor, care plan continued, comfort measures provided, safety maintained.

## 2025-03-06 NOTE — ED PROVIDER NOTE - PROGRESS NOTE DETAILS
[facility] PGY3 - signout - 69-year-old male with a history of amyloidosis, CHF, A-fib on Eliquis, HTN, OA, gout, pituitary mass presenting with chest pain and shortness of breath.  Initial troponin 169, repeat 149.  Creatinine 1.4, BNP 6678.  Discussed with Dr. Cornelius admit to medicine for further management.

## 2025-03-06 NOTE — ED ADULT TRIAGE NOTE - CHIEF COMPLAINT QUOTE
Pt arrives to ED c/o midsternal CP waking him from sleep.  Pt has hx: cardiac amyloidosis, carotid occlusion, HTN, cardiac stents, gout.  Pt states he is also feeling gout pain to bilateral knees and ankles.  Pt received 324 ASA from EMS as well as 18g iv to right forearm. Pt arrives to ED c/o midsternal CP waking him from sleep.  Pt has hx: cardiac amyloidosis, carotid occlusion, HTN, cardiac stents, gout.  Pt states he is also feeling gout pain to bilateral knees and ankles.  Pt received 324 ASA from EMS as well as 18g iv to right forearm.  Pt on Eliquis.

## 2025-03-06 NOTE — ED ADULT NURSE NOTE - HOW OFTEN DO YOU HAVE A DRINK CONTAINING ALCOHOL?
pt BIBA from home, pt c/o headache x a few days.  denies n/v.  pt is scheduled to get a head CT on 7/2, denies trauma Never

## 2025-03-06 NOTE — ED ADULT TRIAGE NOTE - BIRTH SEX
[facility]  [address]  [place] [address]  [phone]        Sophy Jose Enrique  2004  [id number]      PATIENT REFUSAL OF TREATMENT      This is to certify that I am discontinuing medical services at my own insistence  and against the advice of the attending physician.    I have been advised by Beulah Velez PA-C of the dangers of   discontinuing medical services at this time and the alternatives.    I assume all responsibility for refusing recommended medical services; and I   hereby release [facility], its employees and physicians from all  liability.    Patient name:  Sophy Quispe      YOB: 2004  Date: 6/30/2024    Chief Complaint:   Chief Complaint   Patient presents with    Menstrual Problem     Reports very irregular and painful periods in the past few weeks, states symptoms started after being treated for gonorrhea earlier this year. Reports sharp pains and heavy bleeding. Has been on bc pill for past 6 years. Feeling lightheaded and has hx of anemia.        Services refused: Refused Transport to ER via [facility]    Mode of transportation from clinic: Self    Comments: Patient is advised to go to the ER for further evaluation.            Patient/Legal Guardian Signature: ________________________________________      Provider Signature/ Title:________________________________________________                     Beulah Velez PA-C    Witness Signature/ Title: ____________________________________________        ALLI Cota  2004  [id number]      
Male

## 2025-03-06 NOTE — CHART NOTE - NSCHARTNOTEFT_GEN_A_CORE
spoke to rheumatology following recs implemented    solumedrol 40mg IV stat x1 followed by 40mg 3/7 and rheum to re-evaluate at that time  start colchicine 0.6    discussed w/Dr. Cornelius who is in agreement

## 2025-03-06 NOTE — ED ADULT NURSE NOTE - OBJECTIVE STATEMENT
Break RN: Pt received in room 4. Pt alert and oriented x 4. Hx of gout, cardiac stents, HTN, carotid occlusion, cardiac amyloidosis, CHF, Afib on Eliquis. Pt c/o midsternal chest pain, shortness of breath, and right lower extremity pain that awoke him from his sleep tonight. Pt states he feels like he is having a gout flare up to right lower extremity. Per triage note, pt received 324 ASA from EMS. Respirations even and unlabored, NAD. Pt denies headache, dizziness, weakness, fever, chills,  symptoms. Pt received with 18G IV in the right forearm. Pt medicated per MD orders.

## 2025-03-06 NOTE — H&P ADULT - ASSESSMENT
69Y M PMH CHF, afib on Eliquis, HTN, osteoarthritis, gout, pituitary mass, with two recent admissions for acute polyarticular gout involving left foot / L ankle .. now presenting with tcp and sob aswell as RLE pain     CP /SOB this am .   no fever or chills   compliant with his meds     RLE pain most in foot and ankle   has been tapering his steorids as he was instructed however now feels flare   pt improvent wit sob post diuresis   imprving R foot pain s/p steroids         acute gout  :   cont steroids as ordered   hold off on alopurinol   Rheum consult     CP : admit to tele   fu cards    SOB : suspect element of R sided CHF   cont diuresis   repsodned to lasix     afib : restarted eliquis       cardiac amyloid : cont meds : pharmacy to order       Pulmonary nodule.:  hx   ·  Recommendation: PET-CT (read) as an outpatient with RML lung nodule 18 x 16 mm is not hypermetabolic, showing mild activity (SUV 1.3)  -Reviewed images from CT A/P (lower chest) from 10/2024  -Likely hamartoma. No plans for biopsy at this time, suggest eventual repeat CT chest as an outpatient.

## 2025-03-06 NOTE — CONSULT NOTE ADULT - ASSESSMENT
69M w/ PMH of CKD, CHF 2/2 Cardiac amyloidosis, afib, HTN OA, gout who presents for R foot and L hand pain. Rheumatology consulted for gout flare evaluation.     #Acute oligoarticular gout flare: R first toe interphalangeal joint, R 1st MTP joint, L 2nd and 3rd PIP finger joint  -Risk factors: CKD, CHF  -uric acid : 12.3 on 2/19/2025  -Had 2 admissions 2/2025 for gout flare, was on steroid taper  -Meets criteria for urate lowering therapy: has had 2+ gout flares in 1 year and has a previous uric acid level >9 in setting of CKD  -Uric acid level goal should be < 6  -HLA  negative  -Xrays knee with OA, ankle xrays without erosions    Recommendations:  -Please give solumedrol 40mg today 3/6 and 3/7  -Please start colchicine 0.6mg every other day  -Will reassess 3/7 afternoon    Cased discussed with Dr Toro Sanchez MD  Rheumatology Fellow

## 2025-03-06 NOTE — ED ADULT NURSE NOTE - CHIEF COMPLAINT QUOTE
Pt arrives to ED c/o midsternal CP waking him from sleep.  Pt has hx: cardiac amyloidosis, carotid occlusion, HTN, cardiac stents, gout.  Pt states he is also feeling gout pain to bilateral knees and ankles.  Pt received 324 ASA from EMS as well as 18g iv to right forearm.  Pt on Eliquis.

## 2025-03-06 NOTE — ED PROVIDER NOTE - ATTENDING CONTRIBUTION TO CARE
Tarun Hough DO:  patient seen and evaluated with the resident.  I was present for key portions of the History & Physical, and I agree with the Impression & Plan. 68 yo m pmh CHF, afib on Eliquis, HTN, osteoarthritis, gout, pituitary mass, , pw cp. pt recently admitted for gout flare, emr independently reviewed by me. pt reports left sided cp which woke him up from sleep. denies n/v, f/c, cough, congestion, ha, f/c. denies sob, n/v, f/c, paresthesias, cough. pt arrives hds, well appearing. pe as noted. mild hypoxia noted. given pt on eliquis and reports compliance would not obtain dimer upon initial eval. eval for chf exacerbation vs acs. labs, imaging, reassess. ekg non ischemic, independently reviewed by me. Tarun Houhg DO:  patient seen and evaluated with the resident.  I was present for key portions of the History & Physical, and I agree with the Impression & Plan. 68 yo m pmh CHF, afib on Eliquis, HTN, osteoarthritis, gout, pituitary mass, , pw cp. pt recently admitted for gout flare, emr independently reviewed by me. pt reports left sided cp which woke him up from sleep. denies n/v, f/c, cough, congestion, ha, f/c. denies sob, n/v, f/c, paresthesias, cough. pt arrives hds, well appearing. pe as noted. mild hypoxia noted. given pt on eliquis and reports compliance would not obtain dimer upon initial eval. eval for chf exacerbation vs acs. labs, imaging, reassess. ekg non ischemic, independently reviewed by me..

## 2025-03-07 LAB
ALBUMIN SERPL ELPH-MCNC: 3.5 G/DL — SIGNIFICANT CHANGE UP (ref 3.3–5)
ALP SERPL-CCNC: 117 U/L — SIGNIFICANT CHANGE UP (ref 40–120)
ALT FLD-CCNC: 75 U/L — HIGH (ref 4–41)
ANION GAP SERPL CALC-SCNC: 14 MMOL/L — SIGNIFICANT CHANGE UP (ref 7–14)
AST SERPL-CCNC: 41 U/L — HIGH (ref 4–40)
BASOPHILS # BLD AUTO: 0 K/UL — SIGNIFICANT CHANGE UP (ref 0–0.2)
BASOPHILS NFR BLD AUTO: 0 % — SIGNIFICANT CHANGE UP (ref 0–2)
BILIRUB SERPL-MCNC: 1.1 MG/DL — SIGNIFICANT CHANGE UP (ref 0.2–1.2)
BUN SERPL-MCNC: 45 MG/DL — HIGH (ref 7–23)
CALCIUM SERPL-MCNC: 8.9 MG/DL — SIGNIFICANT CHANGE UP (ref 8.4–10.5)
CHLORIDE SERPL-SCNC: 105 MMOL/L — SIGNIFICANT CHANGE UP (ref 98–107)
CO2 SERPL-SCNC: 19 MMOL/L — LOW (ref 22–31)
CREAT SERPL-MCNC: 1.52 MG/DL — HIGH (ref 0.5–1.3)
EGFR: 49 ML/MIN/1.73M2 — LOW
EGFR: 49 ML/MIN/1.73M2 — LOW
EOSINOPHIL # BLD AUTO: 0 K/UL — SIGNIFICANT CHANGE UP (ref 0–0.5)
EOSINOPHIL NFR BLD AUTO: 0 % — SIGNIFICANT CHANGE UP (ref 0–6)
GLUCOSE SERPL-MCNC: 169 MG/DL — HIGH (ref 70–99)
HCT VFR BLD CALC: 32.9 % — LOW (ref 39–50)
HGB BLD-MCNC: 11.1 G/DL — LOW (ref 13–17)
IANC: 8.63 K/UL — HIGH (ref 1.8–7.4)
LYMPHOCYTES # BLD AUTO: 0.41 K/UL — LOW (ref 1–3.3)
LYMPHOCYTES # BLD AUTO: 4.3 % — LOW (ref 13–44)
MCHC RBC-ENTMCNC: 30.2 PG — SIGNIFICANT CHANGE UP (ref 27–34)
MCHC RBC-ENTMCNC: 33.7 G/DL — SIGNIFICANT CHANGE UP (ref 32–36)
MCV RBC AUTO: 89.4 FL — SIGNIFICANT CHANGE UP (ref 80–100)
MONOCYTES # BLD AUTO: 0.16 K/UL — SIGNIFICANT CHANGE UP (ref 0–0.9)
MONOCYTES NFR BLD AUTO: 1.7 % — LOW (ref 2–14)
NEUTROPHILS # BLD AUTO: 8.78 K/UL — HIGH (ref 1.8–7.4)
NEUTROPHILS NFR BLD AUTO: 93.1 % — HIGH (ref 43–77)
PLATELET # BLD AUTO: 201 K/UL — SIGNIFICANT CHANGE UP (ref 150–400)
POTASSIUM SERPL-MCNC: 4.9 MMOL/L — SIGNIFICANT CHANGE UP (ref 3.5–5.3)
POTASSIUM SERPL-SCNC: 4.9 MMOL/L — SIGNIFICANT CHANGE UP (ref 3.5–5.3)
PROT SERPL-MCNC: 6.8 G/DL — SIGNIFICANT CHANGE UP (ref 6–8.3)
RBC # BLD: 3.68 M/UL — LOW (ref 4.2–5.8)
RBC # FLD: 16.5 % — HIGH (ref 10.3–14.5)
SODIUM SERPL-SCNC: 138 MMOL/L — SIGNIFICANT CHANGE UP (ref 135–145)
WBC # BLD: 9.43 K/UL — SIGNIFICANT CHANGE UP (ref 3.8–10.5)
WBC # FLD AUTO: 9.43 K/UL — SIGNIFICANT CHANGE UP (ref 3.8–10.5)

## 2025-03-07 PROCEDURE — 99232 SBSQ HOSP IP/OBS MODERATE 35: CPT | Mod: GC

## 2025-03-07 RX ORDER — TORSEMIDE 10 MG
20 TABLET ORAL
Refills: 0 | Status: DISCONTINUED | OUTPATIENT
Start: 2025-03-07 | End: 2025-03-10

## 2025-03-07 RX ORDER — ACETAMINOPHEN 500 MG/5ML
1000 LIQUID (ML) ORAL ONCE
Refills: 0 | Status: COMPLETED | OUTPATIENT
Start: 2025-03-07 | End: 2025-03-07

## 2025-03-07 RX ORDER — COLCHICINE 0.6 MG/1
0.6 TABLET, FILM COATED ORAL EVERY OTHER DAY
Refills: 0 | Status: DISCONTINUED | OUTPATIENT
Start: 2025-03-07 | End: 2025-03-10

## 2025-03-07 RX ADMIN — Medication 175 MICROGRAM(S): at 06:00

## 2025-03-07 RX ADMIN — COLCHICINE 0.6 MILLIGRAM(S): 0.6 TABLET, FILM COATED ORAL at 06:00

## 2025-03-07 RX ADMIN — FUROSEMIDE 40 MILLIGRAM(S): 10 INJECTION INTRAMUSCULAR; INTRAVENOUS at 05:37

## 2025-03-07 RX ADMIN — SACUBITRIL AND VALSARTAN 1 TABLET(S): 49; 51 TABLET, FILM COATED ORAL at 05:37

## 2025-03-07 RX ADMIN — FINASTERIDE 5 MILLIGRAM(S): 1 TABLET, FILM COATED ORAL at 11:41

## 2025-03-07 RX ADMIN — CABERGOLINE 1 MILLIGRAM(S): 0.5 TABLET ORAL at 09:16

## 2025-03-07 RX ADMIN — FUROSEMIDE 40 MILLIGRAM(S): 10 INJECTION INTRAMUSCULAR; INTRAVENOUS at 13:54

## 2025-03-07 RX ADMIN — TAMSULOSIN HYDROCHLORIDE 0.4 MILLIGRAM(S): 0.4 CAPSULE ORAL at 21:21

## 2025-03-07 RX ADMIN — APIXABAN 5 MILLIGRAM(S): 2.5 TABLET, FILM COATED ORAL at 05:37

## 2025-03-07 RX ADMIN — SACUBITRIL AND VALSARTAN 1 TABLET(S): 49; 51 TABLET, FILM COATED ORAL at 17:01

## 2025-03-07 RX ADMIN — Medication 20 MILLIGRAM(S): at 21:22

## 2025-03-07 RX ADMIN — ATORVASTATIN CALCIUM 40 MILLIGRAM(S): 80 TABLET, FILM COATED ORAL at 21:22

## 2025-03-07 RX ADMIN — METHYLPREDNISOLONE ACETATE 40 MILLIGRAM(S): 80 INJECTION, SUSPENSION INTRA-ARTICULAR; INTRALESIONAL; INTRAMUSCULAR; SOFT TISSUE at 19:09

## 2025-03-07 RX ADMIN — Medication 400 MILLIGRAM(S): at 23:46

## 2025-03-07 RX ADMIN — CLOPIDOGREL BISULFATE 75 MILLIGRAM(S): 75 TABLET, FILM COATED ORAL at 11:41

## 2025-03-07 RX ADMIN — APIXABAN 5 MILLIGRAM(S): 2.5 TABLET, FILM COATED ORAL at 17:01

## 2025-03-07 NOTE — CONSULT NOTE ADULT - ASSESSMENT
69Y M PMH CHF, afib on Eliquis, HTN, osteoarthritis, gout, pituitary mass, with two recent admissions for acute polyarticular gout involving left foot / L ankle .. now presenting with tcp and sob aswell as RLE pain   CP /SOB this am . no fever or chills compliant with his meds RLE pain most in foot and ankle has been tapering his steorids as he was instructed however now feels flare pt improvent wit sob post diuresis imprving R foot pain s/p steroids   now pulm called fro evaluation       Pulmonary Nodule  Gout flare;   A fibrillation  HTN  OA  Pituitary mass    Pulmonary Nodule  the ct chest showed: 3/6/2025: Few scattered round glass and nodular opacities bilaterally, which are nonspecific and may be infectious or inflammatory. No lobar consolidation. 1.9 cm solid pulmonary nodule in the right middle lobe. This has increased in size fromthe prior study dated 10/30/2024. PET/CT suggested for further evaluation.  this  must be evaluted soon ater dc with ct pet and needs to follow up with thoracic as wellas with pulmonary after dc:  especially since it has increased in size   Gout flare;   on steroids   A fibrillation  on eliquis   HTN  controlled   OA  pain control    Pituitary mass  per primary team     dw acp

## 2025-03-07 NOTE — DISCHARGE NOTE PROVIDER - NSDCMRMEDTOKEN_GEN_ALL_CORE_FT
acetaminophen 325 mg oral tablet: 2 tab(s) orally every 6 hours as needed for  mild-moderate pain  Albuterol (Eqv-Ventolin HFA) 90 mcg/inh inhalation aerosol: 1 puff(s) inhaled every 6 hours as needed  Amvuttra 25 mg/0.5 mL subcutaneous solution: 25 milligram(s) subcutaneously every 3 months  apixaban 5 mg oral tablet: 1 tab(s) orally every 12 hours  atorvastatin 40 mg oral tablet: 1 tab(s) orally once a day (at bedtime)  cabergoline 0.5 mg oral tablet: 2 tab(s) orally once a week (every Friday)  clopidogrel 75 mg oral tablet: 1 tab(s) orally once a day  Entresto 24 mg-26 mg oral tablet: 1 tab(s) orally 2 times a day  finasteride 5 mg oral tablet: 1 tab(s) orally once a day  fluticasone-salmeterol 100 mcg-50 mcg inhalation powder: 1 puff(s) inhaled 2 times a day  levothyroxine 175 mcg (0.175 mg) oral tablet: 1 tab(s) orally once a day  pantoprazole 40 mg oral delayed release tablet: 1 tab(s) orally once a day (before a meal) Take this medication while on steroids (prednisone)  polyethylene glycol 3350 oral powder for reconstitution: 17 gram(s) orally once a day as needed for  constipation  predniSONE 5 mg oral tablet: 4 tab(s) orally once a day You will take 20mg  (4 tabs) orally daily x 2 days (2/25 and 2/26), then decrease to 15mg (3 tabs) orally daily x 3 days (2/27-3/1), then decrease to 10mg (2 tabs) orally daily x 3 days (3/2- 3/4), then decrease to 5mg (1 tab) orally daily x 3 days (3/5-3/7), then discontinue  tamsulosin 0.4 mg oral capsule: 1 cap(s) orally once a day (in the morning)  torsemide 20 mg oral tablet: 2 tab(s) orally 2 times a day  Vyndamax 61 mg oral capsule: 1 cap(s) orally once a day   Albuterol (Eqv-Ventolin HFA) 90 mcg/inh inhalation aerosol: 1 puff(s) inhaled every 6 hours as needed  Amvuttra 25 mg/0.5 mL subcutaneous solution: 25 milligram(s) subcutaneously every 3 months  apixaban 5 mg oral tablet: 1 tab(s) orally every 12 hours  atorvastatin 40 mg oral tablet: 1 tab(s) orally once a day (at bedtime)  cabergoline 0.5 mg oral tablet: 2 tab(s) orally once a week (every Friday)  clopidogrel 75 mg oral tablet: 1 tab(s) orally once a day  colchicine 0.6 mg oral tablet: 1 tab(s) orally every other day  Entresto 24 mg-26 mg oral tablet: 1 tab(s) orally 2 times a day  finasteride 5 mg oral tablet: 1 tab(s) orally once a day  fluticasone-salmeterol 100 mcg-50 mcg inhalation powder: 1 puff(s) inhaled 2 times a day  levothyroxine 175 mcg (0.175 mg) oral tablet: 1 tab(s) orally once a day  pantoprazole 40 mg oral delayed release tablet: 1 tab(s) orally once a day (before a meal) Take this medication while on steroids (prednisone)  polyethylene glycol 3350 oral powder for reconstitution: 17 gram(s) orally once a day as needed for  constipation  predniSONE 10 mg oral tablet: 2 tab(s) orally once a day 2 tablets once a day for three days then 1.5 tablets once a day for three days then 1 tablet once a day for three days followed by 0.5 tablet once a day for three days then STOP. Dispense: 15 tablets  tamsulosin 0.4 mg oral capsule: 1 cap(s) orally once a day (in the morning)  torsemide 20 mg oral tablet: 1 tab(s) orally 2 times a day  Vyndamax 61 mg oral capsule: 1 cap(s) orally once a day

## 2025-03-07 NOTE — DISCHARGE NOTE PROVIDER - NSDCCPCAREPLAN_GEN_ALL_CORE_FT
PRINCIPAL DISCHARGE DIAGNOSIS  Diagnosis: Chest pain  Assessment and Plan of Treatment: You had complaints of chest pain. You did not have an acute cardiac event. Follow up with your primary care doctor or return to the emergency room if your chest pain continues. Continue oral Lasix. Follow up your lung nodule as an outpatient.         SECONDARY DISCHARGE DIAGNOSES  Diagnosis: Gout  Assessment and Plan of Treatment: You were treated for gout. You were seen by rheumatology and started on IV steroids. You were started on Colchicine every other day. Take this as prescribed.

## 2025-03-07 NOTE — DISCHARGE NOTE PROVIDER - NSDCFUSCHEDAPPT_GEN_ALL_CORE_FT
Nickie Austin  [facility]  RHEUM [address] [facility]  Scheduled Appointment: 03/24/2025    KAIA SANCHES  [facility]  CARDIOLOGY [address] [facility]   Scheduled Appointment: 03/26/2025

## 2025-03-07 NOTE — PROGRESS NOTE ADULT - SUBJECTIVE AND OBJECTIVE BOX
Cardiovascular Disease Progress Note  DATE OF SERVICE: 03-07-25 @ 10:04    Overnight events: No acute events overnight.    The patient denies chest pain or SOB.   Otherwise review of systems negative    Objective Findings:  T(C): 36.6 (03-07-25 @ 05:20), Max: 36.9 (03-06-25 @ 20:45)  HR: 85 (03-07-25 @ 05:20) (75 - 85)  BP: 125/84 (03-07-25 @ 05:20) (95/63 - 128/91)  RR: 18 (03-07-25 @ 05:20) (18 - 22)  SpO2: 100% (03-07-25 @ 05:20) (99% - 100%)  Wt(kg): --  Daily     Daily       Physical Exam:  Gen: NAD; Patient resting comfortably  HEENT: EOMI, Normocephalic/ atraumatic  CV: RRR, normal S1 + S2, no m/r/g  Lungs:  Normal respiratory effort; clear to auscultation bilaterally  Abd: soft, non-tender; bowel sounds present  Ext: trace edema; warm and well perfused    Telemetry: Sinus; no ectopy    Laboratory Data:                        11.1   9.43  )-----------( 201      ( 07 Mar 2025 04:39 )             32.9     03-07    138  |  105  |  45[H]  ----------------------------<  169[H]  4.9   |  19[L]  |  1.52[H]    Ca    8.9      07 Mar 2025 04:39    TPro  6.8  /  Alb  3.5  /  TBili  1.1  /  DBili  x   /  AST  41[H]  /  ALT  75[H]  /  AlkPhos  117  03-07      CARDIAC MARKERS ( 06 Mar 2025 05:57 )  x     / x     / x     / x     / 2.9 ng/mL          Inpatient Medications:  MEDICATIONS  (STANDING):  apixaban 5 milliGRAM(s) Oral every 12 hours  atorvastatin 40 milliGRAM(s) Oral at bedtime  cabergoline 1 milliGRAM(s) Oral <User Schedule>  clopidogrel Tablet 75 milliGRAM(s) Oral daily  colchicine 0.6 milliGRAM(s) Oral two times a day  finasteride 5 milliGRAM(s) Oral daily  furosemide   Injectable 40 milliGRAM(s) IV Push two times a day  levothyroxine 175 MICROGram(s) Oral daily  methylPREDNISolone sodium succinate Injectable 40 milliGRAM(s) IV Push once  sacubitril 24 mG/valsartan 26 mG 1 Tablet(s) Oral two times a day  tafamidis (Vyndamax) 61 mg capsule 1 Capsule(s) 1 Capsule(s) Oral daily  tamsulosin 0.4 milliGRAM(s) Oral at bedtime      Assessment: 69 year old man with systolic CHF, afib on Eliquis, HTN, and cardiac amyloidosis presents with acute gout.     Plan of Care:    #Compensated systolic CHF-  Known cardiac amyloidosis.  Mr. Joaquin is not grossly fluid overloaded on my exam.  No objection to transitioning to home dose PO diuretics.     #Elevated troponins-  No chest pain and EKG is nonischemic.  Troponins are chronically elevated in the setting of CKD, as per chart review.    #Cardiac amyloidosis-  Home dose tafamidis.     #A-fib-  In sinus on telemetry.   Continue Eliquis.    #ACP (advance care planning)-  CPT 05962  Advanced care planning was discussed with the patient.  Advance care planning forms were discussed. Risks, benefits and alternatives of medical treatment and procedures were discussed in detail and all questions were answered. 30 additional minutes spent addressing advance care plans.    High and complex medical decision making in this patient with comorbidities.       Over 55 minutes spent on total encounter; 100% of the visit was spent counseling and/or coordinating care by the attending physician.      Onel Lugo MD [facility]  Cardiovascular Disease  [phone]

## 2025-03-07 NOTE — PROGRESS NOTE ADULT - ASSESSMENT
69Y M PMH CHF, afib on Eliquis, HTN, osteoarthritis, gout, pituitary mass, with two recent admissions for acute polyarticular gout involving left foot / L ankle .. now presenting with tcp and sob aswell as RLE pain     CP /SOB this am .   no fever or chills   compliant with his meds     RLE pain most in foot and ankle   has been tapering his steorids as he was instructed however now feels flare   pt improvent wit sob post diuresis   imprving R foot pain s/p steroids         acute gout  :   cont steroids as ordered   hold off on alopurinol   Rheum consulted     CP : admit to tele   fu cards    SOB : suspect element of R sided CHF   cont diuresis   repsodned to lasix     afib : restarted eliquis       cardiac amyloid : cont meds      Pulmonary nodule.:  hx   ·  Recommendation: PET-CT (read) as an outpatient with RML lung nodule 18 x 16 mm is not hypermetabolic, showing mild activity (SUV 1.3)  -Reviewed images from CT A/P (lower chest) from 10/2024  -Likely hamartoma. No plans for biopsy at this time, suggest eventual repeat CT chest as an outpatient.

## 2025-03-07 NOTE — DISCHARGE NOTE PROVIDER - HOSPITAL COURSE
69Y M PMH CHF, afib on Eliquis, HTN, osteoarthritis, gout, pituitary mass, with two recent admissions for acute polyarticular gout involving left foot / L ankle .. now presenting with RLE pain and SOB.     RLE pain 2/2 acute oligoarticular gout flare: R first toe interphalangeal joint, R 1st MTP joint, L 2nd and 3rd PIP finger joint  - IV steroids on 3/6 and 3/7, rheum recs --   - Started on Colchicine every other day     SOB - R sided CHF - Continue PO Lasix as not grossly fluid overloaded on exam     Elevated troponins  - No chest pain and EKG is nonischemic.  - Troponins are chronically elevated in the setting of CKD, as per chart review.    PT: No needs    69Y M PMH CHF, afib on Eliquis, HTN, osteoarthritis, gout, pituitary mass, with two recent admissions for acute polyarticular gout involving left foot / L ankle who presented with gout flare and shortness of breath.     RLE pain 2/2 acute oligoarticular gout flare: R first toe interphalangeal joint, R 1st MTP joint, L 2nd and 3rd PIP finger joint  - Seen by rheum, started on IV steroids and tapered during admission.   - DC plan:    - Started on Colchicine every other day     SOB - R sided CHF/known amyloidosis - Continue PO Lasix as not grossly fluid overloaded on exam.   - Has lung nodule that can be done as outpatient.     Elevated troponins-  - No chest pain and EKG is nonischemic.  - Troponins are chronically elevated in the setting of CKD, as per chart review.     PT: No needs

## 2025-03-07 NOTE — PROGRESS NOTE ADULT - SUBJECTIVE AND OBJECTIVE BOX
[facility]  [id number]    INTERVAL HPI/OVERNIGHT EVENTS:    MEDICATIONS  (STANDING):  apixaban 5 milliGRAM(s) Oral every 12 hours  atorvastatin 40 milliGRAM(s) Oral at bedtime  cabergoline 1 milliGRAM(s) Oral <User Schedule>  clopidogrel Tablet 75 milliGRAM(s) Oral daily  colchicine 0.6 milliGRAM(s) Oral two times a day  finasteride 5 milliGRAM(s) Oral daily  furosemide   Injectable 40 milliGRAM(s) IV Push two times a day  levothyroxine 175 MICROGram(s) Oral daily  methylPREDNISolone sodium succinate Injectable 40 milliGRAM(s) IV Push once  sacubitril 24 mG/valsartan 26 mG 1 Tablet(s) Oral two times a day  tafamidis (Vyndamax) 61 mg capsule 1 Capsule(s) 1 Capsule(s) Oral daily  tamsulosin 0.4 milliGRAM(s) Oral at bedtime    MEDICATIONS  (PRN):  polyethylene glycol 3350 17 Gram(s) Oral daily PRN for constipation      Allergies    No Known Allergies    Intolerances        Vital Signs Last 24 Hrs  T(C): 36.6 (07 Mar 2025 05:20), Max: 36.9 (06 Mar 2025 20:45)  T(F): 97.9 (07 Mar 2025 05:20), Max: 98.4 (06 Mar 2025 20:45)  HR: 85 (07 Mar 2025 05:20) (75 - 85)  BP: 125/84 (07 Mar 2025 05:20) (95/63 - 128/91)  BP(mean): --  RR: 18 (07 Mar 2025 05:20) (18 - 22)  SpO2: 100% (07 Mar 2025 05:20) (99% - 100%)    Parameters below as of 07 Mar 2025 05:20  Patient On (Oxygen Delivery Method): room air        Physical Exam:  General: NAD  HEENT: EOMI, MMM  Cardio: +S1/S2, RRR  Resp: CTA b/l  GI: +BS, soft, NT/ND  MSK:  Neuro: AAOx3  Psych: wnl    LABS:                        11.1   9.43  )-----------( 201      ( 07 Mar 2025 04:39 )             32.9     03-07    138  |  105  |  45[H]  ----------------------------<  169[H]  4.9   |  19[L]  |  1.52[H]    Ca    8.9      07 Mar 2025 04:39    TPro  6.8  /  Alb  3.5  /  TBili  1.1  /  DBili  x   /  AST  41[H]  /  ALT  75[H]  /  AlkPhos  117  03-07      Urinalysis Basic - ( 07 Mar 2025 04:39 )    Color: x / Appearance: x / SG: x / pH: x  Gluc: 169 mg/dL / Ketone: x  / Bili: x / Urobili: x   Blood: x / Protein: x / Nitrite: x   Leuk Esterase: x / RBC: x / WBC x   Sq Epi: x / Non Sq Epi: x / Bacteria: x          RADIOLOGY & ADDITIONAL TESTS:   [facility]  [id number]    INTERVAL HPI/OVERNIGHT EVENTS:  Reports improvement in his hand and feet symtoms  ROS; new joint pain, fever, rash    MEDICATIONS  (STANDING):  apixaban 5 milliGRAM(s) Oral every 12 hours  atorvastatin 40 milliGRAM(s) Oral at bedtime  cabergoline 1 milliGRAM(s) Oral <User Schedule>  clopidogrel Tablet 75 milliGRAM(s) Oral daily  colchicine 0.6 milliGRAM(s) Oral two times a day  finasteride 5 milliGRAM(s) Oral daily  furosemide   Injectable 40 milliGRAM(s) IV Push two times a day  levothyroxine 175 MICROGram(s) Oral daily  methylPREDNISolone sodium succinate Injectable 40 milliGRAM(s) IV Push once  sacubitril 24 mG/valsartan 26 mG 1 Tablet(s) Oral two times a day  tafamidis (Vyndamax) 61 mg capsule 1 Capsule(s) 1 Capsule(s) Oral daily  tamsulosin 0.4 milliGRAM(s) Oral at bedtime    MEDICATIONS  (PRN):  polyethylene glycol 3350 17 Gram(s) Oral daily PRN for constipation      Allergies    No Known Allergies    Intolerances        Vital Signs Last 24 Hrs  T(C): 36.6 (07 Mar 2025 05:20), Max: 36.9 (06 Mar 2025 20:45)  T(F): 97.9 (07 Mar 2025 05:20), Max: 98.4 (06 Mar 2025 20:45)  HR: 85 (07 Mar 2025 05:20) (75 - 85)  BP: 125/84 (07 Mar 2025 05:20) (95/63 - 128/91)  BP(mean): --  RR: 18 (07 Mar 2025 05:20) (18 - 22)  SpO2: 100% (07 Mar 2025 05:20) (99% - 100%)    Parameters below as of 07 Mar 2025 05:20  Patient On (Oxygen Delivery Method): room air        Physical Exam:  General: NAD  HEENT: EOMI, MMM  Cardio: +S1/S2, RRR  Resp: CTA b/l  GI: +BS, soft, NT/ND  MSK:  Neuro: AAOx3  Psych: wnl    LABS:                        11.1   9.43  )-----------( 201      ( 07 Mar 2025 04:39 )             32.9     03-07    138  |  105  |  45[H]  ----------------------------<  169[H]  4.9   |  19[L]  |  1.52[H]    Ca    8.9      07 Mar 2025 04:39    TPro  6.8  /  Alb  3.5  /  TBili  1.1  /  DBili  x   /  AST  41[H]  /  ALT  75[H]  /  AlkPhos  117  03-07      Urinalysis Basic - ( 07 Mar 2025 04:39 )    Color: x / Appearance: x / SG: x / pH: x  Gluc: 169 mg/dL / Ketone: x  / Bili: x / Urobili: x   Blood: x / Protein: x / Nitrite: x   Leuk Esterase: x / RBC: x / WBC x   Sq Epi: x / Non Sq Epi: x / Bacteria: x          RADIOLOGY & ADDITIONAL TESTS:

## 2025-03-07 NOTE — CONSULT NOTE ADULT - ASSESSMENT
70 y/o M PMhx CHF, afib on Eliquis, HTN, osteoarthritis, gout, pituitary mass, with two recent admissions for acute polyarticular gout involving left foot / L ankle who presented w/ SOB and R foot/ ankle pain.    Gout  no evidence of erythema, no warmth, no cellulitic changes  on solumedrol  rheumatology following    abnormal lung imaging  - CT chest- Few scattered round glass and nodular opacities bilaterally, which are nonspecific and may be infectious or inflammatory. No lobar consolidation. 1.9 cm solid pulmonary nodule in the right middle lobe. This has increased in size from the prior study dated 10/30/2024.  - states he had a PET scan last month and was told it was nothing to worry about needed to continue to have it monitored    Recommendations  continue off antibiotics  steroids per rheumatology  he will need to continue to f/u for lung nodule    Oh Petersen M.D.  [place] [facility]  [phone]  After 5pm on weekdays and all day on weekends - please call [phone]  Available on [employer] teams    Thank you for consulting us and involving us in the management of this patients case. In addition to reviewing history, imaging, documents, labs, microbiology, took into account antibiotic stewardship, local antibiogram and infection control strategies and potential transmission issues.  70 y/o M PMhx CHF, afib on Eliquis, HTN, osteoarthritis, gout, pituitary mass, with two recent admissions for acute polyarticular gout involving left foot / L ankle who presented w/ SOB and R foot/ ankle pain.    Gout  R foot/ ankle edema  no evidence of erythema, no warmth, no cellulitic changes  on solumedrol  rheumatology following    leukocytosis  likely reactive 2/2 gout vs 2/2 steroids    abnormal lung imaging  - CT chest- Few scattered round glass and nodular opacities bilaterally, which are nonspecific and may be infectious or inflammatory. No lobar consolidation. 1.9 cm solid pulmonary nodule in the right middle lobe. This has increased in size from the prior study dated 10/30/2024.  - states he had a PET scan last month and was told it was nothing to worry about needed to continue to have it monitored    Recommendations  continue off antibiotics  steroids per rheumatology  he will need to continue to f/u for lung nodule    Oh Petersen M.D.  [facility]  [phone]  After 5pm on weekdays and all day on weekends - please call [phone]  Available on [employer] teams    Thank you for consulting us and involving us in the management of this patients case. In addition to reviewing history, imaging, documents, labs, microbiology, took into account antibiotic stewardship, local antibiogram and infection control strategies and potential transmission issues.

## 2025-03-07 NOTE — DISCHARGE NOTE PROVIDER - PROVIDER TOKENS
PROVIDER:[TOKEN:[[id number]]],FREE:[LAST:[FOLLOW UP WITH PCP AND CARDIOLOGY],PHONE:[(   )    -],FAX:[(   )    -]]

## 2025-03-07 NOTE — DISCHARGE NOTE PROVIDER - NPI NUMBER (FOR SYSADMIN USE ONLY) :
Render Post-Care Instructions In Note?: no
Show Applicator Variable?: Yes
Post-Care Instructions: I reviewed with the patient in detail post-care instructions. Patient is to wear sunprotection, and avoid picking at any of the treated lesions. Pt may apply Vaseline to crusted or scabbing areas.
Detail Level: Detailed
Medical Necessity Clause: This procedure was medically necessary because the lesions(s) that were treated were: inflamed, irritated, catch on closed, tender, itchy, growing
Consent: The patient's consent was obtained including but not limited to risks of crusting, scabbing, blistering, scarring, darker or lighter pigmentary change, recurrence, incomplete removal and infection.
Medical Necessity Information: It is in your best interest to select a reason for this procedure from the list below. All of these items fulfill various CMS LCD requirements except the new and changing color options.
Spray Paint Text: The liquid nitrogen was applied to the skin utilizing a spray paint frosting technique.
Pared With?: 15 blade
[[id number]],[UNKNOWN]

## 2025-03-07 NOTE — PHYSICAL THERAPY INITIAL EVALUATION ADULT - ADDITIONAL COMMENTS
Pt lives in a home with family, no falls, uses RW, stairs to negotiate, wife assist with ADLs.   Patients Current SpO2: 99%

## 2025-03-07 NOTE — DISCHARGE NOTE PROVIDER - NSDCFUADDAPPT_GEN_ALL_CORE_FT
Follow up with PCP within 1-2 weeks of discharge. If you are in need of a general medicine physician and post-discharge medical follow-up for further care/recommendations you may contact the [facility] for an appointment at [phone].     Follow up with rheumatology within 1-2 weeks of discharge.     Follow up with cardiology within 1-2 weeks of discharge. If you are in need of a general cardiologist after discharge, you may contact the [facility] for an appointment at [phone].

## 2025-03-07 NOTE — DISCHARGE NOTE PROVIDER - CARE PROVIDER_API CALL
Onel Lugo.  Internal Medicine  [address] [place] [address]  Phone: [phone]  Fax: [phone]  Follow Up Time:     FOLLOW UP WITH PCP AND CARDIOLOGY,   Phone: (   )    -  Fax: (   )    -  Follow Up Time:

## 2025-03-07 NOTE — PROGRESS NOTE ADULT - SUBJECTIVE AND OBJECTIVE BOX
Date of service: 03-07-25 @ 23:08      Patient is a 69y old  Male who presents with a chief complaint of Edema (07 Mar 2025 19:05)                                                               INTERVAL HPI/OVERNIGHT EVENTS:    REVIEW OF SYSTEMS:     CONSTITUTIONAL: No weakness, fevers or chills  EYES/ENT: No visual changes , no ear ache   NECK: No pain or stiffness  RESPIRATORY: No cough, wheezing,  No shortness of breath  CARDIOVASCULAR: No chest pain or palpitations  GASTROINTESTINAL: No abdominal pain  . No nausea, vomiting, or hematemesis; No diarrhea or constipation. No melena or hematochezia.  GENITOURINARY: No dysuria, frequency or hematuria  NEUROLOGICAL: No numbness or weakness  SKIN: No itching, burning, rashes, or lesions                                                                                                                                                                                                                                                                                 Medications:  MEDICATIONS  (STANDING):  apixaban 5 milliGRAM(s) Oral every 12 hours  atorvastatin 40 milliGRAM(s) Oral at bedtime  cabergoline 1 milliGRAM(s) Oral <User Schedule>  clopidogrel Tablet 75 milliGRAM(s) Oral daily  colchicine 0.6 milliGRAM(s) Oral every other day  finasteride 5 milliGRAM(s) Oral daily  levothyroxine 175 MICROGram(s) Oral daily  sacubitril 24 mG/valsartan 26 mG 1 Tablet(s) Oral two times a day  tafamidis (Vyndamax) 61 mg capsule 1 Capsule(s) 1 Capsule(s) Oral daily  tamsulosin 0.4 milliGRAM(s) Oral at bedtime  torsemide 20 milliGRAM(s) Oral two times a day    MEDICATIONS  (PRN):  polyethylene glycol 3350 17 Gram(s) Oral daily PRN for constipation       Allergies    No Known Allergies    Intolerances      Vital Signs Last 24 Hrs  T(C): 36.6 (03-07-25 @ 05:20), Max: 36.9 (03-06-25 @ 20:45)  HR: 85 (03-07-25 @ 05:20) (75 - 85)  BP: 125/84 (03-07-25 @ 05:20) (95/63 - 128/91)  RR: 18 (03-07-25 @ 05:20) (18 - 22)  SpO2: 100% (03-07-25 @ 05:20) (99% - 100%)    Physical Exam:    Daily     Daily   General:  Well appearing, NAD, not cachetic  HEENT:  Nonicteric, PERRLA  CV:  RRR, S1S2   Lungs:  CTA B/L, no wheezes, rales, rhonchi  Abdomen:  Soft, non-tender, no distended, positive BS  Extremities: edema                                                                                                                                                                                                                                                                                             LABS:                                         11.1   9.43  )-----------( 201      ( 07 Mar 2025 04:39 )             32.9     03-07    138  |  105  |  45[H]  ----------------------------<  169[H]  4.9   |  19[L]  |  1.52[H]    Ca    8.9      07 Mar 2025 04:39    TPro  6.8  /  Alb  3.5  /  TBili  1.1  /  DBili  x   /  AST  41[H]  /  ALT  75[H]  /  AlkPhos  117  03-07      CARDIAC MARKERS ( 06 Mar 2025 05:57 )  x     / x     / x     / x     / 2.9 ng/mL

## 2025-03-07 NOTE — PROGRESS NOTE ADULT - ATTENDING COMMENTS
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 Jazmine Engel  Rheumatology Attending  [facility]  Rheumatology at [place]     Contact:   call the office:: [phone] or reach va Microsoft Teams, weekdays before 5 pm   after 5 pm or on weekends, contact [phone]

## 2025-03-07 NOTE — PHYSICAL THERAPY INITIAL EVALUATION ADULT - PERTINENT HX OF CURRENT PROBLEM, REHAB EVAL
69Y M PMH CHF, afib on Eliquis, HTN, osteoarthritis, gout, pituitary mass, with two recent admissions for acute polyarticular gout involving left foot / L ankle .. now presenting with tcp and sob aswell as RLE pain

## 2025-03-07 NOTE — CONSULT NOTE ADULT - SUBJECTIVE AND OBJECTIVE BOX
Cardiovascular Disease Initial Evaluation  DATE OF SERVICE: 03-06-25 @ 11:54    CHIEF COMPLAINT: SOB and chest pain    HISTORY OF PRESENT ILLNESS:    This is a 69 year old man with systolic CHF, afib on Eliquis, HTN, and cardiac amyloidosis who presented to [facility] on 3/6/2025 with SOB, chest pain, and RLE pain.  The patient was recently discharged after a gout flare.  He returns with a similar pain.  He says his SOB and chest pain are now resolved.           Allergies  No Known Allergies        MEDICATIONS:  Reviewed    PAST MEDICAL & SURGICAL HISTORY:  Low back pain  Herniated disc on lumbar region      Redundant prepuce and phimosis      Arthritis  on knees      Hypothyroid  not taking any meds      Hypertension      Thyroid cancer  pt denies any thyroid cancer      History of pituitary disease  on caber      Gout      S/P excision of vocal cord nodule  2007 and radiation -      Laryngeal cancer  "stage 0"      Pituitary mass      Carotid occlusion, bilateral      Obese      S/P excision of vocal cord nodule  radiation - 2007      History of lumbar spinal fusion  2018      H/O arthroscopy of shoulder  right-2017      Status post carotid surgery  right-5/13/2022, pt. states unsuccesful          FAMILY HISTORY:  Family history of stroke (Mother)    Family history of Alzheimer's disease (Father)    FH: stroke (Mother, Father)        SOCIAL HISTORY:    The patient is a nonsmoker       REVIEW OF SYSTEMS:  See HPI, otherwise complete 14 point review of systems negative      PHYSICAL EXAM:  T(C): 36.6 (03-06-25 @ 10:34), Max: 36.8 (03-06-25 @ 08:45)  HR: 77 (03-06-25 @ 10:34) (77 - 95)  BP: 100/64 (03-06-25 @ 10:34) (100/64 - 132/86)  RR: 22 (03-06-25 @ 10:34) (16 - 22)  SpO2: 100% (03-06-25 @ 10:34) (95% - 100%)  Wt(kg): --  I&O's Summary      Appearance: No Acute Distress; resting comfortably  HEENT:  Normal oral mucosa, PERRL, EOMI	  Cardiovascular: Normal S1 S2, No JVD, No murmurs/rubs/gallops  Respiratory: Normal respiratory effort; Lungs clear to auscultation bilaterally  Gastrointestinal:  Soft, Non-tender, + BS	  Skin: No rashes, No ecchymoses, No cyanosis	  Neurologic: Non-focal; no weakness  Extremities: No clubbing, 1+ edema  Vascular: Peripheral pulses palpable 2+ bilaterally  Psychiatry: A & O x 3, Mood & affect appropriate    Laboratory Data:	 	    CBC Full  -  ( 06 Mar 2025 04:20 )  WBC Count : 11.16 K/uL  Hemoglobin : 12.8 g/dL  Hematocrit : 39.1 %  Platelet Count - Automated : 207 K/uL  Mean Cell Volume : 91.8 fL  Mean Cell Hemoglobin : 30.0 pg  Mean Cell Hemoglobin Concentration : 32.7 g/dL  Auto Neutrophil # : x  Auto Lymphocyte # : x  Auto Monocyte # : x  Auto Eosinophil # : x  Auto Basophil # : x  Auto Neutrophil % : x  Auto Lymphocyte % : x  Auto Monocyte % : x  Auto Eosinophil % : x  Auto Basophil % : x    03-06    138  |  104  |  40[H]  ----------------------------<  103[H]  5.1   |  18[L]  |  1.41[H]    Ca    9.2      06 Mar 2025 04:20    TPro  7.6  /  Alb  3.7  /  TBili  0.9  /  DBili  x   /  AST  44[H]  /  ALT  69[H]  /  AlkPhos  96  03-06        Interpretation of Telemetry: Sinus 	    ECG:  	Sinus; low voltage    Assessment: 69 year old man with systolic CHF, afib on Eliquis, HTN, and cardiac amyloidosis presents with acute gout.     Plan of Care:    #Compensated systolic CHF-  Known cardiac amyloidosis.  Mr. Joaquin is not grossly fluid overloaded on my exam.  I would not aggressively diurese.   Continue home dose PO diuretics.     #Elevated troponins-  No chest pain and EKG is nonischemic.  Troponins are chronically elevated in the setting of CKD, as per chart review.    #Cardiac amyloidosis-  Resume home dose tafamidis.     #A-fib-  Admission EKG is sinus.  Resume Eliquis.      Care discussed at length with the patient and wife at bedside in the ED hallway.       High and complex medical decision making in this patient with comorbidities.         76 minutes spent on total encounter; 100% of the visit was spent counseling and/or coordinating care by the attending physician.   	  Onel Lugo MD [facility]  Cardiovascular Diseases  [phone]    
MUSTAPHA CANTU  [id number]    HISTORY OF PRESENT ILLNESS:  "69Y M PMH CHF, afib on Eliquis, HTN, osteoarthritis, gout, pituitary mass, with two recent admissions for acute polyarticular gout involving left foot / L ankle .. now presenting with tcp and sob aswell as RLE pain "    Rheum hx:  Seen by rheum for polyarticular gout flare in early feb, was started on IV solumedrol with good response, was sent on steroid taper. However patient only took taper till feb 13th, while taper was suppose to be till feb 19th, says he ran out of medications. Then had another admission later in february for gout flare of LLE and L hand. Was discharged on prednisone taper    Starting 3/2 started to have pain R foot, L 2nd and 3rd PIP. States he was taking prednisone taper as directed. Never had arthrocentesis before. Was taking colchicine before but stopped in 2/2025.      Rheum ROS  As above    MEDICATIONS  (STANDING):  apixaban 5 milliGRAM(s) Oral every 12 hours  atorvastatin 40 milliGRAM(s) Oral at bedtime  cabergoline 1 milliGRAM(s) Oral <User Schedule>  clopidogrel Tablet 75 milliGRAM(s) Oral daily  colchicine 0.6 milliGRAM(s) Oral two times a day  finasteride 5 milliGRAM(s) Oral daily  furosemide   Injectable 40 milliGRAM(s) IV Push two times a day  levothyroxine 175 MICROGram(s) Oral daily  methylPREDNISolone sodium succinate Injectable 40 milliGRAM(s) IV Push once  sacubitril 24 mG/valsartan 26 mG 1 Tablet(s) Oral two times a day  tamsulosin 0.4 milliGRAM(s) Oral at bedtime    MEDICATIONS  (PRN):  polyethylene glycol 3350 17 Gram(s) Oral daily PRN for constipation      Allergies    No Known Allergies    Intolerances        PERTINENT MEDICATION HISTORY: prednisone taper    SOCIAL HISTORY: did not obtain  OCCUPATION: did not obtain  TRAVEL HISTORY: did not obtain    FAMILY HISTORY:  Family history of stroke (Mother)    Family history of Alzheimer's disease (Father)    FH: stroke (Mother, Father)        Vital Signs Last 24 Hrs  T(C): 36.4 (06 Mar 2025 16:47), Max: 36.8 (06 Mar 2025 08:45)  T(F): 97.6 (06 Mar 2025 16:47), Max: 98.2 (06 Mar 2025 08:45)  HR: 75 (06 Mar 2025 16:47) (75 - 95)  BP: 95/63 (06 Mar 2025 16:47) (95/63 - 132/86)  BP(mean): --  RR: 19 (06 Mar 2025 16:47) (16 - 22)  SpO2: 99% (06 Mar 2025 16:47) (95% - 100%)    Parameters below as of 06 Mar 2025 16:47  Patient On (Oxygen Delivery Method): room air        ROS as noted above     Physical Exam:  General: No apparent distress  HEENT: EOMI, MMM  MSK: +podagra and 1st IP joint of R big toe with overall swelling of R foot. +TTP L 2nd and 3rd PIP  Neuro: AAOx3  Skin: no visible rashes    LABS:                        12.8   11.16 )-----------( 207      ( 06 Mar 2025 04:20 )             39.1     03-06    138  |  104  |  40[H]  ----------------------------<  103[H]  5.1   |  18[L]  |  1.41[H]    Ca    9.2      06 Mar 2025 04:20    TPro  7.6  /  Alb  3.7  /  TBili  0.9  /  DBili  x   /  AST  44[H]  /  ALT  69[H]  /  AlkPhos  96  03-06      Urinalysis Basic - ( 06 Mar 2025 04:20 )    Color: x / Appearance: x / SG: x / pH: x  Gluc: 103 mg/dL / Ketone: x  / Bili: x / Urobili: x   Blood: x / Protein: x / Nitrite: x   Leuk Esterase: x / RBC: x / WBC x   Sq Epi: x / Non Sq Epi: x / Bacteria: x            Rheumatology Work Up    Sedimentation Rate, Erythrocyte: 21 mm/hr *H* [0 - 20] (02-20-25 @ 06:40)  C-Reactive Protein: 124 mg/L *H* [0 - 4] (02-20-25 @ 06:39)  Sedimentation Rate, Erythrocyte: 51 mm/hr *H* [0 - 20] (02-04-25 @ 03:42)  C-Reactive Protein: 207 mg/L *H* [0 - 4] (02-04-25 @ 03:42)            RADIOLOGY & ADDITIONAL STUDIES:    
[facility]  FRANC Jacobs S. Shah, Y. Patel, G. Casimir  [phone]  ([phone] - weekdays after 5pm and weekends)    MUSTAPHA CANTU  69y, Male  [id number]    HPI--  HPI:  70 y/o M PMhx CHF, afib on Eliquis, HTN, osteoarthritis, gout, pituitary mass, with two recent admissions for acute polyarticular gout involving left foot / L ankle who presented w/ SOB and R foot/ ankle pain. States he had been taking prednisone as prescribed but he developed worsening pain in his R ankle which interfered w/ his ability to ambulate. He also had sharp abdominal pain at that time which has since resolved.   Denies fever, chills, abd pain currently, n/v, diarrhea, dysuria.   He was started on IV diuretics and solumedrol.     ROS: 14 point review of systems completed, pertinent positives and negatives as per HPI.    Allergies: No Known Allergies    PMH -- Low back pain    Redundant prepuce and phimosis    Arthritis    Hypothyroid    Hypertension    Thyroid cancer    Throat cancer    History of pituitary disease    Gout    S/P excision of vocal cord nodule    Laryngeal cancer    Pituitary mass    Carotid occlusion, bilateral    Obese      PSH -- S/P excision of vocal cord nodule    H/O shoulder surgery    H/O thyroidectomy    History of back surgery    History of lumbar spinal fusion    H/O arthroscopy of shoulder    Status post carotid surgery      FH -- Family history of stroke (Mother)    Family history of Alzheimer's disease (Father)    No pertinent family history in first degree relatives    FH: stroke (Mother, Father)      Social History -- denies tobacco use    Physical Exam--  Vital Signs Last 24 Hrs  T(F): 97.5 (07 Mar 2025 13:42), Max: 98.4 (06 Mar 2025 20:45)  HR: 86 (07 Mar 2025 13:42) (75 - 86)  BP: 108/74 (07 Mar 2025 13:42) (95/63 - 128/91)  RR: 18 (07 Mar 2025 13:42) (18 - 19)  SpO2: 100% (07 Mar 2025 13:42) (99% - 100%)  General: nontoxic-appearing, no acute distress  HEENT: anicteric  Lungs: Clear bilaterally without rales  Heart: S1, S2, normal rate.   Abdomen: Soft. Nondistended. Nontender.   Neuro: no obvious focal deficits   Back: No costovertebral angle tenderness.  Extremities: R foot edema, tenderness to R ankle but no decreased passive ROM  Skin: R foot without no erythema or warmth  Psychiatric: Appropriate affect and mood for situation.     Laboratory & Imaging Data--  CBC:                       11.1   9.43  )-----------( 201      ( 07 Mar 2025 04:39 )             32.9     WBC Count: 9.43 K/uL (03-07-25 @ 04:39)  WBC Count: 11.16 K/uL (03-06-25 @ 04:20)    CMP: 03-07    138  |  105  |  45[H]  ----------------------------<  169[H]  4.9   |  19[L]  |  1.52[H]    Ca    8.9      07 Mar 2025 04:39    TPro  6.8  /  Alb  3.5  /  TBili  1.1  /  DBili  x   /  AST  41[H]  /  ALT  75[H]  /  AlkPhos  117  03-07    LIVER FUNCTIONS - ( 07 Mar 2025 04:39 )  Alb: 3.5 g/dL / Pro: 6.8 g/dL / ALK PHOS: 117 U/L / ALT: 75 U/L / AST: 41 U/L / GGT: x           Urinalysis Basic - ( 07 Mar 2025 04:39 )    Color: x / Appearance: x / SG: x / pH: x  Gluc: 169 mg/dL / Ketone: x  / Bili: x / Urobili: x   Blood: x / Protein: x / Nitrite: x   Leuk Esterase: x / RBC: x / WBC x   Sq Epi: x / Non Sq Epi: x / Bacteria: x      Microbiology:       Radiology--  ***  Active Medications--  apixaban 5 milliGRAM(s) Oral every 12 hours  atorvastatin 40 milliGRAM(s) Oral at bedtime  cabergoline 1 milliGRAM(s) Oral <User Schedule>  clopidogrel Tablet 75 milliGRAM(s) Oral daily  colchicine 0.6 milliGRAM(s) Oral two times a day  finasteride 5 milliGRAM(s) Oral daily  furosemide   Injectable 40 milliGRAM(s) IV Push two times a day  levothyroxine 175 MICROGram(s) Oral daily  methylPREDNISolone sodium succinate Injectable 40 milliGRAM(s) IV Push once  polyethylene glycol 3350 17 Gram(s) Oral daily PRN  sacubitril 24 mG/valsartan 26 mG 1 Tablet(s) Oral two times a day  tafamidis (Vyndamax) 61 mg capsule 1 Capsule(s) 1 Capsule(s) Oral daily  tamsulosin 0.4 milliGRAM(s) Oral at bedtime    Antimicrobials:     Immunologic:   
  03-07-25 @ 12:08    Patient is a 69y old  Male who presents with a chief complaint of     HPI:  69Y M PMH CHF, afib on Eliquis, HTN, osteoarthritis, gout, pituitary mass, with two recent admissions for acute polyarticular gout involving left foot / L ankle .. now presenting with tcp and sob aswell as RLE pain   CP /SOB this am . no fever or chills compliant with his meds RLE pain most in foot and ankle has been tapering his steorids as he was instructed however now feels flare pt improvent wit sob post diuresis imprving R foot pain s/p steroids   now pulm called fro evaluation        (06 Mar 2025 14:40)      ?FOLLOWING PRESENT  [ x] Hx of PE/DVT, [x ] Hx COPD, [x ] Hx of Asthma, [y ] Hx of Hospitalization, [ x]  Hx of BiPAP/CPAP use, [x ] Hx of CHILANGO    Allergies    No Known Allergies    Intolerances        PAST MEDICAL & SURGICAL HISTORY:  Low back pain  Herniated disc on lumbar region      Redundant prepuce and phimosis      Arthritis  on knees      Hypothyroid  not taking any meds      Hypertension      Thyroid cancer  pt denies any thyroid cancer      History of pituitary disease  on caber      Gout      S/P excision of vocal cord nodule  2007 and radiation -      Laryngeal cancer  "stage 0"      Pituitary mass      Carotid occlusion, bilateral      Obese      S/P excision of vocal cord nodule  radiation - 2007      History of lumbar spinal fusion  2018      H/O arthroscopy of shoulder  right-2017      Status post carotid surgery  right-5/13/2022, pt. states unsuccesful          FAMILY HISTORY:  Family history of stroke (Mother)    Family history of Alzheimer's disease (Father)    FH: stroke (Mother, Father)        Social History: [x  ] TOBACCO                  [ x ] ETOH                                 [ x ] IVDA/DRUGS    REVIEW OF SYSTEMS      General:	x    Skin/Breast:x  	  Ophthalmologic:x  	  ENMT:	x    Respiratory and Thorax:  pain in joints  	  Cardiovascular:	x    Gastrointestinal:	x    Genitourinary:	x    Musculoskeletal:	x    Neurological:	x    Psychiatric:	x    Hematology/Lymphatics:	x    Endocrine:	x    Allergic/Immunologic:	x    MEDICATIONS  (STANDING):  apixaban 5 milliGRAM(s) Oral every 12 hours  atorvastatin 40 milliGRAM(s) Oral at bedtime  cabergoline 1 milliGRAM(s) Oral <User Schedule>  clopidogrel Tablet 75 milliGRAM(s) Oral daily  colchicine 0.6 milliGRAM(s) Oral two times a day  finasteride 5 milliGRAM(s) Oral daily  furosemide   Injectable 40 milliGRAM(s) IV Push two times a day  levothyroxine 175 MICROGram(s) Oral daily  methylPREDNISolone sodium succinate Injectable 40 milliGRAM(s) IV Push once  sacubitril 24 mG/valsartan 26 mG 1 Tablet(s) Oral two times a day  tafamidis (Vyndamax) 61 mg capsule 1 Capsule(s) 1 Capsule(s) Oral daily  tamsulosin 0.4 milliGRAM(s) Oral at bedtime    MEDICATIONS  (PRN):  polyethylene glycol 3350 17 Gram(s) Oral daily PRN for constipation       Vital Signs Last 24 Hrs  T(C): 36.6 (07 Mar 2025 05:20), Max: 36.9 (06 Mar 2025 20:45)  T(F): 97.9 (07 Mar 2025 05:20), Max: 98.4 (06 Mar 2025 20:45)  HR: 85 (07 Mar 2025 05:20) (75 - 85)  BP: 125/84 (07 Mar 2025 05:20) (95/63 - 128/91)  BP(mean): --  RR: 18 (07 Mar 2025 05:20) (18 - 19)  SpO2: 100% (07 Mar 2025 05:20) (99% - 100%)    Parameters below as of 07 Mar 2025 05:20  Patient On (Oxygen Delivery Method): room air    Orthostatic VS          I&O's Summary    06 Mar 2025 07:01  -  07 Mar 2025 07:00  --------------------------------------------------------  IN: 0 mL / OUT: 475 mL / NET: -475 mL    07 Mar 2025 07:01  -  07 Mar 2025 12:08  --------------------------------------------------------  IN: 180 mL / OUT: 0 mL / NET: 180 mL        Physical Exam:   GENERAL: NAD, well-groomed, well-developed  HEENT: HORACIO/   Atraumatic, Normocephalic  ENMT: No tonsillar erythema, exudates, or enlargement; Moist mucous membranes, Good dentition, No lesions  NECK: Supple, No JVD, Normal thyroid  CHEST/LUNG: Clear to auscultation bilaterally  CVS: Regular rate and rhythm; No murmurs, rubs, or gallops  GI: : Soft, Nontender, Nondistended; Bowel sounds present  NERVOUS SYSTEM:  Alert & Oriented X3  EXTREMITIES:- edema  LYMPH: No lymphadenopathy noted  SKIN: No rashes or lesions  ENDOCRINOLOGY: No Thyromegaly  PSYCH: Appropriate    Labs:  -0.4<48<4>>26<<7.345>>-0.4<<3><<4><<5<<269>>  CARDIAC MARKERS ( 06 Mar 2025 05:57 )  x     / x     / x     / x     / 2.9 ng/mL                            11.1   9.43  )-----------( 201      ( 07 Mar 2025 04:39 )             32.9                         12.8   11.16 )-----------( 207      ( 06 Mar 2025 04:20 )             39.1     03-07    138  |  105  |  45[H]  ----------------------------<  169[H]  4.9   |  19[L]  |  1.52[H]  03-06    138  |  104  |  40[H]  ----------------------------<  103[H]  5.1   |  18[L]  |  1.41[H]    Ca    8.9      07 Mar 2025 04:39  Ca    9.2      06 Mar 2025 04:20    TPro  6.8  /  Alb  3.5  /  TBili  1.1  /  DBili  x   /  AST  41[H]  /  ALT  75[H]  /  AlkPhos  117  03-07  TPro  7.6  /  Alb  3.7  /  TBili  0.9  /  DBili  x   /  AST  44[H]  /  ALT  69[H]  /  AlkPhos  96  03-06    CAPILLARY BLOOD GLUCOSE      POCT Blood Glucose.: 189 mg/dL (07 Mar 2025 11:38)  POCT Blood Glucose.: 136 mg/dL (07 Mar 2025 07:55)  POCT Blood Glucose.: 77 mg/dL (06 Mar 2025 21:26)    LIVER FUNCTIONS - ( 07 Mar 2025 04:39 )  Alb: 3.5 g/dL / Pro: 6.8 g/dL / ALK PHOS: 117 U/L / ALT: 75 U/L / AST: 41 U/L / GGT: x             Urinalysis Basic - ( 07 Mar 2025 04:39 )    Color: x / Appearance: x / SG: x / pH: x  Gluc: 169 mg/dL / Ketone: x  / Bili: x / Urobili: x   Blood: x / Protein: x / Nitrite: x   Leuk Esterase: x / RBC: x / WBC x   Sq Epi: x / Non Sq Epi: x / Bacteria: x      D DImer      Studies  Chest X-RAY  CT SCAN Chest   CT Abdomen  Venous Dopplers: LE:   Others  rasd< from: CT Chest No Cont (03.06.25 @ 19:13) >  Few scattered round glass and nodular opacities bilaterally, which are   nonspecific and may be infectious or inflammatory. No lobar consolidation.    1.9 cm solid pulmonary nodule in the right middle lobe. This has   increased in size fromthe prior study dated 10/30/2024. PET/CT suggested   for further evaluation.    < end of copied text >

## 2025-03-08 LAB
ALBUMIN SERPL ELPH-MCNC: 2.8 G/DL — LOW (ref 3.3–5)
ALP SERPL-CCNC: 93 U/L — SIGNIFICANT CHANGE UP (ref 40–120)
ALT FLD-CCNC: 62 U/L — HIGH (ref 4–41)
ANION GAP SERPL CALC-SCNC: 13 MMOL/L — SIGNIFICANT CHANGE UP (ref 7–14)
AST SERPL-CCNC: 35 U/L — SIGNIFICANT CHANGE UP (ref 4–40)
BASOPHILS # BLD AUTO: 0.01 K/UL — SIGNIFICANT CHANGE UP (ref 0–0.2)
BASOPHILS NFR BLD AUTO: 0.1 % — SIGNIFICANT CHANGE UP (ref 0–2)
BILIRUB DIRECT SERPL-MCNC: 0.2 MG/DL — SIGNIFICANT CHANGE UP (ref 0–0.3)
BILIRUB INDIRECT FLD-MCNC: 0.2 MG/DL — SIGNIFICANT CHANGE UP (ref 0–1)
BILIRUB SERPL-MCNC: 0.4 MG/DL — SIGNIFICANT CHANGE UP (ref 0.2–1.2)
BUN SERPL-MCNC: 58 MG/DL — HIGH (ref 7–23)
CALCIUM SERPL-MCNC: 8.2 MG/DL — LOW (ref 8.4–10.5)
CHLORIDE SERPL-SCNC: 107 MMOL/L — SIGNIFICANT CHANGE UP (ref 98–107)
CO2 SERPL-SCNC: 19 MMOL/L — LOW (ref 22–31)
CREAT SERPL-MCNC: 1.71 MG/DL — HIGH (ref 0.5–1.3)
EGFR: 42 ML/MIN/1.73M2 — LOW
EGFR: 42 ML/MIN/1.73M2 — LOW
EOSINOPHIL # BLD AUTO: 0.2 K/UL — SIGNIFICANT CHANGE UP (ref 0–0.5)
EOSINOPHIL NFR BLD AUTO: 2.9 % — SIGNIFICANT CHANGE UP (ref 0–6)
GLUCOSE SERPL-MCNC: 125 MG/DL — HIGH (ref 70–99)
HCT VFR BLD CALC: 32.8 % — LOW (ref 39–50)
HGB BLD-MCNC: 11.2 G/DL — LOW (ref 13–17)
IANC: 5.17 K/UL — SIGNIFICANT CHANGE UP (ref 1.8–7.4)
IMM GRANULOCYTES NFR BLD AUTO: 1.7 % — HIGH (ref 0–0.9)
LYMPHOCYTES # BLD AUTO: 0.95 K/UL — LOW (ref 1–3.3)
LYMPHOCYTES # BLD AUTO: 13.7 % — SIGNIFICANT CHANGE UP (ref 13–44)
MAGNESIUM SERPL-MCNC: 1.8 MG/DL — SIGNIFICANT CHANGE UP (ref 1.6–2.6)
MCHC RBC-ENTMCNC: 29.9 PG — SIGNIFICANT CHANGE UP (ref 27–34)
MCHC RBC-ENTMCNC: 34.1 G/DL — SIGNIFICANT CHANGE UP (ref 32–36)
MCV RBC AUTO: 87.7 FL — SIGNIFICANT CHANGE UP (ref 80–100)
MONOCYTES # BLD AUTO: 0.46 K/UL — SIGNIFICANT CHANGE UP (ref 0–0.9)
MONOCYTES NFR BLD AUTO: 6.7 % — SIGNIFICANT CHANGE UP (ref 2–14)
NEUTROPHILS # BLD AUTO: 5.17 K/UL — SIGNIFICANT CHANGE UP (ref 1.8–7.4)
NEUTROPHILS NFR BLD AUTO: 74.9 % — SIGNIFICANT CHANGE UP (ref 43–77)
NRBC # BLD AUTO: 0 K/UL — SIGNIFICANT CHANGE UP (ref 0–0)
NRBC # FLD: 0 K/UL — SIGNIFICANT CHANGE UP (ref 0–0)
NRBC BLD AUTO-RTO: 0 /100 WBCS — SIGNIFICANT CHANGE UP (ref 0–0)
PHOSPHATE SERPL-MCNC: 3.1 MG/DL — SIGNIFICANT CHANGE UP (ref 2.5–4.5)
PLATELET # BLD AUTO: 214 K/UL — SIGNIFICANT CHANGE UP (ref 150–400)
POTASSIUM SERPL-MCNC: 4.2 MMOL/L — SIGNIFICANT CHANGE UP (ref 3.5–5.3)
POTASSIUM SERPL-SCNC: 4.2 MMOL/L — SIGNIFICANT CHANGE UP (ref 3.5–5.3)
PROT SERPL-MCNC: 5.5 G/DL — LOW (ref 6–8.3)
RBC # BLD: 3.74 M/UL — LOW (ref 4.2–5.8)
RBC # FLD: 16.5 % — HIGH (ref 10.3–14.5)
SODIUM SERPL-SCNC: 139 MMOL/L — SIGNIFICANT CHANGE UP (ref 135–145)
WBC # BLD: 6.91 K/UL — SIGNIFICANT CHANGE UP (ref 3.8–10.5)
WBC # FLD AUTO: 6.91 K/UL — SIGNIFICANT CHANGE UP (ref 3.8–10.5)

## 2025-03-08 RX ORDER — METHYLPREDNISOLONE ACETATE 80 MG/ML
30 INJECTION, SUSPENSION INTRA-ARTICULAR; INTRALESIONAL; INTRAMUSCULAR; SOFT TISSUE DAILY
Refills: 0 | Status: COMPLETED | OUTPATIENT
Start: 2025-03-09 | End: 2025-03-10

## 2025-03-08 RX ORDER — METHYLPREDNISOLONE ACETATE 80 MG/ML
40 INJECTION, SUSPENSION INTRA-ARTICULAR; INTRALESIONAL; INTRAMUSCULAR; SOFT TISSUE ONCE
Refills: 0 | Status: COMPLETED | OUTPATIENT
Start: 2025-03-08 | End: 2025-03-08

## 2025-03-08 RX ADMIN — CLOPIDOGREL BISULFATE 75 MILLIGRAM(S): 75 TABLET, FILM COATED ORAL at 11:57

## 2025-03-08 RX ADMIN — APIXABAN 5 MILLIGRAM(S): 2.5 TABLET, FILM COATED ORAL at 05:04

## 2025-03-08 RX ADMIN — TAMSULOSIN HYDROCHLORIDE 0.4 MILLIGRAM(S): 0.4 CAPSULE ORAL at 21:27

## 2025-03-08 RX ADMIN — COLCHICINE 0.6 MILLIGRAM(S): 0.6 TABLET, FILM COATED ORAL at 11:57

## 2025-03-08 RX ADMIN — ATORVASTATIN CALCIUM 40 MILLIGRAM(S): 80 TABLET, FILM COATED ORAL at 21:27

## 2025-03-08 RX ADMIN — Medication 20 MILLIGRAM(S): at 13:23

## 2025-03-08 RX ADMIN — Medication 175 MICROGRAM(S): at 05:04

## 2025-03-08 RX ADMIN — FINASTERIDE 5 MILLIGRAM(S): 1 TABLET, FILM COATED ORAL at 11:56

## 2025-03-08 RX ADMIN — APIXABAN 5 MILLIGRAM(S): 2.5 TABLET, FILM COATED ORAL at 17:09

## 2025-03-08 RX ADMIN — METHYLPREDNISOLONE ACETATE 40 MILLIGRAM(S): 80 INJECTION, SUSPENSION INTRA-ARTICULAR; INTRALESIONAL; INTRAMUSCULAR; SOFT TISSUE at 17:51

## 2025-03-08 RX ADMIN — Medication 20 MILLIGRAM(S): at 05:05

## 2025-03-08 RX ADMIN — Medication 1000 MILLIGRAM(S): at 00:01

## 2025-03-08 NOTE — PROGRESS NOTE ADULT - ASSESSMENT
69Y M PMH CHF, afib on Eliquis, HTN, osteoarthritis, gout, pituitary mass, with two recent admissions for acute polyarticular gout involving left foot / L ankle .. now presenting with tcp and sob aswell as RLE pain     CP /SOB this am .   no fever or chills   compliant with his meds     RLE pain most in foot and ankle   has been tapering his steorids as he was instructed however now feels flare   pt improvent wit sob post diuresis   imprving R foot pain s/p steroids         acute gout  :   cont steroids as ordered   hold off on alopurinol   Rheum consulted     CP : admit to tele   fu cards    SOB : suspect element of R sided CHF   cont diuresis   repsodned to lasix     afib : restarted eliquis       cardiac amyloid : cont meds : pharmacy to order       Pulmonary nodule.:  hx   ·  Recommendation: PET-CT (read) as an outpatient with RML lung nodule 18 x 16 mm is not hypermetabolic, showing mild activity (SUV 1.3)  -Reviewed images from CT A/P (lower chest) from 10/2024  -Likely hamartoma. No plans for biopsy at this time, suggest eventual repeat CT chest as an outpatient.

## 2025-03-08 NOTE — PROGRESS NOTE ADULT - SUBJECTIVE AND OBJECTIVE BOX
Date of service: 03-08-25 @ 23:04      Patient is a 69y old  Male who presents with a chief complaint of Edema (08 Mar 2025 19:05)                                                               INTERVAL HPI/OVERNIGHT EVENTS:    REVIEW OF SYSTEMS:   imrppving sob   no N/V                                                                                                                                                                                                                                                                             Medications:  MEDICATIONS  (STANDING):  apixaban 5 milliGRAM(s) Oral every 12 hours  atorvastatin 40 milliGRAM(s) Oral at bedtime  cabergoline 1 milliGRAM(s) Oral <User Schedule>  clopidogrel Tablet 75 milliGRAM(s) Oral daily  colchicine 0.6 milliGRAM(s) Oral every other day  finasteride 5 milliGRAM(s) Oral daily  levothyroxine 175 MICROGram(s) Oral daily  sacubitril 24 mG/valsartan 26 mG 1 Tablet(s) Oral two times a day  tafamidis (Vyndamax) 61 mg capsule 1 Capsule(s) 1 Capsule(s) Oral daily  tamsulosin 0.4 milliGRAM(s) Oral at bedtime  torsemide 20 milliGRAM(s) Oral two times a day    MEDICATIONS  (PRN):  polyethylene glycol 3350 17 Gram(s) Oral daily PRN for constipation       Allergies    No Known Allergies    Intolerances      Vital Signs Last 24 Hrs  T(C): 36.6 (08 Mar 2025 21:00), Max: 36.8 (08 Mar 2025 05:15)  T(F): 97.8 (08 Mar 2025 21:00), Max: 98.3 (08 Mar 2025 17:00)  HR: 85 (08 Mar 2025 21:00) (80 - 85)  BP: 107/68 (08 Mar 2025 21:00) (89/64 - 107/68)  BP(mean): --  RR: 18 (08 Mar 2025 21:00) (17 - 18)  SpO2: 100% (08 Mar 2025 21:00) (99% - 100%)    Parameters below as of 08 Mar 2025 21:00  Patient On (Oxygen Delivery Method): room air      CAPILLARY BLOOD GLUCOSE      POCT Blood Glucose.: 157 mg/dL (08 Mar 2025 21:49)  POCT Blood Glucose.: 91 mg/dL (08 Mar 2025 17:12)  POCT Blood Glucose.: 92 mg/dL (08 Mar 2025 11:47)  POCT Blood Glucose.: 88 mg/dL (08 Mar 2025 07:59)      03-07 @ 07:01 - 03-08 @ 07:00  --------------------------------------------------------  IN: 990 mL / OUT: 775 mL / NET: 215 mL    03-08 @ 06:01  -  03-08 @ 23:04  --------------------------------------------------------  IN: 510 mL / OUT: 2000 mL / NET: -1490 mL      Physical Exam:    Daily     Daily   General:  Well appearing, NAD, not cachetic  HEENT:  Nonicteric, PERRLA  CV:  RRR, S1S2   Lungs:  CTA B/L, no wheezes, rales, rhonchi  Abdomen:  Soft, non-tender, no distended, positive BS  Extremities:  no edema                                                                                                                                                                                                                                                                                  LABS:                               11.2   6.91  )-----------( 214      ( 08 Mar 2025 04:45 )             32.8                      03-08    139  |  107  |  58[H]  ----------------------------<  125[H]  4.2   |  19[L]  |  1.71[H]    Ca    8.2[L]      08 Mar 2025 04:45  Phos  3.1     03-08  Mg     1.80     03-08    TPro  5.5[L]  /  Alb  2.8[L]  /  TBili  0.4  /  DBili  0.2  /  AST  35  /  ALT  62[H]  /  AlkPhos  93  03-08                       RADIOLOGY & ADDITIONAL TESTS         I personally reviewed: [  ]EKG   [  ]CXR    [  ] CT      A/P:         Discussed with :     Melonie consultants' Notes   Time spent :

## 2025-03-08 NOTE — PROGRESS NOTE ADULT - ASSESSMENT
69Y M PMH CHF, afib on Eliquis, HTN, osteoarthritis, gout, pituitary mass, with two recent admissions for acute polyarticular gout involving left foot / L ankle .. now presenting with tcp and sob aswell as RLE pain   CP /SOB this am . no fever or chills compliant with his meds RLE pain most in foot and ankle has been tapering his steorids as he was instructed however now feels flare pt improvent wit sob post diuresis imprving R foot pain s/p steroids   now pulm called fro evaluation       Pulmonary Nodule  Gout flare;   A fibrillation  HTN  OA  Pituitary mass    Pulmonary Nodule  the ct chest showed: 3/6/2025: Few scattered round glass and nodular opacities bilaterally, which are nonspecific and may be infectious or inflammatory. No lobar consolidation. 1.9 cm solid pulmonary nodule in the right middle lobe. This has increased in size fromthe prior study dated 10/30/2024. PET/CT suggested for further evaluation.  this  must be evaluated soon after dc with ct pet and needs to follow up with thoracic as well as with pulmonary after dc:  especially since it has increased in size   3/8: seems OK:  no sob;  pulm plan as above   Gout flare;   on steroids   A fibrillation  on eliquis   HTN  controlled   OA  pain control    Pituitary mass  per primary team     dw acp

## 2025-03-08 NOTE — PROGRESS NOTE ADULT - SUBJECTIVE AND OBJECTIVE BOX
Cardiovascular Disease Progress Note  Date of service: 03-08-25 @ 19:06    Overnight events: No acute events overnight.  Denies chest pain  Otherwise review of systems negative    Objective Findings:  T(C): 36.7 (03-08-25 @ 17:50), Max: 36.8 (03-08-25 @ 05:15)  HR: 80 (03-08-25 @ 17:50) (76 - 85)  BP: 105/78 (03-08-25 @ 17:50) (89/64 - 106/66)  RR: 17 (03-08-25 @ 17:50) (17 - 18)  SpO2: 99% (03-08-25 @ 17:50) (99% - 100%)  Wt(kg): --  Daily     Daily       Physical Exam:  Gen: NAD; Patient resting comfortably  HEENT: EOMI, Normocephalic/ atraumatic  CV: RRR, normal S1 + S2, no m/r/g  Lungs:  Normal respiratory effort; clear to auscultation bilaterally  Abd: soft, non-tender; bowel sounds present  Ext: No edema; warm and well perfused    Telemetry:  aflutter    Laboratory Data:                        11.2   6.91  )-----------( 214      ( 08 Mar 2025 04:45 )             32.8     03-08    139  |  107  |  58[H]  ----------------------------<  125[H]  4.2   |  19[L]  |  1.71[H]    Ca    8.2[L]      08 Mar 2025 04:45  Phos  3.1     03-08  Mg     1.80     03-08    TPro  5.5[L]  /  Alb  2.8[L]  /  TBili  0.4  /  DBili  0.2  /  AST  35  /  ALT  62[H]  /  AlkPhos  93  03-08              Inpatient Medications:  MEDICATIONS  (STANDING):  apixaban 5 milliGRAM(s) Oral every 12 hours  atorvastatin 40 milliGRAM(s) Oral at bedtime  cabergoline 1 milliGRAM(s) Oral <User Schedule>  clopidogrel Tablet 75 milliGRAM(s) Oral daily  colchicine 0.6 milliGRAM(s) Oral every other day  finasteride 5 milliGRAM(s) Oral daily  levothyroxine 175 MICROGram(s) Oral daily  sacubitril 24 mG/valsartan 26 mG 1 Tablet(s) Oral two times a day  tafamidis (Vyndamax) 61 mg capsule 1 Capsule(s) 1 Capsule(s) Oral daily  tamsulosin 0.4 milliGRAM(s) Oral at bedtime  torsemide 20 milliGRAM(s) Oral two times a day      Assessment:  69 year old man with systolic CHF, afib on Eliquis, HTN, and cardiac amyloidosis presents with acute gout.     Plan of Care:    #Compensated systolic CHF-  Known cardiac amyloidosis.  Mr. Joaquin is not grossly fluid overloaded on my exam.  Continue home dose PO diuretics.     #Elevated troponins-  No chest pain and EKG is nonischemic.  Troponins are chronically elevated in the setting of CKD, as per chart review.    #Cardiac amyloidosis-  Home dose tafamidis.     #A-fib/flutter-  Continue Eliquis.      Over 55 minutes spent on total encounter; more than 50% of the visit was spent counseling and/or coordinating care by the attending physician.      Haris Lugo DO [facility]  Cardiovascular Disease  [phone]

## 2025-03-08 NOTE — PROGRESS NOTE ADULT - SUBJECTIVE AND OBJECTIVE BOX
Date of Service: 03-08-25 @ 11:41    Patient is a 69y old  Male who presents with a chief complaint of R foot pain (07 Mar 2025 13:54)      Any change in ROS: she seems OK:  no cough :  no phlegm      MEDICATIONS  (STANDING):  apixaban 5 milliGRAM(s) Oral every 12 hours  atorvastatin 40 milliGRAM(s) Oral at bedtime  cabergoline 1 milliGRAM(s) Oral <User Schedule>  clopidogrel Tablet 75 milliGRAM(s) Oral daily  colchicine 0.6 milliGRAM(s) Oral every other day  finasteride 5 milliGRAM(s) Oral daily  levothyroxine 175 MICROGram(s) Oral daily  sacubitril 24 mG/valsartan 26 mG 1 Tablet(s) Oral two times a day  tafamidis (Vyndamax) 61 mg capsule 1 Capsule(s) 1 Capsule(s) Oral daily  tamsulosin 0.4 milliGRAM(s) Oral at bedtime  torsemide 20 milliGRAM(s) Oral two times a day    MEDICATIONS  (PRN):  polyethylene glycol 3350 17 Gram(s) Oral daily PRN for constipation    Vital Signs Last 24 Hrs  T(C): 36.8 (08 Mar 2025 05:15), Max: 36.9 (07 Mar 2025 16:55)  T(F): 98.2 (08 Mar 2025 05:15), Max: 98.4 (07 Mar 2025 16:55)  HR: 83 (08 Mar 2025 05:15) (76 - 88)  BP: 94/65 (08 Mar 2025 05:15) (94/65 - 108/74)  BP(mean): --  RR: 18 (08 Mar 2025 05:15) (17 - 18)  SpO2: 100% (08 Mar 2025 05:15) (99% - 100%)    Parameters below as of 08 Mar 2025 05:15  Patient On (Oxygen Delivery Method): room air        I&O's Summary    07 Mar 2025 07:01  -  08 Mar 2025 07:00  --------------------------------------------------------  IN: 990 mL / OUT: 775 mL / NET: 215 mL          Physical Exam:   GENERAL: NAD, well-groomed, well-developed  HEENT: HORACIO/   Atraumatic, Normocephalic  ENMT: No tonsillar erythema, exudates, or enlargement; Moist mucous membranes, Good dentition, No lesions  NECK: Supple, No JVD, Normal thyroid  CHEST/LUNG: Clear to auscultaion  CVS: Regular rate and rhythm; No murmurs, rubs, or gallops  GI: : Soft, Nontender, Nondistended; Bowel sounds present  NERVOUS SYSTEM:  Alert & Oriented X3  EXTREMITIES:  - edema  LYMPH: No lymphadenopathy noted  SKIN: No rashes or lesions  ENDOCRINOLOGY: No Thyromegaly  PSYCH: Appropriate    Labs:  26                            11.2   6.91  )-----------( 214      ( 08 Mar 2025 04:45 )             32.8                         11.1   9.43  )-----------( 201      ( 07 Mar 2025 04:39 )             32.9                         12.8   11.16 )-----------( 207      ( 06 Mar 2025 04:20 )             39.1     03-08    139  |  107  |  58[H]  ----------------------------<  125[H]  4.2   |  19[L]  |  1.71[H]  03-07    138  |  105  |  45[H]  ----------------------------<  169[H]  4.9   |  19[L]  |  1.52[H]  03-06    138  |  104  |  40[H]  ----------------------------<  103[H]  5.1   |  18[L]  |  1.41[H]    Ca    8.2[L]      08 Mar 2025 04:45  Ca    8.9      07 Mar 2025 04:39  Phos  3.1     03-08  Mg     1.80     03-08    TPro  5.5[L]  /  Alb  2.8[L]  /  TBili  0.4  /  DBili  0.2  /  AST  35  /  ALT  62[H]  /  AlkPhos  93  03-08  TPro  6.8  /  Alb  3.5  /  TBili  1.1  /  DBili  x   /  AST  41[H]  /  ALT  75[H]  /  AlkPhos  117  03-07  TPro  7.6  /  Alb  3.7  /  TBili  0.9  /  DBili  x   /  AST  44[H]  /  ALT  69[H]  /  AlkPhos  96  03-06    CAPILLARY BLOOD GLUCOSE      POCT Blood Glucose.: 88 mg/dL (08 Mar 2025 07:59)  POCT Blood Glucose.: 141 mg/dL (07 Mar 2025 21:21)  POCT Blood Glucose.: 104 mg/dL (07 Mar 2025 16:12)      LIVER FUNCTIONS - ( 08 Mar 2025 04:45 )  Alb: 2.8 g/dL / Pro: 5.5 g/dL / ALK PHOS: 93 U/L / ALT: 62 U/L / AST: 35 U/L / GGT: x             Urinalysis Basic - ( 08 Mar 2025 04:45 )    Color: x / Appearance: x / SG: x / pH: x  Gluc: 125 mg/dL / Ketone: x  / Bili: x / Urobili: x   Blood: x / Protein: x / Nitrite: x   Leuk Esterase: x / RBC: x / WBC x   Sq Epi: x / Non Sq Epi: x / Bacteria: x        rad< from: CT Chest No Cont (03.06.25 @ 19:13) >  IMPRESSION:  Few scattered round glass and nodular opacities bilaterally, which are   nonspecific and may be infectious or inflammatory. No lobar consolidation.    1.9 cm solid pulmonary nodule in the right middle lobe. This has   increased in size fromthe prior study dated 10/30/2024. PET/CT suggested   for further evaluation.    --- End of Report ---          ARON STONE MD; Resident Radiologist  This document has been electronically signed.  CHRISTOPHER STOCK MD; Attending Radiologist  This document has been electronically signed. Mar  7 2025  7:55AM    < end of copied text >      RECENT CULTURES:        RESPIRATORY CULTURES:          Studies  Chest X-RAY  CT SCAN Chest   Venous Dopplers: LE:   CT Abdomen  Others

## 2025-03-09 LAB
ALBUMIN SERPL ELPH-MCNC: 3 G/DL — LOW (ref 3.3–5)
ALP SERPL-CCNC: 88 U/L — SIGNIFICANT CHANGE UP (ref 40–120)
ALT FLD-CCNC: 54 U/L — HIGH (ref 4–41)
ANION GAP SERPL CALC-SCNC: 13 MMOL/L — SIGNIFICANT CHANGE UP (ref 7–14)
AST SERPL-CCNC: 22 U/L — SIGNIFICANT CHANGE UP (ref 4–40)
BASOPHILS # BLD AUTO: 0.02 K/UL — SIGNIFICANT CHANGE UP (ref 0–0.2)
BASOPHILS NFR BLD AUTO: 0.2 % — SIGNIFICANT CHANGE UP (ref 0–2)
BILIRUB DIRECT SERPL-MCNC: <0.2 MG/DL — SIGNIFICANT CHANGE UP (ref 0–0.3)
BILIRUB INDIRECT FLD-MCNC: >0.2 MG/DL — SIGNIFICANT CHANGE UP (ref 0–1)
BILIRUB SERPL-MCNC: 0.4 MG/DL — SIGNIFICANT CHANGE UP (ref 0.2–1.2)
BUN SERPL-MCNC: 60 MG/DL — HIGH (ref 7–23)
CALCIUM SERPL-MCNC: 8.3 MG/DL — LOW (ref 8.4–10.5)
CHLORIDE SERPL-SCNC: 103 MMOL/L — SIGNIFICANT CHANGE UP (ref 98–107)
CO2 SERPL-SCNC: 19 MMOL/L — LOW (ref 22–31)
CREAT SERPL-MCNC: 1.65 MG/DL — HIGH (ref 0.5–1.3)
EGFR: 44 ML/MIN/1.73M2 — LOW
EGFR: 44 ML/MIN/1.73M2 — LOW
EOSINOPHIL # BLD AUTO: 0.02 K/UL — SIGNIFICANT CHANGE UP (ref 0–0.5)
EOSINOPHIL NFR BLD AUTO: 0.2 % — SIGNIFICANT CHANGE UP (ref 0–6)
GLUCOSE SERPL-MCNC: 168 MG/DL — HIGH (ref 70–99)
HCT VFR BLD CALC: 36.5 % — LOW (ref 39–50)
HGB BLD-MCNC: 12.5 G/DL — LOW (ref 13–17)
IANC: 7.23 K/UL — SIGNIFICANT CHANGE UP (ref 1.8–7.4)
IMM GRANULOCYTES NFR BLD AUTO: 1.5 % — HIGH (ref 0–0.9)
LYMPHOCYTES # BLD AUTO: 0.56 K/UL — LOW (ref 1–3.3)
LYMPHOCYTES # BLD AUTO: 6.9 % — LOW (ref 13–44)
MAGNESIUM SERPL-MCNC: 1.7 MG/DL — SIGNIFICANT CHANGE UP (ref 1.6–2.6)
MCHC RBC-ENTMCNC: 29.9 PG — SIGNIFICANT CHANGE UP (ref 27–34)
MCHC RBC-ENTMCNC: 34.2 G/DL — SIGNIFICANT CHANGE UP (ref 32–36)
MCV RBC AUTO: 87.3 FL — SIGNIFICANT CHANGE UP (ref 80–100)
MONOCYTES # BLD AUTO: 0.16 K/UL — SIGNIFICANT CHANGE UP (ref 0–0.9)
MONOCYTES NFR BLD AUTO: 2 % — SIGNIFICANT CHANGE UP (ref 2–14)
NEUTROPHILS # BLD AUTO: 7.23 K/UL — SIGNIFICANT CHANGE UP (ref 1.8–7.4)
NEUTROPHILS NFR BLD AUTO: 89.2 % — HIGH (ref 43–77)
NRBC # BLD AUTO: 0 K/UL — SIGNIFICANT CHANGE UP (ref 0–0)
NRBC # FLD: 0 K/UL — SIGNIFICANT CHANGE UP (ref 0–0)
NRBC BLD AUTO-RTO: 0 /100 WBCS — SIGNIFICANT CHANGE UP (ref 0–0)
PHOSPHATE SERPL-MCNC: 2.9 MG/DL — SIGNIFICANT CHANGE UP (ref 2.5–4.5)
PLATELET # BLD AUTO: 247 K/UL — SIGNIFICANT CHANGE UP (ref 150–400)
POTASSIUM SERPL-MCNC: 4.2 MMOL/L — SIGNIFICANT CHANGE UP (ref 3.5–5.3)
POTASSIUM SERPL-SCNC: 4.2 MMOL/L — SIGNIFICANT CHANGE UP (ref 3.5–5.3)
PROT SERPL-MCNC: 6 G/DL — SIGNIFICANT CHANGE UP (ref 6–8.3)
RBC # BLD: 4.18 M/UL — LOW (ref 4.2–5.8)
RBC # FLD: 16.4 % — HIGH (ref 10.3–14.5)
SODIUM SERPL-SCNC: 135 MMOL/L — SIGNIFICANT CHANGE UP (ref 135–145)
WBC # BLD: 8.11 K/UL — SIGNIFICANT CHANGE UP (ref 3.8–10.5)
WBC # FLD AUTO: 8.11 K/UL — SIGNIFICANT CHANGE UP (ref 3.8–10.5)

## 2025-03-09 RX ADMIN — TAMSULOSIN HYDROCHLORIDE 0.4 MILLIGRAM(S): 0.4 CAPSULE ORAL at 21:42

## 2025-03-09 RX ADMIN — Medication 20 MILLIGRAM(S): at 14:26

## 2025-03-09 RX ADMIN — CLOPIDOGREL BISULFATE 75 MILLIGRAM(S): 75 TABLET, FILM COATED ORAL at 11:37

## 2025-03-09 RX ADMIN — ATORVASTATIN CALCIUM 40 MILLIGRAM(S): 80 TABLET, FILM COATED ORAL at 21:42

## 2025-03-09 RX ADMIN — Medication 175 MICROGRAM(S): at 04:34

## 2025-03-09 RX ADMIN — APIXABAN 5 MILLIGRAM(S): 2.5 TABLET, FILM COATED ORAL at 18:30

## 2025-03-09 RX ADMIN — SACUBITRIL AND VALSARTAN 1 TABLET(S): 49; 51 TABLET, FILM COATED ORAL at 05:06

## 2025-03-09 RX ADMIN — FINASTERIDE 5 MILLIGRAM(S): 1 TABLET, FILM COATED ORAL at 11:37

## 2025-03-09 RX ADMIN — Medication 20 MILLIGRAM(S): at 05:06

## 2025-03-09 RX ADMIN — METHYLPREDNISOLONE ACETATE 30 MILLIGRAM(S): 80 INJECTION, SUSPENSION INTRA-ARTICULAR; INTRALESIONAL; INTRAMUSCULAR; SOFT TISSUE at 05:06

## 2025-03-09 RX ADMIN — APIXABAN 5 MILLIGRAM(S): 2.5 TABLET, FILM COATED ORAL at 05:06

## 2025-03-09 NOTE — PROGRESS NOTE ADULT - SUBJECTIVE AND OBJECTIVE BOX
Date of Service: 03-09-25 @ 10:50    Patient is a 69y old  Male who presents with a chief complaint of Edema (08 Mar 2025 19:05)      Any change in ROS: on room air :  sitting in bed:   no sob:   no cough :   no phlegm      MEDICATIONS  (STANDING):  apixaban 5 milliGRAM(s) Oral every 12 hours  atorvastatin 40 milliGRAM(s) Oral at bedtime  cabergoline 1 milliGRAM(s) Oral <User Schedule>  clopidogrel Tablet 75 milliGRAM(s) Oral daily  colchicine 0.6 milliGRAM(s) Oral every other day  finasteride 5 milliGRAM(s) Oral daily  levothyroxine 175 MICROGram(s) Oral daily  methylPREDNISolone sodium succinate Injectable 30 milliGRAM(s) IV Push daily  sacubitril 24 mG/valsartan 26 mG 1 Tablet(s) Oral two times a day  tafamidis (Vyndamax) 61 mg capsule 1 Capsule(s) 1 Capsule(s) Oral daily  tamsulosin 0.4 milliGRAM(s) Oral at bedtime  torsemide 20 milliGRAM(s) Oral two times a day    MEDICATIONS  (PRN):  polyethylene glycol 3350 17 Gram(s) Oral daily PRN for constipation    Vital Signs Last 24 Hrs  T(C): 36.7 (09 Mar 2025 05:00), Max: 36.8 (08 Mar 2025 17:00)  T(F): 98.1 (09 Mar 2025 05:00), Max: 98.3 (08 Mar 2025 17:00)  HR: 72 (09 Mar 2025 05:00) (72 - 85)  BP: 111/79 (09 Mar 2025 05:00) (89/64 - 111/79)  BP(mean): --  RR: 18 (09 Mar 2025 05:00) (17 - 18)  SpO2: 100% (09 Mar 2025 05:00) (99% - 100%)    Parameters below as of 09 Mar 2025 05:00  Patient On (Oxygen Delivery Method): room air        I&O's Summary    08 Mar 2025 06:01  -  09 Mar 2025 07:00  --------------------------------------------------------  IN: 510 mL / OUT: 3000 mL / NET: -2490 mL    09 Mar 2025 07:01  -  09 Mar 2025 10:50  --------------------------------------------------------  IN: 220 mL / OUT: 125 mL / NET: 95 mL          Physical Exam:   GENERAL: NAD, well-groomed, well-developed  HEENT: HORACIO/   Atraumatic, Normocephalic  ENMT: No tonsillar erythema, exudates, or enlargement; Moist mucous membranes, Good dentition, No lesions  NECK: Supple, No JVD, Normal thyroid  CHEST/LUNG: Clear to auscultaion  GI: : Soft, Nontender, Nondistended; Bowel sounds present  NERVOUS SYSTEM:  Alert & Oriented X3  EXTREMITIES:  2+ Peripheral Pulses, No clubbing, cyanosis, or edema  LYMPH: No lymphadenopathy noted  SKIN: No rashes or lesions  ENDOCRINOLOGY: No Thyromegaly  PSYCH: Appropriate    Labs:  26                            12.5   8.11  )-----------( 247      ( 09 Mar 2025 04:56 )             36.5                         11.2   6.91  )-----------( 214      ( 08 Mar 2025 04:45 )             32.8                         11.1   9.43  )-----------( 201      ( 07 Mar 2025 04:39 )             32.9                         12.8   11.16 )-----------( 207      ( 06 Mar 2025 04:20 )             39.1     03-09    135  |  103  |  60[H]  ----------------------------<  168[H]  4.2   |  19[L]  |  1.65[H]  03-08    139  |  107  |  58[H]  ----------------------------<  125[H]  4.2   |  19[L]  |  1.71[H]  03-07    138  |  105  |  45[H]  ----------------------------<  169[H]  4.9   |  19[L]  |  1.52[H]  03-06    138  |  104  |  40[H]  ----------------------------<  103[H]  5.1   |  18[L]  |  1.41[H]    Ca    8.3[L]      09 Mar 2025 04:56  Ca    8.2[L]      08 Mar 2025 04:45  Phos  2.9     03-09  Phos  3.1     03-08  Mg     1.70     03-09  Mg     1.80     03-08    TPro  6.0  /  Alb  3.0[L]  /  TBili  0.4  /  DBili  <0.2  /  AST  22  /  ALT  54[H]  /  AlkPhos  88  03-09  TPro  5.5[L]  /  Alb  2.8[L]  /  TBili  0.4  /  DBili  0.2  /  AST  35  /  ALT  62[H]  /  AlkPhos  93  03-08  TPro  6.8  /  Alb  3.5  /  TBili  1.1  /  DBili  x   /  AST  41[H]  /  ALT  75[H]  /  AlkPhos  117  03-07  TPro  7.6  /  Alb  3.7  /  TBili  0.9  /  DBili  x   /  AST  44[H]  /  ALT  69[H]  /  AlkPhos  96  03-06    CAPILLARY BLOOD GLUCOSE      POCT Blood Glucose.: 148 mg/dL (09 Mar 2025 08:42)  POCT Blood Glucose.: 157 mg/dL (08 Mar 2025 21:49)  POCT Blood Glucose.: 91 mg/dL (08 Mar 2025 17:12)  POCT Blood Glucose.: 92 mg/dL (08 Mar 2025 11:47)      LIVER FUNCTIONS - ( 09 Mar 2025 04:56 )  Alb: 3.0 g/dL / Pro: 6.0 g/dL / ALK PHOS: 88 U/L / ALT: 54 U/L / AST: 22 U/L / GGT: x             Urinalysis Basic - ( 09 Mar 2025 04:56 )    Color: x / Appearance: x / SG: x / pH: x  Gluc: 168 mg/dL / Ketone: x  / Bili: x / Urobili: x   Blood: x / Protein: x / Nitrite: x   Leuk Esterase: x / RBC: x / WBC x   Sq Epi: x / Non Sq Epi: x / Bacteria: x      rad< from: CT Chest No Cont (03.06.25 @ 19:13) >  calcifications.  MEDIASTINUM AND COSME: No lymphadenopathy.  CHEST WALL AND LOWER NECK: Mild anasarca.  VISUALIZED UPPER ABDOMEN: Within normal limits.  BONES: Degenerative changes. Chronic rib fractures.    IMPRESSION:  Few scattered round glass and nodular opacities bilaterally, which are   nonspecific and may be infectious or inflammatory. No lobar consolidation.    1.9 cm solid pulmonary nodule in the right middle lobe. This has   increased in size fromthe prior study dated 10/30/2024. PET/CT suggested   for further evaluation.    --- End of Report ---          ARON STONE MD; Resident Radiologist  This document has been electronically signed.  CHRISTOPHER STOCK MD; Attending Radiologist  This document has been electronically signed. Mar  7 2025  7:55AM    < end of copied text >        RECENT CULTURES:        RESPIRATORY CULTURES:          Studies  Chest X-RAY  CT SCAN Chest   Venous Dopplers: LE:   CT Abdomen  Others

## 2025-03-09 NOTE — PROGRESS NOTE ADULT - ASSESSMENT
69Y M PMH CHF, afib on Eliquis, HTN, osteoarthritis, gout, pituitary mass, with two recent admissions for acute polyarticular gout involving left foot / L ankle .. now presenting with tcp and sob aswell as RLE pain     CP /SOB this am .   no fever or chills   compliant with his meds     RLE pain most in foot and ankle   has been tapering his steorids as he was instructed however now feels flare   pt improvent wit sob post diuresis   imprving R foot pain s/p steroids         acute gout  :   cont steroids as ordered   hold off on allopurinol   Rheum consulted     CP : admit to tele   fu cards    SOB : suspect element of R sided CHF   cont oral diuresis     afib : restarted eliquis       cardiac amyloid : cont meds : pharmacy to order       Pulmonary nodule.:  hx   ·  Recommendation: PET-CT (read) as an outpatient with RML lung nodule 18 x 16 mm is not hypermetabolic, showing mild activity (SUV 1.3)  -Reviewed images from CT A/P (lower chest) from 10/2024  -Likely hamartoma. No plans for biopsy at this time, suggest eventual repeat CT chest as an outpatient.

## 2025-03-09 NOTE — PROGRESS NOTE ADULT - ASSESSMENT
69Y M PMH CHF, afib on Eliquis, HTN, osteoarthritis, gout, pituitary mass, with two recent admissions for acute polyarticular gout involving left foot / L ankle .. now presenting with tcp and sob aswell as RLE pain   CP /SOB this am . no fever or chills compliant with his meds RLE pain most in foot and ankle has been tapering his steorids as he was instructed however now feels flare pt improvent wit sob post diuresis imprving R foot pain s/p steroids   now pulm called fro evaluation       Pulmonary Nodule  Gout flare;   A fibrillation  HTN  OA  Pituitary mass    Pulmonary Nodule  the ct chest showed: 3/6/2025: Few scattered round glass and nodular opacities bilaterally, which are nonspecific and may be infectious or inflammatory. No lobar consolidation. 1.9 cm solid pulmonary nodule in the right middle lobe. This has increased in size fromthe prior study dated 10/30/2024. PET/CT suggested for further evaluation.  this  must be evaluated soon after dc with ct pet and needs to follow up with thoracic as well as with pulmonary after dc:  especially since it has increased in size   3/8: seems OK:  no sob;  pulm plan as above   3/9: pulm wise OK:  discussed about pet scan : as nodule is increasing in size:   Gout flare;   on steroids   3/9: cont to be on steroids   A fibrillation  on eliquis   HTN  controlled   OA  pain control    Pituitary mass  per primary team     kentrell acp

## 2025-03-09 NOTE — PROGRESS NOTE ADULT - SUBJECTIVE AND OBJECTIVE BOX
Date of service: 03-09-25 @ 17:10      Patient is a 69y old  Male who presents with a chief complaint of Edema (08 Mar 2025 19:05)                                                               INTERVAL HPI/OVERNIGHT EVENTS:    REVIEW OF SYSTEMS:     no cp   no sob                                                                                                                                                                                                                                                                  Medications:  MEDICATIONS  (STANDING):  apixaban 5 milliGRAM(s) Oral every 12 hours  atorvastatin 40 milliGRAM(s) Oral at bedtime  cabergoline 1 milliGRAM(s) Oral <User Schedule>  clopidogrel Tablet 75 milliGRAM(s) Oral daily  colchicine 0.6 milliGRAM(s) Oral every other day  finasteride 5 milliGRAM(s) Oral daily  levothyroxine 175 MICROGram(s) Oral daily  methylPREDNISolone sodium succinate Injectable 30 milliGRAM(s) IV Push daily  sacubitril 24 mG/valsartan 26 mG 1 Tablet(s) Oral two times a day  tafamidis (Vyndamax) 61 mg capsule 1 Capsule(s) 1 Capsule(s) Oral daily  tamsulosin 0.4 milliGRAM(s) Oral at bedtime  torsemide 20 milliGRAM(s) Oral two times a day    MEDICATIONS  (PRN):  polyethylene glycol 3350 17 Gram(s) Oral daily PRN for constipation       Allergies    No Known Allergies    Intolerances      Vital Signs Last 24 Hrs  T(C): 36.6 (09 Mar 2025 11:35), Max: 36.7 (08 Mar 2025 17:50)  T(F): 97.9 (09 Mar 2025 11:35), Max: 98.1 (08 Mar 2025 17:50)  HR: 82 (09 Mar 2025 11:35) (72 - 85)  BP: 104/78 (09 Mar 2025 11:35) (104/78 - 111/79)  BP(mean): --  RR: 18 (09 Mar 2025 11:35) (17 - 18)  SpO2: 100% (09 Mar 2025 11:35) (99% - 100%)    Parameters below as of 09 Mar 2025 11:35  Patient On (Oxygen Delivery Method): room air      CAPILLARY BLOOD GLUCOSE      POCT Blood Glucose.: 148 mg/dL (09 Mar 2025 08:42)  POCT Blood Glucose.: 157 mg/dL (08 Mar 2025 21:49)  POCT Blood Glucose.: 91 mg/dL (08 Mar 2025 17:12)      03-08 @ 06:01  -  03-09 @ 07:00  --------------------------------------------------------  IN: 510 mL / OUT: 3000 mL / NET: -2490 mL    03-09 @ 07:01  - 03-09 @ 17:10  --------------------------------------------------------  IN: 220 mL / OUT: 125 mL / NET: 95 mL      Physical Exam:    Daily     Daily   General:  Well appearing, NAD, not cachetic  HEENT:  Nonicteric, PERRLA  CV:  RRR, S1S2   Lungs: poor effort   Abdomen:  Soft, non-tender, no distended, positive BS  Extremities: trace  edema   Neuro:  AAOx3, non-focal, grossly intact                                                                                                                                                                                                                                                                                                LABS:                               12.5   8.11  )-----------( 247      ( 09 Mar 2025 04:56 )             36.5                      03-09    135  |  103  |  60[H]  ----------------------------<  168[H]  4.2   |  19[L]  |  1.65[H]    Ca    8.3[L]      09 Mar 2025 04:56  Phos  2.9     03-09  Mg     1.70     03-09    TPro  6.0  /  Alb  3.0[L]  /  TBili  0.4  /  DBili  <0.2  /  AST  22  /  ALT  54[H]  /  AlkPhos  88  03-09                       RADIOLOGY & ADDITIONAL TESTS         I personally reviewed: [  ]EKG   [  ]CXR    [  ] CT      A/P:         Discussed with :     Melonie consultants' Notes   Time spent :

## 2025-03-09 NOTE — PROGRESS NOTE ADULT - SUBJECTIVE AND OBJECTIVE BOX
Cardiovascular Disease Progress Note  DATE OF SERVICE: 03-09-25 @ 10:11    Overnight events: No acute events overnight.    The patient denies chest pain or SOB.   Otherwise review of systems negative    Objective Findings:  T(C): 36.7 (03-09-25 @ 05:00), Max: 36.8 (03-08-25 @ 17:00)  HR: 72 (03-09-25 @ 05:00) (72 - 85)  BP: 111/79 (03-09-25 @ 05:00) (89/64 - 111/79)  RR: 18 (03-09-25 @ 05:00) (17 - 18)  SpO2: 100% (03-09-25 @ 05:00) (99% - 100%)  Wt(kg): --  Daily     Daily       Physical Exam:  Gen: NAD; Patient resting comfortably  HEENT: EOMI, Normocephalic/ atraumatic  CV: IIR, normal S1 + S2, no m/r/g  Lungs:  Normal respiratory effort; clear to auscultation bilaterally  Abd: soft, non-tender; bowel sounds present  Ext: No edema; warm and well perfused    Telemetry: A-fib    Laboratory Data:                        12.5   8.11  )-----------( 247      ( 09 Mar 2025 04:56 )             36.5     03-09    135  |  103  |  60[H]  ----------------------------<  168[H]  4.2   |  19[L]  |  1.65[H]    Ca    8.3[L]      09 Mar 2025 04:56  Phos  2.9     03-09  Mg     1.70     03-09    TPro  6.0  /  Alb  3.0[L]  /  TBili  0.4  /  DBili  <0.2  /  AST  22  /  ALT  54[H]  /  AlkPhos  88  03-09              Inpatient Medications:  MEDICATIONS  (STANDING):  apixaban 5 milliGRAM(s) Oral every 12 hours  atorvastatin 40 milliGRAM(s) Oral at bedtime  cabergoline 1 milliGRAM(s) Oral <User Schedule>  clopidogrel Tablet 75 milliGRAM(s) Oral daily  colchicine 0.6 milliGRAM(s) Oral every other day  finasteride 5 milliGRAM(s) Oral daily  levothyroxine 175 MICROGram(s) Oral daily  methylPREDNISolone sodium succinate Injectable 30 milliGRAM(s) IV Push daily  sacubitril 24 mG/valsartan 26 mG 1 Tablet(s) Oral two times a day  tafamidis (Vyndamax) 61 mg capsule 1 Capsule(s) 1 Capsule(s) Oral daily  tamsulosin 0.4 milliGRAM(s) Oral at bedtime  torsemide 20 milliGRAM(s) Oral two times a day      Assessment: 69 year old man with systolic CHF, afib on Eliquis, HTN, and cardiac amyloidosis presents with acute gout.     Plan of Care:    #Compensated systolic CHF-  Known cardiac amyloidosis.  Mr. Joaquin is not grossly fluid overloaded on my exam.  Continue home dose PO diuretics.     #Elevated troponins-  No chest pain and EKG is nonischemic.  Troponins are chronically elevated in the setting of CKD, as per chart review.    #Cardiac amyloidosis-  Home dose tafamidis.     #A-fib/flutter-  Continue Eliquis.           Over 55 minutes spent on total encounter; 100% of the visit was spent counseling and/or coordinating care by the attending physician.      Onel Lugo MD [facility]  Cardiovascular Disease  [phone]

## 2025-03-10 ENCOUNTER — NON-APPOINTMENT (OUTPATIENT)
Age: 70
End: 2025-03-10

## 2025-03-10 ENCOUNTER — TRANSCRIPTION ENCOUNTER (OUTPATIENT)
Age: 70
End: 2025-03-10

## 2025-03-10 VITALS
DIASTOLIC BLOOD PRESSURE: 76 MMHG | OXYGEN SATURATION: 100 % | RESPIRATION RATE: 18 BRPM | TEMPERATURE: 97 F | SYSTOLIC BLOOD PRESSURE: 114 MMHG | HEART RATE: 60 BPM

## 2025-03-10 LAB
ALBUMIN SERPL ELPH-MCNC: 2.8 G/DL — LOW (ref 3.3–5)
ALP SERPL-CCNC: 70 U/L — SIGNIFICANT CHANGE UP (ref 40–120)
ALT FLD-CCNC: 36 U/L — SIGNIFICANT CHANGE UP (ref 4–41)
ANION GAP SERPL CALC-SCNC: 14 MMOL/L — SIGNIFICANT CHANGE UP (ref 7–14)
AST SERPL-CCNC: 17 U/L — SIGNIFICANT CHANGE UP (ref 4–40)
BASOPHILS # BLD AUTO: 0.01 K/UL — SIGNIFICANT CHANGE UP (ref 0–0.2)
BASOPHILS NFR BLD AUTO: 0.1 % — SIGNIFICANT CHANGE UP (ref 0–2)
BILIRUB DIRECT SERPL-MCNC: <0.2 MG/DL — SIGNIFICANT CHANGE UP (ref 0–0.3)
BILIRUB INDIRECT FLD-MCNC: >0.2 MG/DL — SIGNIFICANT CHANGE UP (ref 0–1)
BILIRUB SERPL-MCNC: 0.4 MG/DL — SIGNIFICANT CHANGE UP (ref 0.2–1.2)
BUN SERPL-MCNC: 63 MG/DL — HIGH (ref 7–23)
CALCIUM SERPL-MCNC: 8.2 MG/DL — LOW (ref 8.4–10.5)
CHLORIDE SERPL-SCNC: 102 MMOL/L — SIGNIFICANT CHANGE UP (ref 98–107)
CO2 SERPL-SCNC: 17 MMOL/L — LOW (ref 22–31)
CREAT SERPL-MCNC: 1.7 MG/DL — HIGH (ref 0.5–1.3)
EGFR: 43 ML/MIN/1.73M2 — LOW
EGFR: 43 ML/MIN/1.73M2 — LOW
EOSINOPHIL # BLD AUTO: 0.09 K/UL — SIGNIFICANT CHANGE UP (ref 0–0.5)
EOSINOPHIL NFR BLD AUTO: 1.1 % — SIGNIFICANT CHANGE UP (ref 0–6)
GLUCOSE SERPL-MCNC: 116 MG/DL — HIGH (ref 70–99)
HCT VFR BLD CALC: 32.5 % — LOW (ref 39–50)
HGB BLD-MCNC: 11.1 G/DL — LOW (ref 13–17)
IANC: 7.1 K/UL — SIGNIFICANT CHANGE UP (ref 1.8–7.4)
IMM GRANULOCYTES NFR BLD AUTO: 0.8 % — SIGNIFICANT CHANGE UP (ref 0–0.9)
LYMPHOCYTES # BLD AUTO: 0.86 K/UL — LOW (ref 1–3.3)
LYMPHOCYTES # BLD AUTO: 10.1 % — LOW (ref 13–44)
MAGNESIUM SERPL-MCNC: 1.8 MG/DL — SIGNIFICANT CHANGE UP (ref 1.6–2.6)
MCHC RBC-ENTMCNC: 30.2 PG — SIGNIFICANT CHANGE UP (ref 27–34)
MCHC RBC-ENTMCNC: 34.2 G/DL — SIGNIFICANT CHANGE UP (ref 32–36)
MCV RBC AUTO: 88.3 FL — SIGNIFICANT CHANGE UP (ref 80–100)
MONOCYTES # BLD AUTO: 0.41 K/UL — SIGNIFICANT CHANGE UP (ref 0–0.9)
MONOCYTES NFR BLD AUTO: 4.8 % — SIGNIFICANT CHANGE UP (ref 2–14)
NEUTROPHILS # BLD AUTO: 7.1 K/UL — SIGNIFICANT CHANGE UP (ref 1.8–7.4)
NEUTROPHILS NFR BLD AUTO: 83.1 % — HIGH (ref 43–77)
NRBC # BLD AUTO: 0 K/UL — SIGNIFICANT CHANGE UP (ref 0–0)
NRBC # FLD: 0 K/UL — SIGNIFICANT CHANGE UP (ref 0–0)
NRBC BLD AUTO-RTO: 0 /100 WBCS — SIGNIFICANT CHANGE UP (ref 0–0)
PHOSPHATE SERPL-MCNC: 3 MG/DL — SIGNIFICANT CHANGE UP (ref 2.5–4.5)
PLATELET # BLD AUTO: 210 K/UL — SIGNIFICANT CHANGE UP (ref 150–400)
POTASSIUM SERPL-MCNC: 4.1 MMOL/L — SIGNIFICANT CHANGE UP (ref 3.5–5.3)
POTASSIUM SERPL-SCNC: 4.1 MMOL/L — SIGNIFICANT CHANGE UP (ref 3.5–5.3)
PROT SERPL-MCNC: 5.8 G/DL — LOW (ref 6–8.3)
RBC # BLD: 3.68 M/UL — LOW (ref 4.2–5.8)
RBC # FLD: 16.4 % — HIGH (ref 10.3–14.5)
SODIUM SERPL-SCNC: 133 MMOL/L — LOW (ref 135–145)
WBC # BLD: 8.54 K/UL — SIGNIFICANT CHANGE UP (ref 3.8–10.5)
WBC # FLD AUTO: 8.54 K/UL — SIGNIFICANT CHANGE UP (ref 3.8–10.5)

## 2025-03-10 PROCEDURE — 99232 SBSQ HOSP IP/OBS MODERATE 35: CPT

## 2025-03-10 RX ORDER — COLCHICINE 0.6 MG/1
1 TABLET, FILM COATED ORAL
Qty: 15 | Refills: 0
Start: 2025-03-10 | End: 2025-04-08

## 2025-03-10 RX ORDER — TORSEMIDE 10 MG
1 TABLET ORAL
Qty: 0 | Refills: 0 | DISCHARGE
Start: 2025-03-10

## 2025-03-10 RX ORDER — PREDNISONE 20 MG/1
2 TABLET ORAL
Qty: 15 | Refills: 0
Start: 2025-03-10 | End: 2025-03-21

## 2025-03-10 RX ADMIN — COLCHICINE 0.6 MILLIGRAM(S): 0.6 TABLET, FILM COATED ORAL at 11:30

## 2025-03-10 RX ADMIN — FINASTERIDE 5 MILLIGRAM(S): 1 TABLET, FILM COATED ORAL at 11:30

## 2025-03-10 RX ADMIN — APIXABAN 5 MILLIGRAM(S): 2.5 TABLET, FILM COATED ORAL at 17:36

## 2025-03-10 RX ADMIN — APIXABAN 5 MILLIGRAM(S): 2.5 TABLET, FILM COATED ORAL at 06:17

## 2025-03-10 RX ADMIN — SACUBITRIL AND VALSARTAN 1 TABLET(S): 49; 51 TABLET, FILM COATED ORAL at 17:36

## 2025-03-10 RX ADMIN — CLOPIDOGREL BISULFATE 75 MILLIGRAM(S): 75 TABLET, FILM COATED ORAL at 11:30

## 2025-03-10 RX ADMIN — Medication 20 MILLIGRAM(S): at 13:37

## 2025-03-10 RX ADMIN — Medication 20 MILLIGRAM(S): at 06:17

## 2025-03-10 RX ADMIN — METHYLPREDNISOLONE ACETATE 30 MILLIGRAM(S): 80 INJECTION, SUSPENSION INTRA-ARTICULAR; INTRALESIONAL; INTRAMUSCULAR; SOFT TISSUE at 06:17

## 2025-03-10 RX ADMIN — Medication 175 MICROGRAM(S): at 04:50

## 2025-03-10 NOTE — PROGRESS NOTE ADULT - SUBJECTIVE AND OBJECTIVE BOX
[facility]  [id number]    INTERVAL HPI/OVERNIGHT EVENTS:    MEDICATIONS  (STANDING):  apixaban 5 milliGRAM(s) Oral every 12 hours  atorvastatin 40 milliGRAM(s) Oral at bedtime  cabergoline 1 milliGRAM(s) Oral <User Schedule>  clopidogrel Tablet 75 milliGRAM(s) Oral daily  colchicine 0.6 milliGRAM(s) Oral every other day  finasteride 5 milliGRAM(s) Oral daily  levothyroxine 175 MICROGram(s) Oral daily  sacubitril 24 mG/valsartan 26 mG 1 Tablet(s) Oral two times a day  tafamidis (Vyndamax) 61 mg capsule 1 Capsule(s) 1 Capsule(s) Oral daily  tamsulosin 0.4 milliGRAM(s) Oral at bedtime  torsemide 20 milliGRAM(s) Oral two times a day    MEDICATIONS  (PRN):  polyethylene glycol 3350 17 Gram(s) Oral daily PRN for constipation      Allergies    No Known Allergies    Intolerances        Review of Systems:   General: No fevers/chills, no fatigue  HEENT: No blurry vision, dysphagia, or odynophagia  CVS: No CP/palpitations  Resp: No SOB/wheezing  GI: No N/V/C/D/abdominal pain  MSK:   Skin: No new rashes  Neuro: No headaches      Vital Signs Last 24 Hrs  T(C): 36.4 (10 Mar 2025 06:17), Max: 36.6 (09 Mar 2025 11:35)  T(F): 97.6 (10 Mar 2025 06:17), Max: 97.9 (09 Mar 2025 11:35)  HR: 81 (10 Mar 2025 06:17) (81 - 83)  BP: 98/64 (10 Mar 2025 06:17) (97/71 - 104/78)  BP(mean): --  RR: 18 (10 Mar 2025 06:17) (18 - 18)  SpO2: 100% (10 Mar 2025 06:17) (100% - 100%)    Parameters below as of 10 Mar 2025 06:17  Patient On (Oxygen Delivery Method): room air        Physical Exam:  General: NAD  HEENT: EOMI, MMM  Cardio: +S1/S2, RRR  Resp: CTA b/l  GI: +BS, soft, NT/ND  MSK:  Neuro: AAOx3  Psych: wnl    LABS:                        11.1   8.54  )-----------( 210      ( 10 Mar 2025 06:01 )             32.5     03-10    133[L]  |  102  |  63[H]  ----------------------------<  116[H]  4.1   |  17[L]  |  1.70[H]    Ca    8.2[L]      10 Mar 2025 06:01  Phos  3.0     03-10  Mg     1.80     03-10    TPro  5.8[L]  /  Alb  2.8[L]  /  TBili  0.4  /  DBili  <0.2  /  AST  17  /  ALT  36  /  AlkPhos  70  03-10      Urinalysis Basic - ( 10 Mar 2025 06:01 )    Color: x / Appearance: x / SG: x / pH: x  Gluc: 116 mg/dL / Ketone: x  / Bili: x / Urobili: x   Blood: x / Protein: x / Nitrite: x   Leuk Esterase: x / RBC: x / WBC x   Sq Epi: x / Non Sq Epi: x / Bacteria: x          RADIOLOGY & ADDITIONAL TESTS:

## 2025-03-10 NOTE — PROGRESS NOTE ADULT - ASSESSMENT
69M w/ PMH of CKD, CHF 2/2 Cardiac amyloidosis, afib, HTN OA, gout who presents for R foot and L hand pain    #Acute oligoarticular gout flare now improved with steroids.   taper steroids as follows:   prednisone 20 mg daily x 3 days, then   prednisone 15 mg daily x 3 days, then   prednisone 10 mg daily x 3 days, then   prednisone 5 mg daily x 3 days, then stop   continue with colchicine 0.6 mg every other day   follow up with Dr. Austin as outpatient to initiate ULT    Dr. Jazmine Engel  Rheumatology Attending  [facility]  Rheumatology at [place]     Contact:   call the office:: [phone] or reach [place] [employer] Teams, weekdays before 5 pm   after 5 pm or on weekends, contact [phone]

## 2025-03-10 NOTE — PROGRESS NOTE ADULT - SUBJECTIVE AND OBJECTIVE BOX
Cardiovascular Disease Progress Note  DATE OF SERVICE: 03-10-25 @ 08:43    Overnight events: No acute events overnight.    The patient denies chest pain or SOB.   Otherwise review of systems negative    Objective Findings:  T(C): 36.4 (03-10-25 @ 06:17), Max: 36.6 (03-09-25 @ 11:35)  HR: 81 (03-10-25 @ 06:17) (81 - 83)  BP: 98/64 (03-10-25 @ 06:17) (97/71 - 104/78)  RR: 18 (03-10-25 @ 06:17) (18 - 18)  SpO2: 100% (03-10-25 @ 06:17) (100% - 100%)  Wt(kg): --  Daily     Daily       Physical Exam:  Gen: NAD; Patient resting comfortably  HEENT: EOMI, Normocephalic/ atraumatic  CV: RRR, normal S1 + S2, no m/r/g  Lungs:  Normal respiratory effort; clear to auscultation bilaterally  Abd: soft, non-tender; bowel sounds present  Ext: No edema; warm and well perfused    Telemetry: Sinus    Laboratory Data:                        11.1   8.54  )-----------( 210      ( 10 Mar 2025 06:01 )             32.5     03-10    133[L]  |  102  |  63[H]  ----------------------------<  116[H]  4.1   |  17[L]  |  1.70[H]    Ca    8.2[L]      10 Mar 2025 06:01  Phos  3.0     03-10  Mg     1.80     03-10    TPro  5.8[L]  /  Alb  2.8[L]  /  TBili  0.4  /  DBili  <0.2  /  AST  17  /  ALT  36  /  AlkPhos  70  03-10              Inpatient Medications:  MEDICATIONS  (STANDING):  apixaban 5 milliGRAM(s) Oral every 12 hours  atorvastatin 40 milliGRAM(s) Oral at bedtime  cabergoline 1 milliGRAM(s) Oral <User Schedule>  clopidogrel Tablet 75 milliGRAM(s) Oral daily  colchicine 0.6 milliGRAM(s) Oral every other day  finasteride 5 milliGRAM(s) Oral daily  levothyroxine 175 MICROGram(s) Oral daily  sacubitril 24 mG/valsartan 26 mG 1 Tablet(s) Oral two times a day  tafamidis (Vyndamax) 61 mg capsule 1 Capsule(s) 1 Capsule(s) Oral daily  tamsulosin 0.4 milliGRAM(s) Oral at bedtime  torsemide 20 milliGRAM(s) Oral two times a day      Assessment: 69 year old man with systolic CHF, afib on Eliquis, HTN, and cardiac amyloidosis presents with acute gout.     Plan of Care:    #Compensated systolic CHF-  Known cardiac amyloidosis.  Mr. Joaquin is not grossly fluid overloaded on my exam.  Continue home dose PO diuretics.   Trial off of nasal cannula please.     #Elevated troponins-  No chest pain and EKG is nonischemic.  Troponins are chronically elevated in the setting of CKD, as per chart review.    #Cardiac amyloidosis-  Home dose tafamidis.     #A-fib/flutter-  Continue Eliquis.    #ACP (advance care planning)-  CPT 83557  Advanced care planning was discussed with the patient.  Advance care planning forms were discussed. Risks, benefits and alternatives of medical treatment and procedures were discussed in detail and all questions were answered.   30 additional minutes spent addressing advance care plans.         Over 55 minutes spent on total encounter; 100% of the visit was spent counseling and/or coordinating care by the attending physician.      Onel Lugo MD [facility]  Cardiovascular Disease  [phone]

## 2025-03-10 NOTE — PROGRESS NOTE ADULT - ASSESSMENT
69M w/ PMH of CKD, CHF 2/2 Cardiac amyloidosis, afib, HTN OA, gout who presents for R foot and L hand pain. Rheumatology consulted for gout flare evaluation.     #Acute oligoarticular gout flare: R first toe interphalangeal joint, R 1st MTP joint, L 2nd and 3rd PIP finger joint  -Risk factors: CKD, CHF  -uric acid : 12.3 on 2/19/2025  -Had 2 admissions 2/2025 for gout flare, was on steroid taper  -Meets criteria for urate lowering therapy: has had 2+ gout flares in 1 year and has a previous uric acid level >9 in setting of CKD  -Uric acid level goal should be < 6  -HLA  negative  -Xrays knee with OA, ankle xrays without erosions      Note incomplete

## 2025-03-10 NOTE — PROGRESS NOTE ADULT - SUBJECTIVE AND OBJECTIVE BOX
Date of service: 03-10-25 @ 16:11      Patient is a 69y old  Male who presents with a chief complaint of Edema (08 Mar 2025 19:05)                                                               INTERVAL HPI/OVERNIGHT EVENTS:    REVIEW OF SYSTEMS:     CONSTITUTIONAL: No weakness, fevers or chills  EYES/ENT: No visual changes , no ear ache   NECK: No pain or stiffness  RESPIRATORY: No cough, wheezing,  No shortness of breath  CARDIOVASCULAR: No chest pain or palpitations  GASTROINTESTINAL: No abdominal pain  . No nausea, vomiting, or hematemesis; No diarrhea or constipation. No melena or hematochezia.  GENITOURINARY: No dysuria, frequency or hematuria  NEUROLOGICAL: No numbness or weakness  SKIN: No itching, burning, rashes, or lesions                                                                                                                                                                                                                                                                                 Medications:  MEDICATIONS  (STANDING):  apixaban 5 milliGRAM(s) Oral every 12 hours  atorvastatin 40 milliGRAM(s) Oral at bedtime  cabergoline 1 milliGRAM(s) Oral <User Schedule>  clopidogrel Tablet 75 milliGRAM(s) Oral daily  colchicine 0.6 milliGRAM(s) Oral every other day  finasteride 5 milliGRAM(s) Oral daily  levothyroxine 175 MICROGram(s) Oral daily  sacubitril 24 mG/valsartan 26 mG 1 Tablet(s) Oral two times a day  tafamidis (Vyndamax) 61 mg capsule 1 Capsule(s) 1 Capsule(s) Oral daily  tamsulosin 0.4 milliGRAM(s) Oral at bedtime  torsemide 20 milliGRAM(s) Oral two times a day    MEDICATIONS  (PRN):  polyethylene glycol 3350 17 Gram(s) Oral daily PRN for constipation       Allergies    No Known Allergies    Intolerances      Vital Signs Last 24 Hrs  T(C): 36.3 (10 Mar 2025 12:00), Max: 36.6 (09 Mar 2025 18:30)  T(F): 97.4 (10 Mar 2025 12:00), Max: 97.8 (09 Mar 2025 18:30)  HR: 86 (10 Mar 2025 12:00) (81 - 86)  BP: 92/62 (10 Mar 2025 12:00) (92/62 - 103/66)  BP(mean): --  RR: 17 (10 Mar 2025 12:00) (17 - 18)  SpO2: 96% (10 Mar 2025 12:00) (96% - 100%)    Parameters below as of 10 Mar 2025 12:00  Patient On (Oxygen Delivery Method): room air      CAPILLARY BLOOD GLUCOSE      POCT Blood Glucose.: 149 mg/dL (09 Mar 2025 22:40)      03-09 @ 07:01  -  03-10 @ 07:00  --------------------------------------------------------  IN: 220 mL / OUT: 125 mL / NET: 95 mL      Physical Exam:    Daily     Daily   General:  Well appearing, NAD, not cachetic  HEENT:  Nonicteric, PERRLA  CV:  RRR, S1S2   Lungs:  CTA B/L, no wheezes, rales, rhonchi  Abdomen:  Soft, non-tender, no distended, positive BS  Extremities:  eddema ( iproving )   Neuro:  AAOx3, non-focal, grossly intact                                                                                                                                                                                                                                                                                                LABS:                               11.1   8.54  )-----------( 210      ( 10 Mar 2025 06:01 )             32.5                      03-10    133[L]  |  102  |  63[H]  ----------------------------<  116[H]  4.1   |  17[L]  |  1.70[H]    Ca    8.2[L]      10 Mar 2025 06:01  Phos  3.0     03-10  Mg     1.80     03-10    TPro  5.8[L]  /  Alb  2.8[L]  /  TBili  0.4  /  DBili  <0.2  /  AST  17  /  ALT  36  /  AlkPhos  70  03-10                       RADIOLOGY & ADDITIONAL TESTS         I personally reviewed: [  ]EKG   [  ]CXR    [  ] CT      A/P:         Discussed with :     Melonie consultants' Notes   Time spent :

## 2025-03-10 NOTE — PROGRESS NOTE ADULT - SUBJECTIVE AND OBJECTIVE BOX
Date of Service: 03-10-25 @ 11:05    Patient is a 69y old  Male who presents with a chief complaint of Edema (08 Mar 2025 19:05)      Any change in ROS: seems to be doing  ok :  no sob     MEDICATIONS  (STANDING):  apixaban 5 milliGRAM(s) Oral every 12 hours  atorvastatin 40 milliGRAM(s) Oral at bedtime  cabergoline 1 milliGRAM(s) Oral <User Schedule>  clopidogrel Tablet 75 milliGRAM(s) Oral daily  colchicine 0.6 milliGRAM(s) Oral every other day  finasteride 5 milliGRAM(s) Oral daily  levothyroxine 175 MICROGram(s) Oral daily  sacubitril 24 mG/valsartan 26 mG 1 Tablet(s) Oral two times a day  tafamidis (Vyndamax) 61 mg capsule 1 Capsule(s) 1 Capsule(s) Oral daily  tamsulosin 0.4 milliGRAM(s) Oral at bedtime  torsemide 20 milliGRAM(s) Oral two times a day    MEDICATIONS  (PRN):  polyethylene glycol 3350 17 Gram(s) Oral daily PRN for constipation    Vital Signs Last 24 Hrs  T(C): 36.4 (10 Mar 2025 06:17), Max: 36.6 (09 Mar 2025 11:35)  T(F): 97.6 (10 Mar 2025 06:17), Max: 97.9 (09 Mar 2025 11:35)  HR: 81 (10 Mar 2025 06:17) (81 - 83)  BP: 98/64 (10 Mar 2025 06:17) (97/71 - 104/78)  BP(mean): --  RR: 18 (10 Mar 2025 06:17) (18 - 18)  SpO2: 100% (10 Mar 2025 06:17) (100% - 100%)    Parameters below as of 10 Mar 2025 06:17  Patient On (Oxygen Delivery Method): room air        I&O's Summary    09 Mar 2025 07:01  -  10 Mar 2025 07:00  --------------------------------------------------------  IN: 220 mL / OUT: 125 mL / NET: 95 mL          Physical Exam:   GENERAL: NAD, well-groomed, well-developed  HEENT: HORACIO/   Atraumatic, Normocephalic  ENMT: No tonsillar erythema, exudates, or enlargement; Moist mucous membranes, Good dentition, No lesions  NECK: Supple, No JVD, Normal thyroid  CHEST/LUNG: Clear to auscultaion  CVS: Regular rate and rhythm; No murmurs, rubs, or gallops  GI: : Soft, Nontender, Nondistended; Bowel sounds present  NERVOUS SYSTEM:  Alert & Oriented X3  EXTREMITIES: - edema  LYMPH: No lymphadenopathy noted  SKIN: No rashes or lesions  ENDOCRINOLOGY: No Thyromegaly  PSYCH: Appropriate    Labs:  26                            11.1   8.54  )-----------( 210      ( 10 Mar 2025 06:01 )             32.5                         12.5   8.11  )-----------( 247      ( 09 Mar 2025 04:56 )             36.5                         11.2   6.91  )-----------( 214      ( 08 Mar 2025 04:45 )             32.8                         11.1   9.43  )-----------( 201      ( 07 Mar 2025 04:39 )             32.9     03-10    133[L]  |  102  |  63[H]  ----------------------------<  116[H]  4.1   |  17[L]  |  1.70[H]  03-09    135  |  103  |  60[H]  ----------------------------<  168[H]  4.2   |  19[L]  |  1.65[H]  03-08    139  |  107  |  58[H]  ----------------------------<  125[H]  4.2   |  19[L]  |  1.71[H]  03-07    138  |  105  |  45[H]  ----------------------------<  169[H]  4.9   |  19[L]  |  1.52[H]    Ca    8.2[L]      10 Mar 2025 06:01  Ca    8.3[L]      09 Mar 2025 04:56  Phos  3.0     03-10  Phos  2.9     03-09  Mg     1.80     03-10  Mg     1.70     03-09    TPro  5.8[L]  /  Alb  2.8[L]  /  TBili  0.4  /  DBili  <0.2  /  AST  17  /  ALT  36  /  AlkPhos  70  03-10  TPro  6.0  /  Alb  3.0[L]  /  TBili  0.4  /  DBili  <0.2  /  AST  22  /  ALT  54[H]  /  AlkPhos  88  03-09  TPro  5.5[L]  /  Alb  2.8[L]  /  TBili  0.4  /  DBili  0.2  /  AST  35  /  ALT  62[H]  /  AlkPhos  93  03-08  TPro  6.8  /  Alb  3.5  /  TBili  1.1  /  DBili  x   /  AST  41[H]  /  ALT  75[H]  /  AlkPhos  117  03-07    CAPILLARY BLOOD GLUCOSE      POCT Blood Glucose.: 149 mg/dL (09 Mar 2025 22:40)      LIVER FUNCTIONS - ( 10 Mar 2025 06:01 )  Alb: 2.8 g/dL / Pro: 5.8 g/dL / ALK PHOS: 70 U/L / ALT: 36 U/L / AST: 17 U/L / GGT: x             Urinalysis Basic - ( 10 Mar 2025 06:01 )    Color: x / Appearance: x / SG: x / pH: x  Gluc: 116 mg/dL / Ketone: x  / Bili: x / Urobili: x   Blood: x / Protein: x / Nitrite: x   Leuk Esterase: x / RBC: x / WBC x   Sq Epi: x / Non Sq Epi: x / Bacteria: x        rad< from: CT Chest No Cont (03.06.25 @ 19:13) >    IMPRESSION:  Few scattered round glass and nodular opacities bilaterally, which are   nonspecific and may be infectious or inflammatory. No lobar consolidation.    1.9 cm solid pulmonary nodule in the right middle lobe. This has   increased in size fromthe prior study dated 10/30/2024. PET/CT suggested   for further evaluation.    --- End of Report ---          ARON STONE MD; Resident Radiologist  This document has been electronically signed.  CHRISTOPHER STOCK MD; Attending Radiologist  This document has been electronically signed. Mar  7 2025  7:55AM    < end of copied text >      RECENT CULTURES:        RESPIRATORY CULTURES:          Studies  Chest X-RAY  CT SCAN Chest   Venous Dopplers: LE:   CT Abdomen  Others

## 2025-03-10 NOTE — PROGRESS NOTE ADULT - ASSESSMENT
69Y M PMH CHF, afib on Eliquis, HTN, osteoarthritis, gout, pituitary mass, with two recent admissions for acute polyarticular gout involving left foot / L ankle .. now presenting with tcp and sob aswell as RLE pain     CP /SOB this am .   no fever or chills   compliant with his meds     RLE pain most in foot and ankle   has been tapering his steorids as he was instructed however now feels flare   pt improvent wit sob post diuresis   imprving R foot pain s/p steroids         acute gout  :   cont steroids as ordered : will need a very slow taper and possibly chronic low dose ?    fu with rheum     hold off on allopurinol   on colchicine qod       CP : admit to tele   fu cards    SOB : suspect element of acute R sided  CHF   cont oral diuresis   improved     afib : restarted eliquis       cardiac amyloid : cont meds : pharmacy to order       Pulmonary nodule.:  hx   ·  Recommendation: PET-CT (read) as an outpatient with RML lung nodule 18 x 16 mm is not hypermetabolic, showing mild activity (SUV 1.3)  -Reviewed images from CT A/P (lower chest) from 10/2024  -Likely hamartoma. No plans for biopsy at this time, suggest eventual repeat CT chest as an outpatient.

## 2025-03-10 NOTE — DISCHARGE NOTE NURSING/CASE MANAGEMENT/SOCIAL WORK - FINANCIAL ASSISTANCE
[facility] provides services at a reduced cost to those who are determined to be eligible through [facility]’s financial assistance program. Information regarding [facility]’s financial assistance program can be found by going to [email or web address] or by calling [phone].

## 2025-03-10 NOTE — PROGRESS NOTE ADULT - ASSESSMENT
69Y M PMH CHF, afib on Eliquis, HTN, osteoarthritis, gout, pituitary mass, with two recent admissions for acute polyarticular gout involving left foot / L ankle .. now presenting with tcp and sob aswell as RLE pain   CP /SOB this am . no fever or chills compliant with his meds RLE pain most in foot and ankle has been tapering his steorids as he was instructed however now feels flare pt improvent wit sob post diuresis imprving R foot pain s/p steroids   now pulm called fro evaluation       Pulmonary Nodule  Gout flare;   A fibrillation  HTN  OA  Pituitary mass    Pulmonary Nodule  the ct chest showed: 3/6/2025: Few scattered round glass and nodular opacities bilaterally, which are nonspecific and may be infectious or inflammatory. No lobar consolidation. 1.9 cm solid pulmonary nodule in the right middle lobe. This has increased in size fromthe prior study dated 10/30/2024. PET/CT suggested for further evaluation.  this  must be evaluated soon after dc with ct pet and needs to follow up with thoracic as well as with pulmonary after dc:  especially since it has increased in size   3/8: seems OK:  no sob;  pulm plan as above   3/9: pulm wise OK:  discussed about pet scan : as nodule is increasing in size:   3/10:  seems OK;  he is aware of his pulm nodule:  he says he got pet scan few weeks ago :    and is being followed regularly;  as an outpt    Gout flare;   on steroids   3/9: cont to be on steroids   3/10; steroids per primary team   A fibrillation  on eliquis   HTN  controlled   OA  pain control    Pituitary mass  per primary team     dw acp

## 2025-03-10 NOTE — DISCHARGE NOTE NURSING/CASE MANAGEMENT/SOCIAL WORK - PATIENT PORTAL LINK FT
You can access the FollowMyHealth Patient Portal offered by [facility] by registering at the following website: [email or web address]. By joining [email or web address]’s FollowMyHealth portal, you will also be able to view your health information using other applications (apps) compatible with our system.

## 2025-03-10 NOTE — PROGRESS NOTE ADULT - SUBJECTIVE AND OBJECTIVE BOX
[facility]  [id number]    INTERVAL HPI/OVERNIGHT EVENTS: pain in the ankle and hand improved with steroids.   able to ambulate better  took a shower    MEDICATIONS  (STANDING):  apixaban 5 milliGRAM(s) Oral every 12 hours  atorvastatin 40 milliGRAM(s) Oral at bedtime  cabergoline 1 milliGRAM(s) Oral <User Schedule>  clopidogrel Tablet 75 milliGRAM(s) Oral daily  colchicine 0.6 milliGRAM(s) Oral every other day  finasteride 5 milliGRAM(s) Oral daily  levothyroxine 175 MICROGram(s) Oral daily  sacubitril 24 mG/valsartan 26 mG 1 Tablet(s) Oral two times a day  tafamidis (Vyndamax) 61 mg capsule 1 Capsule(s) 1 Capsule(s) Oral daily  tamsulosin 0.4 milliGRAM(s) Oral at bedtime  torsemide 20 milliGRAM(s) Oral two times a day    MEDICATIONS  (PRN):  polyethylene glycol 3350 17 Gram(s) Oral daily PRN for constipation      Allergies    No Known Allergies      Review of Systems:   General: No fevers/chills, no fatigue  CVS: No CP/palpitations  Resp: No SOB/wheezing  GI: No N/V/C/D/abdominal pain  MSK: joint pain improved  Skin: No new rashes  Neuro: No headaches      Vital Signs Last 24 Hrs  T(C): 36.3 (10 Mar 2025 12:00), Max: 36.6 (09 Mar 2025 18:30)  T(F): 97.4 (10 Mar 2025 12:00), Max: 97.8 (09 Mar 2025 18:30)  HR: 86 (10 Mar 2025 12:00) (81 - 86)  BP: 92/62 (10 Mar 2025 12:00) (92/62 - 103/66)  BP(mean): --  RR: 17 (10 Mar 2025 12:00) (17 - 18)  SpO2: 96% (10 Mar 2025 12:00) (96% - 100%)    Parameters below as of 10 Mar 2025 12:00  Patient On (Oxygen Delivery Method): room air        Physical Exam:  General: NAD  HEENT: EOMI, MMM  MSK: no synovitis; normal range of motion b/l ankles  Neuro: AAOx3  Psych: wnl    LABS:                        11.1   8.54  )-----------( 210      ( 10 Mar 2025 06:01 )             32.5     03-10    133[L]  |  102  |  63[H]  ----------------------------<  116[H]  4.1   |  17[L]  |  1.70[H]    Ca    8.2[L]      10 Mar 2025 06:01  Phos  3.0     03-10  Mg     1.80     03-10    TPro  5.8[L]  /  Alb  2.8[L]  /  TBili  0.4  /  DBili  <0.2  /  AST  17  /  ALT  36  /  AlkPhos  70  03-10      Urinalysis Basic - ( 10 Mar 2025 06:01 )    Color: x / Appearance: x / SG: x / pH: x  Gluc: 116 mg/dL / Ketone: x  / Bili: x / Urobili: x   Blood: x / Protein: x / Nitrite: x   Leuk Esterase: x / RBC: x / WBC x   Sq Epi: x / Non Sq Epi: x / Bacteria: x          RADIOLOGY & ADDITIONAL TESTS:

## 2025-03-10 NOTE — PROGRESS NOTE ADULT - ASSESSMENT
70 y/o M PMhx CHF, afib on Eliquis, HTN, osteoarthritis, gout, pituitary mass, with two recent admissions for acute polyarticular gout involving left foot / L ankle who presented w/ SOB and R foot/ ankle pain.    Gout  R foot/ ankle edema  no evidence of erythema, no warmth, no cellulitic changes  on solumedrol  rheumatology following    leukocytosis  likely reactive 2/2 gout vs 2/2 steroids    abnormal lung imaging  - CT chest- Few scattered round glass and nodular opacities bilaterally, which are nonspecific and may be infectious or inflammatory. No lobar consolidation. 1.9 cm solid pulmonary nodule in the right middle lobe. This has increased in size from the prior study dated 10/30/2024.  - states he had a PET scan last month and was told it was nothing to worry about needed to continue to have it monitored    Recommendations  continue off antibiotics  steroids taper per rheumatology  he will need to continue to monitor lung nodule, defer to pulm    We will sign off. Thank you for allowing us to participate in the care of Mr. Joaquin. Please feel free to call with any questions or concerns.     Oh Petersen M.D.  [facility]  [phone]  After 5pm on weekdays and all day on weekends - please call [phone]  Available on [employer] teams    Thank you for consulting us and involving us in the management of this patients case. In addition to reviewing history, imaging, documents, labs, microbiology, took into account antibiotic stewardship, local antibiogram and infection control strategies and potential transmission issues.

## 2025-03-10 NOTE — DISCHARGE NOTE NURSING/CASE MANAGEMENT/SOCIAL WORK - NSDCFUADDAPPT_GEN_ALL_CORE_FT
Follow up with PCP within 1-2 weeks of discharge. If you are in need of a general medicine physician and post-discharge medical follow-up for further care/recommendations you may contact the [facility] for an appointment at [phone].     Follow up with rheumatology within 1-2 weeks of discharge.     Follow up with cardiology within 1-2 weeks of discharge. If you are in need of a general cardiologist after discharge, you may contact the [facility] for an appointment at [phone].

## 2025-03-10 NOTE — PROGRESS NOTE ADULT - PROVIDER SPECIALTY LIST ADULT
Cardiology
Cardiology
Internal Medicine
Pulmonology
Pulmonology
Rheumatology
Cardiology
Cardiology
Infectious Disease
Internal Medicine
Rheumatology
Rheumatology
Internal Medicine
Internal Medicine
Pulmonology

## 2025-03-10 NOTE — PROGRESS NOTE ADULT - SUBJECTIVE AND OBJECTIVE BOX
[facility]  FRANC Jacobs Y. Patel, S. Shah, G. Casimir  [phone]  ([phone] - weekdays after 5pm and weekends)    Name: MUSTAPHA CANTU  Age/Gender: 69y Male  MRN: [id number]    Interval History:  Notes reviewed.   No concerning overnight events.  Afebrile.   states R ankle pain is much better    Allergies: No Known Allergies      Objective:  Vitals:   T(F): 97.4 (03-10-25 @ 12:00), Max: 97.8 (03-09-25 @ 18:30)  HR: 86 (03-10-25 @ 12:00) (81 - 86)  BP: 92/62 (03-10-25 @ 12:00) (92/62 - 103/66)  RR: 17 (03-10-25 @ 12:00) (17 - 18)  SpO2: 96% (03-10-25 @ 12:00) (96% - 100%)  Physical Examination:  General: no acute distress  HEENT: anicteric  Cardio: S1, S2, normal rate  Resp: breathing comfortably on RA   Abd: soft, NT, ND  Ext: R ankle ROM improved  Skin: warm, dry    Laboratory Studies:  CBC:                       11.1   8.54  )-----------( 210      ( 10 Mar 2025 06:01 )             32.5     WBC Trend:  8.54 03-10-25 @ 06:01  8.11 03-09-25 @ 04:56  6.91 03-08-25 @ 04:45  9.43 03-07-25 @ 04:39  11.16 03-06-25 @ 04:20    CMP: 03-10    133[L]  |  102  |  63[H]  ----------------------------<  116[H]  4.1   |  17[L]  |  1.70[H]    Ca    8.2[L]      10 Mar 2025 06:01  Phos  3.0     03-10  Mg     1.80     03-10    TPro  5.8[L]  /  Alb  2.8[L]  /  TBili  0.4  /  DBili  <0.2  /  AST  17  /  ALT  36  /  AlkPhos  70  03-10      LIVER FUNCTIONS - ( 10 Mar 2025 06:01 )  Alb: 2.8 g/dL / Pro: 5.8 g/dL / ALK PHOS: 70 U/L / ALT: 36 U/L / AST: 17 U/L / GGT: x             Urinalysis Basic - ( 10 Mar 2025 06:01 )    Color: x / Appearance: x / SG: x / pH: x  Gluc: 116 mg/dL / Ketone: x  / Bili: x / Urobili: x   Blood: x / Protein: x / Nitrite: x   Leuk Esterase: x / RBC: x / WBC x   Sq Epi: x / Non Sq Epi: x / Bacteria: x      Microbiology: reviewed       Radiology: reviewed     Medications:  apixaban 5 milliGRAM(s) Oral every 12 hours  atorvastatin 40 milliGRAM(s) Oral at bedtime  cabergoline 1 milliGRAM(s) Oral <User Schedule>  clopidogrel Tablet 75 milliGRAM(s) Oral daily  colchicine 0.6 milliGRAM(s) Oral every other day  finasteride 5 milliGRAM(s) Oral daily  levothyroxine 175 MICROGram(s) Oral daily  polyethylene glycol 3350 17 Gram(s) Oral daily PRN  sacubitril 24 mG/valsartan 26 mG 1 Tablet(s) Oral two times a day  tafamidis (Vyndamax) 61 mg capsule 1 Capsule(s) 1 Capsule(s) Oral daily  tamsulosin 0.4 milliGRAM(s) Oral at bedtime  torsemide 20 milliGRAM(s) Oral two times a day    Antimicrobials:

## 2025-03-24 ENCOUNTER — APPOINTMENT (OUTPATIENT)
Dept: RHEUMATOLOGY | Facility: CLINIC | Age: 70
End: 2025-03-24

## 2025-03-26 ENCOUNTER — APPOINTMENT (OUTPATIENT)
Dept: CARDIOLOGY | Facility: CLINIC | Age: 70
End: 2025-03-26

## 2025-03-28 ENCOUNTER — RX RENEWAL (OUTPATIENT)
Age: 70
End: 2025-03-28

## 2025-03-28 RX ORDER — VUTRISIRAN 25 MG/.5ML
25 INJECTION SUBCUTANEOUS
Qty: 1 | Refills: 2 | Status: ACTIVE | COMMUNITY
Start: 2024-03-13 | End: 1900-01-01

## 2025-05-16 NOTE — ED CDU PROVIDER SUBSEQUENT DAY NOTE - NS ED MD PROGRESS NOTE ADD
----- Message from Tiffanie Solorzano MD sent at 5/16/2025 12:57 PM CDT -----  Labs reviewed stable/within normal limits please advise patient   Add Progress Note...

## 2025-06-05 NOTE — SWALLOW VFSS/MBS ASSESSMENT ADULT - UNSUCCESSFUL STRATEGIES TRIALED DURING PROCEDURE
Session ID: [id number]  Session Duration: 23 minutes  Language: Beninese   ID: #[id number]   Name: Jaime Chin Tuck (deep penetration to the vocal folds); Head Turn RIGHT

## (undated) DEVICE — WARMING BLANKET LOWER ADULT

## (undated) DEVICE — POSITIONER FOAM EGG CRATE ULNAR 2PCS (PINK)

## (undated) DEVICE — SUT SOFSILK 3-0 18" TIES

## (undated) DEVICE — BLADE SCALPEL SAFETYLOCK #11

## (undated) DEVICE — DRAPE IOBAN 13" X 13"

## (undated) DEVICE — GLV 8 PROTEXIS (WHITE)

## (undated) DEVICE — VESSEL LOOP MAXI-YELLOW  0.120" X 16"

## (undated) DEVICE — SOL IRR POUR NS 0.9% 500ML

## (undated) DEVICE — TAPE SILK 3"

## (undated) DEVICE — DRSG TEGADERM 6"X8"

## (undated) DEVICE — DRAPE INSTRUMENT POUCH 6.75" X 11"

## (undated) DEVICE — SUT BIOSYN 4-0 18" P-12

## (undated) DEVICE — DRAPE 3/4 SHEET 52X76"

## (undated) DEVICE — DRSG DERMABOND 0.7ML

## (undated) DEVICE — SPONGE PEANUT AUTO COUNT

## (undated) DEVICE — GLV 6.5 PROTEXIS (WHITE)

## (undated) DEVICE — SUCTION YANKAUER NO CONTROL VENT

## (undated) DEVICE — SUT POLYSORB 2-0 18" V-20

## (undated) DEVICE — DRAPE IOBAN 33" X 23"

## (undated) DEVICE — GOWN TRIMAX LG

## (undated) DEVICE — DRSG MASTISOL

## (undated) DEVICE — SUT SILK 4-0 18" TIES

## (undated) DEVICE — SUT SOFSILK 4-0 18" TIES

## (undated) DEVICE — FOLEY TRAY 16FR 5CC LF UMETER CLOSED

## (undated) DEVICE — SYR LUER LOK 3CC

## (undated) DEVICE — SUT SILK 3-0 24" TIES

## (undated) DEVICE — SUT PROLENE 6-0 30" BV-1

## (undated) DEVICE — GLV 8.5 PROTEXIS (WHITE)

## (undated) DEVICE — PREP BETADINE SPONGE STICKS

## (undated) DEVICE — STAPLER SKIN MULTI DIRECTION W35

## (undated) DEVICE — DRSG TAPE UMBILICAL COTTON 2" X 30 X 1/8"

## (undated) DEVICE — VISITEC 4X4

## (undated) DEVICE — ELCTR 4-DISC 20MM 49" (RED, BLUE, GREEN, BLACK)

## (undated) DEVICE — DRAPE TOWEL BLUE 17" X 24"

## (undated) DEVICE — POSITIONER STRAP ARMBOARD VELCRO TS-30

## (undated) DEVICE — WARMING BLANKET FULL UNDERBODY

## (undated) DEVICE — DRAPE SURGICAL #1010

## (undated) DEVICE — NDL HYPO SAFE 25G X 5/8" (ORANGE)

## (undated) DEVICE — SUT PROLENE 6-0 18" C-1

## (undated) DEVICE — NDL HYPO REGULAR BEVEL 25G X 1.5" (BLUE)

## (undated) DEVICE — VENODYNE/SCD SLEEVE CALF MEDIUM

## (undated) DEVICE — TOURNIQUET SET SURE-SNARE 22FR (2 TUBES, 2 UMBILICAL TAPES, 2 PLASTIC SNARES) 5"

## (undated) DEVICE — BLADE SCALPEL SAFETYLOCK #15

## (undated) DEVICE — PREP CHLORAPREP HI-LITE ORANGE 3ML

## (undated) DEVICE — LAP PAD 18 X 18"

## (undated) DEVICE — DRSG STERISTRIPS 0.5 X 4"

## (undated) DEVICE — Device

## (undated) DEVICE — GLV 7.5 PROTEXIS (WHITE)

## (undated) DEVICE — VENODYNE/SCD SLEEVE CALF LARGE

## (undated) DEVICE — PACK PERIPHERAL VASC

## (undated) DEVICE — MARKING PEN W RULER

## (undated) DEVICE — PROTECTOR HEEL / ELBOW FLUFFY

## (undated) DEVICE — URETERAL CATH RED RUBBER 16FR (ORANGE)

## (undated) DEVICE — MEDICATION LABELS W MARKER

## (undated) DEVICE — SOL IRR POUR H2O 250ML

## (undated) DEVICE — DRSG OPSITE 2.5 X 2"

## (undated) DEVICE — SPECIMEN CONTAINER 100ML

## (undated) DEVICE — PACK CAROTID ENDARTERECTOMY

## (undated) DEVICE — DRAPE MAYO STAND 30"

## (undated) DEVICE — SUT PROLENE 6-0 4-30" C-1

## (undated) DEVICE — SUT SILK 2-0 24" TIES

## (undated) DEVICE — ELCTR GROUNDING PAD ADULT COVIDIEN

## (undated) DEVICE — SUT PROLENE 6-0 4-30" BV-1

## (undated) DEVICE — FOLEY TRAY 16FR 5CC LTX UMETER CLOSED

## (undated) DEVICE — GLV 7 PROTEXIS (WHITE)

## (undated) DEVICE — INFLATOR ENCORE 26

## (undated) DEVICE — ELCTR SUBDERMAL NDL CLASSIC 1.5M X 59" (6 COLOR)

## (undated) DEVICE — SUT VICRYL 3-0 18" SH UNDYED (POP-OFF)

## (undated) DEVICE — SUT SOFSILK 2-0 18" TIES

## (undated) DEVICE — DRSG CURITY GAUZE SPONGE 4 X 4" 12-PLY

## (undated) DEVICE — DRAPE MINOR PROCEDURE

## (undated) DEVICE — DRAPE THYROID 77" X 123"

## (undated) DEVICE — DRAPE CHEST BREAST 106" X 122"

## (undated) DEVICE — SUT SOFSILK 0 18" TIES

## (undated) DEVICE — SUT MONOCRYL 4-0 27" PS-2 UNDYED